# Patient Record
Sex: FEMALE | Race: WHITE | NOT HISPANIC OR LATINO | Employment: OTHER | URBAN - METROPOLITAN AREA
[De-identification: names, ages, dates, MRNs, and addresses within clinical notes are randomized per-mention and may not be internally consistent; named-entity substitution may affect disease eponyms.]

---

## 2017-01-11 RX ORDER — ATORVASTATIN CALCIUM 20 MG/1
TABLET, FILM COATED ORAL
Qty: 90 TABLET | Refills: 3 | Status: SHIPPED | OUTPATIENT
Start: 2017-01-11 | End: 2018-01-01 | Stop reason: SDUPTHER

## 2017-01-11 NOTE — TELEPHONE ENCOUNTER
----- Message from Chana Denise sent at 1/11/2017 10:06 AM CST -----  Contact: self/[phone]  Patient would like a refill on carvedilol (COREG) 3.125 MG tablet and atorvastatin (LIPITOR) 20 MG tablet.  Please advise

## 2017-02-03 DIAGNOSIS — M05.741 RHEUMATOID ARTHRITIS INVOLVING BOTH HANDS WITH POSITIVE RHEUMATOID FACTOR: ICD-10-CM

## 2017-02-03 DIAGNOSIS — M05.742 RHEUMATOID ARTHRITIS INVOLVING BOTH HANDS WITH POSITIVE RHEUMATOID FACTOR: ICD-10-CM

## 2017-02-03 DIAGNOSIS — M06.9 RHEUMATOID ARTHRITIS OF HAND, UNSPECIFIED LATERALITY, UNSPECIFIED RHEUMATOID FACTOR PRESENCE: ICD-10-CM

## 2017-02-03 DIAGNOSIS — Z79.631 LONG TERM METHOTREXATE USER: ICD-10-CM

## 2017-02-03 RX ORDER — METHOTREXATE 2.5 MG/1
25 TABLET ORAL
Qty: 40 TABLET | Refills: 0 | Status: SHIPPED | OUTPATIENT
Start: 2017-02-03 | End: 2017-03-03 | Stop reason: SDUPTHER

## 2017-02-06 RX ORDER — ESCITALOPRAM OXALATE 20 MG/1
TABLET ORAL
Qty: 30 TABLET | Refills: 11 | Status: SHIPPED | OUTPATIENT
Start: 2017-02-06 | End: 2017-06-16 | Stop reason: DRUGHIGH

## 2017-02-15 DIAGNOSIS — M81.0 OSTEOPOROSIS: Primary | ICD-10-CM

## 2017-02-17 ENCOUNTER — LAB VISIT (OUTPATIENT)
Dept: LAB | Facility: HOSPITAL | Age: 82
End: 2017-02-17
Attending: INTERNAL MEDICINE
Payer: MEDICARE

## 2017-02-17 DIAGNOSIS — Z79.631 METHOTREXATE, LONG TERM, CURRENT USE: ICD-10-CM

## 2017-02-17 DIAGNOSIS — M06.9 RHEUMATOID ARTHRITIS OF HAND, UNSPECIFIED LATERALITY, UNSPECIFIED RHEUMATOID FACTOR PRESENCE: ICD-10-CM

## 2017-02-17 LAB
ALBUMIN SERPL BCP-MCNC: 3.5 G/DL
ALP SERPL-CCNC: 67 U/L
ALT SERPL W/O P-5'-P-CCNC: 21 U/L
ANION GAP SERPL CALC-SCNC: 5 MMOL/L
AST SERPL-CCNC: 23 U/L
BASOPHILS # BLD AUTO: 0.03 K/UL
BASOPHILS NFR BLD: 0.7 %
BILIRUB SERPL-MCNC: 0.6 MG/DL
BUN SERPL-MCNC: 20 MG/DL
CALCIUM SERPL-MCNC: 9.2 MG/DL
CHLORIDE SERPL-SCNC: 101 MMOL/L
CO2 SERPL-SCNC: 29 MMOL/L
CREAT SERPL-MCNC: 0.9 MG/DL
CRP SERPL-MCNC: 8.8 MG/L
DIFFERENTIAL METHOD: ABNORMAL
EOSINOPHIL # BLD AUTO: 0.2 K/UL
EOSINOPHIL NFR BLD: 4.6 %
ERYTHROCYTE [DISTWIDTH] IN BLOOD BY AUTOMATED COUNT: 14 %
ERYTHROCYTE [SEDIMENTATION RATE] IN BLOOD BY WESTERGREN METHOD: 31 MM/HR
EST. GFR  (AFRICAN AMERICAN): >60 ML/MIN/1.73 M^2
EST. GFR  (NON AFRICAN AMERICAN): 58.1 ML/MIN/1.73 M^2
GLUCOSE SERPL-MCNC: 79 MG/DL
HCT VFR BLD AUTO: 34.5 %
HGB BLD-MCNC: 11.8 G/DL
LYMPHOCYTES # BLD AUTO: 1 K/UL
LYMPHOCYTES NFR BLD: 22.9 %
MCH RBC QN AUTO: 31.5 PG
MCHC RBC AUTO-ENTMCNC: 34.2 %
MCV RBC AUTO: 92 FL
MONOCYTES # BLD AUTO: 0.5 K/UL
MONOCYTES NFR BLD: 11.3 %
NEUTROPHILS # BLD AUTO: 2.6 K/UL
NEUTROPHILS NFR BLD: 60.3 %
PLATELET # BLD AUTO: 210 K/UL
PMV BLD AUTO: 11.3 FL
POTASSIUM SERPL-SCNC: 4.1 MMOL/L
PROT SERPL-MCNC: 7.1 G/DL
RBC # BLD AUTO: 3.75 M/UL
SODIUM SERPL-SCNC: 135 MMOL/L
WBC # BLD AUTO: 4.32 K/UL

## 2017-02-17 PROCEDURE — 36415 COLL VENOUS BLD VENIPUNCTURE: CPT | Mod: PO

## 2017-02-17 PROCEDURE — 85025 COMPLETE CBC W/AUTO DIFF WBC: CPT

## 2017-02-17 PROCEDURE — 80053 COMPREHEN METABOLIC PANEL: CPT

## 2017-02-17 PROCEDURE — 86140 C-REACTIVE PROTEIN: CPT

## 2017-02-17 PROCEDURE — 85651 RBC SED RATE NONAUTOMATED: CPT

## 2017-02-20 ENCOUNTER — TELEPHONE (OUTPATIENT)
Dept: RHEUMATOLOGY | Facility: CLINIC | Age: 82
End: 2017-02-20

## 2017-02-20 NOTE — TELEPHONE ENCOUNTER
----- Message from Isai Smith MD sent at 2/17/2017  4:33 PM CST -----  Please tell pt the crp is 8.8 which is minimal elevation similar to previous. The sodium is minimally low and slightly improved compared with previous. The liver tests are normal, the chronic kidney disease stage 3 is stable for last 7 months. ARISTIDES

## 2017-02-22 ENCOUNTER — OFFICE VISIT (OUTPATIENT)
Dept: RHEUMATOLOGY | Facility: CLINIC | Age: 82
End: 2017-02-22
Payer: MEDICARE

## 2017-02-22 ENCOUNTER — CLINICAL SUPPORT (OUTPATIENT)
Dept: INFECTIOUS DISEASES | Facility: CLINIC | Age: 82
End: 2017-02-22
Payer: MEDICARE

## 2017-02-22 VITALS
DIASTOLIC BLOOD PRESSURE: 74 MMHG | SYSTOLIC BLOOD PRESSURE: 143 MMHG | HEART RATE: 60 BPM | HEIGHT: 61 IN | WEIGHT: 122.13 LBS | BODY MASS INDEX: 23.06 KG/M2

## 2017-02-22 DIAGNOSIS — I10 ESSENTIAL HYPERTENSION: ICD-10-CM

## 2017-02-22 DIAGNOSIS — M17.0 PRIMARY OSTEOARTHRITIS OF BOTH KNEES: ICD-10-CM

## 2017-02-22 DIAGNOSIS — G89.29 CHRONIC PAIN OF RIGHT KNEE: ICD-10-CM

## 2017-02-22 DIAGNOSIS — M85.88 OSTEOPENIA OF SPINE: ICD-10-CM

## 2017-02-22 DIAGNOSIS — M81.0 OP (OSTEOPOROSIS): ICD-10-CM

## 2017-02-22 DIAGNOSIS — M85.80 OSTEOPENIA: ICD-10-CM

## 2017-02-22 DIAGNOSIS — M45.9 RHEUMATOID ARTHRITIS INVOLVING VERTEBRA WITH POSITIVE RHEUMATOID FACTOR: Primary | ICD-10-CM

## 2017-02-22 DIAGNOSIS — M25.561 CHRONIC PAIN OF RIGHT KNEE: ICD-10-CM

## 2017-02-22 DIAGNOSIS — Z79.631 METHOTREXATE, LONG TERM, CURRENT USE: Chronic | ICD-10-CM

## 2017-02-22 PROCEDURE — G0009 ADMIN PNEUMOCOCCAL VACCINE: HCPCS | Mod: PBBFAC

## 2017-02-22 PROCEDURE — 20610 DRAIN/INJ JOINT/BURSA W/O US: CPT | Mod: S$PBB,GC,, | Performed by: STUDENT IN AN ORGANIZED HEALTH CARE EDUCATION/TRAINING PROGRAM

## 2017-02-22 PROCEDURE — 99999 PR PBB SHADOW E&M-EST. PATIENT-LVL I: CPT | Mod: PBBFAC,,,

## 2017-02-22 PROCEDURE — 99211 OFF/OP EST MAY X REQ PHY/QHP: CPT | Mod: PBBFAC,25

## 2017-02-22 PROCEDURE — 90732 PPSV23 VACC 2 YRS+ SUBQ/IM: CPT | Mod: PBBFAC

## 2017-02-22 PROCEDURE — 99999 PR PBB SHADOW E&M-EST. PATIENT-LVL IV: CPT | Mod: PBBFAC,,, | Performed by: INTERNAL MEDICINE

## 2017-02-22 PROCEDURE — 99214 OFFICE O/P EST MOD 30 MIN: CPT | Mod: 25,S$PBB,, | Performed by: INTERNAL MEDICINE

## 2017-02-22 RX ORDER — TRIAMCINOLONE ACETONIDE 40 MG/ML
40 INJECTION, SUSPENSION INTRA-ARTICULAR; INTRAMUSCULAR
Status: DISCONTINUED | OUTPATIENT
Start: 2017-02-22 | End: 2017-02-22 | Stop reason: HOSPADM

## 2017-02-22 RX ADMIN — TRIAMCINOLONE ACETONIDE 40 MG: 40 INJECTION, SUSPENSION INTRA-ARTICULAR; INTRAMUSCULAR at 12:02

## 2017-02-22 ASSESSMENT — DISEASE ACTIVITY SCORE (DAS28)
TOTAL_SCORE_ESR: 3.5
ESR_MM_PER_HR: 31
TOTAL_SCORE_CRP: 2.88
GLOBAL_HEALTH_SCORE: 10
CRP_MG_PER_LITER: 8.8
TENDER_JOINTS_COUNT: 1
SWOLLEN_JOINTS_COUNT: 2

## 2017-02-22 ASSESSMENT — ROUTINE ASSESSMENT OF PATIENT INDEX DATA (RAPID3)
PSYCHOLOGICAL DISTRESS SCORE: 2.2
PAIN SCORE: 1
MDHAQ FUNCTION SCORE: .9
TOTAL RAPID3 SCORE: 1.67
FATIGUE SCORE: 1
PATIENT GLOBAL ASSESSMENT SCORE: 1
AM STIFFNESS SCORE: 0, NO

## 2017-02-22 NOTE — PROGRESS NOTES
Subjective:       Patient ID: Karly Sandoval is a 86 y.o. female.    Chief Complaint: No chief complaint on file.    HPI   Pt is an 87 y/o F with a h/o RA, OA of the knees, osteoporosis, and HTN who presents today for a f/u visit. She denies any recent flares of her RA. She is taking MTX 25 mg PO weekly and folic acid 1 mg daily. She denies any recent fevers/chills, antibiotic use, alopecia, vision changes, oral ulcers, chest pain, SOB, abdominal pain, skin rash, bowel/bladder changes, or neck pain. Her last Prolia 60 mg injection was 10/10/16, and she is due for another one on 4/10/17. Her worst pain is in her knees, with the right being worse than the left. The pain is worst when she gets out of a chair. She did PT which ended about 2 months ago, but she has not been keeping up with her HEP due to being busy. Her ankles swell sometimes, which she says is better in the morning and worse at night. On ROS, she reports SOB but says that was in the distant past and has not recurred. She also reports constipation, but she is having a BM almost daily. She takes Miralax prn which helps. She has eye drops for dry eyes.    Review of Systems   Constitutional: Negative for chills, fever and unexpected weight change.   HENT: Negative for mouth sores and trouble swallowing.    Eyes: Negative for pain and redness.        Dry eyes   Respiratory: Positive for shortness of breath. Negative for cough and choking.    Cardiovascular: Negative for chest pain.   Gastrointestinal: Positive for constipation. Negative for abdominal pain, blood in stool, diarrhea and nausea.   Endocrine: Negative for cold intolerance and heat intolerance.   Genitourinary: Negative for dysuria, genital sores and hematuria.   Musculoskeletal: Negative for myalgias.   Skin: Negative for color change, pallor and rash.   Neurological: Negative for weakness, numbness and headaches.   Hematological: Negative for adenopathy. Does not bruise/bleed easily.  "  Psychiatric/Behavioral: Negative for dysphoric mood and suicidal ideas.         Objective:     Visit Vitals    BP (!) 143/74    Pulse 60    Ht 5' 1.2" (1.554 m)    Wt 55.4 kg (122 lb 1.6 oz)    BMI 22.92 kg/m2        Physical Exam   Constitutional: She is oriented to person, place, and time and well-developed, well-nourished, and in no distress. No distress.   HENT:   Head: Normocephalic and atraumatic.   Eyes: Conjunctivae and EOM are normal. Pupils are equal, round, and reactive to light. Right eye exhibits no discharge. Left eye exhibits no discharge. No scleral icterus.   Neck: Normal range of motion.   Cardiovascular: Normal rate, regular rhythm, normal heart sounds and intact distal pulses.  Exam reveals no gallop and no friction rub.    No murmur heard.  Pulmonary/Chest: Effort normal and breath sounds normal. No respiratory distress. She has no wheezes. She has no rales.   Abdominal: Soft. Bowel sounds are normal. She exhibits no distension. There is no tenderness.       Right Side Rheumatological Exam     Examination finds the shoulder, elbow, wrist, 1st PIP, 1st MCP, 2nd PIP, 3rd PIP, 3rd MCP, 4th PIP, 4th MCP, 5th PIP and 5th MCP normal.    The patient has an enlarged knee and 2nd MCP    Joint Exam Comments   Knee: Baker's cyst    Knee Exam     Range of Motion   The patient has normal right knee ROM.  Patellofemoral Crepitus: positive  Effusion: negative  Warmth: negative    Muscle Strength (0-5 scale):  Neck Flexion:  5  Neck Extension: 5  Deltoid:  5  Biceps: 5/5   Triceps:  5  : 5/5   Iliopsoas: 5  Quadriceps:  5   Distal Lower Extremity: 5    Left Side Rheumatological Exam     Examination finds the shoulder, elbow, wrist, 1st PIP, 1st MCP, 2nd PIP, 2nd MCP, 3rd PIP, 3rd MCP, 4th PIP, 4th MCP, 5th PIP and 5th MCP normal.    The patient has an enlarged knee.    Joint Exam Comments   Knee: Baker's cyst    Knee Exam     Range of Motion   The patient has normal left knee ROM.    Patellofemoral " Crepitus: positive  Effusion: negative  Warmth: negative    Muscle Strength (0-5 scale):  Neck Flexion:  5  Neck Extension: 5  Deltoid:  5  Biceps: 5/5   Triceps:  5  :  5/5   Iliopsoas: 5  Quadriceps:  5   Distal Lower Extremity: 5      Neurological: She is alert and oriented to person, place, and time. She has normal reflexes. Gait normal.   Right Doran's Sign:  absent  Left Doran's Sign:  Absent  Reflex Scores:       Tricep reflexes are 2+ on the right side and 2+ on the left side.       Bicep reflexes are 2+ on the right side and 2+ on the left side.       Brachioradialis reflexes are 2+ on the right side and 2+ on the left side.       Patellar reflexes are 2+ on the right side and 2+ on the left side.       Achilles reflexes are 2+ on the right side and 2+ on the left side.  Skin: Skin is warm and dry. No rash noted. She is not diaphoretic. No erythema.     Psychiatric: Mood, memory, affect and judgment normal.   Musculoskeletal: Normal range of motion.           Results for ROULA MARIE (MRN [id number]) as of 2/22/2017 11:10   Ref. Range 2/17/2017 09:41   WBC Latest Ref Range: 3.90 - 12.70 K/uL 4.32   RBC Latest Ref Range: 4.00 - 5.40 M/uL 3.75 (L)   Hemoglobin Latest Ref Range: 12.0 - 16.0 g/dL 11.8 (L)   Hematocrit Latest Ref Range: 37.0 - 48.5 % 34.5 (L)   MCV Latest Ref Range: 82 - 98 fL 92   MCH Latest Ref Range: 27.0 - 31.0 pg 31.5 (H)   MCHC Latest Ref Range: 32.0 - 36.0 % 34.2   RDW Latest Ref Range: 11.5 - 14.5 % 14.0   Platelets Latest Ref Range: 150 - 350 K/uL 210   MPV Latest Ref Range: 9.2 - 12.9 fL 11.3   Gran% Latest Ref Range: 38.0 - 73.0 % 60.3   Gran # Latest Ref Range: 1.8 - 7.7 K/uL 2.6   Lymph% Latest Ref Range: 18.0 - 48.0 % 22.9   Lymph # Latest Ref Range: 1.0 - 4.8 K/uL 1.0   Mono% Latest Ref Range: 4.0 - 15.0 % 11.3   Mono # Latest Ref Range: 0.3 - 1.0 K/uL 0.5   Eosinophil% Latest Ref Range: 0.0 - 8.0 % 4.6   Eos # Latest Ref Range: 0.0 - 0.5 K/uL 0.2   Basophil% Latest  Ref Range: 0.0 - 1.9 % 0.7   Baso # Latest Ref Range: 0.00 - 0.20 K/uL 0.03   Sed Rate Latest Ref Range: 0 - 20 mm/Hr 31 (H)   Sodium Latest Ref Range: 136 - 145 mmol/L 135 (L)   Potassium Latest Ref Range: 3.5 - 5.1 mmol/L 4.1   Chloride Latest Ref Range: 95 - 110 mmol/L 101   CO2 Latest Ref Range: 23 - 29 mmol/L 29   Anion Gap Latest Ref Range: 8 - 16 mmol/L 5 (L)   BUN, Bld Latest Ref Range: 8 - 23 mg/dL 20   Creatinine Latest Ref Range: 0.5 - 1.4 mg/dL 0.9   eGFR if non African American Latest Ref Range: >60 mL/min/1.73 m^2 58.1 (A)   eGFR if African American Latest Ref Range: >60 mL/min/1.73 m^2 >60.0   Glucose Latest Ref Range: 70 - 110 mg/dL 79   Calcium Latest Ref Range: 8.7 - 10.5 mg/dL 9.2   Alkaline Phosphatase Latest Ref Range: 55 - 135 U/L 67   Total Protein Latest Ref Range: 6.0 - 8.4 g/dL 7.1   Albumin Latest Ref Range: 3.5 - 5.2 g/dL 3.5   Total Bilirubin Latest Ref Range: 0.1 - 1.0 mg/dL 0.6   AST Latest Ref Range: 10 - 40 U/L 23   ALT Latest Ref Range: 10 - 44 U/L 21   CRP Latest Ref Range: 0.0 - 8.2 mg/L 8.8 (H)   Differential Method Unknown Automated         Assessment:       1. 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 OP (osteoporosis)    3. Primary osteoarthritis of both knees    4. Methotrexate, long term, current use    5. Essential hypertension    6. Osteopenia    7. Osteopenia of spine          TJC=0  SJC=0  DAS28=2.54  GZV48-VGP=4.92    Plan:       *After obtaining verbal consent and discussing the risks and benefits with the patient, the patient's right knee was injected with 1mL 1% lidocaine and 1mL (40mg) Kenalog. Patient tolerated the procedure without complications. See attached procedure note.  - Continue MTX 25mg Qweek, with folic acid 1mg Daily  - Cont BP meds as prescribed by PCP  - Due for DEXA scan after 3/2/17.  - Arthritis survey today  - Pneumovax booster today  - Last Prolia injection was 10/10/16. Due for Prolia on 4/10/17.  - Discussed  the importance of HEP for knees to reduce knee pain and increase strength, as well as spine extension exercises to prevent risk of further kyphosis, vertebral fractures, and falls  - Cont daily vitamin D/calcium  - RTC 3 months with standing labs

## 2017-02-22 NOTE — PROCEDURES
Large Joint Aspiration/Injection  Date/Time: 2/22/2017 12:45 PM  Performed by: KRISTAL KEENAN  Authorized by: KRISTAL KEENAN     Consent Done?:  Yes (Verbal)  Indications:  Pain  Procedure site marked: Yes    Timeout: Prior to procedure the correct patient, procedure, and site was verified      Location:  Knee  Site:  R knee  Prep: Patient was prepped and draped in usual sterile fashion    Ultrasonic Guidance for needle placement: No  Needle size:  25 G  Approach:  Lateral  Medications:  40 mg triamcinolone acetonide 40 mg/mL  Aspirate amount (ml):  0  Patient tolerance:  Patient tolerated the procedure well with no immediate complications

## 2017-02-22 NOTE — PROGRESS NOTES
I have personally taken the history and examined the patient and agree with the resident,s note as stated above     Exam 143/74 tender synovitis both knees right> left no other tender or swollen joints  Chest is clear to ausc  2+ edema bilateral to knees.      RA: DAS28 3.5(moderate) [id number] 2.88( LDA0) but driven by OA knees right>>left  OA knees right> left improved with PT, completed  minimal hyponatremia off HCTZ  Minimal NCNC anemia, improved  Osteoporosis on denosumab 60mg sc q 6months last 10/10/16(#4) next( #5) due 4/19/17      * After verbal consent and cleansing with Chloraprep the right knee injected via the lateral suprapatellar bursa with Kenalog 40mg with 1 ml 1 % lidocaine . Patient tolerated procedure well.  Limited weight bearing x 72 h  *Pneumovax  Cont methotrexate 25 mg po once a week with folic acid 1mg daily  Cont HEP  Arthritis survey as previously ordered  DXA as previously ordered  RTC 3 months with standing labs

## 2017-02-22 NOTE — PATIENT INSTRUCTIONS
"- DEXA (bone density) scan after 3/2/17  - Due for Prolia on 4/10/17  - Pneumovax (pneumonia vaccine) today  - Arthritis survey (x-rays) today  - Continue taking all current medications  - Injected right knee with steroid today. Rest knee for 2-3 days.  - It is very important to continue your exercises. These can reduce knee pain and back exercises can reduce the chance of vertebral fracture and decrease being "hunched over."  - Return to see Dr. Smith in 3 months with labs  "

## 2017-02-22 NOTE — MR AVS SNAPSHOT
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    -  Primary     OP (osteoporosis)         Primary osteoarthritis of both knees         Methotrexate, long term, current use         Essential hypertension         Osteopenia         Osteopenia of spine                To Do List           Future Appointments        Provider Department Dept Phone    3/23/2017 9:15 AM EKG, APPT [facility] [phone]    3/23/2017 9:40 AM PACEMAKER, ICD [facility] Arrhythmia [phone]    3/23/2017 10:00 AM Davy Calero MD [facility] Arrhythmia [phone]    3/23/2017 11:20 AM [facility], DEXA1 [facility]-Bone Mineral Density [phone]    4/3/2017 1:00 PM Uday Allen MD [facility] [phone]      Goals (5 Years of Data)     None      Follow-Up and Disposition     Return in about 3 months (around 2017).      [facility] On Call     [facility] On Call Nurse Care Line - 24/7 Assistance  Registered nurses in the [facility] On Call Center provide clinical advisement, health education, appointment booking, and other advisory services.  Call for this free service at [phone].             Medications           Message regarding Medications     Verify the changes and/or additions to your medication regime listed below are the same as discussed with your clinician today.  If any of these changes or additions are incorrect, please notify your healthcare provider.             Verify that the below list of medications is an accurate representation of the medications you are currently taking.  If none reported, the list may be blank. If incorrect,  "please contact your healthcare provider. Carry this list with you in case of emergency.           Current Medications     alprazolam (XANAX) 0.25 MG tablet Take 1 tablet (0.25 mg total) by mouth daily as needed.    amlodipine (NORVASC) 10 MG tablet 1/2 pill  PO once a day    aspirin (ECOTRIN) 81 MG EC tablet Take 1 tablet (81 mg total) by mouth once daily. HOLD until cleared by your ENT doctor and your Neurologist.    atorvastatin (LIPITOR) 20 MG tablet TAKE 1 TABLET BY MOUTH EVERY DAY    carvedilol (COREG) 3.125 MG tablet Take 1 tablet (3.125 mg total) by mouth 2 (two) times daily with meals.    coenzyme Q10 (CO Q-10) 100 mg capsule Take 100 mg by mouth once daily.    denosumab (PROLIA) 60 mg/mL Syrg Inject 60 mg into the skin. Every 6 months    escitalopram oxalate (LEXAPRO) 20 MG tablet TAKE 1 TABLET (20 MG TOTAL) BY MOUTH ONCE DAILY.    fish oil-omega-3 fatty acids 300-1,000 mg capsule Take 1,000 mg by mouth once daily.     fluticasone (FLONASE) 50 mcg/actuation nasal spray 1 spray by Each Nare route as needed.     FLUZONE HIGH-DOSE 2016-17, PF, 180 mcg/0.5 mL Syrg ADM 0.5ML IM UTD    folic acid (FOLVITE) 1 MG tablet Take 1 tablet (1 mg total) by mouth once daily.    lisinopril (PRINIVIL,ZESTRIL) 40 MG tablet TAKE 1 TABLET BY MOUTH EVERY DAY    methotrexate 2.5 MG Tab Take 10 tablets (25 mg total) by mouth every 7 days.    ranitidine (ZANTAC) 150 MG tablet TAKE ONE TABLET BY MOUTH TWICE DAILY    VITAMIN B COMPLEX ORAL Take 400 mg by mouth once daily. Pt stated that she does not take vitamin b everyday.    vitamin E 400 UNIT capsule Take 400 Units by mouth once daily. Pt taking PRN           Clinical Reference Information           Your Vitals Were     BP Pulse Height Weight BMI    143/74 60 5' 1.2" (1.554 m) 55.4 kg (122 lb 1.6 oz) 22.92 kg/m2      Blood Pressure          Most Recent Value    BP  (!)  143/74      Allergies as of 2/22/2017     Amoxicillin    Bactrim [Sulfamethoxazole-trimethoprim]    Omeprazole " "   Rocephin [Ceftriaxone]    Penicillins      Immunizations Administered on Date of Encounter - 2/22/2017     Name Date Dose VIS Date Route    Pneumococcal Polysaccharide - 23 Valent  Incomplete 0.5 mL 4/24/2015 Intramuscular      Orders Placed During Today's Visit      Normal Orders This Visit    Pneumococcal Polysaccharide Vaccine (23 Valent) (SQ/IM)     Future Labs/Procedures Expected by Expires    DXA Bone Density Spine And Hip  2/22/2017 2/22/2018      [facility] Sign-Up     Activating your [facility] account is as easy as 1-2-3!     1) Visit [email or web address], select Sign Up Now, enter this activation code and your date of birth, then select Next.  [email or web address]  Expires: 4/8/2017 12:41 PM      2) Create a username and password to use when you visit [facility] in the future and select a security question in case you lose your password and select Next.    3) Enter your e-mail address and click Sign Up!    Additional Information  If you have questions, please e-mail [email or web address] or call [phone] to talk to our [facility] staff. Remember, [facility] is NOT to be used for urgent needs. For medical emergencies, dial 911.         Instructions    - DEXA (bone density) scan after 3/2/17  - Due for Prolia on 4/10/17  - Pneumovax (pneumonia vaccine) today  - Arthritis survey (x-rays) today  - Continue taking all current medications  - Injected right knee with steroid today. Rest knee for 2-3 days.  - It is very important to continue your exercises. These can reduce knee pain and back exercises can reduce the chance of vertebral fracture and decrease being "hunched over."  - Return to see Dr. Smith in 3 months with labs       Language Assistance Services     ATTENTION: Language assistance services are available, free of charge. Please call [phone].      ATENCIÓN: Si habla español, tiene a magaña disposición servicios gratuitos de asistencia lingüística. Llame al [phone].     CHÚ Ý: N?u b?n nói Ti?ng " Vi?t, có các d?ch v? h? tr? ngôn ng? mi?n phí dành cho b?n. G?i s? [phone].         [facility] complies with applicable Federal civil rights laws and does not discriminate on the basis of race, color, national origin, age, disability, or sex.

## 2017-03-03 DIAGNOSIS — M06.9 RHEUMATOID ARTHRITIS OF HAND, UNSPECIFIED LATERALITY, UNSPECIFIED RHEUMATOID FACTOR PRESENCE: ICD-10-CM

## 2017-03-03 DIAGNOSIS — Z79.631 LONG TERM METHOTREXATE USER: ICD-10-CM

## 2017-03-03 DIAGNOSIS — M05.741 RHEUMATOID ARTHRITIS INVOLVING BOTH HANDS WITH POSITIVE RHEUMATOID FACTOR: ICD-10-CM

## 2017-03-03 DIAGNOSIS — M05.742 RHEUMATOID ARTHRITIS INVOLVING BOTH HANDS WITH POSITIVE RHEUMATOID FACTOR: ICD-10-CM

## 2017-03-03 RX ORDER — METHOTREXATE 2.5 MG/1
25 TABLET ORAL
Qty: 120 TABLET | Refills: 0 | Status: SHIPPED | OUTPATIENT
Start: 2017-03-03 | End: 2017-06-01 | Stop reason: SDUPTHER

## 2017-03-21 DIAGNOSIS — I50.9 CONGESTIVE HEART FAILURE, UNSPECIFIED CONGESTIVE HEART FAILURE CHRONICITY, UNSPECIFIED CONGESTIVE HEART FAILURE TYPE: Primary | ICD-10-CM

## 2017-03-23 ENCOUNTER — HOSPITAL ENCOUNTER (OUTPATIENT)
Dept: CARDIOLOGY | Facility: CLINIC | Age: 82
Discharge: HOME | End: 2017-03-23
Payer: MEDICARE

## 2017-03-23 ENCOUNTER — HOSPITAL ENCOUNTER (OUTPATIENT)
Dept: RADIOLOGY | Facility: HOSPITAL | Age: 82
Discharge: HOME | End: 2017-03-23
Attending: STUDENT IN AN ORGANIZED HEALTH CARE EDUCATION/TRAINING PROGRAM
Payer: MEDICARE

## 2017-03-23 ENCOUNTER — CLINICAL SUPPORT (OUTPATIENT)
Dept: ELECTROPHYSIOLOGY | Facility: CLINIC | Age: 82
End: 2017-03-23
Payer: MEDICARE

## 2017-03-23 ENCOUNTER — HOSPITAL ENCOUNTER (OUTPATIENT)
Dept: RADIOLOGY | Facility: CLINIC | Age: 82
Discharge: HOME | End: 2017-03-23
Attending: STUDENT IN AN ORGANIZED HEALTH CARE EDUCATION/TRAINING PROGRAM
Payer: MEDICARE

## 2017-03-23 ENCOUNTER — OFFICE VISIT (OUTPATIENT)
Dept: ELECTROPHYSIOLOGY | Facility: CLINIC | Age: 82
End: 2017-03-23
Payer: MEDICARE

## 2017-03-23 VITALS
HEIGHT: 62 IN | BODY MASS INDEX: 23.21 KG/M2 | WEIGHT: 126.13 LBS | DIASTOLIC BLOOD PRESSURE: 61 MMHG | HEART RATE: 60 BPM | SYSTOLIC BLOOD PRESSURE: 142 MMHG

## 2017-03-23 DIAGNOSIS — M85.88 OSTEOPENIA OF SPINE: ICD-10-CM

## 2017-03-23 DIAGNOSIS — Z95.0 PACEMAKER: Primary | ICD-10-CM

## 2017-03-23 DIAGNOSIS — M85.80 OSTEOPENIA: ICD-10-CM

## 2017-03-23 DIAGNOSIS — M45.9 RHEUMATOID ARTHRITIS INVOLVING VERTEBRA WITH POSITIVE RHEUMATOID FACTOR: ICD-10-CM

## 2017-03-23 DIAGNOSIS — Z95.0 CARDIAC PACEMAKER IN SITU: ICD-10-CM

## 2017-03-23 DIAGNOSIS — I50.9 CONGESTIVE HEART FAILURE, UNSPECIFIED CONGESTIVE HEART FAILURE CHRONICITY, UNSPECIFIED CONGESTIVE HEART FAILURE TYPE: ICD-10-CM

## 2017-03-23 DIAGNOSIS — I49.3 PVC (PREMATURE VENTRICULAR CONTRACTION): ICD-10-CM

## 2017-03-23 DIAGNOSIS — M17.0 PRIMARY OSTEOARTHRITIS OF BOTH KNEES: ICD-10-CM

## 2017-03-23 DIAGNOSIS — E78.5 HYPERLIPIDEMIA, UNSPECIFIED HYPERLIPIDEMIA TYPE: ICD-10-CM

## 2017-03-23 DIAGNOSIS — I49.8 ATRIAL ARRHYTHMIA: ICD-10-CM

## 2017-03-23 DIAGNOSIS — I45.10 RBBB: ICD-10-CM

## 2017-03-23 DIAGNOSIS — I44.4 LAFB (LEFT ANTERIOR FASCICULAR BLOCK): ICD-10-CM

## 2017-03-23 DIAGNOSIS — I10 ESSENTIAL HYPERTENSION: ICD-10-CM

## 2017-03-23 DIAGNOSIS — Z79.631 METHOTREXATE, LONG TERM, CURRENT USE: Chronic | ICD-10-CM

## 2017-03-23 DIAGNOSIS — Q21.12 PFO (PATENT FORAMEN OVALE): ICD-10-CM

## 2017-03-23 PROCEDURE — 77080 DXA BONE DENSITY AXIAL: CPT | Mod: 26,,, | Performed by: INTERNAL MEDICINE

## 2017-03-23 PROCEDURE — 99214 OFFICE O/P EST MOD 30 MIN: CPT | Mod: S$PBB,,, | Performed by: INTERNAL MEDICINE

## 2017-03-23 PROCEDURE — 93010 ELECTROCARDIOGRAM REPORT: CPT | Mod: S$PBB,,, | Performed by: INTERNAL MEDICINE

## 2017-03-23 PROCEDURE — 93280 PM DEVICE PROGR EVAL DUAL: CPT | Mod: PBBFAC | Performed by: INTERNAL MEDICINE

## 2017-03-23 PROCEDURE — 99999 PR PBB SHADOW E&M-EST. PATIENT-LVL III: CPT | Mod: PBBFAC,,, | Performed by: INTERNAL MEDICINE

## 2017-03-23 PROCEDURE — 77080 DXA BONE DENSITY AXIAL: CPT | Mod: TC

## 2017-03-23 PROCEDURE — 77077 JOINT SURVEY SINGLE VIEW: CPT | Mod: 26,,, | Performed by: RADIOLOGY

## 2017-03-23 NOTE — PROGRESS NOTES
Subjective:   Patient ID:  Karly Sandoval is a 86 y.o. female     Chief complaint:Congestive Heart Failure      HPI  From my last note (2/29/16):  85 woman  RA  Syncope, RBBB/LAHB >. PPM 3 months ago   Has done well since then -- no issues   Feels well   PPM in good repair   ECG shows NSR RBBB/LAHB  She is here with her daughter and she is very jovial and talkative  She is doing well -- she can do what she wants to do-- she uses a cane 'cos she is worried about falling-- ever since she had her syncopal spells -- she has had no falls since the PPM  Update since then:  She has been stable and has not slowed down since last visit with me. No admissions since 2014.  I have reviewed the actual image of the ECG tracing obtained today and it shows NSR with LAHB/RBBB and o/w normal intervals    Current Outpatient Prescriptions   Medication Sig    alprazolam (XANAX) 0.25 MG tablet Take 1 tablet (0.25 mg total) by mouth daily as needed.    amlodipine (NORVASC) 10 MG tablet 1/2 pill  PO once a day    aspirin (ECOTRIN) 81 MG EC tablet Take 1 tablet (81 mg total) by mouth once daily. HOLD until cleared by your ENT doctor and your Neurologist.    atorvastatin (LIPITOR) 20 MG tablet TAKE 1 TABLET BY MOUTH EVERY DAY    carvedilol (COREG) 3.125 MG tablet Take 1 tablet (3.125 mg total) by mouth 2 (two) times daily with meals.    coenzyme Q10 (CO Q-10) 100 mg capsule Take 100 mg by mouth once daily.    denosumab (PROLIA) 60 mg/mL Syrg Inject 60 mg into the skin. Every 6 months    escitalopram oxalate (LEXAPRO) 20 MG tablet TAKE 1 TABLET (20 MG TOTAL) BY MOUTH ONCE DAILY.    fish oil-omega-3 fatty acids 300-1,000 mg capsule Take 1,000 mg by mouth once daily.     fluticasone (FLONASE) 50 mcg/actuation nasal spray 1 spray by Each Nare route as needed.     FLUZONE HIGH-DOSE 2016-17, PF, 180 mcg/0.5 mL Syrg ADM 0.5ML IM UTD    folic acid (FOLVITE) 1 MG tablet Take 1 tablet (1 mg total) by mouth once daily.    lisinopril  (PRINIVIL,ZESTRIL) 40 MG tablet TAKE 1 TABLET BY MOUTH EVERY DAY    methotrexate 2.5 MG Tab Take 10 tablets (25 mg total) by mouth every 7 days.    ranitidine (ZANTAC) 150 MG tablet TAKE ONE TABLET BY MOUTH TWICE DAILY    VITAMIN B COMPLEX ORAL Take 400 mg by mouth once daily. Pt stated that she does not take vitamin b everyday.    vitamin E 400 UNIT capsule Take 400 Units by mouth once daily. Pt taking PRN     No current facility-administered medications for this visit.      Review of Systems   Constitution: Positive for malaise/fatigue. Negative for decreased appetite, weakness, weight gain and weight loss.   HENT: Negative for headaches and nosebleeds.    Eyes: Negative for blurred vision and visual disturbance.   Cardiovascular: Negative for chest pain, claudication, cyanosis, dyspnea on exertion, irregular heartbeat, leg swelling, near-syncope, orthopnea, palpitations, paroxysmal nocturnal dyspnea and syncope.   Respiratory: Negative for cough, shortness of breath and wheezing.    Endocrine: Negative for heat intolerance.   Skin: Negative for rash.   Musculoskeletal: Negative for muscle weakness and myalgias.   Gastrointestinal: Positive for bloating. Negative for abdominal pain, anorexia, melena, nausea and vomiting.   Genitourinary: Negative for menorrhagia.   Neurological: Negative for excessive daytime sleepiness, dizziness, loss of balance, seizures and vertigo.   Psychiatric/Behavioral: Negative for altered mental status and depression. The patient is not nervous/anxious.        Objective:   Physical Exam   Constitutional: She is oriented to person, place, and time. She appears well-developed and well-nourished.   HENT:   Head: Normocephalic and atraumatic.   Right Ear: External ear normal.   Left Ear: External ear normal.   Eyes: Conjunctivae are normal. Pupils are equal, round, and reactive to light. Left eye exhibits no discharge. No scleral icterus.   Neck: Normal range of motion. Neck supple. No  "thyromegaly present.   Cardiovascular: Normal rate, regular rhythm, normal heart sounds and intact distal pulses.  Exam reveals no gallop, no S3, no S4, no friction rub, no midsystolic click and no opening snap.    No murmur heard.  Pulses:       Carotid pulses are 2+ on the right side, and 2+ on the left side.       Radial pulses are 2+ on the right side, and 2+ on the left side.        Dorsalis pedis pulses are 2+ on the right side, and 2+ on the left side.        Posterior tibial pulses are 2+ on the right side, and 2+ on the left side.   Pulmonary/Chest: Effort normal and breath sounds normal.   Device pocket is in excellent repair   Abdominal: Soft. She exhibits no distension. There is no hepatomegaly. There is no tenderness. There is no guarding.   Musculoskeletal:        Right ankle: She exhibits no swelling.        Left ankle: She exhibits no swelling.        Right lower leg: She exhibits no swelling.        Left lower leg: She exhibits no swelling.   Neurological: She is alert and oriented to person, place, and time. She has normal strength. No cranial nerve deficit. Gait normal.   Skin: Skin is warm, dry and intact. No rash noted. No cyanosis. Nails show no clubbing.   Psychiatric: She has a normal mood and affect. Her speech is normal and behavior is normal. Thought content normal. Cognition and memory are normal.   Nursing note and vitals reviewed.    BP (!) 142/61  Pulse 60  Ht 5' 2" (1.575 m)  Wt 57.2 kg (126 lb 1.7 oz)  BMI 23.06 kg/m2     Assessment:      1. Pacemaker    2. LAFB (left anterior fascicular block)    3. Hyperlipidemia, unspecified hyperlipidemia type    4. Essential hypertension    5. 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 Methotrexate, long term, current use    7. PFO (patent foramen ovale)    8. RBBB        Plan:    Discussed nutritional and exercise needs  No orders of the defined types were placed in this encounter.    Return in about 1 " year (around 3/23/2018), or if symptoms worsen or fail to improve, for sarahi bolton.  There are no discontinued medications.  New Prescriptions    No medications on file     Modified Medications    No medications on file

## 2017-03-28 ENCOUNTER — TELEPHONE (OUTPATIENT)
Dept: RHEUMATOLOGY | Facility: CLINIC | Age: 82
End: 2017-03-28

## 2017-04-03 ENCOUNTER — OFFICE VISIT (OUTPATIENT)
Dept: FAMILY MEDICINE | Facility: CLINIC | Age: 82
End: 2017-04-03
Payer: MEDICARE

## 2017-04-03 VITALS
TEMPERATURE: 98 F | HEART RATE: 60 BPM | HEIGHT: 62 IN | DIASTOLIC BLOOD PRESSURE: 60 MMHG | SYSTOLIC BLOOD PRESSURE: 174 MMHG | WEIGHT: 125 LBS | BODY MASS INDEX: 23 KG/M2 | OXYGEN SATURATION: 93 %

## 2017-04-03 DIAGNOSIS — F41.1 GENERALIZED ANXIETY DISORDER: ICD-10-CM

## 2017-04-03 DIAGNOSIS — Z79.631 METHOTREXATE, LONG TERM, CURRENT USE: ICD-10-CM

## 2017-04-03 DIAGNOSIS — I45.10 RBBB: ICD-10-CM

## 2017-04-03 DIAGNOSIS — M06.9 RHEUMATOID ARTHRITIS, INVOLVING UNSPECIFIED SITE, UNSPECIFIED RHEUMATOID FACTOR PRESENCE: ICD-10-CM

## 2017-04-03 DIAGNOSIS — E87.1 HYPONATREMIA: ICD-10-CM

## 2017-04-03 DIAGNOSIS — M17.0 OSTEOARTHRITIS OF BOTH KNEES, UNSPECIFIED OSTEOARTHRITIS TYPE: ICD-10-CM

## 2017-04-03 DIAGNOSIS — D63.8 ANEMIA OF CHRONIC DISEASE: ICD-10-CM

## 2017-04-03 DIAGNOSIS — M81.0 OSTEOPOROSIS, UNSPECIFIED OSTEOPOROSIS TYPE, UNSPECIFIED PATHOLOGICAL FRACTURE PRESENCE: ICD-10-CM

## 2017-04-03 DIAGNOSIS — I10 ESSENTIAL HYPERTENSION: Primary | ICD-10-CM

## 2017-04-03 DIAGNOSIS — I50.32 CHRONIC DIASTOLIC CHF (CONGESTIVE HEART FAILURE): ICD-10-CM

## 2017-04-03 DIAGNOSIS — I44.4 LAFB (LEFT ANTERIOR FASCICULAR BLOCK): ICD-10-CM

## 2017-04-03 PROCEDURE — 99213 OFFICE O/P EST LOW 20 MIN: CPT | Mod: PBBFAC,PO | Performed by: FAMILY MEDICINE

## 2017-04-03 PROCEDURE — 99999 PR PBB SHADOW E&M-EST. PATIENT-LVL III: CPT | Mod: PBBFAC,,, | Performed by: FAMILY MEDICINE

## 2017-04-03 PROCEDURE — 99215 OFFICE O/P EST HI 40 MIN: CPT | Mod: S$PBB,,, | Performed by: FAMILY MEDICINE

## 2017-04-03 NOTE — PROGRESS NOTES
(Portions of this note were dictated using voice recognition software and may contain dictation related errors in spelling/grammar/syntax not found on text review)    CC:   Chief Complaint   Patient presents with    Follow-up       HPI: 86 y.o. female here for medical follow-up    Rheumatoid arthritis followed by rheumatology , on methotrexate therapy    Prior history of stroke, was on dual platelet therapy with aspirin and Plavix.  However, had a syncopal episode, deemed to likely be cardiogenic in nature.  RBBB/LAHB treated with pacemaker.  She was reporting increased bruising with Plavix added, and recently had seen vascular neurology who had recommended stopping Plavix and continue indefinitely on aspirin monotherapy.  Also her Lexapro was decreased to 10 mg daily bc of bruising.    Past history of vertebral compression fracture status post vertebroplasty ×2. Recent xray end of last yr showed no new compression fx    Osteopenia with high fracture risk on DEXA but clinically osteoporotic given fx above: Was on alendronate in the past that had been off secondary to drug holiday.  Currently set up with Prolia injections, last DEXA 3/23/17, improvement of spine bmd    Anemia of chronic disease: Has been stable.  She does try to take iron but states that this makes her very constipated    Patient does have anxiety, Lexapro 10 mg daily, decreased from 20 mg as above.  Feels that anxiety stable,     Hypertension with coexisting diastolic dysfunction, last echo on 6/17/16 with EF of 55%, mild to moderate mitral regurgitation, diastolic dysfunction, mild aVR, mild TVR. On Lisinopril 40 mg daily , carvedilol 3.125 mg twice a day, and amlodipine 10 mg (was on HCTZ in past but was taken off d/t hyponatremia which improved off this med). bp has been better controlled on past VS review over the past month but is high today.  She actually states that she still only taking the 5 mg of amlodipine and never went up to 10 mg as  initially discussed.     Overall doing well today.  Does have some knee pains.  She had gotten the injections by rheumatology which helped for about a week or so.  She was advised him to physical therapy but was somewhat hesitant for this.    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)     has pacemaker       Past Surgical History:   Procedure Laterality Date    APPENDECTOMY      BREAST SURGERY      CARDIAC PACEMAKER PLACEMENT  11/2014    for syncope and RBBB/LAHB    CHOLECYSTECTOMY      EYE SURGERY      HEMORRHOID SURGERY      HYSTERECTOMY      1966    SKIN BIOPSY      VASCULAR SURGERY      VERTEBROPLASTY         Family History   Problem Relation Age of Onset    Leukemia Father     Heart disease Father     Hypertension Father     Diabetes Mellitus Mother     Hypertension Mother     Diabetes Mellitus Brother     Parkinsonism Brother 68    Heart attack Neg Hx     Heart failure Neg Hx     Hyperlipidemia Neg Hx     Stroke Neg Hx        Social History     Social History    Marital status:      Spouse name: N/A    Number of children: N/A    Years of education: N/A     Occupational History    Not on file.     Social History Main Topics    Smoking status: Never Smoker    Smokeless tobacco: Not on file    Alcohol use No      Comment: [facility]    Drug use: No    Sexual activity: No     Other Topics Concern    Not on file      Social History Narrative     SCREENINGS     Mammogram 2012, benign     Colonoscopy 2010, diverticulosis     DEXA scan 3-17. L spine T score is -0.9, up from -1.6  Total hip T score is -1.3. Femoral neck T score is -1.8. She has been scheduled for prolia injections     Immunizations  Pvx 2008  PCV 2015  Zoster 2016  Tetanus status unknown    Lab Results   Component Value Date    WBC 4.32 02/17/2017    HGB 11.8 (L) 02/17/2017    HCT 34.5 (L) 02/17/2017     02/17/2017    CHOL 136 10/17/2016    TRIG 61 10/17/2016    HDL 60 10/17/2016    ALT 21 02/17/2017    AST 23 02/17/2017     (L) 02/17/2017    K 4.1 02/17/2017     02/17/2017    CREATININE 0.9 02/17/2017    BUN 20 02/17/2017    CO2 29 02/17/2017    TSH 2.678 05/05/2016    INR 1.0 11/21/2014    HGBA1C 6.0 01/27/2014    LDLCALC 63.8 10/17/2016    GLU 79 02/17/2017     D level checked in October 2016 was normalThiamine level was normal.  Vitamin B12 level was high    ROS:  GENERAL: No fever, chills, fatigability or weight loss.  SKIN: No rashes, no itching.  Does state that she gets easily bruised.  HEAD: No headaches.  EYES: No visual changes  EARS: No ear pain or changes in hearing.  NOSE: No congestion or rhinorrhea.  MOUTH & THROAT: No hoarseness, change in voice, or sore throat.  NODES: Denies swollen glands.  CHEST: Denies RODAS, cyanosis, wheezing, cough and sputum production.  CARDIOVASCULAR: Denies chest pain, PND, orthopnea.  ABDOMEN: No nausea, vomiting, or changes in bowel function.  URINARY: No flank pain, dysuria or hematuria.  PERIPHERAL VASCULAR: No claudication or cyanosis.  MUSCULOSKELETAL: Above.  NEUROLOGIC: No weakness or numbness.    Vital signs reviewed the blood pressure was 174/60  PE:   APPEARANCE: Well nourished, well developed, in no acute distress.    HEAD: Normocephalic, atraumatic.  EYES: PERRL. EOMI.   Conjunctivae noninjected.  EARS: TM's intact. Light reflex normal. No retraction or perforation  NOSE: Mucosa pink.  Airway clear.  MOUTH & THROAT: No tonsillar enlargement. No pharyngeal erythema or exudate.   NECK: Supple with no cervical lymphadenopathy.  No carotid bruits.  No thyromegaly  CHEST: Good inspiratory effort. Lungs clear to auscultation with no wheezes or crackles.  CARDIOVASCULAR: Normal S1, S2. No rubs, murmurs, or gallops.  ABDOMEN: Bowel sounds normal. Not distended. Soft. No tenderness or masses. No organomegaly.  EXTREMITIES: 1+ ankle edema bilaterally.  Benign-appearing ecchymosis from minor trauma noted left arm      IMPRESSION  1. Essential hypertension    2. Rheumatoid arthritis, involving unspecified site, unspecified rheumatoid factor presence    3. Hyponatremia    4. Anemia of chronic disease    5. LAFB (left anterior fascicular block)    6. RBBB    7. Methotrexate, long term, current use    8. Osteoporosis, unspecified osteoporosis type, unspecified pathological fracture presence    9. Chronic diastolic CHF (congestive heart failure)    10. Osteoarthritis of both knees, unspecified osteoarthritis type    11. Generalized anxiety disorder            PLAN  Reviewed my prior medical documentation along with recent rheumatology and cardiology notes.  Reviewed her health screenings, labs as above.    Hypertension not controlled.  Advised to go up to the 10 mg of amlodipine as initially discussed.  Assess for any significant concern for increased peripheral edema although mild increase edema without any symptoms of pain should not be a very concerning pathology.  She could use compression stockings to help with this.  Will continue nadolol 3.125 mrem twice a day and lisinopril 40 mg daily.  She will stay off  hydrochlorothiazide secondary to prior hyponatremia issues next    Rheumatoid arthritis: Continue rheumatology follow-up.  Next    Hyponatremia: Stable since being off of thiazide next    Anemia chronic disease: Overall stable    Chronic diastolic dysfunction, stable with cardiology follow-up.  On ACE  inhibitor and beta blocker as above.  On aspirin next    Osteoporosis: Continue Prolia injections    Anxiety, stable on Lexapro 10 mg next    Knee pain: Home therapy exercises reviewed.  If worsening symptoms, can consider formal physical therapy.  She does have moderate DJD noted bilaterally based on arthritis survey done March 23, 2017    She states that she will be seeing her cardiologist the next 2 months or so.  She is advised on ambulatory blood pressure monitoring and calling the office if her blood pressure stays at or above 150/90  She can make an appointment here in the next 3-4 months for repeat check of her blood pressure.    She can get tetanus shot at local pharmacy

## 2017-04-03 NOTE — MR AVS SNAPSHOT
[address]  [place] [address]  Phone: [phone]  Fax: [phone]                  Karly Sandoval   4/3/2017 1:00 PM   Office Visit    Description:  Female : 1930   Provider:  Uday Allen MD   Department:  [facility]           Reason for Visit     Follow-up           Diagnoses this Visit        Comments    Essential hypertension    -  Primary     Rheumatoid arthritis, involving unspecified site, unspecified rheumatoid factor presence         Hyponatremia         Anemia of chronic disease         LAFB (left anterior fascicular block)         RBBB         Methotrexate, long term, current use         Osteoporosis, unspecified osteoporosis type, unspecified pathological fracture presence         Chronic diastolic CHF (congestive heart failure)                To Do List           Future Appointments        Provider Department Dept Phone    2017 10:15 AM EPO, INJECTION [facility] [phone]    2017 10:00 AM LAB, [facility] [phone]    2017 10:00 AM Isai Smith MD [facility] - Rheumatology [phone]    2017 8:30 AM LAB, [facility] [phone]    2017 10:30 AM Briseida Muñoz MD [facility] - Cardiology [phone]      Goals (5 Years of Data)     None      [facility]     [facility] Nurse Care Line - 24/ Assistance  Unless otherwise directed by your provider, please contact [facility], our nurse care line that is available for / assistance.     Registered nurses in the [facility] provide: appointment scheduling, clinical advisement, health education, and other advisory services.  Call: [phone] (toll free)               Medications           Message regarding Medications     Verify the changes and/or additions to your medication regime listed below are the same as discussed with your  clinician today.  If any of these changes or additions are incorrect, please notify your healthcare provider.             Verify that the below list of medications is an accurate representation of the medications you are currently taking.  If none reported, the list may be blank. If incorrect, please contact your healthcare provider. Carry this list with you in case of emergency.           Current Medications     alprazolam (XANAX) 0.25 MG tablet Take 1 tablet (0.25 mg total) by mouth daily as needed.    amlodipine (NORVASC) 10 MG tablet 1/2 pill  PO once a day    aspirin (ECOTRIN) 81 MG EC tablet Take 1 tablet (81 mg total) by mouth once daily. HOLD until cleared by your ENT doctor and your Neurologist.    atorvastatin (LIPITOR) 20 MG tablet TAKE 1 TABLET BY MOUTH EVERY DAY    carvedilol (COREG) 3.125 MG tablet Take 1 tablet (3.125 mg total) by mouth 2 (two) times daily with meals.    coenzyme Q10 (CO Q-10) 100 mg capsule Take 100 mg by mouth once daily.    denosumab (PROLIA) 60 mg/mL Syrg Inject 60 mg into the skin. Every 6 months    escitalopram oxalate (LEXAPRO) 20 MG tablet TAKE 1 TABLET (20 MG TOTAL) BY MOUTH ONCE DAILY.    fish oil-omega-3 fatty acids 300-1,000 mg capsule Take 1,000 mg by mouth once daily.     fluticasone (FLONASE) 50 mcg/actuation nasal spray 1 spray by Each Nare route as needed.     FLUZONE HIGH-DOSE 2016-17, PF, 180 mcg/0.5 mL Syrg ADM 0.5ML IM UTD    folic acid (FOLVITE) 1 MG tablet Take 1 tablet (1 mg total) by mouth once daily.    lisinopril (PRINIVIL,ZESTRIL) 40 MG tablet TAKE 1 TABLET BY MOUTH EVERY DAY    methotrexate 2.5 MG Tab Take 10 tablets (25 mg total) by mouth every 7 days.    ranitidine (ZANTAC) 150 MG tablet TAKE ONE TABLET BY MOUTH TWICE DAILY    VITAMIN B COMPLEX ORAL Take 400 mg by mouth once daily. Pt stated that she does not take vitamin b everyday.    vitamin E 400 UNIT capsule Take 400 Units by mouth once daily. Pt taking PRN           Clinical Reference Information  "          Your Vitals Were     BP Pulse Temp Height Weight SpO2    160/64 (BP Location: Right arm, Patient Position: Sitting, BP Method: Manual) 60 98.2 °F (36.8 °C) (Oral) 5' 2" (1.575 m) 56.7 kg (125 lb) 93%    BMI                22.86 kg/m2          Blood Pressure          Most Recent Value    BP  (!)  160/64      Allergies as of 4/3/2017     Amoxicillin    Bactrim [Sulfamethoxazole-trimethoprim]    Omeprazole    Rocephin [Ceftriaxone]    Penicillins      Immunizations Administered on Date of Encounter - 4/3/2017     None      [facility] Sign-Up     Activating your [facility] account is as easy as 1-2-3!     1) Visit [email or web address], select Sign Up Now, enter this activation code and your date of birth, then select Next.  [email or web address]  Expires: 4/8/2017  1:41 PM      2) Create a username and password to use when you visit [facility] in the future and select a security question in case you lose your password and select Next.    3) Enter your e-mail address and click Sign Up!    Additional Information  If you have questions, please e-mail [email or web address] or call [phone] to talk to our [facility] staff. Remember, [facility] is NOT to be used for urgent needs. For medical emergencies, dial 911.         Language Assistance Services     ATTENTION: Language assistance services are available, free of charge. Please call [phone].      ATENCIÓN: Si habla español, tiene a magaña disposición servicios gratuitos de asistencia lingüística. Llame al [phone].     [facility] Ý: N?u b?n nói Ti?ng Vi?t, có các d?ch v? h? tr? ngôn ng? mi?n phí dành cho b?n. G?i s? [phone].         [facility] complies with applicable Federal civil rights laws and does not discriminate on the basis of race, color, national origin, age, disability, or sex.        "

## 2017-04-12 ENCOUNTER — INFUSION (OUTPATIENT)
Dept: INFECTIOUS DISEASES | Facility: HOSPITAL | Age: 82
End: 2017-04-12
Attending: INTERNAL MEDICINE
Payer: MEDICARE

## 2017-04-12 VITALS — BODY MASS INDEX: 23 KG/M2 | WEIGHT: 125 LBS | HEIGHT: 62 IN

## 2017-04-12 DIAGNOSIS — M85.80 OSTEOPENIA, UNSPECIFIED LOCATION: ICD-10-CM

## 2017-04-12 DIAGNOSIS — M80.00XD AGE-RELATED OSTEOPOROSIS WITH CURRENT PATHOLOGICAL FRACTURE WITH ROUTINE HEALING, SUBSEQUENT ENCOUNTER: Primary | ICD-10-CM

## 2017-04-12 PROCEDURE — 96401 CHEMO ANTI-NEOPL SQ/IM: CPT

## 2017-04-12 PROCEDURE — 63600175 PHARM REV CODE 636 W HCPCS: Performed by: INTERNAL MEDICINE

## 2017-04-12 RX ADMIN — DENOSUMAB 60 MG: 60 INJECTION SUBCUTANEOUS at 10:04

## 2017-05-16 ENCOUNTER — TELEPHONE (OUTPATIENT)
Dept: ELECTROPHYSIOLOGY | Facility: CLINIC | Age: 82
End: 2017-05-16

## 2017-05-16 NOTE — TELEPHONE ENCOUNTER
Patient contacted the [facility] on this morning in relation to the [employer] remote monitor she received.  Patient stated she feels intimidated by the monitor and does NOT want to use it.  Patient stated she would prefer to continue to do her pacer follow up they way she has always done in the past.  Informed the patient a message would be sent to Dr. Calero as well as the Technician that monitors this web site.  Patient verbalized understanding.

## 2017-05-17 DIAGNOSIS — I10 HYPERTENSION, ESSENTIAL: ICD-10-CM

## 2017-05-17 RX ORDER — CARVEDILOL 3.12 MG/1
TABLET ORAL
Qty: 60 TABLET | Refills: 0 | Status: SHIPPED | OUTPATIENT
Start: 2017-05-17 | End: 2017-06-13 | Stop reason: SDUPTHER

## 2017-05-26 ENCOUNTER — LAB VISIT (OUTPATIENT)
Dept: LAB | Facility: HOSPITAL | Age: 82
End: 2017-05-26
Attending: INTERNAL MEDICINE
Payer: MEDICARE

## 2017-05-26 DIAGNOSIS — M06.9 RHEUMATOID ARTHRITIS OF HAND, UNSPECIFIED LATERALITY, UNSPECIFIED RHEUMATOID FACTOR PRESENCE: ICD-10-CM

## 2017-05-26 DIAGNOSIS — Z79.631 METHOTREXATE, LONG TERM, CURRENT USE: ICD-10-CM

## 2017-05-26 LAB
ALBUMIN SERPL BCP-MCNC: 3.4 G/DL
ALP SERPL-CCNC: 65 U/L
ALT SERPL W/O P-5'-P-CCNC: 18 U/L
ANION GAP SERPL CALC-SCNC: 7 MMOL/L
AST SERPL-CCNC: 22 U/L
BASOPHILS # BLD AUTO: 0.03 K/UL
BASOPHILS NFR BLD: 0.5 %
BILIRUB SERPL-MCNC: 0.6 MG/DL
BUN SERPL-MCNC: 21 MG/DL
CALCIUM SERPL-MCNC: 9.3 MG/DL
CHLORIDE SERPL-SCNC: 101 MMOL/L
CO2 SERPL-SCNC: 27 MMOL/L
CREAT SERPL-MCNC: 0.9 MG/DL
CRP SERPL-MCNC: 6.8 MG/L
DIFFERENTIAL METHOD: ABNORMAL
EOSINOPHIL # BLD AUTO: 0.2 K/UL
EOSINOPHIL NFR BLD: 3.2 %
ERYTHROCYTE [DISTWIDTH] IN BLOOD BY AUTOMATED COUNT: 13.7 %
ERYTHROCYTE [SEDIMENTATION RATE] IN BLOOD BY WESTERGREN METHOD: 34 MM/HR
EST. GFR  (AFRICAN AMERICAN): >60 ML/MIN/1.73 M^2
EST. GFR  (NON AFRICAN AMERICAN): 58.1 ML/MIN/1.73 M^2
GLUCOSE SERPL-MCNC: 102 MG/DL
HCT VFR BLD AUTO: 33.7 %
HGB BLD-MCNC: 11.1 G/DL
LYMPHOCYTES # BLD AUTO: 1.1 K/UL
LYMPHOCYTES NFR BLD: 18.9 %
MCH RBC QN AUTO: 31.4 PG
MCHC RBC AUTO-ENTMCNC: 32.9 %
MCV RBC AUTO: 95 FL
MONOCYTES # BLD AUTO: 0.6 K/UL
MONOCYTES NFR BLD: 9.9 %
NEUTROPHILS # BLD AUTO: 3.7 K/UL
NEUTROPHILS NFR BLD: 67 %
PLATELET # BLD AUTO: 215 K/UL
PMV BLD AUTO: 11.5 FL
POTASSIUM SERPL-SCNC: 4.5 MMOL/L
PROT SERPL-MCNC: 6.7 G/DL
RBC # BLD AUTO: 3.54 M/UL
SODIUM SERPL-SCNC: 135 MMOL/L
WBC # BLD AUTO: 5.56 K/UL

## 2017-05-26 PROCEDURE — 36415 COLL VENOUS BLD VENIPUNCTURE: CPT | Mod: PO

## 2017-05-26 PROCEDURE — 86140 C-REACTIVE PROTEIN: CPT

## 2017-05-26 PROCEDURE — 85025 COMPLETE CBC W/AUTO DIFF WBC: CPT

## 2017-05-26 PROCEDURE — 85651 RBC SED RATE NONAUTOMATED: CPT

## 2017-05-26 PROCEDURE — 80053 COMPREHEN METABOLIC PANEL: CPT

## 2017-05-29 DIAGNOSIS — Z79.631 LONG TERM METHOTREXATE USER: ICD-10-CM

## 2017-05-29 DIAGNOSIS — M06.9 RHEUMATOID ARTHRITIS OF HAND, UNSPECIFIED LATERALITY, UNSPECIFIED RHEUMATOID FACTOR PRESENCE: ICD-10-CM

## 2017-05-29 RX ORDER — FOLIC ACID 1 MG/1
1 TABLET ORAL DAILY
Qty: 90 TABLET | Refills: 3 | Status: SHIPPED | OUTPATIENT
Start: 2017-05-29 | End: 2017-06-16 | Stop reason: SDUPTHER

## 2017-05-29 NOTE — TELEPHONE ENCOUNTER
----- Message from Isai Smith MD sent at 5/28/2017  2:03 PM CDT -----  Please tell pt the sed rate is mildly elevated, similar to previous. The crp is normal. The sodium is minimally low similar to previous. The liver tests remain normal. The chronic kidney disease stage 3 is stable. The cbc shows mild and stable anemia. Thanks. ARISTIDES

## 2017-06-01 ENCOUNTER — OFFICE VISIT (OUTPATIENT)
Dept: RHEUMATOLOGY | Facility: CLINIC | Age: 82
End: 2017-06-01
Payer: MEDICARE

## 2017-06-01 VITALS
WEIGHT: 127.5 LBS | BODY MASS INDEX: 24.07 KG/M2 | HEIGHT: 61 IN | SYSTOLIC BLOOD PRESSURE: 140 MMHG | DIASTOLIC BLOOD PRESSURE: 72 MMHG | HEART RATE: 65 BPM

## 2017-06-01 DIAGNOSIS — R35.1 NOCTURIA: Primary | ICD-10-CM

## 2017-06-01 DIAGNOSIS — M25.562 ARTHRALGIA OF BOTH KNEES: ICD-10-CM

## 2017-06-01 DIAGNOSIS — M17.0 OSTEOARTHRITIS OF BOTH KNEES, UNSPECIFIED OSTEOARTHRITIS TYPE: ICD-10-CM

## 2017-06-01 DIAGNOSIS — M06.9 RHEUMATOID ARTHRITIS OF HAND, UNSPECIFIED LATERALITY, UNSPECIFIED RHEUMATOID FACTOR PRESENCE: ICD-10-CM

## 2017-06-01 DIAGNOSIS — Z86.73 HISTORY OF CVA (CEREBROVASCULAR ACCIDENT): ICD-10-CM

## 2017-06-01 DIAGNOSIS — M05.741 RHEUMATOID ARTHRITIS INVOLVING BOTH HANDS WITH POSITIVE RHEUMATOID FACTOR: ICD-10-CM

## 2017-06-01 DIAGNOSIS — G89.29 CHRONIC PAIN OF RIGHT KNEE: ICD-10-CM

## 2017-06-01 DIAGNOSIS — M25.561 CHRONIC PAIN OF RIGHT KNEE: ICD-10-CM

## 2017-06-01 DIAGNOSIS — M85.80 OSTEOPENIA, UNSPECIFIED LOCATION: ICD-10-CM

## 2017-06-01 DIAGNOSIS — R26.2 DIFFICULTY WALKING: ICD-10-CM

## 2017-06-01 DIAGNOSIS — Z79.631 METHOTREXATE, LONG TERM, CURRENT USE: Chronic | ICD-10-CM

## 2017-06-01 DIAGNOSIS — M25.561 ARTHRALGIA OF BOTH KNEES: ICD-10-CM

## 2017-06-01 DIAGNOSIS — M05.742 RHEUMATOID ARTHRITIS INVOLVING BOTH HANDS WITH POSITIVE RHEUMATOID FACTOR: ICD-10-CM

## 2017-06-01 DIAGNOSIS — R26.89 DECREASED FUNCTIONAL MOBILITY: ICD-10-CM

## 2017-06-01 DIAGNOSIS — Z79.631 LONG TERM METHOTREXATE USER: ICD-10-CM

## 2017-06-01 PROCEDURE — 99999 PR PBB SHADOW E&M-EST. PATIENT-LVL IV: CPT | Mod: PBBFAC,,, | Performed by: INTERNAL MEDICINE

## 2017-06-01 PROCEDURE — 99214 OFFICE O/P EST MOD 30 MIN: CPT | Mod: PBBFAC | Performed by: INTERNAL MEDICINE

## 2017-06-01 PROCEDURE — 99214 OFFICE O/P EST MOD 30 MIN: CPT | Mod: S$PBB,,, | Performed by: INTERNAL MEDICINE

## 2017-06-01 RX ORDER — FOLIC ACID 1 MG/1
1 TABLET ORAL DAILY
Qty: 90 TABLET | Refills: 3 | Status: SHIPPED | OUTPATIENT
Start: 2017-06-01 | End: 2018-06-01

## 2017-06-01 RX ORDER — METHOTREXATE 2.5 MG/1
25 TABLET ORAL
Qty: 120 TABLET | Refills: 0 | Status: SHIPPED | OUTPATIENT
Start: 2017-06-01 | End: 2017-09-12 | Stop reason: SDUPTHER

## 2017-06-01 ASSESSMENT — ROUTINE ASSESSMENT OF PATIENT INDEX DATA (RAPID3)
FATIGUE SCORE: 5
PATIENT GLOBAL ASSESSMENT SCORE: 4
PSYCHOLOGICAL DISTRESS SCORE: 3.3
PAIN SCORE: 2
AM STIFFNESS SCORE: 0, NO
TOTAL RAPID3 SCORE: 3.33
MDHAQ FUNCTION SCORE: 1.2

## 2017-06-01 ASSESSMENT — DISEASE ACTIVITY SCORE (DAS28)
TOTAL_SCORE_CRP: 2.26
GLOBAL_HEALTH_SCORE: 40
ESR_MM_PER_HR: 34
CRP_MG_PER_LITER: 6.8
TENDER_JOINTS_COUNT: 0
SWOLLEN_JOINTS_COUNT: 0
TOTAL_SCORE_ESR: 3.03

## 2017-06-01 NOTE — LETTER
[facility] Rheumatology  [address]  [facility] [address]  Phone: [phone]  Fax: [phone] June 1, 2017     Patient: Karly Sandoval    YOB: 1930   Date of Visit: 6/1/2017       To Whom It May Concern:    It is my medical opinion that Karly Sandoval  Is not able to push/pull garbage can to curb b/o incline which cannot negotiate due to severe osteoarthritis of both knees, requiring use of cane,  rheumatoid arthritis, and severe osteoporosis.  Please arrange for garbage can retrieval closer to her house as she can no longer bring to curb.   If you have any questions or concerns, please don't hesitate to call.    Sincerely,        Isai Smith MD

## 2017-06-01 NOTE — PROGRESS NOTES
Subjective:       Patient ID: Karly Sandoval is a 86 y.o. female.    Chief Complaint: RA    HPI No appetite, but has gained 5# since here. Dates this to starting carvedilol one year ago.  Knees are the worst thing. Ambulates with cane. The IAS right knee 3 months ago helped only one week. Other joints fine No infections, need for antibiotics. No oral ulcers occ upper denture irritates. Very occ SOB + cough.  No dyspepsia, n/v + protuberant abdomen. Also nocturia, wonders about Rx. Also bilateral ankle edema which she relates to amlodipine.  Review of Systems   Constitutional: Positive for fatigue. Negative for appetite change, fever and unexpected weight change.   HENT: Negative for mouth sores.    Eyes: Negative for visual disturbance.   Respiratory: Negative for cough, shortness of breath and wheezing.    Cardiovascular: Negative for chest pain and palpitations.   Gastrointestinal: Negative for abdominal pain, anal bleeding, blood in stool, constipation, diarrhea, nausea and vomiting.   Genitourinary: Negative for dysuria, frequency and urgency.   Musculoskeletal: Positive for arthralgias. Negative for back pain, gait problem, joint swelling, myalgias, neck pain and neck stiffness.   Skin: Negative for rash.   Neurological: Negative for weakness, numbness and headaches.   Hematological: Negative for adenopathy. Does not bruise/bleed easily.   Psychiatric/Behavioral: Negative for sleep disturbance. The patient is not nervous/anxious.          Objective:   There were no vitals taken for this visit.     Physical Exam   Constitutional: She is oriented to person, place, and time and well-developed, well-nourished, and in no distress.   HENT:   Head: Normocephalic and atraumatic.   Mouth/Throat: Oropharynx is clear and moist.   Eyes: Conjunctivae and EOM are normal.   Neck: Normal range of motion. Neck supple. No thyromegaly present.   Cardiovascular: Normal rate, regular rhythm, normal heart sounds and intact  distal pulses.  Exam reveals no gallop and no friction rub.    No murmur heard.  Pulmonary/Chest: Breath sounds normal. She has no wheezes. She has no rales. She exhibits no tenderness.   Abdominal: Soft. She exhibits no distension and no mass. There is no tenderness.       Right Side Rheumatological Exam     Examination finds the shoulder, elbow, wrist, 1st PIP, 1st MCP, 2nd PIP, 2nd MCP, 3rd PIP, 3rd MCP, 4th PIP, 4th MCP, 5th PIP and 5th MCP normal.    The patient has an enlarged knee    Left Side Rheumatological Exam     Examination finds the shoulder, elbow, wrist, 1st PIP, 1st MCP, 2nd PIP, 2nd MCP, 3rd PIP, 3rd MCP, 4th PIP, 4th MCP, 5th PIP and 5th MCP normal.    The patient has an enlarged knee.      Lymphadenopathy:     She has no cervical adenopathy.   Neurological: She is alert and oriented to person, place, and time. She displays normal reflexes. Gait normal.   Nl motor strength UE and LE bilateral   Musculoskeletal: She exhibits edema.         Results for ROULA MARIE (MRN [id number]) as of 6/1/2017 09:51   Ref. Range 5/26/2017 09:46   WBC Latest Ref Range: 3.90 - 12.70 K/uL 5.56   RBC Latest Ref Range: 4.00 - 5.40 M/uL 3.54 (L)   Hemoglobin Latest Ref Range: 12.0 - 16.0 g/dL 11.1 (L)   Hematocrit Latest Ref Range: 37.0 - 48.5 % 33.7 (L)   MCV Latest Ref Range: 82 - 98 fL 95   MCH Latest Ref Range: 27.0 - 31.0 pg 31.4 (H)   MCHC Latest Ref Range: 32.0 - 36.0 % 32.9   RDW Latest Ref Range: 11.5 - 14.5 % 13.7   Platelets Latest Ref Range: 150 - 350 K/uL 215   MPV Latest Ref Range: 9.2 - 12.9 fL 11.5   Gran% Latest Ref Range: 38.0 - 73.0 % 67.0   Gran # Latest Ref Range: 1.8 - 7.7 K/uL 3.7   Lymph% Latest Ref Range: 18.0 - 48.0 % 18.9   Lymph # Latest Ref Range: 1.0 - 4.8 K/uL 1.1   Mono% Latest Ref Range: 4.0 - 15.0 % 9.9   Mono # Latest Ref Range: 0.3 - 1.0 K/uL 0.6   Eosinophil% Latest Ref Range: 0.0 - 8.0 % 3.2   Eos # Latest Ref Range: 0.0 - 0.5 K/uL 0.2   Basophil% Latest Ref Range: 0.0 - 1.9 %  "0.5   Baso # Latest Ref Range: 0.00 - 0.20 K/uL 0.03   Sed Rate Latest Ref Range: 0 - 20 mm/Hr 34 (H)   Sodium Latest Ref Range: 136 - 145 mmol/L 135 (L)   Potassium Latest Ref Range: 3.5 - 5.1 mmol/L 4.5   Chloride Latest Ref Range: 95 - 110 mmol/L 101   CO2 Latest Ref Range: 23 - 29 mmol/L 27   Anion Gap Latest Ref Range: 8 - 16 mmol/L 7 (L)   BUN, Bld Latest Ref Range: 8 - 23 mg/dL 21   Creatinine Latest Ref Range: 0.5 - 1.4 mg/dL 0.9   eGFR if non African American Latest Ref Range: >60 mL/min/1.73 m^2 58.1 (A)   eGFR if African American Latest Ref Range: >60 mL/min/1.73 m^2 >60.0   Glucose Latest Ref Range: 70 - 110 mg/dL 102   Calcium Latest Ref Range: 8.7 - 10.5 mg/dL 9.3   Alkaline Phosphatase Latest Ref Range: 55 - 135 U/L 65   Total Protein Latest Ref Range: 6.0 - 8.4 g/dL 6.7   Albumin Latest Ref Range: 3.5 - 5.2 g/dL 3.4 (L)   Total Bilirubin Latest Ref Range: 0.1 - 1.0 mg/dL 0.6   AST Latest Ref Range: 10 - 40 U/L 22   ALT Latest Ref Range: 10 - 44 U/L 18   CRP Latest Ref Range: 0.0 - 8.2 mg/L 6.8   Differential Method Unknown Automated       Please tell pt the bone density shows osteopenia with elevated fracture risk but significant improvement in the lumbar spine BMD of 8.9% compared with the previous study. RJQ          Signed by     Signed Credentials Date/Time  Phone Pager   LANI ROLON MD 3/28/2017 08:23 [phone]    PACS Images     Show images for DXA Bone Density Spine And Hip   Reviewed By Pb Figueroa MD on 2017 12:07   Isai Smith MD on 3/28/2017 11:10   External Result Report     External Result Report   Narrative     : 1930 ORDERING PHYSICIAN: ROBERTO Figueroa LOCATION: [facility] [place]    HISTORY: 87 y/o female with a hx of fractured back at 85 y/o. She had menopausal symptoms at 49 y/o. She has lost at least 2" in height since age 25. She ist aking 1,000 units of Vit D supplements. She does not exercise or smoke.    TECHNIQUE: Bone Mineral Density " performed using Hologic Horizon A (S/N [id number]) reveals good positioning of lumbar spine and hip.    BONE MINERAL DENSITY RESULTS:  Lumbar Spine: Lumbar bone mineral density L1-L4 is 0.950g/cm2, which is a t-score of -0.9. The z-score is 2.0.    Total Hip: The total hip bone mineral density is 0.784g/cm2.  The t-score is -1.3, and the z-score is 1.0.  Femoral neck BMD is 0.650g/cm2 and the t-score is -1.8.    COMPARISONS:  Date Location BMD T-score  03/02/15 L-spine 0.872 -1.6  Total Hip 0.772 -1.4   Impression      Low bone mass/osteopenia of the total hip and femoral neck. Treat as osteoporosis with history of prior vertebral fracture. Increase in lumbar spine BMD (8.9%) since prior study. FRAX calculations do suggest treatment.      Recommendations:  1) Adequate calcium and Vitamin D therapy  2) Appropriate exercise  3) Consider medical therapy including bisphosphonates or denosumab.  4) Consider repeat BMD in 2 years      EXPLANATION OF RESULTS:  The t-score compares this results to the bone density of a 25 year old of the same gender. The z-score compares this result to the average bone density to people of the same age and gender. The amounts indicate the number of standard deviations above or below the mean.  * Osteoporosis is generally defined as having a t-score between less than -2.5.  * Osteopenia is generally defined as having a t-score between -1 and -2.5.  * The normal range is generally defined as having a t-score between -1 to 1.      Electronically signed by: LANI ROLON MD  Date: 03/28/17  Time: 08:23     Encounter     View Encounter          Reviewed By     Sarbjit Figueroa MD on 5/8/2017 12:07   Isai Smith MD on 3/28/2017 11:10     Please tell pt the x-rays show degenerative changes, but no damage or findings related to rheumatoid arthritis. RJQ          Signed by     Signed Credentials Date/Time  Phone Pager   OLIVIA CUTLER III, MD 3/23/2017 11:26 [phone]    PACS Images     Show images for XR Arthritis Survey   Reviewed By List     Isai Smith MD on 3/23/2017 13:17   External Result Report     External Result Report   Narrative     4 views: There is DJD of the C-spine but no instability.  Right and left knees demonstrate moderate DJD and varus deformities.  Right left hands demonstrate mild DJD but no erosions.  Right and left feet demonstrate MTP joint subluxations, DJD, and hallux valgus deformities.  There is a short left fourth metatarsal.   Impression      No definite erosive arthritis seen.      Electronically signed by: OLIVIA CUTLER  Date: 03/23/17  Time: 11:26     Encounter     View Encounter          Reviewed By     Isai Smith MD on 3/23/2017 13:17     Results for ROULA MARIE (MRN [id number]) as of 6/1/2017 09:51   Ref. Range 10/17/2016 08:53   Cholesterol Latest Ref Range: 120 - 199 mg/dL 136   HDL Latest Ref Range: 40 - 75 mg/dL 60   LDL Cholesterol Latest Ref Range: 63.0 - 159.0 mg/dL 63.8   Total Cholesterol/HDL Ratio Latest Ref Range: 2.0 - 5.0  2.3   Triglycerides Latest Ref Range: 30 - 150 mg/dL 61         Assessment:         RA: DAS28 3.5(moderate) OVC93-FDM 2.88( LDA0) but driven by OA knees right>>left  OA knees right> left improved with PT, completed s/p IAS 2/22/17  minimal hyponatremia off HCTZ  Minimal NCNC anemia, improved  Osteoporosis on denosumab 60mg sc q 6months last 10/10/16(#4) next( #5) due 4/12/17          Plan:     letter for garbage collection agency provided   ref to Urology for nocturia  Cont methotrexate 25 mg po once a week with folic acid 1mg daily  Cont HEP  RTC 3 months with standing labs

## 2017-06-09 ENCOUNTER — LAB VISIT (OUTPATIENT)
Dept: LAB | Facility: HOSPITAL | Age: 82
End: 2017-06-09
Attending: INTERNAL MEDICINE
Payer: MEDICARE

## 2017-06-09 DIAGNOSIS — E78.5 HYPERLIPIDEMIA, UNSPECIFIED: ICD-10-CM

## 2017-06-09 LAB
ALBUMIN SERPL BCP-MCNC: 3.3 G/DL
ALP SERPL-CCNC: 65 U/L
ALT SERPL W/O P-5'-P-CCNC: 18 U/L
ANION GAP SERPL CALC-SCNC: 9 MMOL/L
AST SERPL-CCNC: 21 U/L
BILIRUB SERPL-MCNC: 0.7 MG/DL
BUN SERPL-MCNC: 20 MG/DL
CALCIUM SERPL-MCNC: 9 MG/DL
CHLORIDE SERPL-SCNC: 102 MMOL/L
CHOLEST/HDLC SERPL: 2.1 {RATIO}
CO2 SERPL-SCNC: 26 MMOL/L
CREAT SERPL-MCNC: 0.9 MG/DL
EST. GFR  (AFRICAN AMERICAN): >60 ML/MIN/1.73 M^2
EST. GFR  (NON AFRICAN AMERICAN): 58.1 ML/MIN/1.73 M^2
GLUCOSE SERPL-MCNC: 104 MG/DL
HDL/CHOLESTEROL RATIO: 48.5 %
HDLC SERPL-MCNC: 130 MG/DL
HDLC SERPL-MCNC: 63 MG/DL
HGB BLD-MCNC: 11.2 G/DL
LDLC SERPL CALC-MCNC: 56.4 MG/DL
NONHDLC SERPL-MCNC: 67 MG/DL
POTASSIUM SERPL-SCNC: 4.4 MMOL/L
PROT SERPL-MCNC: 6.7 G/DL
SODIUM SERPL-SCNC: 137 MMOL/L
TRIGL SERPL-MCNC: 53 MG/DL
TSH SERPL DL<=0.005 MIU/L-ACNC: 2.55 UIU/ML

## 2017-06-09 PROCEDURE — 85018 HEMOGLOBIN: CPT

## 2017-06-09 PROCEDURE — 36415 COLL VENOUS BLD VENIPUNCTURE: CPT | Mod: PO

## 2017-06-09 PROCEDURE — 84443 ASSAY THYROID STIM HORMONE: CPT

## 2017-06-09 PROCEDURE — 80053 COMPREHEN METABOLIC PANEL: CPT

## 2017-06-09 PROCEDURE — 80061 LIPID PANEL: CPT

## 2017-06-12 ENCOUNTER — TELEPHONE (OUTPATIENT)
Dept: CARDIOLOGY | Facility: CLINIC | Age: 82
End: 2017-06-12

## 2017-06-12 NOTE — TELEPHONE ENCOUNTER
----- Message from Briseida Muñoz MD sent at 6/12/2017 11:24 AM CDT -----  Please mail patient the blood work results and inform the patient that we will discuss it in detail during the upcoming visit. It looks good.

## 2017-06-13 DIAGNOSIS — I10 HYPERTENSION, ESSENTIAL: ICD-10-CM

## 2017-06-13 RX ORDER — CARVEDILOL 3.12 MG/1
TABLET ORAL
Qty: 60 TABLET | Refills: 0 | Status: SHIPPED | OUTPATIENT
Start: 2017-06-13 | End: 2017-07-13 | Stop reason: SDUPTHER

## 2017-06-16 ENCOUNTER — OFFICE VISIT (OUTPATIENT)
Dept: CARDIOLOGY | Facility: CLINIC | Age: 82
End: 2017-06-16
Payer: MEDICARE

## 2017-06-16 VITALS
BODY MASS INDEX: 23.45 KG/M2 | HEIGHT: 62 IN | WEIGHT: 127.44 LBS | SYSTOLIC BLOOD PRESSURE: 149 MMHG | DIASTOLIC BLOOD PRESSURE: 65 MMHG | HEART RATE: 60 BPM

## 2017-06-16 DIAGNOSIS — M45.9 RHEUMATOID ARTHRITIS INVOLVING VERTEBRA WITH POSITIVE RHEUMATOID FACTOR: ICD-10-CM

## 2017-06-16 DIAGNOSIS — I49.3 PVC (PREMATURE VENTRICULAR CONTRACTION): ICD-10-CM

## 2017-06-16 DIAGNOSIS — Z86.73 HISTORY OF CVA (CEREBROVASCULAR ACCIDENT): ICD-10-CM

## 2017-06-16 DIAGNOSIS — I44.4 LAFB (LEFT ANTERIOR FASCICULAR BLOCK): ICD-10-CM

## 2017-06-16 DIAGNOSIS — Q21.12 PFO (PATENT FORAMEN OVALE): ICD-10-CM

## 2017-06-16 DIAGNOSIS — I10 ESSENTIAL HYPERTENSION: Primary | ICD-10-CM

## 2017-06-16 DIAGNOSIS — I49.8 ATRIAL ARRHYTHMIA: ICD-10-CM

## 2017-06-16 DIAGNOSIS — E78.5 HYPERLIPIDEMIA, UNSPECIFIED HYPERLIPIDEMIA TYPE: ICD-10-CM

## 2017-06-16 DIAGNOSIS — I45.10 RBBB: ICD-10-CM

## 2017-06-16 DIAGNOSIS — D63.8 ANEMIA OF CHRONIC DISEASE: ICD-10-CM

## 2017-06-16 DIAGNOSIS — I50.32 CHRONIC DIASTOLIC CHF (CONGESTIVE HEART FAILURE): ICD-10-CM

## 2017-06-16 DIAGNOSIS — Z95.0 PACEMAKER: ICD-10-CM

## 2017-06-16 PROCEDURE — 1159F MED LIST DOCD IN RCRD: CPT | Mod: ,,, | Performed by: INTERNAL MEDICINE

## 2017-06-16 PROCEDURE — 99999 PR PBB SHADOW E&M-EST. PATIENT-LVL III: CPT | Mod: PBBFAC,,, | Performed by: INTERNAL MEDICINE

## 2017-06-16 PROCEDURE — 99213 OFFICE O/P EST LOW 20 MIN: CPT | Mod: S$PBB,,, | Performed by: INTERNAL MEDICINE

## 2017-06-16 PROCEDURE — 1126F AMNT PAIN NOTED NONE PRSNT: CPT | Mod: ,,, | Performed by: INTERNAL MEDICINE

## 2017-06-16 PROCEDURE — 99213 OFFICE O/P EST LOW 20 MIN: CPT | Mod: PBBFAC,PO | Performed by: INTERNAL MEDICINE

## 2017-06-16 RX ORDER — ESCITALOPRAM OXALATE 10 MG/1
10 TABLET ORAL DAILY
COMMUNITY
End: 2017-08-07

## 2017-06-16 NOTE — PROGRESS NOTES
Subjective:   Patient ID:  Karly Sandoval is a 86 y.o. female who presents for follow-up of Hypertension and Leg Swelling      HPI: Doing well.  No symptoms except for chronic peripheral edema. In the past did not tolerate diuretics.    Lab Results   Component Value Date     06/09/2017    K 4.4 06/09/2017     06/09/2017    CO2 26 06/09/2017    BUN 20 06/09/2017    CREATININE 0.9 06/09/2017     06/09/2017    HGBA1C 6.0 01/27/2014    MG 1.8 10/04/2014    AST 21 06/09/2017    ALT 18 06/09/2017    ALBUMIN 3.3 (L) 06/09/2017    PROT 6.7 06/09/2017    BILITOT 0.7 06/09/2017    WBC 5.56 05/26/2017    HGB 11.2 (L) 06/09/2017    HCT 33.7 (L) 05/26/2017    MCV 95 05/26/2017     05/26/2017    INR 1.0 11/21/2014    TSH 2.552 06/09/2017    CHOL 130 06/09/2017    HDL 63 06/09/2017    LDLCALC 56.4 (L) 06/09/2017    TRIG 53 06/09/2017       Review of Systems   Constitution: Positive for malaise/fatigue.   HENT: Negative.    Eyes: Negative.    Cardiovascular: Positive for leg swelling. Negative for chest pain, claudication, dyspnea on exertion, irregular heartbeat, near-syncope, palpitations and syncope.   Respiratory: Positive for cough. Negative for shortness of breath, snoring and wheezing.    Endocrine: Negative.  Negative for cold intolerance, heat intolerance, polydipsia, polyphagia and polyuria.   Skin: Negative.    Musculoskeletal: Positive for arthritis.   Gastrointestinal: Negative.    Genitourinary: Negative.    Neurological: Negative.    Psychiatric/Behavioral: Negative.        Current Outpatient Prescriptions:     alprazolam (XANAX) 0.25 MG tablet, Take 1 tablet (0.25 mg total) by mouth daily as needed., Disp: 30 tablet, Rfl: 1    amlodipine (NORVASC) 10 MG tablet, 1/2 pill  PO once a day, Disp: 90 tablet, Rfl: 3    aspirin (ECOTRIN) 81 MG EC tablet, Take 1 tablet (81 mg total) by mouth once daily. HOLD until cleared by your ENT doctor and your Neurologist., Disp: , Rfl: 0     "atorvastatin (LIPITOR) 20 MG tablet, TAKE 1 TABLET BY MOUTH EVERY DAY, Disp: 90 tablet, Rfl: 3    carvedilol (COREG) 3.125 MG tablet, TAKE 1 TABLET(3.125 MG) BY MOUTH TWICE DAILY WITH MEALS, Disp: 60 tablet, Rfl: 0    coenzyme Q10 (CO Q-10) 100 mg capsule, Take 100 mg by mouth once daily., Disp: , Rfl:     denosumab (PROLIA) 60 mg/mL Syrg, Inject 60 mg into the skin. Every 6 months, Disp: , Rfl:     escitalopram oxalate (LEXAPRO) 10 MG tablet, Take 10 mg by mouth once daily., Disp: , Rfl:     fish oil-omega-3 fatty acids 300-1,000 mg capsule, Take 1,000 mg by mouth once daily. , Disp: , Rfl:     fluticasone (FLONASE) 50 mcg/actuation nasal spray, 1 spray by Each Nare route as needed. , Disp: , Rfl:     FLUZONE HIGH-DOSE 2016-17, PF, 180 mcg/0.5 mL Syrg, ADM 0.5ML IM UTD, Disp: , Rfl: 0    folic acid (FOLVITE) 1 MG tablet, Take 1 tablet (1 mg total) by mouth once daily., Disp: 90 tablet, Rfl: 3    lisinopril (PRINIVIL,ZESTRIL) 40 MG tablet, TAKE 1 TABLET BY MOUTH EVERY DAY, Disp: 90 tablet, Rfl: 3    methotrexate 2.5 MG Tab, Take 10 tablets (25 mg total) by mouth every 7 days., Disp: 120 tablet, Rfl: 0    ranitidine (ZANTAC) 150 MG tablet, TAKE ONE TABLET BY MOUTH TWICE DAILY, Disp: 60 tablet, Rfl: 6    VITAMIN B COMPLEX ORAL, Take 400 mg by mouth once daily. Pt stated that she does not take vitamin b everyday., Disp: , Rfl:     vitamin E 400 UNIT capsule, Take 400 Units by mouth once daily. Pt taking PRN, Disp: , Rfl:     Objective:   Physical Exam   Constitutional: She is oriented to person, place, and time. She appears well-developed and well-nourished.   BP (!) 149/65   Pulse 60   Ht 5' 2" (1.575 m)   Wt 57.8 kg (127 lb 6.8 oz)   BMI 23.31 kg/m²      HENT:   Head: Normocephalic.   Eyes: Pupils are equal, round, and reactive to light.   Neck: Normal range of motion. Neck supple. Carotid bruit is not present. No thyromegaly present.   Cardiovascular: Normal rate, regular rhythm and intact distal " pulses.  Exam reveals no gallop and no friction rub.    Murmur heard.   Early systolic murmur is present with a grade of 1/6  at the upper right sternal border  Pulses:       Carotid pulses are 2+ on the right side, and 2+ on the left side.       Radial pulses are 2+ on the right side, and 2+ on the left side.        Femoral pulses are 2+ on the right side, and 2+ on the left side.       Popliteal pulses are 2+ on the right side, and 2+ on the left side.        Dorsalis pedis pulses are 2+ on the right side, and 2+ on the left side.        Posterior tibial pulses are 2+ on the right side, and 2+ on the left side.   Pulmonary/Chest: Effort normal and breath sounds normal. No respiratory distress. She has no wheezes. She has no rales. She exhibits no tenderness.   Abdominal: Soft. Bowel sounds are normal.   Musculoskeletal: Normal range of motion. She exhibits no edema.   Neurological: She is alert and oriented to person, place, and time.   Skin: Skin is warm and dry.   Psychiatric: She has a normal mood and affect.   Nursing note and vitals reviewed.      Assessment and Plan:     Problem List Items Addressed This Visit        Cardiology Problems    Hypertension - Primary    Relevant Orders    Comprehensive metabolic panel    Lipid panel    TSH    Hyperlipidemia    Relevant Orders    Comprehensive metabolic panel    Lipid panel    TSH    Atrial arrhythmia    Chronic diastolic CHF (congestive heart failure)    PFO (patent foramen ovale)    PVC (premature ventricular contraction)    RBBB    LAFB (left anterior fascicular block)       Other    RA (rheumatoid arthritis)    Anemia of chronic disease    History of CVA (cerebrovascular accident)    Pacemaker      Other Visit Diagnoses    None.       Avoid salt and elevate legs.  If no better we might have to d/c Amlodipine.  Return in about 6 months (around 12/16/2017).

## 2017-06-26 ENCOUNTER — CLINICAL SUPPORT (OUTPATIENT)
Dept: ELECTROPHYSIOLOGY | Facility: CLINIC | Age: 82
End: 2017-06-26
Payer: MEDICARE

## 2017-06-26 DIAGNOSIS — I49.3 PVC (PREMATURE VENTRICULAR CONTRACTION): ICD-10-CM

## 2017-06-26 DIAGNOSIS — I49.8 ATRIAL ARRHYTHMIA: ICD-10-CM

## 2017-06-26 DIAGNOSIS — Z95.0 CARDIAC PACEMAKER IN SITU: ICD-10-CM

## 2017-06-26 PROCEDURE — 93293 PM PHONE R-STRIP DEVICE EVAL: CPT | Mod: PBBFAC | Performed by: INTERNAL MEDICINE

## 2017-06-28 DIAGNOSIS — I44.4 LAFB (LEFT ANTERIOR FASCICULAR BLOCK): ICD-10-CM

## 2017-06-28 DIAGNOSIS — I49.3 PVC (PREMATURE VENTRICULAR CONTRACTION): ICD-10-CM

## 2017-06-28 DIAGNOSIS — Q21.12 PFO (PATENT FORAMEN OVALE): ICD-10-CM

## 2017-06-28 DIAGNOSIS — I45.10 RBBB: ICD-10-CM

## 2017-06-28 DIAGNOSIS — I50.32 CHRONIC DIASTOLIC CHF (CONGESTIVE HEART FAILURE): ICD-10-CM

## 2017-06-28 DIAGNOSIS — Z95.0 PACEMAKER: Primary | ICD-10-CM

## 2017-07-13 DIAGNOSIS — I10 HYPERTENSION, ESSENTIAL: ICD-10-CM

## 2017-07-13 RX ORDER — CARVEDILOL 3.12 MG/1
TABLET ORAL
Qty: 60 TABLET | Refills: 0 | Status: SHIPPED | OUTPATIENT
Start: 2017-07-13 | End: 2017-08-10 | Stop reason: SDUPTHER

## 2017-07-27 RX ORDER — LISINOPRIL 40 MG/1
TABLET ORAL
Qty: 90 TABLET | Refills: 3 | Status: SHIPPED | OUTPATIENT
Start: 2017-07-27 | End: 2018-07-19 | Stop reason: SDUPTHER

## 2017-08-07 ENCOUNTER — OFFICE VISIT (OUTPATIENT)
Dept: FAMILY MEDICINE | Facility: CLINIC | Age: 82
End: 2017-08-07
Payer: MEDICARE

## 2017-08-07 VITALS
BODY MASS INDEX: 23.45 KG/M2 | HEART RATE: 58 BPM | DIASTOLIC BLOOD PRESSURE: 66 MMHG | OXYGEN SATURATION: 96 % | WEIGHT: 127.44 LBS | SYSTOLIC BLOOD PRESSURE: 138 MMHG | HEIGHT: 62 IN

## 2017-08-07 DIAGNOSIS — M81.0 OSTEOPOROSIS, UNSPECIFIED OSTEOPOROSIS TYPE, UNSPECIFIED PATHOLOGICAL FRACTURE PRESENCE: ICD-10-CM

## 2017-08-07 DIAGNOSIS — I10 ESSENTIAL HYPERTENSION: ICD-10-CM

## 2017-08-07 DIAGNOSIS — R00.2 PALPITATION: Primary | ICD-10-CM

## 2017-08-07 DIAGNOSIS — Z79.631 METHOTREXATE, LONG TERM, CURRENT USE: ICD-10-CM

## 2017-08-07 DIAGNOSIS — M06.9 RHEUMATOID ARTHRITIS, INVOLVING UNSPECIFIED SITE, UNSPECIFIED RHEUMATOID FACTOR PRESENCE: ICD-10-CM

## 2017-08-07 DIAGNOSIS — R60.0 PERIPHERAL EDEMA: ICD-10-CM

## 2017-08-07 DIAGNOSIS — D63.8 ANEMIA OF CHRONIC DISEASE: ICD-10-CM

## 2017-08-07 DIAGNOSIS — R35.1 NOCTURIA: ICD-10-CM

## 2017-08-07 DIAGNOSIS — F41.1 GENERALIZED ANXIETY DISORDER: ICD-10-CM

## 2017-08-07 PROCEDURE — 99213 OFFICE O/P EST LOW 20 MIN: CPT | Mod: PBBFAC,PO | Performed by: FAMILY MEDICINE

## 2017-08-07 PROCEDURE — 99215 OFFICE O/P EST HI 40 MIN: CPT | Mod: S$PBB,,, | Performed by: FAMILY MEDICINE

## 2017-08-07 PROCEDURE — 3008F BODY MASS INDEX DOCD: CPT | Mod: ,,, | Performed by: FAMILY MEDICINE

## 2017-08-07 PROCEDURE — 99999 PR PBB SHADOW E&M-EST. PATIENT-LVL III: CPT | Mod: PBBFAC,,, | Performed by: FAMILY MEDICINE

## 2017-08-07 PROCEDURE — 93005 ELECTROCARDIOGRAM TRACING: CPT | Mod: PBBFAC,PO | Performed by: FAMILY MEDICINE

## 2017-08-07 PROCEDURE — 93010 ELECTROCARDIOGRAM REPORT: CPT | Mod: ,,, | Performed by: INTERNAL MEDICINE

## 2017-08-07 PROCEDURE — 1159F MED LIST DOCD IN RCRD: CPT | Mod: ,,, | Performed by: FAMILY MEDICINE

## 2017-08-07 RX ORDER — ESCITALOPRAM OXALATE 20 MG/1
20 TABLET ORAL DAILY
COMMUNITY
End: 2018-02-09

## 2017-08-07 RX ORDER — ALPRAZOLAM 0.25 MG/1
0.25 TABLET ORAL DAILY PRN
Qty: 30 TABLET | Refills: 1 | Status: SHIPPED | OUTPATIENT
Start: 2017-08-07 | End: 2018-03-09 | Stop reason: SDUPTHER

## 2017-08-07 RX ORDER — ASPIRIN 81 MG/1
81 TABLET ORAL DAILY
COMMUNITY
End: 2018-01-08 | Stop reason: SDUPTHER

## 2017-08-07 NOTE — PROGRESS NOTES
(Portions of this note were dictated using voice recognition software and may contain dictation related errors in spelling/grammar/syntax not found on text review)    CC:   Chief Complaint   Patient presents with    Follow-up       HPI: 87 y.o. female     Rheumatoid arthritis followed by rheumatology , on methotrexate therapy     Prior history of stroke, was on dual platelet therapy with aspirin and Plavix.  However, had a syncopal episode, deemed to likely be cardiogenic in nature.  RBBB/LAHB treated with pacemaker.  She was reporting increased bruising with Plavix added, and recently had seen vascular neurology who had recommended stopping Plavix and continue indefinitely on aspirin monotherapy.  Also her Lexapro was decreased to 10 mg daily bc of bruising.  However, patient does feel anxious and wonders whether she should go back up on the Lexapro.  Also does take Xanax on rare occasions for anxiety, would like a refill of this just to have on hand.     Past history of vertebral compression fracture status post vertebroplasty ×2.      Osteopenia with high fracture risk on DEXA but clinically osteoporotic given fx above: Was on alendronate in the past that had been off secondary to drug holiday.  Currently set up with Prolia injections, last DEXA 3/23/17, improvement of spine bmd     Anemia of chronic disease: Has been stable.  She does try to take iron but states that this makes her very constipated     Hypertension with coexisting diastolic dysfunction, last echo on 6/17/16 with EF of 55%, mild to moderate mitral regurgitation, diastolic dysfunction, mild aVR, mild TVR. On Lisinopril 40 mg daily , carvedilol 3.125 mg twice a day, and amlodipine 10 mg (was on HCTZ in past but was taken off d/t hyponatremia which improved off this med).  Blood pressure is well controlled today.  She does complain of bilateral leg edema since starting on the amlodipine max dose denies any pain in the leg relating to this    She  "states the last couple of days she has been feeling "not great".  Feels a little "fluttering" type sensation in her chest.  Not sure if it is her heart rate or anything related to her pacemaker.  Interrogation last done June 26, reported as normal.  Denies any chest pain or shortness of breath.            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)     has pacemaker       Past Surgical History:   Procedure Laterality Date    APPENDECTOMY      BREAST SURGERY      CARDIAC PACEMAKER PLACEMENT  11/2014    for syncope and RBBB/LAHB    CHOLECYSTECTOMY      EYE SURGERY      HEMORRHOID SURGERY      HYSTERECTOMY      1966    SKIN BIOPSY      VASCULAR SURGERY      VERTEBROPLASTY         Family History   Problem Relation Age of Onset    Leukemia Father     Heart disease Father     Hypertension Father     Diabetes Mellitus Mother     Hypertension Mother     Diabetes Mellitus Brother     Parkinsonism Brother 68    Heart attack Neg Hx     Heart failure Neg Hx     Hyperlipidemia Neg Hx     Stroke Neg Hx        Social History     Social History    Marital status:      Spouse name: N/A    Number of children: N/A    Years of education: N/A     Occupational History    Not on file.     Social History Main Topics    Smoking status: Never Smoker    Smokeless tobacco: Not on file    Alcohol use No      Comment: vermooth    Drug " use: No    Sexual activity: No     Other Topics Concern    Not on file     Social History Narrative    No narrative on file     SCREENINGS     Mammogram 2012, benign     Colonoscopy 2010, diverticulosis     DEXA scan 3-17. L spine T score is -0.9, up from -1.6  Total hip T score is -1.3. Femoral neck T score is -1.8. She has been on schedule for prolia injections     Immunizations  Pvx 2008  PCV 2015  Zoster 2016  Tetanus: advised at local pharmacy           Lab Results         Lab Results   Component Value Date    WBC 5.56 05/26/2017    HGB 11.2 (L) 06/09/2017    HCT 33.7 (L) 05/26/2017     05/26/2017    CHOL 130 06/09/2017    TRIG 53 06/09/2017    HDL 63 06/09/2017    ALT 18 06/09/2017    AST 21 06/09/2017     06/09/2017    K 4.4 06/09/2017     06/09/2017    CREATININE 0.9 06/09/2017    BUN 20 06/09/2017    CO2 26 06/09/2017    TSH 2.552 06/09/2017    INR 1.0 11/21/2014    HGBA1C 6.0 01/27/2014    LDLCALC 56.4 (L) 06/09/2017     06/09/2017       ROS:  GENERAL: No fever, chills, fatigability or weight loss.  SKIN: No rashes, no itching.  HEAD: No headaches.  EYES: No visual changes  EARS: No ear pain or changes in hearing.  NOSE: No congestion or rhinorrhea.  MOUTH & THROAT: No hoarseness, change in voice, or sore throat.  NODES: Denies swollen glands.  CHEST: Denies RODAS, cyanosis, wheezing, cough and sputum production.  CARDIOVASCULAR: Above.  ABDOMEN: No nausea, vomiting, or changes in bowel function.  URINARY: Complains of nocturia but no significant polyuria during the day.  No burning on urination.  PERIPHERAL VASCULAR: No claudication or cyanosis.  MUSCULOSKELETAL: No joint stiffness or swelling. Denies back pain.  NEUROLOGIC: No weakness or numbness.    Vital signs reviewed  PE:   APPEARANCE: Well nourished, well developed, in no acute distress.    HEAD: Normocephalic, atraumatic.  EYES: PERRL. EOMI.   Conjunctivae noninjected.  EARS: TM's intact. Light reflex normal. No  retraction or perforation  NOSE: Mucosa pink. Airway clear.  MOUTH & THROAT: No tonsillar enlargement. No pharyngeal erythema or exudate.   NECK: Supple with no cervical lymphadenopathy.    CHEST: Good inspiratory effort. Lungs clear to auscultation with no wheezes or crackles.  CARDIOVASCULAR: Normal S1, S2. No rubs, murmurs, or gallops.  She does have several ectopic beats noted on auscultation  ABDOMEN: Bowel sounds normal. Not distended. Soft. No tenderness or masses. No organomegaly.  EXTREMITIES: 2+ pitting edema bilateral lower extremities    IMPRESSION  Encounter Diagnoses   Name Primary?    Palpitation Yes    Essential hypertension     Rheumatoid arthritis, involving unspecified site, unspecified rheumatoid factor presence     Anemia of chronic disease     Osteoporosis, unspecified osteoporosis type, unspecified pathological fracture presence     Methotrexate, long term, current use     Generalized anxiety disorder     Peripheral edema     Nocturia            PLAN  Given the ectopy noted on auscultation, EKG was done.  Independently reviewed, demonstrates left axis deviation consistent with LAHB, normal sinus rhythm.  No ectopy noted.  No acute ST segment or T-wave changes.    Advised to continue to monitor for the above symptoms.  If she notices more fluttering type sensation, she may need to talk to her established cardiologist about further outpatient cardiac monitoring    It is also possible that anxiety could feel some of her symptoms.  She does endorse a nervous and anxious.  She would like to try to go back up to her Lexapro 20 mg which I feel is okay.  She apparently was breaking her 20 g pills in half but she can go back to whole pill    Hypertension controlled.  Continue current therapy.  Does have peripheral edema with the amlodipine but overall is not very symptomatic with this.  She can use compression stockings to control.  Would avoid diuretics if possible given hyponatremia when she  was on thiazides.    Continue rheumatology follow-up for rheumatoid arthritis    Nocturia: Offered testing for UTI or other abnormalities including proteinuria or glycosuria.  Patient denies any pain or burning.  She declines urinary testing.  She wonders if there something she can take for nocturia.  I would rather not be on anticholinergics because of age and comorbidities.  She is advised to try to restrict fluid intake at least a couple of hours prior to going to bed.  Notify if there are worsening concerns that warrant further testing

## 2017-08-10 DIAGNOSIS — I10 HYPERTENSION, ESSENTIAL: ICD-10-CM

## 2017-08-10 RX ORDER — CARVEDILOL 3.12 MG/1
TABLET ORAL
Qty: 60 TABLET | Refills: 0 | Status: SHIPPED | OUTPATIENT
Start: 2017-08-10 | End: 2017-09-07 | Stop reason: SDUPTHER

## 2017-09-07 DIAGNOSIS — I10 HYPERTENSION, ESSENTIAL: ICD-10-CM

## 2017-09-07 RX ORDER — CARVEDILOL 3.12 MG/1
TABLET ORAL
Qty: 60 TABLET | Refills: 0 | Status: SHIPPED | OUTPATIENT
Start: 2017-09-07 | End: 2017-10-06 | Stop reason: SDUPTHER

## 2017-09-12 ENCOUNTER — TELEPHONE (OUTPATIENT)
Dept: RHEUMATOLOGY | Facility: CLINIC | Age: 82
End: 2017-09-12

## 2017-09-12 DIAGNOSIS — M05.742 RHEUMATOID ARTHRITIS INVOLVING BOTH HANDS WITH POSITIVE RHEUMATOID FACTOR: ICD-10-CM

## 2017-09-12 DIAGNOSIS — M05.741 RHEUMATOID ARTHRITIS INVOLVING BOTH HANDS WITH POSITIVE RHEUMATOID FACTOR: ICD-10-CM

## 2017-09-12 DIAGNOSIS — Z79.631 LONG TERM METHOTREXATE USER: ICD-10-CM

## 2017-09-12 DIAGNOSIS — M06.9 RHEUMATOID ARTHRITIS OF HAND, UNSPECIFIED LATERALITY, UNSPECIFIED RHEUMATOID FACTOR PRESENCE: ICD-10-CM

## 2017-09-12 RX ORDER — METHOTREXATE 2.5 MG/1
25 TABLET ORAL
Qty: 40 TABLET | Refills: 0 | Status: SHIPPED | OUTPATIENT
Start: 2017-09-12 | End: 2017-10-09 | Stop reason: SDUPTHER

## 2017-09-19 ENCOUNTER — OFFICE VISIT (OUTPATIENT)
Dept: URGENT CARE | Facility: CLINIC | Age: 82
End: 2017-09-19
Payer: MEDICARE

## 2017-09-19 VITALS
HEIGHT: 62 IN | WEIGHT: 125 LBS | TEMPERATURE: 98 F | BODY MASS INDEX: 23 KG/M2 | DIASTOLIC BLOOD PRESSURE: 63 MMHG | HEART RATE: 61 BPM | OXYGEN SATURATION: 97 % | SYSTOLIC BLOOD PRESSURE: 154 MMHG

## 2017-09-19 DIAGNOSIS — M77.12 LATERAL EPICONDYLITIS OF LEFT ELBOW: Primary | ICD-10-CM

## 2017-09-19 PROCEDURE — 99213 OFFICE O/P EST LOW 20 MIN: CPT | Mod: 25,S$GLB,, | Performed by: FAMILY MEDICINE

## 2017-09-19 PROCEDURE — 20605 DRAIN/INJ JOINT/BURSA W/O US: CPT | Mod: LT,S$GLB,, | Performed by: FAMILY MEDICINE

## 2017-09-19 PROCEDURE — 1125F AMNT PAIN NOTED PAIN PRSNT: CPT | Mod: S$GLB,,, | Performed by: FAMILY MEDICINE

## 2017-09-19 PROCEDURE — 1159F MED LIST DOCD IN RCRD: CPT | Mod: S$GLB,,, | Performed by: FAMILY MEDICINE

## 2017-09-19 RX ORDER — DICLOFENAC SODIUM 10 MG/G
2 GEL TOPICAL 2 TIMES DAILY
Qty: 100 G | Refills: 1 | Status: ON HOLD | OUTPATIENT
Start: 2017-09-19 | End: 2018-09-25 | Stop reason: HOSPADM

## 2017-09-19 RX ORDER — BETAMETHASONE SODIUM PHOSPHATE AND BETAMETHASONE ACETATE 3; 3 MG/ML; MG/ML
3 INJECTION, SUSPENSION INTRA-ARTICULAR; INTRALESIONAL; INTRAMUSCULAR; SOFT TISSUE
Status: DISCONTINUED | OUTPATIENT
Start: 2017-09-19 | End: 2017-09-19 | Stop reason: HOSPADM

## 2017-09-19 RX ADMIN — BETAMETHASONE SODIUM PHOSPHATE AND BETAMETHASONE ACETATE 3 MG: 3; 3 INJECTION, SUSPENSION INTRA-ARTICULAR; INTRALESIONAL; INTRAMUSCULAR; SOFT TISSUE at 03:09

## 2017-09-19 NOTE — PROGRESS NOTES
"Subjective:       Patient ID: Karly Sandoval is a 87 y.o. female.    Vitals:  height is 5' 2" (1.575 m) and weight is 56.7 kg (125 lb). Her temperature is 97.6 °F (36.4 °C). Her blood pressure is 154/63 (abnormal) and her pulse is 61. Her oxygen saturation is 97%.     Chief Complaint: Arm Pain    Patient reports that she has not fallen or injured her left elbow and she has full ROM but she has discomfort and hurting from the elbow radiating up to her left shoulder.  She reports using her cane regularly and using her arms regularly at [facility]       Arm Pain    Incident onset: 7 days. There was no injury mechanism. The pain is present in the upper left arm and left elbow. The quality of the pain is described as aching. The pain is at a severity of 4/10. The pain is moderate. The pain has been fluctuating since the incident. The symptoms are aggravated by movement. She has tried acetaminophen for the symptoms. The treatment provided moderate relief.     Review of Systems   Musculoskeletal: Positive for joint pain and stiffness.       Objective:      Physical Exam   Constitutional: She is oriented to person, place, and time. She appears well-developed and well-nourished. She is cooperative.  Non-toxic appearance. She does not appear ill. No distress.   HENT:   Head: Normocephalic and atraumatic.   Right Ear: External ear normal.   Left Ear: External ear normal.   Nose: Nose normal. No nasal deformity.   Mouth/Throat: Uvula is midline, oropharynx is clear and moist and mucous membranes are normal. No trismus in the jaw. Normal dentition. No uvula swelling. No posterior oropharyngeal erythema.   Eyes: Conjunctivae and lids are normal. Right eye exhibits no discharge. Left eye exhibits no discharge. No scleral icterus.   Sclera clear bilat   Neck: Trachea normal, normal range of motion, full passive range of motion without pain and phonation normal.   Cardiovascular: Intact distal pulses and normal pulses.  "   Pulmonary/Chest: Effort normal. No respiratory distress.   Left side pacer noted.    Abdominal: Soft. Normal appearance and bowel sounds are normal. She exhibits no distension, no pulsatile midline mass and no mass. There is no tenderness.   Musculoskeletal: Normal range of motion. She exhibits no edema or deformity.        Left elbow: She exhibits normal range of motion, no swelling, no effusion, no deformity and no laceration. Tenderness found. Lateral epicondyle and olecranon process tenderness noted.        Arms:  Neurological: She is alert and oriented to person, place, and time. She exhibits normal muscle tone. Coordination normal.   Skin: Skin is warm, dry and intact. She is not diaphoretic (delightful elderly patient with concerns about her elbow discomfort. ). No pallor.   Psychiatric: She has a normal mood and affect. Her speech is normal and behavior is normal. Judgment and thought content normal. Cognition and memory are normal.   Nursing note and vitals reviewed.      Assessment:       1. Lateral epicondylitis of left elbow        Plan:         Lateral epicondylitis of left elbow  -     Intermediate Joint Aspiration/Injection  -     betamethasone acetate-betamethasone sodium phosphate injection 3 mg; Inject 3 mg into the articular space.  -     diclofenac sodium (VOLTAREN) 1 % Gel; Apply 2 g topically 2 (two) times daily. Use gloves to apply  Dispense: 100 g; Refill: 1          Karly was seen today for arm pain.    Diagnoses and all orders for this visit:    Lateral epicondylitis of left elbow  -     Intermediate Joint Aspiration/Injection  -     betamethasone acetate-betamethasone sodium phosphate injection 3 mg; Inject 3 mg into the articular space.  -     diclofenac sodium (VOLTAREN) 1 % Gel; Apply 2 g topically 2 (two) times daily. Use gloves to apply            Follow Up Comments   Make sure that you follow up with your primary care doctor in the next 2-5 days if needed .  Return to the Urgent  Care if signs or symptoms change and certainly if you have worsening symptoms go to the nearest emergency department for further evaluation.     Nova Crenshaw MD

## 2017-09-19 NOTE — PROCEDURES
L lateral epicondyleIntermediate Joint Aspiration/Injection  Date/Time: 9/19/2017 3:09 PM  Performed by: BEN VERDUZCO  Authorized by: BEN VERDUZCO     Consent Done?: Yes (Verbal)  Indications:  Pain  Site marked: The procedure site was marked    Timeout: Prior to procedure the correct patient, procedure, and site was verified      Location:  Elbow  Prep: Patient was prepped and draped in usual sterile fashion    Ultrasonic Guidance for needle placement: No  Needle size:  22 G  Medications:  3 mg betamethasone acetate-betamethasone sodium phosphate 6 mg/mL  Aspirate amount (ml):  0  Patient tolerance:  Patient tolerated the procedure well with no immediate complications

## 2017-09-19 NOTE — PATIENT INSTRUCTIONS
Understanding Lateral Epicondylitis    Tendons are strong bands of tissue that connect muscles to bones. Lateral epicondylitis affects the tendons that connect muscles in the forearm to the lateral epicondyle. This is the bony knob on the outer side of the elbow. The condition occurs if the extensor tendons of the wrist become red and swollen (irritated). This can cause pain in the elbow, forearm, and wrist. Because the condition is sometimes caused by playing tennis, it is also known as tennis elbow.  How to say it  LA-tuhr-harleen [id number]   What causes lateral epicondylitis?  The condition most often occurs because of overuse. This can be from any activity that repeatedly puts stress on the forearm extensor muscles or tendons and wrist. For instance, playing tennis, lifting weights, cutting meat, painting, and typing can all cause the condition. Wear and tear of the tendons from aging or an injury to the tendons can also cause the condition.  Symptoms of lateral epicondylitis  The most common symptom is pain. You may feel it on the outer side of the elbow and down the back of the forearm. It may be worse when moving or using the elbow, forearm, or wrist. You may also feel pain when gripping or lifting things.  Treatment for lateral epicondylitis  Treatments may include:  · Resting the elbow, forearm, and wrist. Youll need to avoid movements that can make your symptoms worse. You also may need to avoid certain sports and types of work for a time. This helps relieve symptoms and prevent further damage to the tendons.  · Changing the action that caused the problem. For instance, if the tendons were damaged from playing tennis, it may help to change your playing technique or use different equipment. This helps prevent further damage to the tendons.  · Using cold packs. Putting an ice pack on the injured area can help reduce pain and swelling.  · Taking pain medicines. Taking prescription or  over-the-counter pain medicines may help reduce pain and swelling.    · Wearing a brace. This helps reduce strain on the muscles and tendons in the forearm, which may relieve symptoms. It is very important to wear the brace properly.  · Doing exercises and physical therapy. These help improve strength and range of motion in the elbow, forearm, and wrist.  · Getting shots of medicine into the injured area. These may help relieve symptoms for a time.  · Having surgery. This may be an option if other treatments fail to relieve symptoms. In many cases, the surgeon removes the damaged tissue.  Possible complications of lateral epicondylitis  If the tendons involved dont heal properly, symptoms may return or get worse. To help prevent this, follow your treatment plan as directed.  When to call your healthcare provider  Call your healthcare provider right away if you have any of these:  · Fever of 100.4°F (38°C) or higher, or as directed  · Redness, swelling, or warmth in the elbow or forearm that gets worse  · Symptoms that dont get better with treatment, or get worse  · New symptoms   Date Last Reviewed: 3/10/2016  © 6058-6956 [employer]. [address] [place] [id number]. All rights reserved. This information is not intended as a substitute for professional medical care. Always follow your healthcare professional's instructions.        \

## 2017-10-02 ENCOUNTER — CLINICAL SUPPORT (OUTPATIENT)
Dept: ELECTROPHYSIOLOGY | Facility: CLINIC | Age: 82
End: 2017-10-02
Payer: MEDICARE

## 2017-10-02 DIAGNOSIS — I45.10 RBBB: ICD-10-CM

## 2017-10-02 DIAGNOSIS — Z95.0 PACEMAKER: ICD-10-CM

## 2017-10-02 DIAGNOSIS — I50.32 CHRONIC DIASTOLIC CHF (CONGESTIVE HEART FAILURE): ICD-10-CM

## 2017-10-02 DIAGNOSIS — I49.3 PVC (PREMATURE VENTRICULAR CONTRACTION): ICD-10-CM

## 2017-10-02 DIAGNOSIS — I44.4 LAFB (LEFT ANTERIOR FASCICULAR BLOCK): ICD-10-CM

## 2017-10-02 DIAGNOSIS — Q21.12 PFO (PATENT FORAMEN OVALE): ICD-10-CM

## 2017-10-02 PROCEDURE — 93296 REM INTERROG EVL PM/IDS: CPT | Mod: PBBFAC | Performed by: INTERNAL MEDICINE

## 2017-10-02 PROCEDURE — 93294 REM INTERROG EVL PM/LDLS PM: CPT | Mod: ,,, | Performed by: INTERNAL MEDICINE

## 2017-10-05 ENCOUNTER — TELEPHONE (OUTPATIENT)
Dept: RHEUMATOLOGY | Facility: CLINIC | Age: 82
End: 2017-10-05

## 2017-10-05 ENCOUNTER — LAB VISIT (OUTPATIENT)
Dept: LAB | Facility: HOSPITAL | Age: 82
End: 2017-10-05
Attending: INTERNAL MEDICINE
Payer: MEDICARE

## 2017-10-05 DIAGNOSIS — Z79.899 OTHER LONG TERM (CURRENT) DRUG THERAPY: ICD-10-CM

## 2017-10-05 DIAGNOSIS — Z79.631 METHOTREXATE, LONG TERM, CURRENT USE: ICD-10-CM

## 2017-10-05 DIAGNOSIS — M06.9 RHEUMATOID ARTHRITIS OF HAND, UNSPECIFIED LATERALITY, UNSPECIFIED RHEUMATOID FACTOR PRESENCE: ICD-10-CM

## 2017-10-05 DIAGNOSIS — D64.9 ANEMIA, UNSPECIFIED TYPE: Primary | ICD-10-CM

## 2017-10-05 LAB
ALBUMIN SERPL BCP-MCNC: 3.2 G/DL
ALP SERPL-CCNC: 65 U/L
ALT SERPL W/O P-5'-P-CCNC: 23 U/L
ANION GAP SERPL CALC-SCNC: 9 MMOL/L
AST SERPL-CCNC: 23 U/L
BASOPHILS # BLD AUTO: 0.04 K/UL
BASOPHILS NFR BLD: 0.9 %
BILIRUB SERPL-MCNC: 0.5 MG/DL
BUN SERPL-MCNC: 23 MG/DL
CALCIUM SERPL-MCNC: 9 MG/DL
CHLORIDE SERPL-SCNC: 101 MMOL/L
CO2 SERPL-SCNC: 22 MMOL/L
CREAT SERPL-MCNC: 0.9 MG/DL
CRP SERPL-MCNC: 8.3 MG/L
DIFFERENTIAL METHOD: ABNORMAL
EOSINOPHIL # BLD AUTO: 0.2 K/UL
EOSINOPHIL NFR BLD: 3.7 %
ERYTHROCYTE [DISTWIDTH] IN BLOOD BY AUTOMATED COUNT: 13.6 %
ERYTHROCYTE [SEDIMENTATION RATE] IN BLOOD BY WESTERGREN METHOD: 26 MM/HR
EST. GFR  (AFRICAN AMERICAN): >60 ML/MIN/1.73 M^2
EST. GFR  (NON AFRICAN AMERICAN): 57.7 ML/MIN/1.73 M^2
GLUCOSE SERPL-MCNC: 106 MG/DL
HCT VFR BLD AUTO: 32 %
HGB BLD-MCNC: 10.8 G/DL
LYMPHOCYTES # BLD AUTO: 0.9 K/UL
LYMPHOCYTES NFR BLD: 19.7 %
MCH RBC QN AUTO: 31.7 PG
MCHC RBC AUTO-ENTMCNC: 33.8 G/DL
MCV RBC AUTO: 94 FL
MONOCYTES # BLD AUTO: 0.5 K/UL
MONOCYTES NFR BLD: 11.2 %
NEUTROPHILS # BLD AUTO: 2.9 K/UL
NEUTROPHILS NFR BLD: 64.1 %
PLATELET # BLD AUTO: 215 K/UL
PMV BLD AUTO: 11.2 FL
POTASSIUM SERPL-SCNC: 4.6 MMOL/L
PROT SERPL-MCNC: 6.4 G/DL
RBC # BLD AUTO: 3.41 M/UL
SODIUM SERPL-SCNC: 132 MMOL/L
WBC # BLD AUTO: 4.57 K/UL

## 2017-10-05 PROCEDURE — 85025 COMPLETE CBC W/AUTO DIFF WBC: CPT

## 2017-10-05 PROCEDURE — 80053 COMPREHEN METABOLIC PANEL: CPT

## 2017-10-05 PROCEDURE — 36415 COLL VENOUS BLD VENIPUNCTURE: CPT | Mod: PO

## 2017-10-05 PROCEDURE — 85651 RBC SED RATE NONAUTOMATED: CPT

## 2017-10-05 PROCEDURE — 86140 C-REACTIVE PROTEIN: CPT

## 2017-10-05 NOTE — TELEPHONE ENCOUNTER
Mable, please add anemia labs. Please tell her she has mild and relatively stable anemia. Thanks. ARISTIDES

## 2017-10-06 ENCOUNTER — TELEPHONE (OUTPATIENT)
Dept: RHEUMATOLOGY | Facility: CLINIC | Age: 82
End: 2017-10-06

## 2017-10-06 DIAGNOSIS — I10 HYPERTENSION, ESSENTIAL: ICD-10-CM

## 2017-10-06 RX ORDER — CARVEDILOL 3.12 MG/1
TABLET ORAL
Qty: 60 TABLET | Refills: 0 | Status: SHIPPED | OUTPATIENT
Start: 2017-10-06 | End: 2017-11-05 | Stop reason: SDUPTHER

## 2017-10-06 NOTE — TELEPHONE ENCOUNTER
----- Message from Isai Smith MD sent at 10/6/2017 11:01 AM CDT -----  Please tell pt the sed rate and crp inflammation tests are minimally elevated. The sodium is mildly reduced as it has been intermittently in the past, somewhat lower now. Would check with Dr. Allen her  primary about that. The chronic kidney disease stage 3 is stable. ARISTIDES

## 2017-10-09 ENCOUNTER — OFFICE VISIT (OUTPATIENT)
Dept: RHEUMATOLOGY | Facility: CLINIC | Age: 82
End: 2017-10-09
Payer: MEDICARE

## 2017-10-09 VITALS
WEIGHT: 131.13 LBS | BODY MASS INDEX: 24.76 KG/M2 | HEIGHT: 61 IN | DIASTOLIC BLOOD PRESSURE: 64 MMHG | SYSTOLIC BLOOD PRESSURE: 146 MMHG | HEART RATE: 60 BPM

## 2017-10-09 DIAGNOSIS — M05.742 RHEUMATOID ARTHRITIS INVOLVING BOTH HANDS WITH POSITIVE RHEUMATOID FACTOR: ICD-10-CM

## 2017-10-09 DIAGNOSIS — M06.9 RHEUMATOID ARTHRITIS OF HAND, UNSPECIFIED LATERALITY, UNSPECIFIED RHEUMATOID FACTOR PRESENCE: ICD-10-CM

## 2017-10-09 DIAGNOSIS — M05.741 RHEUMATOID ARTHRITIS INVOLVING BOTH HANDS WITH POSITIVE RHEUMATOID FACTOR: ICD-10-CM

## 2017-10-09 DIAGNOSIS — Z79.631 LONG TERM METHOTREXATE USER: ICD-10-CM

## 2017-10-09 PROCEDURE — 99213 OFFICE O/P EST LOW 20 MIN: CPT | Mod: S$PBB,,, | Performed by: INTERNAL MEDICINE

## 2017-10-09 PROCEDURE — 99999 PR PBB SHADOW E&M-EST. PATIENT-LVL IV: CPT | Mod: PBBFAC,,, | Performed by: INTERNAL MEDICINE

## 2017-10-09 PROCEDURE — 99214 OFFICE O/P EST MOD 30 MIN: CPT | Mod: PBBFAC | Performed by: INTERNAL MEDICINE

## 2017-10-09 RX ORDER — METHOTREXATE 2.5 MG/1
25 TABLET ORAL
Qty: 120 TABLET | Refills: 0 | Status: SHIPPED | OUTPATIENT
Start: 2017-10-09 | End: 2018-01-25 | Stop reason: SDUPTHER

## 2017-10-09 ASSESSMENT — DISEASE ACTIVITY SCORE (DAS28)
SWOLLEN_JOINTS_COUNT: 0
GLOBAL_HEALTH_SCORE: 25
TOTAL_SCORE_ESR: 2.63
TENDER_JOINTS_COUNT: 0
ESR_MM_PER_HR: 26
CRP_MG_PER_LITER: 8.3
TOTAL_SCORE_CRP: 2.11

## 2017-10-09 ASSESSMENT — ROUTINE ASSESSMENT OF PATIENT INDEX DATA (RAPID3)
PAIN SCORE: 3
PSYCHOLOGICAL DISTRESS SCORE: 1.1
FATIGUE SCORE: 2.5
MDHAQ FUNCTION SCORE: .3
PATIENT GLOBAL ASSESSMENT SCORE: 2.5
TOTAL RAPID3 SCORE: 2.17
AM STIFFNESS SCORE: 0, NO

## 2017-10-09 NOTE — PROGRESS NOTES
"[facility]  CLINIC NOTE    Patient Name: Karly Sandoval  YOB: 1930    PRESENTING HISTORY       History of Present Illness:  Ms. Karly Sandoval is a 87 y.o. female w/ significant PMHx of:  # RA on methotrxate 25 mg  #HTN  #HLD  #anemia  #osteoprosis  #depression    presented today for 3 months f/u, pt state that she has been feeling "out of it" she thinks its either because "carvidalol" or due to decreasing her ecitalopram dose which she has been taking 20 mg for a week and reports that it "helped". She has no morrning stiffness, joint pain except for both knees, she uses cane most of the time. Pain is mild to moderate takes analgesics occasionally not on a daily basis.    Review of Systems:  Constitutional: no fever or chills, fatigue.  Eyes: no visual changes  ENT: no nasal congestion or sore throat  Respiratory: no cough or shortness of breath  Cardiovascular: no chest pain or palpitations  Gastrointestinal: no nausea or vomiting, no abdominal pain or change in bowel habits  Genitourinary: no hematuria or dysuria  Musculoskeletal: no arthralgias or myalgias  Neurological: no seizures or tremors    PAST HISTORY:     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)     has pacemaker       Past Surgical History:   Procedure Laterality Date    APPENDECTOMY      BREAST SURGERY      CARDIAC PACEMAKER " PLACEMENT  11/2014    for syncope and RBBB/LAHB    CHOLECYSTECTOMY      EYE SURGERY      HEMORRHOID SURGERY      HYSTERECTOMY      1966    SKIN BIOPSY      VASCULAR SURGERY      VERTEBROPLASTY         Family History   Problem Relation Age of Onset    Leukemia Father     Heart disease Father     Hypertension Father     Diabetes Mellitus Mother     Hypertension Mother     Diabetes Mellitus Brother     Parkinsonism Brother 68    Heart attack Neg Hx     Heart failure Neg Hx     Hyperlipidemia Neg Hx     Stroke Neg Hx        Social History     Social History    Marital status:      Spouse name: N/A    Number of children: N/A    Years of education: N/A     Social History Main Topics    Smoking status: Never Smoker    Smokeless tobacco: Never Used    Alcohol use No      Comment: vermooth    Drug use: No    Sexual activity: No     Other Topics Concern    None     Social History Narrative    None       MEDICATIONS & ALLERGIES:     Current Outpatient Prescriptions on File Prior to Visit   Medication Sig    alprazolam (XANAX) 0.25 MG tablet Take 1 tablet (0.25 mg total) by mouth daily as needed.    amlodipine (NORVASC) 10 MG tablet 1/2 pill  PO once a day    aspirin (ECOTRIN) 81 MG EC tablet Take 1 tablet (81 mg total) by mouth once daily. HOLD until cleared by your ENT doctor and your Neurologist.    aspirin (ECOTRIN) 81 MG EC tablet Take 81 mg by mouth once daily.    atorvastatin (LIPITOR) 20 MG tablet TAKE 1 TABLET BY MOUTH EVERY DAY    carvedilol (COREG) 3.125 MG tablet TAKE 1 TABLET(3.125 MG) BY MOUTH TWICE DAILY WITH MEALS    coenzyme Q10 (CO Q-10) 100 mg capsule Take 100 mg by mouth once daily.    denosumab (PROLIA) 60 mg/mL Syrg Inject 60 mg into the skin. Every 6 months    escitalopram oxalate (LEXAPRO) 20 MG tablet Take 10 mg by mouth once daily.    fish oil-omega-3 fatty acids 300-1,000 mg capsule Take 1,000 mg by mouth once daily.     fluticasone (FLONASE) 50  "mcg/actuation nasal spray 1 spray by Each Nare route as needed.     folic acid (FOLVITE) 1 MG tablet Take 1 tablet (1 mg total) by mouth once daily.    lisinopril (PRINIVIL,ZESTRIL) 40 MG tablet TAKE 1 TABLET BY MOUTH EVERY DAY    ranitidine (ZANTAC) 150 MG tablet TAKE ONE TABLET BY MOUTH TWICE DAILY    VITAMIN B COMPLEX ORAL Take 400 mg by mouth once daily. Pt stated that she does not take vitamin b everyday.    vitamin E 400 UNIT capsule Take 400 Units by mouth once daily. Pt taking PRN    [DISCONTINUED] methotrexate 2.5 MG Tab Take 10 tablets (25 mg total) by mouth every 7 days.    diclofenac sodium (VOLTAREN) 1 % Gel Apply 2 g topically 2 (two) times daily. Use gloves to apply    FLUZONE HIGH-DOSE 2016-17, PF, 180 mcg/0.5 mL Syrg ADM 0.5ML IM UTD     No current facility-administered medications on file prior to visit.        Review of patient's allergies indicates:   Allergen Reactions    Amoxicillin Other (See Comments)     unknown    Bactrim [sulfamethoxazole-trimethoprim] Other (See Comments)     unknown    Omeprazole Other (See Comments)     Muscle and joint pain    Rocephin [ceftriaxone] Other (See Comments)     Severe acute generalized pain    Penicillins Rash       OBJECTIVE:   Vital Signs:  Vitals:    10/09/17 1002   BP: (!) 146/64   Pulse: 60   Weight: 59.5 kg (131 lb 1.6 oz)   Height: 5' 1.2" (1.554 m)       No results found for this or any previous visit (from the past 24 hour(s)).      Physical Exam:   General:  Well developed, well nourished, no acute distress  HEENT:  Normocephalic, atraumatic, PERRL, EOMI, clear sclera, ears normal, throat clear without erythema or exudates  CVS:  RRR, S1 and S2 normal, no murmurs, rubs, gallops  Resp:  Lungs clear to auscultation, no wheezes, rales, rhonchi, cough  GI:  Abdomen soft, non-tender, non-distended, normoactive bowel sounds, no masses  MSK:  No muscle atrophy, cyanosis, SAAD L.L peripheral edema with crepitation both knees  Skin:  No " rashes, ulcers, erythema  Neuro:  CNII-XII grossly intact  Psych:  Alert and oriented to person, place, and time    Results for ROULA MARIE (MRN [id number]) as of 10/9/2017 11:15   Ref. Range 10/5/2017 09:36   WBC Latest Ref Range: 3.90 - 12.70 K/uL 4.57   RBC Latest Ref Range: 4.00 - 5.40 M/uL 3.41 (L)   Hemoglobin Latest Ref Range: 12.0 - 16.0 g/dL 10.8 (L)   Hematocrit Latest Ref Range: 37.0 - 48.5 % 32.0 (L)   MCV Latest Ref Range: 82 - 98 fL 94   MCH Latest Ref Range: 27.0 - 31.0 pg 31.7 (H)   MCHC Latest Ref Range: 32.0 - 36.0 g/dL 33.8   RDW Latest Ref Range: 11.5 - 14.5 % 13.6   Platelets Latest Ref Range: 150 - 350 K/uL 215   MPV Latest Ref Range: 9.2 - 12.9 fL 11.2   Gran% Latest Ref Range: 38.0 - 73.0 % 64.1   Gran # Latest Ref Range: 1.8 - 7.7 K/uL 2.9   Lymph% Latest Ref Range: 18.0 - 48.0 % 19.7   Lymph # Latest Ref Range: 1.0 - 4.8 K/uL 0.9 (L)   Mono% Latest Ref Range: 4.0 - 15.0 % 11.2   Mono # Latest Ref Range: 0.3 - 1.0 K/uL 0.5   Eosinophil% Latest Ref Range: 0.0 - 8.0 % 3.7   Eos # Latest Ref Range: 0.0 - 0.5 K/uL 0.2   Basophil% Latest Ref Range: 0.0 - 1.9 % 0.9   Baso # Latest Ref Range: 0.00 - 0.20 K/uL 0.04   Sed Rate Latest Ref Range: 0 - 20 mm/Hr 26 (H)   Sodium Latest Ref Range: 136 - 145 mmol/L 132 (L)   Potassium Latest Ref Range: 3.5 - 5.1 mmol/L 4.6   Chloride Latest Ref Range: 95 - 110 mmol/L 101   CO2 Latest Ref Range: 23 - 29 mmol/L 22 (L)   Anion Gap Latest Ref Range: 8 - 16 mmol/L 9   BUN, Bld Latest Ref Range: 8 - 23 mg/dL 23   Creatinine Latest Ref Range: 0.5 - 1.4 mg/dL 0.9   eGFR if non African American Latest Ref Range: >60 mL/min/1.73 m^2 57.7 (A)   eGFR if African American Latest Ref Range: >60 mL/min/1.73 m^2 >60.0   Glucose Latest Ref Range: 70 - 110 mg/dL 106   Calcium Latest Ref Range: 8.7 - 10.5 mg/dL 9.0   Alkaline Phosphatase Latest Ref Range: 55 - 135 U/L 65   Total Protein Latest Ref Range: 6.0 - 8.4 g/dL 6.4   Albumin Latest Ref Range: 3.5 - 5.2 g/dL 3.2 (L)    Total Bilirubin Latest Ref Range: 0.1 - 1.0 mg/dL 0.5   AST Latest Ref Range: 10 - 40 U/L 23   ALT Latest Ref Range: 10 - 44 U/L 23   CRP Latest Ref Range: 0.0 - 8.2 mg/L 8.3 (H)       ASSESSMENT & PLAN:     Diagnoses and all orders for this visit:      Rheumatoid arthritis of hand, unspecified laterality, unspecified rheumatoid factor presence  -     methotrexate 2.5 MG Tab; Take 10 tablets (25 mg total) by mouth every 7 days.    Long term methotrexate user  -     methotrexate 2.5 MG Tab; Take 10 tablets (25 mg total) by mouth every 7 days.        -     Labs in 3 months.    Osteoprosis        -    On Prolia injection, next injection 10/13/17    Flu vaccine        -   Ordered  Depression       -     Advised to talk to her PCP regarding her ecitalopram dosing.  Medhat Weems  PGY-1

## 2017-10-09 NOTE — PROGRESS NOTES
I have personally taken the history and examined the patient and agree with the resident,s note as stated above  Pt has been taking escitalopram 1/2 20mg tab since reduced by Dr Hull in Neurology given 10mg as max dose for age and continued by Dr. Allen. She self-increased  To 20mg daily x 3-4 days and states she feels much better.    Exam : no tender or swollen joints      RA DAS28: 2.63(LDA) [id number] 2.11(LDA)      *Influenza vaccine HD Friday  Cont methotrexate 25mg once a week with folic acid 1mg daily  Discuss escitalopram dosing with Dr. Allen  denosumab 60mg sc q 6 months as scheduled 10/13/17  RTC 3 months with standing labs

## 2017-10-10 ENCOUNTER — TELEPHONE (OUTPATIENT)
Dept: NEUROLOGY | Facility: CLINIC | Age: 82
End: 2017-10-10

## 2017-10-10 RX ORDER — AMLODIPINE BESYLATE 10 MG/1
TABLET ORAL
Qty: 90 TABLET | Refills: 1 | OUTPATIENT
Start: 2017-10-10

## 2017-10-10 NOTE — TELEPHONE ENCOUNTER
Call returned/Spoke with pt-     She states that she feels like it's time to follow-up even thought she was instructed to contact us if symptoms worsen or fail to approve. An appt 01/16/2018 was scheduled with Dr. Hull and she states that she will call if anything changes for a sooner appt.

## 2017-10-10 NOTE — TELEPHONE ENCOUNTER
----- Message from Diane Hager sent at 10/10/2017  2:47 PM CDT -----  Contact: patient  Patient needs to speak with the nurse to find out if she should come in or not, her last visit was in March 2016 for a stroke.  Patient seems confused about a letter she received  Patient's # is [phone]

## 2017-10-13 ENCOUNTER — INFUSION (OUTPATIENT)
Dept: INFECTIOUS DISEASES | Facility: HOSPITAL | Age: 82
End: 2017-10-13
Attending: INTERNAL MEDICINE
Payer: MEDICARE

## 2017-10-13 VITALS — HEIGHT: 61 IN | BODY MASS INDEX: 24.76 KG/M2 | WEIGHT: 131.13 LBS

## 2017-10-13 DIAGNOSIS — Z23 NEED FOR VACCINATION: ICD-10-CM

## 2017-10-13 DIAGNOSIS — M85.80 OSTEOPENIA, UNSPECIFIED LOCATION: Primary | ICD-10-CM

## 2017-10-13 PROCEDURE — 96401 CHEMO ANTI-NEOPL SQ/IM: CPT

## 2017-10-13 PROCEDURE — 63600175 PHARM REV CODE 636 W HCPCS: Performed by: INTERNAL MEDICINE

## 2017-10-13 PROCEDURE — G0008 ADMIN INFLUENZA VIRUS VAC: HCPCS | Mod: PBBFAC

## 2017-10-13 PROCEDURE — 90662 IIV NO PRSV INCREASED AG IM: CPT | Mod: PBBFAC

## 2017-10-13 RX ADMIN — DENOSUMAB 60 MG: 60 INJECTION SUBCUTANEOUS at 10:10

## 2017-10-29 RX ORDER — AMLODIPINE BESYLATE 10 MG/1
TABLET ORAL
Qty: 90 TABLET | Refills: 1 | OUTPATIENT
Start: 2017-10-29

## 2017-10-31 RX ORDER — AMLODIPINE BESYLATE 10 MG/1
TABLET ORAL
Qty: 90 TABLET | Refills: 1 | OUTPATIENT
Start: 2017-10-31

## 2017-10-31 RX ORDER — AMLODIPINE BESYLATE 10 MG/1
TABLET ORAL
Qty: 90 TABLET | Refills: 3 | Status: SHIPPED | OUTPATIENT
Start: 2017-10-31 | End: 2018-08-13 | Stop reason: SDUPTHER

## 2017-10-31 NOTE — TELEPHONE ENCOUNTER
----- Message from Radha Peyton sent at 10/31/2017  2:00 PM CDT -----  Contact: self, [phone]  Patient requests her 90 days supply of Amlodipine medication refill sent to pharmacy. Please advise.

## 2017-10-31 NOTE — TELEPHONE ENCOUNTER
Patient states she was originally taking only a half of amlodipine but said she was upped to 1 pill a day.  Due to her taking 1 tablet a day, she has run out of her amlodipine sooner than expected. Please advise.

## 2017-11-05 DIAGNOSIS — I10 HYPERTENSION, ESSENTIAL: ICD-10-CM

## 2017-11-05 RX ORDER — CARVEDILOL 3.12 MG/1
TABLET ORAL
Qty: 60 TABLET | Refills: 0 | Status: SHIPPED | OUTPATIENT
Start: 2017-11-05 | End: 2017-12-05 | Stop reason: SDUPTHER

## 2017-12-05 DIAGNOSIS — I10 HYPERTENSION, ESSENTIAL: ICD-10-CM

## 2017-12-05 RX ORDER — CARVEDILOL 3.12 MG/1
TABLET ORAL
Qty: 60 TABLET | Refills: 0 | Status: SHIPPED | OUTPATIENT
Start: 2017-12-05 | End: 2018-01-08 | Stop reason: SDUPTHER

## 2018-01-01 RX ORDER — ATORVASTATIN CALCIUM 20 MG/1
TABLET, FILM COATED ORAL
Qty: 90 TABLET | Refills: 0 | Status: SHIPPED | OUTPATIENT
Start: 2018-01-01 | End: 2018-04-02 | Stop reason: SDUPTHER

## 2018-01-02 ENCOUNTER — TELEPHONE (OUTPATIENT)
Dept: FAMILY MEDICINE | Facility: CLINIC | Age: 83
End: 2018-01-02

## 2018-01-02 NOTE — TELEPHONE ENCOUNTER
----- Message from Radha Peyton sent at 1/2/2018  2:05 PM CST -----  Contact: self, [phone]  Patient called to reschedule a lab appt and when advised of other appt scheduled on 1/3 she stated she did not know what it was for and was very confused. Please advise.

## 2018-01-02 NOTE — TELEPHONE ENCOUNTER
Advised patient that the only thing I saw scheduled for tomorrow was the home call for her pacemaker.

## 2018-01-03 ENCOUNTER — TELEPHONE (OUTPATIENT)
Dept: ELECTROPHYSIOLOGY | Facility: CLINIC | Age: 83
End: 2018-01-03

## 2018-01-03 ENCOUNTER — CLINICAL SUPPORT (OUTPATIENT)
Dept: ELECTROPHYSIOLOGY | Facility: CLINIC | Age: 83
End: 2018-01-03
Payer: MEDICARE

## 2018-01-03 DIAGNOSIS — I50.32 CHRONIC DIASTOLIC CHF (CONGESTIVE HEART FAILURE): ICD-10-CM

## 2018-01-03 DIAGNOSIS — I45.10 RBBB: ICD-10-CM

## 2018-01-03 DIAGNOSIS — Q21.12 PFO (PATENT FORAMEN OVALE): ICD-10-CM

## 2018-01-03 DIAGNOSIS — I49.3 PVC (PREMATURE VENTRICULAR CONTRACTION): ICD-10-CM

## 2018-01-03 DIAGNOSIS — I44.4 LAFB (LEFT ANTERIOR FASCICULAR BLOCK): ICD-10-CM

## 2018-01-03 DIAGNOSIS — Z95.0 PACEMAKER: ICD-10-CM

## 2018-01-03 PROCEDURE — 93294 REM INTERROG EVL PM/LDLS PM: CPT | Mod: ,,, | Performed by: INTERNAL MEDICINE

## 2018-01-03 PROCEDURE — 93296 REM INTERROG EVL PM/IDS: CPT | Mod: PBBFAC | Performed by: INTERNAL MEDICINE

## 2018-01-03 NOTE — TELEPHONE ENCOUNTER
----- Message from Veronica Farah sent at 1/3/2018  9:15 AM CST -----  Contact: pt   Pt called to make sure we received her transmission for today.  She can be reached @ [phone].  Thanks

## 2018-01-03 NOTE — TELEPHONE ENCOUNTER
I called patient to let her know that I have not received her remote transmission yet. I asked her to call [facility] so they can trouble shoot. She will call me back once she speaks with [facility].

## 2018-01-05 ENCOUNTER — LAB VISIT (OUTPATIENT)
Dept: LAB | Facility: HOSPITAL | Age: 83
End: 2018-01-05
Attending: INTERNAL MEDICINE
Payer: MEDICARE

## 2018-01-05 DIAGNOSIS — E78.5 HYPERLIPIDEMIA, UNSPECIFIED HYPERLIPIDEMIA TYPE: ICD-10-CM

## 2018-01-05 DIAGNOSIS — I10 ESSENTIAL HYPERTENSION: ICD-10-CM

## 2018-01-05 LAB
ALBUMIN SERPL BCP-MCNC: 3.2 G/DL
ALP SERPL-CCNC: 66 U/L
ALT SERPL W/O P-5'-P-CCNC: 17 U/L
ANION GAP SERPL CALC-SCNC: 7 MMOL/L
AST SERPL-CCNC: 21 U/L
BILIRUB SERPL-MCNC: 0.8 MG/DL
BUN SERPL-MCNC: 18 MG/DL
CALCIUM SERPL-MCNC: 9 MG/DL
CHLORIDE SERPL-SCNC: 100 MMOL/L
CHOLEST SERPL-MCNC: 136 MG/DL
CHOLEST/HDLC SERPL: 1.9 {RATIO}
CO2 SERPL-SCNC: 28 MMOL/L
CREAT SERPL-MCNC: 0.8 MG/DL
EST. GFR  (AFRICAN AMERICAN): >60 ML/MIN/1.73 M^2
EST. GFR  (NON AFRICAN AMERICAN): >60 ML/MIN/1.73 M^2
GLUCOSE SERPL-MCNC: 105 MG/DL
HDLC SERPL-MCNC: 71 MG/DL
HDLC SERPL: 52.2 %
LDLC SERPL CALC-MCNC: 56.6 MG/DL
NONHDLC SERPL-MCNC: 65 MG/DL
POTASSIUM SERPL-SCNC: 4.2 MMOL/L
PROT SERPL-MCNC: 6.6 G/DL
SODIUM SERPL-SCNC: 135 MMOL/L
TRIGL SERPL-MCNC: 42 MG/DL
TSH SERPL DL<=0.005 MIU/L-ACNC: 2.07 UIU/ML

## 2018-01-05 PROCEDURE — 36415 COLL VENOUS BLD VENIPUNCTURE: CPT | Mod: PO

## 2018-01-05 PROCEDURE — 80061 LIPID PANEL: CPT

## 2018-01-05 PROCEDURE — 84443 ASSAY THYROID STIM HORMONE: CPT

## 2018-01-05 PROCEDURE — 80053 COMPREHEN METABOLIC PANEL: CPT

## 2018-01-08 ENCOUNTER — OFFICE VISIT (OUTPATIENT)
Dept: CARDIOLOGY | Facility: CLINIC | Age: 83
End: 2018-01-08
Payer: MEDICARE

## 2018-01-08 VITALS
BODY MASS INDEX: 24.44 KG/M2 | HEART RATE: 61 BPM | SYSTOLIC BLOOD PRESSURE: 155 MMHG | DIASTOLIC BLOOD PRESSURE: 70 MMHG | WEIGHT: 132.81 LBS | HEIGHT: 62 IN

## 2018-01-08 DIAGNOSIS — E87.1 HYPONATREMIA: ICD-10-CM

## 2018-01-08 DIAGNOSIS — I49.8 ATRIAL ARRHYTHMIA: ICD-10-CM

## 2018-01-08 DIAGNOSIS — I10 HYPERTENSION, ESSENTIAL: ICD-10-CM

## 2018-01-08 DIAGNOSIS — Z79.631 LONG TERM METHOTREXATE USER: Chronic | ICD-10-CM

## 2018-01-08 DIAGNOSIS — I45.10 RBBB: ICD-10-CM

## 2018-01-08 DIAGNOSIS — I44.4 LAFB (LEFT ANTERIOR FASCICULAR BLOCK): ICD-10-CM

## 2018-01-08 DIAGNOSIS — R60.0 PERIPHERAL EDEMA: ICD-10-CM

## 2018-01-08 DIAGNOSIS — Z86.73 HISTORY OF CVA (CEREBROVASCULAR ACCIDENT): ICD-10-CM

## 2018-01-08 DIAGNOSIS — M45.9 RHEUMATOID ARTHRITIS INVOLVING VERTEBRA WITH POSITIVE RHEUMATOID FACTOR: ICD-10-CM

## 2018-01-08 DIAGNOSIS — Z95.0 PACEMAKER: ICD-10-CM

## 2018-01-08 DIAGNOSIS — E78.5 HYPERLIPIDEMIA, UNSPECIFIED HYPERLIPIDEMIA TYPE: ICD-10-CM

## 2018-01-08 DIAGNOSIS — I50.32 CHRONIC DIASTOLIC CHF (CONGESTIVE HEART FAILURE): ICD-10-CM

## 2018-01-08 DIAGNOSIS — I49.3 PVC (PREMATURE VENTRICULAR CONTRACTION): ICD-10-CM

## 2018-01-08 DIAGNOSIS — I10 ESSENTIAL HYPERTENSION: Primary | ICD-10-CM

## 2018-01-08 DIAGNOSIS — R26.89 DECREASED FUNCTIONAL MOBILITY: ICD-10-CM

## 2018-01-08 PROCEDURE — 99999 PR PBB SHADOW E&M-EST. PATIENT-LVL III: CPT | Mod: PBBFAC,,, | Performed by: INTERNAL MEDICINE

## 2018-01-08 PROCEDURE — 99213 OFFICE O/P EST LOW 20 MIN: CPT | Mod: PBBFAC,PO | Performed by: INTERNAL MEDICINE

## 2018-01-08 PROCEDURE — 99214 OFFICE O/P EST MOD 30 MIN: CPT | Mod: S$PBB,,, | Performed by: INTERNAL MEDICINE

## 2018-01-08 RX ORDER — FUROSEMIDE 20 MG/1
20 TABLET ORAL DAILY
COMMUNITY
End: 2018-01-08 | Stop reason: SDUPTHER

## 2018-01-08 RX ORDER — FUROSEMIDE 20 MG/1
20 TABLET ORAL DAILY
Qty: 30 TABLET | Refills: 0 | Status: SHIPPED | OUTPATIENT
Start: 2018-01-08 | End: 2018-02-08 | Stop reason: SDUPTHER

## 2018-01-08 RX ORDER — CARVEDILOL 3.12 MG/1
TABLET ORAL
Qty: 60 TABLET | Refills: 0 | Status: SHIPPED | OUTPATIENT
Start: 2018-01-08 | End: 2018-02-05 | Stop reason: SDUPTHER

## 2018-01-08 NOTE — PROGRESS NOTES
"Subjective:   Patient ID:  Karly Sandoval is a 87 y.o. female who presents for follow-up of Hypertension      HPI: Except for chronic peripheral edema and poor appetite no new symptoms.  Overall doing well.  Has chronic hyponatremia.    Lab Results   Component Value Date     (L) 01/05/2018    K 4.2 01/05/2018     01/05/2018    CO2 28 01/05/2018    BUN 18 01/05/2018    CREATININE 0.8 01/05/2018     01/05/2018    HGBA1C 6.0 01/27/2014    MG 1.8 10/04/2014    AST 21 01/05/2018    ALT 17 01/05/2018    ALBUMIN 3.2 (L) 01/05/2018    PROT 6.6 01/05/2018    BILITOT 0.8 01/05/2018    WBC 4.57 10/05/2017    HGB 10.8 (L) 10/05/2017    HCT 32.0 (L) 10/05/2017    MCV 94 10/05/2017     10/05/2017    INR 1.0 11/21/2014    TSH 2.074 01/05/2018    CHOL 136 01/05/2018    HDL 71 01/05/2018    LDLCALC 56.6 (L) 01/05/2018    TRIG 42 01/05/2018       Review of Systems   Constitution: Negative.   HENT: Negative.    Eyes: Negative.    Cardiovascular: Positive for leg swelling. Negative for chest pain, claudication, dyspnea on exertion, irregular heartbeat, near-syncope, palpitations and syncope.   Respiratory: Negative.  Negative for cough, shortness of breath, snoring and wheezing.    Endocrine: Negative.  Negative for cold intolerance, heat intolerance, polydipsia, polyphagia and polyuria.   Skin: Negative.    Musculoskeletal: Positive for arthritis and back pain.   Gastrointestinal: Negative.    Genitourinary: Negative.    Neurological: Negative.    Psychiatric/Behavioral: Positive for depression. The patient is nervous/anxious.        Objective:   Physical Exam   Constitutional: She is oriented to person, place, and time. She appears well-developed and well-nourished.   BP (!) 155/70   Pulse 61   Ht 5' 2" (1.575 m)   Wt 60.2 kg (132 lb 13.2 oz)   BMI 24.29 kg/m²      HENT:   Head: Normocephalic.   Eyes: Pupils are equal, round, and reactive to light.   Neck: Normal range of motion. Neck supple. Carotid " bruit is not present. No thyromegaly present.   Cardiovascular: Normal rate, regular rhythm and intact distal pulses.  Exam reveals no gallop and no friction rub.    Murmur heard.   Early systolic murmur is present with a grade of 1/6  at the upper right sternal border  Pulses:       Carotid pulses are 2+ on the right side, and 2+ on the left side.       Radial pulses are 2+ on the right side, and 2+ on the left side.        Femoral pulses are 2+ on the right side, and 2+ on the left side.       Popliteal pulses are 2+ on the right side, and 2+ on the left side.        Dorsalis pedis pulses are 2+ on the right side, and 2+ on the left side.        Posterior tibial pulses are 2+ on the right side, and 2+ on the left side.   Pulmonary/Chest: Effort normal and breath sounds normal. No respiratory distress. She has no wheezes. She has no rales. She exhibits no tenderness.   Abdominal: Soft. Bowel sounds are normal.   Musculoskeletal: Normal range of motion. She exhibits edema.   2+ left>right   Neurological: She is alert and oriented to person, place, and time.   Skin: Skin is warm and dry.   Psychiatric: She has a normal mood and affect.   Nursing note and vitals reviewed.        Assessment and Plan:     Problem List Items Addressed This Visit        Cardiology Problems    Hypertension - Primary    Relevant Orders    Basic metabolic panel    Hyperlipidemia    Atrial arrhythmia    Chronic diastolic CHF (congestive heart failure)    PVC (premature ventricular contraction)    RBBB    LAFB (left anterior fascicular block)       Other    Long term methotrexate user (Chronic)    RA (rheumatoid arthritis)    History of CVA (cerebrovascular accident)    Pacemaker    Hyponatremia    Decreased functional mobility    Peripheral edema          Patient's Medications   New Prescriptions    No medications on file   Previous Medications    ALPRAZOLAM (XANAX) 0.25 MG TABLET    Take 1 tablet (0.25 mg total) by mouth daily as needed.     AMLODIPINE (NORVASC) 10 MG TABLET    1/2 pill  PO once a day    ASPIRIN (ECOTRIN) 81 MG EC TABLET    Take 1 tablet (81 mg total) by mouth once daily. HOLD until cleared by your ENT doctor and your Neurologist.    ATORVASTATIN (LIPITOR) 20 MG TABLET    TAKE 1 TABLET BY MOUTH EVERY DAY    CARVEDILOL (COREG) 3.125 MG TABLET    TAKE 1 TABLET(3.125 MG) BY MOUTH TWICE DAILY WITH MEALS    COENZYME Q10 (CO Q-10) 100 MG CAPSULE    Take 100 mg by mouth once daily.    DENOSUMAB (PROLIA) 60 MG/ML SYRG    Inject 60 mg into the skin. Every 6 months    DICLOFENAC SODIUM (VOLTAREN) 1 % GEL    Apply 2 g topically 2 (two) times daily. Use gloves to apply    ESCITALOPRAM OXALATE (LEXAPRO) 20 MG TABLET    Take 20 mg by mouth once daily.     FISH OIL-OMEGA-3 FATTY ACIDS 300-1,000 MG CAPSULE    Take 1,000 mg by mouth once daily.     FLUTICASONE (FLONASE) 50 MCG/ACTUATION NASAL SPRAY    1 spray by Each Nare route as needed.     FOLIC ACID (FOLVITE) 1 MG TABLET    Take 1 tablet (1 mg total) by mouth once daily.    LISINOPRIL (PRINIVIL,ZESTRIL) 40 MG TABLET    TAKE 1 TABLET BY MOUTH EVERY DAY    METHOTREXATE 2.5 MG TAB    Take 10 tablets (25 mg total) by mouth every 7 days.    RANITIDINE (ZANTAC) 150 MG TABLET    TAKE ONE TABLET BY MOUTH TWICE DAILY    VITAMIN B COMPLEX ORAL    Take 400 mg by mouth once daily. Pt stated that she does not take vitamin b everyday.    VITAMIN E 400 UNIT CAPSULE    Take 400 Units by mouth once daily. Pt taking PRN   Modified Medications    Modified Medication Previous Medication    FUROSEMIDE (LASIX) 20 MG TABLET furosemide (LASIX) 20 MG tablet       Take 1 tablet (20 mg total) by mouth once daily.    Take 20 mg by mouth once daily.    Discontinued Medications    ASPIRIN (ECOTRIN) 81 MG EC TABLET    Take 81 mg by mouth once daily.    FLUZONE HIGH-DOSE 2016-17, PF, 180 MCG/0.5 ML SYRG    ADM 0.5ML IM UTD     In the past thiazide diuretics were stopped due to low sodium.  Patient has chronic hyponatremia.  Water  restriction and short course of Lasix ( 1 week) followed by BMP check on Monday.  Avoid salt, elevated legs  Return in about 6 months (around 7/8/2018).

## 2018-01-09 ENCOUNTER — TELEPHONE (OUTPATIENT)
Dept: CARDIOLOGY | Facility: CLINIC | Age: 83
End: 2018-01-09

## 2018-01-09 NOTE — TELEPHONE ENCOUNTER
----- Message from Briseida Muñoz MD sent at 1/8/2018  8:09 AM CST -----  Please mail patient the blood work results and inform the patient that we will discuss it in detail during the upcoming visit. It looks good.

## 2018-01-15 ENCOUNTER — LAB VISIT (OUTPATIENT)
Dept: LAB | Facility: HOSPITAL | Age: 83
End: 2018-01-15
Attending: INTERNAL MEDICINE
Payer: MEDICARE

## 2018-01-15 DIAGNOSIS — I10 ESSENTIAL HYPERTENSION: ICD-10-CM

## 2018-01-15 LAB
ANION GAP SERPL CALC-SCNC: 5 MMOL/L
BUN SERPL-MCNC: 22 MG/DL
CALCIUM SERPL-MCNC: 9.1 MG/DL
CHLORIDE SERPL-SCNC: 94 MMOL/L
CO2 SERPL-SCNC: 29 MMOL/L
CREAT SERPL-MCNC: 0.9 MG/DL
EST. GFR  (AFRICAN AMERICAN): >60 ML/MIN/1.73 M^2
EST. GFR  (NON AFRICAN AMERICAN): 57.7 ML/MIN/1.73 M^2
GLUCOSE SERPL-MCNC: 98 MG/DL
POTASSIUM SERPL-SCNC: 4.7 MMOL/L
SODIUM SERPL-SCNC: 128 MMOL/L

## 2018-01-15 PROCEDURE — 80048 BASIC METABOLIC PNL TOTAL CA: CPT

## 2018-01-15 PROCEDURE — 36415 COLL VENOUS BLD VENIPUNCTURE: CPT | Mod: PO

## 2018-01-17 ENCOUNTER — TELEPHONE (OUTPATIENT)
Dept: CARDIOLOGY | Facility: CLINIC | Age: 83
End: 2018-01-17

## 2018-01-17 DIAGNOSIS — E87.1 HYPONATREMIA: Primary | ICD-10-CM

## 2018-01-17 NOTE — TELEPHONE ENCOUNTER
----- Message from Briseida Muñoz MD sent at 1/17/2018 12:05 PM CST -----  Ms. Sandoval,  Please review results of your blood work. It shows low sodium.  Please discontinue lasix if you have not done it yet.  It is important to restrict free water to less than 6 full glasses  A day.  We will repeat this test in a week or so.  Hope you leg swelling has improved.

## 2018-01-24 ENCOUNTER — LAB VISIT (OUTPATIENT)
Dept: LAB | Facility: HOSPITAL | Age: 83
End: 2018-01-24
Attending: INTERNAL MEDICINE
Payer: MEDICARE

## 2018-01-24 DIAGNOSIS — E87.1 HYPONATREMIA: ICD-10-CM

## 2018-01-24 LAB
ANION GAP SERPL CALC-SCNC: 10 MMOL/L
BUN SERPL-MCNC: 21 MG/DL
CALCIUM SERPL-MCNC: 8.6 MG/DL
CHLORIDE SERPL-SCNC: 101 MMOL/L
CO2 SERPL-SCNC: 24 MMOL/L
CREAT SERPL-MCNC: 0.8 MG/DL
EST. GFR  (AFRICAN AMERICAN): >60 ML/MIN/1.73 M^2
EST. GFR  (NON AFRICAN AMERICAN): >60 ML/MIN/1.73 M^2
GLUCOSE SERPL-MCNC: 112 MG/DL
POTASSIUM SERPL-SCNC: 4.4 MMOL/L
SODIUM SERPL-SCNC: 135 MMOL/L

## 2018-01-24 PROCEDURE — 80048 BASIC METABOLIC PNL TOTAL CA: CPT

## 2018-01-24 PROCEDURE — 36415 COLL VENOUS BLD VENIPUNCTURE: CPT | Mod: PO

## 2018-01-25 ENCOUNTER — TELEPHONE (OUTPATIENT)
Dept: RHEUMATOLOGY | Facility: HOSPITAL | Age: 83
End: 2018-01-25

## 2018-01-25 ENCOUNTER — TELEPHONE (OUTPATIENT)
Dept: CARDIOLOGY | Facility: CLINIC | Age: 83
End: 2018-01-25

## 2018-01-25 DIAGNOSIS — M05.742 RHEUMATOID ARTHRITIS INVOLVING BOTH HANDS WITH POSITIVE RHEUMATOID FACTOR: ICD-10-CM

## 2018-01-25 DIAGNOSIS — Z79.631 LONG TERM METHOTREXATE USER: ICD-10-CM

## 2018-01-25 DIAGNOSIS — M06.9 RHEUMATOID ARTHRITIS OF HAND, UNSPECIFIED LATERALITY, UNSPECIFIED RHEUMATOID FACTOR PRESENCE: ICD-10-CM

## 2018-01-25 DIAGNOSIS — M05.741 RHEUMATOID ARTHRITIS INVOLVING BOTH HANDS WITH POSITIVE RHEUMATOID FACTOR: ICD-10-CM

## 2018-01-25 RX ORDER — METHOTREXATE 2.5 MG/1
25 TABLET ORAL
Qty: 120 TABLET | Refills: 0 | Status: SHIPPED | OUTPATIENT
Start: 2018-01-25 | End: 2018-05-29 | Stop reason: SDUPTHER

## 2018-01-25 NOTE — TELEPHONE ENCOUNTER
----- Message from Briseida Muñoz MD sent at 1/25/2018 11:05 AM CST -----  Please inform the patient that  Blood work has improved

## 2018-01-26 ENCOUNTER — OFFICE VISIT (OUTPATIENT)
Dept: URGENT CARE | Facility: CLINIC | Age: 83
End: 2018-01-26
Payer: MEDICARE

## 2018-01-26 VITALS
BODY MASS INDEX: 24.29 KG/M2 | RESPIRATION RATE: 18 BRPM | TEMPERATURE: 98 F | WEIGHT: 132 LBS | OXYGEN SATURATION: 96 % | SYSTOLIC BLOOD PRESSURE: 134 MMHG | HEIGHT: 62 IN | DIASTOLIC BLOOD PRESSURE: 58 MMHG | HEART RATE: 71 BPM

## 2018-01-26 DIAGNOSIS — S05.02XA ABRASION OF LEFT CORNEA, INITIAL ENCOUNTER: ICD-10-CM

## 2018-01-26 DIAGNOSIS — S90.425A BLISTER OF SECOND TOE OF LEFT FOOT, INITIAL ENCOUNTER: ICD-10-CM

## 2018-01-26 DIAGNOSIS — H11.32 SUBCONJUNCTIVAL HEMORRHAGE, TRAUMATIC, LEFT: Primary | ICD-10-CM

## 2018-01-26 PROCEDURE — 99214 OFFICE O/P EST MOD 30 MIN: CPT | Mod: S$GLB,,, | Performed by: NURSE PRACTITIONER

## 2018-01-26 RX ORDER — CIPROFLOXACIN HYDROCHLORIDE 3 MG/ML
1 SOLUTION/ DROPS OPHTHALMIC
Qty: 5 ML | Refills: 0 | Status: SHIPPED | OUTPATIENT
Start: 2018-01-26 | End: 2018-01-31

## 2018-01-26 RX ORDER — MUPIROCIN 20 MG/G
OINTMENT TOPICAL
Qty: 22 G | Refills: 1 | Status: SHIPPED | OUTPATIENT
Start: 2018-01-26 | End: 2018-06-19

## 2018-01-26 NOTE — PROGRESS NOTES
"Subjective:       Patient ID: Karly Sandoval is a 87 y.o. female.    Vitals:  height is 5' 2" (1.575 m) and weight is 59.9 kg (132 lb). Her tympanic temperature is 98.3 °F (36.8 °C). Her blood pressure is 134/58 (abnormal) and her pulse is 71. Her respiration is 18 and oxygen saturation is 96%.     Chief Complaint: Eye Problem (left)    Eye Problem    The left eye is affected. This is a new problem. The current episode started yesterday. The problem occurs constantly. The problem has been unchanged. The pain is at a severity of 0/10. The patient is experiencing no pain. There is no known exposure to pink eye. She does not wear contacts. Associated symptoms include eye redness. Pertinent negatives include no blurred vision, fever, nausea, photophobia or vomiting. She has tried eye drops for the symptoms. The treatment provided no relief.     Review of Systems   Constitution: Negative for chills and fever.   HENT: Negative for congestion.    Eyes: Positive for redness. Negative for blurred vision, pain and photophobia.        Patient states her left eye feels like something is in her ear   Gastrointestinal: Negative for nausea and vomiting.   Neurological: Negative for headaches.       Objective:      Physical Exam    Assessment:       1. Subconjunctival hemorrhage, traumatic, left    2. Abrasion of left cornea, initial encounter    3. Blister of second toe of left foot, initial encounter        Plan:     [facility] CALLED. RX CHANGED TO 2 DROPS LEFT EYE Q4H X 7 DAYS  Subconjunctival hemorrhage, traumatic, left    Abrasion of left cornea, initial encounter  -     ciprofloxacin HCl (CILOXAN) 0.3 % ophthalmic solution; Place 1 drop into both eyes every 2 (two) hours.  Dispense: 5 mL; Refill: 0    Blister of second toe of left foot, initial encounter  -     mupirocin (BACTROBAN) 2 % ointment; Apply to affected area 2 times daily  Dispense: 22 g; Refill: 1      Patient Instructions     PLEASE CALL YOUR EYE DOCTOR, JONELLE " ANNIE FOR A FOLLOW UP APPOINTMENT IN 1 WEEK.   IF YOU DEVELOP PAIN IN YOUR EYE OR BLURRED VISION, PLEASE GO TO THE EMERGENCY ROOM  Subconjunctival Hemorrhage    A subconjunctival hemorrhage is a result of a broken blood vessel in the white portion of the eye. It is usually painless and may be caused by coughing, sneezing, or vomiting. An injury to the eye can cause this. It can also be a sign of hypertension (high blood pressure) or a bleeding disorder.  Although it can look frightening, the presence of the blood is not serious. The blood will be reabsorbed without treatment within 2 to 3 weeks.  Home care  You may continue your usual activities.  Follow-up care  Follow up with your healthcare provider, or as advised.  When to seek medical advice  Contact your healthcare provider right away if any of these occur:  · Pain in the eye  · Change in vision  · The blood does not disappear within three weeks  · Increasing redness or swelling of the eye  · Severe headache or dizziness  · Signs of bruising or bleeding from other parts of your body  Date Last Reviewed: 6/14/2015 © 2000-2017 [employer]. [address] [place] [address]. All rights reserved. This information is not intended as a substitute for professional medical care. Always follow your healthcare professional's instructions.        Subconjunctival Hemorrhage    A subconjunctival hemorrhage is when a blood vessel breaks open in the white of the eye. It causes a bright red patch in the white of the eye. It is similar to a bruise on the skin. This type of hemorrhage is common. It can look quite alarming, but it is usually harmless.  Understanding the conjunctiva  The conjunctiva is the thin layer that covers the inside of the eyelids and the surface of the eye. It has many tiny blood vessels that bring oxygen and nutrients to the eye. The sclera is the white part of the eye that lies beneath the conjunctiva. Sometimes a blood vessel in  the conjunctiva breaks open and bleeds. The blood then collects under the conjunctiva and turns part of the eye red. Over several weeks, your body then absorbs the blood.  What causes subconjunctival hemorrhage?  In many cases the cause isnt known. But some health conditions may make it more likely. These include:  · Eye injury  · Eye surgery  · High blood pressure  · Inflammation of the conjunctiva  · Contact lens use  · Diabetes  · Arteriosclerosis  · Tumor of the conjunctiva  · Diseases that affect blood clotting  · Violent sneezing, coughing, or vomiting  · Certain medicines that can increase bleeding, such as aspirin  · Pushing hard during childbirth  · Straining during constipation  Symptoms of subconjunctival hemorrhage  The main symptom is a red patch on the eye. You may notice it after waking up in the morning. In most cases just one eye will have a hemorrhage. It usually happens once and then goes away. But some health conditions may cause repeat hemorrhages. You may feel like you have something in your eye, but this is not common. The hemorrhage shouldnt affect your eyesight or cause any pain. If you do have pain, you may have another type of problem with your eye.  Diagnosing subconjunctival hemorrhage  Your healthcare provider will ask about your health history. You may have a physical exam. This includes a basic eye exam. Your provider will make sure you dont have other causes of red eye that may need other treatment.  You will need to see an eye doctor (ophthalmologist) if you have had an eye injury. This doctor might use a special lighted microscope to look closely at your eye. This helps show the doctor if the injury hurt the eye itself and not just its outer layer.  If this is not your first subconjunctival hemorrhage, your doctor may need to find the cause. For example, you may need blood tests to check for a blood clotting disorder.  Treatment for subconjunctival hemorrhage  In most cases you  will not need treatment. The red patch will usually go away on its own in a few weeks. It will turn from red to brown and then yellow. There are no treatments to speed up this process. Your doctor may suggest you use a warm compress and artificial tears eye drops to help relieve some of the redness.  If your subconjunctival hemorrhage was caused by a health condition, that condition will be treated. For example, you may need a blood pressure medicine to treat high blood pressure.  When to call your healthcare provider  Call your healthcare provider right away if you have any of these:  · Hemorrhage that doesnt go away in 2 to 3 weeks  · Eye pain  · Loss of eyesight  · Another subconjunctival hemorrhage    Date Last Reviewed: 2/1/2017  © 0115-5739 [employer]. [address] [place] [address], [place] [address]. All rights reserved. This information is not intended as a substitute for professional medical care. Always follow your healthcare professional's instructions.

## 2018-01-26 NOTE — PATIENT INSTRUCTIONS
PLEASE CALL YOUR EYE DOCTOR, JONELLE VALENCIA FOR A FOLLOW UP APPOINTMENT IN 1 WEEK.   IF YOU DEVELOP PAIN IN YOUR EYE OR BLURRED VISION, PLEASE GO TO THE EMERGENCY ROOM  Subconjunctival Hemorrhage    A subconjunctival hemorrhage is a result of a broken blood vessel in the white portion of the eye. It is usually painless and may be caused by coughing, sneezing, or vomiting. An injury to the eye can cause this. It can also be a sign of hypertension (high blood pressure) or a bleeding disorder.  Although it can look frightening, the presence of the blood is not serious. The blood will be reabsorbed without treatment within 2 to 3 weeks.  Home care  You may continue your usual activities.  Follow-up care  Follow up with your healthcare provider, or as advised.  When to seek medical advice  Contact your healthcare provider right away if any of these occur:  · Pain in the eye  · Change in vision  · The blood does not disappear within three weeks  · Increasing redness or swelling of the eye  · Severe headache or dizziness  · Signs of bruising or bleeding from other parts of your body  Date Last Reviewed: 6/14/2015  © 4964-9391 [employer]. [address] [place] [address]. All rights reserved. This information is not intended as a substitute for professional medical care. Always follow your healthcare professional's instructions.        Subconjunctival Hemorrhage    A subconjunctival hemorrhage is when a blood vessel breaks open in the white of the eye. It causes a bright red patch in the white of the eye. It is similar to a bruise on the skin. This type of hemorrhage is common. It can look quite alarming, but it is usually harmless.  Understanding the conjunctiva  The conjunctiva is the thin layer that covers the inside of the eyelids and the surface of the eye. It has many tiny blood vessels that bring oxygen and nutrients to the eye. The sclera is the white part of the eye that lies beneath the  conjunctiva. Sometimes a blood vessel in the conjunctiva breaks open and bleeds. The blood then collects under the conjunctiva and turns part of the eye red. Over several weeks, your body then absorbs the blood.  What causes subconjunctival hemorrhage?  In many cases the cause isnt known. But some health conditions may make it more likely. These include:  · Eye injury  · Eye surgery  · High blood pressure  · Inflammation of the conjunctiva  · Contact lens use  · Diabetes  · Arteriosclerosis  · Tumor of the conjunctiva  · Diseases that affect blood clotting  · Violent sneezing, coughing, or vomiting  · Certain medicines that can increase bleeding, such as aspirin  · Pushing hard during childbirth  · Straining during constipation  Symptoms of subconjunctival hemorrhage  The main symptom is a red patch on the eye. You may notice it after waking up in the morning. In most cases just one eye will have a hemorrhage. It usually happens once and then goes away. But some health conditions may cause repeat hemorrhages. You may feel like you have something in your eye, but this is not common. The hemorrhage shouldnt affect your eyesight or cause any pain. If you do have pain, you may have another type of problem with your eye.  Diagnosing subconjunctival hemorrhage  Your healthcare provider will ask about your health history. You may have a physical exam. This includes a basic eye exam. Your provider will make sure you dont have other causes of red eye that may need other treatment.  You will need to see an eye doctor (ophthalmologist) if you have had an eye injury. This doctor might use a special lighted microscope to look closely at your eye. This helps show the doctor if the injury hurt the eye itself and not just its outer layer.  If this is not your first subconjunctival hemorrhage, your doctor may need to find the cause. For example, you may need blood tests to check for a blood clotting disorder.  Treatment for  subconjunctival hemorrhage  In most cases you will not need treatment. The red patch will usually go away on its own in a few weeks. It will turn from red to brown and then yellow. There are no treatments to speed up this process. Your doctor may suggest you use a warm compress and artificial tears eye drops to help relieve some of the redness.  If your subconjunctival hemorrhage was caused by a health condition, that condition will be treated. For example, you may need a blood pressure medicine to treat high blood pressure.  When to call your healthcare provider  Call your healthcare provider right away if you have any of these:  · Hemorrhage that doesnt go away in 2 to 3 weeks  · Eye pain  · Loss of eyesight  · Another subconjunctival hemorrhage    Date Last Reviewed: 2/1/2017  © 9675-5700 [employer]. [address] [place] [address], [place] [address]. All rights reserved. This information is not intended as a substitute for professional medical care. Always follow your healthcare professional's instructions.

## 2018-02-01 ENCOUNTER — LAB VISIT (OUTPATIENT)
Dept: LAB | Facility: HOSPITAL | Age: 83
End: 2018-02-01
Attending: INTERNAL MEDICINE
Payer: MEDICARE

## 2018-02-01 ENCOUNTER — TELEPHONE (OUTPATIENT)
Dept: RHEUMATOLOGY | Facility: CLINIC | Age: 83
End: 2018-02-01

## 2018-02-01 ENCOUNTER — OFFICE VISIT (OUTPATIENT)
Dept: NEUROLOGY | Facility: CLINIC | Age: 83
End: 2018-02-01
Payer: MEDICARE

## 2018-02-01 VITALS
BODY MASS INDEX: 23.53 KG/M2 | HEART RATE: 60 BPM | SYSTOLIC BLOOD PRESSURE: 160 MMHG | DIASTOLIC BLOOD PRESSURE: 66 MMHG | WEIGHT: 127.88 LBS | HEIGHT: 62 IN

## 2018-02-01 DIAGNOSIS — F41.9 ANXIETY: ICD-10-CM

## 2018-02-01 DIAGNOSIS — Z79.631 METHOTREXATE, LONG TERM, CURRENT USE: ICD-10-CM

## 2018-02-01 DIAGNOSIS — M06.9 RHEUMATOID ARTHRITIS OF HAND, UNSPECIFIED LATERALITY, UNSPECIFIED RHEUMATOID FACTOR PRESENCE: ICD-10-CM

## 2018-02-01 DIAGNOSIS — D64.9 ANEMIA, UNSPECIFIED TYPE: Primary | ICD-10-CM

## 2018-02-01 DIAGNOSIS — I10 ESSENTIAL HYPERTENSION: ICD-10-CM

## 2018-02-01 DIAGNOSIS — Z79.899 OTHER LONG TERM (CURRENT) DRUG THERAPY: ICD-10-CM

## 2018-02-01 DIAGNOSIS — Z86.73 HISTORY OF CVA (CEREBROVASCULAR ACCIDENT): Primary | ICD-10-CM

## 2018-02-01 LAB
BASOPHILS # BLD AUTO: 0.05 K/UL
BASOPHILS NFR BLD: 1 %
CRP SERPL-MCNC: 4.4 MG/L
DIFFERENTIAL METHOD: ABNORMAL
EOSINOPHIL # BLD AUTO: 0.2 K/UL
EOSINOPHIL NFR BLD: 3.4 %
ERYTHROCYTE [DISTWIDTH] IN BLOOD BY AUTOMATED COUNT: 13.4 %
ERYTHROCYTE [SEDIMENTATION RATE] IN BLOOD BY WESTERGREN METHOD: 30 MM/HR
HCT VFR BLD AUTO: 34.8 %
HGB BLD-MCNC: 11.6 G/DL
IMM GRANULOCYTES # BLD AUTO: 0.02 K/UL
IMM GRANULOCYTES NFR BLD AUTO: 0.4 %
LYMPHOCYTES # BLD AUTO: 1.3 K/UL
LYMPHOCYTES NFR BLD: 25.3 %
MCH RBC QN AUTO: 30.9 PG
MCHC RBC AUTO-ENTMCNC: 33.3 G/DL
MCV RBC AUTO: 93 FL
MONOCYTES # BLD AUTO: 0.6 K/UL
MONOCYTES NFR BLD: 11.8 %
NEUTROPHILS # BLD AUTO: 3.1 K/UL
NEUTROPHILS NFR BLD: 58.1 %
NRBC BLD-RTO: 0 /100 WBC
PLATELET # BLD AUTO: 214 K/UL
PMV BLD AUTO: 11.2 FL
RBC # BLD AUTO: 3.76 M/UL
WBC # BLD AUTO: 5.25 K/UL

## 2018-02-01 PROCEDURE — 99214 OFFICE O/P EST MOD 30 MIN: CPT | Mod: S$PBB,,, | Performed by: PSYCHIATRY & NEUROLOGY

## 2018-02-01 PROCEDURE — 99999 PR PBB SHADOW E&M-EST. PATIENT-LVL IV: CPT | Mod: PBBFAC,,, | Performed by: PSYCHIATRY & NEUROLOGY

## 2018-02-01 PROCEDURE — 86140 C-REACTIVE PROTEIN: CPT

## 2018-02-01 PROCEDURE — 1126F AMNT PAIN NOTED NONE PRSNT: CPT | Mod: ,,, | Performed by: PSYCHIATRY & NEUROLOGY

## 2018-02-01 PROCEDURE — 1159F MED LIST DOCD IN RCRD: CPT | Mod: ,,, | Performed by: PSYCHIATRY & NEUROLOGY

## 2018-02-01 PROCEDURE — 85025 COMPLETE CBC W/AUTO DIFF WBC: CPT

## 2018-02-01 PROCEDURE — 36415 COLL VENOUS BLD VENIPUNCTURE: CPT

## 2018-02-01 PROCEDURE — 85651 RBC SED RATE NONAUTOMATED: CPT

## 2018-02-01 PROCEDURE — 99214 OFFICE O/P EST MOD 30 MIN: CPT | Mod: PBBFAC | Performed by: PSYCHIATRY & NEUROLOGY

## 2018-02-01 NOTE — PROGRESS NOTES
Neurology Clinic Follow-Up Note      Impression:  1. s/p L MCA ischemic stroke 2014, [facility]  2. Anxiety  3. HTN: above-target  4. Stroke risk factors: prior stroke, HTN, dyslipidemia, PFO    Plan:  1. Continue Lexapro 20mg/d  2. Update me by phone in 3 weeks if anxiety isn't better  3. Call Dr. Allen's office re uncontrolled BP  4. Continue ASA 81mg daily indefinitely  5. RTC prn or 6 months if Dr. Allen prefers I handle the anxiety        Problem List Items Addressed This Visit        1 - High    History of CVA (cerebrovascular accident) - Primary    Anxiety       2     Hypertension        Patient returns for follow-up of above.  She presents with her son.  Having months of anxiety.  Lexapro increased from 10 to 20mg daily 4 weeks ago; no change yet.  Some word-finding trouble persists.  Tolerating ASA and atorvastatin.     No recurrent stroke symptoms       Outpatient Prescriptions Marked as Taking for the 2/1/18 encounter (Office Visit) with Alo Hull MD   Medication Sig Dispense Refill    alprazolam (XANAX) 0.25 MG tablet Take 1 tablet (0.25 mg total) by mouth daily as needed. 30 tablet 1    amLODIPine (NORVASC) 10 MG tablet 1/2 pill  PO once a day 90 tablet 3    aspirin (ECOTRIN) 81 MG EC tablet Take 1 tablet (81 mg total) by mouth once daily. HOLD until cleared by your ENT doctor and your Neurologist.  0    atorvastatin (LIPITOR) 20 MG tablet TAKE 1 TABLET BY MOUTH EVERY DAY 90 tablet 0    carvedilol (COREG) 3.125 MG tablet TAKE 1 TABLET(3.125 MG) BY MOUTH TWICE DAILY WITH MEALS 60 tablet 0    coenzyme Q10 (CO Q-10) 100 mg capsule Take 100 mg by mouth once daily.      denosumab (PROLIA) 60 mg/mL Syrg Inject 60 mg into the skin. Every 6 months      escitalopram oxalate (LEXAPRO) 20 MG tablet Take 20 mg by mouth once daily.       fish oil-omega-3 fatty acids 300-1,000 mg capsule Take 1,000 mg by mouth once daily.       fluticasone (FLONASE) 50 mcg/actuation nasal spray 1 spray by Each Nare  "route as needed.       folic acid (FOLVITE) 1 MG tablet Take 1 tablet (1 mg total) by mouth once daily. 90 tablet 3    lisinopril (PRINIVIL,ZESTRIL) 40 MG tablet TAKE 1 TABLET BY MOUTH EVERY DAY 90 tablet 3    methotrexate 2.5 MG Tab Take 10 tablets (25 mg total) by mouth every 7 days. 120 tablet 0    mupirocin (BACTROBAN) 2 % ointment Apply to affected area 2 times daily 22 g 1    ranitidine (ZANTAC) 150 MG tablet TAKE ONE TABLET BY MOUTH TWICE DAILY 60 tablet 6    VITAMIN B COMPLEX ORAL Take 400 mg by mouth once daily. Pt stated that she does not take vitamin b everyday.      vitamin E 400 UNIT capsule Take 400 Units by mouth once daily. Pt taking PRN         BP (!) 160/66   Pulse 60   Ht 5' 2" (1.575 m)   Wt 58 kg (127 lb 13.9 oz)   BMI 23.39 kg/m²   Well-developed, well nourished.  Awake, alert and oriented.  Language is normal.  PERRL, optic disc margins are sharp, EOMF without nystagmus, VFF, facial movements intact.  No UE drift.  No extinction to double simultaneous tactile stimulation.  Muscle power is normal.  Gait is steady. Min L wrist cogwheeling    Lab Results   Component Value Date    TSH 2.074 01/05/2018       Alo Hull MD  "

## 2018-02-01 NOTE — LETTER
February 1, 2018      Uday Allen MD  [address]  [address]  [facility] [address]           [facility]  [facility]  [place] [address]  Phone: [phone]          Patient: Karly Sandoval   MR Number: [id number]   YOB: 1930   Date of Visit: 2/1/2018       Dear Dr. Uday Allen:    Thank you for referring Karly Sandoval to me for evaluation. Attached you will find relevant portions of my assessment and plan of care.    If you have questions, please do not hesitate to call me. I look forward to following Karly Sandoval along with you.    Sincerely,    Alo Hull MD    Enclosure  CC:  No Recipients    If you would like to receive this communication electronically, please contact [email or web address] or [phone] to request more information on [employer] access.    For providers and/or their staff who would like to refer a patient to [facility], please contact us through our one-stop-shop provider referral line, [facility], at [phone].    If you feel you have received this communication in error or would no longer like to receive these types of communications, please e-mail [email or web address]

## 2018-02-02 ENCOUNTER — TELEPHONE (OUTPATIENT)
Dept: RHEUMATOLOGY | Facility: CLINIC | Age: 83
End: 2018-02-02

## 2018-02-02 ENCOUNTER — TELEPHONE (OUTPATIENT)
Dept: FAMILY MEDICINE | Facility: CLINIC | Age: 83
End: 2018-02-02

## 2018-02-02 NOTE — TELEPHONE ENCOUNTER
----- Message from Isai Smith MD sent at 2/1/2018  1:35 PM CST -----  Please tell pt the crp inflammation test is normal and improved compared with 3 months ago. ARISTIDES

## 2018-02-02 NOTE — TELEPHONE ENCOUNTER
----- Message from Florencia Bustos sent at 2/2/2018  3:03 PM CST -----  Contact: Self/ [phone]  Patient would like to be seen sooner than the next available appointment.     Please call and advise.

## 2018-02-05 DIAGNOSIS — I10 HYPERTENSION, ESSENTIAL: ICD-10-CM

## 2018-02-05 RX ORDER — CARVEDILOL 3.12 MG/1
TABLET ORAL
Qty: 60 TABLET | Refills: 0 | Status: SHIPPED | OUTPATIENT
Start: 2018-02-05 | End: 2018-06-19

## 2018-02-08 ENCOUNTER — OFFICE VISIT (OUTPATIENT)
Dept: URGENT CARE | Facility: CLINIC | Age: 83
End: 2018-02-08
Payer: MEDICARE

## 2018-02-08 VITALS
DIASTOLIC BLOOD PRESSURE: 74 MMHG | RESPIRATION RATE: 16 BRPM | HEART RATE: 63 BPM | HEIGHT: 62 IN | OXYGEN SATURATION: 97 % | WEIGHT: 127 LBS | TEMPERATURE: 97 F | BODY MASS INDEX: 23.37 KG/M2 | SYSTOLIC BLOOD PRESSURE: 164 MMHG

## 2018-02-08 DIAGNOSIS — R03.0 ELEVATED BLOOD PRESSURE, SITUATIONAL: Primary | ICD-10-CM

## 2018-02-08 PROCEDURE — 99213 OFFICE O/P EST LOW 20 MIN: CPT | Mod: S$GLB,,, | Performed by: FAMILY MEDICINE

## 2018-02-08 RX ORDER — FUROSEMIDE 20 MG/1
TABLET ORAL
Qty: 20 TABLET | Refills: 1 | Status: SHIPPED | OUTPATIENT
Start: 2018-02-08 | End: 2018-03-08 | Stop reason: SDUPTHER

## 2018-02-08 RX ORDER — FUROSEMIDE 20 MG/1
20 TABLET ORAL DAILY
Qty: 30 TABLET | Refills: 11 | Status: ON HOLD | OUTPATIENT
Start: 2018-02-08 | End: 2018-12-03 | Stop reason: SDUPTHER

## 2018-02-08 NOTE — PROGRESS NOTES
"Subjective:       Patient ID: Karly Sandoval is a 87 y.o. female.    Vitals:  height is 5' 2" (1.575 m) and weight is 57.6 kg (127 lb). Her temperature is 97 °F (36.1 °C). Her blood pressure is 164/74 (abnormal) and her pulse is 63. Her respiration is 16 and oxygen saturation is 97%.     Chief Complaint: Hypertension (185/71 - TODAY)    PT HAS HX OF HYPERTENSION. PT SAYS HER BLOOD PRESSURE HAS BEEN HIGH SINCE THURSDAY 2/1/18. PT TRIED TO GET APPOINTMENT W PCP BUT WAS UNABLE TO.      Hypertension   This is a chronic problem. The current episode started in the past 7 days. The problem has been waxing and waning since onset. Pertinent negatives include no blurred vision, chest pain, headaches or shortness of breath. There are no associated agents to hypertension. There are no known risk factors for coronary artery disease. Treatments tried: AMLODIPINE.     Review of Systems   Constitution: Negative for chills and fever.   HENT: Negative for sore throat.    Eyes: Negative for blurred vision.   Cardiovascular: Negative for chest pain.   Respiratory: Negative for shortness of breath.    Skin: Negative for rash.   Musculoskeletal: Negative for back pain and joint pain.   Gastrointestinal: Negative for abdominal pain, diarrhea, nausea and vomiting.   Neurological: Negative for headaches.   Psychiatric/Behavioral: The patient is not nervous/anxious.        Objective:      Physical Exam   Constitutional: She is oriented to person, place, and time. She appears well-developed and well-nourished. She is cooperative.  Non-toxic appearance. She does not appear ill. No distress.   HENT:   Head: Normocephalic and atraumatic.   Right Ear: Hearing, tympanic membrane, external ear and ear canal normal.   Left Ear: Hearing, tympanic membrane, external ear and ear canal normal.   Nose: Nose normal. No mucosal edema, rhinorrhea or nasal deformity. No epistaxis. Right sinus exhibits no maxillary sinus tenderness and no frontal sinus " tenderness. Left sinus exhibits no maxillary sinus tenderness and no frontal sinus tenderness.   Mouth/Throat: Uvula is midline, oropharynx is clear and moist and mucous membranes are normal. No trismus in the jaw. Normal dentition. No uvula swelling. No posterior oropharyngeal erythema.   Eyes: Conjunctivae and lids are normal. Right eye exhibits no discharge. Left eye exhibits no discharge. No scleral icterus.   Sclera clear bilat   Neck: Trachea normal, normal range of motion, full passive range of motion without pain and phonation normal. Neck supple.   Cardiovascular: Normal rate, regular rhythm, normal heart sounds, intact distal pulses and normal pulses.    Pulmonary/Chest: Effort normal and breath sounds normal. She has no rales. She exhibits no tenderness.   Abdominal: Soft. Normal appearance and bowel sounds are normal. She exhibits no distension, no pulsatile midline mass and no mass. There is no tenderness.   Musculoskeletal: Normal range of motion. She exhibits no edema or deformity.   Neurological: She is alert and oriented to person, place, and time. She exhibits normal muscle tone. Coordination normal.   Skin: Skin is warm, dry and intact. She is not diaphoretic. No pallor.   Psychiatric: She has a normal mood and affect. Her speech is normal and behavior is normal. Judgment and thought content normal. Cognition and memory are normal.   Nursing note and vitals reviewed.      Assessment:       1. Elevated blood pressure, situational        Plan:         Elevated blood pressure, situational    Other orders  -     furosemide (LASIX) 20 MG tablet; Take 1 tablet (20 mg total) by mouth once daily.  Dispense: 30 tablet; Refill: 11  -     furosemide (LASIX) 20 MG tablet; Take one by mouth every other day  Dispense: 20 tablet; Refill: 1        Cont all meds  Follow up with PMD..Reassurance

## 2018-02-08 NOTE — TELEPHONE ENCOUNTER
----- Message from Sade Rapp sent at 2/8/2018 12:31 PM CST -----  Contact: [phone]/self  Patient requesting to speak with you regarding having elevated blood pressure. Please advise.

## 2018-02-08 NOTE — PATIENT INSTRUCTIONS
"  Discharge Instructions: Taking Blood Pressure Medications  You have been diagnosed with high blood pressure (hypertension). Diet and exercise help control high blood pressure. Many people also need the help of one or more medicines. Here are the main types of blood pressure medicines and how they work.  Diuretics  Diuretics are sometimes called "water pills" because they work in the kidney and flush excess water and sodium (salt) from the body. These are one of the most common and  important medicines for the management of blood pressure.  Beta-blockers  Beta-blockers reduce nerve impulses to the heart and blood vessels. This makes the heart beat slower and with less force. Your blood pressure drops, and your heart does not have to work as hard, which may improve pumping function.  ACE inhibitors  ACE inhibitors cause the vessels to relax. This helps the blood flow better and lowers blood pressure.  Angiotensin antagonists  Angiotensin antagonists shield blood vessels from a hormone that causes the blood vessels to get stiff and narrow. Your vessels become wider, and your blood pressure goes down.  Calcium channel blockers (CCBs)  CCBs keep calcium from entering the muscle cells of the heart and blood vessels. This causes blood vessels to relax, and your blood pressure goes down.  Alpha-blockers  Alpha-blockers reduce nerve impulses to blood vessels. This allows blood to pass easily, causing blood pressure to go down.  Alpha-beta blockers  Alpha-beta blockers work the same way as alpha-blockers but also slow your heartbeat. As a result, less blood is pumped through your blood vessels and your blood pressure goes down.  Vasodilators  Vasodilators directly open blood vessels by relaxing the muscle in the vessel walls, causing blood pressure to go down.  Date Last Reviewed: 4/27/2016  © 8392-9142 [employer]. [address] [place] [address], [place] [address]. All rights reserved. This information is not " intended as a substitute for professional medical care. Always follow your healthcare professional's instructions.

## 2018-02-09 RX ORDER — ESCITALOPRAM OXALATE 20 MG/1
TABLET ORAL
Qty: 30 TABLET | Refills: 6 | Status: SHIPPED | OUTPATIENT
Start: 2018-02-09 | End: 2018-03-09

## 2018-02-15 DIAGNOSIS — I10 HYPERTENSION, ESSENTIAL: ICD-10-CM

## 2018-02-15 RX ORDER — CARVEDILOL 3.12 MG/1
TABLET ORAL
Qty: 60 TABLET | Refills: 0 | Status: SHIPPED | OUTPATIENT
Start: 2018-02-15 | End: 2018-03-08 | Stop reason: SDUPTHER

## 2018-03-08 ENCOUNTER — OFFICE VISIT (OUTPATIENT)
Dept: RHEUMATOLOGY | Facility: CLINIC | Age: 83
End: 2018-03-08
Payer: MEDICARE

## 2018-03-08 VITALS
BODY MASS INDEX: 24.52 KG/M2 | WEIGHT: 129.88 LBS | HEART RATE: 60 BPM | HEIGHT: 61 IN | DIASTOLIC BLOOD PRESSURE: 69 MMHG | SYSTOLIC BLOOD PRESSURE: 143 MMHG

## 2018-03-08 DIAGNOSIS — M85.80 OSTEOPENIA, UNSPECIFIED LOCATION: ICD-10-CM

## 2018-03-08 DIAGNOSIS — M05.79 RHEUMATOID ARTHRITIS INVOLVING MULTIPLE SITES WITH POSITIVE RHEUMATOID FACTOR: ICD-10-CM

## 2018-03-08 DIAGNOSIS — E55.9 VITAMIN D DEFICIENCY: ICD-10-CM

## 2018-03-08 DIAGNOSIS — E78.00 PURE HYPERCHOLESTEROLEMIA: ICD-10-CM

## 2018-03-08 PROCEDURE — 96372 THER/PROPH/DIAG INJ SC/IM: CPT | Mod: PBBFAC

## 2018-03-08 PROCEDURE — 99213 OFFICE O/P EST LOW 20 MIN: CPT | Mod: S$PBB,,, | Performed by: INTERNAL MEDICINE

## 2018-03-08 PROCEDURE — 99999 PR PBB SHADOW E&M-EST. PATIENT-LVL IV: CPT | Mod: PBBFAC,,, | Performed by: INTERNAL MEDICINE

## 2018-03-08 PROCEDURE — 99214 OFFICE O/P EST MOD 30 MIN: CPT | Mod: PBBFAC,25 | Performed by: INTERNAL MEDICINE

## 2018-03-08 RX ORDER — TRIAMCINOLONE ACETONIDE 40 MG/ML
40 INJECTION, SUSPENSION INTRA-ARTICULAR; INTRAMUSCULAR
Status: COMPLETED | OUTPATIENT
Start: 2018-03-08 | End: 2018-03-08

## 2018-03-08 RX ADMIN — TRIAMCINOLONE ACETONIDE 40 MG: 40 INJECTION, SUSPENSION INTRA-ARTICULAR; INTRAMUSCULAR at 10:03

## 2018-03-08 ASSESSMENT — DISEASE ACTIVITY SCORE (DAS28)
CRP_MG_PER_LITER: 4.4
GLOBAL_HEALTH_SCORE: 70
TENDER_JOINTS_COUNT: 6
TOTAL_SCORE_CRP: 4.48
ESR_MM_PER_HR: 30
TOTAL_SCORE_ESR: 5.29
SWOLLEN_JOINTS_COUNT: 4

## 2018-03-08 ASSESSMENT — ROUTINE ASSESSMENT OF PATIENT INDEX DATA (RAPID3)
MDHAQ FUNCTION SCORE: 1.2
TOTAL RAPID3 SCORE: 5.67
PSYCHOLOGICAL DISTRESS SCORE: 2.2
PATIENT GLOBAL ASSESSMENT SCORE: 7
PAIN SCORE: 6
FATIGUE SCORE: 5
WHEN YOU AWAKENED IN THE MORNING OVER THE LAST WEEK, PLEASE INDICATE THE AMOUNT OF TIME IT TAKES UNTIL YOU ARE AS LIMBER AS YOU WILL BE FOR THE DAY: 15 MINS
AM STIFFNESS SCORE: 1, YES

## 2018-03-08 NOTE — PROGRESS NOTES
"Subjective:       Patient ID: Karly Sandoval is a 87 y.o. female.    Chief Complaint: RA RF+ ACPA+, KELLY-, non-erosive, osteoporosis  HPInotes some increased joint pain 2,3 mcps right>left with some slight swelling righ 2nd mcp. Some increased knee pain as well.  Review of Systems   Constitutional: Negative for appetite change, fatigue, fever and unexpected weight change.   HENT: Negative for mouth sores.    Eyes: Negative for visual disturbance.   Respiratory: Negative for cough, shortness of breath and wheezing.    Cardiovascular: Negative for chest pain and palpitations.   Gastrointestinal: Positive for constipation. Negative for abdominal pain, anal bleeding, blood in stool, diarrhea, nausea and vomiting.   Genitourinary: Negative for dysuria, frequency and urgency.   Musculoskeletal: Negative for arthralgias, back pain, gait problem, joint swelling, myalgias, neck pain and neck stiffness.   Skin: Negative for rash.   Neurological: Negative for weakness, numbness and headaches.   Hematological: Negative for adenopathy. Does not bruise/bleed easily.   Psychiatric/Behavioral: Negative for sleep disturbance. The patient is not nervous/anxious.          Objective:   BP (!) 143/69   Pulse 60   Ht 5' 1.2" (1.554 m)   Wt 58.9 kg (129 lb 14.4 oz)   BMI 24.38 kg/m²      Physical Exam   Constitutional: She is oriented to person, place, and time and well-developed, well-nourished, and in no distress.   HENT:   Head: Normocephalic and atraumatic.   Mouth/Throat: Oropharynx is clear and moist.   Eyes: Conjunctivae and EOM are normal.   Neck: Normal range of motion. Neck supple. No thyromegaly present.   Cardiovascular: Normal rate, regular rhythm, normal heart sounds and intact distal pulses.  Exam reveals no gallop and no friction rub.    No murmur heard.  Pulmonary/Chest: Breath sounds normal. She has no wheezes. She has no rales. She exhibits no tenderness.       Right Side Rheumatological Exam     The patient is " tender to palpation of the knee, 2nd MCP and 3rd MCP    She has swelling of the 2nd MCP and 3rd MCP    The patient has an enlarged knee    Left Side Rheumatological Exam     The patient is tender to palpation of the knee, 2nd MCP and 3rd MCP.    She has swelling of the 2nd MCP and 3rd MCP    The patient has an enlarged knee.      Lymphadenopathy:     She has no cervical adenopathy.   Neurological: She is alert and oriented to person, place, and time. She displays normal reflexes. Gait normal.   Nl motor strength UE and LE bilateral   Skin: Skin is warm and dry. No rash noted. No erythema. No pallor.     Musculoskeletal: She exhibits no edema.           AssessResults for ROULA MARIE (MRN [id number]) as of 3/8/2018 10:14   Ref. Range 1/24/2018 09:52 2/1/2018 12:37   WBC Latest Ref Range: 3.90 - 12.70 K/uL  5.25   RBC Latest Ref Range: 4.00 - 5.40 M/uL  3.76 (L)   Hemoglobin Latest Ref Range: 12.0 - 16.0 g/dL  11.6 (L)   Hematocrit Latest Ref Range: 37.0 - 48.5 %  34.8 (L)   MCV Latest Ref Range: 82 - 98 fL  93   MCH Latest Ref Range: 27.0 - 31.0 pg  30.9   MCHC Latest Ref Range: 32.0 - 36.0 g/dL  33.3   RDW Latest Ref Range: 11.5 - 14.5 %  13.4   Platelets Latest Ref Range: 150 - 350 K/uL  214   MPV Latest Ref Range: 9.2 - 12.9 fL  11.2   Gran% Latest Ref Range: 38.0 - 73.0 %  58.1   Gran # (ANC) Latest Ref Range: 1.8 - 7.7 K/uL  3.1   Immature Granulocytes Latest Ref Range: 0.0 - 0.5 %  0.4   Immature Grans (Abs) Latest Ref Range: 0.00 - 0.04 K/uL  0.02   Lymph% Latest Ref Range: 18.0 - 48.0 %  25.3   Lymph # Latest Ref Range: 1.0 - 4.8 K/uL  1.3   Mono% Latest Ref Range: 4.0 - 15.0 %  11.8   Mono # Latest Ref Range: 0.3 - 1.0 K/uL  0.6   Eosinophil% Latest Ref Range: 0.0 - 8.0 %  3.4   Eos # Latest Ref Range: 0.0 - 0.5 K/uL  0.2   Basophil% Latest Ref Range: 0.0 - 1.9 %  1.0   Baso # Latest Ref Range: 0.00 - 0.20 K/uL  0.05   nRBC Latest Ref Range: 0 /100 WBC  0   Sed Rate Latest Ref Range: 0 - 20 mm/Hr  30 (H)    Sodium Latest Ref Range: 136 - 145 mmol/L 135 (L)    Potassium Latest Ref Range: 3.5 - 5.1 mmol/L 4.4    Chloride Latest Ref Range: 95 - 110 mmol/L 101    CO2 Latest Ref Range: 23 - 29 mmol/L 24    Anion Gap Latest Ref Range: 8 - 16 mmol/L 10    BUN, Bld Latest Ref Range: 8 - 23 mg/dL 21    Creatinine Latest Ref Range: 0.5 - 1.4 mg/dL 0.8    eGFR if non African American Latest Ref Range: >60 mL/min/1.73 m^2 >60.0    eGFR if African American Latest Ref Range: >60 mL/min/1.73 m^2 >60.0    Glucose Latest Ref Range: 70 - 110 mg/dL 112 (H)    Calcium Latest Ref Range: 8.7 - 10.5 mg/dL 8.6 (L)    CRP Latest Ref Range: 0.0 - 8.2 mg/L  4.4   ment/Plan         Problem List Items Addressed This Visit     RA (rheumatoid arthritis)    Overview     Results for ROULA MARIE (MRN [id number]) as of 3/8/2018 10:14   Ref. Range 8/21/2007 10:34   KELLY Latest Ref Range: Neg <1:160  Negative   CCP Antibodies Latest Units: U/mL 7.4   Rheumatoid Factor Latest Ref Range: 0 - 15 IU/ml 930 (HH)     Please tell pt the x-rays show degenerative changes, but no damage or findings related to rheumatoid arthritis. 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  Right and left knees demonstrate moderate DJD and varus deformities.  Right left hands demonstrate mild DJD but no erosions.  Right and left feet demonstrate MTP joint subluxations, DJD, and hallux valgus deformities.  There is a short left fourth metatarsal.   Impression      No definite erosive arthritis seen.      Electronically signed by: OLIVIA CUTLER  Date: 03/23/17  Time: 11:26                  Current Assessment & Plan     ESR 30 CRP 4.4 global 70  TJ 6    SJ  4  DAS28  5.029(high disease activity [id number] 4.48 (moderate activity) c/w flare    *Kenalog 40mg IM today  Cont  "methotrexate 25mg po once a week with folic acid 1mg daily  RTC 3 months, If no improvement, add abatacept IV             Relevant Medications    triamcinolone acetonide injection 40 mg    Osteopenia    Overview     Please tell pt the bone density shows osteopenia with elevated fracture risk but significant improvement in the lumbar spine BMD of 8.9% compared with the previous study. RJQ          PACS Images     Show images for DXA Bone Density Spine And Hip   Reviewed By Pb Figueroa MD on 2017 12:07   Isai Smith MD on 3/28/2017 11:10   External Result Report     External Result Report   Narrative     : 1930 ORDERING PHYSICIAN: ROBERTO Figueroa LOCATION: [facility]    HISTORY: 85 y/o female with a hx of fractured back at 83 y/o. She had menopausal symptoms at 51 y/o. She has lost at least 2" in height since age 25. She ist aking 1,000 units of Vit D supplements. She does not exercise or smoke.    TECHNIQUE: Bone Mineral Density performed using Hologic Horizon A (S/N [id number]) reveals good positioning of lumbar spine and hip.    BONE MINERAL DENSITY RESULTS:  Lumbar Spine: Lumbar bone mineral density L1-L4 is 0.950g/cm2, which is a t-score of -0.9. The z-score is 2.0.    Total Hip: The total hip bone mineral density is 0.784g/cm2.  The t-score is -1.3, and the z-score is 1.0.  Femoral neck BMD is 0.650g/cm2 and the t-score is -1.8.    COMPARISONS:  Date Location BMD T-score  03/02/15 L-spine 0.872 -1.6  Total Hip 0.772 -1.4   Impression      Low bone mass/osteopenia of the total hip and femoral neck. Treat as osteoporosis with history of prior vertebral fracture. Increase in lumbar spine BMD (8.9%) since prior study. FRAX calculations do suggest treatment.      Recommendations:  1) Adequate calcium and Vitamin D therapy  2) Appropriate exercise  3) Consider medical therapy including bisphosphonates or denosumab.  4) Consider repeat BMD in 2 years        Thoracic spine    AP and " lateral    The patient is status post vertebroplasty 2 mid thoracic vertebral bodies since 2014. There is scoliosis and osteopenia. The remainder of the visualized vertebral bodies show preserved height. There is kyphosis at the level of the previously vertebroplasty vertebral bodies.    The patient is status post cholecystectomy.   Impression      The patient is status post vertebroplasty at 2 mid thoracic vertebral bodies with kyphosis of the spine at this point.      ______________________________________     Electronically signed by: DEB ZAPATA MD  Date: 09/13/16              Current Assessment & Plan     Cont denosumab 60mg sc q 6 months, next dose 4/13/18\  cmp 1 wk  prior to dose         Hyperlipidemia    Overview     Results for ROULA MARIE (MRN [id number]) as of 3/8/2018 10:14   Ref. Range 1/5/2018 09:20   Cholesterol Latest Ref Range: 120 - 199 mg/dL 136   HDL Latest Ref Range: 40 - 75 mg/dL 71   LDL Cholesterol Latest Ref Range: 63.0 - 159.0 mg/dL 56.6 (L)   Total Cholesterol/HDL Ratio Latest Ref Range: 2.0 - 5.0  1.9 (L)   Triglycerides Latest Ref Range: 30 - 150 mg/dL 42            Current Assessment & Plan     Cont atorvastatin 20mg daily         Vitamin D deficiency    Overview     Results for ROULA MARIE (MRN [id number]) as of 3/8/2018 10:14   Ref. Range 10/27/2016 09:28   Vit D, 25-Hydroxy Latest Ref Range: 30 - 96 ng/mL 45

## 2018-03-08 NOTE — ASSESSMENT & PLAN NOTE
ESR 30 CRP 4.4 global 70  TJ 6    SJ  4  DAS28  5.029(high disease activity [id number] 4.48 (moderate activity) c/w flare    *Kenalog 40mg IM today  Cont methotrexate 25mg po once a week with folic acid 1mg daily  RTC 3 months, If no improvement, add abatacept IV

## 2018-03-09 ENCOUNTER — OFFICE VISIT (OUTPATIENT)
Dept: FAMILY MEDICINE | Facility: CLINIC | Age: 83
End: 2018-03-09
Payer: MEDICARE

## 2018-03-09 ENCOUNTER — LAB VISIT (OUTPATIENT)
Dept: LAB | Facility: HOSPITAL | Age: 83
End: 2018-03-09
Attending: FAMILY MEDICINE
Payer: MEDICARE

## 2018-03-09 VITALS
DIASTOLIC BLOOD PRESSURE: 62 MMHG | BODY MASS INDEX: 24.47 KG/M2 | TEMPERATURE: 98 F | OXYGEN SATURATION: 99 % | WEIGHT: 129.63 LBS | HEART RATE: 62 BPM | HEIGHT: 61 IN | SYSTOLIC BLOOD PRESSURE: 138 MMHG

## 2018-03-09 DIAGNOSIS — Z95.0 PACEMAKER: ICD-10-CM

## 2018-03-09 DIAGNOSIS — D63.8 ANEMIA OF CHRONIC DISEASE: Primary | ICD-10-CM

## 2018-03-09 DIAGNOSIS — E87.1 HYPONATREMIA: ICD-10-CM

## 2018-03-09 DIAGNOSIS — I10 ESSENTIAL HYPERTENSION: ICD-10-CM

## 2018-03-09 DIAGNOSIS — F41.1 GENERALIZED ANXIETY DISORDER: ICD-10-CM

## 2018-03-09 DIAGNOSIS — M81.0 OSTEOPOROSIS, UNSPECIFIED OSTEOPOROSIS TYPE, UNSPECIFIED PATHOLOGICAL FRACTURE PRESENCE: ICD-10-CM

## 2018-03-09 DIAGNOSIS — Z86.73 HISTORY OF CVA (CEREBROVASCULAR ACCIDENT): ICD-10-CM

## 2018-03-09 DIAGNOSIS — E78.00 PURE HYPERCHOLESTEROLEMIA: ICD-10-CM

## 2018-03-09 DIAGNOSIS — M05.79 RHEUMATOID ARTHRITIS INVOLVING MULTIPLE SITES WITH POSITIVE RHEUMATOID FACTOR: ICD-10-CM

## 2018-03-09 LAB
ANION GAP SERPL CALC-SCNC: 8 MMOL/L
BUN SERPL-MCNC: 23 MG/DL
CALCIUM SERPL-MCNC: 9.7 MG/DL
CHLORIDE SERPL-SCNC: 98 MMOL/L
CO2 SERPL-SCNC: 28 MMOL/L
CREAT SERPL-MCNC: 0.8 MG/DL
EST. GFR  (AFRICAN AMERICAN): >60 ML/MIN/1.73 M^2
EST. GFR  (NON AFRICAN AMERICAN): >60 ML/MIN/1.73 M^2
GLUCOSE SERPL-MCNC: 89 MG/DL
POTASSIUM SERPL-SCNC: 4.5 MMOL/L
SODIUM SERPL-SCNC: 134 MMOL/L

## 2018-03-09 PROCEDURE — 36415 COLL VENOUS BLD VENIPUNCTURE: CPT

## 2018-03-09 PROCEDURE — 99214 OFFICE O/P EST MOD 30 MIN: CPT | Mod: PBBFAC,PO | Performed by: FAMILY MEDICINE

## 2018-03-09 PROCEDURE — 99215 OFFICE O/P EST HI 40 MIN: CPT | Mod: S$PBB,,, | Performed by: FAMILY MEDICINE

## 2018-03-09 PROCEDURE — 80048 BASIC METABOLIC PNL TOTAL CA: CPT

## 2018-03-09 PROCEDURE — 99999 PR PBB SHADOW E&M-EST. PATIENT-LVL IV: CPT | Mod: PBBFAC,,, | Performed by: FAMILY MEDICINE

## 2018-03-09 RX ORDER — ALPRAZOLAM 0.25 MG/1
0.25 TABLET ORAL DAILY PRN
Qty: 30 TABLET | Refills: 1 | Status: SHIPPED | OUTPATIENT
Start: 2018-03-09 | End: 2018-04-05 | Stop reason: SDUPTHER

## 2018-03-09 RX ORDER — SERTRALINE HYDROCHLORIDE 50 MG/1
50 TABLET, FILM COATED ORAL DAILY
Qty: 30 TABLET | Refills: 11 | Status: SHIPPED | OUTPATIENT
Start: 2018-03-09 | End: 2018-06-19 | Stop reason: SDUPTHER

## 2018-03-09 NOTE — PROGRESS NOTES
"(Portions of this note were dictated using voice recognition software and may contain dictation related errors in spelling/grammar/syntax not found on text review)    CC:   Chief Complaint   Patient presents with    Follow-up     6 month       HPI: 87 y.o. female     Rheumatoid arthritis followed by rheumatology , on methotrexate therapy     Prior history of stroke, was on dual platelet therapy with aspirin and Plavix.  However, had a syncopal episode, deemed to likely be cardiogenic in nature.  RBBB/LAHB treated with pacemaker.  She was reporting increased bruising with Plavix added, and recently had seen vascular neurology who had recommended stopping Plavix and continue indefinitely on aspirin monotherapy.     Anxiety: On Lexapro.  Was decreased to 10 mg secondary to bruising issues as above but patient had reported worsening anxiety at last visit and this was increased back to 20 mg.  she feels "not myself".  She does report significant anxiety, excessive worry and perseveration about her health.  While she takes Xanax just half a pill and in the past occasionally only, she finds herself taking half pill most days a week now because of the anxiety.    Past history of vertebral compression fracture status post vertebroplasty ×2.      Osteopenia with high fracture risk on DEXA but clinically osteoporotic given fx above: Was on alendronate in the past that had been off secondary to drug holiday.  Currently set up with Prolia injections, last DEXA 3/23/17, improvement of spine bmd     Anemia of chronic disease: Has been stable.  She does try to take iron but states that this makes her very constipated     Hypertension with coexisting diastolic dysfunction, last echo on 6/17/16 with EF of 55%, mild to moderate mitral regurgitation, diastolic dysfunction, mild aVR, mild TVR. On Lisinopril 40 mg daily , carvedilol 3.125 mg twice a day, and amlodipine 10 mg (patient states she only taking half a day at 5 mg).  She was " on HCTZ in past but was taken off d/t hyponatremia which improved off this med.  BP was well-controlled at my last assessment but has been higher on more recent office visits with other providers.  .  She was started on Lasix 20 mg a day which he takes every other day or so.  We did discuss rechecking BMP given her hyponatremia and also checking potassium status since starting the Lasix.        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)     has pacemaker       Past Surgical History:   Procedure Laterality Date    APPENDECTOMY      BREAST SURGERY      CARDIAC PACEMAKER PLACEMENT  11/2014    for syncope and RBBB/LAHB    CHOLECYSTECTOMY      EYE SURGERY      HEMORRHOID SURGERY      HYSTERECTOMY      1966    SKIN BIOPSY      VASCULAR SURGERY      VERTEBROPLASTY         Family History   Problem Relation Age of Onset    Leukemia Father     Heart disease Father     Hypertension Father     Diabetes Mellitus Mother     Hypertension Mother     Diabetes Mellitus Brother     Parkinsonism Brother 68    Heart attack Neg Hx     Heart failure Neg Hx     Hyperlipidemia Neg Hx     Stroke Neg Hx        Social History     Social History    Marital status:      Spouse name: N/A    Number of children: N/A    Years of education: N/A     Occupational History    Not on file.     Social History Main Topics    Smoking status: Never  "Smoker    Smokeless tobacco: Never Used    Alcohol use No      Comment: [facility]    Drug use: No    Sexual activity: No     Other Topics Concern    Not on file     Social History Narrative    No narrative on file     SCREENINGS     Mammogram 2012, benign     Colonoscopy 2010, diverticulosis     DEXA scan 3-17. L spine T score is -0.9, up from -1.6  Total hip T score is -1.3. Femoral neck T score is -1.8. She has been on schedule for prolia injections     Immunizations  Pvx 2008  PCV 2015  Zoster 2016  Tetanus: advised at local pharmacy       Lab Results   Component Value Date    WBC 5.25 02/01/2018    HGB 11.6 (L) 02/01/2018    HCT 34.8 (L) 02/01/2018     02/01/2018    CHOL 136 01/05/2018    TRIG 42 01/05/2018    HDL 71 01/05/2018    ALT 17 01/05/2018    AST 21 01/05/2018     (L) 01/24/2018    K 4.4 01/24/2018     01/24/2018    CREATININE 0.8 01/24/2018    CALCIUM 8.6 (L) 01/24/2018    BUN 21 01/24/2018    CO2 24 01/24/2018    TSH 2.074 01/05/2018    INR 1.0 11/21/2014    HGBA1C 6.0 01/27/2014    LDLCALC 56.6 (L) 01/05/2018     (H) 01/24/2018           ROS:  GENERAL: No fever, chills, fatigability or weight loss.  Admits to decreased appetite  SKIN: No rashes, no itching.  HEAD: No headaches.  EYES: No visual changes  EARS: No ear pain or changes in hearing.  NOSE: No congestion or rhinorrhea.  MOUTH & THROAT: No hoarseness, change in voice, or sore throat.  NODES: Denies swollen glands.  CHEST: Denies RODAS, cyanosis, wheezing, cough and sputum production.  CARDIOVASCULAR: Sometimes she will have a "feeling in my chest" that she attributes to feeling nervous and anxious  ABDOMEN: No nausea, vomiting, or changes in bowel function.  URINARY: No flank pain, dysuria or hematuria.  PERIPHERAL VASCULAR: No claudication or cyanosis.  MUSCULOSKELETAL: Chronic joint pains  NEUROLOGIC: No weakness or numbness.  Psychiatric: Anxiety, gets frustrated by forgetfulness    Vital signs reviewed  PE: "   APPEARANCE: Well nourished, well developed, in no acute distress   HEAD: Normocephalic, atraumatic.  EYES:     Conjunctivae noninjected.  NECK: Supple with no cervical lymphadenopathy.    CHEST: Good inspiratory effort. Lungs clear to auscultation with no wheezes or crackles.  CARDIOVASCULAR: Normal S1, S2. No rubs, murmurs, or gallops.  ABDOMEN: Bowel sounds normal. Not distended. Soft. No tenderness or masses. No organomegaly.  EXTREMITIES: 1+ pitting edema bilateral lower extremities   PSYCHIATRIC: Anxious affect, often does present with word finding difficulties that is known to be in the setting of her anxiety    IMPRESSION  1. Anemia of chronic disease    2. Osteoporosis, unspecified osteoporosis type, unspecified pathological fracture presence    3. Generalized anxiety disorder    4. Essential hypertension    5. History of CVA (cerebrovascular accident)    6. Pacemaker    7. Pure hypercholesterolemia    8. Hyponatremia    9. Rheumatoid arthritis involving multiple sites with positive rheumatoid factor            PLAN  Reviewed recent rheumatology, cardiology, and urgent care documentation.  Reviewed her most recent labs and health screenings as documented above     Blood pressure is better control at this time.  She had a very difficult time getting up on the table secondary to arthritis pains and such I could not get a secondary blood pressure management later in the visit beyond the automatic blood pressure measurement done by the nurse on intake.  She is on furosemide at this time 20 mg every other day in addition to her Norvasc 5 mg daily, lisinopril 40 mg daily, carvedilol 3.125 mg twice daily.  Recheck BMP    Generalized anxiety: She is on 20 mg of Lexapro but does find that her anxiety is not controlled.  While her Xanax use used to be fairly rare, now she is taking a half of the 0.25 mg pill almost every day.  She does feel very nervous about her health conditions and seems to focus a lot and  "perseverate on her health even though she admits "I am probably doing a lot better healthwise than other 87-year-old people that you see".  She feels that perhaps resetting her focus and her thinking may be important.  I did recommend she could consider counseling as an option to help with her anxiety.  We discussed also switching her SSRI to something different in case she has a better effect from a change.  After much discussion, patient agrees to try something else.  Transition over to Zoloft 50 mg daily.  We can perhaps uptitrated this if necessary later on over the course of the next month or 2.  She is advised to stop her Lexapro.  Xanax refilled but advise trying to use as little as possible    Rheumatoid arthritis: Continue rheumatology follow-up      "

## 2018-03-09 NOTE — PATIENT INSTRUCTIONS
Over the counter compression stockings to help with leg swelling.    Anxiety: Lexapro does not seem to be helping with your anxiety, so I don't know the benefit of continuing to take it. We may want to change to a different anxiety medication called zoloft (sertraline). We'll start at 50 mg of zoloft a day. You can stop lexapro and immediately transition over to the zoloft. Minor symptoms like stomach or tiredness may happen in the first week or so but should improve with continued use. We could increase that med over the next month if we need to.

## 2018-03-18 DIAGNOSIS — I10 HYPERTENSION, ESSENTIAL: ICD-10-CM

## 2018-03-19 RX ORDER — CARVEDILOL 3.12 MG/1
TABLET ORAL
Qty: 60 TABLET | Refills: 0 | Status: SHIPPED | OUTPATIENT
Start: 2018-03-19 | End: 2018-04-12 | Stop reason: SDUPTHER

## 2018-03-22 RX ORDER — ALPRAZOLAM 0.25 MG/1
TABLET ORAL
Qty: 30 TABLET | OUTPATIENT
Start: 2018-03-22

## 2018-04-02 RX ORDER — ATORVASTATIN CALCIUM 20 MG/1
TABLET, FILM COATED ORAL
Qty: 90 TABLET | Refills: 0 | Status: SHIPPED | OUTPATIENT
Start: 2018-04-02 | End: 2018-07-12 | Stop reason: SDUPTHER

## 2018-04-06 RX ORDER — ALPRAZOLAM 0.25 MG/1
TABLET ORAL
Qty: 30 TABLET | Refills: 1 | Status: SHIPPED | OUTPATIENT
Start: 2018-04-06 | End: 2018-08-24 | Stop reason: SDUPTHER

## 2018-04-12 ENCOUNTER — TELEPHONE (OUTPATIENT)
Dept: ELECTROPHYSIOLOGY | Facility: CLINIC | Age: 83
End: 2018-04-12

## 2018-04-12 ENCOUNTER — OFFICE VISIT (OUTPATIENT)
Dept: ELECTROPHYSIOLOGY | Facility: CLINIC | Age: 83
End: 2018-04-12
Payer: MEDICARE

## 2018-04-12 ENCOUNTER — HOSPITAL ENCOUNTER (OUTPATIENT)
Dept: CARDIOLOGY | Facility: CLINIC | Age: 83
Discharge: HOME | End: 2018-04-12
Payer: MEDICARE

## 2018-04-12 ENCOUNTER — CLINICAL SUPPORT (OUTPATIENT)
Dept: ELECTROPHYSIOLOGY | Facility: CLINIC | Age: 83
End: 2018-04-12
Attending: INTERNAL MEDICINE
Payer: MEDICARE

## 2018-04-12 VITALS
SYSTOLIC BLOOD PRESSURE: 142 MMHG | WEIGHT: 125.69 LBS | DIASTOLIC BLOOD PRESSURE: 82 MMHG | BODY MASS INDEX: 23.59 KG/M2 | HEART RATE: 67 BPM

## 2018-04-12 DIAGNOSIS — I44.4 LAFB (LEFT ANTERIOR FASCICULAR BLOCK): ICD-10-CM

## 2018-04-12 DIAGNOSIS — I49.3 PVC (PREMATURE VENTRICULAR CONTRACTION): ICD-10-CM

## 2018-04-12 DIAGNOSIS — Z95.0 PACEMAKER: ICD-10-CM

## 2018-04-12 DIAGNOSIS — Z95.0 CARDIAC PACEMAKER IN SITU: ICD-10-CM

## 2018-04-12 DIAGNOSIS — I50.32 CHRONIC DIASTOLIC CHF (CONGESTIVE HEART FAILURE): ICD-10-CM

## 2018-04-12 DIAGNOSIS — I49.8 ATRIAL ARRHYTHMIA: ICD-10-CM

## 2018-04-12 DIAGNOSIS — I50.9 CONGESTIVE HEART FAILURE, UNSPECIFIED CONGESTIVE HEART FAILURE CHRONICITY, UNSPECIFIED CONGESTIVE HEART FAILURE TYPE: ICD-10-CM

## 2018-04-12 DIAGNOSIS — Z86.73 HISTORY OF CVA (CEREBROVASCULAR ACCIDENT): ICD-10-CM

## 2018-04-12 DIAGNOSIS — I45.10 RBBB: ICD-10-CM

## 2018-04-12 DIAGNOSIS — Q21.12 PFO (PATENT FORAMEN OVALE): Primary | ICD-10-CM

## 2018-04-12 DIAGNOSIS — I50.9 CONGESTIVE HEART FAILURE, UNSPECIFIED CONGESTIVE HEART FAILURE CHRONICITY, UNSPECIFIED CONGESTIVE HEART FAILURE TYPE: Primary | ICD-10-CM

## 2018-04-12 DIAGNOSIS — I10 ESSENTIAL HYPERTENSION: ICD-10-CM

## 2018-04-12 PROCEDURE — 93010 ELECTROCARDIOGRAM REPORT: CPT | Mod: S$PBB,,, | Performed by: INTERNAL MEDICINE

## 2018-04-12 PROCEDURE — 99999 PR PBB SHADOW E&M-EST. PATIENT-LVL III: CPT | Mod: PBBFAC,,, | Performed by: NURSE PRACTITIONER

## 2018-04-12 PROCEDURE — 99213 OFFICE O/P EST LOW 20 MIN: CPT | Mod: PBBFAC | Performed by: NURSE PRACTITIONER

## 2018-04-12 PROCEDURE — 93005 ELECTROCARDIOGRAM TRACING: CPT | Mod: PBBFAC | Performed by: INTERNAL MEDICINE

## 2018-04-12 PROCEDURE — 99214 OFFICE O/P EST MOD 30 MIN: CPT | Mod: S$PBB,,, | Performed by: NURSE PRACTITIONER

## 2018-04-12 PROCEDURE — 93280 PM DEVICE PROGR EVAL DUAL: CPT | Mod: PBBFAC | Performed by: INTERNAL MEDICINE

## 2018-04-12 RX ORDER — ESCITALOPRAM OXALATE 20 MG/1
20 TABLET ORAL DAILY
Refills: 5 | COMMUNITY
Start: 2018-03-21 | End: 2018-04-12

## 2018-04-12 NOTE — PROGRESS NOTES
Subjective:    Patient ID:  Karly Sandoval is a 87 y.o. female who presents for a Pacemaker Check.     Karly Sandoval is a patient of Dr. Ivet Carrasco.    HPI     87 woman  RA  Syncope, RBBB/LAHB >. PPM   Has done well since then -- no issues   No admissions since 2014.    Since her last office visit, Ms. Sandoval reports feeling well from a cardiac standpoint, she denies chest pain, SOB/RODAS, dizziness, palpitations, or syncope. She does note feeling occasionally depressed>>Per pt, she was recently switched to Zoloft>>to follow with PCP.     Recent cardiac studies:  Device Interrogation (04/12/18) reveals an intrinsic SB with stable lead and device function. AMS x 2 noted (total duration 1 minute, 57 seconds); NSVT (max episode duration 6 seconds). She paces 45% in the RA and 0% in the RV. Estimated battery longevity 6 years.     I reviewed today's ECG which demonstrated SR w/first degree AVB and PACs at 67 bpm; , , and QTc 471.      Review of Systems   Constitution: Positive for malaise/fatigue. Negative for diaphoresis.   HENT: Negative for nosebleeds.    Eyes: Negative for double vision.   Cardiovascular: Negative for chest pain, dyspnea on exertion, irregular heartbeat, near-syncope, palpitations and syncope.   Respiratory: Negative for shortness of breath.    Skin: Negative.    Musculoskeletal: Negative.    Gastrointestinal: Negative for hematemesis and hematochezia.   Genitourinary: Negative for hematuria.   Neurological: Negative for dizziness and light-headedness.   Psychiatric/Behavioral: Positive for depression (occasional). Negative for altered mental status, suicidal ideas and thoughts of violence.        Objective:    Physical Exam   Constitutional: She is oriented to person, place, and time. She appears well-developed and well-nourished.   HENT:   Head: Normocephalic and atraumatic.   Eyes: Pupils are equal, round, and reactive to light.   Cardiovascular: Normal rate and regular  rhythm.    Pulmonary/Chest: Effort normal.   Neurological: She is alert and oriented to person, place, and time.   Skin: She is not diaphoretic.   Vitals reviewed.        Assessment:       1. PFO (patent foramen ovale)    2. PVC (premature ventricular contraction)    3. RBBB    4. LAFB (left anterior fascicular block)    5. Essential hypertension    6. Chronic diastolic CHF (congestive heart failure)    7. History of CVA (cerebrovascular accident)    8. Pacemaker         Plan:       Ms. Sandoval is doing well from a device perspective with stable lead and device function without sustained ventricular arrhythmia noted; AMS x 2 noted (total duration 1 minute 57 seconds).    Continue current medication regimen and device settings.   Follow up with PCP for occasional depression and anti-depression medication management.   Follow up in device clinic as scheduled.   Follow up in EP clinic in 1 year, sooner as needed.     [place]ena Berry, [place], APRN, FNP-C        (A copy of today's note was sent to Rafael Carrasco and Alejandro (PCP).)

## 2018-04-16 ENCOUNTER — INFUSION (OUTPATIENT)
Dept: INFECTIOUS DISEASES | Facility: HOSPITAL | Age: 83
End: 2018-04-16
Attending: INTERNAL MEDICINE
Payer: MEDICARE

## 2018-04-16 VITALS — HEIGHT: 61 IN | BODY MASS INDEX: 23.73 KG/M2 | WEIGHT: 125.69 LBS

## 2018-04-16 DIAGNOSIS — M85.80 OSTEOPENIA, UNSPECIFIED LOCATION: ICD-10-CM

## 2018-04-16 DIAGNOSIS — M81.0 AGE-RELATED OSTEOPOROSIS WITHOUT CURRENT PATHOLOGICAL FRACTURE: Primary | ICD-10-CM

## 2018-04-16 PROCEDURE — 96372 THER/PROPH/DIAG INJ SC/IM: CPT

## 2018-04-16 PROCEDURE — 63600175 PHARM REV CODE 636 W HCPCS: Mod: JG | Performed by: INTERNAL MEDICINE

## 2018-04-16 RX ADMIN — DENOSUMAB 60 MG: 60 INJECTION SUBCUTANEOUS at 10:04

## 2018-04-16 NOTE — PLAN OF CARE
Problem: Health Knowledge, Opportunity to Enhance (Adult,NICU,[place],Obstetrics,Pediatric)  Goal: Knowledgeable about Health Subject/Topic  Patient will demonstrate the desired outcomes by discharge/transition of care.  Outcome: Ongoing (interventions implemented as appropriate)  PATIENT EDUCATED ON HAND WASHING AND INFECTION CONTROL. PATIENT VERBALIZED UNDERSTANDING

## 2018-04-17 DIAGNOSIS — I10 HYPERTENSION, ESSENTIAL: ICD-10-CM

## 2018-04-17 RX ORDER — ATORVASTATIN CALCIUM 20 MG/1
TABLET, FILM COATED ORAL
Qty: 90 TABLET | Refills: 0 | OUTPATIENT
Start: 2018-04-17

## 2018-04-17 RX ORDER — CARVEDILOL 3.12 MG/1
TABLET ORAL
Qty: 60 TABLET | Refills: 0 | Status: SHIPPED | OUTPATIENT
Start: 2018-04-17 | End: 2018-05-15 | Stop reason: SDUPTHER

## 2018-05-08 ENCOUNTER — TELEPHONE (OUTPATIENT)
Dept: CARDIOLOGY | Facility: CLINIC | Age: 83
End: 2018-05-08

## 2018-05-08 DIAGNOSIS — I10 HYPERTENSION, ESSENTIAL: ICD-10-CM

## 2018-05-08 DIAGNOSIS — E78.5 HYPERLIPIDEMIA, UNSPECIFIED HYPERLIPIDEMIA TYPE: Primary | ICD-10-CM

## 2018-05-15 DIAGNOSIS — I10 HYPERTENSION, ESSENTIAL: ICD-10-CM

## 2018-05-15 RX ORDER — CARVEDILOL 3.12 MG/1
TABLET ORAL
Qty: 60 TABLET | Refills: 0 | Status: SHIPPED | OUTPATIENT
Start: 2018-05-15 | End: 2018-06-17 | Stop reason: SDUPTHER

## 2018-05-29 ENCOUNTER — TELEPHONE (OUTPATIENT)
Dept: RHEUMATOLOGY | Facility: CLINIC | Age: 83
End: 2018-05-29

## 2018-05-29 DIAGNOSIS — M06.9 RHEUMATOID ARTHRITIS OF HAND, UNSPECIFIED LATERALITY, UNSPECIFIED RHEUMATOID FACTOR PRESENCE: ICD-10-CM

## 2018-05-29 DIAGNOSIS — M05.742 RHEUMATOID ARTHRITIS INVOLVING BOTH HANDS WITH POSITIVE RHEUMATOID FACTOR: ICD-10-CM

## 2018-05-29 DIAGNOSIS — M05.741 RHEUMATOID ARTHRITIS INVOLVING BOTH HANDS WITH POSITIVE RHEUMATOID FACTOR: ICD-10-CM

## 2018-05-29 DIAGNOSIS — Z79.631 LONG TERM METHOTREXATE USER: ICD-10-CM

## 2018-05-29 RX ORDER — METHOTREXATE 2.5 MG/1
25 TABLET ORAL
Qty: 40 TABLET | Refills: 0 | Status: SHIPPED | OUTPATIENT
Start: 2018-05-29 | End: 2018-05-31 | Stop reason: SDUPTHER

## 2018-05-29 NOTE — TELEPHONE ENCOUNTER
Mable, please schedule overdue standing labs this week so I can refill methotrexate. Also overdue for f/u appt. Thanks ARISTIDES

## 2018-05-31 ENCOUNTER — TELEPHONE (OUTPATIENT)
Dept: RHEUMATOLOGY | Facility: CLINIC | Age: 83
End: 2018-05-31

## 2018-05-31 DIAGNOSIS — M06.9 RHEUMATOID ARTHRITIS OF HAND, UNSPECIFIED LATERALITY, UNSPECIFIED RHEUMATOID FACTOR PRESENCE: ICD-10-CM

## 2018-05-31 DIAGNOSIS — M05.741 RHEUMATOID ARTHRITIS INVOLVING BOTH HANDS WITH POSITIVE RHEUMATOID FACTOR: ICD-10-CM

## 2018-05-31 DIAGNOSIS — Z79.631 LONG TERM METHOTREXATE USER: ICD-10-CM

## 2018-05-31 DIAGNOSIS — M05.742 RHEUMATOID ARTHRITIS INVOLVING BOTH HANDS WITH POSITIVE RHEUMATOID FACTOR: ICD-10-CM

## 2018-05-31 RX ORDER — METHOTREXATE 2.5 MG/1
25 TABLET ORAL
Qty: 40 TABLET | Refills: 0 | Status: SHIPPED | OUTPATIENT
Start: 2018-05-31 | End: 2018-06-19 | Stop reason: SDUPTHER

## 2018-05-31 NOTE — TELEPHONE ENCOUNTER
Please schedule overdue standing labs tomorrow or early next week so I can refill methotrexate. Thanks. ARISTIDES

## 2018-06-01 DIAGNOSIS — Z79.631 LONG TERM METHOTREXATE USER: ICD-10-CM

## 2018-06-01 DIAGNOSIS — M06.9 RHEUMATOID ARTHRITIS OF HAND, UNSPECIFIED LATERALITY, UNSPECIFIED RHEUMATOID FACTOR PRESENCE: ICD-10-CM

## 2018-06-01 RX ORDER — FOLIC ACID 1 MG/1
1 TABLET ORAL DAILY
Qty: 90 TABLET | Refills: 3 | Status: SHIPPED | OUTPATIENT
Start: 2018-06-01 | End: 2019-06-01

## 2018-06-12 ENCOUNTER — TELEPHONE (OUTPATIENT)
Dept: NEUROLOGY | Facility: CLINIC | Age: 83
End: 2018-06-12

## 2018-06-12 NOTE — TELEPHONE ENCOUNTER
Spoke with Mrs. Sandoval-    She states that she doing well and does not need to f/u at this time.

## 2018-06-12 NOTE — TELEPHONE ENCOUNTER
----- Message from Sharon Gomez sent at 6/12/2018  2:11 PM CDT -----  Contact: pt @ [phone]  Calling to speak with someone regarding her needing to schedule an appt with Dr. Hull, says during her visit in January she understood the doctor to say that she did not need to see her again. Please call.

## 2018-06-17 DIAGNOSIS — I10 HYPERTENSION, ESSENTIAL: ICD-10-CM

## 2018-06-18 RX ORDER — CARVEDILOL 3.12 MG/1
TABLET ORAL
Qty: 60 TABLET | Refills: 0 | Status: SHIPPED | OUTPATIENT
Start: 2018-06-18 | End: 2018-07-16 | Stop reason: SDUPTHER

## 2018-06-19 ENCOUNTER — OFFICE VISIT (OUTPATIENT)
Dept: FAMILY MEDICINE | Facility: CLINIC | Age: 83
End: 2018-06-19
Payer: MEDICARE

## 2018-06-19 VITALS
WEIGHT: 128.06 LBS | HEART RATE: 60 BPM | OXYGEN SATURATION: 98 % | HEIGHT: 61 IN | DIASTOLIC BLOOD PRESSURE: 65 MMHG | BODY MASS INDEX: 24.18 KG/M2 | SYSTOLIC BLOOD PRESSURE: 141 MMHG | TEMPERATURE: 98 F

## 2018-06-19 DIAGNOSIS — E87.1 HYPONATREMIA: ICD-10-CM

## 2018-06-19 DIAGNOSIS — F41.1 GENERALIZED ANXIETY DISORDER: Primary | ICD-10-CM

## 2018-06-19 DIAGNOSIS — L82.1 SEBORRHEIC KERATOSIS: ICD-10-CM

## 2018-06-19 DIAGNOSIS — M05.79 RHEUMATOID ARTHRITIS INVOLVING MULTIPLE SITES WITH POSITIVE RHEUMATOID FACTOR: ICD-10-CM

## 2018-06-19 DIAGNOSIS — I10 ESSENTIAL HYPERTENSION: ICD-10-CM

## 2018-06-19 DIAGNOSIS — Z79.631 LONG TERM METHOTREXATE USER: ICD-10-CM

## 2018-06-19 DIAGNOSIS — D63.8 ANEMIA OF CHRONIC DISEASE: ICD-10-CM

## 2018-06-19 DIAGNOSIS — M05.741 RHEUMATOID ARTHRITIS INVOLVING BOTH HANDS WITH POSITIVE RHEUMATOID FACTOR: ICD-10-CM

## 2018-06-19 DIAGNOSIS — M06.9 RHEUMATOID ARTHRITIS OF HAND, UNSPECIFIED LATERALITY, UNSPECIFIED RHEUMATOID FACTOR PRESENCE: ICD-10-CM

## 2018-06-19 DIAGNOSIS — R49.0 HOARSENESS: ICD-10-CM

## 2018-06-19 DIAGNOSIS — M05.742 RHEUMATOID ARTHRITIS INVOLVING BOTH HANDS WITH POSITIVE RHEUMATOID FACTOR: ICD-10-CM

## 2018-06-19 DIAGNOSIS — R60.0 PERIPHERAL EDEMA: ICD-10-CM

## 2018-06-19 PROCEDURE — 99214 OFFICE O/P EST MOD 30 MIN: CPT | Mod: PBBFAC,PO | Performed by: FAMILY MEDICINE

## 2018-06-19 PROCEDURE — 99214 OFFICE O/P EST MOD 30 MIN: CPT | Mod: S$PBB,,, | Performed by: FAMILY MEDICINE

## 2018-06-19 PROCEDURE — 99999 PR PBB SHADOW E&M-EST. PATIENT-LVL IV: CPT | Mod: PBBFAC,,, | Performed by: FAMILY MEDICINE

## 2018-06-19 RX ORDER — ESCITALOPRAM OXALATE 20 MG/1
20 TABLET ORAL DAILY
Refills: 5 | COMMUNITY
Start: 2018-04-16 | End: 2018-06-19

## 2018-06-19 RX ORDER — SERTRALINE HYDROCHLORIDE 25 MG/1
25 TABLET, FILM COATED ORAL DAILY
Qty: 30 TABLET | Refills: 11 | Status: SHIPPED | OUTPATIENT
Start: 2018-06-19 | End: 2018-06-19 | Stop reason: SDUPTHER

## 2018-06-19 RX ORDER — METHOTREXATE 2.5 MG/1
25 TABLET ORAL
Qty: 40 TABLET | Refills: 0 | Status: SHIPPED | OUTPATIENT
Start: 2018-06-19 | End: 2018-07-11

## 2018-06-19 RX ORDER — FLUTICASONE PROPIONATE 50 MCG
2 SPRAY, SUSPENSION (ML) NASAL DAILY
Qty: 16 G | Refills: 0 | Status: SHIPPED | OUTPATIENT
Start: 2018-06-19 | End: 2018-06-19 | Stop reason: SDUPTHER

## 2018-06-19 RX ORDER — TRIAMCINOLONE ACETONIDE 1 MG/G
CREAM TOPICAL
Refills: 1 | COMMUNITY
Start: 2018-06-01 | End: 2018-06-19

## 2018-06-19 RX ORDER — SERTRALINE HYDROCHLORIDE 25 MG/1
25 TABLET, FILM COATED ORAL DAILY
Qty: 30 TABLET | Refills: 11 | Status: ON HOLD | OUTPATIENT
Start: 2018-06-19 | End: 2018-12-03 | Stop reason: HOSPADM

## 2018-06-19 RX ORDER — FLUTICASONE PROPIONATE 50 MCG
SPRAY, SUSPENSION (ML) NASAL
Qty: 48 ML | Refills: 0 | OUTPATIENT
Start: 2018-06-19

## 2018-06-19 RX ORDER — FLUTICASONE PROPIONATE 50 MCG
2 SPRAY, SUSPENSION (ML) NASAL DAILY
Qty: 16 G | Refills: 0 | Status: SHIPPED | OUTPATIENT
Start: 2018-06-19 | End: 2018-07-19

## 2018-06-19 NOTE — PROGRESS NOTES
(Portions of this note were dictated using voice recognition software and may contain dictation related errors in spelling/grammar/syntax not found on text review)    CC:   Chief Complaint   Patient presents with    Medication Management       HPI: 87 y.o. female     Last visit with me in March 2018.  Medical history below.  At last visit we modified her anxiety medication.  Was on Lexapro.  This was maximized to 20 mg a day but she still felt significant anxiety excessive worry and perseveration about her health.  Typically takes Xanax only on occasion just half a pill but was finding herself taking this more regularly.  After much discussion, she agreed to change her pharmacotherapy for anxiety to  Zoloft 50 mg daily she states that ,I was doing good for a while but then about 5 weeks ago she noticed that she is getting issues with feeling not motivated and not wanting to socialize with people.  She was concerned the medication could be having this effect on her.  She was concerned if any of her other issues could be causing the symptoms.  Denies any shortness of breath or chest pain at this time.  Denies any fevers or chills.  As below, has had issues with hyponatremia in the past typically improved after stopping hydrochlorothiazide but since she has been on Lasix since then for blood pressure control and diastolic dysfunction, we discussed perhaps she should get some labs to recheck this in case this is having some affect on mental status.    Also followed by Cardiology, Rheumatology, Neurology, overall health has been fairly stable    Hypertension: with coexisting diastolic dysfunction, last echo on 6/17/16 with EF of 55%, mild to moderate mitral regurgitation, diastolic dysfunction, mild aVR, mild TVR. On Lisinopril 40 mg daily , carvedilol 3.125 mg twice a day, and amlodipine 10 mg (patient states she only taking half a day at 5 mg).  She was on HCTZ in past but was taken off d/t hyponatremia which  improved off this med.   .  She was started on Lasix 20 mg a day which he takes every other day or so.     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)     has pacemaker       Past Surgical History:   Procedure Laterality Date    APPENDECTOMY      BREAST SURGERY      CARDIAC PACEMAKER PLACEMENT  11/2014    for syncope and RBBB/LAHB    CHOLECYSTECTOMY      EYE SURGERY      HEMORRHOID SURGERY      HYSTERECTOMY      1966    SKIN BIOPSY      VASCULAR SURGERY      VERTEBROPLASTY         Family History   Problem Relation Age of Onset    Leukemia Father     Heart disease Father     Hypertension Father     Diabetes Mellitus Mother     Hypertension Mother     Diabetes Mellitus Brother     Parkinsonism Brother 68    Heart attack Neg Hx     Heart failure Neg Hx     Hyperlipidemia Neg Hx     Stroke Neg Hx        Social History     Social History    Marital status:      Spouse name: N/A    Number of children: N/A    Years of education: N/A     Occupational History    Not on file.     Social History Main Topics    Smoking status: Never Smoker    Smokeless tobacco: Never Used    Alcohol use No      Comment: [facility]    Drug use: No    Sexual activity: No     Other Topics Concern    Not on file     Social History Narrative    No narrative on file     Lab Results   Component Value Date    WBC 5.25 02/01/2018    HGB 11.6 (L)  02/01/2018    HCT 34.8 (L) 02/01/2018     02/01/2018    CHOL 136 01/05/2018    TRIG 42 01/05/2018    HDL 71 01/05/2018    ALT 17 01/05/2018    AST 21 01/05/2018     (L) 03/09/2018    K 4.5 03/09/2018    CL 98 03/09/2018    CREATININE 0.8 03/09/2018    CALCIUM 9.7 03/09/2018    BUN 23 03/09/2018    CO2 28 03/09/2018    TSH 2.074 01/05/2018    INR 1.0 11/21/2014    HGBA1C 6.0 01/27/2014    LDLCALC 56.6 (L) 01/05/2018    GLU 89 03/09/2018       Sodium   Date Value Ref Range Status   03/09/2018 134 (L) 136 - 145 mmol/L Final   01/24/2018 135 (L) 136 - 145 mmol/L Final   01/15/2018 128 (L) 136 - 145 mmol/L Final   01/05/2018 135 (L) 136 - 145 mmol/L Final   10/05/2017 132 (L) 136 - 145 mmol/L Final   06/09/2017 137 136 - 145 mmol/L Final           ROS:  GENERAL: No fever, chills, fatigability or weight loss.  SKIN:  Rash on back.  HEAD: No headaches.  EYES: No visual changes  EARS: No ear pain or changes in hearing.  NOSE: No congestion or rhinorrhea.  MOUTH & THROAT:  Hoarseness, throat discomfort.  NODES: Denies swollen glands.  CHEST:  Cough  CARDIOVASCULAR: Denies chest pain, PND, orthopnea.  ABDOMEN: No nausea, vomiting, or changes in bowel function.  URINARY: No flank pain, dysuria or hematuria.  PERIPHERAL VASCULAR: No claudication or cyanosis.  MUSCULOSKELETAL:  No acute issues  NEUROLOGIC: No weakness or numbness.  PSYCHIATRIC:  Above    Vital signs reviewed  PE:   APPEARANCE: Well nourished, well developed, in no acute distress.    HEAD: Normocephalic, atraumatic.  EYES:    Conjunctivae noninjected.  EARS: TM's intact. Light reflex normal. No retraction or perforation  NOSE:  Mild mucosal edema bilaterally.  MOUTH & THROAT: No tonsillar enlargement. No pharyngeal erythema or exudate.  hoarse  NECK: Supple with no cervical lymphadenopathy.  No carotid bruits.  No thyromegaly  CHEST: Good inspiratory effort. Lungs clear to auscultation with no wheezes or crackles.  CARDIOVASCULAR: Normal S1, S2. No  rubs, murmurs, or gallops.  ABDOMEN: Bowel sounds normal. Not distended. Soft. No tenderness or masses. No organomegaly.  EXTREMITIES:  1+ bilateral lower extremity edema  SKIN:  Multiple seborrheic keratoses noted on back, none of them looking significantly inflamed.  No signs of infection.    IMPRESSION  1. Generalized anxiety disorder    2. Seborrheic keratosis    3. Hyponatremia    4. Anemia of chronic disease    5. Peripheral edema    6. Rheumatoid arthritis involving multiple sites with positive rheumatoid factor    7. Essential hypertension            PLAN  2 possibilities:  1.  Her recent issues with motivation problems, difficulty with socializing and interacting could be from medication side effects in which case we could drop to medication Zoloft down to 25 mg to see how she does.  2.  Recent issues could be from uncontrolled anxiety and depression in which case perhaps even increase in the Zoloft could be helpful.  She is concerned about increasing the dose at this time would rather try to drop down and see if she does any better.  I have changed her Zoloft out down to 25 mg daily and sent this to her pharmacy.  Notify if the above symptoms get worse which might make option 2 a more likely scenario.    Also review labs to see if any other issues like worsening hyponatremia could be involved with some of the changes as noted above.  I see that she has Rheumatology labs coming up this Friday with a CBC, CMP, CRP.  Furthermore I see that next month she has Cardiology labs including a CMP, lipids, TSH.  I will have her do lipids and TSH with the most recent lab to get all done at the same time and she can cancel the July lab appointment.  Was advised to fast for this upcoming labs.  She can follow up with her rheumatologist and cardiologist as directed further scheduled appointments    Hoarseness and cough.  Can try a course of Flonase 2 sprays per nostril once daily for the next 2-3 weeks in case of  postnasal drip related symptoms.     SCREENINGS     Mammogram 2012, benign     Colonoscopy 2010, diverticulosis     DEXA scan 3-17. L spine T score is -0.9, up from -1.6  Total hip T score is -1.3. Femoral neck T score is -1.8. She has been on schedule for prolia injections     Immunizations  Pvx 2008  PCV 2015  Zoster 2016  Tetanus: advised at local pharmacy

## 2018-06-19 NOTE — PATIENT INSTRUCTIONS
We'll drop down Zoloft to 25 mg daily from 50 mg in case your symptoms of not wanting to do anything are related to the medication    Start flonase 2 sprays/nostril once daily for 2-3 weeks for possible allergies.

## 2018-06-22 ENCOUNTER — LAB VISIT (OUTPATIENT)
Dept: LAB | Facility: HOSPITAL | Age: 83
End: 2018-06-22
Attending: INTERNAL MEDICINE
Payer: MEDICARE

## 2018-06-22 ENCOUNTER — TELEPHONE (OUTPATIENT)
Dept: RHEUMATOLOGY | Facility: CLINIC | Age: 83
End: 2018-06-22

## 2018-06-22 DIAGNOSIS — I10 HYPERTENSION, ESSENTIAL: ICD-10-CM

## 2018-06-22 DIAGNOSIS — D64.9 ANEMIA, UNSPECIFIED TYPE: Primary | ICD-10-CM

## 2018-06-22 DIAGNOSIS — Z79.899 OTHER LONG TERM (CURRENT) DRUG THERAPY: ICD-10-CM

## 2018-06-22 DIAGNOSIS — M06.9 RHEUMATOID ARTHRITIS OF HAND, UNSPECIFIED LATERALITY, UNSPECIFIED RHEUMATOID FACTOR PRESENCE: ICD-10-CM

## 2018-06-22 DIAGNOSIS — Z79.631 METHOTREXATE, LONG TERM, CURRENT USE: ICD-10-CM

## 2018-06-22 DIAGNOSIS — E78.5 HYPERLIPIDEMIA, UNSPECIFIED HYPERLIPIDEMIA TYPE: ICD-10-CM

## 2018-06-22 LAB
ALBUMIN SERPL BCP-MCNC: 3.4 G/DL
ALP SERPL-CCNC: 65 U/L
ALT SERPL W/O P-5'-P-CCNC: 21 U/L
ANION GAP SERPL CALC-SCNC: 8 MMOL/L
AST SERPL-CCNC: 21 U/L
BASOPHILS # BLD AUTO: 0.03 K/UL
BASOPHILS NFR BLD: 0.6 %
BILIRUB SERPL-MCNC: 0.7 MG/DL
BUN SERPL-MCNC: 18 MG/DL
CALCIUM SERPL-MCNC: 8.9 MG/DL
CHLORIDE SERPL-SCNC: 101 MMOL/L
CHOLEST SERPL-MCNC: 132 MG/DL
CHOLEST/HDLC SERPL: 2 {RATIO}
CO2 SERPL-SCNC: 26 MMOL/L
CREAT SERPL-MCNC: 0.8 MG/DL
CRP SERPL-MCNC: 12.7 MG/L
DIFFERENTIAL METHOD: ABNORMAL
EOSINOPHIL # BLD AUTO: 0.1 K/UL
EOSINOPHIL NFR BLD: 2.9 %
ERYTHROCYTE [DISTWIDTH] IN BLOOD BY AUTOMATED COUNT: 14.3 %
ERYTHROCYTE [SEDIMENTATION RATE] IN BLOOD BY WESTERGREN METHOD: 40 MM/HR
EST. GFR  (AFRICAN AMERICAN): >60 ML/MIN/1.73 M^2
EST. GFR  (NON AFRICAN AMERICAN): >60 ML/MIN/1.73 M^2
GLUCOSE SERPL-MCNC: 98 MG/DL
HCT VFR BLD AUTO: 32.7 %
HDLC SERPL-MCNC: 66 MG/DL
HDLC SERPL: 50 %
HGB BLD-MCNC: 10.8 G/DL
IMM GRANULOCYTES # BLD AUTO: 0.04 K/UL
IMM GRANULOCYTES NFR BLD AUTO: 0.8 %
LDLC SERPL CALC-MCNC: 56.4 MG/DL
LYMPHOCYTES # BLD AUTO: 0.9 K/UL
LYMPHOCYTES NFR BLD: 17.9 %
MCH RBC QN AUTO: 31.5 PG
MCHC RBC AUTO-ENTMCNC: 33 G/DL
MCV RBC AUTO: 95 FL
MONOCYTES # BLD AUTO: 0.7 K/UL
MONOCYTES NFR BLD: 14.1 %
NEUTROPHILS # BLD AUTO: 3 K/UL
NEUTROPHILS NFR BLD: 63.7 %
NONHDLC SERPL-MCNC: 66 MG/DL
NRBC BLD-RTO: 0 /100 WBC
PLATELET # BLD AUTO: 221 K/UL
PMV BLD AUTO: 10.9 FL
POTASSIUM SERPL-SCNC: 4.3 MMOL/L
PROT SERPL-MCNC: 6.6 G/DL
RBC # BLD AUTO: 3.43 M/UL
SODIUM SERPL-SCNC: 135 MMOL/L
TRIGL SERPL-MCNC: 48 MG/DL
TSH SERPL DL<=0.005 MIU/L-ACNC: 2.04 UIU/ML
WBC # BLD AUTO: 4.76 K/UL

## 2018-06-22 PROCEDURE — 80061 LIPID PANEL: CPT

## 2018-06-22 PROCEDURE — 86140 C-REACTIVE PROTEIN: CPT

## 2018-06-22 PROCEDURE — 36415 COLL VENOUS BLD VENIPUNCTURE: CPT | Mod: PO

## 2018-06-22 PROCEDURE — 80053 COMPREHEN METABOLIC PANEL: CPT

## 2018-06-22 PROCEDURE — 85651 RBC SED RATE NONAUTOMATED: CPT

## 2018-06-22 PROCEDURE — 85025 COMPLETE CBC W/AUTO DIFF WBC: CPT

## 2018-06-22 PROCEDURE — 84443 ASSAY THYROID STIM HORMONE: CPT

## 2018-06-26 ENCOUNTER — TELEPHONE (OUTPATIENT)
Dept: CARDIOLOGY | Facility: CLINIC | Age: 83
End: 2018-06-26

## 2018-06-26 DIAGNOSIS — Z79.631 LONG TERM METHOTREXATE USER: ICD-10-CM

## 2018-06-26 DIAGNOSIS — M05.742 RHEUMATOID ARTHRITIS INVOLVING BOTH HANDS WITH POSITIVE RHEUMATOID FACTOR: ICD-10-CM

## 2018-06-26 DIAGNOSIS — M05.741 RHEUMATOID ARTHRITIS INVOLVING BOTH HANDS WITH POSITIVE RHEUMATOID FACTOR: ICD-10-CM

## 2018-06-26 DIAGNOSIS — M06.9 RHEUMATOID ARTHRITIS OF HAND, UNSPECIFIED LATERALITY, UNSPECIFIED RHEUMATOID FACTOR PRESENCE: ICD-10-CM

## 2018-06-26 RX ORDER — METHOTREXATE 2.5 MG/1
25 TABLET ORAL
Qty: 120 TABLET | Refills: 0 | Status: SHIPPED | OUTPATIENT
Start: 2018-06-26 | End: 2018-10-18

## 2018-06-26 NOTE — TELEPHONE ENCOUNTER
----- Message from Briseida Muñoz MD sent at 6/25/2018  5:35 PM CDT -----  Please mail patient the blood work results and inform the patient that we will discuss it in detail during the upcoming visit. It looks good.

## 2018-06-30 ENCOUNTER — EXTERNAL CHRONIC CARE MANAGEMENT (OUTPATIENT)
Dept: PRIMARY CARE CLINIC | Facility: CLINIC | Age: 83
End: 2018-06-30
Payer: MEDICARE

## 2018-06-30 PROCEDURE — 99490 CHRNC CARE MGMT STAFF 1ST 20: CPT | Mod: PBBFAC,PO | Performed by: FAMILY MEDICINE

## 2018-06-30 PROCEDURE — 99490 CHRNC CARE MGMT STAFF 1ST 20: CPT | Mod: S$PBB,,, | Performed by: FAMILY MEDICINE

## 2018-07-07 ENCOUNTER — HOSPITAL ENCOUNTER (EMERGENCY)
Facility: HOSPITAL | Age: 83
Discharge: HOME | End: 2018-07-07
Attending: EMERGENCY MEDICINE
Payer: MEDICARE

## 2018-07-07 VITALS
DIASTOLIC BLOOD PRESSURE: 76 MMHG | RESPIRATION RATE: 18 BRPM | BODY MASS INDEX: 23.79 KG/M2 | OXYGEN SATURATION: 99 % | HEIGHT: 61 IN | SYSTOLIC BLOOD PRESSURE: 193 MMHG | TEMPERATURE: 98 F | HEART RATE: 76 BPM | WEIGHT: 126 LBS

## 2018-07-07 DIAGNOSIS — R51.9 ACUTE NONINTRACTABLE HEADACHE, UNSPECIFIED HEADACHE TYPE: Primary | ICD-10-CM

## 2018-07-07 PROCEDURE — 99284 EMERGENCY DEPT VISIT MOD MDM: CPT | Mod: 25

## 2018-07-07 NOTE — ED PROVIDER NOTES
"Encounter Date: 7/7/2018    SCRIBE #1 NOTE: I, Radha Sanches, am scribing for, and in the presence of, Dr. Mckeon.       History     Chief Complaint   Patient presents with    Headache     pt reports having a headache since midnight, states headache is a little better but feels "shook up" because of having a pace maker. Reports taking aspirin around 0030. Reports BP at home was 180/74. Denies chest pain.      Karly Sandoval is a 88 y.o. 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    The patient presents to the ED due to right sided headache that began at midnight..  Denies any fever, neck stiffness, or vomiting.  Pt's family is concerning for stroke.  They states that 4 years ago, pt experienced headache and was diagnosed with left cerebral infarct.  She was given 2 baby aspirin PTA.  She is on blood thinner.  No other complaints at this time.      The history is provided by the patient.     Review of patient's allergies indicates:   Allergen Reactions    Amoxicillin Other (See Comments)     unknown    Bactrim [sulfamethoxazole-trimethoprim] Other (See Comments)     unknown    Omeprazole Other (See Comments)     Muscle and joint pain    Rocephin [ceftriaxone] Other (See Comments)     Severe acute generalized pain    Penicillins Rash     Past Medical History:   Diagnosis Date    Acid reflux     Anemia     Anxiety     Carotid artery occlusion     Compression fracture of thoracic vertebra 6/25/2014    CVA (cerebral infarction) 2014    Diastolic " heart failure     echo 2016 ef 55% +Diastolic dysf    Diverticulosis     Encounter for blood transfusion     History of cholelithiasis     Hyperlipidemia     Hypertension     Osteoarthritis     Osteopenia     PVC (premature ventricular contraction) 4/28/2014    RVOT origin -- benign     Rheumatoid arthritis(714.0)     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)     has pacemaker     Past Surgical History:   Procedure Laterality Date    APPENDECTOMY      BREAST SURGERY      CARDIAC PACEMAKER PLACEMENT  11/2014    for syncope and RBBB/LAHB    CHOLECYSTECTOMY      EYE SURGERY      HEMORRHOID SURGERY      HYSTERECTOMY      1966    SKIN BIOPSY      VASCULAR SURGERY      VERTEBROPLASTY       Family History   Problem Relation Age of Onset    Leukemia Father     Heart disease Father     Hypertension Father     Diabetes Mellitus Mother     Hypertension Mother     Diabetes Mellitus Brother     Parkinsonism Brother 68    Heart attack Neg Hx     Heart failure Neg Hx     Hyperlipidemia Neg Hx     Stroke Neg Hx      Social History   Substance Use Topics    Smoking status: Never Smoker    Smokeless tobacco: Never Used    Alcohol use No      Comment: mitchell     Review of Systems   Constitutional: Negative for chills, fatigue and fever.   HENT: Negative for congestion, sore throat and voice change.    Eyes: Negative for photophobia, pain and redness.   Respiratory: Negative for cough, choking and shortness of breath.    Cardiovascular: Negative for chest pain, palpitations and leg swelling.   Gastrointestinal: Negative for abdominal pain, diarrhea, nausea and vomiting.   Genitourinary: Negative for dysuria, frequency and urgency.   Musculoskeletal: Negative for back pain, neck pain and neck stiffness.   Skin: Negative for rash.   Neurological: Positive for headaches. Negative for seizures, light-headedness and numbness.   All other systems reviewed and are  negative.      Physical Exam     Initial Vitals [07/07/18 0208]   BP Pulse Resp Temp SpO2   (!) 202/81 66 18 98 °F (36.7 °C) 99 %      MAP       --         Physical Exam    Nursing note and vitals reviewed.  Constitutional: She appears well-developed and well-nourished. She is not diaphoretic. No distress.   HENT:   Head: Normocephalic and atraumatic.   Mouth/Throat: Oropharynx is clear and moist.   Eyes: Conjunctivae and EOM are normal. Pupils are equal, round, and reactive to light.   Neck: Normal range of motion. Neck supple.   No nuchal rigidity   Cardiovascular: Normal rate, regular rhythm, normal heart sounds and intact distal pulses.   Pulmonary/Chest: Breath sounds normal. No respiratory distress.   Abdominal: Soft. She exhibits no distension. There is no tenderness. There is no rebound and no guarding.   Musculoskeletal: Normal range of motion. She exhibits no edema or tenderness.   Neurological: She is alert and oriented to person, place, and time. She has normal strength.   Skin: Skin is warm and dry. Capillary refill takes less than 2 seconds.   Psychiatric: She has a normal mood and affect. Thought content normal.         ED Course   Procedures  Labs Reviewed - No data to display       Imaging Results          CT Head Without Contrast (Final result)  Result time 07/07/18 02:43:12    Final result by Mayuri Ortiz MD (07/07/18 02:43:12)                 Impression:      No acute intracranial abnormalities identified.  Remote left temporal parietal lobe infarction with encephalomalacia.      Electronically signed by: Mayuri Ortiz MD  Date:    07/07/2018  Time:    02:43             Narrative:    EXAMINATION:  CT HEAD WITHOUT CONTRAST    CLINICAL HISTORY:  Headache, acute, norm neuro exam;    TECHNIQUE:  Low dose axial images were obtained through the head.  Coronal and sagittal reformations were also performed. Contrast was not administered.    COMPARISON:  CT head from September  2014.    FINDINGS:  There is generalized cerebral volume loss with mild chronic microvascular ischemic disease.  Stable region of remote infarction with encephalomalacia seen involving the left temporal parietal lobe.  Acute/recent major vascular distribution cerebral infarction, intraparenchymal hemorrhage, or intra-axial space occupying lesion. The ventricular system is normal in size and configuration with no evidence of hydrocephalus. No effacement of the skull-base cisterns. No abnormal extra-axial fluid collections or blood products. The visualized paranasal sinuses and mastoid air cells are clear. The calvarium shows no significant abnormality.                                 Medical Decision Making:   History:   Old Medical Records: I decided to obtain old medical records.  Clinical Tests:   Radiological Study: Ordered and Reviewed  ED Management:  Pt appears well. States ha improved with asa. Head ct wnl.               Attending Attestation:           Physician Attestation for Scribe:  Physician Attestation Statement for Scribe #1: I, Acosta Mckeon, reviewed documentation, as scribed by Radha Sanches in my presence, and it is both accurate and complete.                    Clinical Impression:     1. Acute nonintractable headache, unspecified headache type         Disposition:   Disposition: Discharged  Condition: Stable                        Acosta Mckeon MD  07/07/18 8256

## 2018-07-11 ENCOUNTER — OFFICE VISIT (OUTPATIENT)
Dept: RHEUMATOLOGY | Facility: CLINIC | Age: 83
End: 2018-07-11
Payer: MEDICARE

## 2018-07-11 VITALS
HEART RATE: 59 BPM | WEIGHT: 128.19 LBS | SYSTOLIC BLOOD PRESSURE: 140 MMHG | DIASTOLIC BLOOD PRESSURE: 66 MMHG | HEIGHT: 61 IN | BODY MASS INDEX: 24.2 KG/M2

## 2018-07-11 DIAGNOSIS — M20.12 HALLUX ABDUCTO VALGUS, BILATERAL: ICD-10-CM

## 2018-07-11 DIAGNOSIS — M85.80 OSTEOPENIA, UNSPECIFIED LOCATION: ICD-10-CM

## 2018-07-11 DIAGNOSIS — M06.9 RHEUMATOID ARTHRITIS, INVOLVING UNSPECIFIED SITE, UNSPECIFIED RHEUMATOID FACTOR PRESENCE: Primary | ICD-10-CM

## 2018-07-11 DIAGNOSIS — G89.29 CHRONIC PAIN OF RIGHT KNEE: ICD-10-CM

## 2018-07-11 DIAGNOSIS — M20.11 HALLUX ABDUCTO VALGUS, BILATERAL: ICD-10-CM

## 2018-07-11 DIAGNOSIS — M25.561 CHRONIC PAIN OF RIGHT KNEE: ICD-10-CM

## 2018-07-11 DIAGNOSIS — M17.0 BILATERAL PRIMARY OSTEOARTHRITIS OF KNEE: ICD-10-CM

## 2018-07-11 PROCEDURE — 99214 OFFICE O/P EST MOD 30 MIN: CPT | Mod: S$PBB,25,, | Performed by: INTERNAL MEDICINE

## 2018-07-11 PROCEDURE — 99999 PR PBB SHADOW E&M-EST. PATIENT-LVL V: CPT | Mod: PBBFAC,,, | Performed by: INTERNAL MEDICINE

## 2018-07-11 PROCEDURE — 20610 DRAIN/INJ JOINT/BURSA W/O US: CPT | Mod: PBBFAC | Performed by: INTERNAL MEDICINE

## 2018-07-11 PROCEDURE — 99215 OFFICE O/P EST HI 40 MIN: CPT | Mod: PBBFAC | Performed by: INTERNAL MEDICINE

## 2018-07-11 RX ORDER — TRIAMCINOLONE ACETONIDE 40 MG/ML
40 INJECTION, SUSPENSION INTRA-ARTICULAR; INTRAMUSCULAR
Status: DISCONTINUED | OUTPATIENT
Start: 2018-07-11 | End: 2018-07-11 | Stop reason: HOSPADM

## 2018-07-11 RX ORDER — HYDROXYCHLOROQUINE SULFATE 200 MG/1
200 TABLET, FILM COATED ORAL DAILY
Qty: 30 TABLET | Refills: 1 | Status: SHIPPED | OUTPATIENT
Start: 2018-07-11 | End: 2018-12-11 | Stop reason: SDUPTHER

## 2018-07-11 RX ADMIN — TRIAMCINOLONE ACETONIDE 40 MG: 40 INJECTION, SUSPENSION INTRA-ARTICULAR; INTRAMUSCULAR at 01:07

## 2018-07-11 ASSESSMENT — ROUTINE ASSESSMENT OF PATIENT INDEX DATA (RAPID3)
MDHAQ FUNCTION SCORE: 1
WHEN YOU AWAKENED IN THE MORNING OVER THE LAST WEEK, PLEASE INDICATE THE AMOUNT OF TIME IT TAKES UNTIL YOU ARE AS LIMBER AS YOU WILL BE FOR THE DAY: 5 MINS
PSYCHOLOGICAL DISTRESS SCORE: 1.1
PATIENT GLOBAL ASSESSMENT SCORE: 5
PAIN SCORE: 0
TOTAL RAPID3 SCORE: 2.78
AM STIFFNESS SCORE: 1, YES
FATIGUE SCORE: 0

## 2018-07-11 ASSESSMENT — DISEASE ACTIVITY SCORE (DAS28)
SWOLLEN_JOINTS_COUNT: 1
TOTAL_SCORE_ESR: 4.35
CRP_MG_PER_LITER: 12.7
TENDER_JOINTS_COUNT: 2
ESR_MM_PER_HR: 40
GLOBAL_HEALTH_SCORE: 50
TOTAL_SCORE_CRP: 3.67

## 2018-07-11 NOTE — PROCEDURES
Large Joint Aspiration/Injection  Date/Time: 7/11/2018 1:51 PM  Performed by: ISELA ISAAC  Authorized by: ISELA ISAAC     Consent Done?:  Yes (Verbal)  Indications:  Pain  Procedure site marked: Yes    Timeout: Prior to procedure the correct patient, procedure, and site was verified      Location:  Knee  Site:  R knee  Prep: Patient was prepped and draped in usual sterile fashion    Needle size:  25 G  Medications:  40 mg triamcinolone acetonide 40 mg/mL  Aspirate amount (ml):  0  Patient tolerance:  Patient tolerated the procedure well with no immediate complications

## 2018-07-11 NOTE — PROGRESS NOTES
I have personally taken the history and examined the patient and agree with the resident,s note as stated above         Results for ROULA MARIE (MRN [id number]) as of 7/11/2018 13:14   Ref. Range 6/22/2018 08:46   WBC Latest Ref Range: 3.90 - 12.70 K/uL 4.76   RBC Latest Ref Range: 4.00 - 5.40 M/uL 3.43 (L)   Hemoglobin Latest Ref Range: 12.0 - 16.0 g/dL 10.8 (L)   Hematocrit Latest Ref Range: 37.0 - 48.5 % 32.7 (L)   MCV Latest Ref Range: 82 - 98 fL 95   MCH Latest Ref Range: 27.0 - 31.0 pg 31.5 (H)   MCHC Latest Ref Range: 32.0 - 36.0 g/dL 33.0   RDW Latest Ref Range: 11.5 - 14.5 % 14.3   Platelets Latest Ref Range: 150 - 350 K/uL 221   MPV Latest Ref Range: 9.2 - 12.9 fL 10.9   Gran% Latest Ref Range: 38.0 - 73.0 % 63.7   Gran # (ANC) Latest Ref Range: 1.8 - 7.7 K/uL 3.0   Immature Granulocytes Latest Ref Range: 0.0 - 0.5 % 0.8 (H)   Immature Grans (Abs) Latest Ref Range: 0.00 - 0.04 K/uL 0.04   Lymph% Latest Ref Range: 18.0 - 48.0 % 17.9 (L)   Lymph # Latest Ref Range: 1.0 - 4.8 K/uL 0.9 (L)   Mono% Latest Ref Range: 4.0 - 15.0 % 14.1   Mono # Latest Ref Range: 0.3 - 1.0 K/uL 0.7   Eosinophil% Latest Ref Range: 0.0 - 8.0 % 2.9   Eos # Latest Ref Range: 0.0 - 0.5 K/uL 0.1   Basophil% Latest Ref Range: 0.0 - 1.9 % 0.6   Baso # Latest Ref Range: 0.00 - 0.20 K/uL 0.03   nRBC Latest Ref Range: 0 /100 WBC 0   Sed Rate Latest Ref Range: 0 - 20 mm/Hr 40 (H)   Sodium Latest Ref Range: 136 - 145 mmol/L 135 (L)   Potassium Latest Ref Range: 3.5 - 5.1 mmol/L 4.3   Chloride Latest Ref Range: 95 - 110 mmol/L 101   CO2 Latest Ref Range: 23 - 29 mmol/L 26   Anion Gap Latest Ref Range: 8 - 16 mmol/L 8   BUN, Bld Latest Ref Range: 8 - 23 mg/dL 18   Creatinine Latest Ref Range: 0.5 - 1.4 mg/dL 0.8   eGFR if non African American Latest Ref Range: >60 mL/min/1.73 m^2 >60.0   eGFR if African American Latest Ref Range: >60 mL/min/1.73 m^2 >60.0   Glucose Latest Ref Range: 70 - 110 mg/dL 98   Calcium Latest Ref Range: 8.7 - 10.5  mg/dL 8.9   Alkaline Phosphatase Latest Ref Range: 55 - 135 U/L 65   Total Protein Latest Ref Range: 6.0 - 8.4 g/dL 6.6   Albumin Latest Ref Range: 3.5 - 5.2 g/dL 3.4 (L)   Total Bilirubin Latest Ref Range: 0.1 - 1.0 mg/dL 0.7   AST Latest Ref Range: 10 - 40 U/L 21   ALT Latest Ref Range: 10 - 44 U/L 21   CRP Latest Ref Range: 0.0 - 8.2 mg/L 12.7 (H)   Triglycerides Latest Ref Range: 30 - 150 mg/dL 48   Cholesterol Latest Ref Range: 120 - 199 mg/dL 132   HDL Latest Ref Range: 40 - 75 mg/dL 66   LDL Cholesterol Latest Ref Range: 63.0 - 159.0 mg/dL 56.4 (L)   Total Cholesterol/HDL Ratio Latest Ref Range: 2.0 - 5.0  2.0   TSH Latest Ref Range: 0.400 - 4.000 uIU/mL 2.038             RA RF+ ACPA+:   TJ 2  SJ 1   Global 50 ESR 40 CRP 12.7 DAS28 4.35  ULQ95-SCG 3.67(MDA)    * After verbal consent and cleansing with Chloraprep the right knee  injected with Kenalog 40mg with 1 ml 1 % lidocaine via the lateral suprapatellar bursa. Patient tolerated procedure well.  *Tdap, Shingrix  Cont methotrexate 25mg po once a week with folic acid 1mg daily  Add hydroxychloroquine 200mg daily, baseline Ophthalmology(Dr. Duval_  No transportation for PT  Ref to Podiatry  RTC 3 months with standing labs

## 2018-07-12 RX ORDER — ATORVASTATIN CALCIUM 20 MG/1
TABLET, FILM COATED ORAL
Qty: 90 TABLET | Refills: 3 | Status: SHIPPED | OUTPATIENT
Start: 2018-07-12

## 2018-07-16 ENCOUNTER — TELEPHONE (OUTPATIENT)
Dept: ELECTROPHYSIOLOGY | Facility: CLINIC | Age: 83
End: 2018-07-16

## 2018-07-16 DIAGNOSIS — I10 HYPERTENSION, ESSENTIAL: ICD-10-CM

## 2018-07-16 RX ORDER — CARVEDILOL 3.12 MG/1
TABLET ORAL
Qty: 60 TABLET | Refills: 0 | Status: SHIPPED | OUTPATIENT
Start: 2018-07-16 | End: 2018-08-13 | Stop reason: SDUPTHER

## 2018-07-19 ENCOUNTER — TELEPHONE (OUTPATIENT)
Dept: ELECTROPHYSIOLOGY | Facility: CLINIC | Age: 83
End: 2018-07-19

## 2018-07-19 RX ORDER — LISINOPRIL 40 MG/1
TABLET ORAL
Qty: 90 TABLET | Refills: 3 | Status: ON HOLD | OUTPATIENT
Start: 2018-07-19 | End: 2018-09-25 | Stop reason: HOSPADM

## 2018-07-19 NOTE — TELEPHONE ENCOUNTER
I spoke with patient this morning and reminded her to send her [employer] remote pacemaker transmission. She informed me that she spoke with John at [employer] and was informed that she needs a new monitor and it will be sent to her.

## 2018-07-27 ENCOUNTER — TELEPHONE (OUTPATIENT)
Dept: RHEUMATOLOGY | Facility: CLINIC | Age: 83
End: 2018-07-27

## 2018-07-27 NOTE — TELEPHONE ENCOUNTER
[facility]ry Duval 7/18/18:    No Plaquenil toxicity f/u 6 months

## 2018-07-30 ENCOUNTER — TELEPHONE (OUTPATIENT)
Dept: RHEUMATOLOGY | Facility: CLINIC | Age: 83
End: 2018-07-30

## 2018-07-30 ENCOUNTER — OFFICE VISIT (OUTPATIENT)
Dept: CARDIOLOGY | Facility: CLINIC | Age: 83
End: 2018-07-30
Payer: MEDICARE

## 2018-07-30 ENCOUNTER — HOSPITAL ENCOUNTER (OUTPATIENT)
Dept: RADIOLOGY | Facility: HOSPITAL | Age: 83
Discharge: HOME | End: 2018-07-30
Attending: STUDENT IN AN ORGANIZED HEALTH CARE EDUCATION/TRAINING PROGRAM
Payer: MEDICARE

## 2018-07-30 VITALS
WEIGHT: 126.44 LBS | BODY MASS INDEX: 23.27 KG/M2 | HEIGHT: 62 IN | SYSTOLIC BLOOD PRESSURE: 166 MMHG | DIASTOLIC BLOOD PRESSURE: 73 MMHG | HEART RATE: 67 BPM

## 2018-07-30 DIAGNOSIS — E78.00 PURE HYPERCHOLESTEROLEMIA: ICD-10-CM

## 2018-07-30 DIAGNOSIS — D63.8 ANEMIA OF CHRONIC DISEASE: ICD-10-CM

## 2018-07-30 DIAGNOSIS — I10 ESSENTIAL HYPERTENSION: Primary | ICD-10-CM

## 2018-07-30 DIAGNOSIS — I45.10 RBBB: ICD-10-CM

## 2018-07-30 DIAGNOSIS — M20.12 HALLUX ABDUCTO VALGUS, BILATERAL: ICD-10-CM

## 2018-07-30 DIAGNOSIS — I49.3 PVC (PREMATURE VENTRICULAR CONTRACTION): ICD-10-CM

## 2018-07-30 DIAGNOSIS — E87.1 HYPONATREMIA: ICD-10-CM

## 2018-07-30 DIAGNOSIS — M20.11 HALLUX ABDUCTO VALGUS, BILATERAL: ICD-10-CM

## 2018-07-30 DIAGNOSIS — I50.32 CHRONIC DIASTOLIC CHF (CONGESTIVE HEART FAILURE): ICD-10-CM

## 2018-07-30 DIAGNOSIS — I49.8 ATRIAL ARRHYTHMIA: ICD-10-CM

## 2018-07-30 DIAGNOSIS — Q21.12 PFO (PATENT FORAMEN OVALE): ICD-10-CM

## 2018-07-30 DIAGNOSIS — I44.4 LAFB (LEFT ANTERIOR FASCICULAR BLOCK): ICD-10-CM

## 2018-07-30 DIAGNOSIS — M05.79 RHEUMATOID ARTHRITIS INVOLVING MULTIPLE SITES WITH POSITIVE RHEUMATOID FACTOR: ICD-10-CM

## 2018-07-30 PROCEDURE — 73620 X-RAY EXAM OF FOOT: CPT | Mod: TC,50,PO,LT

## 2018-07-30 PROCEDURE — 99214 OFFICE O/P EST MOD 30 MIN: CPT | Mod: S$PBB,,, | Performed by: INTERNAL MEDICINE

## 2018-07-30 PROCEDURE — 99999 PR PBB SHADOW E&M-EST. PATIENT-LVL III: CPT | Mod: PBBFAC,,, | Performed by: INTERNAL MEDICINE

## 2018-07-30 PROCEDURE — 73620 X-RAY EXAM OF FOOT: CPT | Mod: 26,50,, | Performed by: RADIOLOGY

## 2018-07-30 PROCEDURE — 99213 OFFICE O/P EST LOW 20 MIN: CPT | Mod: PBBFAC,25,PO | Performed by: INTERNAL MEDICINE

## 2018-07-30 NOTE — PROGRESS NOTES
"Subjective:   Patient ID:  Karly Sandoval is a 88 y.o. female who presents for follow-up of Hypertension      HPI: Doing well. No new complaints except or chronic fatigue and RA symptoms.  BP is elevated today    Lab Results   Component Value Date     (L) 06/22/2018    K 4.3 06/22/2018     06/22/2018    CO2 26 06/22/2018    BUN 18 06/22/2018    CREATININE 0.8 06/22/2018    GLU 98 06/22/2018    HGBA1C 6.0 01/27/2014    MG 1.8 10/04/2014    AST 21 06/22/2018    ALT 21 06/22/2018    ALBUMIN 3.4 (L) 06/22/2018    PROT 6.6 06/22/2018    BILITOT 0.7 06/22/2018    WBC 4.76 06/22/2018    HGB 10.8 (L) 06/22/2018    HCT 32.7 (L) 06/22/2018    MCV 95 06/22/2018     06/22/2018    INR 1.0 11/21/2014    TSH 2.038 06/22/2018    CHOL 132 06/22/2018    HDL 66 06/22/2018    LDLCALC 56.4 (L) 06/22/2018    TRIG 48 06/22/2018       Review of Systems   Constitution: Positive for malaise/fatigue.   HENT: Negative.    Eyes: Negative.    Cardiovascular: Positive for leg swelling. Negative for chest pain, claudication, dyspnea on exertion, irregular heartbeat, near-syncope, palpitations and syncope.   Respiratory: Positive for cough and sputum production. Negative for shortness of breath, snoring and wheezing.    Endocrine: Negative.  Negative for cold intolerance, heat intolerance, polydipsia, polyphagia and polyuria.   Skin: Negative.    Musculoskeletal: Positive for arthritis.   Gastrointestinal: Positive for change in bowel habit.   Genitourinary: Negative.    Neurological: Negative.    Psychiatric/Behavioral: Positive for depression.       Objective:   Physical Exam   Constitutional: She is oriented to person, place, and time. She appears well-developed and well-nourished.   BP (!) 166/73   Pulse 67   Ht 5' 2" (1.575 m)   Wt 57.3 kg (126 lb 6.9 oz)   BMI 23.13 kg/m²      HENT:   Head: Normocephalic.   Eyes: Pupils are equal, round, and reactive to light.   Neck: Normal range of motion. Neck supple. Carotid " bruit is not present. No thyromegaly present.   Cardiovascular: Normal rate, regular rhythm and intact distal pulses.  Exam reveals no gallop and no friction rub.    Murmur heard.   Early systolic murmur is present with a grade of 1/6  at the upper right sternal border  Pulses:       Carotid pulses are 2+ on the right side, and 2+ on the left side.       Radial pulses are 2+ on the right side, and 2+ on the left side.        Femoral pulses are 2+ on the right side, and 2+ on the left side.       Popliteal pulses are 2+ on the right side, and 2+ on the left side.        Dorsalis pedis pulses are 2+ on the right side, and 2+ on the left side.        Posterior tibial pulses are 2+ on the right side, and 2+ on the left side.   Pulmonary/Chest: Effort normal and breath sounds normal. No respiratory distress. She has no wheezes. She has no rales. She exhibits no tenderness.   Abdominal: Soft. Bowel sounds are normal.   Musculoskeletal: Normal range of motion. She exhibits no edema.   Neurological: She is alert and oriented to person, place, and time.   Skin: Skin is warm and dry.   Psychiatric: She has a normal mood and affect.   Nursing note and vitals reviewed.        Assessment and Plan:     Problem List Items Addressed This Visit        Cardiology Problems    Hypertension - Primary    Hyperlipidemia    Atrial arrhythmia    Chronic diastolic CHF (congestive heart failure)    PFO (patent foramen ovale)    PVC (premature ventricular contraction)    RBBB    LAFB (left anterior fascicular block)       Other    RA (rheumatoid arthritis)    Anemia of chronic disease    Hyponatremia          Patient's Medications   New Prescriptions    No medications on file   Previous Medications    ALPRAZOLAM (XANAX) 0.25 MG TABLET    TAKE 1 TABLET BY MOUTH EVERY DAY AS NEEDED    AMLODIPINE (NORVASC) 10 MG TABLET    1/2 pill  PO once a day    ASPIRIN (ECOTRIN) 81 MG EC TABLET    Take 1 tablet (81 mg total) by mouth once daily. HOLD until  cleared by your ENT doctor and your Neurologist.    ATORVASTATIN (LIPITOR) 20 MG TABLET    TAKE 1 TABLET BY MOUTH EVERY DAY    CARVEDILOL (COREG) 3.125 MG TABLET    TAKE 1 TABLET(3.125 MG) BY MOUTH TWICE DAILY WITH MEALS    COENZYME Q10 (CO Q-10) 100 MG CAPSULE    Take 100 mg by mouth once daily.    DENOSUMAB (PROLIA) 60 MG/ML SYRG    Inject 60 mg into the skin. Every 6 months    DICLOFENAC SODIUM (VOLTAREN) 1 % GEL    Apply 2 g topically 2 (two) times daily. Use gloves to apply    FISH OIL-OMEGA-3 FATTY ACIDS 300-1,000 MG CAPSULE    Take 1,000 mg by mouth once daily.     FOLIC ACID (FOLVITE) 1 MG TABLET    TAKE 1 TABLET (1 MG TOTAL) BY MOUTH ONCE DAILY.    FUROSEMIDE (LASIX) 20 MG TABLET    Take 1 tablet (20 mg total) by mouth once daily.    HYDROXYCHLOROQUINE (PLAQUENIL) 200 MG TABLET    Take 1 tablet (200 mg total) by mouth once daily.    LISINOPRIL (PRINIVIL,ZESTRIL) 40 MG TABLET    TAKE 1 TABLET BY MOUTH EVERY DAY    METHOTREXATE 2.5 MG TAB    TAKE 10 TABLETS (25 MG TOTAL) BY MOUTH EVERY 7 DAYS.    RANITIDINE (ZANTAC) 150 MG TABLET    TAKE ONE TABLET BY MOUTH TWICE DAILY    SERTRALINE (ZOLOFT) 25 MG TABLET    Take 1 tablet (25 mg total) by mouth once daily.    VITAMIN B COMPLEX ORAL    Take 400 mg by mouth once daily. Pt stated that she does not take vitamin b everyday.    VITAMIN E 400 UNIT CAPSULE    Take 400 Units by mouth once daily. Pt taking PRN   Modified Medications    No medications on file   Discontinued Medications    No medications on file   May increase Amlodipine to 10 mg daily.  Can decrease Lasix to biweeekly    Follow-up in about 6 months (around 1/30/2019).

## 2018-07-30 NOTE — TELEPHONE ENCOUNTER
"Spoke to the patient and confirmed that the "foot x-rays show bunion type deformities, degenerative changes, thin bones and hammertoes. No definite evidence of stress fracture. Would consult with Podiatry as scheduled. RJQ" Patient verbalized understanding    Message from Isai Smith MD sent at 7/30/2018 12:40 PM CDT -----  Please tell pt the foot x-rays show bunion type deformities, degenerative changes, thin bones and hammertoes. No definite evidence of stress fracture. Would consult with Podiatry as scheduled. RJQ    "

## 2018-07-31 ENCOUNTER — EXTERNAL CHRONIC CARE MANAGEMENT (OUTPATIENT)
Dept: PRIMARY CARE CLINIC | Facility: CLINIC | Age: 83
End: 2018-07-31
Payer: MEDICARE

## 2018-07-31 PROCEDURE — 99490 CHRNC CARE MGMT STAFF 1ST 20: CPT | Mod: PBBFAC,PO | Performed by: FAMILY MEDICINE

## 2018-07-31 PROCEDURE — 99490 CHRNC CARE MGMT STAFF 1ST 20: CPT | Mod: S$PBB,,, | Performed by: FAMILY MEDICINE

## 2018-08-01 ENCOUNTER — OFFICE VISIT (OUTPATIENT)
Dept: PODIATRY | Facility: CLINIC | Age: 83
End: 2018-08-01
Payer: MEDICARE

## 2018-08-01 VITALS
BODY MASS INDEX: 23.19 KG/M2 | SYSTOLIC BLOOD PRESSURE: 153 MMHG | DIASTOLIC BLOOD PRESSURE: 64 MMHG | WEIGHT: 126 LBS | HEART RATE: 60 BPM | HEIGHT: 62 IN

## 2018-08-01 DIAGNOSIS — M79.672 LEFT FOOT PAIN: ICD-10-CM

## 2018-08-01 DIAGNOSIS — B35.1 ONYCHOMYCOSIS DUE TO DERMATOPHYTE: Primary | ICD-10-CM

## 2018-08-01 PROCEDURE — 99203 OFFICE O/P NEW LOW 30 MIN: CPT | Mod: S$PBB,,, | Performed by: PODIATRIST

## 2018-08-01 PROCEDURE — 99213 OFFICE O/P EST LOW 20 MIN: CPT | Mod: PBBFAC,PO | Performed by: PODIATRIST

## 2018-08-01 PROCEDURE — 99999 PR PBB SHADOW E&M-EST. PATIENT-LVL III: CPT | Mod: PBBFAC,,, | Performed by: PODIATRIST

## 2018-08-01 NOTE — LETTER
August 13, 2018      Bernabe Huber, DO  [address]  [facility] [address]           [facility]  [address] [facility] [address]  Phone: [phone]          Patient: Karly Sandoval   MR Number: [id number]   YOB: 1930   Date of Visit: 8/1/2018       Dear Dr. Bernabe Huber:    Thank you for referring Karly Sandoval to me for evaluation. Attached you will find relevant portions of my assessment and plan of care.    If you have questions, please do not hesitate to call me. I look forward to following Karly Sandoval along with you.    Sincerely,    Elder Rivera, AMRIT    Enclosure  CC:  No Recipients    If you would like to receive this communication electronically, please contact [email or web address] or [phone] to request more information on [employer] access.    For providers and/or their staff who would like to refer a patient to [facility], please contact us through our one-stop-shop provider referral line, [facility], at [phone].    If you feel you have received this communication in error or would no longer like to receive these types of communications, please e-mail [email or web address]

## 2018-08-03 ENCOUNTER — TELEPHONE (OUTPATIENT)
Dept: ELECTROPHYSIOLOGY | Facility: CLINIC | Age: 83
End: 2018-08-03

## 2018-08-03 NOTE — TELEPHONE ENCOUNTER
Ms Roach called w/ questions of how to connect her new JUDITH/ [employer] monitor. I advised I would have the Diagnostic Tech return her call. Patient verbalized understanding.

## 2018-08-03 NOTE — TELEPHONE ENCOUNTER
----- Message from Alaina Ritteramosjordana sent at 8/3/2018  1:35 PM CDT -----  Contact: Patient  The Pt is calling to speak with you about her device. Please call her back @ [phone]. Thanks, Alaina       I spoke with patient. She is confused on how to connect her remote home monitor. I gave her the number to [employer] to call and connect. She will do this then call me back.

## 2018-08-13 ENCOUNTER — TELEPHONE (OUTPATIENT)
Dept: CARDIOLOGY | Facility: CLINIC | Age: 83
End: 2018-08-13

## 2018-08-13 ENCOUNTER — TELEPHONE (OUTPATIENT)
Dept: FAMILY MEDICINE | Facility: CLINIC | Age: 83
End: 2018-08-13

## 2018-08-13 DIAGNOSIS — I10 HYPERTENSION, ESSENTIAL: Primary | ICD-10-CM

## 2018-08-13 RX ORDER — CARVEDILOL 6.25 MG/1
6.25 TABLET ORAL 2 TIMES DAILY WITH MEALS
Qty: 60 TABLET | Refills: 6 | Status: SHIPPED | OUTPATIENT
Start: 2018-08-13

## 2018-08-13 RX ORDER — AMLODIPINE BESYLATE 10 MG/1
TABLET ORAL
Qty: 90 TABLET | Refills: 3 | Status: ON HOLD | OUTPATIENT
Start: 2018-08-13 | End: 2018-09-25 | Stop reason: HOSPADM

## 2018-08-13 RX ORDER — CARVEDILOL 6.25 MG/1
6.25 TABLET ORAL 2 TIMES DAILY WITH MEALS
COMMUNITY
End: 2018-08-13 | Stop reason: SDUPTHER

## 2018-08-13 NOTE — TELEPHONE ENCOUNTER
Patient states that when she went to her Cardiologist, they advised her to take 1 whole tablet of amlodipine (Norvasc).  She doesn't like the medication and is wanting to change her medication.  I advised patient that if she is having any adverse symptoms, to contact Cardiology due to increase in medication.  Patient verbalized understanding.

## 2018-08-13 NOTE — PROGRESS NOTES
Subjective:      Patient ID: Karly Sandoval is a 88 y.o. female.    Chief Complaint: Plantar Warts (2nd left metatarsal)    Karly is a 88 y.o. female who presents to the clinic complaining of thick and discolored toenails on both feet. Karly is inquiring about treatment options.      Review of Systems   Constitution: Negative for chills, fever and malaise/fatigue.   HENT: Negative for hearing loss.    Cardiovascular: Positive for leg swelling. Negative for claudication.   Respiratory: Negative for shortness of breath.    Skin: Positive for color change, dry skin and nail changes. Negative for flushing and rash.   Musculoskeletal: Positive for arthritis, joint pain and joint swelling. Negative for myalgias.   Neurological: Negative for loss of balance, numbness, paresthesias and sensory change.   Psychiatric/Behavioral: Negative for altered mental status.           Objective:      Physical Exam   Constitutional: She appears well-developed and well-nourished. She is cooperative.   Cardiovascular:   Pulses:       Dorsalis pedis pulses are 2+ on the right side, and 2+ on the left side.        Posterior tibial pulses are 2+ on the right side, and 2+ on the left side.   +1 LE edema noted b/L   Musculoskeletal:        Right ankle: She exhibits decreased range of motion and abnormal pulse. Achilles tendon exhibits normal Foster's test results.        Left ankle: She exhibits decreased range of motion and abnormal pulse. Achilles tendon exhibits normal Foster's test results.        Right foot: There is decreased range of motion.        Left foot: There is decreased range of motion.   Decreased ROM with out joint pain nor crepitation to bilateral feet and ankle joints.  Muscle strength 4/5 in all groups bilaterally  Hammertoes noted, digits 2-5 b/L, rigid with adductovarus rotation of b/L  fifth digit  Left 2nd digit cock up deformity noted.   Hallux valgus deformity noted b/L       Feet:   Right Foot:   Protective  Sensation: 5 sites tested. 2 sites sensed.   Left Foot:   Protective Sensation: 5 sites tested. 2 sites sensed.   Neurological: She is alert.   intact sensation noted to b/L lower extremities   Skin: Skin is dry. Capillary refill takes more than 3 seconds.   Nails x10 are elongated by  4-5mm's, thickened by 3-4 mm's, dystrophic, and are yellowish in  coloration . Xerosis Bilaterally. No open lesions noted.   HKL's noted to distal 2nd digit and sub 2nd met b/L   Psychiatric: Her behavior is normal.   Vitals reviewed.            Assessment:       Encounter Diagnoses   Name Primary?    Onychomycosis due to dermatophyte Yes    Left foot pain          Plan:       Karly was seen today for plantar warts.    Diagnoses and all orders for this visit:    Onychomycosis due to dermatophyte    Left foot pain      I counseled the patient on her conditions, their implications and medical management.        - Shoe inspection. Patient instructed on proper foot hygeine. We discussed wearing proper shoe gear, daily foot inspections, never walking without protective shoe gear, never putting sharp instruments to feet, routine podiatric nail visits every 2-3 months.      - With patient's permission, nails were aggressively reduced and debrided x 10 to their soft tissue attachment mechanically and with electric , removing all offending nail and debris. Patient relates relief following the procedure. She will continue to monitor the areas daily, inspect her feet, wear protective shoe gear when ambulatory, moisturizer to maintain skin integrity and follow in this office in approximately 2-3 months, sooner p.r.n.  After cleansing with an alcohol prep pad, the about mentioned hyperkeratotic lesions were sharply debrided X 3 utilizing a #15 blade to a smooth base without incident. Pt tolerated the procedure well and reported comfort to the debarment sites. Pt will continue to use padding and moisture the callused areas.   Nail care not  covered, pt can RTC as Proc-b prn.

## 2018-08-13 NOTE — TELEPHONE ENCOUNTER
Pt called stating that she increased Amlodoine to 10mg once a day for the past 8 days. Pt stated that her b/p has been in 160's-170's/70's w/her increasing the dosage. Pt would like to know if she should continue to take Amlodipine 10mg or if she should be on another medication since her b/p is high. Pt stated that she has been eating a little salt. Please advise.

## 2018-08-16 DIAGNOSIS — I10 HYPERTENSION, ESSENTIAL: ICD-10-CM

## 2018-08-16 RX ORDER — CARVEDILOL 3.12 MG/1
TABLET ORAL
Qty: 60 TABLET | Refills: 0 | Status: SHIPPED | OUTPATIENT
Start: 2018-08-16 | End: 2018-09-17 | Stop reason: DRUGHIGH

## 2018-08-17 ENCOUNTER — TELEPHONE (OUTPATIENT)
Dept: ELECTROPHYSIOLOGY | Facility: CLINIC | Age: 83
End: 2018-08-17

## 2018-08-17 NOTE — TELEPHONE ENCOUNTER
----- Message from Cherie Berrios sent at 8/17/2018 11:41 AM CDT -----  Contact: Pt called   Pt has been having problems with her device pacemaker. Please call pt @ [phone]. Thank you.

## 2018-08-21 ENCOUNTER — CLINICAL SUPPORT (OUTPATIENT)
Dept: ELECTROPHYSIOLOGY | Facility: CLINIC | Age: 83
End: 2018-08-21
Attending: INTERNAL MEDICINE
Payer: MEDICARE

## 2018-08-21 DIAGNOSIS — I44.4 LAFB (LEFT ANTERIOR FASCICULAR BLOCK): ICD-10-CM

## 2018-08-21 DIAGNOSIS — I45.10 RBBB: ICD-10-CM

## 2018-08-21 DIAGNOSIS — Z95.0 PACEMAKER: ICD-10-CM

## 2018-08-21 DIAGNOSIS — Q21.12 PFO (PATENT FORAMEN OVALE): ICD-10-CM

## 2018-08-21 DIAGNOSIS — I49.3 PVC (PREMATURE VENTRICULAR CONTRACTION): ICD-10-CM

## 2018-08-21 DIAGNOSIS — I50.32 CHRONIC DIASTOLIC CHF (CONGESTIVE HEART FAILURE): ICD-10-CM

## 2018-08-21 PROCEDURE — 93296 REM INTERROG EVL PM/IDS: CPT | Mod: PBBFAC | Performed by: INTERNAL MEDICINE

## 2018-08-21 PROCEDURE — 93294 REM INTERROG EVL PM/LDLS PM: CPT | Mod: ,,, | Performed by: INTERNAL MEDICINE

## 2018-08-24 RX ORDER — ALPRAZOLAM 0.25 MG/1
TABLET ORAL
Qty: 30 TABLET | Refills: 1 | Status: SHIPPED | OUTPATIENT
Start: 2018-08-24

## 2018-08-27 ENCOUNTER — TELEPHONE (OUTPATIENT)
Dept: CARDIOLOGY | Facility: CLINIC | Age: 83
End: 2018-08-27

## 2018-08-27 NOTE — TELEPHONE ENCOUNTER
Pt's son, Rene notified. Rene verbalized understanding. Rene checked pt's b/p while I was on the phone w/him and stated that it was 154/64 P61.

## 2018-08-27 NOTE — TELEPHONE ENCOUNTER
Pt called stating that she is confused, dizzy, and a fullness/heaviness in her head. Pt stated that her b/p has been high and was unable to tell me what her readings are. I called pt's son, Rene because pt stated that she is confused and she sounds confused over the phone. I advised Rene of what was going on w/pt and he stated that he will be at her house to check on her in 15minutes. I advised pt that her son was on his way to check on her, pt verbalized understanding. Pt was able to check her b/p while I was on the phone and it was 170/81 p72. Pt stated that she took Amlodipine at 7am, Carvedilol and Lisinopril at 7am. Pt stated that she did have salt w/her breakfast this morning. Pt stated that she is worried about her b/p being high and wonders if it is making her feel this way. I advised pt to go to the ER if she feels like she needs immediate attention, verbalized understanding. Please advise.     I agree, it would be best to be checked out in ER.

## 2018-08-31 ENCOUNTER — EXTERNAL CHRONIC CARE MANAGEMENT (OUTPATIENT)
Dept: PRIMARY CARE CLINIC | Facility: CLINIC | Age: 83
End: 2018-08-31
Payer: MEDICARE

## 2018-08-31 ENCOUNTER — TELEPHONE (OUTPATIENT)
Dept: CARDIOLOGY | Facility: CLINIC | Age: 83
End: 2018-08-31

## 2018-08-31 PROCEDURE — 99490 CHRNC CARE MGMT STAFF 1ST 20: CPT | Mod: S$PBB,,, | Performed by: FAMILY MEDICINE

## 2018-08-31 PROCEDURE — 99490 CHRNC CARE MGMT STAFF 1ST 20: CPT | Mod: PBBFAC,PO | Performed by: FAMILY MEDICINE

## 2018-08-31 NOTE — TELEPHONE ENCOUNTER
----- Message from Gloria Thakur MA sent at 8/31/2018  9:00 AM CDT -----  Contact: self  Pt. Is calling about meds-Norvasc and Coreg. Thinks that meds are not working b/p. Please call. Last visit 7/30/18.

## 2018-08-31 NOTE — TELEPHONE ENCOUNTER
Pt called stating that her b/p has still been elevated. I spoke w/Dr. Griffin regarding pt's readings and he stated that pt could take1/2 of Carvedilol 6.25mg twice a day with her Carvedilol 6.25mg until she gets instructions from you.  Pt stated that she has Carvedilol 3.125mg at home and will take them in addition to the 6.25mg. Please advise.   Pt reports b/p as:  8/30/18 161/76P? AFTER MEDS  8/30/18 159/64 P?  8/30/18 150/68 P62    8/31/18 151/62 P71 8:30AM 30 MINUTES AFTER MEDS

## 2018-09-05 ENCOUNTER — TELEPHONE (OUTPATIENT)
Dept: CARDIOLOGY | Facility: CLINIC | Age: 83
End: 2018-09-05

## 2018-09-05 DIAGNOSIS — I10 ESSENTIAL HYPERTENSION: ICD-10-CM

## 2018-09-05 DIAGNOSIS — I50.32 CHRONIC DIASTOLIC CHF (CONGESTIVE HEART FAILURE): Primary | ICD-10-CM

## 2018-09-05 NOTE — TELEPHONE ENCOUNTER
Pt called stating that her b/p this morning an hour after taking her medications was 150/63 P66. Pt also stated that she has edema in her feet and ankles that has gotten better since last week. Pt denies eating salt.  Pt stated that she is nervous and worried about her b/p and edema and wants to know if she should be seen. Please advise.

## 2018-09-05 NOTE — TELEPHONE ENCOUNTER
Pt notified, verbalized understanding. Per Dr. Muñoz pt to take Furosemide 20mg daily. Pt notified, verbalized understanding.

## 2018-09-13 ENCOUNTER — LAB VISIT (OUTPATIENT)
Dept: LAB | Facility: HOSPITAL | Age: 83
End: 2018-09-13
Attending: INTERNAL MEDICINE
Payer: MEDICARE

## 2018-09-13 DIAGNOSIS — I10 ESSENTIAL HYPERTENSION: ICD-10-CM

## 2018-09-13 DIAGNOSIS — I50.32 CHRONIC DIASTOLIC CHF (CONGESTIVE HEART FAILURE): ICD-10-CM

## 2018-09-13 LAB
ANION GAP SERPL CALC-SCNC: 7 MMOL/L
BNP SERPL-MCNC: 127 PG/ML
BUN SERPL-MCNC: 21 MG/DL
CALCIUM SERPL-MCNC: 8.9 MG/DL
CHLORIDE SERPL-SCNC: 96 MMOL/L
CO2 SERPL-SCNC: 25 MMOL/L
CREAT SERPL-MCNC: 0.9 MG/DL
EST. GFR  (AFRICAN AMERICAN): >60 ML/MIN/1.73 M^2
EST. GFR  (NON AFRICAN AMERICAN): 57.3 ML/MIN/1.73 M^2
GLUCOSE SERPL-MCNC: 105 MG/DL
POTASSIUM SERPL-SCNC: 4.6 MMOL/L
SODIUM SERPL-SCNC: 128 MMOL/L

## 2018-09-13 PROCEDURE — 36415 COLL VENOUS BLD VENIPUNCTURE: CPT | Mod: PO

## 2018-09-13 PROCEDURE — 80048 BASIC METABOLIC PNL TOTAL CA: CPT

## 2018-09-13 PROCEDURE — 83880 ASSAY OF NATRIURETIC PEPTIDE: CPT

## 2018-09-14 ENCOUNTER — TELEPHONE (OUTPATIENT)
Dept: CARDIOLOGY | Facility: CLINIC | Age: 83
End: 2018-09-14

## 2018-09-14 NOTE — TELEPHONE ENCOUNTER
----- Message from Briseida Muñoz MD sent at 9/14/2018  8:22 AM CDT -----  Please inform the patient that blood work shows low sodium.  This is due to increased dose of Lasix.  She should go back to Lasix 2 x week (M-F) and restrict water intake to 6 glasses a day.  We should repeat BMP in 1 week after that

## 2018-09-16 NOTE — PROGRESS NOTES
Cardiology Clinic Note  Reason for Visit: 2 month f/u     HPI:     Karly Sandoval is a 88 y.o. F with HTN, HLD, bifascicular block (RBBB, LAFB) and unexplained syncope with wide QRS s/p Hieu dcPPM, HFpEF, PFO, PVCs, and rheumatoid arthritis, who presents for follow up. She was last seen by Dr Muñoz on 7/30/18.    She reports swelling in her bilateral lower extremities for the past few weeks. She was increased to lasix daily with some improvement in edema but not resolution. Labs showed hyponatremia, so lasix was decreased back to twice per week. She denies dyspnea, orthopnea, and PND. She continues to report a large amount of swelling in her legs. She also reports that her BP is elevated today.    Prior cardiovascular issues:  None    ROS:    Constitution: Negative for fever or chills.  HENT: Negative for  headaches.  Eyes: Negative for blurred vision.   Cardiovascular: See above  Pulmonary: Negative for SOB. Negative for cough.   Gastrointestinal: Negative for nausea/vomiting.   : Negative for dysuria.   Skin: Negative for rashes.  Neurological: Negative for focal weakness.  Psychological: Negative for depression.    Medications:     Current Outpatient Medications on File Prior to Visit   Medication Sig Dispense Refill    ALPRAZolam (XANAX) 0.25 MG tablet TAKE 1 TABLET BY MOUTH EVERY DAY AS NEEDED 30 tablet 1    amLODIPine (NORVASC) 10 MG tablet Pt to take one pill once a day 90 tablet 3    aspirin (ECOTRIN) 81 MG EC tablet Take 1 tablet (81 mg total) by mouth once daily. HOLD until cleared by your ENT doctor and your Neurologist.  0    atorvastatin (LIPITOR) 20 MG tablet TAKE 1 TABLET BY MOUTH EVERY DAY 90 tablet 3    carvedilol (COREG) 3.125 MG tablet TAKE 1 TABLET(3.125 MG) BY MOUTH TWICE DAILY WITH MEALS 60 tablet 0    carvedilol (COREG) 6.25 MG tablet Take 1 tablet (6.25 mg total) by mouth 2 (two) times daily with meals. 60 tablet 6    coenzyme Q10 (CO Q-10) 100 mg capsule Take 100 mg by  "mouth once daily.      denosumab (PROLIA) 60 mg/mL Syrg Inject 60 mg into the skin. Every 6 months      diclofenac sodium (VOLTAREN) 1 % Gel Apply 2 g topically 2 (two) times daily. Use gloves to apply (Patient taking differently: Apply 2 g topically 2 (two) times daily. Use gloves to apply  Pt using prn.) 100 g 1    fish oil-omega-3 fatty acids 300-1,000 mg capsule Take 1,000 mg by mouth once daily.       folic acid (FOLVITE) 1 MG tablet TAKE 1 TABLET (1 MG TOTAL) BY MOUTH ONCE DAILY. 90 tablet 3    furosemide (LASIX) 20 MG tablet Take 1 tablet (20 mg total) by mouth once daily. (Patient taking differently: Take 20 mg by mouth. 1 tablet 2 days per week per  9/14/18) 30 tablet 11    hydroxychloroquine (PLAQUENIL) 200 mg tablet Take 1 tablet (200 mg total) by mouth once daily. 30 tablet 1    lisinopril (PRINIVIL,ZESTRIL) 40 MG tablet TAKE 1 TABLET BY MOUTH EVERY DAY 90 tablet 3    methotrexate 2.5 MG Tab TAKE 10 TABLETS (25 MG TOTAL) BY MOUTH EVERY 7 DAYS. 120 tablet 0    ranitidine (ZANTAC) 150 MG tablet TAKE ONE TABLET BY MOUTH TWICE DAILY 60 tablet 6    sertraline (ZOLOFT) 25 MG tablet Take 1 tablet (25 mg total) by mouth once daily. 30 tablet 11    VITAMIN B COMPLEX ORAL Take 400 mg by mouth once daily. Pt stated that she does not take vitamin b everyday.      vitamin E 400 UNIT capsule Take 400 Units by mouth once daily. Pt taking PRN       No current facility-administered medications on file prior to visit.        Physical Exam:   BP (!) 178/77   Pulse 60   Ht 5' 2" (1.575 m)   Wt 58.7 kg (129 lb 6.6 oz)   BMI 23.67 kg/m²      Constitutional: No apparent distress, conversant  HEENT: Sclera anicteric, extraocular movements intact  Neck: Jugular venous distension to mid neck while sitting upright, no carotid bruits  CV: Regular rate and rhythm, no murmurs  Pulm: Bibasilar crackles  GI: Abdomen soft, no palpable masses  Extremities: 2+ pitting bilateral lower extremity edema, warm "   Skin: No ecchymosis, erythema, or ulcers  Psych: Alert and oriented to person place location, appropriate affect  Neuro: No focal deficits    Labs:     Blood Tests:  Lab Results   Component Value Date     (H) 2018     (L) 2018    K 4.6 2018    CL 96 2018    CO2 25 2018    BUN 21 2018    CREATININE 0.9 2018     2018    HGBA1C 6.0 2014    MG 1.8 10/04/2014    AST 21 2018    ALT 21 2018    ALBUMIN 3.4 (L) 2018    PROT 6.6 2018    BILITOT 0.7 2018    WBC 4.76 2018    HGB 10.8 (L) 2018    HCT 32.7 (L) 2018    MCV 95 2018     2018    INR 1.0 2014    TSH 2.038 2018       Lab Results   Component Value Date    CHOL 132 2018    HDL 66 2018    TRIG 48 2018       Lab Results   Component Value Date    LDLCALC 56.4 (L) 2018       Urine Tests:  Lab Results   Component Value Date    COLORU Yellow 2016    APPEARANCEUA Clear 2016    PHUR 7.0 2016    SPECGRAV 1.010 2016    PROTEINUA Negative 2016    GLUCUA Negative 2016    KETONESU Negative 2016    BILIRUBINUA Negative 2016    OCCULTUA Negative 2016    NITRITE Negative 2016    UROBILINOGEN Negative 2016    LEUKOCYTESUR 2+ (A) 2016    CREATRANDUR 57.0 2016       Imaging:     Echocardiogram  TTE 16  CONCLUSIONS     1 - Moderate left atrial enlargement.     2 - Eccentric hypertrophy.     3 - Normal left ventricular systolic function (EF 55-60%).     4 - Normal right ventricular systolic function .     5 - The estimated PA systolic pressure is 26 mmHg.     6 - Mild aortic regurgitation.     7 - Mild to moderate mitral regurgitation.     8 - Mild tricuspid regurgitation.     Stress testing  None    Cath Lab  None    Other  None    EK18 - atrial paced with 1st degree AVB, LAFB    Assessment:     1. Chronic diastolic CHF  (congestive heart failure)    2. Essential hypertension    3. Pure hypercholesterolemia    4. Atrial arrhythmia    5. Pacemaker    6. PFO (patent foramen ovale)    7. History of CVA (cerebrovascular accident)        Plan:     Chronic diastolic CHF (congestive heart failure)  Still volume overloaded and suspect hyponatremia is due to remaining volume overload. Most likely precipitants of heart failure are dietary indiscretion and uncontrolled hypertension. Continue current BP regimen with amlodipine 10mg qd, coreg 6.25mg BID, lisinopril 40mg daily. Start spironolactone for BP control. Check Cr, K in a few days. Increase lasix to daily.    Essential hypertension  BP is uncontrolled. Adding spironolactone, as above.    Pure hypercholesterolemia  On moderate intensity atorvastatin.    Bifascicular block  Wide QRS, syncope s/p pacemaker  Followed by Dr Ivet Carrasco. If volume overload persists despite control of HTN, will interrogate device.    PFO (patent foramen ovale)  On ASA.    History of CVA (cerebrovascular accident)  On ASA and statin.    Signed:  Shade Arreguin MD  Cardiology     9/17/2018 12:01 PM    Follow-up:     Future Appointments   Date Time Provider Department Center   9/17/2018 11:30 AM Jackson Arreguin III, MD [facility] CARDIO [place]   10/8/2018 11:00 AM LAB, [facility] LAB [place]   10/17/2018 10:00 AM EPO, INJECTION [facility] ID INF [facility]   10/17/2018 11:00 AM Isai Smith MD [facility] RHEUM [facility]   11/19/2018  8:00 AM HOME MONITOR DEVICE CHECK, [facility] ARRHYTH [facility]   2/19/2019  8:00 AM HOME MONITOR DEVICE CHECK, [facility] ARRHYTH [facility]

## 2018-09-17 ENCOUNTER — TELEPHONE (OUTPATIENT)
Dept: CARDIOLOGY | Facility: CLINIC | Age: 83
End: 2018-09-17

## 2018-09-17 ENCOUNTER — OFFICE VISIT (OUTPATIENT)
Dept: CARDIOLOGY | Facility: CLINIC | Age: 83
End: 2018-09-17
Payer: MEDICARE

## 2018-09-17 VITALS
SYSTOLIC BLOOD PRESSURE: 178 MMHG | HEIGHT: 62 IN | DIASTOLIC BLOOD PRESSURE: 77 MMHG | HEART RATE: 60 BPM | BODY MASS INDEX: 23.82 KG/M2 | WEIGHT: 129.44 LBS

## 2018-09-17 DIAGNOSIS — I10 ESSENTIAL HYPERTENSION: Primary | ICD-10-CM

## 2018-09-17 DIAGNOSIS — Z86.73 HISTORY OF CVA (CEREBROVASCULAR ACCIDENT): ICD-10-CM

## 2018-09-17 DIAGNOSIS — I49.8 ATRIAL ARRHYTHMIA: ICD-10-CM

## 2018-09-17 DIAGNOSIS — Q21.12 PFO (PATENT FORAMEN OVALE): ICD-10-CM

## 2018-09-17 DIAGNOSIS — I10 ESSENTIAL HYPERTENSION: ICD-10-CM

## 2018-09-17 DIAGNOSIS — Z95.0 PACEMAKER: ICD-10-CM

## 2018-09-17 DIAGNOSIS — E78.00 PURE HYPERCHOLESTEROLEMIA: ICD-10-CM

## 2018-09-17 DIAGNOSIS — I50.32 CHRONIC DIASTOLIC CHF (CONGESTIVE HEART FAILURE): Primary | ICD-10-CM

## 2018-09-17 PROCEDURE — 99214 OFFICE O/P EST MOD 30 MIN: CPT | Mod: S$PBB,,, | Performed by: INTERNAL MEDICINE

## 2018-09-17 PROCEDURE — 99999 PR PBB SHADOW E&M-EST. PATIENT-LVL III: CPT | Mod: PBBFAC,,, | Performed by: INTERNAL MEDICINE

## 2018-09-17 PROCEDURE — 99213 OFFICE O/P EST LOW 20 MIN: CPT | Mod: PBBFAC,PO | Performed by: INTERNAL MEDICINE

## 2018-09-17 RX ORDER — SPIRONOLACTONE 25 MG/1
25 TABLET ORAL DAILY
Qty: 30 TABLET | Refills: 11 | Status: ON HOLD | OUTPATIENT
Start: 2018-09-17 | End: 2018-10-10

## 2018-09-17 NOTE — TELEPHONE ENCOUNTER
----- Message from Nika Bailey MA sent at 9/17/2018  2:47 PM CDT -----  Contact: patient called  The patient would like to ask you a question about her medication Spironolactone 25 mg. Last Visit was on 9- .  Please call [phone]. Thank you.

## 2018-09-17 NOTE — TELEPHONE ENCOUNTER
Pt instructed as on how to take her new medication Spironolactone.Pt reports understanding of these instructions.

## 2018-09-21 ENCOUNTER — TELEPHONE (OUTPATIENT)
Dept: CARDIOLOGY | Facility: CLINIC | Age: 83
End: 2018-09-21

## 2018-09-21 NOTE — TELEPHONE ENCOUNTER
----- Message from Briseida Muñoz MD sent at 9/21/2018  4:57 PM CDT -----  Please let patientknow that sodium isgetting really low. SHe hsould definitely restrict fluid to 65-6 glasses day. Is she feels weak, nauseated over the weekend she should go to ER.

## 2018-09-24 ENCOUNTER — HOSPITAL ENCOUNTER (OUTPATIENT)
Facility: HOSPITAL | Age: 83
Discharge: HOME | End: 2018-09-25
Attending: EMERGENCY MEDICINE | Admitting: EMERGENCY MEDICINE
Payer: MEDICARE

## 2018-09-24 ENCOUNTER — OFFICE VISIT (OUTPATIENT)
Dept: CARDIOLOGY | Facility: CLINIC | Age: 83
End: 2018-09-24
Payer: MEDICARE

## 2018-09-24 VITALS
HEIGHT: 62 IN | DIASTOLIC BLOOD PRESSURE: 59 MMHG | HEART RATE: 66 BPM | BODY MASS INDEX: 23.22 KG/M2 | SYSTOLIC BLOOD PRESSURE: 132 MMHG | WEIGHT: 126.19 LBS

## 2018-09-24 DIAGNOSIS — I50.32 CHRONIC DIASTOLIC CHF (CONGESTIVE HEART FAILURE): Primary | ICD-10-CM

## 2018-09-24 DIAGNOSIS — I49.3 PVC (PREMATURE VENTRICULAR CONTRACTION): ICD-10-CM

## 2018-09-24 DIAGNOSIS — E87.1 HYPONATREMIA: Primary | ICD-10-CM

## 2018-09-24 DIAGNOSIS — E78.00 PURE HYPERCHOLESTEROLEMIA: ICD-10-CM

## 2018-09-24 DIAGNOSIS — I10 ESSENTIAL HYPERTENSION: ICD-10-CM

## 2018-09-24 DIAGNOSIS — I45.10 RBBB: ICD-10-CM

## 2018-09-24 DIAGNOSIS — I44.4 LAFB (LEFT ANTERIOR FASCICULAR BLOCK): ICD-10-CM

## 2018-09-24 DIAGNOSIS — Z95.0 PACEMAKER: ICD-10-CM

## 2018-09-24 PROBLEM — N17.9 AKI (ACUTE KIDNEY INJURY): Status: ACTIVE | Noted: 2018-09-24

## 2018-09-24 LAB
ALBUMIN SERPL BCP-MCNC: 3.6 G/DL
ALP SERPL-CCNC: 63 U/L
ALT SERPL W/O P-5'-P-CCNC: 27 U/L
ANION GAP SERPL CALC-SCNC: 9 MMOL/L
AST SERPL-CCNC: 23 U/L
BACTERIA #/AREA URNS AUTO: ABNORMAL /HPF
BASOPHILS # BLD AUTO: 0.04 K/UL
BASOPHILS NFR BLD: 0.8 %
BILIRUB SERPL-MCNC: 0.6 MG/DL
BILIRUB UR QL STRIP: NEGATIVE
BNP SERPL-MCNC: 97 PG/ML
BUN SERPL-MCNC: 44 MG/DL
CALCIUM SERPL-MCNC: 9.7 MG/DL
CHLORIDE SERPL-SCNC: 99 MMOL/L
CLARITY UR REFRACT.AUTO: CLEAR
CO2 SERPL-SCNC: 21 MMOL/L
COLOR UR AUTO: YELLOW
CREAT SERPL-MCNC: 1.5 MG/DL
DIFFERENTIAL METHOD: ABNORMAL
EOSINOPHIL # BLD AUTO: 0.1 K/UL
EOSINOPHIL NFR BLD: 2.9 %
ERYTHROCYTE [DISTWIDTH] IN BLOOD BY AUTOMATED COUNT: 13.6 %
EST. GFR  (AFRICAN AMERICAN): 35.6 ML/MIN/1.73 M^2
EST. GFR  (NON AFRICAN AMERICAN): 30.9 ML/MIN/1.73 M^2
GLUCOSE SERPL-MCNC: 109 MG/DL
GLUCOSE UR QL STRIP: NEGATIVE
HCT VFR BLD AUTO: 33.2 %
HGB BLD-MCNC: 11.5 G/DL
HGB UR QL STRIP: ABNORMAL
HYALINE CASTS UR QL AUTO: 2 /LPF
IMM GRANULOCYTES # BLD AUTO: 0.02 K/UL
IMM GRANULOCYTES NFR BLD AUTO: 0.4 %
KETONES UR QL STRIP: NEGATIVE
LEUKOCYTE ESTERASE UR QL STRIP: ABNORMAL
LYMPHOCYTES # BLD AUTO: 0.9 K/UL
LYMPHOCYTES NFR BLD: 17.4 %
MCH RBC QN AUTO: 31.6 PG
MCHC RBC AUTO-ENTMCNC: 34.6 G/DL
MCV RBC AUTO: 91 FL
MICROSCOPIC COMMENT: ABNORMAL
MONOCYTES # BLD AUTO: 0.6 K/UL
MONOCYTES NFR BLD: 13.1 %
NEUTROPHILS # BLD AUTO: 3.2 K/UL
NEUTROPHILS NFR BLD: 65.4 %
NITRITE UR QL STRIP: NEGATIVE
NRBC BLD-RTO: 0 /100 WBC
OSMOLALITY UR: 304 MOSM/KG
PH UR STRIP: 6 [PH] (ref 5–8)
PLATELET # BLD AUTO: 192 K/UL
PMV BLD AUTO: 10.5 FL
POTASSIUM SERPL-SCNC: 4.8 MMOL/L
PROT SERPL-MCNC: 6.6 G/DL
PROT UR QL STRIP: NEGATIVE
RBC # BLD AUTO: 3.64 M/UL
RBC #/AREA URNS AUTO: 0 /HPF (ref 0–4)
SODIUM SERPL-SCNC: 129 MMOL/L
SODIUM UR-SCNC: 61 MMOL/L
SP GR UR STRIP: 1.01 (ref 1–1.03)
SQUAMOUS #/AREA URNS AUTO: 0 /HPF
URN SPEC COLLECT METH UR: ABNORMAL
UROBILINOGEN UR STRIP-ACNC: NEGATIVE EU/DL
WBC # BLD AUTO: 4.88 K/UL
WBC #/AREA URNS AUTO: 2 /HPF (ref 0–5)

## 2018-09-24 PROCEDURE — 85025 COMPLETE CBC W/AUTO DIFF WBC: CPT

## 2018-09-24 PROCEDURE — 99999 PR PBB SHADOW E&M-EST. PATIENT-LVL III: CPT | Mod: PBBFAC,,, | Performed by: INTERNAL MEDICINE

## 2018-09-24 PROCEDURE — 83935 ASSAY OF URINE OSMOLALITY: CPT

## 2018-09-24 PROCEDURE — 99220 PR INITIAL OBSERVATION CARE,LEVL III: CPT | Mod: ,,, | Performed by: PHYSICIAN ASSISTANT

## 2018-09-24 PROCEDURE — 99213 OFFICE O/P EST LOW 20 MIN: CPT | Mod: PBBFAC,PO | Performed by: INTERNAL MEDICINE

## 2018-09-24 PROCEDURE — 99213 OFFICE O/P EST LOW 20 MIN: CPT | Mod: S$PBB,,, | Performed by: INTERNAL MEDICINE

## 2018-09-24 PROCEDURE — 81001 URINALYSIS AUTO W/SCOPE: CPT

## 2018-09-24 PROCEDURE — 25000003 PHARM REV CODE 250: Performed by: PHYSICIAN ASSISTANT

## 2018-09-24 PROCEDURE — 80053 COMPREHEN METABOLIC PANEL: CPT

## 2018-09-24 PROCEDURE — 63600175 PHARM REV CODE 636 W HCPCS: Performed by: PHYSICIAN ASSISTANT

## 2018-09-24 PROCEDURE — 99284 EMERGENCY DEPT VISIT MOD MDM: CPT | Mod: 27

## 2018-09-24 PROCEDURE — 99285 EMERGENCY DEPT VISIT HI MDM: CPT | Mod: ,,, | Performed by: PHYSICIAN ASSISTANT

## 2018-09-24 PROCEDURE — G0378 HOSPITAL OBSERVATION PER HR: HCPCS

## 2018-09-24 PROCEDURE — 83880 ASSAY OF NATRIURETIC PEPTIDE: CPT

## 2018-09-24 PROCEDURE — 84300 ASSAY OF URINE SODIUM: CPT

## 2018-09-24 RX ORDER — SODIUM CHLORIDE 0.9 % (FLUSH) 0.9 %
3 SYRINGE (ML) INJECTION EVERY 8 HOURS
Status: DISCONTINUED | OUTPATIENT
Start: 2018-09-24 | End: 2018-09-25 | Stop reason: HOSPADM

## 2018-09-24 RX ORDER — CARVEDILOL 3.12 MG/1
6.25 TABLET ORAL 2 TIMES DAILY WITH MEALS
Status: DISCONTINUED | OUTPATIENT
Start: 2018-09-24 | End: 2018-09-25 | Stop reason: HOSPADM

## 2018-09-24 RX ORDER — ACETAMINOPHEN 325 MG/1
650 TABLET ORAL EVERY 6 HOURS PRN
Status: DISCONTINUED | OUTPATIENT
Start: 2018-09-24 | End: 2018-09-25 | Stop reason: HOSPADM

## 2018-09-24 RX ORDER — SODIUM CHLORIDE 9 MG/ML
1000 INJECTION, SOLUTION INTRAVENOUS
Status: COMPLETED | OUTPATIENT
Start: 2018-09-24 | End: 2018-09-24

## 2018-09-24 RX ORDER — ALPRAZOLAM 0.25 MG/1
0.25 TABLET ORAL DAILY PRN
Status: DISCONTINUED | OUTPATIENT
Start: 2018-09-24 | End: 2018-09-25 | Stop reason: HOSPADM

## 2018-09-24 RX ORDER — ONDANSETRON 2 MG/ML
4 INJECTION INTRAMUSCULAR; INTRAVENOUS EVERY 8 HOURS PRN
Status: DISCONTINUED | OUTPATIENT
Start: 2018-09-24 | End: 2018-09-25 | Stop reason: HOSPADM

## 2018-09-24 RX ORDER — FOLIC ACID 1 MG/1
1 TABLET ORAL DAILY
Status: DISCONTINUED | OUTPATIENT
Start: 2018-09-25 | End: 2018-09-25 | Stop reason: HOSPADM

## 2018-09-24 RX ORDER — EPINEPHRINE 0.22MG
100 AEROSOL WITH ADAPTER (ML) INHALATION DAILY
Status: DISCONTINUED | OUTPATIENT
Start: 2018-09-25 | End: 2018-09-25 | Stop reason: HOSPADM

## 2018-09-24 RX ORDER — ATORVASTATIN CALCIUM 10 MG/1
20 TABLET, FILM COATED ORAL DAILY
Status: DISCONTINUED | OUTPATIENT
Start: 2018-09-25 | End: 2018-09-25 | Stop reason: HOSPADM

## 2018-09-24 RX ORDER — ASPIRIN 81 MG/1
81 TABLET ORAL NIGHTLY
Status: DISCONTINUED | OUTPATIENT
Start: 2018-09-24 | End: 2018-09-25 | Stop reason: HOSPADM

## 2018-09-24 RX ORDER — SPIRONOLACTONE 25 MG/1
25 TABLET ORAL DAILY
Status: DISCONTINUED | OUTPATIENT
Start: 2018-09-25 | End: 2018-09-24

## 2018-09-24 RX ORDER — HEPARIN SODIUM 5000 [USP'U]/ML
5000 INJECTION, SOLUTION INTRAVENOUS; SUBCUTANEOUS EVERY 8 HOURS
Status: DISCONTINUED | OUTPATIENT
Start: 2018-09-24 | End: 2018-09-25 | Stop reason: HOSPADM

## 2018-09-24 RX ORDER — HYDRALAZINE HYDROCHLORIDE 25 MG/1
25 TABLET, FILM COATED ORAL EVERY 8 HOURS PRN
Status: DISCONTINUED | OUTPATIENT
Start: 2018-09-24 | End: 2018-09-25 | Stop reason: HOSPADM

## 2018-09-24 RX ORDER — SERTRALINE HYDROCHLORIDE 25 MG/1
25 TABLET, FILM COATED ORAL DAILY
Status: DISCONTINUED | OUTPATIENT
Start: 2018-09-25 | End: 2018-09-25 | Stop reason: HOSPADM

## 2018-09-24 RX ORDER — SODIUM CHLORIDE 9 MG/ML
INJECTION, SOLUTION INTRAVENOUS CONTINUOUS
Status: ACTIVE | OUTPATIENT
Start: 2018-09-24 | End: 2018-09-25

## 2018-09-24 RX ORDER — ONDANSETRON 8 MG/1
8 TABLET, ORALLY DISINTEGRATING ORAL EVERY 8 HOURS PRN
Status: DISCONTINUED | OUTPATIENT
Start: 2018-09-24 | End: 2018-09-25 | Stop reason: HOSPADM

## 2018-09-24 RX ADMIN — HEPARIN SODIUM 5000 UNITS: 5000 INJECTION, SOLUTION INTRAVENOUS; SUBCUTANEOUS at 08:09

## 2018-09-24 RX ADMIN — CARVEDILOL 6.25 MG: 3.12 TABLET, FILM COATED ORAL at 04:09

## 2018-09-24 RX ADMIN — SODIUM CHLORIDE: 0.9 INJECTION, SOLUTION INTRAVENOUS at 11:09

## 2018-09-24 RX ADMIN — ASPIRIN 81 MG: 81 TABLET, COATED ORAL at 08:09

## 2018-09-24 RX ADMIN — SODIUM CHLORIDE 1000 ML: 0.9 INJECTION, SOLUTION INTRAVENOUS at 01:09

## 2018-09-24 RX ADMIN — SODIUM CHLORIDE: 0.9 INJECTION, SOLUTION INTRAVENOUS at 04:09

## 2018-09-24 NOTE — ED PROVIDER NOTES
Encounter Date: 9/24/2018       History     Chief Complaint   Patient presents with    Abnormal Lab     sodium 127     88-year-old female with multiple comorbidities including CAD, diastolic CHF, history of CVA presents from Cardiology Clinic for hyponatremia noted on outpatient labs.  Patient reports bilateral lower extremity swelling and urinary frequency, denies any other symptoms at this time.  Denies shortness of breath, chest pain, orthopnea, confusion, seizures.  Patient states she has been compliant with Lasix has been restricting her fluid intake.          Review of patient's allergies indicates:   Allergen Reactions    Amoxicillin Other (See Comments)     unknown    Bactrim [sulfamethoxazole-trimethoprim] Other (See Comments)     unknown    Omeprazole Other (See Comments)     Muscle and joint pain    Rocephin [ceftriaxone] Other (See Comments)     Severe acute generalized pain    Penicillins Rash     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 Negative for fever.   Respiratory: Positive for cough. Negative for chest tightness and shortness of breath.    Cardiovascular: Positive for leg swelling. Negative for chest pain and palpitations.   Gastrointestinal: Positive for constipation. Negative for abdominal pain, anal bleeding, blood in stool, diarrhea, nausea, rectal pain and vomiting.   Endocrine: Positive for polyuria.   Genitourinary: Positive for frequency. Negative for decreased urine volume, difficulty urinating, dysuria, flank pain, hematuria and urgency.   Musculoskeletal: Negative for back pain and gait problem.   Skin: Negative for color change.   Neurological: Negative for tremors, seizures, weakness, light-headedness and headaches.   Psychiatric/Behavioral: Negative for confusion and decreased concentration. The patient is nervous/anxious.        Physical Exam     Initial Vitals [09/24/18 1207]   BP Pulse Resp Temp SpO2   (!) 120/59 65 18 97.8 °F (36.6 °C) 96 %      MAP       --         Physical Exam    Nursing note and vitals reviewed.  Constitutional: She appears well-developed and well-nourished. She is not diaphoretic. No distress.   HENT:   Head: Normocephalic and atraumatic.   Eyes: EOM are normal. Pupils are equal, round, and reactive to light.   Neck: Normal range of motion. Neck supple.   Cardiovascular: Normal rate, regular rhythm, normal heart sounds and intact distal pulses. Exam reveals no gallop and no friction rub.    No murmur heard.  Pulmonary/Chest: Breath sounds normal. No respiratory distress. She has no wheezes. She has no rhonchi. She has no rales. She exhibits no tenderness.   Abdominal: Soft. Bowel sounds are normal. She exhibits no distension and no mass. There is no tenderness. There is no rebound and no guarding.    Musculoskeletal: Normal range of motion. She exhibits no edema or tenderness.   Neurological: She is alert and oriented to person, place, and time. She has normal strength. No cranial nerve deficit.   Skin: Skin is warm and dry.   Psychiatric: She has a normal mood and affect.         ED Course   Procedures  Labs Reviewed   CBC W/ AUTO DIFFERENTIAL   COMPREHENSIVE METABOLIC PANEL   URINALYSIS, REFLEX TO URINE CULTURE   OSMOLALITY, URINE RANDOM   SODIUM, URINE, RANDOM   B-TYPE NATRIURETIC PEPTIDE          Imaging Results    None          Medical Decision Making:   History:   Old Medical Records: I decided to obtain old medical records.  Clinical Tests:   Lab Tests: Ordered and Reviewed  Other:   I have discussed this case with another health care provider.       APC / Resident Notes:   88-year-old female presenting from Cardiology Clinic for hyponatremia.  Patient's only complaint is persistent bilateral lower leg swelling. Denies confusion, seizures weakness. Suspect hyponatremia secondary to over diuresis, DDx includes increased excretion of sodium dehydration.  Will recheck labs and consult Cardiology.    Labs show improvement of hyponatremia with sodium of 129.  BUN/creatinine 44/1.5.  Suspect secondary to dehydration.  Cardiology consulted, they recommend observation admission for hydration.  Will start fluid infusion at a rate of 250 per hour.  I discussed this patient with my supervising physician and admitting to Medicine for observation.    Marisela Madison PA-C                   Clinical Impression:   The encounter diagnosis was Hyponatremia.      Disposition:   Disposition: Admitted  Condition: Fair                        Marisela Madison PA-C  09/24/18 0106

## 2018-09-24 NOTE — ASSESSMENT & PLAN NOTE
Stable on admission  - continue home coreg  - hold home lasix, lisinopril, spironolactone in setting of CHARLENE

## 2018-09-24 NOTE — ASSESSMENT & PLAN NOTE
-2DCFD EF 55% (2016)  - continue home coreg  - hold home lasix, lisinopril in setting of CHARLENE/ hyponatremia

## 2018-09-24 NOTE — CONSULTS
[facility]  Cardiology  Consult Note    Patient Name: Karly Sandoval  MRN: [id number]  Admission Date: 9/24/2018  Hospital Length of Stay: 0 days  Code Status: Full Code   Attending Provider: Kristina Wolf MD   Consulting Provider: Blaise Pepper MD  Primary Care Physician: Uday Allen MD  Principal Problem:<principal problem not specified>    Patient information was obtained from patient and ER records.     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 A few weeks ago her legs started swelling and she had increased her PRN Lasix with some improvement(still swollen) noted however on labs developed hyponatremia. Per the son, the patient did not have much edema on lower extremities with amlodipine 5mg but did with 10mg.     On labs today, she remains with a mild hyponatremia as well as elevated cr 1.5.     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)     has pacemaker       Past Surgical History:   Procedure Laterality Date    APPENDECTOMY      BREAST SURGERY      CARDIAC PACEMAKER PLACEMENT  11/2014    for syncope and RBBB/LAHB    CHOLECYSTECTOMY       EYE SURGERY      HEMORRHOID SURGERY      HYSTERECTOMY      1966    SKIN BIOPSY      VASCULAR SURGERY      VERTEBROPLASTY         Review of patient's allergies indicates:   Allergen Reactions    Amoxicillin Other (See Comments)     unknown    Bactrim [sulfamethoxazole-trimethoprim] Other (See Comments)     unknown    Omeprazole Other (See Comments)     Muscle and joint pain    Rocephin [ceftriaxone] Other (See Comments)     Severe acute generalized pain    Penicillins Rash       No current facility-administered medications on file prior to encounter.      Current Outpatient Medications on File Prior to Encounter   Medication Sig    ALPRAZolam (XANAX) 0.25 MG tablet TAKE 1 TABLET BY MOUTH EVERY DAY AS NEEDED    amLODIPine (NORVASC) 10 MG tablet Pt to take one pill once a day    aspirin (ECOTRIN) 81 MG EC tablet Take 1 tablet (81 mg total) by mouth once daily. HOLD until cleared by your ENT doctor and your Neurologist.    atorvastatin (LIPITOR) 20 MG tablet TAKE 1 TABLET BY MOUTH EVERY DAY    carvedilol (COREG) 6.25 MG tablet Take 1 tablet (6.25 mg total) by mouth 2 (two) times daily with meals. (Patient taking differently: Take 9.375 mg by mouth 2 (two) times daily with meals. 1.5 tablets twice daily)    coenzyme Q10 (CO Q-10) 100 mg capsule Take 100 mg by mouth once daily.    denosumab (PROLIA) 60 mg/mL Syrg Inject 60 mg into the skin. Every 6 months    diclofenac sodium (VOLTAREN) 1 % Gel Apply 2 g topically 2 (two) times daily. Use gloves to apply (Patient taking differently: Apply 2 g topically 2 (two) times daily. Use gloves to apply  Pt using prn.)    fish oil-omega-3 fatty acids 300-1,000 mg capsule Take 1,000 mg by mouth once daily.     folic acid (FOLVITE) 1 MG tablet TAKE 1 TABLET (1 MG TOTAL) BY MOUTH ONCE DAILY.    furosemide (LASIX) 20 MG tablet Take 1 tablet (20 mg total) by mouth once daily. (Patient taking differently: Take 20 mg by mouth. 1 tablet 2 days per week per   9/14/18)    hydroxychloroquine (PLAQUENIL) 200 mg tablet Take 1 tablet (200 mg total) by mouth once daily.    lisinopril (PRINIVIL,ZESTRIL) 40 MG tablet TAKE 1 TABLET BY MOUTH EVERY DAY    methotrexate 2.5 MG Tab TAKE 10 TABLETS (25 MG TOTAL) BY MOUTH EVERY 7 DAYS.    sertraline (ZOLOFT) 25 MG tablet Take 1 tablet (25 mg total) by mouth once daily.    spironolactone (ALDACTONE) 25 MG tablet Take 1 tablet (25 mg total) by mouth once daily.    VITAMIN B COMPLEX ORAL Take 400 mg by mouth once daily. Pt stated that she does not take vitamin b everyday.    vitamin E 400 UNIT capsule Take 400 Units by mouth once daily. Pt taking PRN     Family History     Problem Relation (Age of Onset)    Diabetes Mellitus Mother, Brother    Heart disease Father    Hypertension Father, Mother    Leukemia Father    Parkinsonism Brother (68)        Tobacco Use    Smoking status: Never Smoker    Smokeless tobacco: Never Used   Substance and Sexual Activity    Alcohol use: No     Comment: [facility]    Drug use: No    Sexual activity: No     Partners: Male     Birth control/protection: None     Review of Systems   Constitution: Positive for weakness. Negative for chills, diaphoresis, fever and weight loss.   HENT: Negative for congestion, hoarse voice and sore throat.    Eyes: Negative for blurred vision and double vision.   Cardiovascular: Positive for leg swelling. Negative for chest pain, dyspnea on exertion, orthopnea, palpitations, paroxysmal nocturnal dyspnea and syncope.   Respiratory: Negative for shortness of breath.    Endocrine: Negative for cold intolerance and heat intolerance.   Hematologic/Lymphatic: Does not bruise/bleed easily.   Gastrointestinal: Positive for anorexia. Negative for abdominal pain, constipation, diarrhea, hematochezia, hemorrhoids, melena, nausea and vomiting.   Genitourinary: Negative for dysuria.   Neurological: Negative for focal weakness and sensory change.     Objective:     Vital  Signs (Most Recent):  Temp: 97.8 °F (36.6 °C) (09/24/18 1207)  Pulse: 65 (09/24/18 1207)  Resp: 18 (09/24/18 1207)  BP: (!) 120/59 (09/24/18 1207)  SpO2: 96 % (09/24/18 1207) Vital Signs (24h Range):  Temp:  [97.8 °F (36.6 °C)] 97.8 °F (36.6 °C)  Pulse:  [65-66] 65  Resp:  [18] 18  SpO2:  [96 %] 96 %  BP: (120-132)/(59) 120/59     Weight: 57.2 kg (126 lb)  Body mass index is 23.05 kg/m².    SpO2: 96 %       No intake or output data in the 24 hours ending 09/24/18 1416    Lines/Drains/Airways     Peripheral Intravenous Line                 Peripheral IV - Single Lumen 09/24/18 1249 Right Hand less than 1 day                Physical Exam   Constitutional: She is oriented to person, place, and time. She appears well-developed and well-nourished.   HENT:   Head: Normocephalic and atraumatic.   Eyes: EOM are normal.   Neck: No JVD present.   Cardiovascular: Normal rate, regular rhythm and intact distal pulses. Exam reveals no gallop and no friction rub.   Murmur heard.   Midsystolic murmur is present with a grade of 2/6 at the upper right sternal border.  Pulmonary/Chest: Effort normal and breath sounds normal. No respiratory distress.   Abdominal: Soft. Bowel sounds are normal. There is no tenderness.   Musculoskeletal: Normal range of motion. She exhibits edema (+2).   Neurological: She is oriented to person, place, and time.   Skin: Skin is warm and dry.       Significant Labs:   Recent Lab Results       09/24/18  1249      Immature Granulocytes 0.4     Immature Grans (Abs) 0.02  Comment:  Mild elevation in immature granulocytes is non specific and   can be seen in a variety of conditions including stress response,   acute inflammation, trauma and pregnancy. Correlation with other   laboratory and clinical findings is essential.       Albumin 3.6     Alkaline Phosphatase 63     ALT 27     Anion Gap 9     AST 23     Baso # 0.04     Basophil% 0.8     Total Bilirubin 0.6  Comment:  For infants and newborns,  interpretation of results should be based  on gestational age, weight and in agreement with clinical  observations.  Premature Infant recommended reference ranges:  Up to 24 hours.............<8.0 mg/dL  Up to 48 hours............<12.0 mg/dL  3-5 days..................<15.0 mg/dL  6-29 days.................<15.0 mg/dL       BNP 97  Comment:  Values of less than 100 pg/ml are consistent with non-CHF populations.     BUN, Bld 44     Calcium 9.7     Chloride 99     CO2 21     Creatinine 1.5     Differential Method Automated     eGFR if African American 35.6     eGFR if non  30.9  Comment:  Calculation used to obtain the estimated glomerular filtration  rate (eGFR) is the CKD-EPI equation.        Eos # 0.1     Eosinophil% 2.9     Glucose 109     Gran # (ANC) 3.2     Gran% 65.4     Hematocrit 33.2     Hemoglobin 11.5     Lymph # 0.9     Lymph% 17.4     MCH 31.6     MCHC 34.6     MCV 91     Mono # 0.6     Mono% 13.1     MPV 10.5     nRBC 0     Platelets 192     Potassium 4.8     Total Protein 6.6     RBC 3.64     RDW 13.6     Sodium 129     WBC 4.88           Significant Imaging: Echocardiogram:   2D echo with color flow doppler:   Results for orders placed or performed in visit on 06/17/16   Echo doppler color flow   Result Value Ref Range    EF 55 55 - 65    Mitral Valve Regurgitation MILD TO MODERATE     Diastolic Dysfunction Yes (A)     Aortic Valve Regurgitation MILD     Est. PA Systolic Pressure 26.04     Mitral Valve Mobility NORMAL     Tricuspid Valve Regurgitation MILD      Assessment and Plan:     Hyponatremia    Hyponatremia and CHARLENE noted on labs  Recommend admission for volume replacement with normal saline  Recommend stopping amlodipine  Per history patient does not have a good appetite so solute may be an ongoing issue                VTE Risk Mitigation (From admission, onward)        Ordered     IP VTE HIGH RISK PATIENT  Once      09/24/18 1348     Place TOM hose  Until discontinued       09/24/18 4536          Thank you for your consult.    Blaise Pepper DO  Cardiology Fellow, PGY-6    Cardiology   [facility]

## 2018-09-24 NOTE — SUBJECTIVE & OBJECTIVE
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)     has pacemaker       Past Surgical History:   Procedure Laterality Date    APPENDECTOMY      BREAST SURGERY      CARDIAC PACEMAKER PLACEMENT  11/2014    for syncope and RBBB/LAHB    CHOLECYSTECTOMY      EYE SURGERY      HEMORRHOID SURGERY      HYSTERECTOMY      1966    SKIN BIOPSY      VASCULAR SURGERY      VERTEBROPLASTY         Review of patient's allergies indicates:   Allergen Reactions    Amoxicillin Other (See Comments)     unknown    Bactrim [sulfamethoxazole-trimethoprim] Other (See Comments)     unknown    Omeprazole Other (See Comments)     Muscle and joint pain    Rocephin [ceftriaxone] Other (See Comments)     Severe acute generalized pain    Penicillins Rash       No current facility-administered medications on file prior to encounter.      Current Outpatient Medications on File Prior to Encounter   Medication Sig    ALPRAZolam (XANAX) 0.25 MG tablet TAKE 1 TABLET BY MOUTH EVERY DAY AS NEEDED    amLODIPine (NORVASC) 10 MG tablet Pt to take one pill once a day    aspirin (ECOTRIN) 81 MG EC tablet Take 1 tablet (81 mg total) by mouth once daily. HOLD until cleared by your ENT doctor and your Neurologist.    atorvastatin (LIPITOR) 20 MG tablet TAKE 1 TABLET BY MOUTH EVERY DAY    carvedilol (COREG) 6.25 MG tablet Take 1 tablet (6.25 mg total) by mouth 2 (two) times  daily with meals. (Patient taking differently: Take 9.375 mg by mouth 2 (two) times daily with meals. 1.5 tablets twice daily)    coenzyme Q10 (CO Q-10) 100 mg capsule Take 100 mg by mouth once daily.    denosumab (PROLIA) 60 mg/mL Syrg Inject 60 mg into the skin. Every 6 months    diclofenac sodium (VOLTAREN) 1 % Gel Apply 2 g topically 2 (two) times daily. Use gloves to apply (Patient taking differently: Apply 2 g topically 2 (two) times daily. Use gloves to apply  Pt using prn.)    fish oil-omega-3 fatty acids 300-1,000 mg capsule Take 1,000 mg by mouth once daily.     folic acid (FOLVITE) 1 MG tablet TAKE 1 TABLET (1 MG TOTAL) BY MOUTH ONCE DAILY.    furosemide (LASIX) 20 MG tablet Take 1 tablet (20 mg total) by mouth once daily. (Patient taking differently: Take 20 mg by mouth. 1 tablet 2 days per week per  9/14/18)    hydroxychloroquine (PLAQUENIL) 200 mg tablet Take 1 tablet (200 mg total) by mouth once daily.    lisinopril (PRINIVIL,ZESTRIL) 40 MG tablet TAKE 1 TABLET BY MOUTH EVERY DAY    methotrexate 2.5 MG Tab TAKE 10 TABLETS (25 MG TOTAL) BY MOUTH EVERY 7 DAYS.    sertraline (ZOLOFT) 25 MG tablet Take 1 tablet (25 mg total) by mouth once daily.    spironolactone (ALDACTONE) 25 MG tablet Take 1 tablet (25 mg total) by mouth once daily.    VITAMIN B COMPLEX ORAL Take 400 mg by mouth once daily. Pt stated that she does not take vitamin b everyday.    vitamin E 400 UNIT capsule Take 400 Units by mouth once daily. Pt taking PRN     Family History     Problem Relation (Age of Onset)    Diabetes Mellitus Mother, Brother    Heart disease Father    Hypertension Father, Mother    Leukemia Father    Parkinsonism Brother (68)        Tobacco Use    Smoking status: Never Smoker    Smokeless tobacco: Never Used   Substance and Sexual Activity    Alcohol use: No     [facility]: [facility]    Drug use: No    Sexual activity: No     Partners: Male     Birth control/protection: None     Review of  Systems   Constitution: Positive for weakness. Negative for chills, diaphoresis, fever and weight loss.   HENT: Negative for congestion, hoarse voice and sore throat.    Eyes: Negative for blurred vision and double vision.   Cardiovascular: Positive for leg swelling. Negative for chest pain, dyspnea on exertion, orthopnea, palpitations, paroxysmal nocturnal dyspnea and syncope.   Respiratory: Negative for shortness of breath.    Endocrine: Negative for cold intolerance and heat intolerance.   Hematologic/Lymphatic: Does not bruise/bleed easily.   Gastrointestinal: Positive for anorexia. Negative for abdominal pain, constipation, diarrhea, hematochezia, hemorrhoids, melena, nausea and vomiting.   Genitourinary: Negative for dysuria.   Neurological: Negative for focal weakness and sensory change.     Objective:     Vital Signs (Most Recent):  Temp: 97.8 °F (36.6 °C) (09/24/18 1207)  Pulse: 65 (09/24/18 1207)  Resp: 18 (09/24/18 1207)  BP: (!) 120/59 (09/24/18 1207)  SpO2: 96 % (09/24/18 1207) Vital Signs (24h Range):  Temp:  [97.8 °F (36.6 °C)] 97.8 °F (36.6 °C)  Pulse:  [65-66] 65  Resp:  [18] 18  SpO2:  [96 %] 96 %  BP: (120-132)/(59) 120/59     Weight: 57.2 kg (126 lb)  Body mass index is 23.05 kg/m².    SpO2: 96 %       No intake or output data in the 24 hours ending 09/24/18 1416    Lines/Drains/Airways     Peripheral Intravenous Line                 Peripheral IV - Single Lumen 09/24/18 1249 Right Hand less than 1 day                Physical Exam   Constitutional: She is oriented to person, place, and time. She appears well-developed and well-nourished.   HENT:   Head: Normocephalic and atraumatic.   Eyes: EOM are normal.   Neck: No JVD present.   Cardiovascular: Normal rate, regular rhythm and intact distal pulses. Exam reveals no gallop and no friction rub.   Murmur heard.   Midsystolic murmur is present with a grade of 2/6 at the upper right sternal border.  Pulmonary/Chest: Effort normal and breath sounds  normal. No respiratory distress.   Abdominal: Soft. Bowel sounds are normal. There is no tenderness.   Musculoskeletal: Normal range of motion. She exhibits edema (+2).   Neurological: She is oriented to person, place, and time.   Skin: Skin is warm and dry.       Significant Labs:   Recent Lab Results       09/24/18  1249      Immature Granulocytes 0.4     Immature Grans (Abs) 0.02  Comment:  Mild elevation in immature granulocytes is non specific and   can be seen in a variety of conditions including stress response,   acute inflammation, trauma and pregnancy. Correlation with other   laboratory and clinical findings is essential.       Albumin 3.6     Alkaline Phosphatase 63     ALT 27     Anion Gap 9     AST 23     Baso # 0.04     Basophil% 0.8     Total Bilirubin 0.6  Comment:  For infants and newborns, interpretation of results should be based  on gestational age, weight and in agreement with clinical  observations.  Premature Infant recommended reference ranges:  Up to 24 hours.............<8.0 mg/dL  Up to 48 hours............<12.0 mg/dL  3-5 days..................<15.0 mg/dL  6-29 days.................<15.0 mg/dL       BNP 97  Comment:  Values of less than 100 pg/ml are consistent with non-CHF populations.     BUN, Bld 44     Calcium 9.7     Chloride 99     CO2 21     Creatinine 1.5     Differential Method Automated     eGFR if African American 35.6     eGFR if non  30.9  Comment:  Calculation used to obtain the estimated glomerular filtration  rate (eGFR) is the CKD-EPI equation.        Eos # 0.1     Eosinophil% 2.9     Glucose 109     Gran # (ANC) 3.2     Gran% 65.4     Hematocrit 33.2     Hemoglobin 11.5     Lymph # 0.9     Lymph% 17.4     MCH 31.6     MCHC 34.6     MCV 91     Mono # 0.6     Mono% 13.1     MPV 10.5     nRBC 0     Platelets 192     Potassium 4.8     Total Protein 6.6     RBC 3.64     RDW 13.6     Sodium 129     WBC 4.88           Significant Imaging: Echocardiogram:   2D  echo with color flow doppler:   Results for orders placed or performed in visit on 06/17/16   Echo doppler color flow   Result Value Ref Range    EF 55 55 - 65    Mitral Valve Regurgitation MILD TO MODERATE     Diastolic Dysfunction Yes (A)     Aortic Valve Regurgitation MILD     Est. PA Systolic Pressure 26.04     Mitral Valve Mobility NORMAL     Tricuspid Valve Regurgitation MILD

## 2018-09-24 NOTE — HPI
89 yo female with a history of HTN, Bi-fasicular block, syncope with wide complex QRS s/p DcPPM(Hieu,11/15/14), HFpEF(EF55% 6/16), PFO, RA who presents to the ER after being referred from cardiology clinic for hyponatremia, leg swelling and fatigue. A few weeks ago her legs started swelling and she had increased her PRN Lasix with some improvement(still swollen) noted however on labs developed hyponatremia. Per the son, the patient did not have much edema on lower extremities with amlodipine 5mg but did with 10mg.     On labs today, she remains with a mild hyponatremia as well as elevated cr 1.5.

## 2018-09-24 NOTE — ASSESSMENT & PLAN NOTE
Peripheral Edema  Likely due to over diuresis  - continue gentle IVF overnight  - hold home amlodipine, lasix  - reassess in AM

## 2018-09-24 NOTE — PLAN OF CARE
Problem: Fall Risk (Adult)  Goal: Identify Related Risk Factors and Signs and Symptoms  Related risk factors and signs and symptoms are identified upon initiation of Human Response Clinical Practice Guideline (CPG)  No falls this shift.

## 2018-09-24 NOTE — SUBJECTIVE & OBJECTIVE
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)     has pacemaker       Past Surgical History:   Procedure Laterality Date    APPENDECTOMY      BREAST SURGERY      CARDIAC PACEMAKER PLACEMENT  11/2014    for syncope and RBBB/LAHB    CHOLECYSTECTOMY      EYE SURGERY      HEMORRHOID SURGERY      HYSTERECTOMY      1966    SKIN BIOPSY      VASCULAR SURGERY      VERTEBROPLASTY         Review of patient's allergies indicates:   Allergen Reactions    Amoxicillin Other (See Comments)     unknown    Bactrim [sulfamethoxazole-trimethoprim] Other (See Comments)     unknown    Omeprazole Other (See Comments)     Muscle and joint pain    Rocephin [ceftriaxone] Other (See Comments)     Severe acute generalized pain    Penicillins Rash       No current facility-administered medications on file prior to encounter.      Current Outpatient Medications on File Prior to Encounter   Medication Sig    ALPRAZolam (XANAX) 0.25 MG tablet TAKE 1 TABLET BY MOUTH EVERY DAY AS NEEDED    amLODIPine (NORVASC) 10 MG tablet Pt to take one pill once a day    aspirin (ECOTRIN) 81 MG EC tablet Take 1 tablet (81 mg total) by mouth once daily. HOLD until cleared by your ENT doctor and your Neurologist.    atorvastatin (LIPITOR) 20 MG tablet TAKE 1 TABLET BY MOUTH EVERY DAY    carvedilol (COREG) 6.25 MG tablet Take 1 tablet (6.25 mg total) by mouth 2 (two) times  daily with meals. (Patient taking differently: Take 9.375 mg by mouth 2 (two) times daily with meals. 1.5 tablets twice daily)    coenzyme Q10 (CO Q-10) 100 mg capsule Take 100 mg by mouth once daily.    denosumab (PROLIA) 60 mg/mL Syrg Inject 60 mg into the skin. Every 6 months    diclofenac sodium (VOLTAREN) 1 % Gel Apply 2 g topically 2 (two) times daily. Use gloves to apply (Patient taking differently: Apply 2 g topically 2 (two) times daily. Use gloves to apply  Pt using prn.)    fish oil-omega-3 fatty acids 300-1,000 mg capsule Take 1,000 mg by mouth once daily.     folic acid (FOLVITE) 1 MG tablet TAKE 1 TABLET (1 MG TOTAL) BY MOUTH ONCE DAILY.    furosemide (LASIX) 20 MG tablet Take 1 tablet (20 mg total) by mouth once daily. (Patient taking differently: Take 20 mg by mouth. 1 tablet 2 days per week per  9/14/18)    hydroxychloroquine (PLAQUENIL) 200 mg tablet Take 1 tablet (200 mg total) by mouth once daily.    lisinopril (PRINIVIL,ZESTRIL) 40 MG tablet TAKE 1 TABLET BY MOUTH EVERY DAY    methotrexate 2.5 MG Tab TAKE 10 TABLETS (25 MG TOTAL) BY MOUTH EVERY 7 DAYS.    sertraline (ZOLOFT) 25 MG tablet Take 1 tablet (25 mg total) by mouth once daily.    spironolactone (ALDACTONE) 25 MG tablet Take 1 tablet (25 mg total) by mouth once daily.    VITAMIN B COMPLEX ORAL Take 400 mg by mouth once daily. Pt stated that she does not take vitamin b everyday.    vitamin E 400 UNIT capsule Take 400 Units by mouth once daily. Pt taking PRN     Family History     Problem Relation (Age of Onset)    Diabetes Mellitus Mother, Brother    Heart disease Father    Hypertension Father, Mother    Leukemia Father    Parkinsonism Brother (68)        Tobacco Use    Smoking status: Never Smoker    Smokeless tobacco: Never Used   Substance and Sexual Activity    Alcohol use: No     Comment: [facility]    Drug use: No    Sexual activity: No     Partners: Male     Birth control/protection: None     Review of  Systems   Constitutional: Positive for appetite change (decreased ) and fatigue. Negative for chills, diaphoresis, fever and unexpected weight change.   HENT: Negative for congestion, sinus pressure and sore throat.    Eyes: Negative for discharge and itching.   Respiratory: Positive for cough (chronic). Negative for chest tightness, shortness of breath and wheezing.    Cardiovascular: Positive for leg swelling (BLE). Negative for chest pain and palpitations.   Gastrointestinal: Negative for abdominal pain, diarrhea, nausea and vomiting.   Genitourinary: Positive for frequency. Negative for difficulty urinating, dysuria and hematuria.   Musculoskeletal: Negative for arthralgias and myalgias.   Skin: Negative for color change and pallor.   Neurological: Negative for dizziness, syncope, light-headedness, numbness and headaches.   Psychiatric/Behavioral: Negative for confusion. The patient is nervous/anxious.      Objective:     Vital Signs (Most Recent):  Temp: 97.6 °F (36.4 °C) (09/24/18 1537)  Pulse: 63 (09/24/18 1537)  Resp: 12 (09/24/18 1537)  BP: (!) 159/70 (09/24/18 1537)  SpO2: 99 % (09/24/18 1537) Vital Signs (24h Range):  Temp:  [97.6 °F (36.4 °C)-97.8 °F (36.6 °C)] 97.6 °F (36.4 °C)  Pulse:  [63-67] 63  Resp:  [12-18] 12  SpO2:  [96 %-99 %] 99 %  BP: (120-159)/(59-70) 159/70     Weight: 57.2 kg (126 lb)  Body mass index is 23.05 kg/m².    Physical Exam   Constitutional: She is oriented to person, place, and time. No distress.   HENT:   Head: Normocephalic and atraumatic.   Eyes: Right eye exhibits no discharge. Left eye exhibits no discharge.   Neck: Normal range of motion. Neck supple.   Cardiovascular: Normal rate, regular rhythm and normal heart sounds.   Pulmonary/Chest: Effort normal and breath sounds normal. No respiratory distress. She has no wheezes. She has no rales.   Abdominal: Soft. Bowel sounds are normal. She exhibits no distension and no mass. There is tenderness (pt endorses suprapubic  tenderness). There is no guarding.   Musculoskeletal: She exhibits edema and tenderness (BLE). She exhibits no deformity.   Neurological: She is alert and oriented to person, place, and time.   Skin: Skin is warm and dry. She is not diaphoretic.   Psychiatric: She has a normal mood and affect. Her behavior is normal.           Significant Labs: All pertinent labs within the past 24 hours have been reviewed.    Significant Imaging: I have reviewed all pertinent imaging results/findings within the past 24 hours.

## 2018-09-24 NOTE — ASSESSMENT & PLAN NOTE
Hyponatremia and CHARLENE noted on labs  Recommend admission for volume replacement with normal saline  Recommend stopping amlodipine  Per history patient does not have a good appetite so solute may be an ongoing issue

## 2018-09-24 NOTE — ASSESSMENT & PLAN NOTE
Cr 1.5 on admission (BL 0.8-1.2)  - hold home lasix, lisinopril, spironolactone  - avoid nephrotoxic agents  - continue to monitor

## 2018-09-24 NOTE — ED TRIAGE NOTES
Pt sent from cardiology clinic for sodium of 127 and bilateral lower extremity swelling.  Pt states she has noticed increased swelling in bilateral lower extremities for over one month. Denies chest pain. Denies shortness of breath

## 2018-09-24 NOTE — HPI
"Karly Sandoval is an 88F with HTN, HLD, CAD, diastolic CHF pEF (2016 echo 55%), CVA (2014, no residual deficits), RA, who is being admitted to observation for hyponatremia. The patient reports she was referred to the ED by her cardiologist for having low sodium and leg swelling. She states that her lower extremities have been swelling for "months". She reports that 1-2 weeks ago her cardiologist recommended taking her Lasix daily rather than 3 times per week. She endorses nocturia. She denies fevers, muscle cramps, HA, confusion, LOC, CP, SOB, N/V/D.     In ED, VSS, afebrile without leukocytosis. Hyponatremic (Na+ 129), Cr 1.5 (BL 0.8-1.2), BUN 44 (BL ~20s), BNP 97 (decreased from 127 on 9/13).  "

## 2018-09-24 NOTE — PROGRESS NOTES
Cardiology Clinic Note  Reason for Visit: HF    HPI:     Karly Sandoval is a 88 y.o. HTN, HLD, bifascicular block (RBBB, LAFB) and unexplained syncope with wide QRS s/p Hieu dcPPM, HFpEF, PFO, PVCs, and rheumatoid arthritis, who presents for follow up.     She recently reported an increase in her bilateral lower extremities for the past few weeks. She increased her lasix to daily and restricted fluid intake with improvement in edema and decrease in weight. Recent labs revealed persistent hyponatremia. She currently denies orthopnea, dyspnea, PND. Her BP is controlled today. Edema in bilateral legs persists, but she reports improvement with leg elevation.     Prior cardiovascular issues:  None    ROS:    Constitution: Negative for fever or chills.  HENT: Negative for  headaches.  Eyes: Negative for blurred vision.   Cardiovascular: See above  Pulmonary: Negative for SOB. Negative for cough.   Gastrointestinal: Negative for nausea/vomiting.   : Negative for dysuria.   Skin: Negative for rashes.  Neurological: Negative for focal weakness.  Psychological: Negative for depression.    Medications:     Current Outpatient Medications on File Prior to Visit   Medication Sig Dispense Refill    ALPRAZolam (XANAX) 0.25 MG tablet TAKE 1 TABLET BY MOUTH EVERY DAY AS NEEDED 30 tablet 1    amLODIPine (NORVASC) 10 MG tablet Pt to take one pill once a day 90 tablet 3    aspirin (ECOTRIN) 81 MG EC tablet Take 1 tablet (81 mg total) by mouth once daily. HOLD until cleared by your ENT doctor and your Neurologist.  0    atorvastatin (LIPITOR) 20 MG tablet TAKE 1 TABLET BY MOUTH EVERY DAY 90 tablet 3    carvedilol (COREG) 6.25 MG tablet Take 1 tablet (6.25 mg total) by mouth 2 (two) times daily with meals. (Patient taking differently: Take 9.375 mg by mouth 2 (two) times daily with meals. 1.5 tablets twice daily) 60 tablet 6    coenzyme Q10 (CO Q-10) 100 mg capsule Take 100 mg by mouth once daily.      denosumab  "(PROLIA) 60 mg/mL Syrg Inject 60 mg into the skin. Every 6 months      diclofenac sodium (VOLTAREN) 1 % Gel Apply 2 g topically 2 (two) times daily. Use gloves to apply (Patient taking differently: Apply 2 g topically 2 (two) times daily. Use gloves to apply  Pt using prn.) 100 g 1    fish oil-omega-3 fatty acids 300-1,000 mg capsule Take 1,000 mg by mouth once daily.       folic acid (FOLVITE) 1 MG tablet TAKE 1 TABLET (1 MG TOTAL) BY MOUTH ONCE DAILY. 90 tablet 3    furosemide (LASIX) 20 MG tablet Take 1 tablet (20 mg total) by mouth once daily. (Patient taking differently: Take 20 mg by mouth. 1 tablet 2 days per week per  9/14/18) 30 tablet 11    hydroxychloroquine (PLAQUENIL) 200 mg tablet Take 1 tablet (200 mg total) by mouth once daily. 30 tablet 1    lisinopril (PRINIVIL,ZESTRIL) 40 MG tablet TAKE 1 TABLET BY MOUTH EVERY DAY 90 tablet 3    methotrexate 2.5 MG Tab TAKE 10 TABLETS (25 MG TOTAL) BY MOUTH EVERY 7 DAYS. 120 tablet 0    sertraline (ZOLOFT) 25 MG tablet Take 1 tablet (25 mg total) by mouth once daily. 30 tablet 11    spironolactone (ALDACTONE) 25 MG tablet Take 1 tablet (25 mg total) by mouth once daily. 30 tablet 11    VITAMIN B COMPLEX ORAL Take 400 mg by mouth once daily. Pt stated that she does not take vitamin b everyday.      vitamin E 400 UNIT capsule Take 400 Units by mouth once daily. Pt taking PRN       No current facility-administered medications on file prior to visit.        Physical Exam:   BP (!) 132/59   Pulse 66   Ht 5' 2" (1.575 m)   Wt 57.2 kg (126 lb 3.4 oz)   BMI 23.08 kg/m²      Constitutional: No apparent distress, conversant  HEENT: Sclera anicteric, extraocular movements intact  Neck: No jugular venous distension, no carotid bruits  CV: Regular rate and rhythm, no murmurs rubs or gallops, normal S1/S2  Pulm: Clear to auscultation bilaterally  GI: Abdomen soft, no palpable masses  Extremities: 1+ bilateral lower extremity edema, warm with palpable " pulses  Skin: No ecchymosis, erythema, or ulcers  Psych: Alert and oriented to person place location, appropriate affect  Neuro: No focal deficits    Labs:     Blood Tests:  Lab Results   Component Value Date     (H) 2018     (L) 2018    K 4.8 2018    CL 95 2018    CO2 24 2018    BUN 29 (H) 2018    CREATININE 1.2 2018     2018    HGBA1C 6.0 2014    MG 1.8 10/04/2014    AST 21 2018    ALT 21 2018    ALBUMIN 3.4 (L) 2018    PROT 6.6 2018    BILITOT 0.7 2018    WBC 4.76 2018    HGB 10.8 (L) 2018    HCT 32.7 (L) 2018    MCV 95 2018     2018    INR 1.0 2014    TSH 2.038 2018       Lab Results   Component Value Date    CHOL 132 2018    HDL 66 2018    TRIG 48 2018       Lab Results   Component Value Date    LDLCALC 56.4 (L) 2018       Urine Tests:  Lab Results   Component Value Date    COLORU Yellow 2016    APPEARANCEUA Clear 2016    PHUR 7.0 2016    SPECGRAV 1.010 2016    PROTEINUA Negative 2016    GLUCUA Negative 2016    KETONESU Negative 2016    BILIRUBINUA Negative 2016    OCCULTUA Negative 2016    NITRITE Negative 2016    UROBILINOGEN Negative 2016    LEUKOCYTESUR 2+ (A) 2016    CREATRANDUR 57.0 2016       Imaging:     Echocardiogram  TTE 16  CONCLUSIONS     1 - Moderate left atrial enlargement.     2 - Eccentric hypertrophy.     3 - Normal left ventricular systolic function (EF 55-60%).     4 - Normal right ventricular systolic function .     5 - The estimated PA systolic pressure is 26 mmHg.     6 - Mild aortic regurgitation.     7 - Mild to moderate mitral regurgitation.     8 - Mild tricuspid regurgitation.      Stress testing  None     Cath Lab  None     Other  None     EK18 - atrial paced with 1st degree AVB, LAFB    Assessment:     1. Chronic  diastolic CHF (congestive heart failure)    2. Pure hypercholesterolemia    3. Essential hypertension    4. LAFB (left anterior fascicular block)    5. Pacemaker    6. PVC (premature ventricular contraction)    7. RBBB        Plan:     Chronic diastolic CHF (congestive heart failure)  Appears to be euvolemic to dry on exam. She has bilateral lower extremity edema, which is most likely due to amlodipine use. Suspect hyponatermia is due to volume depletion, given rise in creatinine. Will refer to ED for management of hyponatremia, which has been persistent outpatient and did not correct with correction of volume status.     Essential hypertension  Controlled on amlodipine 10mg daily, coreg 6.25mg BID, lisinopril 40mg daily, and spironolactone 25mg daily. Would prefer to discontinue amlodipine and up-titrate medications for heart failure while inpatient.     Pure hypercholesterolemia  On moderate intensity atorvastatin.     Bifascicular block  Wide QRS, syncope s/p pacemaker  Followed by Dr Ivet Carrasco. I     PFO (patent foramen ovale)  On ASA.     History of CVA (cerebrovascular accident)  On ASA and statin.    Signed:  Shade Arreguin MD  Cardiology     9/24/2018 11:38 AM    Follow-up:     Future Appointments   Date Time Provider Department Center   9/24/2018 11:00 AM Jackson Arreguin III, MD [facility] CARDIO [place]   10/1/2018 10:30 AM Jackson Arreguin III, MD [facility] CARDIO [place]   10/8/2018 11:00 AM LAB, [facility] LAB [place]   10/17/2018 10:00 AM EPO, INJECTION [facility] ID INF [facility]   10/17/2018 11:00 AM Isai Smith MD [facility] RHEUM [facility]   11/19/2018  8:00 AM HOME MONITOR DEVICE CHECK, [facility] ARRHYTH [facility]   2/19/2019  8:00 AM HOME MONITOR DEVICE CHECK, [facility] ARRHYTH Y[facility] Bertrand

## 2018-09-24 NOTE — ED NOTES
LOC: The patient is awake, alert and aware of environment with an appropriate affect, the patient is oriented x 3 and speaking appropriately.  APPEARANCE: Patient resting comfortably and in no acute distress, patient is clean and well groomed, patient's clothing is properly fastened.  SKIN: The skin is warm and dry, patient has normal skin turgor; bruising in multiple stages of healing noted to bilateral upper extremities   MUSKULOSKELETAL: Patient moving all extremities well, no obvious swelling or deformities noted.  RESPIRATORY: Airway is open and patent, respirations are spontaneous, patient has a normal effort and rate. Breath sounds are clear and equal bilaterally.  CARDIAC: Normal heart sounds. No peripheral edema.  ABDOMEN: Soft and non tender to palpation, no distention noted. Bowel sounds present.  NEURO: No neuro deficits, hand grasp equal, no drift noted, no facial droop noted. Speech is clear.

## 2018-09-24 NOTE — ED NOTES
Pt ambulated to restroom with assistance of ER tech. Pt returned from restroom and reconnected to cardiac monitor and pulse ox, blood pressure cycling every thirty minutes. Side rails up x two call light in reach bed locked and in low position. VSS. NAD noted

## 2018-09-25 ENCOUNTER — TELEPHONE (OUTPATIENT)
Dept: FAMILY MEDICINE | Facility: CLINIC | Age: 83
End: 2018-09-25

## 2018-09-25 ENCOUNTER — TELEPHONE (OUTPATIENT)
Dept: ENDOCRINOLOGY | Facility: CLINIC | Age: 83
End: 2018-09-25

## 2018-09-25 VITALS
DIASTOLIC BLOOD PRESSURE: 74 MMHG | HEART RATE: 72 BPM | BODY MASS INDEX: 23.13 KG/M2 | HEIGHT: 62 IN | TEMPERATURE: 98 F | WEIGHT: 125.69 LBS | RESPIRATION RATE: 18 BRPM | SYSTOLIC BLOOD PRESSURE: 182 MMHG | OXYGEN SATURATION: 94 %

## 2018-09-25 PROBLEM — R60.0 PERIPHERAL EDEMA: Status: RESOLVED | Noted: 2018-01-08 | Resolved: 2018-09-25

## 2018-09-25 PROBLEM — N17.9 AKI (ACUTE KIDNEY INJURY): Status: RESOLVED | Noted: 2018-09-24 | Resolved: 2018-09-25

## 2018-09-25 LAB
ALBUMIN SERPL BCP-MCNC: 3.4 G/DL
ALP SERPL-CCNC: 58 U/L
ALT SERPL W/O P-5'-P-CCNC: 23 U/L
ANION GAP SERPL CALC-SCNC: 7 MMOL/L
AST SERPL-CCNC: 21 U/L
BASOPHILS # BLD AUTO: 0.04 K/UL
BASOPHILS NFR BLD: 0.9 %
BILIRUB SERPL-MCNC: 0.8 MG/DL
BUN SERPL-MCNC: 33 MG/DL
CALCIUM SERPL-MCNC: 8.8 MG/DL
CHLORIDE SERPL-SCNC: 104 MMOL/L
CO2 SERPL-SCNC: 22 MMOL/L
CREAT SERPL-MCNC: 1.1 MG/DL
DIFFERENTIAL METHOD: ABNORMAL
EOSINOPHIL # BLD AUTO: 0.2 K/UL
EOSINOPHIL NFR BLD: 4.1 %
ERYTHROCYTE [DISTWIDTH] IN BLOOD BY AUTOMATED COUNT: 13.6 %
EST. GFR  (AFRICAN AMERICAN): 51.8 ML/MIN/1.73 M^2
EST. GFR  (NON AFRICAN AMERICAN): 44.9 ML/MIN/1.73 M^2
GLUCOSE SERPL-MCNC: 94 MG/DL
HCT VFR BLD AUTO: 31.2 %
HGB BLD-MCNC: 10.9 G/DL
IMM GRANULOCYTES # BLD AUTO: 0.02 K/UL
IMM GRANULOCYTES NFR BLD AUTO: 0.5 %
LYMPHOCYTES # BLD AUTO: 1 K/UL
LYMPHOCYTES NFR BLD: 23.1 %
MAGNESIUM SERPL-MCNC: 1.9 MG/DL
MCH RBC QN AUTO: 31.9 PG
MCHC RBC AUTO-ENTMCNC: 34.9 G/DL
MCV RBC AUTO: 91 FL
MONOCYTES # BLD AUTO: 0.6 K/UL
MONOCYTES NFR BLD: 14.6 %
NEUTROPHILS # BLD AUTO: 2.5 K/UL
NEUTROPHILS NFR BLD: 56.8 %
NRBC BLD-RTO: 0 /100 WBC
PHOSPHATE SERPL-MCNC: 3.4 MG/DL
PLATELET # BLD AUTO: 176 K/UL
PMV BLD AUTO: 10.5 FL
POTASSIUM SERPL-SCNC: 4.5 MMOL/L
PROT SERPL-MCNC: 6.2 G/DL
RBC # BLD AUTO: 3.42 M/UL
SODIUM SERPL-SCNC: 133 MMOL/L
WBC # BLD AUTO: 4.38 K/UL

## 2018-09-25 PROCEDURE — 83735 ASSAY OF MAGNESIUM: CPT

## 2018-09-25 PROCEDURE — G0378 HOSPITAL OBSERVATION PER HR: HCPCS

## 2018-09-25 PROCEDURE — 85025 COMPLETE CBC W/AUTO DIFF WBC: CPT

## 2018-09-25 PROCEDURE — 36415 COLL VENOUS BLD VENIPUNCTURE: CPT

## 2018-09-25 PROCEDURE — 84100 ASSAY OF PHOSPHORUS: CPT

## 2018-09-25 PROCEDURE — 25000003 PHARM REV CODE 250: Performed by: PHYSICIAN ASSISTANT

## 2018-09-25 PROCEDURE — 80053 COMPREHEN METABOLIC PANEL: CPT

## 2018-09-25 PROCEDURE — 63600175 PHARM REV CODE 636 W HCPCS: Performed by: PHYSICIAN ASSISTANT

## 2018-09-25 PROCEDURE — A4216 STERILE WATER/SALINE, 10 ML: HCPCS | Performed by: PHYSICIAN ASSISTANT

## 2018-09-25 PROCEDURE — 99217 PR OBSERVATION CARE DISCHARGE: CPT | Mod: ,,, | Performed by: PHYSICIAN ASSISTANT

## 2018-09-25 RX ADMIN — HEPARIN SODIUM 5000 UNITS: 5000 INJECTION, SOLUTION INTRAVENOUS; SUBCUTANEOUS at 05:09

## 2018-09-25 RX ADMIN — Medication 100 MG: at 09:09

## 2018-09-25 RX ADMIN — CARVEDILOL 6.25 MG: 3.12 TABLET, FILM COATED ORAL at 09:09

## 2018-09-25 RX ADMIN — ALPRAZOLAM 0.12 MG: 0.25 TABLET ORAL at 09:09

## 2018-09-25 RX ADMIN — Medication 3 ML: at 05:09

## 2018-09-25 RX ADMIN — FOLIC ACID 1 MG: 1 TABLET ORAL at 09:09

## 2018-09-25 RX ADMIN — SERTRALINE HYDROCHLORIDE 25 MG: 25 TABLET ORAL at 09:09

## 2018-09-25 RX ADMIN — ATORVASTATIN CALCIUM 20 MG: 10 TABLET, FILM COATED ORAL at 09:09

## 2018-09-25 NOTE — ASSESSMENT & PLAN NOTE
Peripheral Edema  Likely due to over diuresis  -  with improvement to 133 (baseline) and CHARLENE resolved with hydration and holding lisinopril, lasix, spironolactone.   - Per cardiology, amlodipine held due to peripheral edema. Discharged in stable condition. - Recommended to DC lisinopril (hyponatremia) and amlodipine (edema). Resumed lasix and spironolactone.  - PCP f/u in one week.

## 2018-09-25 NOTE — TELEPHONE ENCOUNTER
----- Message from Katerina Lemon sent at 9/25/2018  3:03 PM CDT -----  Contact: pt  Pt was d/c from the hospital and thinks she was told that she did not have to keep the appt with Dr. Arreguin on 10/1/18 and wants to be sure.  She can be reached at [phone].    Thank you

## 2018-09-25 NOTE — ASSESSMENT & PLAN NOTE
-2DCFD EF 55% (2016)  - continue home coreg  - DC'd  Lisinopril, continue lasix and spironolactone

## 2018-09-25 NOTE — DISCHARGE SUMMARY
"[facility] Hospital Medicine  Discharge Summary      Patient Name: Karly Sandoval  MRN: [id number]  Admission Date: 9/24/2018  Hospital Length of Stay: 0 days  Discharge Date and Time:  09/25/2018 9:14 AM  Attending Physician: Kristina Wolf MD   Discharging Provider: Nika Taylor PA-C  Primary Care Provider: Uday Allen MD  [facility] Medicine Team: [facility] MED F Nika Taylor PA-C    HPI:   Karly Sandoval is an 88F with HTN, HLD, CAD, diastolic CHF pEF (2016 echo 55%), CVA (2014, no residual deficits), RA, who is being admitted to observation for hyponatremia. The patient reports she was referred to the ED by her cardiologist for having low sodium and leg swelling. She states that her lower extremities have been swelling for "months". She reports that 1-2 weeks ago her cardiologist recommended taking her Lasix daily rather than 3 times per week. She endorses nocturia. She denies fevers, muscle cramps, HA, confusion, LOC, CP, SOB, N/V/D.     In ED, VSS, afebrile without leukocytosis. Hyponatremic (Na+ 129), Cr 1.5 (BL 0.8-1.2), BUN 44 (BL ~20s), BNP 97 (decreased from 127 on 9/13).    * No surgery found *      Hospital Course:   Admitted to observation for hyponatremia and CHARLENE.  with improvement to 133 (baseline) and CHARLENE resolved with hydration and holding lisinopril, lasix, spironolactone. Per cardiology, amlodipine held due to peripheral edema. Discharged in stable condition. Recommended to DC lisinopril (hyponatremia) and amlodipine (edema). Resumed lasix and spironolactone. PCP f/u in one week.      Consults:     * Hyponatremia-resolved as of 9/25/2018    Peripheral Edema  Likely due to over diuresis  -  with improvement to 133 (baseline) and CHARLENE resolved with hydration and holding lisinopril, lasix, spironolactone.   - Per cardiology, amlodipine held due to peripheral edema. Discharged in stable condition. - Recommended to DC lisinopril (hyponatremia) and " amlodipine (edema). Resumed lasix and spironolactone.  - PCP f/u in one week.         CHARLENE (acute kidney injury)-resolved as of 9/25/2018    Cr 1.5 on admission (BL 0.8-1.2)  - hold home lasix, lisinopril, spironolactone  - avoid nephrotoxic agents  - resolved; Cr 1.1 (baseline) at discharge        Anxiety    - continue home zoloft and xanax        Pacemaker    Stable        History of CVA (cerebrovascular accident)    CVA 2014, no residual deficits  - continue home ASA 81mg daily        Anemia of chronic disease    H/H stable 11.5/33.2  - continue home folvite        Chronic diastolic CHF (congestive heart failure)    -2DCFD EF 55% (2016)  - continue home coreg  - DC'd  Lisinopril, continue lasix and spironolactone           Hyperlipidemia    Stable and chronic  - continue home Lipitor        Hypertension    Stable on admission  - continue home coreg, lasix, spironolactone  - DC'd home lisinopril and amlodipine          Final Active Diagnoses:    Diagnosis Date Noted POA    Anxiety [F41.9] 02/01/2018 Yes    Pacemaker [Z95.0] 03/25/2015 Yes    History of CVA (cerebrovascular accident) [Z86.73] 10/29/2014 Not Applicable    Anemia of chronic disease [D63.8] 03/22/2014 Yes    Chronic diastolic CHF (congestive heart failure) [I50.32] 02/11/2014 Yes    Hyperlipidemia [E78.5]  Yes    Hypertension [I10] 09/11/2012 Yes      Problems Resolved During this Admission:    Diagnosis Date Noted Date Resolved POA    PRINCIPAL PROBLEM:  Hyponatremia [E87.1] 07/22/2016 09/25/2018 Yes    CHARLENE (acute kidney injury) [N17.9] 09/24/2018 09/25/2018 Yes    Peripheral edema [R60.9] 01/08/2018 09/25/2018 Yes       Discharged Condition: good    Disposition: Home or Self Care    Follow Up:  Follow-up Information     Uday Allen MD In 1 week.    Specialty:  Family Medicine  Contact information:  [address]  [address]  [place] [id number] [phone]                 Patient Instructions:      Notify your health care provider if  you experience any of the following:  increased confusion or weakness     Notify your health care provider if you experience any of the following:  persistent dizziness, light-headedness, or visual disturbances     Activity as tolerated       Significant Diagnostic Studies: Labs: All labs within the past 24 hours have been reviewed    Pending Diagnostic Studies:     None         Medications:  Reconciled Home Medications:      Medication List      CHANGE how you take these medications    carvedilol 6.25 MG tablet  Commonly known as:  COREG  Take 1 tablet (6.25 mg total) by mouth 2 (two) times daily with meals.  What changed:    · how much to take  · additional instructions     furosemide 20 MG tablet  Commonly known as:  LASIX  Take 1 tablet (20 mg total) by mouth once daily.  What changed:    · when to take this  · additional instructions        CONTINUE taking these medications    ALPRAZolam 0.25 MG tablet  Commonly known as:  XANAX  TAKE 1 TABLET BY MOUTH EVERY DAY AS NEEDED     aspirin 81 MG EC tablet  Commonly known as:  ECOTRIN  Take 1 tablet (81 mg total) by mouth once daily. HOLD until cleared by your ENT doctor and your Neurologist.     atorvastatin 20 MG tablet  Commonly known as:  LIPITOR  TAKE 1 TABLET BY MOUTH EVERY DAY     CO Q-10 100 mg capsule  Generic drug:  coenzyme Q10  Take 100 mg by mouth once daily.     fish oil-omega-3 fatty acids 300-1,000 mg capsule  Take 1,000 mg by mouth once daily.     folic acid 1 MG tablet  Commonly known as:  FOLVITE  TAKE 1 TABLET (1 MG TOTAL) BY MOUTH ONCE DAILY.     hydroxychloroquine 200 mg tablet  Commonly known as:  PLAQUENIL  Take 1 tablet (200 mg total) by mouth once daily.     methotrexate 2.5 MG Tab  TAKE 10 TABLETS (25 MG TOTAL) BY MOUTH EVERY 7 DAYS.     PROLIA 60 mg/mL Syrg  Generic drug:  denosumab  Inject 60 mg into the skin. Every 6 months     sertraline 25 MG tablet  Commonly known as:  ZOLOFT  Take 1 tablet (25 mg total) by mouth once daily.      spironolactone 25 MG tablet  Commonly known as:  ALDACTONE  Take 1 tablet (25 mg total) by mouth once daily.     VITAMIN B COMPLEX ORAL  Take 400 mg by mouth once daily. Pt stated that she does not take vitamin b everyday.     vitamin E 400 UNIT capsule  Take 400 Units by mouth once daily. Pt taking PRN        STOP taking these medications    amLODIPine 10 MG tablet  Commonly known as:  NORVASC     diclofenac sodium 1 % Gel  Commonly known as:  VOLTAREN     lisinopril 40 MG tablet  Commonly known as:  PRINIVIL,ZESTRIL            Indwelling Lines/Drains at time of discharge:   Lines/Drains/Airways          None          Time spent on the discharge of patient: >30 minutes  Patient was seen and examined on the date of discharge and determined to be suitable for discharge.         Nika Taylor PA-C  Department of Hospital Medicine  [facility]

## 2018-09-25 NOTE — PLAN OF CARE
Problem: Patient Care Overview  Goal: Plan of Care Review  Outcome: Ongoing (interventions implemented as appropriate)  Pt is alert and oriented x4. Pt admitted for hyponatremia last sodium level was 129 on yesterday, pt to have labs drawn this am. Pt had no complaints of pain this shift. Pt was receiving Iv fluids of NS at 75 and was stopped at 0305 this am per order. Pt voided several times this shift, no bm noted. VS have been stable, bp a little elevated througout shift but has hydralazine prn. Will continue to monitor.

## 2018-09-25 NOTE — NURSING
Discharge instructions provided to patient and family. Patient and family verbalizes understanding.

## 2018-09-25 NOTE — TELEPHONE ENCOUNTER
----- Message from Shelli Ochoa sent at 9/25/2018  2:52 PM CDT -----  Contact: Self [phone]  Patient is requesting to schedule a hospital follow up in 1-2 weeks. Please advice

## 2018-09-25 NOTE — HOSPITAL COURSE
Admitted to observation for hyponatremia and CHARLENE.  with improvement to 133 (baseline) and CHARLENE resolved with hydration and holding lisinopril, lasix, spironolactone. Per cardiology, amlodipine held due to peripheral edema. Discharged in stable condition. Recommended to DC lisinopril (hyponatremia) and amlodipine (edema). Resumed lasix and spironolactone. PCP f/u in one week.

## 2018-09-25 NOTE — ASSESSMENT & PLAN NOTE
Stable on admission  - continue home coreg, lasix, spironolactone  - DC'd home lisinopril and amlodipine

## 2018-09-25 NOTE — PLAN OF CARE
09/25/18 3900   Final Note   Assessment Type Final Discharge Note     Patient discharged home with no needs prior to this CM completing a discharge planning assessment. Message sent to Dr. Uday Allen's (PCP) nurse requesting a hospital follow up appointment in 1-2 weeks. Dr. Allen's nurse to call the patient with appointment date & time.

## 2018-09-25 NOTE — ASSESSMENT & PLAN NOTE
Cr 1.5 on admission (BL 0.8-1.2)  - hold home lasix, lisinopril, spironolactone  - avoid nephrotoxic agents  - resolved; Cr 1.1 (baseline) at discharge

## 2018-09-25 NOTE — H&P
"[facility] Hospital Medicine  History & Physical    Patient Name: Karly Sandoval  MRN: [id number]  Admission Date: 9/24/2018  Attending Physician: Kristina Wolf MD   Primary Care Provider: Uday Allen MD    [facility] Medicine Team: [facility] MED F Ney Liz PA-C     Patient information was obtained from patient, past medical records and ER records.     Subjective:     Principal Problem:Hyponatremia    Chief Complaint:   Chief Complaint   Patient presents with    Abnormal Lab     sodium 127        HPI: Karly Sandoval is an 88F with HTN, HLD, CAD, diastolic CHF pEF (2016 echo 55%), CVA (2014, no residual deficits), RA, who is being admitted to observation for hyponatremia. The patient reports she was referred to the ED by her cardiologist for having low sodium and leg swelling. She states that her lower extremities have been swelling for "months". She reports that 1-2 weeks ago her cardiologist recommended taking her Lasix daily rather than 3 times per week. She endorses nocturia. She denies fevers, muscle cramps, HA, confusion, LOC, CP, SOB, N/V/D.     In ED, VSS, afebrile without leukocytosis. Hyponatremic (Na+ 129), Cr 1.5 (BL 0.8-1.2), BUN 44 (BL ~20s), BNP 97 (decreased from 127 on 9/13).    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)     has pacemaker       Past Surgical History:   Procedure " Laterality Date    APPENDECTOMY      BREAST SURGERY      CARDIAC PACEMAKER PLACEMENT  11/2014    for syncope and RBBB/LAHB    CHOLECYSTECTOMY      EYE SURGERY      HEMORRHOID SURGERY      HYSTERECTOMY      1966    SKIN BIOPSY      VASCULAR SURGERY      VERTEBROPLASTY         Review of patient's allergies indicates:   Allergen Reactions    Amoxicillin Other (See Comments)     unknown    Bactrim [sulfamethoxazole-trimethoprim] Other (See Comments)     unknown    Omeprazole Other (See Comments)     Muscle and joint pain    Rocephin [ceftriaxone] Other (See Comments)     Severe acute generalized pain    Penicillins Rash       No current facility-administered medications on file prior to encounter.      Current Outpatient Medications on File Prior to Encounter   Medication Sig    ALPRAZolam (XANAX) 0.25 MG tablet TAKE 1 TABLET BY MOUTH EVERY DAY AS NEEDED    amLODIPine (NORVASC) 10 MG tablet Pt to take one pill once a day    aspirin (ECOTRIN) 81 MG EC tablet Take 1 tablet (81 mg total) by mouth once daily. HOLD until cleared by your ENT doctor and your Neurologist.    atorvastatin (LIPITOR) 20 MG tablet TAKE 1 TABLET BY MOUTH EVERY DAY    carvedilol (COREG) 6.25 MG tablet Take 1 tablet (6.25 mg total) by mouth 2 (two) times daily with meals. (Patient taking differently: Take 9.375 mg by mouth 2 (two) times daily with meals. 1.5 tablets twice daily)    coenzyme Q10 (CO Q-10) 100 mg capsule Take 100 mg by mouth once daily.    denosumab (PROLIA) 60 mg/mL Syrg Inject 60 mg into the skin. Every 6 months    diclofenac sodium (VOLTAREN) 1 % Gel Apply 2 g topically 2 (two) times daily. Use gloves to apply (Patient taking differently: Apply 2 g topically 2 (two) times daily. Use gloves to apply  Pt using prn.)    fish oil-omega-3 fatty acids 300-1,000 mg capsule Take 1,000 mg by mouth once daily.     folic acid (FOLVITE) 1 MG tablet TAKE 1 TABLET (1 MG TOTAL) BY MOUTH ONCE DAILY.    furosemide (LASIX) 20  MG tablet Take 1 tablet (20 mg total) by mouth once daily. (Patient taking differently: Take 20 mg by mouth. 1 tablet 2 days per week per  9/14/18)    hydroxychloroquine (PLAQUENIL) 200 mg tablet Take 1 tablet (200 mg total) by mouth once daily.    lisinopril (PRINIVIL,ZESTRIL) 40 MG tablet TAKE 1 TABLET BY MOUTH EVERY DAY    methotrexate 2.5 MG Tab TAKE 10 TABLETS (25 MG TOTAL) BY MOUTH EVERY 7 DAYS.    sertraline (ZOLOFT) 25 MG tablet Take 1 tablet (25 mg total) by mouth once daily.    spironolactone (ALDACTONE) 25 MG tablet Take 1 tablet (25 mg total) by mouth once daily.    VITAMIN B COMPLEX ORAL Take 400 mg by mouth once daily. Pt stated that she does not take vitamin b everyday.    vitamin E 400 UNIT capsule Take 400 Units by mouth once daily. Pt taking PRN     Family History     Problem Relation (Age of Onset)    Diabetes Mellitus Mother, Brother    Heart disease Father    Hypertension Father, Mother    Leukemia Father    Parkinsonism Brother (68)        Tobacco Use    Smoking status: Never Smoker    Smokeless tobacco: Never Used   Substance and Sexual Activity    Alcohol use: No     Comment: [facility]    Drug use: No    Sexual activity: No     Partners: Male     Birth control/protection: None     Review of Systems   Constitutional: Positive for appetite change (decreased ) and fatigue. Negative for chills, diaphoresis, fever and unexpected weight change.   HENT: Negative for congestion, sinus pressure and sore throat.    Eyes: Negative for discharge and itching.   Respiratory: Positive for cough (chronic). Negative for chest tightness, shortness of breath and wheezing.    Cardiovascular: Positive for leg swelling (BLE). Negative for chest pain and palpitations.   Gastrointestinal: Negative for abdominal pain, diarrhea, nausea and vomiting.   Genitourinary: Positive for frequency. Negative for difficulty urinating, dysuria and hematuria.   Musculoskeletal: Negative for arthralgias and  myalgias.   Skin: Negative for color change and pallor.   Neurological: Negative for dizziness, syncope, light-headedness, numbness and headaches.   Psychiatric/Behavioral: Negative for confusion. The patient is nervous/anxious.      Objective:     Vital Signs (Most Recent):  Temp: 97.6 °F (36.4 °C) (09/24/18 1537)  Pulse: 63 (09/24/18 1537)  Resp: 12 (09/24/18 1537)  BP: (!) 159/70 (09/24/18 1537)  SpO2: 99 % (09/24/18 1537) Vital Signs (24h Range):  Temp:  [97.6 °F (36.4 °C)-97.8 °F (36.6 °C)] 97.6 °F (36.4 °C)  Pulse:  [63-67] 63  Resp:  [12-18] 12  SpO2:  [96 %-99 %] 99 %  BP: (120-159)/(59-70) 159/70     Weight: 57.2 kg (126 lb)  Body mass index is 23.05 kg/m².    Physical Exam   Constitutional: She is oriented to person, place, and time. No distress.   HENT:   Head: Normocephalic and atraumatic.   Eyes: Right eye exhibits no discharge. Left eye exhibits no discharge.   Neck: Normal range of motion. Neck supple.   Cardiovascular: Normal rate, regular rhythm and normal heart sounds.   Pulmonary/Chest: Effort normal and breath sounds normal. No respiratory distress. She has no wheezes. She has no rales.   Abdominal: Soft. Bowel sounds are normal. She exhibits no distension and no mass. There is tenderness (pt endorses suprapubic tenderness). There is no guarding.   Musculoskeletal: She exhibits edema and tenderness (BLE). She exhibits no deformity.   Neurological: She is alert and oriented to person, place, and time.   Skin: Skin is warm and dry. She is not diaphoretic.   Psychiatric: She has a normal mood and affect. Her behavior is normal.           Significant Labs: All pertinent labs within the past 24 hours have been reviewed.    Significant Imaging: I have reviewed all pertinent imaging results/findings within the past 24 hours.    Assessment/Plan:     * Hyponatremia    Peripheral Edema  Likely due to over diuresis  - continue gentle IVF overnight  - hold home amlodipine, lasix  - reassess in AM        CHARLENE  (acute kidney injury)    Cr 1.5 on admission (BL 0.8-1.2)  - hold home lasix, lisinopril, spironolactone  - avoid nephrotoxic agents  - continue to monitor        Anxiety    - continue home zoloft and xanax PRN        Pacemaker    Stable        History of CVA (cerebrovascular accident)    CVA 2014, no residual deficits  - continue home ASA 81mg daily        Anemia of chronic disease    H/H stable 11.5/33.2  - continue home folvite  - continue to monitor        Chronic diastolic CHF (congestive heart failure)    -2DCFD EF 55% (2016)  - continue home coreg  - hold home lasix, lisinopril in setting of CHARLENE/ hyponatremia          Hyperlipidemia    Stable and chronic  - continue home Lipitor        Hypertension    Stable on admission  - continue home coreg  - hold home lasix, lisinopril, spironolactone in setting of CHARLENE          VTE Risk Mitigation (From admission, onward)        Ordered     heparin (porcine) injection 5,000 Units  Every 8 hours      09/24/18 1606     IP VTE HIGH RISK PATIENT  Once      09/24/18 1606     Place TOM hose  Until discontinued      09/24/18 1348             Ney Liz PA-C  Department of Hospital Medicine   [facility]

## 2018-09-26 ENCOUNTER — OFFICE VISIT (OUTPATIENT)
Dept: FAMILY MEDICINE | Facility: CLINIC | Age: 83
End: 2018-09-26
Payer: MEDICARE

## 2018-09-26 ENCOUNTER — TELEPHONE (OUTPATIENT)
Dept: ELECTROPHYSIOLOGY | Facility: CLINIC | Age: 83
End: 2018-09-26

## 2018-09-26 VITALS
DIASTOLIC BLOOD PRESSURE: 66 MMHG | HEART RATE: 67 BPM | BODY MASS INDEX: 23 KG/M2 | HEIGHT: 62 IN | WEIGHT: 125 LBS | TEMPERATURE: 98 F | SYSTOLIC BLOOD PRESSURE: 150 MMHG | OXYGEN SATURATION: 96 %

## 2018-09-26 DIAGNOSIS — R60.0 PERIPHERAL EDEMA: ICD-10-CM

## 2018-09-26 DIAGNOSIS — N18.30 CKD (CHRONIC KIDNEY DISEASE), STAGE III: ICD-10-CM

## 2018-09-26 DIAGNOSIS — M05.79 RHEUMATOID ARTHRITIS INVOLVING MULTIPLE SITES WITH POSITIVE RHEUMATOID FACTOR: ICD-10-CM

## 2018-09-26 DIAGNOSIS — D50.9 IRON DEFICIENCY ANEMIA, UNSPECIFIED IRON DEFICIENCY ANEMIA TYPE: ICD-10-CM

## 2018-09-26 DIAGNOSIS — N17.9 AKI (ACUTE KIDNEY INJURY): ICD-10-CM

## 2018-09-26 DIAGNOSIS — E87.1 HYPONATREMIA: Primary | ICD-10-CM

## 2018-09-26 DIAGNOSIS — I10 ESSENTIAL HYPERTENSION: ICD-10-CM

## 2018-09-26 PROCEDURE — 99999 PR PBB SHADOW E&M-EST. PATIENT-LVL V: CPT | Mod: PBBFAC,,, | Performed by: FAMILY MEDICINE

## 2018-09-26 PROCEDURE — 99214 OFFICE O/P EST MOD 30 MIN: CPT | Mod: S$PBB,,, | Performed by: FAMILY MEDICINE

## 2018-09-26 PROCEDURE — 99215 OFFICE O/P EST HI 40 MIN: CPT | Mod: PBBFAC,PO | Performed by: FAMILY MEDICINE

## 2018-09-26 RX ORDER — LISINOPRIL 10 MG/1
10 TABLET ORAL DAILY
Qty: 30 TABLET | Refills: 11 | Status: ON HOLD | OUTPATIENT
Start: 2018-09-26 | End: 2018-12-03 | Stop reason: HOSPADM

## 2018-09-26 NOTE — PATIENT INSTRUCTIONS
Blood pressure    Stay off the Norvasc (amlodipine)  Continue:  -Coreg (carvedilol) 6.25 mg twice daily  -Spironolactone 25 mg daily  -Furosemide (Lasix) 20 mg daily  -ADD BACK low dose Lisinopril 10 mg daily    Recheck sodium/potassium/kidney function in 2 weeks    If sodium is lower, we may back down the lasix from everyday (you were taking the lasix more frequently recently to counteract the leg swelling which was likely worse from the Norvasc, so now you are off the Norvasc and the swelling is better, you may not necessarily need lasix every day. )      Low iron  You can try over the counter iron sulfate (also known as ferrous sulfate) 65 mg daily to help boost your iron levels. Taking this with a vitamin C tablet (250-500 mg) will increase iron absorption.

## 2018-09-26 NOTE — PROGRESS NOTES
(Portions of this note were dictated using voice recognition software and may contain dictation related errors in spelling/grammar/syntax not found on text review)    CC:   Chief Complaint   Patient presents with    Hospital Follow Up       HPI: 88 y.o. female   Last visit with me in June.  Recently hospitalized on 09/24/2018 to observation secondary to hyponatremia.  Had reported to ED by her cardiologist for having low sodium and leg swelling.  Does have a history of hyponatremia in the past from thiazide diuretics and thus her HCTZ was removed.  For her hypertension had been on lisinopril 40 mg daily, Coreg 6.25 mg b.i.d., spironolactone 25 mg added by her cardiologist September 17th, amlodipine 10 mg, half pill per day.  Lasix 20 mg the patient was taking once every couple of days but was recently advised on taking on a daily basis secondary to her leg edema.  Admission sodium was 129, BNP 97, creatinine 1.5, BUN 44.  Hyponatremia in CHARLENE  treated with hydration.    On discharge was recommended that she is stop lisinopril and amlodipine secondary to hyponatremia and edema respectively.  Have resumed her Lasix 20 mg daily and spironolactone 25 mg daily.  She continues on carvedilol 6.25 b.i.d..  Has been trying to use leg compression stockings which are difficult to pull off and on.  However, she does notice or swelling is somewhat improving.  Denies any chest pain or shortness of breath.  Blood pressure is elevated today, recheck at 150/66    Also her rheumatologist had drawn some labs which show stable chronic anemia.  Ferritin normal but a slight depressed iron saturation.  States that she has had iron deficiency anemia for a long time.  Has tried iron in the past but vomited constipated her.  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)     has pacemaker       Past Surgical History:   Procedure Laterality Date    APPENDECTOMY      BREAST SURGERY      CARDIAC PACEMAKER PLACEMENT  11/2014    for syncope and RBBB/LAHB    CHOLECYSTECTOMY      EYE SURGERY      HEMORRHOID SURGERY      HYSTERECTOMY      1966    SKIN BIOPSY      VASCULAR SURGERY      VERTEBROPLASTY         Family History   Problem Relation Age of Onset    Leukemia Father     Heart disease Father     Hypertension Father     Diabetes Mellitus Mother     Hypertension Mother     Diabetes Mellitus Brother     Parkinsonism Brother 68    Heart attack Neg Hx     Heart failure Neg Hx     Hyperlipidemia Neg Hx     Stroke Neg Hx        Social History     Socioeconomic History    Marital status:      Spouse name: Not on file    Number of children: Not on file    Years of education: Not on file    Highest education level: Not on file   Social Needs    Financial resource strain: Not on file    Food insecurity - worry: Not on file    Food insecurity - inability: Not on file    Transportation needs - medical: Not on file    Transportation needs - non-medical: Not on file   Occupational History    Not on file   Tobacco Use    Smoking status: Never Smoker    Smokeless tobacco: Never Used   Substance and Sexual Activity    Alcohol use: No     Comment: [facility]    Drug use: No    Sexual activity: No     Partners: Male     Birth control/protection: None   Other Topics Concern    Not on file   Social History Narrative     Not on file     Lab Results   Component Value Date    WBC 4.38 09/25/2018    HGB 10.9 (L) 09/25/2018    HCT 31.2 (L) 09/25/2018     09/25/2018    CHOL 132 06/22/2018    TRIG 48 06/22/2018    HDL 66 06/22/2018    ALT 23 09/25/2018    AST 21 09/25/2018     (L) 09/25/2018    K 4.5 09/25/2018     09/25/2018    CREATININE 1.1 09/25/2018    CALCIUM 8.8 09/25/2018    BUN 33 (H) 09/25/2018    CO2 22 (L) 09/25/2018    TSH 2.038 06/22/2018    INR 1.0 11/21/2014    HGBA1C 6.0 01/27/2014    LDLCALC 56.4 (L) 06/22/2018    GLU 94 09/25/2018         Sodium   Date Value Ref Range Status   09/25/2018 133 (L) 136 - 145 mmol/L Final   09/24/2018 129 (L) 136 - 145 mmol/L Final   09/21/2018 127 (L) 136 - 145 mmol/L Final   09/13/2018 128 (L) 136 - 145 mmol/L Final   06/22/2018 135 (L) 136 - 145 mmol/L Final   03/09/2018 134 (L) 136 - 145 mmol/L Final   01/24/2018 135 (L) 136 - 145 mmol/L Final     Creatinine   Date Value Ref Range Status   09/25/2018 1.1 0.5 - 1.4 mg/dL Final   09/24/2018 1.5 (H) 0.5 - 1.4 mg/dL Final   09/21/2018 1.2 0.5 - 1.4 mg/dL Final   09/13/2018 0.9 0.5 - 1.4 mg/dL Final   06/22/2018 0.8 0.5 - 1.4 mg/dL Final   03/09/2018 0.8 0.5 - 1.4 mg/dL Final   01/24/2018 0.8 0.5 - 1.4 mg/dL Final     Hemoglobin   Date Value Ref Range Status   09/25/2018 10.9 (L) 12.0 - 16.0 g/dL Final   09/24/2018 11.5 (L) 12.0 - 16.0 g/dL Final   06/22/2018 10.8 (L) 12.0 - 16.0 g/dL Final   02/01/2018 11.6 (L) 12.0 - 16.0 g/dL Final   10/05/2017 10.8 (L) 12.0 - 16.0 g/dL Final   06/09/2017 11.2 (L) 12.0 - 16.0 g/dL Final   05/26/2017 11.1 (L) 12.0 - 16.0 g/dL Final     Ferritin normal, iron saturation low at 13% in July of 2018      ROS:  GENERAL: No fever, chills, fatigability or weight loss.  Positive for anxiety  SKIN: No rashes, no itching.  HEAD: No headaches.  EYES: No visual changes  EARS: No ear pain or changes in hearing.  NOSE: No congestion or rhinorrhea.  MOUTH & THROAT: No hoarseness, change in voice, or  sore throat.  NODES: Denies swollen glands.  CHEST: Denies RODAS, cyanosis, wheezing, cough and sputum production.  CARDIOVASCULAR: Denies chest pain, PND, orthopnea.  ABDOMEN: No nausea, vomiting, or changes in bowel function.  URINARY: No flank pain, dysuria or hematuria.  PERIPHERAL VASCULAR: No claudication or cyanosis.  MUSCULOSKELETAL: No acute issues.  NEUROLOGIC: No weakness or numbness.    Vital signs reviewed  Wt Readings from Last 10 Encounters:   09/26/18 1449 56.7 kg (125 lb 0 oz)   09/24/18 1537 57 kg (125 lb 10.6 oz)   09/24/18 1207 57.2 kg (126 lb)   09/24/18 1057 57.2 kg (126 lb 3.4 oz)   09/17/18 1139 58.7 kg (129 lb 6.6 oz)   08/01/18 1044 57.2 kg (126 lb)   07/30/18 1038 57.3 kg (126 lb 6.9 oz)   07/11/18 1255 58.2 kg (128 lb 3.2 oz)   07/07/18 0208 57.2 kg (126 lb)   06/19/18 0954 58.1 kg (128 lb 1.4 oz)   04/16/18 1011 57 kg (125 lb 10.6 oz)       PE:   APPEARANCE: Well nourished, well developed, in no acute distress.    HEAD: Normocephalic, atraumatic.  EYES:.   Conjunctivae noninjected.  NECK: Supple with no cervical lymphadenopathy.  No carotid bruits.  No thyromegaly  CHEST: Good inspiratory effort. Lungs clear to auscultation with no wheezes or crackles.  CARDIOVASCULAR: Normal S1, S2. No rubs, murmurs, or gallops.  ABDOMEN: Bowel sounds normal. Not distended. Soft. No tenderness or masses. No organomegaly.  EXTREMITIES:  1+ pitting edema BLE, improved per patient and from last assessments  PSYCHIATRIC anxious affect    IMPRESSION  1. Hyponatremia    2. Essential hypertension    3. Peripheral edema    4. CHARLENE (acute kidney injury)    5. CKD (chronic kidney disease), stage III    6. Iron deficiency anemia, unspecified iron deficiency anemia type    7. Rheumatoid arthritis involving multiple sites with positive rheumatoid factor            PLAN    Orders Placed This Encounter   Procedures    Basic metabolic panel       Reviewed cardiology/hospital documentation, labs as above  Instructions to  patient:    Blood pressure/low-sodium    Stay off the Norvasc (amlodipine)  Continue:  -Coreg (carvedilol) 6.25 mg twice daily  -Spironolactone 25 mg daily  -Furosemide (Lasix) 20 mg daily  -ADD BACK low dose Lisinopril 10 mg daily    Recheck sodium/potassium/kidney function in 2 weeks    If sodium is lower, we may back down the lasix from everyday (you were taking the lasix more frequently recently to counteract the leg swelling which was likely worse from the Norvasc, so now you are off the Norvasc and the swelling is better, you may not necessarily need lasix every day. )      Low iron  You can try back on over the counter iron sulfate (also known as ferrous sulfate) 65 mg daily to help boost your iron levels. Taking this with a vitamin C tablet (250-500 mg) will increase iron absorption.  Could also take with a stool softener if this gives you constipation      Follow-up in office in the next 4-6 weeks or sooner if needed

## 2018-09-26 NOTE — TELEPHONE ENCOUNTER
Cynthia,  Ms Felix was recently discharged from the hospital. Can we make sure she has an appointment 1-2 weeks after discharge to make sure she is doing well?    Thanks,  Shade Arreguin

## 2018-09-26 NOTE — TELEPHONE ENCOUNTER
On 9/17/18, Mrs. Farooq originally scheduled pt for 10/01/18 apt.  On 9/24/18, pt called nervously re low sodium results and came in [facility] on 9/24/18 and saw Dr. Arreguin re results.  Pt was admitted to hospital, in which I adv Dr. Arreguin of 10/1/18 apt that was still scheduled.  Dr. Arreguin adv me that pt is to come in a couple weeks after being discharged.  I called Rene, son, to advise him of what Dr. Arreguin said, no answer & I left a message, WITHOUT cancelling 10/1/18 apt before talking with the son Rene.    Mrs. Sandoval called today (9/26/18) to adv she was discharged from hospital and to see about follow up apt with Dr. Arreguin.  Yeni Simpson called Rene, son, re Mrs. Sandoval's message to adv him they can keep 10/1/18 apt to see Dr. Arreguin.  Per Rene Mancilla (pt's son) verbalized understanding & confirmed apt.

## 2018-09-30 ENCOUNTER — EXTERNAL CHRONIC CARE MANAGEMENT (OUTPATIENT)
Dept: PRIMARY CARE CLINIC | Facility: CLINIC | Age: 83
End: 2018-09-30
Payer: MEDICARE

## 2018-09-30 PROCEDURE — 99490 CHRNC CARE MGMT STAFF 1ST 20: CPT | Mod: PBBFAC,PO | Performed by: FAMILY MEDICINE

## 2018-09-30 PROCEDURE — 99490 CHRNC CARE MGMT STAFF 1ST 20: CPT | Mod: S$PBB,,, | Performed by: FAMILY MEDICINE

## 2018-10-01 ENCOUNTER — OFFICE VISIT (OUTPATIENT)
Dept: CARDIOLOGY | Facility: CLINIC | Age: 83
End: 2018-10-01
Payer: MEDICARE

## 2018-10-01 VITALS
HEART RATE: 60 BPM | DIASTOLIC BLOOD PRESSURE: 66 MMHG | HEIGHT: 62 IN | WEIGHT: 123 LBS | BODY MASS INDEX: 22.63 KG/M2 | SYSTOLIC BLOOD PRESSURE: 159 MMHG

## 2018-10-01 DIAGNOSIS — I50.32 CHRONIC DIASTOLIC CHF (CONGESTIVE HEART FAILURE): Primary | ICD-10-CM

## 2018-10-01 DIAGNOSIS — Z86.73 HISTORY OF CVA (CEREBROVASCULAR ACCIDENT): ICD-10-CM

## 2018-10-01 DIAGNOSIS — I45.10 RBBB: ICD-10-CM

## 2018-10-01 DIAGNOSIS — Q21.12 PFO (PATENT FORAMEN OVALE): ICD-10-CM

## 2018-10-01 DIAGNOSIS — Z95.0 PACEMAKER: ICD-10-CM

## 2018-10-01 DIAGNOSIS — E78.00 PURE HYPERCHOLESTEROLEMIA: ICD-10-CM

## 2018-10-01 DIAGNOSIS — I49.3 PVC (PREMATURE VENTRICULAR CONTRACTION): ICD-10-CM

## 2018-10-01 DIAGNOSIS — I44.4 LAFB (LEFT ANTERIOR FASCICULAR BLOCK): ICD-10-CM

## 2018-10-01 DIAGNOSIS — I10 ESSENTIAL HYPERTENSION: ICD-10-CM

## 2018-10-01 PROCEDURE — 99999 PR PBB SHADOW E&M-EST. PATIENT-LVL III: CPT | Mod: PBBFAC,,, | Performed by: INTERNAL MEDICINE

## 2018-10-01 PROCEDURE — 99214 OFFICE O/P EST MOD 30 MIN: CPT | Mod: S$PBB,,, | Performed by: INTERNAL MEDICINE

## 2018-10-01 PROCEDURE — 99213 OFFICE O/P EST LOW 20 MIN: CPT | Mod: PBBFAC,PO | Performed by: INTERNAL MEDICINE

## 2018-10-01 NOTE — PROGRESS NOTES
Cardiology Clinic Note  Reason for Visit: post hospitalization f/u    HPI:     Karly Sandoval is a 88 y.o. F with HTN, HLD, bifascicular block (RBBB, LAFB) and unexplained syncope with wide QRS s/p Hieu dcPPM, HFpEF, PFO, PVCs, and rheumatoid arthritis, who presents for follow up after one day hospitalization last week.     At her last visit, she denied heart failure symptoms but labs showed persistent hyponatremia due to suspected volume depletion. She was referred to the ED. She was admitted for one night with IV hydration. Amlodipine was discontinued. Lisinopril was also held due to suspicion of this worsening hyponatremia. Lasix was resumed at daily (dose prior to HF exac was 3x/week). Her BP is now uncontrolled. She reports frequent anxiety and upset stomach. Her baseline weight is 120-122lb at home.    Prior cardiovascular issues:  None    ROS:    Constitution: Negative for fever or chills.  HENT: Negative for  headaches.  Eyes: Negative for blurred vision.   Cardiovascular: See above  Pulmonary: Negative for SOB. Negative for cough.   Gastrointestinal: Negative for nausea/vomiting.   : Negative for dysuria.   Skin: Negative for rashes.  Neurological: Negative for focal weakness.  Psychological: Negative for depression.    Medications:     Current Outpatient Medications on File Prior to Visit   Medication Sig Dispense Refill    ALPRAZolam (XANAX) 0.25 MG tablet TAKE 1 TABLET BY MOUTH EVERY DAY AS NEEDED 30 tablet 1    aspirin (ECOTRIN) 81 MG EC tablet Take 1 tablet (81 mg total) by mouth once daily. HOLD until cleared by your ENT doctor and your Neurologist.  0    atorvastatin (LIPITOR) 20 MG tablet TAKE 1 TABLET BY MOUTH EVERY DAY 90 tablet 3    carvedilol (COREG) 6.25 MG tablet Take 1 tablet (6.25 mg total) by mouth 2 (two) times daily with meals. (Patient taking differently: Take 9.375 mg by mouth 2 (two) times daily with meals. 1.5 tablets twice daily) 60 tablet 6    coenzyme Q10 (CO Q-10)  "100 mg capsule Take 100 mg by mouth once daily.      denosumab (PROLIA) 60 mg/mL Syrg Inject 60 mg into the skin. Every 6 months      fish oil-omega-3 fatty acids 300-1,000 mg capsule Take 1,000 mg by mouth once daily.       folic acid (FOLVITE) 1 MG tablet TAKE 1 TABLET (1 MG TOTAL) BY MOUTH ONCE DAILY. 90 tablet 3    furosemide (LASIX) 20 MG tablet Take 1 tablet (20 mg total) by mouth once daily. (Patient taking differently: Take 20 mg by mouth. 1 tablet 2 days per week per  9/14/18) 30 tablet 11    hydroxychloroquine (PLAQUENIL) 200 mg tablet Take 1 tablet (200 mg total) by mouth once daily. 30 tablet 1    lisinopril 10 MG tablet Take 1 tablet (10 mg total) by mouth once daily. 30 tablet 11    methotrexate 2.5 MG Tab TAKE 10 TABLETS (25 MG TOTAL) BY MOUTH EVERY 7 DAYS. 120 tablet 0    sertraline (ZOLOFT) 25 MG tablet Take 1 tablet (25 mg total) by mouth once daily. 30 tablet 11    spironolactone (ALDACTONE) 25 MG tablet Take 1 tablet (25 mg total) by mouth once daily. 30 tablet 11    VITAMIN B COMPLEX ORAL Take 400 mg by mouth once daily. Pt stated that she does not take vitamin b everyday.      vitamin E 400 UNIT capsule Take 400 Units by mouth once daily. Pt taking PRN       No current facility-administered medications on file prior to visit.        Physical Exam:   BP (!) 159/66   Pulse 60   Ht 5' 2" (1.575 m)   Wt 55.8 kg (123 lb 0.3 oz)   LMP  (LMP Unknown)   BMI 22.50 kg/m²      Constitutional: No apparent distress, conversant  HEENT: Sclera anicteric, extraocular movements intact  Neck: No jugular venous distension, no carotid bruits  CV: Regular rate and rhythm, ectopic beats  Pulm: Clear to auscultation bilaterally  GI: Abdomen soft, no palpable masses  Extremities: No lower extremity edema, warm with palpable pulses  Skin: No ecchymosis, erythema, or ulcers  Psych: Alert and oriented to person place location, appropriate affect  Neuro: No focal deficits    Labs:     Blood " Tests:  Lab Results   Component Value Date    BNP 97 2018     (L) 2018    K 4.5 2018     2018    CO2 22 (L) 2018    BUN 33 (H) 2018    CREATININE 1.1 2018    GLU 94 2018    HGBA1C 6.0 2014    MG 1.9 2018    AST 21 2018    ALT 23 2018    ALBUMIN 3.4 (L) 2018    PROT 6.2 2018    BILITOT 0.8 2018    WBC 4.38 2018    HGB 10.9 (L) 2018    HCT 31.2 (L) 2018    MCV 91 2018     2018    INR 1.0 2014    TSH 2.038 2018       Lab Results   Component Value Date    CHOL 132 2018    HDL 66 2018    TRIG 48 2018       Lab Results   Component Value Date    LDLCALC 56.4 (L) 2018       Urine Tests:  Lab Results   Component Value Date    COLORU Yellow 2018    APPEARANCEUA Clear 2018    PHUR 6.0 2018    SPECGRAV 1.010 2018    PROTEINUA Negative 2018    GLUCUA Negative 2018    KETONESU Negative 2018    BILIRUBINUA Negative 2018    OCCULTUA 1+ (A) 2018    NITRITE Negative 2018    UROBILINOGEN Negative 2018    LEUKOCYTESUR Trace (A) 2018    CREATRANDUR 57.0 2016       Imaging:     Echocardiogram  TTE 16  CONCLUSIONS     1 - Moderate left atrial enlargement.     2 - Eccentric hypertrophy.     3 - Normal left ventricular systolic function (EF 55-60%).     4 - Normal right ventricular systolic function .     5 - The estimated PA systolic pressure is 26 mmHg.     6 - Mild aortic regurgitation.     7 - Mild to moderate mitral regurgitation.     8 - Mild tricuspid regurgitation.      Stress testing  None     Cath Lab  None     Other  None     EK18 - atrial paced with 1st degree AVB, LAFB    Assessment:     1. Chronic diastolic CHF (congestive heart failure)    2. Essential hypertension    3. Pure hypercholesterolemia    4. LAFB (left anterior fascicular block)    5. Pacemaker    6.  PFO (patent foramen ovale)    7. PVC (premature ventricular contraction)    8. RBBB    9. History of CVA (cerebrovascular accident)        Plan:     Chronic diastolic CHF (congestive heart failure)  Appears to be euvolemic on exam. Edema resolved with discontinuation of amlodipine.  Will decrease to lasix 20mg 3x/week.   Instructed to keep daily weights and take an extra dose if weight increases 3lb in one day or 5lb in one week.     Essential hypertension  Will restart lisinopril 40mg daily.  Continue coreg 6.25mg BID and spironolactone 25mg daily.  Check BMP before next visit.     Pure hypercholesterolemia  On moderate intensity atorvastatin.     Bifascicular block  Wide QRS, syncope s/p pacemaker  Followed by Dr Ivet Carrasco.     PFO (patent foramen ovale)  On ASA.     History of CVA (cerebrovascular accident)  On ASA and statin.    Signed:  Shade Arreguin MD  Cardiology     10/1/2018 11:52 AM    Follow-up:     Future Appointments   Date Time Provider Department Center   10/1/2018 10:30 AM Jackson Arreguin III, MD [facility] CARDIO [place]   10/8/2018 11:00 AM LABJT LAB [place]   10/17/2018 10:00 AM EPO, INJECTION [facility] ID INF [facility]   10/17/2018 11:00 AM Isai Smith MD [facility] RHEUM [facility]   11/6/2018  3:20 PM Uday Allen MD [facility] MED [facility]   11/19/2018  8:00 AM HOME MONITOR DEVICE CHECK, [facility] ARRHYTH [facility]   2/19/2019  8:00 AM HOME MONITOR DEVICE CHECK, [facility] ARRHYTH Yaya Bertrand

## 2018-10-08 ENCOUNTER — OFFICE VISIT (OUTPATIENT)
Dept: URGENT CARE | Facility: CLINIC | Age: 83
End: 2018-10-08
Payer: MEDICARE

## 2018-10-08 ENCOUNTER — HOSPITAL ENCOUNTER (INPATIENT)
Facility: HOSPITAL | Age: 83
LOS: 3 days | Discharge: HOME HEALTH | DRG: 682 | End: 2018-10-11
Attending: EMERGENCY MEDICINE | Admitting: INTERNAL MEDICINE
Payer: MEDICARE

## 2018-10-08 VITALS
OXYGEN SATURATION: 97 % | HEIGHT: 62 IN | SYSTOLIC BLOOD PRESSURE: 167 MMHG | TEMPERATURE: 99 F | HEART RATE: 54 BPM | RESPIRATION RATE: 18 BRPM | BODY MASS INDEX: 22.63 KG/M2 | DIASTOLIC BLOOD PRESSURE: 53 MMHG | WEIGHT: 123 LBS

## 2018-10-08 DIAGNOSIS — R11.10 VOMITING, INTRACTABILITY OF VOMITING NOT SPECIFIED, PRESENCE OF NAUSEA NOT SPECIFIED, UNSPECIFIED VOMITING TYPE: Primary | ICD-10-CM

## 2018-10-08 DIAGNOSIS — I10 ESSENTIAL HYPERTENSION: ICD-10-CM

## 2018-10-08 DIAGNOSIS — E87.5 HYPERKALEMIA: ICD-10-CM

## 2018-10-08 DIAGNOSIS — D63.8 ANEMIA OF CHRONIC DISEASE: ICD-10-CM

## 2018-10-08 DIAGNOSIS — I49.8 ATRIAL ARRHYTHMIA: ICD-10-CM

## 2018-10-08 DIAGNOSIS — E78.00 PURE HYPERCHOLESTEROLEMIA: ICD-10-CM

## 2018-10-08 DIAGNOSIS — I50.32 CHRONIC CONGESTIVE HEART FAILURE WITH LEFT VENTRICULAR DIASTOLIC DYSFUNCTION: ICD-10-CM

## 2018-10-08 DIAGNOSIS — R19.7 VOMITING AND DIARRHEA: ICD-10-CM

## 2018-10-08 DIAGNOSIS — R89.9 ABNORMAL LABORATORY TEST: ICD-10-CM

## 2018-10-08 DIAGNOSIS — R26.89 DECREASED FUNCTIONAL MOBILITY: ICD-10-CM

## 2018-10-08 DIAGNOSIS — I71.40 ABDOMINAL AORTIC ANEURYSM (AAA) WITHOUT RUPTURE: ICD-10-CM

## 2018-10-08 DIAGNOSIS — K86.2 CYST OF PANCREAS: ICD-10-CM

## 2018-10-08 DIAGNOSIS — R74.8 ELEVATED LIPASE: ICD-10-CM

## 2018-10-08 DIAGNOSIS — M54.9 BACK PAIN, UNSPECIFIED BACK LOCATION, UNSPECIFIED BACK PAIN LATERALITY, UNSPECIFIED CHRONICITY: ICD-10-CM

## 2018-10-08 DIAGNOSIS — Z86.73 HISTORY OF CVA (CEREBROVASCULAR ACCIDENT): ICD-10-CM

## 2018-10-08 DIAGNOSIS — R06.2 WHEEZING: ICD-10-CM

## 2018-10-08 DIAGNOSIS — R93.5 ABNORMAL CT OF THE ABDOMEN: ICD-10-CM

## 2018-10-08 DIAGNOSIS — R11.10 VOMITING AND DIARRHEA: ICD-10-CM

## 2018-10-08 DIAGNOSIS — E87.1 HYPONATREMIA: ICD-10-CM

## 2018-10-08 DIAGNOSIS — K85.90 ACUTE PANCREATITIS, UNSPECIFIED COMPLICATION STATUS, UNSPECIFIED PANCREATITIS TYPE: ICD-10-CM

## 2018-10-08 DIAGNOSIS — N17.9 AKI (ACUTE KIDNEY INJURY): Primary | ICD-10-CM

## 2018-10-08 PROBLEM — R19.8 RIGHT PELVIC ADNEXAL FLUID COLLECTION: Status: ACTIVE | Noted: 2018-10-08

## 2018-10-08 LAB
ALBUMIN SERPL BCP-MCNC: 3.5 G/DL
ALP SERPL-CCNC: 55 U/L
ALT SERPL W/O P-5'-P-CCNC: 29 U/L
AMYLASE SERPL-CCNC: 164 U/L
ANION GAP SERPL CALC-SCNC: 10 MMOL/L
ANION GAP SERPL CALC-SCNC: 9 MMOL/L
AST SERPL-CCNC: 22 U/L
BASOPHILS # BLD AUTO: 0.02 K/UL
BASOPHILS NFR BLD: 0.4 %
BILIRUB SERPL-MCNC: 0.6 MG/DL
BILIRUB UR QL STRIP: NEGATIVE
BUN SERPL-MCNC: 36 MG/DL
BUN SERPL-MCNC: 42 MG/DL
CALCIUM SERPL-MCNC: 8.6 MG/DL
CALCIUM SERPL-MCNC: 9.2 MG/DL
CHLORIDE SERPL-SCNC: 93 MMOL/L
CHLORIDE SERPL-SCNC: 98 MMOL/L
CLARITY UR: CLEAR
CO2 SERPL-SCNC: 20 MMOL/L
CO2 SERPL-SCNC: 23 MMOL/L
COLOR UR: YELLOW
CREAT SERPL-MCNC: 1.5 MG/DL
CREAT SERPL-MCNC: 1.7 MG/DL
DIFFERENTIAL METHOD: ABNORMAL
EOSINOPHIL # BLD AUTO: 0.2 K/UL
EOSINOPHIL NFR BLD: 3.2 %
ERYTHROCYTE [DISTWIDTH] IN BLOOD BY AUTOMATED COUNT: 13.7 %
EST. GFR  (AFRICAN AMERICAN): 31 ML/MIN/1.73 M^2
EST. GFR  (AFRICAN AMERICAN): 36 ML/MIN/1.73 M^2
EST. GFR  (NON AFRICAN AMERICAN): 27 ML/MIN/1.73 M^2
EST. GFR  (NON AFRICAN AMERICAN): 31 ML/MIN/1.73 M^2
ESTIMATED AVG GLUCOSE: 123 MG/DL
GLUCOSE SERPL-MCNC: 103 MG/DL
GLUCOSE SERPL-MCNC: 109 MG/DL (ref 70–110)
GLUCOSE SERPL-MCNC: 98 MG/DL
GLUCOSE UR QL STRIP: NEGATIVE
HBA1C MFR BLD HPLC: 5.9 %
HCT VFR BLD AUTO: 30.3 %
HGB BLD-MCNC: 10.5 G/DL
HGB UR QL STRIP: NEGATIVE
KETONES UR QL STRIP: NEGATIVE
LACTATE SERPL-SCNC: 0.7 MMOL/L
LEUKOCYTE ESTERASE UR QL STRIP: NEGATIVE
LIPASE SERPL-CCNC: 230 U/L
LYMPHOCYTES # BLD AUTO: 0.9 K/UL
LYMPHOCYTES NFR BLD: 16.1 %
MCH RBC QN AUTO: 31.3 PG
MCHC RBC AUTO-ENTMCNC: 34.7 G/DL
MCV RBC AUTO: 90 FL
MONOCYTES # BLD AUTO: 0.3 K/UL
MONOCYTES NFR BLD: 5.9 %
NEUTROPHILS # BLD AUTO: 4.1 K/UL
NEUTROPHILS NFR BLD: 73.7 %
NITRITE UR QL STRIP: NEGATIVE
PH UR STRIP: 7 [PH] (ref 5–8)
PLATELET # BLD AUTO: 207 K/UL
PMV BLD AUTO: 10.6 FL
POC ANION GAP: 17 MMOL/L (ref 10–20)
POC BUN: 39 MMOL/L (ref 8–26)
POC CHLORIDE: 91 MMOL/L (ref 98–109)
POC CREATININE: 1.8 MG/DL (ref 0.6–1.3)
POC HEMATOCRIT: 29 %PCV (ref 37–47)
POC HEMOGLOBIN: 9.9 G/DL (ref 12.5–16)
POC ICA: 1.13 MMOL/L (ref 1.12–1.32)
POC POTASSIUM: 5 MMOL/L (ref 3.5–4.9)
POC SODIUM: 126 MMOL/L (ref 138–146)
POC TCO2: 24 MMOL/L (ref 24–29)
POTASSIUM SERPL-SCNC: 4.5 MMOL/L
POTASSIUM SERPL-SCNC: 4.9 MMOL/L
PROT SERPL-MCNC: 6.8 G/DL
PROT UR QL STRIP: NEGATIVE
RBC # BLD AUTO: 3.35 M/UL
SODIUM SERPL-SCNC: 126 MMOL/L
SODIUM SERPL-SCNC: 127 MMOL/L
SP GR UR STRIP: 1.02 (ref 1–1.03)
URN SPEC COLLECT METH UR: NORMAL
UROBILINOGEN UR STRIP-ACNC: NEGATIVE EU/DL
WBC # BLD AUTO: 5.58 K/UL

## 2018-10-08 PROCEDURE — 85025 COMPLETE CBC W/AUTO DIFF WBC: CPT

## 2018-10-08 PROCEDURE — 80047 BASIC METABLC PNL IONIZED CA: CPT | Mod: QW,S$GLB,, | Performed by: NURSE PRACTITIONER

## 2018-10-08 PROCEDURE — 25000003 PHARM REV CODE 250: Performed by: PHYSICIAN ASSISTANT

## 2018-10-08 PROCEDURE — 99215 OFFICE O/P EST HI 40 MIN: CPT | Mod: S$GLB,,, | Performed by: NURSE PRACTITIONER

## 2018-10-08 PROCEDURE — 83690 ASSAY OF LIPASE: CPT

## 2018-10-08 PROCEDURE — 93010 ELECTROCARDIOGRAM REPORT: CPT | Mod: ,,, | Performed by: INTERNAL MEDICINE

## 2018-10-08 PROCEDURE — 82150 ASSAY OF AMYLASE: CPT

## 2018-10-08 PROCEDURE — 25000003 PHARM REV CODE 250: Performed by: STUDENT IN AN ORGANIZED HEALTH CARE EDUCATION/TRAINING PROGRAM

## 2018-10-08 PROCEDURE — 83036 HEMOGLOBIN GLYCOSYLATED A1C: CPT

## 2018-10-08 PROCEDURE — 96360 HYDRATION IV INFUSION INIT: CPT

## 2018-10-08 PROCEDURE — 80053 COMPREHEN METABOLIC PANEL: CPT

## 2018-10-08 PROCEDURE — 63600175 PHARM REV CODE 636 W HCPCS: Performed by: STUDENT IN AN ORGANIZED HEALTH CARE EDUCATION/TRAINING PROGRAM

## 2018-10-08 PROCEDURE — 99285 EMERGENCY DEPT VISIT HI MDM: CPT | Mod: 25

## 2018-10-08 PROCEDURE — 93005 ELECTROCARDIOGRAM TRACING: CPT

## 2018-10-08 PROCEDURE — 81003 URINALYSIS AUTO W/O SCOPE: CPT

## 2018-10-08 PROCEDURE — 11000001 HC ACUTE MED/SURG PRIVATE ROOM

## 2018-10-08 PROCEDURE — 96361 HYDRATE IV INFUSION ADD-ON: CPT

## 2018-10-08 PROCEDURE — 83605 ASSAY OF LACTIC ACID: CPT

## 2018-10-08 PROCEDURE — 80048 BASIC METABOLIC PNL TOTAL CA: CPT

## 2018-10-08 RX ORDER — HEPARIN SODIUM 5000 [USP'U]/ML
5000 INJECTION, SOLUTION INTRAVENOUS; SUBCUTANEOUS EVERY 8 HOURS
Status: DISCONTINUED | OUTPATIENT
Start: 2018-10-08 | End: 2018-10-11 | Stop reason: HOSPADM

## 2018-10-08 RX ORDER — HYDRALAZINE HYDROCHLORIDE 25 MG/1
25 TABLET, FILM COATED ORAL EVERY 8 HOURS
Status: DISCONTINUED | OUTPATIENT
Start: 2018-10-08 | End: 2018-10-11

## 2018-10-08 RX ORDER — LOPERAMIDE HYDROCHLORIDE 2 MG/1
2 CAPSULE ORAL 3 TIMES DAILY
Qty: 15 CAPSULE | Refills: 0 | Status: CANCELLED | OUTPATIENT
Start: 2018-10-08 | End: 2018-10-15

## 2018-10-08 RX ORDER — HYDROXYCHLOROQUINE SULFATE 200 MG/1
200 TABLET, FILM COATED ORAL DAILY
Status: DISCONTINUED | OUTPATIENT
Start: 2018-10-09 | End: 2018-10-11 | Stop reason: HOSPADM

## 2018-10-08 RX ORDER — ONDANSETRON 4 MG/1
4 TABLET, ORALLY DISINTEGRATING ORAL EVERY 6 HOURS PRN
Qty: 20 TABLET | Refills: 0 | Status: CANCELLED | OUTPATIENT
Start: 2018-10-08

## 2018-10-08 RX ORDER — SODIUM CHLORIDE 0.9 % (FLUSH) 0.9 %
5 SYRINGE (ML) INJECTION
Status: DISCONTINUED | OUTPATIENT
Start: 2018-10-08 | End: 2018-10-11 | Stop reason: HOSPADM

## 2018-10-08 RX ORDER — ONDANSETRON 8 MG/1
8 TABLET, ORALLY DISINTEGRATING ORAL EVERY 8 HOURS PRN
Status: DISCONTINUED | OUTPATIENT
Start: 2018-10-08 | End: 2018-10-11 | Stop reason: HOSPADM

## 2018-10-08 RX ORDER — ACETAMINOPHEN 500 MG
500 TABLET ORAL EVERY 6 HOURS PRN
Status: DISCONTINUED | OUTPATIENT
Start: 2018-10-08 | End: 2018-10-11 | Stop reason: HOSPADM

## 2018-10-08 RX ORDER — IBUPROFEN 200 MG
24 TABLET ORAL
Status: DISCONTINUED | OUTPATIENT
Start: 2018-10-08 | End: 2018-10-11 | Stop reason: HOSPADM

## 2018-10-08 RX ORDER — IBUPROFEN 200 MG
16 TABLET ORAL
Status: DISCONTINUED | OUTPATIENT
Start: 2018-10-08 | End: 2018-10-11 | Stop reason: HOSPADM

## 2018-10-08 RX ORDER — ASPIRIN 81 MG/1
81 TABLET ORAL NIGHTLY
Status: DISCONTINUED | OUTPATIENT
Start: 2018-10-08 | End: 2018-10-11 | Stop reason: HOSPADM

## 2018-10-08 RX ORDER — CARVEDILOL 6.25 MG/1
6.25 TABLET ORAL 2 TIMES DAILY WITH MEALS
Status: DISCONTINUED | OUTPATIENT
Start: 2018-10-08 | End: 2018-10-11 | Stop reason: HOSPADM

## 2018-10-08 RX ORDER — SODIUM CHLORIDE 9 MG/ML
INJECTION, SOLUTION INTRAVENOUS CONTINUOUS
Status: ACTIVE | OUTPATIENT
Start: 2018-10-08 | End: 2018-10-09

## 2018-10-08 RX ORDER — GLUCAGON 1 MG
1 KIT INJECTION
Status: DISCONTINUED | OUTPATIENT
Start: 2018-10-08 | End: 2018-10-11 | Stop reason: HOSPADM

## 2018-10-08 RX ADMIN — HEPARIN SODIUM 5000 UNITS: 5000 INJECTION, SOLUTION INTRAVENOUS; SUBCUTANEOUS at 10:10

## 2018-10-08 RX ADMIN — ASPIRIN 81 MG: 81 TABLET, COATED ORAL at 08:10

## 2018-10-08 RX ADMIN — HYDRALAZINE HYDROCHLORIDE 25 MG: 25 TABLET, FILM COATED ORAL at 10:10

## 2018-10-08 RX ADMIN — SODIUM CHLORIDE: 0.9 INJECTION, SOLUTION INTRAVENOUS at 10:10

## 2018-10-08 RX ADMIN — SODIUM CHLORIDE 1000 ML: 0.9 INJECTION, SOLUTION INTRAVENOUS at 05:10

## 2018-10-08 RX ADMIN — CARVEDILOL 6.25 MG: 6.25 TABLET, FILM COATED ORAL at 08:10

## 2018-10-08 NOTE — PATIENT INSTRUCTIONS
Discussed going to ER due to abnormal blood work, n/v, blood in stool and both patient and son agree this should occur     Results for orders placed or performed in visit on 10/08/18   POCT Chemistry Panel   Result Value Ref Range    POC Sodium 126 (A) 138 - 146 MMOL/L    POC Potassium 5.0 (A) 3.5 - 4.9 MMOL/L    POC Chloride 91 (A) 98 - 109 MMOL/L    POC BUN 39 (A) 8 - 26 MMOL/L    POC Glucose 109 70 - 110 MG/DL    POC Creatinine 1.8 (A) 0.6 - 1.3 mg/dL    POC iCA 1.13 1.12 - 1.32 MMOL/L    POC TCO2 24 24 - 29 MMOL/L    POC Hematocrit 29 (A) 37 - 47 %PCV    POC Hemoglobin 9.9 (A) 12.5 - 16 g/dL    POC Anion Gap 17 10.0 - 20 MMOL/L

## 2018-10-08 NOTE — ED NOTES
Pt escorted by RN to restroom.  Pt ambulates with cane and steady gait.  Yellow footie socks applied.

## 2018-10-08 NOTE — ED PROVIDER NOTES
Encounter Date: 10/8/2018       History     Chief Complaint   Patient presents with    Emesis     c/o vomiting and diarrhea since Saturday. Also c/o blood in stool. Went to urgent care today and was sent to the ER for these symptoms as well as some abnormal labs today     89 yo female with multiple co morbidities presents to the ED with complaints of nausea, vomiting, and diarrhea x 3 days. Patient states she has had several episodes of loose stools per day and 1 episode of vomiting per day with minimal nausea. She states she feels as though her abdomin is bloated today and noticed from blood on her stool today. She states she does have hemorrhoids and they occasionally bleed. She denies abdominal pain, rectal pain, blood between bowel movements, dysuria, fever.       The history is provided by the patient. No  was used.     Review of patient's allergies indicates:   Allergen Reactions    Amoxicillin Other (See Comments)     unknown    Bactrim [sulfamethoxazole-trimethoprim] Other (See Comments)     unknown    Omeprazole Other (See Comments)     Muscle and joint pain    Rocephin [ceftriaxone] Other (See Comments)     Severe acute generalized pain    Penicillins Rash     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)     has pacemaker     Past Surgical History:   Procedure Laterality Date    APPENDECTOMY      BREAST  SURGERY      CARDIAC PACEMAKER PLACEMENT  11/2014    for syncope and RBBB/LAHB    CHOLECYSTECTOMY      EYE SURGERY      HEMORRHOID SURGERY      HYSTERECTOMY      1966    SKIN BIOPSY      VASCULAR SURGERY      VERTEBROPLASTY       Family History   Problem Relation Age of Onset    Leukemia Father     Heart disease Father     Hypertension Father     Diabetes Mellitus Mother     Hypertension Mother     Diabetes Mellitus Brother     Parkinsonism Brother 68    Heart attack Neg Hx     Heart failure Neg Hx     Hyperlipidemia Neg Hx     Stroke Neg Hx      Social History     Tobacco Use    Smoking status: Never Smoker    Smokeless tobacco: Never Used   Substance Use Topics    Alcohol use: No     [facility]    Drug use: No     Review of Systems   Constitutional: Negative for fever.   Gastrointestinal: Positive for abdominal distention, blood in stool, diarrhea, nausea and vomiting. Negative for abdominal pain, anal bleeding and rectal pain.   All other systems reviewed and are negative.      Physical Exam     Initial Vitals [10/08/18 1458]   BP Pulse Resp Temp SpO2   (!) 147/66 62 20 98.5 °F (36.9 °C) 98 %      MAP       --         Physical Exam    Nursing note and vitals reviewed.  Constitutional: Vital signs are normal. She appears well-developed and well-nourished. No distress.   HENT:   Head: Normocephalic and atraumatic.   Nose: Nose normal.   Eyes: Conjunctivae and lids are normal.   Neck: Normal range of motion and phonation normal.   Cardiovascular: Normal rate, regular rhythm and normal heart sounds.   Pulmonary/Chest: Effort normal and breath sounds normal.   Abdominal: Soft. Normal appearance and bowel sounds are normal. She exhibits distension. She exhibits no fluid wave and no mass. There is no tenderness. There is no rigidity and no guarding.   Musculoskeletal: Normal range of motion.   Neurological: She is alert and oriented to person, place, and time.   Skin: Skin is warm and dry.    Psychiatric: She has a normal mood and affect. Her speech is normal and behavior is normal. Judgment and thought content normal. Cognition and memory are normal.         ED Course   Procedures  Labs Reviewed   CBC W/ AUTO DIFFERENTIAL - Abnormal; Notable for the following components:       Result Value    RBC 3.35 (*)     Hemoglobin 10.5 (*)     Hematocrit 30.3 (*)     MCH 31.3 (*)     Lymph # 0.9 (*)     Gran% 73.7 (*)     Lymph% 16.1 (*)     All other components within normal limits   COMPREHENSIVE METABOLIC PANEL - Abnormal; Notable for the following components:    Sodium 126 (*)     Chloride 93 (*)     BUN, Bld 42 (*)     Creatinine 1.7 (*)     eGFR if  31 (*)     eGFR if non  27 (*)     All other components within normal limits   LIPASE - Abnormal; Notable for the following components:    Lipase 230 (*)     All other components within normal limits   AMYLASE - Abnormal; Notable for the following components:    Amylase 164 (*)     All other components within normal limits   URINALYSIS, REFLEX TO URINE CULTURE    Narrative:     Preferred Collection Type->Urine, Clean Catch   AMYLASE          Imaging Results          CT Renal Stone Study ABD Pelvis WO (Final result)  Result time 10/08/18 17:44:32    Final result by Figueroa Chapman MD (10/08/18 17:44:32)                 Impression:      2.1 cm fluid attenuation lesion within the pancreatic head.  No significant inflammatory changes surrounding the pancreas.  Differential considerations would include a pancreatic pseudocyst versus cystic neoplasm of the pancreas.  MRCP/MRI of the abdomen without and with contrast may be obtained for further evaluation.    Minimal fluid prominence of the small bowel loops.  The findings may represent enteritis.    Scattered colonic diverticula without acute diverticulitis.    3.8 cm fluid attenuation lesion within the right adnexa.  Pelvic ultrasound may be obtained for further evaluation.    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    Additional findings as above.      Electronically signed by: Figueroa Chapman MD  Date:    10/08/2018  Time:    17:44             Narrative:    EXAMINATION:  CT RENAL STONE STUDY ABD PELVIS WO    CLINICAL HISTORY:  vomiting, diarrhea, elevated lipase;    TECHNIQUE:  Axial CT scan of the abdomen and pelvis was obtained from the level of the hemidiaphragms to the pubic symphysis without intravenous contrast. Coronal and sagittal reformats were obtained.  The overall examination is somewhat degraded by motion.    COMPARISON:  Abdomen x-ray dated 10/08/2018.    FINDINGS:  There are no pleural effusions.  There is no evidence of a pneumothorax.  There are nodular airspace opacities within the lingula.    There are partially visualized pacemaker leads.  There are extensive mitral annular calcifications.  No pericardial effusion is present.  There are extensive calcifications along the course of the abdominal aorta and its branch vessels.  There is suggestion of there is aneurysm or dilatation of the suprarenal abdominal aorta measuring 3.5 cm.  There is no evidence of lymphadenopathy in the abdomen or pelvis.    The esophagus, stomach, and duodenum are within normal limits.  There is minimal distention of the small bowel loops.  The appendix is not visualized.  There are no secondary findings of acute appendicitis.  There is extensive colonic diverticula without evidence of acute diverticulitis.    The liver is unremarkable.  The patient is status post cholecystectomy.  There is dilatation of the extrahepatic biliary tree.  The spleen is unremarkable.  There is poor definition of the pancreas.  There is a 2.1 cm cystic lesion in the region of the pancreatic head.  This is poorly defined due to adjacent soft tissue.    The adrenal glands are within normal limits.  There is no  evidence of nephrolithiasis.  The distal ureters are poorly delineated.  There is no evidence of obstructing stone in the ureters.  The urinary bladder is distended.  The patient is status post hysterectomy.  There is a 3.8 x 3.6 cm fluid attenuation lesion in the right adnexa.  The remaining adnexal structures are within normal limits.    There is no evidence of free fluid in the pelvis.  There is no evidence of free air.  There is no evidence of pneumatosis.    The psoas margins are unremarkable.  The abdominal wall is within normal limits.  There are chronic appearing left-sided rib fractures.  There are bilateral pars defects at the L5-S1 level associated grade 1 anterolisthesis of L5 on S1.                               X-Ray Abdomen Flat And Erect (Final result)  Result time 10/08/18 16:18:10    Final result by Xu Flores MD (10/08/18 16:18:10)                 Impression:      Stable abdominal exam.  No acute or obstructive process.      Electronically signed by: Xu Flores MD  Date:    10/08/2018  Time:    16:18             Narrative:    EXAMINATION:  XR ABDOMEN FLAT AND ERECT    CLINICAL HISTORY:  Vomiting, unspecified    TECHNIQUE:  Flat and erect AP views of the abdomen were performed.    COMPARISON:  Two thousand fifteen    FINDINGS:  Postop changes gallbladder fossa, left subclavian pacemaker, postop vertebroplasty dorsal spine vertebral body stable.  No free air or abnormal GI tract distention.  Mild fecal debris large bowel distribution.  Vascular type calcifications within pelvis and aorta stable.                                 Medical Decision Making:   Initial Assessment:   89 yo female with diarrhea, vomiting, and nausea x 3 days with blood on stool x 1 episode today. BS normal, abdomen is distended but soft and non tender. Normal heart and lung exam. Afrebile, NAD, VSS.   Differential Diagnosis:   Enteritis, bowel obstruction, pancreatitis, hemorrhoids,  CHARLENE  Clinical Tests:    Lab Tests: Ordered and Reviewed  Radiological Study: Ordered and Reviewed  ED Management:  Labs, imaging, iv fluids.   Lipase 230, amylase 164. Sodium 126, chloride 93, BUN/Cr 42/ 1.7, GFR 27. Stable anemia. CT scan shows 2.1 cm fluid attenuation lesion within the pancreatic head.  No significant inflammatory changes surrounding the pancreas.  Differential considerations would include a pancreatic pseudocyst versus cystic neoplasm of the pancreas. Minimal fluid prominence of the small bowel loops.  The findings may represent enteritis.  6:45pm  [facility] internal medicine paged.   7:25PM  Patient will be admitted to [facility] internal medicine.                    ED Course as of Nov 27 1110   Mon Oct 08, 2018   1910 Attestation: The patient was seen independently from the midlevel or resident. The management and disposition was discussed with me.   The patient presents the emergency department with abdominal pain and nausea vomiting. The patient has an elevated lipase in the emergency department today, her sodium is 126, her chloride is 93.  Her creatinine is 1.7.  She had a CT of her abdomen which shows a cystic mass at the pancreatic head.  Differential includes pseudocyst and cystic mass. She will be admitted to the [facility] hospitalist service for further workup and evaluation of this mass and her vomiting and pain.  [ST]      ED Course User Index  [ST] Emily Carlos MD     Clinical Impression:   The primary encounter diagnosis was CHARLENE (acute kidney injury). Diagnoses of Vomiting and diarrhea, Abnormal laboratory test, and Acute pancreatitis, unspecified complication status, unspecified pancreatitis type were also pertinent to this visit.                             Dolores dAler PA-C  10/08/18 2058       Dolores Adler PA-C  11/27/18 1110

## 2018-10-08 NOTE — ED NOTES
Patient identifiers verified and correct for Karly Sandoval.  Arrived walking, gait is steady. Reports nausea, vomiting, along with frequent formed stools since this past Saturday. Denies taking any medicine to relieve symptoms. Denies pain, discomfort or fever.    LOC: The patient is awake, alert and aware of environment with an appropriate affect, the patient is oriented x 3 and speaking appropriately.  APPEARANCE: Patient resting comfortably and in no acute distress, patient is clean and well groomed, patient's clothing is properly fastened.  SKIN: The skin is warm and dry, color consistent with ethnicity, patient has normal skin turgor and moist mucus membranes, skin intact, no breakdown or bruising noted.  MUSCULOSKELETAL: Patient moving all extremities spontaneously, no obvious swelling or deformities noted.  RESPIRATORY: Airway is open and patent, respirations are spontaneous, patient has a normal effort and rate, no accessory muscle use noted, bilateral breath sounds clear.  CARDIAC: Patient has a normal rate and regular rhythm, no periphreal edema noted, capillary refill < 3 seconds.  ABDOMEN: Soft and non tender to palpation, no distention noted, normoactive bowel sounds present in all four quadrants.  NEUROLOGIC Reports laser surgery bilaterally, eyes open spontaneously, behavior appropriate to situation, follows commands, facial expression symmetrical, bilateral hand grasp equal and even, purposeful motor response noted, normal sensation in all extremities when touched with a finger.

## 2018-10-08 NOTE — PROGRESS NOTES
"Subjective:       Patient ID: Karly Sandoval is a 88 y.o. female.    Vitals:  height is 5' 2" (1.575 m) and weight is 55.8 kg (123 lb). Her temperature is 98.8 °F (37.1 °C). Her blood pressure is 167/53 (abnormal) and her pulse is 54 (abnormal). Her respiration is 18 and oxygen saturation is 97%.     Chief Complaint: Nausea    Nausea and vomiting Saturday, better yesterday. Today, had oatmeal and tolerated well but lunch she had avocado ngoc cheese sandwich and vomited.   Denies blood in the vomited   Bright red blood states she does have hemorrhoids, denies painful BM   Last time diarrhea today 4-5 times, yesterday 3 all day           Nausea   This is a new problem. Episode onset: 2days  The problem occurs constantly. The problem has been unchanged. Associated symptoms include nausea and vomiting. Pertinent negatives include no abdominal pain, chest pain, chills or fever. The symptoms are aggravated by eating. She has tried nothing for the symptoms.     Review of Systems   Constitution: Positive for decreased appetite. Negative for chills and fever.   Cardiovascular: Negative for chest pain.   Respiratory: Negative for shortness of breath.    Musculoskeletal: Negative for back pain.   Gastrointestinal: Positive for diarrhea, nausea and vomiting. Negative for abdominal pain, constipation, hematochezia and melena.   Genitourinary: Negative.  Negative for dysuria.       Objective:      Physical Exam   Constitutional: She is oriented to person, place, and time. She appears well-developed and well-nourished. She is cooperative.  Non-toxic appearance. She does not appear ill. No distress.   HENT:   Head: Normocephalic and atraumatic.   Right Ear: Hearing, tympanic membrane, external ear and ear canal normal.   Left Ear: Hearing, tympanic membrane, external ear and ear canal normal.   Nose: Nose normal. No mucosal edema, rhinorrhea or nasal deformity. No epistaxis. Right sinus exhibits no maxillary sinus tenderness and " no frontal sinus tenderness. Left sinus exhibits no maxillary sinus tenderness and no frontal sinus tenderness.   Mouth/Throat: Uvula is midline, oropharynx is clear and moist and mucous membranes are normal. No trismus in the jaw. Normal dentition. No uvula swelling. No posterior oropharyngeal erythema.   Eyes: Conjunctivae and lids are normal. Pupils are equal, round, and reactive to light. Right eye exhibits no discharge. Left eye exhibits no discharge. No scleral icterus.   Sclera clear bilat   Neck: Trachea normal, normal range of motion, full passive range of motion without pain and phonation normal. Neck supple.   Cardiovascular: Normal rate, regular rhythm, normal heart sounds, intact distal pulses and normal pulses.   Pulmonary/Chest: Effort normal and breath sounds normal. No respiratory distress.   Abdominal: Soft. Normal appearance and bowel sounds are normal. She exhibits no distension and no pulsatile midline mass. There is no tenderness. There is no guarding.   Musculoskeletal: Normal range of motion. She exhibits no edema or deformity.   Neurological: She is alert and oriented to person, place, and time. She exhibits normal muscle tone. Coordination normal.   Skin: Skin is warm, dry and intact. She is not diaphoretic. No pallor.   Psychiatric: She has a normal mood and affect. Her speech is normal and behavior is normal. Judgment and thought content normal. Cognition and memory are normal.   Nursing note and vitals reviewed.      Assessment:       1. Vomiting, intractability of vomiting not specified, presence of nausea not specified, unspecified vomiting type    2. Hyponatremia    3. Hyperkalemia        Plan:         Vomiting, intractability of vomiting not specified, presence of nausea not specified, unspecified vomiting type  -     POCT Chemistry Panel      Patient Instructions     Discussed going to ER due to abnormal blood work, n/v, blood in stool and both patient and son agree this should  occur     Results for orders placed or performed in visit on 10/08/18   POCT Chemistry Panel   Result Value Ref Range    POC Sodium 126 (A) 138 - 146 MMOL/L    POC Potassium 5.0 (A) 3.5 - 4.9 MMOL/L    POC Chloride 91 (A) 98 - 109 MMOL/L    POC BUN 39 (A) 8 - 26 MMOL/L    POC Glucose 109 70 - 110 MG/DL    POC Creatinine 1.8 (A) 0.6 - 1.3 mg/dL    POC iCA 1.13 1.12 - 1.32 MMOL/L    POC TCO2 24 24 - 29 MMOL/L    POC Hematocrit 29 (A) 37 - 47 %PCV    POC Hemoglobin 9.9 (A) 12.5 - 16 g/dL    POC Anion Gap 17 10.0 - 20 MMOL/L

## 2018-10-09 PROBLEM — K86.2 CYST OF PANCREAS: Status: ACTIVE | Noted: 2018-10-09

## 2018-10-09 LAB
ALBUMIN SERPL BCP-MCNC: 2.8 G/DL
ALP SERPL-CCNC: 47 U/L
ALT SERPL W/O P-5'-P-CCNC: 25 U/L
ANION GAP SERPL CALC-SCNC: 8 MMOL/L
AST SERPL-CCNC: 21 U/L
BASOPHILS # BLD AUTO: 0.02 K/UL
BASOPHILS NFR BLD: 0.4 %
BILIRUB SERPL-MCNC: 0.7 MG/DL
BNP SERPL-MCNC: 762 PG/ML
BUN SERPL-MCNC: 35 MG/DL
CALCIUM SERPL-MCNC: 8.2 MG/DL
CHLORIDE SERPL-SCNC: 100 MMOL/L
CHOLEST SERPL-MCNC: 107 MG/DL
CHOLEST/HDLC SERPL: 1.8 {RATIO}
CO2 SERPL-SCNC: 20 MMOL/L
CREAT SERPL-MCNC: 1.5 MG/DL
CREAT UR-MCNC: 30.7 MG/DL
DIFFERENTIAL METHOD: ABNORMAL
EOSINOPHIL # BLD AUTO: 0.1 K/UL
EOSINOPHIL NFR BLD: 2 %
ERYTHROCYTE [DISTWIDTH] IN BLOOD BY AUTOMATED COUNT: 13.7 %
EST. GFR  (AFRICAN AMERICAN): 36 ML/MIN/1.73 M^2
EST. GFR  (NON AFRICAN AMERICAN): 31 ML/MIN/1.73 M^2
FERRITIN SERPL-MCNC: 321 NG/ML
FOLATE SERPL-MCNC: >40 NG/ML
GLUCOSE SERPL-MCNC: 85 MG/DL
HCT VFR BLD AUTO: 26.2 %
HDLC SERPL-MCNC: 58 MG/DL
HDLC SERPL: 54.2 %
HGB BLD-MCNC: 9 G/DL
IRON SERPL-MCNC: 66 UG/DL
LDLC SERPL CALC-MCNC: 42.2 MG/DL
LYMPHOCYTES # BLD AUTO: 0.7 K/UL
LYMPHOCYTES NFR BLD: 15.9 %
MCH RBC QN AUTO: 31.3 PG
MCHC RBC AUTO-ENTMCNC: 34.4 G/DL
MCV RBC AUTO: 91 FL
MONOCYTES # BLD AUTO: 0.3 K/UL
MONOCYTES NFR BLD: 5.8 %
NEUTROPHILS # BLD AUTO: 3.4 K/UL
NEUTROPHILS NFR BLD: 75.5 %
NONHDLC SERPL-MCNC: 49 MG/DL
PLATELET # BLD AUTO: 175 K/UL
PMV BLD AUTO: 10.1 FL
POCT GLUCOSE: 89 MG/DL (ref 70–110)
POTASSIUM SERPL-SCNC: 4.6 MMOL/L
PROT SERPL-MCNC: 5.5 G/DL
RBC # BLD AUTO: 2.88 M/UL
SATURATED IRON: 25 %
SODIUM SERPL-SCNC: 128 MMOL/L
SODIUM UR-SCNC: 60 MMOL/L
TOTAL IRON BINDING CAPACITY: 266 UG/DL
TRANSFERRIN SERPL-MCNC: 180 MG/DL
TRIGL SERPL-MCNC: 34 MG/DL
TSH SERPL DL<=0.005 MIU/L-ACNC: 1.88 UIU/ML
UUN UR-MCNC: 405 MG/DL
VIT B12 SERPL-MCNC: 1810 PG/ML
WBC # BLD AUTO: 4.52 K/UL

## 2018-10-09 PROCEDURE — G8988 SELF CARE GOAL STATUS: HCPCS | Mod: CI

## 2018-10-09 PROCEDURE — 63600175 PHARM REV CODE 636 W HCPCS: Performed by: STUDENT IN AN ORGANIZED HEALTH CARE EDUCATION/TRAINING PROGRAM

## 2018-10-09 PROCEDURE — 83540 ASSAY OF IRON: CPT

## 2018-10-09 PROCEDURE — 97530 THERAPEUTIC ACTIVITIES: CPT

## 2018-10-09 PROCEDURE — G8978 MOBILITY CURRENT STATUS: HCPCS | Mod: CJ

## 2018-10-09 PROCEDURE — 84443 ASSAY THYROID STIM HORMONE: CPT

## 2018-10-09 PROCEDURE — 80061 LIPID PANEL: CPT

## 2018-10-09 PROCEDURE — G8979 MOBILITY GOAL STATUS: HCPCS | Mod: CH

## 2018-10-09 PROCEDURE — 25000003 PHARM REV CODE 250: Performed by: STUDENT IN AN ORGANIZED HEALTH CARE EDUCATION/TRAINING PROGRAM

## 2018-10-09 PROCEDURE — 85025 COMPLETE CBC W/AUTO DIFF WBC: CPT

## 2018-10-09 PROCEDURE — 97161 PT EVAL LOW COMPLEX 20 MIN: CPT

## 2018-10-09 PROCEDURE — 84540 ASSAY OF URINE/UREA-N: CPT

## 2018-10-09 PROCEDURE — 83880 ASSAY OF NATRIURETIC PEPTIDE: CPT

## 2018-10-09 PROCEDURE — 82570 ASSAY OF URINE CREATININE: CPT

## 2018-10-09 PROCEDURE — 11000001 HC ACUTE MED/SURG PRIVATE ROOM

## 2018-10-09 PROCEDURE — 36415 COLL VENOUS BLD VENIPUNCTURE: CPT

## 2018-10-09 PROCEDURE — 25000242 PHARM REV CODE 250 ALT 637 W/ HCPCS: Performed by: STUDENT IN AN ORGANIZED HEALTH CARE EDUCATION/TRAINING PROGRAM

## 2018-10-09 PROCEDURE — 80053 COMPREHEN METABOLIC PANEL: CPT

## 2018-10-09 PROCEDURE — 94640 AIRWAY INHALATION TREATMENT: CPT

## 2018-10-09 PROCEDURE — 97165 OT EVAL LOW COMPLEX 30 MIN: CPT

## 2018-10-09 PROCEDURE — 82607 VITAMIN B-12: CPT

## 2018-10-09 PROCEDURE — 82746 ASSAY OF FOLIC ACID SERUM: CPT

## 2018-10-09 PROCEDURE — 94761 N-INVAS EAR/PLS OXIMETRY MLT: CPT

## 2018-10-09 PROCEDURE — 82728 ASSAY OF FERRITIN: CPT

## 2018-10-09 PROCEDURE — 84300 ASSAY OF URINE SODIUM: CPT

## 2018-10-09 PROCEDURE — G8987 SELF CARE CURRENT STATUS: HCPCS | Mod: CJ

## 2018-10-09 RX ORDER — ALPRAZOLAM 0.25 MG/1
0.25 TABLET ORAL ONCE
Status: COMPLETED | OUTPATIENT
Start: 2018-10-09 | End: 2018-10-09

## 2018-10-09 RX ORDER — IPRATROPIUM BROMIDE AND ALBUTEROL SULFATE 2.5; .5 MG/3ML; MG/3ML
3 SOLUTION RESPIRATORY (INHALATION) EVERY 6 HOURS PRN
Status: DISCONTINUED | OUTPATIENT
Start: 2018-10-09 | End: 2018-10-11 | Stop reason: HOSPADM

## 2018-10-09 RX ORDER — SODIUM CHLORIDE 9 MG/ML
INJECTION, SOLUTION INTRAVENOUS CONTINUOUS
Status: ACTIVE | OUTPATIENT
Start: 2018-10-09 | End: 2018-10-10

## 2018-10-09 RX ADMIN — SODIUM CHLORIDE: 0.9 INJECTION, SOLUTION INTRAVENOUS at 08:10

## 2018-10-09 RX ADMIN — HYDROXYCHLOROQUINE SULFATE 200 MG: 200 TABLET, FILM COATED ORAL at 08:10

## 2018-10-09 RX ADMIN — HEPARIN SODIUM 5000 UNITS: 5000 INJECTION, SOLUTION INTRAVENOUS; SUBCUTANEOUS at 05:10

## 2018-10-09 RX ADMIN — ONDANSETRON 8 MG: 8 TABLET, ORALLY DISINTEGRATING ORAL at 07:10

## 2018-10-09 RX ADMIN — ALPRAZOLAM 0.25 MG: 0.25 TABLET ORAL at 05:10

## 2018-10-09 RX ADMIN — IPRATROPIUM BROMIDE AND ALBUTEROL SULFATE 3 ML: .5; 3 SOLUTION RESPIRATORY (INHALATION) at 07:10

## 2018-10-09 RX ADMIN — ASPIRIN 81 MG: 81 TABLET, COATED ORAL at 08:10

## 2018-10-09 RX ADMIN — CARVEDILOL 6.25 MG: 6.25 TABLET, FILM COATED ORAL at 05:10

## 2018-10-09 RX ADMIN — HEPARIN SODIUM 5000 UNITS: 5000 INJECTION, SOLUTION INTRAVENOUS; SUBCUTANEOUS at 08:10

## 2018-10-09 RX ADMIN — CARVEDILOL 6.25 MG: 6.25 TABLET, FILM COATED ORAL at 08:10

## 2018-10-09 RX ADMIN — HYDRALAZINE HYDROCHLORIDE 25 MG: 25 TABLET, FILM COATED ORAL at 02:10

## 2018-10-09 RX ADMIN — HEPARIN SODIUM 5000 UNITS: 5000 INJECTION, SOLUTION INTRAVENOUS; SUBCUTANEOUS at 02:10

## 2018-10-09 RX ADMIN — HYDRALAZINE HYDROCHLORIDE 25 MG: 25 TABLET, FILM COATED ORAL at 05:10

## 2018-10-09 RX ADMIN — HYDRALAZINE HYDROCHLORIDE 25 MG: 25 TABLET, FILM COATED ORAL at 08:10

## 2018-10-09 RX ADMIN — SODIUM CHLORIDE: 0.9 INJECTION, SOLUTION INTRAVENOUS at 07:10

## 2018-10-09 NOTE — PLAN OF CARE
Patient still complaining of anxiety after 0.25mg Xanax. Denies any chest pain or SOB, however is mildly tachypnic, O2 saturation 93% on room air. On exam has bilateral expiratory wheezes.     Will try duoneb and reassess. Will also order chest xray as patient has not had one this admission.     Alissa Mohan MD  [facility] PGY 3

## 2018-10-09 NOTE — PLAN OF CARE
"I was notified the Ms. Sandoval was experiencing anxiety. Went to exam patient at bed side. Somewhat pressured speech and mildy tremulous on exam. Upon questioning, patient states she has taken Xanax "on and off for years." States she is has been taking her home dose of Xanax 0.25mg QD for the last 3 weeks. This medication is listed on her home medication list. Would not normally recommend benzos in patient of this age but given the likelihood that this is withdrawal sx, will give her a one time dose of Xanax 0.25 mg. Will allow the primary team to schedule this regularly tomorrow if they deem necessary.     ZBIGNIEW Anderson MD  [facility]  "

## 2018-10-09 NOTE — PROGRESS NOTES
"[facility] Medicine Progress Note    Primary Team: \[facility]\"" Hospitalist Team A  Attending Physician: Peewee Randall MD  Resident: Alo  Intern: Igor    Subjective:      NAOE. Patient still complains of nausea this AM that started around 7 AM. No abdominal pain, vomiting, bowel movements, fever or chills. Still with decreased appetite. States she is anxious. Reports some tenderness in her lower extremities that has been a chronic condition. Denies CP/ SOB, palpitations, dizziness or HA.     Objective:     Last 24 Hour Vital Signs:  BP  Min: 140/64  Max: 170/76  Temp  Av.8 °F (36.6 °C)  Min: 97 °F (36.1 °C)  Max: 98.8 °F (37.1 °C)  Pulse  Av.7  Min: 54  Max: 80  Resp  Av  Min: 16  Max: 20  SpO2  Av.6 %  Min: 94 %  Max: 98 %  Height  Av' 2" (157.5 cm)  Min: 5' 2" (157.5 cm)  Max: 5' 2" (157.5 cm)  Weight  Av.9 kg (121 lb)  Min: 54.4 kg (120 lb)  Max: 55.8 kg (123 lb)  I/O last 3 completed shifts:  In: -   Out: 625 [Urine:625]    Physical Examination:  General: NAD, non-toxic, calm, cooperative, pleasant  Head: normocephalic, atraumatic   Eyes: EOMI, PERRL, non-icteric  Ears, Nose, Throat: OP clear without exudate, pink and moist MM, ext aud canals clear  Neck: full ROM, no JVD  Cardiovascular: RRR, II/VI BENJAMIN murmur loudest at RUSB without radiation, no extra heart sounds, peripheral pulses 2+ throughout  Pulmonary: CTABL, normal work of breathing without accessory muscle use, symmetric chest rise  Abdomen: mildly distended, soft, non-tender in any quadrant, normoactive BS, no organomegaly appreciated, no grey-christine or bushra's signs  MSKL: normal ROM  Lymphatic: no cervical LAD  Skin: without cyanosis, clubbing, or edema   Neurologic: AAOx4, CNII-XII intact, no focal deficits appreciated    Laboratory:  Laboratory Data Reviewed: yes  Pertinent Findings:  Recent Labs   Lab  10/08/18   1555  10/08/18   2115  10/09/18   0437   WBC  5.58   --   4.52   HGB  10.5*   --   9.0*   HCT  30.3*   " --   26.2*   PLT  207   --   175   MCV  90   --   91   RDW  13.7   --   13.7   NA  126*  127*  128*   K  4.9  4.5  4.6   CL  93*  98  100   CO2  23  20*  20*   BUN  42*  36*  35*   CREATININE  1.7*  1.5*  1.5*   GLU  103  98  85   PROT  6.8   --   5.5*   ALBUMIN  3.5   --   2.8*   BILITOT  0.6   --   0.7   AST  22   --   21   ALKPHOS  55   --   47*   ALT  29   --   25     Microbiology Data Reviewed: yes  Pertinent Findings:  None    Other Results:    Radiology Data Reviewed: yes  Pertinent Findings:    US Abdomen:   2.8 x 1.6 x 1.5 cm cystic lesion in the pancreatic head.  Differential considerations remain between cystic neoplasm of the pancreas and pancreatic pseudocyst.  MRCP/ERCP follow-up is suggested.    Status post cholecystectomy.  No abnormality in the gallbladder fossa.    No evidence of intrahepatic or extrahepatic biliary dilatation.    Current Medications:     Infusions:   sodium chloride 0.9% 100 mL/hr at 10/08/18 2245        Scheduled:   aspirin  81 mg Oral QHS    carvedilol  6.25 mg Oral BID WM    heparin (porcine)  5,000 Units Subcutaneous Q8H    hydrALAZINE  25 mg Oral Q8H    hydroxychloroquine  200 mg Oral Daily        PRN:  acetaminophen, dextrose 50%, dextrose 50%, glucagon (human recombinant), glucose, glucose, ondansetron, sodium chloride 0.9%      Assessment:     Karly Sandoval is a 88 y.o.female with  Patient Active Problem List    Diagnosis Date Noted    CHARLENE (acute kidney injury) 10/08/2018    Abnormal CT of the abdomen 10/08/2018    Chronic congestive heart failure with left ventricular diastolic dysfunction 10/08/2018    Elevated lipase 10/08/2018    Right pelvic adnexal fluid collection 10/08/2018    Abdominal aortic aneurysm (AAA) without rupture 10/08/2018    Anxiety 02/01/2018    Nocturia 06/01/2017    Decreased functional mobility 08/17/2016    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    Vitamin D deficiency 09/22/2015    Osteoarthritis of both knees 06/12/2015     Pacemaker 03/25/2015    RBBB 11/14/2014    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    History of CVA (cerebrovascular accident) 10/29/2014    Mild protein-calorie malnutrition 10/02/2014    Fall 09/30/2014    PVC (premature ventricular contraction) 04/28/2014    Back pain 03/22/2014    Anemia of chronic disease 03/22/2014    Atrial arrhythmia 02/11/2014    Chronic diastolic CHF (congestive heart failure) 02/11/2014    PFO (patent foramen ovale) 02/11/2014    Hyperlipidemia     RA (rheumatoid arthritis) 09/11/2012    Hypertension 09/11/2012    Osteopenia 09/11/2012    GERD (gastroesophageal reflux disease) 09/11/2012        Plan:     Acute kidney injury  -Cr 1.7, BUN 42 eGFR 27  Baseline Cr 0.9  -reported history of diarrhea and decreased PO intake  -BUN/Cr >20  - FEUrea: 56.5% suggesting intrinsic renal disease  CT renal stone study with no evidence of nephrolithiasis  -IVF bolus in ED, NS @100 mL/h on floor   -Repeat Cr 1.5 this AM, slightly improved  -Will discuss renal US today     Diarrhea and nausea  -3 days of subjective dark loose stools, denies BRBPR  -Pt has history of scattered diverticulosis and hemorrhoids  -Possibly gastroenteritis causing dehydration w/ CHARLENE  -FOBT, pending  -will monitor and treat symptomatically     Pancreatic head lesion  -poorly defined 2.1 cm cystic lesion seen in the head of pancreas on CT renal stone study   -Lipase slightly elevated to 230, amylase 164  -abdomen non-tender, no evidence of pancreatitis on CT scan   -LFTs normal  -Will consider further work up with MRCP/MRI with and without contrast once CHARLENE resolved     Abdominal aortic aneurism  -New diagnosis of 3.5 cm aneurysmal dilatation of suprarenal abdominal aorta  -No issues currently  -Will tightly control BP     Fluid in right adnexa  -3.8 cm fluid attenuation lesion within the right adnexa.  -Pelvic ultrasound ordered for further investigation     Hyponatremia  -Na+ 126 at admission   baseline is ~135  -MIVF NS at 100 mL/h   -daily CMPs     Normocytic anemia  -H/H 10.5/30.3 MCV 90  baseline Hgb 10-11  -anemia panel pending     Chronic diastolic heart failure with preserved EF  -Echo from 2014 shows significant diastolic dysfunction. More recent Echo from 2016 indeterminate.   -Continue home coreg 6.25 BID  will hold lasix, lisinopril, and aldactone in setting of CHARLENE  -No current issues  euvolemic to slightly dry on exam     Coronary artery disease  -atherosclerosis seen on previous imaging (CT thorax 2014)  -Continue lipitor 20  -Not currently on ASA at home, ordered     Hypertension, essential  -hypertensive in ED, pt unclear if she took BP meds today  -restart home BB  Hydralazine TID with parameters scheduled  -ACEi and spironolactone held 2/2 CHARLENE  -continue to monitor closely      Rheumatoid arthritis  -no current issues  -continue home plaquenil     CODE: Full  PPX: SQH  DIET: Cardiac     DISPO: Pending improvement in kidney function and work up for pancreatic/adnexal lesion. Will need referral to case management as pt is requesting discharge to a nursing home.        Miguel Costa MD  [facility] Internal Medicine HO-1    [facility] Medicine Hospitalist Pager numbers:   [facility] Hospitalist Medicine Team A (Tonia/Yordan): [phone]  [facility] Hospitalist Medicine Team B (Kang/Mimi):  [phone]

## 2018-10-09 NOTE — PLAN OF CARE
Problem: Patient Care Overview  Goal: Plan of Care Review  Outcome: Ongoing (interventions implemented as appropriate)  Reviewed the plan of care with the pt. Pt denies any pain overnight. Pt presented with distended abdomen. IV fluids initiated. Urine sample collected. No true red alarms noted on tele  The bed is lock and in the lowest position. The call light is in reach and safety measures are in place. The pt verbalizes full understanding of their plan of care.

## 2018-10-09 NOTE — PLAN OF CARE
TN went to meet with patient, camille López at bedside. Patient lives at home alone. She has a rolling walker and cane. She does not have home health. Patient does have a service that comes one time a week to do housecleaning, etc. TN did ask patient and family about nursing home placement. Patient declined. TN informed them therapy is recommending home health on discharge. TN gave them a home health integrated provider list to review. They are interested in assisted living or independent living in the near future. They have TN permission to contact Tracie Hope with [facility].    Future Appointments   Date Time Provider Department Center   10/17/2018 10:00 AM EPO, INJECTION [facility] INF [facility]   10/17/2018 11:00 AM [facility] [address]   10/25/2018 10:00 AM LAB, [facility] LAB [place]   10/29/2018 10:30 AM Jackson Arreguin III, MD [facility] CARDIO [place]   11/6/2018  3:20 PM Uday Allen MD [facility]   11/19/2018  8:00 AM HOME MONITOR DEVICE CHECK, [facility] AR[facility] [address]   2/19/2019  8:00 AM HOME MONITOR DEVICE CHECK, [facility] [address]        10/09/18 1312   Discharge Assessment   Assessment Type Discharge Planning Assessment   Confirmed/corrected address and phone number on facesheet? Yes   Assessment information obtained from? Patient   Prior to hospitilization cognitive status: Alert/Oriented   Prior to hospitalization functional status: Assistive Equipment   Current cognitive status: Alert/Oriented   Current Functional Status: Assistive Equipment   Facility Arrived From: Home   Lives With alone   Able to Return to Prior Arrangements yes   Is patient able to care for self after discharge? Yes   Who are your caregiver(s) and their phone number(s)? Rene Sandoval [phone]    Patient's perception of discharge disposition home health   Readmission Within The Last 30 Days no previous admission in last 30 days    Equipment Currently Used at Home walker, rolling;cane, straight   Does the patient have transportation home? Yes   Transportation Available family or friend will provide   Dialysis Name and Scheduled days N/A   Discharge Plan A Home with family;Home Health   Discharge Plan B Home with family   Patient/Family In Agreement With Plan yes     Alysa Phipps RN  Transition Navigator  [phone]

## 2018-10-09 NOTE — PT/OT/SLP EVAL
Physical Therapy Evaluation    Patient Name:  Karly Sandoval   MRN:  [id number]    Recommendations:     Discharge Recommendations:  home with home health, home health PT, home health OT   Discharge Equipment Recommendations: none   Barriers to discharge: None    Assessment:     Karly Sandoval is a 88 y.o. female admitted with a medical diagnosis of CHARLENE (acute kidney injury).  She presents with the following impairments/functional limitations:  weakness, impaired functional mobilty, impaired balance, gait instability, impaired endurance, impaired cardiopulmonary response to activity, decreased safety awareness . Patient with decline in functional mobility. Would benefit from Home Health O T and PT assessment.    Rehab Prognosis:  good; patient would benefit from acute skilled PT services to address these deficits and reach maximum level of function.      Recent Surgery: * No surgery found *      Plan:     During this hospitalization, patient to be seen 6 x/week to address the above listed problems via gait training, therapeutic activities, therapeutic exercises  · Plan of Care Expires:  11/09/18   Plan of Care Reviewed with: patient, son    Subjective     Communicated with primary nurse prior to session.  Patient found supine  upon PT entry to room, agreeable to evaluation.      Chief Complaint: weakness  Patient comments/goals: go home  Pain/Comfort:  · Pain Rating 1: 0/10  · Pain Rating Post-Intervention 1: 0/10    Patients cultural, spiritual, Jainism conflicts given the current situation:      Living Environment:  Lives alone in [facility] family close by for visits  Prior to admission, patients level of function was independent.  Patient has the following equipment: cane, straight, walker, rolling.  DME owned (not currently used): none.  Upon discharge, patient will have assistance from family  .    Objective:     Patient found with: telemetry, peripheral IV     General Precautions: Standard, fall    Orthopedic Precautions:N/A   Braces: N/A     Exams:  · RLE ROM: WFL  · RLE Strength: WFL  · LLE ROM: WFL  · LLE Strength: WFL    Functional Mobility:  · Bed Mobility:     · Supine to Sit: stand by assistance  · Transfers:     · Sit to Stand:  contact guard assistance with straight cane  · Gait: 70 ft with SPC and CG for safety  · Balance: fair with AD    AM-PAC 6 CLICK MOBILITY  Total Score:21       Therapeutic Activities and Exercises:   na    Patient left up in chair with all lines intact, call button in reach and chair alarm on.    GOALS:   Multidisciplinary Problems     Physical Therapy Goals        Problem: Physical Therapy Goal    Goal Priority Disciplines Outcome Goal Variances Interventions   Physical Therapy Goal     PT, PT/OT Ongoing (interventions implemented as appropriate)     Description:  Goals to be met by: 2018     Patient will increase functional independence with mobility by performin. Supine to sit with Modified [place]  2. Sit to stand transfer with Modified [place]  3. Gait  x 150 feet with Modified [place] using Rolling Walker.                       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)     has pacemaker       Past Surgical History:   Procedure Laterality Date    APPENDECTOMY      BREAST SURGERY      CARDIAC PACEMAKER PLACEMENT  2014    for syncope and RBBB/LAHB     CHOLECYSTECTOMY      EYE SURGERY      HEMORRHOID SURGERY      HYSTERECTOMY      1966    SKIN BIOPSY      VASCULAR SURGERY      VERTEBROPLASTY         Clinical Decision Making:     History  Co-morbidities and personal factors that may impact the plan of care Examination  Body Structures and Functions, activity limitations and participation restrictions that may impact the plan of care Clinical Presentation   Decision Making/ Complexity Score   Co-morbidities:   [] Time since onset of injury / illness / exacerbation  [] Status of current condition  [x]Patient's cognitive status and safety concerns    [] Multiple Medical Problems (see med hx)  Personal Factors:   [] Patient's age  [] Prior Level of function   [x] Patient's home situation (environment and family support)  [] Patient's level of motivation  [] Expected progression of patient      HISTORY:(criteria)    [] 67929 - no personal factors/history    [x] 09164 - has 1-2 personal factor/comorbidity     [] 84896 - has >3 personal factor/comorbidity     Body Regions:  [] Objective examination findings  [] Head     []  Neck  [] Trunk   [] Upper Extremity  [x] Lower Extremity    Body Systems:  [] For communication ability, affect, cognition, language, and learning style: the assessment of the ability to make needs known, consciousness, orientation (person, place, and time), expected emotional /behavioral responses, and learning preferences (eg, learning barriers, education  needs)  [x] For the neuromuscular system: a general assessment of gross coordinated movement (eg, balance, gait, locomotion, transfers, and transitions) and motor function  (motor control and motor learning)  [x] For the musculoskeletal system: the assessment of gross symmetry, gross range of motion, gross strength, height, and weight  [] For the integumentary system: the assessment of pliability(texture), presence of scar formation, skin color, and skin integrity  [x] For  cardiovascular/pulmonary system: the assessment of heart rate, respiratory rate, blood pressure, and edema     Activity limitations:    [x] Patient's cognitive status and saf ety concerns          [] Status of current condition      [] Weight bearing restriction  [] Cardiopulmunary Restriction    Participation Restrictions:   [] Goals and goal agreement with the patient     [] Rehab potential (prognosis) and probable outcome      Examination of Body System: (criteria)    [] 83779 - addressing 1-2 elements    [x] 43406 - addressing a total of 3 or more elements     [] 29785 -  Addressing a total of 4 or more elements         Clinical Presentation: (criteria)  Stable - 60431     On examination of body system using standardized tests and measures patient presents with 1-2 elements from any of the following: body structures and functions, activity limitations, and/or participation restrictions.  Leading to a clinical presentation that is considered stable and/or uncomplicated                              Clinical Decision Making  (Eval Complexity):  Low- 54560     Time Tracking:     PT Received On: 10/09/18  PT Start Time: 1150     PT Stop Time: 1211  PT Total Time (min): 21 min     Billable Minutes: Evaluation 20      Poncho Decker, PT  10/09/2018

## 2018-10-09 NOTE — PT/OT/SLP EVAL
Occupational Therapy   Evaluation & tx    Name: Karly Sandoval  MRN: [id number]  Admitting Diagnosis:  CHARLENE (acute kidney injury)      Recommendations:     Discharge Recommendations: home health OT, home health PT  Discharge Equipment Recommendations:  none  Barriers to discharge:  Decreased caregiver support    History:     Occupational Profile:  Living Environment: alone in H w/ 4STE & BHR; has T/S combo  Previous level of function: MI  Roles and Routines: light IADLs; PRN driving  Equipment Used at Home:  bedside commode, bath bench, cane, straight, raised toilet(life alert)  Assistance upon Discharge: 24hr S/A    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)     has pacemaker       Past Surgical History:   Procedure Laterality Date    APPENDECTOMY      BREAST SURGERY      CARDIAC PACEMAKER PLACEMENT  11/2014    for syncope and RBBB/LAHB    CHOLECYSTECTOMY      EYE SURGERY      HEMORRHOID SURGERY      HYSTERECTOMY      1966    SKIN BIOPSY      VASCULAR SURGERY      VERTEBROPLASTY         Subjective     Chief Complaint: N/V  Patient/Family Comments/goals: return home    Pain/Comfort:  · Pain Rating 1: 0/10  · Pain Rating Post-Intervention 1: 0/10    Patients cultural, spiritual, Methodist conflicts given the current situation:      Objective:     Communicated with: nsg prior to session.  Patient found  peripheral IV, telemetry, bed  alarm upon OT entry to room.    General Precautions: Standard, fall   Orthopedic Precautions:N/A   Braces: N/A     Occupational Performance:    Bed Mobility:    · Patient completed Supine to Sit with supervision    Functional Mobility/Transfers:  · Patient completed Sit <> Stand Transfer with stand by assistance and contact guard assistance  with  straight cane   · Patient completed Bed <> Chair Transfer using Step Transfer technique with stand by assistance and contact guard assistance with straight cane  · Functional Mobility: via SPC around room w/ SB-CGA    Activities of Daily Living:  ·     Cognitive/Visual Perceptual:  Grossly WFL    Physical Exam:  B UEs WFL at 4/5    Sit balance: F+  Stand balance: F- to F    AMPAC 6 Click ADL:  AMPA Total Score: 20    Treatment & Education:  OT/PT co-eval & tx completed this date. Pt lives alone in [facility] w/ 4STE & B[facility]; has T/S combo. PLOF: MI w/ SPC (outside home only) w/ all fxnl tasks incl light IADLs & PRN driving. Currently, pt SBA-CGA via SPC around room & noted SOB w/ mod exertion but cont at 93% on RA. Edu/tx re: HEP & rec (I) vs A'ed living facility at d/c. Pt/son verbalized understanding. Pt left up in b/s chair w/ chair alarm & nsg notified.    Education:    Patient left up in chair with all lines intact, call button in reach, chair alarm on, nsg notified and son present    Assessment:   Pt presents w/ decreased overall endurance/conditioning, balance/mobility & coordination w/ subsequent decline in (I)/safety w/ BADLs, fxnl mobility & fxnl t/f's. OT 3x/wk to increase phys/fxnl status & maximize potential to achieve established goals for d/c-->home w/ HHOT/PT & no DME.  Education:      Karly Sandoval is a 88 y.o. female with a medical diagnosis of CHARLENE (acute kidney injury).  She presents with the following performance deficits affecting function: weakness, impaired endurance, gait instability, impaired functional mobilty, impaired self care skills, impaired  "balance, decreased lower extremity function, decreased safety awareness, impaired cardiopulmonary response to activity.      Rehab Prognosis: Good; patient would benefit from acute skilled OT services to address these deficits and reach maximum level of function.         Clinical Decision Makin.  OT Low:  "Pt evaluation falls under low complexity for evaluation coding due to performance deficits noted in 1-3 areas as stated above and 0 co-morbities affecting current functional status. Data obtained from problem focused assessments. No modifications or assistance was required for completion of evaluation. Only brief occupational profile and history review completed."     Plan:     Patient to be seen 3 x/week to address the above listed problems via self-care/home management, therapeutic activities, therapeutic exercises  · Plan of Care Expires: 18  · Plan of Care Reviewed with: patient, son    This Plan of care has been discussed with the patient who was involved in its development and understands and is in agreement with the identified goals and treatment plan    GOALS:   Multidisciplinary Problems     Occupational Therapy Goals        Problem: Occupational Therapy Goal    Goal Priority Disciplines Outcome Interventions   Occupational Therapy Goal     OT, PT/OT Ongoing (interventions implemented as appropriate)    Description:  Goals to be met by: 18     Patient will increase functional independence with ADLs by performing:    LE Dressing with Supervision.  Grooming while standing at sink with Supervision.  Toileting from toilet with Supervision for hygiene and clothing management.   Toilet transfer to toilet with Supervision.  Increased functional strength to WFL for ADLs.                      Time Tracking:     OT Date of Treatment: 10/09/18  OT Start Time: 1145  OT Stop Time: 1211  OT Total Time (min): 26 min    Billable Minutes:Evaluation 10  Therapeutic Activity 11  Total Time 21    Amalia " DACIA Brothers  10/9/2018

## 2018-10-09 NOTE — H&P
"[facility] Medicine H&P Note     Admitting Team: [facility] Hospitalist Team A  Attending Physician: Peewee Randall M.D.  Resident: Yuri Prajapati  Intern: Miguel Costa     Date of Admit: 10/8/2018    Chief Complaint     Loose bowels x 3 days    Subjective:      History of Present Illness:    Karly Sandoval is an 88 year old female, with a past medical history of diverticulitis, CVA (2014 w/o residual deficits), HTN, HLD, HFpEF, RA/OA, hx of atrial arrhythmias s/p single lead pacemaker, who presents to ED with primary complaint of diarrhea for 2 days and one day of nausea and vomiting x 1.    Ms. Sandoval was in her usual state of health until Saturday when she began having frequent loose stools. She states that sometime around noon, she had the first of many "loose and dark" bowel movements. This continued for three days and on morning of admission, patient began to feel nauseated and endorses one episode of non-bloody, non-bilious emesis. She states that she has had no appetite and decreased PO intake over this time. She also associates some mild abdominal distension and increased fatigue/lethargy, but she denies any abdominal pain, BRBPR fevers, chills, dizziness, blurry vision, chest pain or shortness of breath.     Patient reports no known history of gall stones. She denies any alcohol use for at least 4 years. The only medication change she reports is recently being off of lisinopril and being restarted on it, but she is unclear why. She denies any sick contacts or recent antibiotic use.    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)     has pacemaker       Past Surgical History:  Past Surgical History:   Procedure Laterality Date    APPENDECTOMY      BREAST SURGERY      CARDIAC PACEMAKER PLACEMENT  11/2014    for syncope and RBBB/LAHB    CHOLECYSTECTOMY      EYE SURGERY      HEMORRHOID SURGERY      HYSTERECTOMY      1966    SKIN BIOPSY      VASCULAR SURGERY      VERTEBROPLASTY         Allergies:  Review of patient's allergies indicates:   Allergen Reactions    Amoxicillin Other (See Comments)     unknown    Bactrim [sulfamethoxazole-trimethoprim] Other (See Comments)     unknown    Omeprazole Other (See Comments)     Muscle and joint pain    Rocephin [ceftriaxone] Other (See Comments)     Severe acute generalized pain    Penicillins Rash       Home Medications:  Prior to Admission medications    Medication Sig Start Date End Date Taking? Authorizing Provider   aspirin (ECOTRIN) 81 MG EC tablet Take 1 tablet (81 mg total) by mouth once daily. HOLD until cleared by your ENT doctor and your Neurologist. 10/4/14  Yes Nicolás Lr MD   atorvastatin (LIPITOR) 20 MG tablet TAKE 1 TABLET BY MOUTH EVERY DAY 7/12/18  Yes Uday Allen MD   carvedilol (COREG) 6.25 MG tablet Take 1 tablet (6.25 mg total) by mouth 2 (two) times daily with meals. 8/13/18  Yes Briseida Muñoz MD   folic acid (FOLVITE) 1 MG tablet TAKE 1 TABLET (1 MG TOTAL) BY MOUTH ONCE DAILY. 6/1/18 6/1/19 Yes Isai Smith MD   furosemide (LASIX) 20 MG tablet Take 1 tablet (20 mg total) by mouth once daily. 2/8/18 2/8/19 Yes Nestor Amezcua MD   hydroxychloroquine (PLAQUENIL) 200 mg tablet Take 1 tablet (200 mg total) by mouth once daily. 7/11/18  Yes Bernabe Huber DO   lisinopril 10 MG tablet Take 1 tablet (10 mg total) by mouth  once daily. 9/26/18 9/26/19 Yes Uday Allen MD   sertraline (ZOLOFT) 25 MG tablet Take 1 tablet (25 mg total) by mouth once daily. 6/19/18  Yes Uday Allen MD   spironolactone (ALDACTONE) 25 MG tablet Take 1 tablet (25 mg total) by mouth once daily. 9/17/18 9/17/19 Yes Jackson Arreguin III, MD   ALPRAZolam (XANAX) 0.25 MG tablet TAKE 1 TABLET BY MOUTH EVERY DAY AS NEEDED 8/24/18   Uday Allen MD   calcium carbonate (CALCIUM 600 ORAL) Take 1 tablet by mouth 2 (two) times daily.    Historical Provider, MD   coenzyme Q10 (CO Q-10) 100 mg capsule Take 100 mg by mouth once daily.    Historical Provider, MD   denosumab (PROLIA) 60 mg/mL Syrg Inject 60 mg into the skin. Every 6 months    Historical Provider, MD   fish oil-omega-3 fatty acids 300-1,000 mg capsule Take 1,000 mg by mouth once daily.     Historical Provider, MD   methotrexate 2.5 MG Tab TAKE 10 TABLETS (25 MG TOTAL) BY MOUTH EVERY 7 DAYS. 6/26/18 6/26/19  Isai Smith MD   VITAMIN B COMPLEX ORAL Take 400 mg by mouth once daily. Pt stated that she does not take vitamin b everyday.    Historical Provider, MD   vitamin E 400 UNIT capsule Take 400 Units by mouth once daily. Pt taking PRN    Historical Provider, MD       Family History:  Family History   Problem Relation Age of Onset    Leukemia Father     Heart disease Father     Hypertension Father     Diabetes Mellitus Mother     Hypertension Mother     Diabetes Mellitus Brother     Parkinsonism Brother 68    Heart attack Neg Hx     Heart failure Neg Hx     Hyperlipidemia Neg Hx     Stroke Neg Hx        Social History:  Social History     Tobacco Use    Smoking status: Never Smoker    Smokeless tobacco: Never Used   Substance Use Topics    Alcohol use: No     Comment: mitchell    Drug use: No       Review of Systems:  Pertinent items are noted in HPI. All other systems are reviewed and are negative.    Health Maintaince :   Primary Care Physician:   "Alejandro    Objective:   Last 24 Hour Vital Signs:  BP  Min: 147/66  Max: 170/76  Temp  Av.7 °F (37.1 °C)  Min: 98.5 °F (36.9 °C)  Max: 98.8 °F (37.1 °C)  Pulse  Av.8  Min: 54  Max: 68  Resp  Av  Min: 18  Max: 20  SpO2  Av.2 %  Min: 95 %  Max: 98 %  Height  Av' 2" (157.5 cm)  Min: 5' 2" (157.5 cm)  Max: 5' 2" (157.5 cm)  Weight  Av.1 kg (121 lb 8 oz)  Min: 54.4 kg (120 lb)  Max: 55.8 kg (123 lb)  Body mass index is 21.95 kg/m².  I/O last 3 completed shifts:  In: -   Out: 150 [Urine:150]    Physical Examination:  General: NAD, non-toxic, calm, cooperative, pleasant  Head: normocephalic, atraumatic   Eyes: EOMI, PERRL, non-icteric  Ears, Nose, Throat: no gross auditory deficit detected via conversation on normal room volume , without rhinorrhea, Oropharynx clear and without exudate or erythema, pink moist MM  Neck: full ROM, no JVD  Cardiovascular: RRR, without murmurs or rubs, without extra heart sounds, peripheral pulses 2+ throughout  Pulmonary: CTABL, normal work of breathing without accessory muscle use, symmetric chest rise  Abdomen: mildly distended, soft, non-tender, normoactive BS, no organomegaly appreciated, no grey-christine or bushra's signs  MSKL: normal ROM  Lymphatic: no cervical LAD  Skin: without cyanosis, clubbing, or edema   Neurologic: AAOx4, CNII-XII intact, no focal deficits appreciated    Laboratory:  Most Recent Data:  CBC:   Lab Results   Component Value Date    WBC 5.58 10/08/2018    HGB 10.5 (L) 10/08/2018    HCT 30.3 (L) 10/08/2018     10/08/2018    MCV 90 10/08/2018    RDW 13.7 10/08/2018       BMP:   Lab Results   Component Value Date     (L) 10/08/2018    K 4.9 10/08/2018    CL 93 (L) 10/08/2018    CO2 23 10/08/2018    BUN 42 (H) 10/08/2018    CREATININE 1.7 (H) 10/08/2018     10/08/2018    CALCIUM 9.2 10/08/2018    MG 1.9 2018    PHOS 3.4 2018     LFTs:   Lab Results   Component Value Date    PROT 6.8 10/08/2018    ALBUMIN 3.5 " 10/08/2018    BILITOT 0.6 10/08/2018    AST 22 10/08/2018    ALKPHOS 55 10/08/2018    ALT 29 10/08/2018     Coags:   Lab Results   Component Value Date    INR 1.0 11/21/2014     FLP:   Lab Results   Component Value Date    CHOL 132 06/22/2018    HDL 66 06/22/2018    LDLCALC 56.4 (L) 06/22/2018    TRIG 48 06/22/2018    CHOLHDL 50.0 06/22/2018     DM:   Lab Results   Component Value Date    HGBA1C 6.0 01/27/2014    LDLCALC 56.4 (L) 06/22/2018    CREATININE 1.7 (H) 10/08/2018     Thyroid:   Lab Results   Component Value Date    TSH 2.038 06/22/2018     Anemia:   Lab Results   Component Value Date    IRON 48 07/11/2018    TIBC 358 07/11/2018    FERRITIN 154 07/11/2018    DWBMOSVA49 1471 (H) 07/11/2018    FOLATE >40.0 (H) 07/11/2018     Cardiac:   Lab Results   Component Value Date    TROPONINI 0.012 01/27/2014    BNP 97 09/24/2018     Urinalysis:   Lab Results   Component Value Date    LABURIN No growth 02/12/2014    COLORU Yellow 10/08/2018    SPECGRAV 1.025 10/08/2018    NITRITE Negative 10/08/2018    KETONESU Negative 10/08/2018    UROBILINOGEN Negative 10/08/2018    WBCUA 2 09/24/2018       Trended Lab Data:  Recent Labs   Lab  10/08/18   1555   WBC  5.58   HGB  10.5*   HCT  30.3*   PLT  207   MCV  90   RDW  13.7   NA  126*   K  4.9   CL  93*   CO2  23   BUN  42*   CREATININE  1.7*   GLU  103   PROT  6.8   ALBUMIN  3.5   BILITOT  0.6   AST  22   ALKPHOS  55   ALT  29       Trended Cardiac Data:  No results for input(s): TROPONINI, CKTOTAL, CKMB, BNP in the last 168 hours.    Radiology:  Imaging Results          CT Renal Stone Study ABD Pelvis WO (Final result)  Result time 10/08/18 17:44:32    Final result by Figueroa Chapman MD (10/08/18 17:44:32)                 Impression:      2.1 cm fluid attenuation lesion within the pancreatic head.  No significant inflammatory changes surrounding the pancreas.  Differential considerations would include a pancreatic pseudocyst versus cystic neoplasm of the pancreas.  MRCP/MRI of  the abdomen without and with contrast may be obtained for further evaluation.    Minimal fluid prominence of the small bowel loops.  The findings may represent enteritis.    Scattered colonic diverticula without acute diverticulitis.    3.8 cm fluid attenuation lesion within the right adnexa.  Pelvic ultrasound may be obtained for further evaluation.    3.5 cm aneurysmal dilatation of the suprarenal abdominal aorta along with advanced atherosclerotic disease of the abdominal aorta and its branch vessels.    Additional findings as above.      Electronically signed by: Figueroa Chapman MD  Date:    10/08/2018  Time:    17:44             Narrative:    EXAMINATION:  CT RENAL STONE STUDY ABD PELVIS WO    CLINICAL HISTORY:  vomiting, diarrhea, elevated lipase;    TECHNIQUE:  Axial CT scan of the abdomen and pelvis was obtained from the level of the hemidiaphragms to the pubic symphysis without intravenous contrast. Coronal and sagittal reformats were obtained.  The overall examination is somewhat degraded by motion.    COMPARISON:  Abdomen x-ray dated 10/08/2018.    FINDINGS:  There are no pleural effusions.  There is no evidence of a pneumothorax.  There are nodular airspace opacities within the lingula.    There are partially visualized pacemaker leads.  There are extensive mitral annular calcifications.  No pericardial effusion is present.  There are extensive calcifications along the course of the abdominal aorta and its branch vessels.  There is suggestion of there is aneurysm or dilatation of the suprarenal abdominal aorta measuring 3.5 cm.  There is no evidence of lymphadenopathy in the abdomen or pelvis.    The esophagus, stomach, and duodenum are within normal limits.  There is minimal distention of the small bowel loops.  The appendix is not visualized.  There are no secondary findings of acute appendicitis.  There is extensive colonic diverticula without evidence of acute diverticulitis.    The liver is  unremarkable.  The patient is status post cholecystectomy.  There is dilatation of the extrahepatic biliary tree.  The spleen is unremarkable.  There is poor definition of the pancreas.  There is a 2.1 cm cystic lesion in the region of the pancreatic head.  This is poorly defined due to adjacent soft tissue.    The adrenal glands are within normal limits.  There is no evidence of nephrolithiasis.  The distal ureters are poorly delineated.  There is no evidence of obstructing stone in the ureters.  The urinary bladder is distended.  The patient is status post hysterectomy.  There is a 3.8 x 3.6 cm fluid attenuation lesion in the right adnexa.  The remaining adnexal structures are within normal limits.    There is no evidence of free fluid in the pelvis.  There is no evidence of free air.  There is no evidence of pneumatosis.    The psoas margins are unremarkable.  The abdominal wall is within normal limits.  There are chronic appearing left-sided rib fractures.  There are bilateral pars defects at the L5-S1 level associated grade 1 anterolisthesis of L5 on S1.                               X-Ray Abdomen Flat And Erect (Final result)  Result time 10/08/18 16:18:10    Final result by Xu Flores MD (10/08/18 16:18:10)                 Impression:      Stable abdominal exam.  No acute or obstructive process.      Electronically signed by: Xu Flores MD  Date:    10/08/2018  Time:    16:18             Narrative:    EXAMINATION:  XR ABDOMEN FLAT AND ERECT    CLINICAL HISTORY:  Vomiting, unspecified    TECHNIQUE:  Flat and erect AP views of the abdomen were performed.    COMPARISON:  Two thousand fifteen    FINDINGS:  Postop changes gallbladder fossa, left subclavian pacemaker, postop vertebroplasty dorsal spine vertebral body stable.  No free air or abnormal GI tract distention.  Mild fecal debris large bowel distribution.  Vascular type calcifications within pelvis and aorta stable.                                      Assessment:     Karly Sandoval is a 88 y.o. 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7, BUN 42 eGFR 27  Baseline Cr 0.9  -reported history of diarrhea and decreased PO intake  -BUN/Cr >20, likely pre-renal etiology, urine electrolytes ordered  -IVF bolus while in ED  gentle fluids once to floor    Diarrhea and nausea  -3 days of subjective dark loose stools, denies BRBPR  -Pt has history of scattered diverticulosis  -Possibly gastroenteritis causing dehydration w/ CHARLENE  -FOBT ordered  -will monitor and treat symptomatically    Pancreatic head lesion  -poorly defined 2.1 cm cystic lesion seen in the head of pancreas on CT renal stone study   -Lipase slightly elevated to 230, amylase 164  -abdomen non-tender, no evidence of pancreatitis on CT scan   -LFTs normal  -Will consider further work up with MRCP/MRI with and without contrast once CHARLENE resolved    Abdominal aortic aneurism  -New diagnosis of 3.5 cm aneurysmal dilatation of suprarenal  abdominal aorta  -No issues currently  -Will tightly control BP    Fluid in right adnexa  -3.8 cm fluid attenuation lesion within the right adnexa.  -Pelvic ultrasound ordered for further investigation    Hyponatremia  -Na+ 126 at admission  baseline is ~135  -MIVF NS at 100 mL/h   -daily CMPs    Normocytic anemia  -H/H 10.5/30.3 MCV 90  baseline Hgb 10-11  -anemia panel pending    Chronic diastolic heart failure with preserved EF  -Echo from 2014 shows significant diastolic dysfunction. More recent Echo from 2016 indeterminate.   -Continue home coreg 6.25 BID  will hold lasix, lisinopril, and aldactone in setting of CHARLENE  -No current issues  euvolemic to slightly dry on exam    Coronary artery disease  -atherosclerosis seen on previous imaging (CT thorax 2014)  -Continue lipitor 20  -Not currently on ASA at home, ordered    Hypertension, essential  -hypertensive in ED, pt unclear if she took BP meds today  -restart home BB  Hydralazine TID with parameters scheduled  -ACEi and spironolactone held 2/2 CHARLENE  -continue to monitor closely     Rheumatoid arthritis  -no current issues  -continue home plaquenil    CODE: Full  PPX: SQH  DIET: Cardiac    DISPO: Pending improvement in kidney function and work up for pancreatic/adnexal lesion. Will need referral to case management as pt is requesting discharge to a nursing home.      Miguel Costa MD  [facility] HO-1    [facility] Medicine Hospitalist Pager numbers:   [facility] Hospitalist Medicine Team A (Tonia/Yordan): [phone]  [facility] Hospitalist Medicine Team B (Kang/Mimi):  [phone]

## 2018-10-09 NOTE — PLAN OF CARE
Problem: Occupational Therapy Goal  Goal: Occupational Therapy Goal  Goals to be met by: 11/09/18     Patient will increase functional independence with ADLs by performing:    LE Dressing with Supervision.  Grooming while standing at sink with Supervision.  Toileting from toilet with Supervision for hygiene and clothing management.   Toilet transfer to toilet with Supervision.  Increased functional strength to WFL for ADLs.    Outcome: Ongoing (interventions implemented as appropriate)  OT/PT co-eval & tx completed this date. Pt lives alone in [facility] w/ 4STE & BHR; has T/S combo. PLOF: MI w/ SPC (outside home only) w/ all fxnl tasks incl light IADLs & PRN driving. Currently, pt SBA-CGA via SPC around room & noted SOB w/ mod exertion but cont at 93% on RA. Edu/tx re: HEP & rec (I) vs A'ed living facility at d/c. Pt/son verbalized understanding. Pt left up in b/s chair w/ chair alarm & nsg notified.    Pt presents w/ decreased overall endurance/conditioning, balance/mobility & coordination w/ subsequent decline in (I)/safety w/ BADLs, fxnl mobility & fxnl t/f's. OT 3x/wk to increase phys/fxnl status & maximize potential to achieve established goals for d/c-->home w/ HHOT/PT & no DME.

## 2018-10-09 NOTE — PLAN OF CARE
Problem: Physical Therapy Goal  Goal: Physical Therapy Goal  Goals to be met by: 2018     Patient will increase functional independence with mobility by performin. Supine to sit with Modified [place]  2. Sit to stand transfer with Modified [place]  3. Gait  x 150 feet with Modified [place] using Rolling Walker.     Outcome: Ongoing (interventions implemented as appropriate)  Recommend Home with Home Health  No DME needs apparent

## 2018-10-10 LAB
ALBUMIN SERPL BCP-MCNC: 3 G/DL
ALP SERPL-CCNC: 54 U/L
ALT SERPL W/O P-5'-P-CCNC: 38 U/L
ANION GAP SERPL CALC-SCNC: 8 MMOL/L
AST SERPL-CCNC: 27 U/L
BASOPHILS # BLD AUTO: 0.01 K/UL
BASOPHILS NFR BLD: 0.1 %
BILIRUB SERPL-MCNC: 0.8 MG/DL
BUN SERPL-MCNC: 35 MG/DL
CALCIUM SERPL-MCNC: 8 MG/DL
CANCER AG19-9 SERPL-ACNC: <2 U/ML
CHLORIDE SERPL-SCNC: 102 MMOL/L
CO2 SERPL-SCNC: 19 MMOL/L
CREAT SERPL-MCNC: 1.5 MG/DL
DIFFERENTIAL METHOD: ABNORMAL
EOSINOPHIL # BLD AUTO: 0.1 K/UL
EOSINOPHIL NFR BLD: 0.6 %
ERYTHROCYTE [DISTWIDTH] IN BLOOD BY AUTOMATED COUNT: 13.8 %
EST. GFR  (AFRICAN AMERICAN): 36 ML/MIN/1.73 M^2
EST. GFR  (NON AFRICAN AMERICAN): 31 ML/MIN/1.73 M^2
GLUCOSE SERPL-MCNC: 137 MG/DL
HCT VFR BLD AUTO: 28.1 %
HGB BLD-MCNC: 9.5 G/DL
LYMPHOCYTES # BLD AUTO: 0.6 K/UL
LYMPHOCYTES NFR BLD: 7.3 %
MCH RBC QN AUTO: 30.8 PG
MCHC RBC AUTO-ENTMCNC: 33.8 G/DL
MCV RBC AUTO: 91 FL
MONOCYTES # BLD AUTO: 0.3 K/UL
MONOCYTES NFR BLD: 3.5 %
NEUTROPHILS # BLD AUTO: 7.2 K/UL
NEUTROPHILS NFR BLD: 88.1 %
PLATELET # BLD AUTO: 191 K/UL
PMV BLD AUTO: 9.9 FL
POTASSIUM SERPL-SCNC: 4.9 MMOL/L
PROT SERPL-MCNC: 5.9 G/DL
RBC # BLD AUTO: 3.08 M/UL
SODIUM SERPL-SCNC: 129 MMOL/L
WBC # BLD AUTO: 8.11 K/UL

## 2018-10-10 PROCEDURE — 80053 COMPREHEN METABOLIC PANEL: CPT

## 2018-10-10 PROCEDURE — 25000003 PHARM REV CODE 250: Performed by: STUDENT IN AN ORGANIZED HEALTH CARE EDUCATION/TRAINING PROGRAM

## 2018-10-10 PROCEDURE — 94761 N-INVAS EAR/PLS OXIMETRY MLT: CPT

## 2018-10-10 PROCEDURE — 63600175 PHARM REV CODE 636 W HCPCS: Performed by: STUDENT IN AN ORGANIZED HEALTH CARE EDUCATION/TRAINING PROGRAM

## 2018-10-10 PROCEDURE — 27000221 HC OXYGEN, UP TO 24 HOURS

## 2018-10-10 PROCEDURE — 97116 GAIT TRAINING THERAPY: CPT

## 2018-10-10 PROCEDURE — 94640 AIRWAY INHALATION TREATMENT: CPT

## 2018-10-10 PROCEDURE — 25000242 PHARM REV CODE 250 ALT 637 W/ HCPCS: Performed by: STUDENT IN AN ORGANIZED HEALTH CARE EDUCATION/TRAINING PROGRAM

## 2018-10-10 PROCEDURE — 86301 IMMUNOASSAY TUMOR CA 19-9: CPT

## 2018-10-10 PROCEDURE — 36415 COLL VENOUS BLD VENIPUNCTURE: CPT

## 2018-10-10 PROCEDURE — 85025 COMPLETE CBC W/AUTO DIFF WBC: CPT

## 2018-10-10 PROCEDURE — 97530 THERAPEUTIC ACTIVITIES: CPT

## 2018-10-10 PROCEDURE — 11000001 HC ACUTE MED/SURG PRIVATE ROOM

## 2018-10-10 RX ORDER — FOLIC ACID 1 MG/1
1 TABLET ORAL DAILY
Status: DISCONTINUED | OUTPATIENT
Start: 2018-10-10 | End: 2018-10-11 | Stop reason: HOSPADM

## 2018-10-10 RX ORDER — ATORVASTATIN CALCIUM 20 MG/1
20 TABLET, FILM COATED ORAL DAILY
Status: DISCONTINUED | OUTPATIENT
Start: 2018-10-10 | End: 2018-10-11 | Stop reason: HOSPADM

## 2018-10-10 RX ORDER — RAMELTEON 8 MG/1
8 TABLET ORAL NIGHTLY PRN
Status: DISCONTINUED | OUTPATIENT
Start: 2018-10-10 | End: 2018-10-11 | Stop reason: HOSPADM

## 2018-10-10 RX ORDER — SERTRALINE HYDROCHLORIDE 25 MG/1
25 TABLET, FILM COATED ORAL DAILY
Status: DISCONTINUED | OUTPATIENT
Start: 2018-10-10 | End: 2018-10-11 | Stop reason: HOSPADM

## 2018-10-10 RX ORDER — ALPRAZOLAM 0.25 MG/1
0.25 TABLET ORAL 2 TIMES DAILY PRN
Status: DISCONTINUED | OUTPATIENT
Start: 2018-10-10 | End: 2018-10-11 | Stop reason: HOSPADM

## 2018-10-10 RX ADMIN — HEPARIN SODIUM 5000 UNITS: 5000 INJECTION, SOLUTION INTRAVENOUS; SUBCUTANEOUS at 04:10

## 2018-10-10 RX ADMIN — CARVEDILOL 6.25 MG: 6.25 TABLET, FILM COATED ORAL at 08:10

## 2018-10-10 RX ADMIN — FOLIC ACID 1 MG: 1 TABLET ORAL at 04:10

## 2018-10-10 RX ADMIN — HEPARIN SODIUM 5000 UNITS: 5000 INJECTION, SOLUTION INTRAVENOUS; SUBCUTANEOUS at 09:10

## 2018-10-10 RX ADMIN — HYDROXYCHLOROQUINE SULFATE 200 MG: 200 TABLET, FILM COATED ORAL at 08:10

## 2018-10-10 RX ADMIN — ALPRAZOLAM 0.25 MG: 0.25 TABLET ORAL at 08:10

## 2018-10-10 RX ADMIN — RAMELTEON 8 MG: 8 TABLET, FILM COATED ORAL at 08:10

## 2018-10-10 RX ADMIN — ONDANSETRON 8 MG: 8 TABLET, ORALLY DISINTEGRATING ORAL at 03:10

## 2018-10-10 RX ADMIN — ASPIRIN 81 MG: 81 TABLET, COATED ORAL at 08:10

## 2018-10-10 RX ADMIN — HYDRALAZINE HYDROCHLORIDE 25 MG: 25 TABLET, FILM COATED ORAL at 04:10

## 2018-10-10 RX ADMIN — ATORVASTATIN CALCIUM 20 MG: 20 TABLET, FILM COATED ORAL at 04:10

## 2018-10-10 RX ADMIN — HYDRALAZINE HYDROCHLORIDE 25 MG: 25 TABLET, FILM COATED ORAL at 05:10

## 2018-10-10 RX ADMIN — HYDRALAZINE HYDROCHLORIDE 25 MG: 25 TABLET, FILM COATED ORAL at 09:10

## 2018-10-10 RX ADMIN — RAMELTEON 8 MG: 8 TABLET, FILM COATED ORAL at 01:10

## 2018-10-10 RX ADMIN — ALPRAZOLAM 0.25 MG: 0.25 TABLET ORAL at 04:10

## 2018-10-10 RX ADMIN — HEPARIN SODIUM 5000 UNITS: 5000 INJECTION, SOLUTION INTRAVENOUS; SUBCUTANEOUS at 05:10

## 2018-10-10 RX ADMIN — CARVEDILOL 6.25 MG: 6.25 TABLET, FILM COATED ORAL at 04:10

## 2018-10-10 RX ADMIN — IPRATROPIUM BROMIDE AND ALBUTEROL SULFATE 3 ML: .5; 3 SOLUTION RESPIRATORY (INHALATION) at 01:10

## 2018-10-10 RX ADMIN — SERTRALINE HYDROCHLORIDE 25 MG: 25 TABLET ORAL at 04:10

## 2018-10-10 NOTE — PROGRESS NOTES
Patient given breathing treatment for expiratory wheezes. Patient states she is very  Nervous/anxious. RN aware. Patient SpO2 97% on 1 lpm NC.

## 2018-10-10 NOTE — PLAN OF CARE
TN reviewed patient's clinicals. Nurse Ivana stated patient was resting at this time. TN will follow-up at a later time. Tracie Hope with [facility] [employer] did come and meet patient and son this morning. TN did speak with patient's son from [place] and business card given.    Future Appointments   Date Time Provider Department Center   10/17/2018 10:00 AM EPO, INJECTION [facility] ID INF [facility]   10/17/2018 11:00 AM Isai Smith MD [facility] RHEUM [address]   10/25/2018 10:00 AM LAB, [facility] [place]   10/29/2018 10:30 AM Jackson Arreguin III, MD [facility] CARDIO [place]   11/6/2018  3:20 PM Uday Allen MD [facility] MED [facility]   11/19/2018  8:00 AM HOME MONITOR DEVICE CHECK, [facility] [address]   2/19/2019  8:00 AM HOME MONITOR DEVICE CHECK, [facility] [address]        10/10/18 [id number]   Discharge Reassessment   Assessment Type Discharge Planning Reassessment   Discharge Plan A Home Health;Home   Discharge Plan B Home with family;Home Health     Alysa Phipps RN  Transition Navigator  [phone]

## 2018-10-10 NOTE — PLAN OF CARE
Problem: Physical Therapy Goal  Goal: Physical Therapy Goal  Goals to be met by: 2018     Patient will increase functional independence with mobility by performin. Supine to sit with Modified [place]  2. Sit to stand transfer with Modified [place]  3. Gait  x 150 feet with Modified [place] using Rolling Walker.      Outcome: Ongoing (interventions implemented as appropriate)     Pt continues to work and progress toward goals.

## 2018-10-10 NOTE — PT/OT/SLP PROGRESS
Occupational Therapy  Missed Visit    Patient Name:  Karly Sandoval   MRN:  [id number]    Patient not seen today secondary to having fly mtg w/ [facility] coordinator. Will follow-up as able.    DACIA Sherman  10/10/2018

## 2018-10-10 NOTE — PT/OT/SLP PROGRESS
Physical Therapy Treatment    Patient Name:  Karly Sandoval   MRN:  [id number]    Recommendations:     Discharge Recommendations:  home with home health, home health PT, home health OT   Discharge Equipment Recommendations: none   Barriers to discharge: None    Assessment:     Karly Sandoval is a 88 y.o. female admitted with a medical diagnosis of CHARLENE (acute kidney injury).  She presents with the following impairments/functional limitations:  weakness, impaired endurance, impaired functional mobilty, gait instability, decreased lower extremity function, decreased upper extremity function, impaired balance, decreased safety awareness, impaired cardiopulmonary response to activity. Pt very anxious throughout treatment session despite calming techniques used. Able to ambulated ~130 ft WITHOUT O2 donned and SpO2 levels taken throughout. Fluctuated between 88-94%. Would benefit from continued PT services to address all impairments listed above and increase out of bed functional mobility.    Rehab Prognosis:  Good; patient would benefit from acute skilled PT services to address these deficits and reach maximum level of function.      Recent Surgery: * No surgery found *      Plan:     During this hospitalization, patient to be seen 6 x/week to address the above listed problems via gait training, therapeutic activities, therapeutic exercises  · Plan of Care Expires:  11/09/18   Plan of Care Reviewed with: patient    Subjective     Communicated with nurse prior to session.  Patient found sitting in B/S chair with son present upon PT entry to room, agreeable to treatment.      Chief Complaint: No complaints expressed  Patient comments/goals: To get better and not be a burden to others  Pain/Comfort:  · Pain Rating 1: 0/10    Patients cultural, spiritual, Temple conflicts given the current situation: [employer]    Objective:     Patient found with: telemetry, peripheral IV     General Precautions: Standard, fall    Orthopedic Precautions:N/A   Braces: N/A     Functional Mobility:  · Transfers:     · Sit to Stand:  contact guard assistance with straight cane  · Gait:  ~130 ft with CGA. 1st ~30 ft with RW and ~100 ft with SPC      AM-PAC 6 CLICK MOBILITY  Turning over in bed (including adjusting bedclothes, sheets and blankets)?: 4  Sitting down on and standing up from a chair with arms (e.g., wheelchair, bedside commode, etc.): 3  Moving from lying on back to sitting on the side of the bed?: 4  Moving to and from a bed to a chair (including a wheelchair)?: 4  Need to walk in hospital room?: 3  Climbing 3-5 steps with a railing?: 3  Basic Mobility Total Score: 21       Therapeutic Activities and Exercises:   Pt very anxious before, during, and after treatment. Pt performed ambulation training ~130 ft with CGA. ~30 ft with RW at which time pt requested to use SPC for an additional  ~100ft. Son stated his Mom seldom uses the SPC at home and she does fine. O2 taken off for ambulation SpO2 levels taken throughout and fluctuated from 88-94%. O2 donned after treatment and pt set up to eat lunch.    Patient left up in chair with all lines intact, call button in reach, chair alarm on,  nurse notified and  son present..    GOALS:   Multidisciplinary Problems     Physical Therapy Goals        Problem: Physical Therapy Goal    Goal Priority Disciplines Outcome Goal Variances Interventions   Physical Therapy Goal     PT, PT/OT Ongoing (interventions implemented as appropriate)     Description:  Goals to be met by: 2018     Patient will increase functional independence with mobility by performin. Supine to sit with Modified [place]  2. Sit to stand transfer with Modified [place]  3. Gait  x 150 feet with Modified [place] using Rolling Walker.                       Time Tracking:     PT Received On: 10/10/18  PT Start Time: 1140     PT Stop Time: 1220  PT Total Time (min): 40 min     Billable Minutes: Gait  Training   23 and Therapeutic Activity  17    Treatment Type: Treatment  PT/PTA: PTA     PTA Visit Number: 1     Portia Singh, PROMISE  10/10/2018

## 2018-10-10 NOTE — PROGRESS NOTES
"[facility] Hospital Medicine Progress Note    Primary Team: [facility] Hospitalist Team A  Attending Physician: Peewee Randall MD  Resident: Alo  Intern: Igor    Subjective:      Patient reports she does not feel well this morning. Complains of "terrible" anxiety overnight and this morning in regards to family meeting scheduled for today in regards to pancreatic head lesion. Patient reports she was unable to sleep because of this and is feeling short of breath. Requesting PRN alprazolam from her home medication list. Denies chest pain, diarrhea, abdominal pain, fever, or constipation.      Objective:     Last 24 Hour Vital Signs:  BP  Min: 141/90  Max: 162/70  Temp  Av.6 °F (36.4 °C)  Min: 96.2 °F (35.7 °C)  Max: 98.5 °F (36.9 °C)  Pulse  Av.7  Min: 58  Max: 79  Resp  Av  Min: 18  Max: 23  SpO2  Av.9 %  Min: 92 %  Max: 99 %  I/O last 3 completed shifts:  In: 2360 [P.O.:375; I.V.:1985]  Out: 1175 [Urine:1175]    Physical Examination:  General: anxious, restless, sitting up in bed  Head: normocephalic, atraumatic   Eyes: EOMI, PERRL, non-icteric  Ears, Nose, Throat: OP clear without exudate, pink and moist MM, ext aud canals clear  Neck: full ROM, JVP 6-7cm  Cardiovascular: NRRR, II/VI BENJAMIN murmur loudest at RUSB without radiation, no extra heart sounds, peripheral pulses 2+ throughout  Pulmonary: CTABL, tachypnea  Abdomen: mildly distended, soft, non-tender in any quadrant, normoactive BS, no organomegaly appreciated,  MSKL: normal ROM, without gross deformities  Lymphatic: no cervical or axillary LAD  Skin: without cyanosis, clubbing. Has nonpitting edema to mid shins bilateral, but no dependent edema  Neurologic: AAOx4, following commands    Laboratory:  Laboratory Data Reviewed: yes  Pertinent Findings:  Recent Labs   Lab  10/08/18   1555  10/08/18   2115  10/09/18   0437  10/10/18   0433   WBC  5.58   --   4.52  8.11   HGB  10.5*   --   9.0*  9.5*   HCT  30.3*   --   26.2*  28.1*   PLT  207   --   " 175  191   MCV  90   --   91  91   RDW  13.7   --   13.7  13.8   NA  126*  127*  128*  129*   K  4.9  4.5  4.6  4.9   CL  93*  98  100  102   CO2  23  20*  20*  19*   BUN  42*  36*  35*  35*   CREATININE  1.7*  1.5*  1.5*  1.5*   GLU  103  98  85  137*   PROT  6.8   --   5.5*  5.9*   ALBUMIN  3.5   --   2.8*  3.0*   BILITOT  0.6   --   0.7  0.8   AST  22   --   21  27   ALKPHOS  55   --   47*  54*   ALT  29   --   25  38     Microbiology Data Reviewed: yes  Pertinent Findings:  None    Other Results:    Radiology Data Reviewed: yes  Pertinent Findings:  CXR pending read; notable for increased pulmonary vasculature      US Abdomen:   2.8 x 1.6 x 1.5 cm cystic lesion in the pancreatic head.  Differential considerations remain between cystic neoplasm of the pancreas and pancreatic pseudocyst.  MRCP/ERCP follow-up is suggested.    Status post cholecystectomy.  No abnormality in the gallbladder fossa.    No evidence of intrahepatic or extrahepatic biliary dilatation.    Current Medications:     Infusions:       Scheduled:   aspirin  81 mg Oral QHS    carvedilol  6.25 mg Oral BID WM    heparin (porcine)  5,000 Units Subcutaneous Q8H    hydrALAZINE  25 mg Oral Q8H    hydroxychloroquine  200 mg Oral Daily        PRN:  acetaminophen, albuterol-ipratropium, ALPRAZolam, dextrose 50%, dextrose 50%, glucagon (human recombinant), glucose, glucose, ondansetron, ramelteon, sodium chloride 0.9%      Assessment:     Karly AGUILERA Lori is a 88 y.o.female with  Patient Active Problem List    Diagnosis Date Noted    Cyst of pancreas 10/09/2018    CHARLENE (acute kidney injury) 10/08/2018    Abnormal CT of the abdomen 10/08/2018    Chronic congestive heart failure with left ventricular diastolic dysfunction 10/08/2018    Elevated lipase 10/08/2018    Right pelvic adnexal fluid collection 10/08/2018    Abdominal aortic aneurysm (AAA) without rupture 10/08/2018    Anxiety 02/01/2018    Nocturia 06/01/2017    Decreased functional  mobility 08/17/2016    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    Vitamin D deficiency 09/22/2015    Osteoarthritis of both knees 06/12/2015    Pacemaker 03/25/2015    RBBB 11/14/2014    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    History of CVA (cerebrovascular accident) 10/29/2014    Mild protein-calorie malnutrition 10/02/2014    Fall 09/30/2014    PVC (premature ventricular contraction) 04/28/2014    Back pain 03/22/2014    Anemia of chronic disease 03/22/2014    Atrial arrhythmia 02/11/2014    Chronic diastolic CHF (congestive heart failure) 02/11/2014    PFO (patent foramen ovale) 02/11/2014    Hyperlipidemia     RA (rheumatoid arthritis) 09/11/2012    Hypertension 09/11/2012    Osteopenia 09/11/2012    GERD (gastroesophageal reflux disease) 09/11/2012        Plan:     Acute kidney injury (KDIGO stage I)  - Cr 1.7, BUN 42 eGFR 27 on admission  Baseline Cr 0.9  - reported history of diarrhea and decreased PO intake, suggest prerenal etiology (BUN/Cr >20)  - FEUrea: 56.5% suggesting intrinsic renal disease  CT renal stone study with no evidence of nephrolithiasis  - s/p 4 L NS this admission  - Repeat Cr 1.5 this AM, unchanged from yesterday, appears euvolemic on current assessment     Diarrhea and nausea  -3 days of subjective dark loose stools, denies BRBPR  -Pt has history of scattered diverticulosis and hemorrhoids  -Possibly gastroenteritis causing dehydration w/ CHARLENE  -FOBT, pending  -will monitor and treat symptomatically     Pancreatic head lesion  -poorly defined 2.1 cm cystic lesion seen in the head of pancreas on CT renal stone study   -Lipase slightly elevated to 230, amylase 164  -abdomen non-tender, no evidence of pancreatitis on CT scan   -LFTs normal  -Will consider further work up with MRCP/MRI with and without contrast once CHRALENE resolved. To have family meeting today to discuss care plan     Abdominal aortic aneurism  -New diagnosis of 3.5 cm aneurysmal dilatation of  suprarenal abdominal aorta  -No issues currently  -Will tightly control BP     Fluid in right adnexa  -3.8 cm fluid attenuation lesion within the right adnexa.  -Pelvic ultrasound ordered for further investigation     Hypovolemic Hyponatremia  -Na+ 126 at admission  baseline is ~135  -MIVF NS at 100 mL/h   -daily CMPs; improved to 129 on current CMP     Normocytic anemia  -H/H 10.5/30.3 MCV 90  baseline Hgb 10-11  -anemia panel pending     Chronic diastolic heart failure with preserved EF  -Echo from 2014 shows significant diastolic dysfunction. More recent Echo from 2016 indeterminate.   -Continue home coreg 6.25 BID  will hold lasix, lisinopril, and aldactone in setting of CHARLENE  -No current issues  euvolemic to slightly dry on exam     Coronary artery disease  -atherosclerosis seen on previous imaging (CT thorax 2014)  -Continue lipitor 20, asa 81     Hypertension, essential  -hypertensive in ED, pt unclear if she took BP meds today  -restart home BB  Hydralazine TID with parameters scheduled  -ACEi and spironolactone held 2/2 CHARLENE  -continue to monitor closely      Rheumatoid arthritis  -no current issues  -continue home plaquenil     CODE: Full  PPX: SQH  DIET: Cardiac     DISPO: Pending improvement in kidney function and family discussion for pancreatic/adnexal lesion. Home health PT/OT on discharge        Javier Prajapati MD  [facility] Internal Medicine HO-III    [facility] Medicine Hospitalist Pager numbers:   [facility] Hospitalist Medicine Team A (Tonia/Yordan): [phone]  [facility] Hospitalist Medicine Team B (Kang/Mimi):  [phone]

## 2018-10-10 NOTE — PROGRESS NOTES
.Pharmacy New Medication Education    Patient accepted medication education.    Pharmacy educated patient on name and purpose of medications and possible side effects, using the teach-back method.     Current Inpatient Medication Orders   acetaminophen tablet 500 mg   albuterol-ipratropium 2.5 mg-0.5 mg/3 mL nebulizer solution 3 mL   aspirin EC tablet 81 mg   carvedilol tablet 6.25 mg   dextrose 50% injection 12.5 g   dextrose 50% injection 25 g   glucagon (human recombinant) injection 1 mg   glucose chewable tablet 16 g   glucose chewable tablet 24 g   heparin (porcine) injection 5,000 Units   hydrALAZINE tablet 25 mg   hydroxychloroquine tablet 200 mg   ondansetron disintegrating tablet 8 mg   ramelteon tablet 8 mg   sodium chloride 0.9% flush 5 mL       Learners of pharmacy medication education included:  Patient    Patient +/- learner response:  Verbalized Understanding, Teachback

## 2018-10-10 NOTE — PLAN OF CARE
Problem: Patient Care Overview  Goal: Plan of Care Review  Outcome: Ongoing (interventions implemented as appropriate)  Patient remained in bed with bed alarm on, nonskid socks and yellow fall risk band. ID band on, bed in lowest position, and call light within reach. Patient denied any pain for the shift. Patient remained of 2L NC O2. Bruises noted on abdomen. No additional skin issues noted. Patient positions self easily in bed. RAC IV was accidentally tugged out. New LFA 22G. Remained intact for remainder of shift. Patient kept on 100ml'hr NS until  420. Vital signs stable. Paced rhythm on telemetry. Will continue to monitor.

## 2018-10-11 VITALS
HEIGHT: 62 IN | HEART RATE: 65 BPM | BODY MASS INDEX: 22.08 KG/M2 | TEMPERATURE: 96 F | RESPIRATION RATE: 16 BRPM | OXYGEN SATURATION: 95 % | SYSTOLIC BLOOD PRESSURE: 151 MMHG | DIASTOLIC BLOOD PRESSURE: 64 MMHG | WEIGHT: 120 LBS

## 2018-10-11 PROBLEM — N17.9 AKI (ACUTE KIDNEY INJURY): Status: RESOLVED | Noted: 2018-10-08 | Resolved: 2018-10-11

## 2018-10-11 LAB
ALBUMIN SERPL BCP-MCNC: 2.7 G/DL
ALP SERPL-CCNC: 54 U/L
ALT SERPL W/O P-5'-P-CCNC: 40 U/L
ANION GAP SERPL CALC-SCNC: 9 MMOL/L
AST SERPL-CCNC: 27 U/L
BASOPHILS # BLD AUTO: 0 K/UL
BASOPHILS NFR BLD: 0 %
BILIRUB SERPL-MCNC: 0.7 MG/DL
BILIRUB UR QL STRIP: NEGATIVE
BUN SERPL-MCNC: 34 MG/DL
CALCIUM SERPL-MCNC: 8.1 MG/DL
CHLORIDE SERPL-SCNC: 102 MMOL/L
CLARITY UR: CLEAR
CO2 SERPL-SCNC: 17 MMOL/L
COLOR UR: YELLOW
CREAT SERPL-MCNC: 1.5 MG/DL
DIFFERENTIAL METHOD: ABNORMAL
EOSINOPHIL # BLD AUTO: 0 K/UL
EOSINOPHIL NFR BLD: 0.1 %
ERYTHROCYTE [DISTWIDTH] IN BLOOD BY AUTOMATED COUNT: 14 %
EST. GFR  (AFRICAN AMERICAN): 36 ML/MIN/1.73 M^2
EST. GFR  (NON AFRICAN AMERICAN): 31 ML/MIN/1.73 M^2
GLUCOSE SERPL-MCNC: 141 MG/DL
GLUCOSE UR QL STRIP: NEGATIVE
HCT VFR BLD AUTO: 26.8 %
HGB BLD-MCNC: 9.2 G/DL
HGB UR QL STRIP: NEGATIVE
KETONES UR QL STRIP: NEGATIVE
LEUKOCYTE ESTERASE UR QL STRIP: NEGATIVE
LYMPHOCYTES # BLD AUTO: 0.5 K/UL
LYMPHOCYTES NFR BLD: 5.6 %
MCH RBC QN AUTO: 31.3 PG
MCHC RBC AUTO-ENTMCNC: 34.3 G/DL
MCV RBC AUTO: 91 FL
MONOCYTES # BLD AUTO: 0.5 K/UL
MONOCYTES NFR BLD: 5.1 %
NEUTROPHILS # BLD AUTO: 8 K/UL
NEUTROPHILS NFR BLD: 88.9 %
NITRITE UR QL STRIP: NEGATIVE
PH UR STRIP: 6 [PH] (ref 5–8)
PLATELET # BLD AUTO: 177 K/UL
PMV BLD AUTO: 9.8 FL
POTASSIUM SERPL-SCNC: 4.6 MMOL/L
PROT SERPL-MCNC: 5.5 G/DL
PROT UR QL STRIP: NEGATIVE
RBC # BLD AUTO: 2.94 M/UL
SODIUM SERPL-SCNC: 128 MMOL/L
SP GR UR STRIP: >=1.03 (ref 1–1.03)
URN SPEC COLLECT METH UR: ABNORMAL
UROBILINOGEN UR STRIP-ACNC: NEGATIVE EU/DL
WBC # BLD AUTO: 8.97 K/UL

## 2018-10-11 PROCEDURE — 63600175 PHARM REV CODE 636 W HCPCS: Performed by: STUDENT IN AN ORGANIZED HEALTH CARE EDUCATION/TRAINING PROGRAM

## 2018-10-11 PROCEDURE — 85025 COMPLETE CBC W/AUTO DIFF WBC: CPT

## 2018-10-11 PROCEDURE — 36415 COLL VENOUS BLD VENIPUNCTURE: CPT

## 2018-10-11 PROCEDURE — 81003 URINALYSIS AUTO W/O SCOPE: CPT

## 2018-10-11 PROCEDURE — 94761 N-INVAS EAR/PLS OXIMETRY MLT: CPT

## 2018-10-11 PROCEDURE — 97116 GAIT TRAINING THERAPY: CPT

## 2018-10-11 PROCEDURE — 25000003 PHARM REV CODE 250: Performed by: STUDENT IN AN ORGANIZED HEALTH CARE EDUCATION/TRAINING PROGRAM

## 2018-10-11 PROCEDURE — 97530 THERAPEUTIC ACTIVITIES: CPT

## 2018-10-11 PROCEDURE — 97535 SELF CARE MNGMENT TRAINING: CPT

## 2018-10-11 PROCEDURE — 80053 COMPREHEN METABOLIC PANEL: CPT

## 2018-10-11 RX ORDER — SODIUM CHLORIDE 9 MG/ML
INJECTION, SOLUTION INTRAVENOUS CONTINUOUS
Status: DISCONTINUED | OUTPATIENT
Start: 2018-10-11 | End: 2018-10-11

## 2018-10-11 RX ORDER — LISINOPRIL 5 MG/1
10 TABLET ORAL DAILY
Status: DISCONTINUED | OUTPATIENT
Start: 2018-10-11 | End: 2018-10-11 | Stop reason: HOSPADM

## 2018-10-11 RX ADMIN — ACETAMINOPHEN 500 MG: 500 TABLET ORAL at 01:10

## 2018-10-11 RX ADMIN — CARVEDILOL 6.25 MG: 6.25 TABLET, FILM COATED ORAL at 05:10

## 2018-10-11 RX ADMIN — HEPARIN SODIUM 5000 UNITS: 5000 INJECTION, SOLUTION INTRAVENOUS; SUBCUTANEOUS at 05:10

## 2018-10-11 RX ADMIN — SERTRALINE HYDROCHLORIDE 25 MG: 25 TABLET ORAL at 09:10

## 2018-10-11 RX ADMIN — CARVEDILOL 6.25 MG: 6.25 TABLET, FILM COATED ORAL at 09:10

## 2018-10-11 RX ADMIN — FOLIC ACID 1 MG: 1 TABLET ORAL at 09:10

## 2018-10-11 RX ADMIN — HYDROXYCHLOROQUINE SULFATE 200 MG: 200 TABLET, FILM COATED ORAL at 09:10

## 2018-10-11 RX ADMIN — ALPRAZOLAM 0.25 MG: 0.25 TABLET ORAL at 09:10

## 2018-10-11 RX ADMIN — HYDRALAZINE HYDROCHLORIDE 25 MG: 25 TABLET, FILM COATED ORAL at 05:10

## 2018-10-11 RX ADMIN — LISINOPRIL 10 MG: 5 TABLET ORAL at 09:10

## 2018-10-11 RX ADMIN — ATORVASTATIN CALCIUM 20 MG: 20 TABLET, FILM COATED ORAL at 09:10

## 2018-10-11 NOTE — NURSING
Discharge discussed with pt and son. Pt and family  acknowledged understanding of discharge medications and follow up appts. Pts walker will arrive at home on Friday. Also  services will follow up with pt when home.  t with  0 distress at this time. IV removed with catheter intact.Denies pain or discomfort upon discharge. Hospital transport called for assistance.

## 2018-10-11 NOTE — PLAN OF CARE
Problem: Patient Care Overview  Goal: Plan of Care Review  Outcome: Ongoing (interventions implemented as appropriate)  Flow changed per O2 guidelines. Will continue to monitor.  Patient found on 2  LPM NC, O2 sats notated.

## 2018-10-11 NOTE — PT/OT/SLP PROGRESS
Occupational Therapy   Treatment    Name: Karly Sandoval  MRN: [id number]  Admitting Diagnosis:  CHARLENE (acute kidney injury)       Recommendations:     Discharge Recommendations: nursing facility, skilled(changed from initial eval)  Discharge Equipment Recommendations:  walker, rolling, bath bench(changed from initial eval)  Barriers to discharge:  Decreased caregiver support    Subjective     Communicated with: nsg prior to session.  Pain/Comfort:  · Pain Rating 1: 0/10  · Pain Rating Post-Intervention 1: 0/10    Patients cultural, spiritual, [employer]ism conflicts given the current situation:      Objective:     Patient found with: peripheral IV, telemetry, bed alarm    General Precautions: Standard, fall   Orthopedic Precautions:N/A   Braces:       Occupational Performance:    Bed Mobility:    · Patient completed Sit to Supine with stand by assistance     Functional Mobility/Transfers:  · Patient completed Sit <> Stand Transfer with contact guard assistance  with  rolling walker   · Patient completed Toilet Transfer Step Transfer technique with contact guard assistance and minimum assistance with  rolling walker and grab bars**POOR SAFETY  · Functional Mobility: via RW around room for ADLs w/ CG-Min A    Activities of Daily Living:  · Grooming: contact guard assistance standing  · Toileting: stand by assistance and contact guard assistance on toilet    Patient left HOB elevated with all lines intact, call button in reach, bed alarm on, nsg notified and son present    [facility] 6 Click:  AMPA Total Score: 17    Treatment & Education:  Pt found up in b/s chair & agreeable to OT this PM. Pt perf the following: standing & amb via RW around room w/ CGA; G/H standing at sink w/ SB-CGA x ~5min; toil t/f & toileting w/ SB-CGA & LOB x 1 w/ Min A to recover. Pt returned to supine w/ SBA. Edu/tx re: pt's decline in phys status since admit & new need to have 24hr S/A for at least a few days upon returning home; rec TTB & HEP.  Pt/son verbalized understanding.      Education:    Assessment:   Pt tremulous throughout tx w/ noted labored breathing/wheezing w/ min exertion. Pt verbalized numerous times how bad she was feeling & that she hadn't moved her bowels since admit. Nsg notified. Direct contact w/ CM & phone contact w/ MD re: pt's change in phys/fxnl status & need for RW, TTB & 24hr S/A upon return home. CM & MD verbalized understanding. Cont w/ OT per POC.    Karly Sandoval is a 88 y.o. female with a medical diagnosis of CHARLENE (acute kidney injury).  She presents with ..  Performance deficits affecting function are weakness, impaired endurance, gait instability, impaired functional mobilty, impaired self care skills, impaired balance, decreased lower extremity function, decreased upper extremity function, decreased coordination, decreased safety awareness, impaired cardiopulmonary response to activity.      Rehab Prognosis:  good; patient would benefit from acute skilled OT services to address these deficits and reach maximum level of function.       Plan:     Patient to be seen 5 x/week(changed from eval) to address the above listed problems via self-care/home management, therapeutic activities, therapeutic exercises  · Plan of Care Expires: 11/09/18  · Plan of Care Reviewed with: patient, son    This Plan of care has been discussed with the patient who was involved in its development and understands and is in agreement with the identified goals and treatment plan    GOALS:   Multidisciplinary Problems     Occupational Therapy Goals        Problem: Occupational Therapy Goal    Goal Priority Disciplines Outcome Interventions   Occupational Therapy Goal     OT, PT/OT Ongoing (interventions implemented as appropriate)    Description:  Goals to be met by: 11/09/18     Patient will increase functional independence with ADLs by performing:    LE Dressing with Supervision.  Grooming while standing at sink with Supervision.  Toileting from  toilet with Supervision for hygiene and clothing management.   Toilet transfer to toilet with Supervision.  Increased functional strength to WFL for ADLs.                      Time Tracking:     OT Date of Treatment: 10/11/18  OT Start Time: 1505  OT Stop Time: 1550  OT Total Time (min): 45 min    Billable Minutes:Self Care/Home Management 30  Therapeutic Activity 15  Total Time 45    DACIA Sherman  10/11/2018

## 2018-10-11 NOTE — PROGRESS NOTES
"[facility] Hospital Medicine Progress Note    Primary Team: [facility] Hospitalist Team A  Attending Physician: Peewee Randall MD  Resident: Alo  Intern: Igor    Subjective:      Patient is anxious and tremulous this morning. She states that she had a bad night but cannot elaborate. Reports no more diarrhea or vomiting. She denies fevers, chills, or abdominal pain. She endorses some nausea and decreased appetite and she suspects this is because she is a "nervous joce".     Objective:     Last 24 Hour Vital Signs:  BP  Min: 136/64  Max: 231/91  Temp  Av.5 °F (36.4 °C)  Min: 96.8 °F (36 °C)  Max: 97.9 °F (36.6 °C)  Pulse  Av.5  Min: 60  Max: 80  Resp  Av.7  Min: 16  Max: 20  SpO2  Av.9 %  Min: 96 %  Max: 98 %  I/O last 3 completed shifts:  In: 2410 [P.O.:425; I.V.:1985]  Out: 550 [Urine:550]    Physical Examination:  General: anxious, restless, gittery  Head: normocephalic, atraumatic   Eyes: EOMI, PERRL, non-icteric  Ears, Nose, Throat: OP clear without exudate, pink and moist MM, ext aud canals clear  Neck: full ROM, JVP 6-7cm  Cardiovascular: NRRR, II/VI BENJAMIN murmur loudest at RUSB without radiation, no extra heart sounds, peripheral pulses 2+ throughout  Pulmonary: CTABL, tachypnic  Abdomen: mildly distended, soft, non-tender in any quadrant, normoactive BS, no organomegaly appreciated,  MSKL: normal ROM, without gross deformities  Lymphatic: no cervical or axillary LAD  Skin: without cyanosis, clubbing. Has nonpitting edema to mid shins bilaterally, but no dependent edema  Neurologic: AAOx4, following commands    Laboratory:  Laboratory Data Reviewed: yes  Pertinent Findings:  Recent Labs   Lab  10/09/18   0437  10/10/18   0433  10/11/18   0351   WBC  4.52  8.11  8.97   HGB  9.0*  9.5*  9.2*   HCT  26.2*  28.1*  26.8*   PLT  175  191  177   MCV  91  91  91   RDW  13.7  13.8  14.0   NA  128*  129*  128*   K  4.6  4.9  4.6   CL  100  102  102   CO2  20*  19*  17*   BUN  35*  35*  34*   CREATININE  1.5* "  1.5*  1.5*   GLU  85  137*  141*   PROT  5.5*  5.9*  5.5*   ALBUMIN  2.8*  3.0*  2.7*   BILITOT  0.7  0.8  0.7   AST  21  27  27   ALKPHOS  47*  54*  54*   ALT  25  38  40       Microbiology Data Reviewed: yes  Pertinent Findings:  None new    Other Results:  EKG (my interpretation):   None new    Radiology Data Reviewed: yes  Pertinent Findings:  None new    Current Medications:     Infusions:       Scheduled:   aspirin  81 mg Oral QHS    atorvastatin  20 mg Oral Daily    carvedilol  6.25 mg Oral BID WM    folic acid  1 mg Oral Daily    heparin (porcine)  5,000 Units Subcutaneous Q8H    hydrALAZINE  25 mg Oral Q8H    hydroxychloroquine  200 mg Oral Daily    sertraline  25 mg Oral Daily        PRN:  acetaminophen, albuterol-ipratropium, ALPRAZolam, dextrose 50%, dextrose 50%, glucagon (human recombinant), glucose, glucose, ondansetron, ramelteon, sodium chloride 0.9%      Assessment:     Karly Whitesideni is a 88 y.o.female with  Patient Active Problem List    Diagnosis Date Noted    Cyst of pancreas 10/09/2018    CHARLENE (acute kidney injury) 10/08/2018    Abnormal CT of the abdomen 10/08/2018    Chronic congestive heart failure with left ventricular diastolic dysfunction 10/08/2018    Elevated lipase 10/08/2018    Right pelvic adnexal fluid collection 10/08/2018    Abdominal aortic aneurysm (AAA) without rupture 10/08/2018    Anxiety 02/01/2018    Nocturia 06/01/2017    Decreased functional mobility 08/17/2016    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    Vitamin D deficiency 09/22/2015    Osteoarthritis of both knees 06/12/2015    Pacemaker 03/25/2015    RBBB 11/14/2014    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    History of CVA (cerebrovascular accident) 10/29/2014    Mild protein-calorie malnutrition 10/02/2014    Fall 09/30/2014    PVC (premature ventricular contraction) 04/28/2014    Back pain 03/22/2014    Anemia of chronic disease 03/22/2014    Atrial arrhythmia  02/11/2014    Chronic diastolic CHF (congestive heart failure) 02/11/2014    PFO (patent foramen ovale) 02/11/2014    Hyperlipidemia     RA (rheumatoid arthritis) 09/11/2012    Hypertension 09/11/2012    Osteopenia 09/11/2012    GERD (gastroesophageal reflux disease) 09/11/2012        Plan:     Acute kidney injury (KDIGO stage I), resolved  - Cr 1.7, BUN 42 eGFR 27 on admission  previous baseline Cr 0.9  - reported history of diarrhea and decreased PO intake, suggest prerenal etiology (BUN/Cr >20)  - FEUrea: 56.5% suggesting intrinsic renal disease  CT renal stone study with no evidence of nephrolithiasis  - s/p 4 L NS this admission  - Repeat Cr 1.5 this AM eGFR 31, unchanged over 3 days, appears euvolemic on current assessment  Likely new baseline renal function     Diarrhea and nausea, resolved  -3 days of subjective dark loose stools, denies BRBPR  -Pt has history of scattered diverticulosis and hemorrhoids  -Possibly gastroenteritis causing dehydration w/ CHARLENE  -cont to monitor and treat symptomatically     Pancreatic head lesion  -poorly defined 2.1 cm cystic lesion seen in the head of pancreas on CT renal stone study   -Lipase slightly elevated to 230, amylase 164  -abdomen non-tender, no evidence of pancreatitis on CT scan   -LFTs normal, CA 19-9 <2.0  -Will discuss utility of further workup with MRI/MRCP at family meeting today.     Anxiety  -Pt takes zoloft and PRN xanax for RUSTAM at home  -Reports increased anxiety since admission  -restarted zoloft, PRN xanax 0.25mg ordered    Abdominal aortic aneurism  -New diagnosis of 3.5 cm aneurysmal dilatation of suprarenal abdominal aorta  -No issues currently  -Will tightly control BP     Fluid in right adnexa  -3.8 cm fluid attenuation lesion within the right adnexa.  -Pelvic ultrasound shows 3.8cm cystic lesion in cul-de-sac likely representing either a peritoneal or ovarian cyst     Hypovolemic Hyponatremia  -Na+ 126 at admission  baseline is  ~135  -s/p 4L NS  -daily CMPs; 128 and stable on current CMP     Anemia of chronic inflammation  -H/H 10.5/30.3 MCV 90  baseline Hgb 10-11  -anemia panel c/w AOCI     Chronic diastolic heart failure with preserved EF  -Echo from 2014 shows significant diastolic dysfunction. More recent Echo from 2016 indeterminate.   -Continue home coreg 6.25 BID  held lasix, lisinopril, and aldactone in setting of CHARLENE  -No current issues  euvolemic to slightly dry on exam     Coronary artery disease  -atherosclerosis seen on previous imaging (CT thorax 2014)  -Continue lipitor 20, asa 81     Hypertension, essential  -hypertensive in ED, pt unclear if she took BP meds on DOA  -restarted home BB  Hydralazine TID with parameters scheduled  -Home ACEi restarted as BP is not well controlled and CHARLENE likely resolved with new baseline fx  -continue to monitor closely      Rheumatoid arthritis  -no current issues  -continue home plaquenil     CODE: Full  PPX: SQH  DIET: Cardiac     DISPO: Pending hyponatremic improvement and family decision on pancreatic lesion work up.       Miguel Costa MD  [facility] Internal Medicine HO-1    [facility] Medicine Hospitalist Pager numbers:   [facility] Hospitalist Medicine Team A (Tonia/Yordan): [phone]  [facility] Hospitalist Medicine Team B (Kang/Mimi):  [phone]

## 2018-10-11 NOTE — PLAN OF CARE
Problem: Patient Care Overview  Goal: Plan of Care Review  Outcome: Ongoing (interventions implemented as appropriate)  Patient remained in bed with bed alarm on, nonskid socks and yellow fall risk band. ID band on, bed in lowest position, and call light within reach. Plan of care reviewed with patient. Patient denied any pain for the shift. Patient remained of 2L NC O2. Bruises noted on abdomen. No additional skin issues noted. Patient positions self easily in bed. LFA 22G remained intact. Received PRN sleep aid at 2015 and patient sleep soundly.  Patient kept on 100ml'hr NS until  420. Vital signs stable. Paced rhythm on telemetry with PVCs. Will continue to monitor.

## 2018-10-11 NOTE — PLAN OF CARE
TN went to meet with patient. Patient's two sons at bedside. Son still reviewing list for home health. TN will also leave message for earlier PCP follow-up.    Family preference is [facility]. TN will send facesheet to them to see if they can accept patient. --Accepted.    [facility] TN paged MD team to correct home health orders.--Sent.    --Update: TN spoke with Amalia from OT. She stated patient needs a walker for home. TN will notify MD team. Patient and son are also aware. All questions answered.    Since patient has medicare walker will have to be out-sourced. TN notified son and patient. Walker will be delivered to the house once ordered and approved.     1613--TN [facility]    10/12 at 09:30am--TN called [facility] and spoke with Brionna. She stated will give walker order to [place] to outsource.     Future Appointments   Date Time Provider Department Center   10/17/2018 10:00 AM EPO, INJECTION [facility] ID INF [facility]   10/17/2018 11:00 AM Isai Smith MD [facility] RHEUM [address]   10/25/2018 10:00 AM LAB, [facility] [place]   10/29/2018 10:30 AM Jackson Arreguin III, MD [facility] CARDIO [place]   11/6/2018  3:20 PM Uday Allen MD [facility] MED [facility]   11/19/2018  8:00 AM HOME MONITOR DEVICE CHECK, [facility] [address]   2/19/2019  8:00 AM HOME MONITOR DEVICE CHECK, [facility] [address]     Follow-up With  Details  Why  Contact Info   [facility]      Phone Number: [phone]   Uday Allen MD  In 1 week  Appointment Scheduled-- left message to schedule earlier follow-up.  [address]  [address]  [place] [address]  [phone]   [facility]    Home Health "[facility]  [address]  [address]  [place] [address]  [phone]      10/11/18 1346   Final Note   Assessment Type Final Discharge Note   Discharge Disposition Home-Health   What phone number can be called  within the next 1-3 days to see how you are doing after discharge? [phone]   Hospital Follow Up  Appt(s) scheduled? Yes   Right Care Referral Info   Post Acute Recommendation Home-care   Referral Type Home Health   Facility Name [facility]     Alysa Phipps RN  Transition Navigator  [phone]

## 2018-10-11 NOTE — PLAN OF CARE
Problem: Occupational Therapy Goal  Goal: Occupational Therapy Goal  Goals to be met by: 11/09/18     Patient will increase functional independence with ADLs by performing:    LE Dressing with Supervision.  Grooming while standing at sink with Supervision.  Toileting from toilet with Supervision for hygiene and clothing management.   Toilet transfer to toilet with Supervision.  Increased functional strength to WFL for ADLs.     Outcome: Ongoing (interventions implemented as appropriate)  Pt found up in b/s chair & agreeable to OT this PM. Pt perf the following: standing & amb via RW around room w/ CGA; G/H standing at sink w/ SB-CGA x ~5min; toil t/f & toileting w/ SB-CGA & LOB x 1 w/ Min A to recover. Pt returned to supine w/ SBA. Edu/tx re: pt's decline in phys status since admit & new need to have 24hr S/A for at least a few days upon returning home; rec TTB & HEP. Pt/son verbalized understanding.    Pt tremulous throughout tx w/ noted labored breathing/wheezing w/ min exertion. Pt verbalized numerous times how bad she was feeling & that she hadn't moved her bowels since admit. Nsg notified. Direct contact w/ CM & phone contact w/ MD re: pt's change in phys/fxnl status & need for RW, TTB & 24hr S/A upon return home. CM & MD verbalized understanding. Cont w/ OT per POC.

## 2018-10-11 NOTE — PT/OT/SLP PROGRESS
Physical Therapy Treatment    Patient Name:  Karly Sandoval   MRN:  [id number]    Recommendations:     Discharge Recommendations:  (possible HH will discuss with PT)   Discharge Equipment Recommendations: none   Barriers to discharge: decreased mobility,endurance and strength    Assessment:     Karly Sandoval is a 88 y.o. female admitted with a medical diagnosis of CHARLENE (acute kidney injury).  She presents with the following impairments/functional limitations:  weakness, impaired endurance, impaired functional mobilty, gait instability, impaired balance, decreased lower extremity function, decreased safety awareness, impaired coordination, impaired cardiopulmonary response to activity ,pt with good participation and required SBA/CGA with mobility and transfers,pt's son leaving to go back to [place] tomorrow and pt lives alone,will discuss further with PT to assess pt's discharge plan.    Rehab Prognosis:  Good; patient would benefit from acute skilled PT services to address these deficits and reach maximum level of function.      Recent Surgery: * No surgery found *      Plan:     During this hospitalization, patient to be seen 6 x/week to address the above listed problems via gait training, therapeutic activities, therapeutic exercises  · Plan of Care Expires:  11/09/18   Plan of Care Reviewed with: patient    Subjective     Communicated with nsg prior to session.  Patient found supine upon PT entry to room, agreeable to treatment.      Chief Complaint: n/a  Patient comments/goals: pt states she is getting old  Pain/Comfort:  · Pain Rating 1: 0/10    Patients cultural, spiritual, Taoism conflicts given the current situation: [place]    Objective:     Patient found with: peripheral IV, telemetry     General Precautions: Standard, fall   Orthopedic Precautions:N/A   Braces: N/A     Functional Mobility:  · Bed Mobility:     · Supine to Sit: stand by assistance  · Transfers:     · Sit to Stand:  stand by  assistance with rolling walker  · Bed to Chair: stand by assistance and contact guard assistance with  rolling walker  using  ambulation  · Toilet Transfer: stand by assistance and contact guard assistance with  rolling walker  using  ambulation  · Gait: amb ~10' X 1 and ~50' X 2 with RW and SBA/CGA  · Balance: fair standing balance with RW      AM-PAC 6 CLICK MOBILITY  Turning over in bed (including adjusting bedclothes, sheets and blankets)?: 4  Sitting down on and standing up from a chair with arms (e.g., wheelchair, bedside commode, etc.): 3  Moving from lying on back to sitting on the side of the bed?: 3  Moving to and from a bed to a chair (including a wheelchair)?: 3  Need to walk in hospital room?: 3  Climbing 3-5 steps with a railing?: 2  Basic Mobility Total Score: 18       Therapeutic Activities and Exercises: pt's O2 sats on rm air at rest 97%,during and post gait 93%,nsg notified and O2 doffed in room       Patient left up in chair with all lines intact, call button in reach, chair alarm on, nsg notified and son present..    GOALS: see general POC  Multidisciplinary Problems     Physical Therapy Goals        Problem: Physical Therapy Goal    Goal Priority Disciplines Outcome Goal Variances Interventions   Physical Therapy Goal     PT, PT/OT Ongoing (interventions implemented as appropriate)     Description:  Goals to be met by: 2018     Patient will increase functional independence with mobility by performin. Supine to sit with Modified [place]  2. Sit to stand transfer with Modified [place]  3. Gait  x 150 feet with Modified [place] using Rolling Walker.                       Time Tracking:     PT Received On: 10/11/18  PT Start Time: 08     PT Stop Time: 0840  PT Total Time (min): 28 min     Billable Minutes: Gait Training 16 and Therapeutic Activity 12    Treatment Type: Treatment  PT/PTA: PTA     PTA Visit Number: 2     Sherwin Amaya, PTA  10/11/2018

## 2018-10-11 NOTE — NURSING
Pt in bed at this time. Pt up in her chair most of the day with sons at bed side. Pt with episodes of anxiousness and uncertaintainty about everything. Pt AAO able to tell staff her needs. Up and down to the BR today voiding very little. 02 on at 1L this evening. Restarted on zoloft and given xanax to assist with anxiety. Helped some. Pt participated in PT today with 0 problems. Zofran given for 1 time episode of nausea. Med effective. Alarm on bed and call light within reach.

## 2018-10-11 NOTE — DISCHARGE SUMMARY
"[facility] Hospital Medicine Discharge Summary    Primary Team: [facility] Hospitalist Team A  Attending Physician: Peewee Randall MD  Resident: Yuri Prajapati  Intern: Miguel Costa    Date of Admit: 10/8/2018  Date of Discharge: 10/11/2018    Discharge to: Home  Condition: Stable    Discharge Diagnoses     Patient Active Problem List   Diagnosis    RA (rheumatoid arthritis)    Hypertension    Osteopenia    GERD (gastroesophageal reflux disease)    Hyperlipidemia    Atrial arrhythmia    Chronic diastolic CHF (congestive heart failure)    PFO (patent foramen ovale)    Back pain    Anemia of chronic disease    PVC (premature ventricular contraction)    Fall    Mild protein-calorie malnutrition    History of CVA (cerebrovascular accident)    RBBB    LAFB (left anterior fascicular block)    Pacemaker    Osteoarthritis of both knees    Long term methotrexate user    Vitamin D deficiency    Decreased functional mobility    Nocturia    Anxiety    Abnormal CT of the abdomen    Chronic congestive heart failure with left ventricular diastolic dysfunction    Elevated lipase    Right pelvic adnexal fluid collection    Abdominal aortic aneurysm (AAA) without rupture    Cyst of pancreas       Consultants and Procedures     Consultants:  None    Procedures:   None    Imaging:  XR abdomen  CT renal stone study  US abdomen  US pelvis  CXR    Brief History of Present Illness      Karly Sandoval is an 88 year old female, with a past medical history of diverticulitis, CVA (2014 w/o residual deficits), HTN, HLD, HFpEF, RA/OA, hx of atrial arrhythmias s/p single lead pacemaker, who presents to ED with primary complaint of diarrhea for 2 days and one day of nausea and vomiting x 1.     Ms. Sandoval was in her usual state of health until Saturday PTA when she began having frequent loose stools. She states that sometime around noon, she had the first of many "loose and dark" bowel movements. This continued for three " days and on morning of admission, patient began to feel nauseated and endorses one episode of non-bloody, non-bilious emesis. She states that she has had no appetite and decreased PO intake over this time. She also associates some mild abdominal distension and increased fatigue/lethargy, but she denies any abdominal pain, BRBPR, fevers, chills, dizziness, blurry vision, chest pain or shortness of breath.      Patient reports no known history of gall stones. She denies any alcohol use for at least 4 years. The only medication change she reports is recently being off of lisinopril and being restarted on it, but she is unclear why. She denied any sick contacts or recent antibiotic use.      Hospital Course By Problem with Pertinent Findings     Acute kidney injury (KDIGO stage I), resolved  - Cr 1.7, BUN 42 eGFR 27 on admission  previous baseline Cr 0.9  - reported history of diarrhea and decreased PO intake, suggest prerenal etiology (BUN/Cr >20)  - FEUrea: 56.5% suggesting intrinsic renal disease  CT renal stone study with no evidence of nephrolithiasis  - s/p 4 L NS this admission  - Repeat Cr 1.5, eGFR 31 on morning of discharge, unchanged over 3 days, appears euvolemic on exam  Likely new baseline renal function  -To diagnose likely CKD will have to repeat in ~30 days  -renal not following as inpatient  recommend outpatient referral to nephrology     Diarrhea and nausea, resolved  -3 days of subjective dark loose stools, denies BRBPR  -Pt has history of scattered diverticulosis and hemorrhoids  -Possibly gastroenteritis causing dehydration w/ CHARLENE  -no episodes of diarrhea in hospital  -tx symptomatically with zofran PRN     Pancreatic head lesion  -poorly defined 2.1 cm cystic lesion seen in the head of pancreas on CT renal stone study   -Lipase slightly elevated to 230, amylase 164 on admission  -abdomen non-tender, no evidence of pancreatitis on CT scan   -LFTs normal, CA 19-9 <2.0  -Utility of further work  up with MRI/MRCP discussed with family. They felt that it wasn't pressing at this time. Will discuss options with PCP.     Anxiety  -Pt takes zoloft and PRN xanax for RUSTAM at home  -Reports increased anxiety during admission  -restarted zoloft, PRN xanax 0.25mg ordered     Abdominal aortic aneurism  -New diagnosis of 3.5 cm aneurysmal dilatation of suprarenal abdominal aorta  -No issues currently  -BP closely monitored while IP  -Will likely need annual US screening as an outpatient     Right adnexal lesion  -3.8 cm fluid attenuation lesion within the right adnexa.  -Pelvic ultrasound shows 3.8cm cystic lesion in cul-de-sac likely representing either a peritoneal or ovarian cyst, no current issues     Hypovolemic Hyponatremia  -Na+ 126 at admission  baseline is ~135  -s/p 4L NS  -daily CMPs; 128 and stable on latest CMP     Anemia of chronic inflammation  -H/H 10.5/30.3 MCV 90  baseline Hgb 10-11  -anemia panel c/w AOCI     Chronic diastolic heart failure with preserved EF  -Echo from 2014 shows significant diastolic dysfunction. More recent Echo from 2016 indeterminate.   -Continue home coreg 6.25 BID  temporarily held lasix, lisinopril, and aldactone in setting of CHARLENE  -No current issues  euvolemic on exam     Coronary artery disease  -atherosclerosis seen on previous imaging (CT thorax 2014)  -Continue lipitor 20, asa 81     Hypertension, essential  -hypertensive in ED, pt unclear if she took BP meds on DOA  -restarted home BB  Hydralazine PRN with parameters while inpatient  -Home ACEi restarted as BP is not well controlled and CHARLENE likely resolved with new baseline fx     Rheumatoid arthritis  -no current issues  -continued home plaquenil        Discharge Medications      Karly Sandoval   Home Medication Instructions NORA:[id number]    Printed on:10/11/18 2059   Medication Information                      ALPRAZolam (XANAX) 0.25 MG tablet  TAKE 1 TABLET BY MOUTH EVERY DAY AS NEEDED             aspirin  (ECOTRIN) 81 MG EC tablet  Take 1 tablet (81 mg total) by mouth once daily. HOLD until cleared by your ENT doctor and your Neurologist.             atorvastatin (LIPITOR) 20 MG tablet  TAKE 1 TABLET BY MOUTH EVERY DAY             calcium carbonate (CALCIUM 600 ORAL)  Take 1 tablet by mouth 2 (two) times daily.             carvedilol (COREG) 6.25 MG tablet  Take 1 tablet (6.25 mg total) by mouth 2 (two) times daily with meals.             coenzyme Q10 (CO Q-10) 100 mg capsule  Take 100 mg by mouth once daily.             denosumab (PROLIA) 60 mg/mL Syrg  Inject 60 mg into the skin. Every 6 months             fish oil-omega-3 fatty acids 300-1,000 mg capsule  Take 1,000 mg by mouth once daily.              folic acid (FOLVITE) 1 MG tablet  TAKE 1 TABLET (1 MG TOTAL) BY MOUTH ONCE DAILY.             furosemide (LASIX) 20 MG tablet  Take 1 tablet (20 mg total) by mouth once daily.             hydroxychloroquine (PLAQUENIL) 200 mg tablet  Take 1 tablet (200 mg total) by mouth once daily.             lisinopril 10 MG tablet  Take 1 tablet (10 mg total) by mouth once daily.             methotrexate 2.5 MG Tab  TAKE 10 TABLETS (25 MG TOTAL) BY MOUTH EVERY 7 DAYS.             sertraline (ZOLOFT) 25 MG tablet  Take 1 tablet (25 mg total) by mouth once daily.             VITAMIN B COMPLEX ORAL  Take 400 mg by mouth once daily. Pt stated that she does not take vitamin b everyday.             vitamin E 400 UNIT capsule  Take 400 Units by mouth once daily. Pt taking PRN                 Discharge Information:   Diet:  Cardiac/Renal    Physical Activity:  Advance as tolerated             Instructions:  1. Take all medications as prescribed  2. Keep all follow-up appointments  3. Return to the hospital or call your primary care physicians if any worsening symptoms such as fever, chest pain, shortness of breath, return of symptoms, or any other concerns.    Follow-Up Appointments:  Keep all previously scheduled appointments  including:  Infectious disease on 10/17/18  Cardiology on 10/29/18  PCP, Dr. Allen,  on 11/6/18      Miguel Costa MD  [facility], [facility]

## 2018-10-11 NOTE — PLAN OF CARE
Problem: Physical Therapy Goal  Goal: Physical Therapy Goal  Goals to be met by: 2018     Patient will increase functional independence with mobility by performin. Supine to sit with Modified [place]  2. Sit to stand transfer with Modified [place]  3. Gait  x 150 feet with Modified [place] using Rolling Walker.      Outcome: Ongoing (interventions implemented as appropriate)  Goals ongoing

## 2018-10-12 ENCOUNTER — PATIENT OUTREACH (OUTPATIENT)
Dept: ADMINISTRATIVE | Facility: CLINIC | Age: 83
End: 2018-10-12

## 2018-10-12 NOTE — PATIENT INSTRUCTIONS
Discharge Instructions for Acute Kidney Injury  You have been diagnosed with acute kidney injury. This means that your kidneys are not working properly. When both kidneys are healthy, they help filter out fluid and waste from the blood and body. Acute kidney injury has many causes. These include urinary blockages, infection, lack of enough blood supply, and medicines that can injure kidneys. In some cases, acute kidney injury is short-term (temporary), lasting several days to a few months. This is because the kidney can repair itself. But acute kidney injury can also result in chronic kidney disease or end stage renal failure. Here are some instructions for you to follow as you recover.  Home care  · Follow any instructions for eating and drinking given to you by your healthcare provider.  ¨ Drink less fluid, if instructed by your healthcare provider.  ¨ Keep a record of everything you eat and drink.  · Measure the amount of urine and stool you have each day.  · Weigh yourself every day, at the same time of day, and in the same kind of clothes. Keep a daily record of your daily weights.  · Take your temperature every day. Keep a record of the results.  · Learn to take your own blood pressure. Keep a record of your results. Ask your healthcare provider when you should seek emergency medical attention. Your provider will tell you which blood pressure reading is dangerous.  · Avoid contact with people who have infections (colds, bronchitis, or skin conditions).  · Practice good personal hygiene. This is especially important if you have a catheter in place when you leave the hospital. Doing so helps keep you safe from infection.  · Take your medicines exactly as directed.  · You may require frequent blood and urine tests to monitor your kidney function.  Follow-up care  Follow up with your healthcare provider, or as advised.  When to seek medical care  Call your healthcare provider right away if you have any of the  following:  · Signs of bladder infection (urinating more often than usual, or burning, pain, bleeding, or hesitancy when you urinate)  · Signs of infection around your catheter (redness, swelling, warmth, or drainage)  · Rapid weight loss or weight gain, such as 3 pounds or more in 24 hours or 6 pounds or more in 7 days  · Fever above 100.4°F (38.0°C) or chills  · Muscle aches  · Night sweats  · Very little or no urine output  · Swelling of your hands, legs, or feet  · Back pain  · Abdominal pain  · Extreme tiredness   Date Last Reviewed: 2/1/2017  © 7684-9934 [employer]. [address] [place] [address], [place] [address]. All rights reserved. This information is not intended as a substitute for professional medical care. Always follow your healthcare professional's instructions.

## 2018-10-16 ENCOUNTER — TELEPHONE (OUTPATIENT)
Dept: FAMILY MEDICINE | Facility: CLINIC | Age: 83
End: 2018-10-16

## 2018-10-16 NOTE — TELEPHONE ENCOUNTER
----- Message from Bret Rojas sent at 10/16/2018 10:57 AM CDT -----  Contact: Abigail (daughter in law)/[phone]  Patient's daughter in law called to speak with the nurse about the patient's declining health and the need for a handicapped placard.    Please call and advise.

## 2018-10-17 ENCOUNTER — OFFICE VISIT (OUTPATIENT)
Dept: RHEUMATOLOGY | Facility: CLINIC | Age: 83
DRG: 543 | End: 2018-10-17
Payer: MEDICARE

## 2018-10-17 ENCOUNTER — INFUSION (OUTPATIENT)
Dept: INFECTIOUS DISEASES | Facility: HOSPITAL | Age: 83
End: 2018-10-17
Attending: INTERNAL MEDICINE
Payer: MEDICARE

## 2018-10-17 ENCOUNTER — HOSPITAL ENCOUNTER (INPATIENT)
Facility: HOSPITAL | Age: 83
LOS: 5 days | Discharge: NURSING, REHAB OR LONG-TERM CARE | DRG: 543 | End: 2018-10-23
Attending: EMERGENCY MEDICINE | Admitting: INTERNAL MEDICINE
Payer: MEDICARE

## 2018-10-17 ENCOUNTER — HOSPITAL ENCOUNTER (EMERGENCY)
Facility: HOSPITAL | Age: 83
Discharge: ANOTHER HOSPITAL | End: 2018-10-17
Attending: EMERGENCY MEDICINE
Payer: MEDICARE

## 2018-10-17 VITALS
HEART RATE: 60 BPM | BODY MASS INDEX: 24.17 KG/M2 | SYSTOLIC BLOOD PRESSURE: 165 MMHG | WEIGHT: 128 LBS | DIASTOLIC BLOOD PRESSURE: 63 MMHG | HEIGHT: 61 IN

## 2018-10-17 VITALS — WEIGHT: 125.69 LBS | HEIGHT: 61 IN | BODY MASS INDEX: 23.73 KG/M2

## 2018-10-17 VITALS
HEART RATE: 66 BPM | WEIGHT: 123 LBS | BODY MASS INDEX: 22.63 KG/M2 | HEIGHT: 62 IN | TEMPERATURE: 98 F | SYSTOLIC BLOOD PRESSURE: 175 MMHG | DIASTOLIC BLOOD PRESSURE: 75 MMHG | OXYGEN SATURATION: 98 % | RESPIRATION RATE: 16 BRPM

## 2018-10-17 DIAGNOSIS — N39.490 OVERFLOW INCONTINENCE OF URINE: ICD-10-CM

## 2018-10-17 DIAGNOSIS — M54.50 ACUTE MIDLINE LOW BACK PAIN WITHOUT SCIATICA: ICD-10-CM

## 2018-10-17 DIAGNOSIS — M54.50 ACUTE BILATERAL LOW BACK PAIN WITHOUT SCIATICA: ICD-10-CM

## 2018-10-17 DIAGNOSIS — R52 PAIN: ICD-10-CM

## 2018-10-17 DIAGNOSIS — N39.41 URINARY INCONTINENCE, URGE: ICD-10-CM

## 2018-10-17 DIAGNOSIS — M85.80 OSTEOPENIA, UNSPECIFIED LOCATION: Primary | ICD-10-CM

## 2018-10-17 DIAGNOSIS — M54.9 BACK PAIN: ICD-10-CM

## 2018-10-17 DIAGNOSIS — N39.45 CONTINUOUS LEAKAGE OF URINE: ICD-10-CM

## 2018-10-17 DIAGNOSIS — M54.50 ACUTE RIGHT-SIDED LOW BACK PAIN WITHOUT SCIATICA: Primary | ICD-10-CM

## 2018-10-17 DIAGNOSIS — M54.5 ACUTE MIDLINE LOW BACK PAIN, WITH SCIATICA PRESENCE UNSPECIFIED: ICD-10-CM

## 2018-10-17 DIAGNOSIS — E87.1 HYPONATREMIA: Primary | ICD-10-CM

## 2018-10-17 PROBLEM — R32 URINARY INCONTINENCE: Status: ACTIVE | Noted: 2018-10-17

## 2018-10-17 LAB
ALBUMIN SERPL BCP-MCNC: 3.2 G/DL
ALP SERPL-CCNC: 70 U/L
ALT SERPL W/O P-5'-P-CCNC: 44 U/L
ANION GAP SERPL CALC-SCNC: 12 MMOL/L
AST SERPL-CCNC: 28 U/L
BASOPHILS # BLD AUTO: 0.02 K/UL
BASOPHILS NFR BLD: 0.2 %
BILIRUB SERPL-MCNC: 0.8 MG/DL
BILIRUB UR QL STRIP: NEGATIVE
BUN SERPL-MCNC: 23 MG/DL
CALCIUM SERPL-MCNC: 9.2 MG/DL
CHLORIDE SERPL-SCNC: 92 MMOL/L
CLARITY UR: CLEAR
CO2 SERPL-SCNC: 25 MMOL/L
COLOR UR: YELLOW
CREAT SERPL-MCNC: 1.5 MG/DL
DIFFERENTIAL METHOD: ABNORMAL
EOSINOPHIL # BLD AUTO: 0.2 K/UL
EOSINOPHIL NFR BLD: 2.1 %
ERYTHROCYTE [DISTWIDTH] IN BLOOD BY AUTOMATED COUNT: 14.1 %
EST. GFR  (AFRICAN AMERICAN): 36 ML/MIN/1.73 M^2
EST. GFR  (NON AFRICAN AMERICAN): 31 ML/MIN/1.73 M^2
GLUCOSE SERPL-MCNC: 100 MG/DL
GLUCOSE UR QL STRIP: NEGATIVE
HCT VFR BLD AUTO: 29.6 %
HGB BLD-MCNC: 10.3 G/DL
HGB UR QL STRIP: NEGATIVE
KETONES UR QL STRIP: NEGATIVE
LEUKOCYTE ESTERASE UR QL STRIP: NEGATIVE
LYMPHOCYTES # BLD AUTO: 1 K/UL
LYMPHOCYTES NFR BLD: 12.5 %
MCH RBC QN AUTO: 31.3 PG
MCHC RBC AUTO-ENTMCNC: 34.8 G/DL
MCV RBC AUTO: 90 FL
MONOCYTES # BLD AUTO: 1.3 K/UL
MONOCYTES NFR BLD: 15.2 %
NEUTROPHILS # BLD AUTO: 5.4 K/UL
NEUTROPHILS NFR BLD: 66.2 %
NITRITE UR QL STRIP: NEGATIVE
PH UR STRIP: 7 [PH] (ref 5–8)
PLATELET # BLD AUTO: 221 K/UL
PMV BLD AUTO: 9.1 FL
POTASSIUM SERPL-SCNC: 3.9 MMOL/L
PROT SERPL-MCNC: 6.3 G/DL
PROT UR QL STRIP: NEGATIVE
RBC # BLD AUTO: 3.29 M/UL
SODIUM SERPL-SCNC: 129 MMOL/L
SP GR UR STRIP: 1.01 (ref 1–1.03)
URN SPEC COLLECT METH UR: NORMAL
UROBILINOGEN UR STRIP-ACNC: NEGATIVE EU/DL
WBC # BLD AUTO: 8.22 K/UL

## 2018-10-17 PROCEDURE — 99285 EMERGENCY DEPT VISIT HI MDM: CPT | Mod: GC,,, | Performed by: EMERGENCY MEDICINE

## 2018-10-17 PROCEDURE — 99222 1ST HOSP IP/OBS MODERATE 55: CPT | Mod: GC,,, | Performed by: NEUROLOGICAL SURGERY

## 2018-10-17 PROCEDURE — 99285 EMERGENCY DEPT VISIT HI MDM: CPT | Mod: 25,27

## 2018-10-17 PROCEDURE — 99999 PR PBB SHADOW E&M-EST. PATIENT-LVL IV: CPT | Mod: PBBFAC,,, | Performed by: INTERNAL MEDICINE

## 2018-10-17 PROCEDURE — 99213 OFFICE O/P EST LOW 20 MIN: CPT | Mod: S$PBB,,, | Performed by: INTERNAL MEDICINE

## 2018-10-17 PROCEDURE — 25000003 PHARM REV CODE 250: Performed by: EMERGENCY MEDICINE

## 2018-10-17 PROCEDURE — 96361 HYDRATE IV INFUSION ADD-ON: CPT

## 2018-10-17 PROCEDURE — 96374 THER/PROPH/DIAG INJ IV PUSH: CPT

## 2018-10-17 PROCEDURE — 80053 COMPREHEN METABOLIC PANEL: CPT

## 2018-10-17 PROCEDURE — 81003 URINALYSIS AUTO W/O SCOPE: CPT

## 2018-10-17 PROCEDURE — 99214 OFFICE O/P EST MOD 30 MIN: CPT | Mod: PBBFAC | Performed by: INTERNAL MEDICINE

## 2018-10-17 PROCEDURE — 63600175 PHARM REV CODE 636 W HCPCS: Performed by: EMERGENCY MEDICINE

## 2018-10-17 PROCEDURE — 85025 COMPLETE CBC W/AUTO DIFF WBC: CPT

## 2018-10-17 PROCEDURE — 25000003 PHARM REV CODE 250: Performed by: STUDENT IN AN ORGANIZED HEALTH CARE EDUCATION/TRAINING PROGRAM

## 2018-10-17 RX ORDER — ACETAMINOPHEN 500 MG
1000 TABLET ORAL
Status: COMPLETED | OUTPATIENT
Start: 2018-10-17 | End: 2018-10-17

## 2018-10-17 RX ORDER — SPIRONOLACTONE 100 MG/1
100 TABLET, FILM COATED ORAL DAILY
Status: ON HOLD | COMMUNITY
End: 2018-10-21

## 2018-10-17 RX ORDER — LEFLUNOMIDE 20 MG/1
20 TABLET ORAL DAILY
Qty: 30 TABLET | Refills: 11 | Status: CANCELLED | OUTPATIENT
Start: 2018-10-17 | End: 2019-10-17

## 2018-10-17 RX ORDER — SODIUM CHLORIDE 9 MG/ML
1000 INJECTION, SOLUTION INTRAVENOUS
Status: COMPLETED | OUTPATIENT
Start: 2018-10-17 | End: 2018-10-18

## 2018-10-17 RX ORDER — CARVEDILOL 3.12 MG/1
6.25 TABLET ORAL ONCE
Status: COMPLETED | OUTPATIENT
Start: 2018-10-17 | End: 2018-10-17

## 2018-10-17 RX ORDER — MORPHINE SULFATE 4 MG/ML
4 INJECTION, SOLUTION INTRAMUSCULAR; INTRAVENOUS
Status: COMPLETED | OUTPATIENT
Start: 2018-10-17 | End: 2018-10-17

## 2018-10-17 RX ORDER — OXYCODONE AND ACETAMINOPHEN 5; 325 MG/1; MG/1
1 TABLET ORAL
Status: COMPLETED | OUTPATIENT
Start: 2018-10-17 | End: 2018-10-17

## 2018-10-17 RX ADMIN — SODIUM CHLORIDE 1000 ML: 0.9 INJECTION, SOLUTION INTRAVENOUS at 10:10

## 2018-10-17 RX ADMIN — ACETAMINOPHEN 1000 MG: 500 TABLET, FILM COATED ORAL at 03:10

## 2018-10-17 RX ADMIN — MORPHINE SULFATE 4 MG: 4 INJECTION INTRAVENOUS at 03:10

## 2018-10-17 RX ADMIN — CARVEDILOL 6.25 MG: 3.12 TABLET, FILM COATED ORAL at 10:10

## 2018-10-17 RX ADMIN — OXYCODONE HYDROCHLORIDE AND ACETAMINOPHEN 1 TABLET: 5; 325 TABLET ORAL at 10:10

## 2018-10-17 ASSESSMENT — ROUTINE ASSESSMENT OF PATIENT INDEX DATA (RAPID3)
PSYCHOLOGICAL DISTRESS SCORE: 1.1
MDHAQ FUNCTION SCORE: 2.2
FATIGUE SCORE: 0
TOTAL RAPID3 SCORE: 5.78
PATIENT GLOBAL ASSESSMENT SCORE: 2
AM STIFFNESS SCORE: 0, NO
PAIN SCORE: 8

## 2018-10-17 NOTE — PATIENT INSTRUCTIONS
Please stop taking Methotrexate until instructed. We will restart once your kidney function improves  Please continue taking Plaquenil 200 mg daily  Please go to the emergency room to be evaluated

## 2018-10-17 NOTE — PROGRESS NOTES
I have personally taken the history and examined the patient and agree with the resident,s note as stated above. No pain or swelling in joints. Had stopped methotrexate last week, continues hydroxychloroquine 200mg daily. This am awakened with severe lumbosacral and right flank pain, and has had 4 episodes urinary incontinence associated  With acute LBP. Normal BM this am, formed. No fecal incontinence. No fever or chills.    Exam 97.8 165/63 no tender or swollen joints. Chest is clear to ausc  LE with 4.8/5 psoas and quad bilat, normal distal 2+ knees, trace ankles.    +right flank and LS spine tenderness      Hospitalized [facility] 10/8/18- 10/11/18  Acute kidney injury (KDIGO stage I), resolved  - Cr 1.7, BUN 42 eGFR 27 on admission  previous baseline Cr 0.9  - reported history of diarrhea and decreased PO intake, suggest prerenal etiology (BUN/Cr >20)  - FEUrea: 56.5% suggesting intrinsic renal disease  CT renal stone study with no evidence of nephrolithiasis  - s/p 4 L NS this admission  - Repeat Cr 1.5, eGFR 31 on morning of discharge, unchanged over 3 days, appears euvolemic on exam  Likely new baseline renal function  -To diagnose likely CKD will have to repeat in ~30 days  -renal not following as inpatient  recommend outpatient referral to nephrology     Diarrhea and nausea, resolved  -3 days of subjective dark loose stools, denies BRBPR  -Pt has history of scattered diverticulosis and hemorrhoids  -Possibly gastroenteritis causing dehydration w/ CHARLENE  -no episodes of diarrhea in hospital  -tx symptomatically with zofran PRN     Pancreatic head lesion  -poorly defined 2.1 cm cystic lesion seen in the head of pancreas on CT renal stone study   -Lipase slightly elevated to 230, amylase 164 on admission  -abdomen non-tender, no evidence of pancreatitis on CT scan   -LFTs normal, CA 19-9 <2.0  -Utility of further work up with MRI/MRCP discussed with family. They felt that it wasn't pressing at this  time. Will discuss options with PCP.     Anxiety  -Pt takes zoloft and PRN xanax for RUSTAM at home  -Reports increased anxiety during admission  -restarted zoloft, PRN xanax 0.25mg ordered     Abdominal aortic aneurism  -New diagnosis of 3.5 cm aneurysmal dilatation of suprarenal abdominal aorta  -No issues currently  -BP closely monitored while IP  -Will likely need annual US screening as an outpatient     Right adnexal lesion  -3.8 cm fluid attenuation lesion within the right adnexa.  -Pelvic ultrasound shows 3.8cm cystic lesion in cul-de-sac likely representing either a peritoneal or ovarian cyst, no current issues     Hypovolemic Hyponatremia  -Na+ 126 at admission  baseline is ~135  -s/p 4L NS  -daily CMPs; 128 and stable on latest CMP     Anemia of chronic inflammation  -H/H 10.5/30.3 MCV 90  baseline Hgb 10-11  -anemia panel c/w AOCI     Chronic diastolic heart failure with preserved EF  -Echo from 2014 shows significant diastolic dysfunction. More recent Echo from 2016 indeterminate.   -Continue home coreg 6.25 BID  temporarily held lasix, lisinopril, and aldactone in setting of CHARLENE  -No current issues  euvolemic on exam     Coronary artery disease  -atherosclerosis seen on previous imaging (CT thorax 2014)  -Continue lipitor 20, asa 81     Hypertension, essential  -hypertensive in ED, pt unclear if she took BP meds on DOA  -restarted home BB  Hydralazine PRN with parameters while inpatient  -Home ACEi restarted as BP is not well controlled and CHARLENE likely resolved with new baseline fx     Rheumatoid arthritis  -no current issues  -continued home plaquenil    Acute atraumatic low back pain with associated urinary incotinence x 4 this am  RA in remission TJ= 0 SJ=0  Osteoporosis with history spinal compression fracture    No more methotrexate given 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

## 2018-10-17 NOTE — PROGRESS NOTES
Patient was due now for her Prolia injections but when she arrived She was not feeling well, confused, lower back pain. Prolia dose was held. Dr fernandez notified

## 2018-10-17 NOTE — ED PROVIDER NOTES
Encounter Date: 10/17/2018    SCRIBE #1 NOTE: I, Mike Sloan, am scribing for, and in the presence of,  Dr. Acosta Mckeon. I have scribed the entire note.       History     Chief Complaint   Patient presents with    Back Pain     Was sent to ED by PCP for lower back pain and urinary problems     Time seen by provider: 2:24 PM    This is a 88 y.o. female w h/o rheumatoid arthritis who presents with complaint of lower back pain that began today. Patient sent to the ED by Rheumatologist for acute lower back pain and urinary incontinence. She denies any dysuria, fever, or vomiting. Pt was able to ambulate this morning with some difficulty. She denies any lower extremity weakness or numbness. Denies saddle anesthesia      The history is provided by the patient.     Review of patient's allergies indicates:   Allergen Reactions    Amoxicillin Other (See Comments)     unknown    Bactrim [sulfamethoxazole-trimethoprim] Other (See Comments)     unknown    Omeprazole Other (See Comments)     Muscle and joint pain    Rocephin [ceftriaxone] Other (See Comments)     Severe acute generalized pain    Penicillins Rash     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)     has pacemaker     Past Surgical History:   Procedure Laterality Date    APPENDECTOMY      BREAST SURGERY      CARDIAC PACEMAKER PLACEMENT  11/2014    for syncope and RBBB/LAHB     CHOLECYSTECTOMY      EYE SURGERY      HEMORRHOID SURGERY      HYSTERECTOMY      1966    SKIN BIOPSY      VASCULAR SURGERY      VERTEBROPLASTY       Family History   Problem Relation Age of Onset    Leukemia Father     Heart disease Father     Hypertension Father     Diabetes Mellitus Mother     Hypertension Mother     Diabetes Mellitus Brother     Parkinsonism Brother 68    Heart attack Neg Hx     Heart failure Neg Hx     Hyperlipidemia Neg Hx     Stroke Neg Hx      Social History     Tobacco Use    Smoking status: Never Smoker    Smokeless tobacco: Never Used   Substance Use Topics    Alcohol use: No     Comment: [facility]    Drug use: No     Review of Systems   Constitutional: Negative for chills and fever.   HENT: Negative for congestion, rhinorrhea and sore throat.    Eyes: Negative for redness and visual disturbance.   Respiratory: Negative for cough, shortness of breath and wheezing.    Cardiovascular: Negative for chest pain and palpitations.   Gastrointestinal: Negative for abdominal pain, diarrhea, nausea and vomiting.   Genitourinary: Negative for dysuria and hematuria.        Urinary incontinence.   Musculoskeletal: Positive for back pain. Negative for myalgias and neck pain.   Skin: Negative for rash.   Neurological: Negative for dizziness, weakness and light-headedness.   Psychiatric/Behavioral: Negative for confusion.   All other systems reviewed and are negative.      Physical Exam     Initial Vitals [10/17/18 1330]   BP Pulse Resp Temp SpO2   (!) 167/81 62 16 98.1 °F (36.7 °C) 98 %      MAP       --         Physical Exam    Nursing note and vitals reviewed.  Constitutional: She appears well-developed and well-nourished. No distress.   HENT:   Head: Normocephalic and atraumatic.   Eyes: Conjunctivae and EOM are normal. Pupils are equal, round, and reactive to light.   Neck: Normal range of motion. Neck supple.   Cardiovascular: Normal rate, regular rhythm, normal heart sounds and  intact distal pulses.   No murmur heard.  Pulmonary/Chest: Breath sounds normal. No respiratory distress. She has no wheezes.   Abdominal: Soft. She exhibits no distension. There is tenderness (suprapubic abdominal).   Poor rectal tone, + saddle anesthesia   Musculoskeletal: Normal range of motion. She exhibits no edema.   Neurological: She is alert and oriented to person, place, and time. She has normal strength. No cranial nerve deficit.   Skin: Skin is warm and dry.         ED Course   Procedures  Labs Reviewed   CBC W/ AUTO DIFFERENTIAL - Abnormal; Notable for the following components:       Result Value    RBC 3.29 (*)     Hemoglobin 10.3 (*)     Hematocrit 29.6 (*)     MCH 31.3 (*)     MPV 9.1 (*)     Mono # 1.3 (*)     Lymph% 12.5 (*)     Mono% 15.2 (*)     All other components within normal limits   COMPREHENSIVE METABOLIC PANEL - Abnormal; Notable for the following components:    Sodium 129 (*)     Chloride 92 (*)     Creatinine 1.5 (*)     Albumin 3.2 (*)     eGFR if  36 (*)     eGFR if non  31 (*)     All other components within normal limits   URINALYSIS          Imaging Results          CT Lumbar Spine Without Contrast (Final result)  Result time 10/17/18 17:02:17    Final result by Haider Alarcon MD (10/17/18 17:02:17)                 Impression:      Minimally displaced fracture of the left L3 transverse process, unchanged since March 8, 2018.    Small bilateral pleural effusions.      Electronically signed by: Haider Alarcon MD  Date:    10/17/2018  Time:    17:02             Narrative:    EXAMINATION:  CT LUMBAR SPINE WITHOUT CONTRAST    CLINICAL HISTORY:  Low back pain, risk factors (osteoporosis or chronic steroid use or elderly);    TECHNIQUE:  Low-dose axial, sagittal and coronal reformations are obtained through the lumbar spine.  Contrast was not administered.    COMPARISON:  CT abdomen March 8, 2018.    FINDINGS:  Small bilateral pleural  effusions.    Minimally displaced fracture of the left L3 transverse process unchanged since recent CT..    The lumbar spine demonstrates mild grade 1 anterolisthesis of L5 over S1, as well as chronic left-sided L5-S1 pars defect.  Vertebral body heights are maintained.    No suspicious bone lesion.    Calcified coronary atherosclerosis extending into the bilateral common iliac arteries.    Moderate and severe multilevel lumbar spine degenerative disc disease and facet DJD, most prominent at L2-3 and L5-S1.                               CT Thoracic Spine Without Contrast (Final result)  Result time 10/17/18 17:09:08    Final result by Haider Alarcon MD (10/17/18 17:09:08)                 Impression:      Age indeterminate compression deformities with associated cement within the T8 and T9 vertebral bodies.    Small bilateral pleural effusions.      Electronically signed by: Haider Alarcon MD  Date:    10/17/2018  Time:    17:09             Narrative:    EXAMINATION:  CT THORACIC SPINE WITHOUT CONTRAST    CLINICAL HISTORY:  back pain;    TECHNIQUE:  Helical CT images.    COMPARISON:  March 20, 2014.  MRI March 21, 2014.    FINDINGS:  Minimal grade 1 anterolisthesis of C6 over C7, unchanged since March 20, 2014.    The thoracic spine demonstrates focal kyphosis at levels T8 and T9.  Associated age indeterminate compression deformities with vertebral body cement in place.    Thoracic spine vertebral body height is otherwise maintained.  Thoracic spine is also otherwise maintained.  No subluxation.  No suspicious bone lesion.  No acute displaced fracture.  There is near complete loss of T8 vertebral body height.    Small bilateral pleural effusions.                                 Medical Decision Making:   Clinical Tests:   Lab Tests: Ordered and Reviewed  Radiological Study: Ordered and Reviewed  ED Management:  No UTI. Pt has new back pain and overflow incontinence with post void residual of almost 500cc and poor  rectal tone with saddle anesthesia.  Considering these findings, I have concern for cauda equina syndrome. Discussed case with Dr Verdin for nsg who recommends transfer for pacemaker compatible MRI at [facility] and nsg evaluation there              Attending Attestation:           Physician Attestation for Scribe:  Physician Attestation Statement for Scribe #1: I, Acosta Mckeon, reviewed documentation, as scribed by Mike Sloan in my presence, and it is both accurate and complete.                    Clinical Impression:     1. Acute right-sided low back pain without sciatica    2. Pain    3. Back pain    4. Overflow incontinence of urine            Disposition:   Disposition: Transferred  Condition: Serious                        Acosta Mckeon MD  10/17/18 9135

## 2018-10-18 PROBLEM — N39.41 URGE INCONTINENCE: Status: ACTIVE | Noted: 2018-10-18

## 2018-10-18 PROBLEM — E87.1 HYPONATREMIA: Status: ACTIVE | Noted: 2018-10-18

## 2018-10-18 LAB
ANION GAP SERPL CALC-SCNC: 11 MMOL/L
APTT BLDCRRT: 23.6 SEC
BASOPHILS # BLD AUTO: 0.06 K/UL
BASOPHILS NFR BLD: 0.8 %
BILIRUB UR QL STRIP: NEGATIVE
BUN SERPL-MCNC: 26 MG/DL
CALCIUM SERPL-MCNC: 8.7 MG/DL
CHLORIDE SERPL-SCNC: 97 MMOL/L
CLARITY UR REFRACT.AUTO: CLEAR
CO2 SERPL-SCNC: 22 MMOL/L
COLOR UR AUTO: YELLOW
CREAT SERPL-MCNC: 1.4 MG/DL
DIFFERENTIAL METHOD: ABNORMAL
EOSINOPHIL # BLD AUTO: 0.2 K/UL
EOSINOPHIL NFR BLD: 2.3 %
ERYTHROCYTE [DISTWIDTH] IN BLOOD BY AUTOMATED COUNT: 14 %
EST. GFR  (AFRICAN AMERICAN): 38.7 ML/MIN/1.73 M^2
EST. GFR  (NON AFRICAN AMERICAN): 33.6 ML/MIN/1.73 M^2
GLUCOSE SERPL-MCNC: 56 MG/DL
GLUCOSE UR QL STRIP: NEGATIVE
HCT VFR BLD AUTO: 28.9 %
HGB BLD-MCNC: 10.1 G/DL
HGB UR QL STRIP: NEGATIVE
IMM GRANULOCYTES # BLD AUTO: 0.22 K/UL
IMM GRANULOCYTES NFR BLD AUTO: 2.9 %
INR PPP: 1.1
KETONES UR QL STRIP: ABNORMAL
LEUKOCYTE ESTERASE UR QL STRIP: NEGATIVE
LYMPHOCYTES # BLD AUTO: 0.9 K/UL
LYMPHOCYTES NFR BLD: 12.3 %
MCH RBC QN AUTO: 31.9 PG
MCHC RBC AUTO-ENTMCNC: 34.9 G/DL
MCV RBC AUTO: 91 FL
MONOCYTES # BLD AUTO: 0.9 K/UL
MONOCYTES NFR BLD: 12.3 %
NEUTROPHILS # BLD AUTO: 5.2 K/UL
NEUTROPHILS NFR BLD: 69.4 %
NITRITE UR QL STRIP: NEGATIVE
NRBC BLD-RTO: 0 /100 WBC
OSMOLALITY SERPL: 278 MOSM/KG
OSMOLALITY SERPL: 285 MOSM/KG
OSMOLALITY UR: 355 MOSM/KG
PH UR STRIP: 6 [PH] (ref 5–8)
PLATELET # BLD AUTO: 199 K/UL
PMV BLD AUTO: 9.5 FL
POTASSIUM SERPL-SCNC: 4.1 MMOL/L
PROT UR QL STRIP: NEGATIVE
PROTHROMBIN TIME: 11.7 SEC
RBC # BLD AUTO: 3.17 M/UL
SODIUM SERPL-SCNC: 130 MMOL/L
SODIUM UR-SCNC: 79 MMOL/L
SP GR UR STRIP: 1.01 (ref 1–1.03)
URN SPEC COLLECT METH UR: ABNORMAL
UROBILINOGEN UR STRIP-ACNC: NEGATIVE EU/DL
UUN UR-MCNC: 377 MG/DL
WBC # BLD AUTO: 7.46 K/UL

## 2018-10-18 PROCEDURE — 83930 ASSAY OF BLOOD OSMOLALITY: CPT | Mod: 91

## 2018-10-18 PROCEDURE — 85610 PROTHROMBIN TIME: CPT

## 2018-10-18 PROCEDURE — 99223 1ST HOSP IP/OBS HIGH 75: CPT | Mod: AI,GC,, | Performed by: INTERNAL MEDICINE

## 2018-10-18 PROCEDURE — 25000003 PHARM REV CODE 250: Performed by: STUDENT IN AN ORGANIZED HEALTH CARE EDUCATION/TRAINING PROGRAM

## 2018-10-18 PROCEDURE — 99232 SBSQ HOSP IP/OBS MODERATE 35: CPT | Mod: ,,, | Performed by: PHYSICIAN ASSISTANT

## 2018-10-18 PROCEDURE — 84540 ASSAY OF URINE/UREA-N: CPT

## 2018-10-18 PROCEDURE — 20600001 HC STEP DOWN PRIVATE ROOM

## 2018-10-18 PROCEDURE — 83935 ASSAY OF URINE OSMOLALITY: CPT

## 2018-10-18 PROCEDURE — 81003 URINALYSIS AUTO W/O SCOPE: CPT

## 2018-10-18 PROCEDURE — 83930 ASSAY OF BLOOD OSMOLALITY: CPT

## 2018-10-18 PROCEDURE — 63600175 PHARM REV CODE 636 W HCPCS: Performed by: STUDENT IN AN ORGANIZED HEALTH CARE EDUCATION/TRAINING PROGRAM

## 2018-10-18 PROCEDURE — 80048 BASIC METABOLIC PNL TOTAL CA: CPT

## 2018-10-18 PROCEDURE — 36415 COLL VENOUS BLD VENIPUNCTURE: CPT

## 2018-10-18 PROCEDURE — 85025 COMPLETE CBC W/AUTO DIFF WBC: CPT

## 2018-10-18 PROCEDURE — 85730 THROMBOPLASTIN TIME PARTIAL: CPT

## 2018-10-18 PROCEDURE — 84300 ASSAY OF URINE SODIUM: CPT

## 2018-10-18 RX ORDER — ONDANSETRON 2 MG/ML
4 INJECTION INTRAMUSCULAR; INTRAVENOUS ONCE
Status: COMPLETED | OUTPATIENT
Start: 2018-10-18 | End: 2018-10-18

## 2018-10-18 RX ORDER — GLUCAGON 1 MG
1 KIT INJECTION
Status: DISCONTINUED | OUTPATIENT
Start: 2018-10-18 | End: 2018-10-23 | Stop reason: HOSPADM

## 2018-10-18 RX ORDER — SERTRALINE HYDROCHLORIDE 25 MG/1
25 TABLET, FILM COATED ORAL DAILY
Status: DISCONTINUED | OUTPATIENT
Start: 2018-10-18 | End: 2018-10-23 | Stop reason: HOSPADM

## 2018-10-18 RX ORDER — HYDROXYCHLOROQUINE SULFATE 200 MG/1
200 TABLET, FILM COATED ORAL DAILY
Status: DISCONTINUED | OUTPATIENT
Start: 2018-10-18 | End: 2018-10-23 | Stop reason: HOSPADM

## 2018-10-18 RX ORDER — IBUPROFEN 200 MG
16 TABLET ORAL
Status: DISCONTINUED | OUTPATIENT
Start: 2018-10-18 | End: 2018-10-23 | Stop reason: HOSPADM

## 2018-10-18 RX ORDER — ACETAMINOPHEN 325 MG/1
650 TABLET ORAL EVERY 6 HOURS PRN
Status: DISCONTINUED | OUTPATIENT
Start: 2018-10-18 | End: 2018-10-23 | Stop reason: HOSPADM

## 2018-10-18 RX ORDER — IBUPROFEN 200 MG
24 TABLET ORAL
Status: DISCONTINUED | OUTPATIENT
Start: 2018-10-18 | End: 2018-10-23 | Stop reason: HOSPADM

## 2018-10-18 RX ORDER — SODIUM CHLORIDE 0.9 % (FLUSH) 0.9 %
5 SYRINGE (ML) INJECTION
Status: DISCONTINUED | OUTPATIENT
Start: 2018-10-18 | End: 2018-10-23 | Stop reason: HOSPADM

## 2018-10-18 RX ORDER — ATORVASTATIN CALCIUM 20 MG/1
20 TABLET, FILM COATED ORAL DAILY
Status: DISCONTINUED | OUTPATIENT
Start: 2018-10-18 | End: 2018-10-23 | Stop reason: HOSPADM

## 2018-10-18 RX ORDER — FOLIC ACID 1 MG/1
1 TABLET ORAL DAILY
Status: DISCONTINUED | OUTPATIENT
Start: 2018-10-18 | End: 2018-10-23 | Stop reason: HOSPADM

## 2018-10-18 RX ORDER — DEXTROSE 50 % IN WATER (D50W) INTRAVENOUS SYRINGE
12.5
Status: DISCONTINUED | OUTPATIENT
Start: 2018-10-18 | End: 2018-10-23 | Stop reason: HOSPADM

## 2018-10-18 RX ORDER — ALPRAZOLAM 0.25 MG/1
0.25 TABLET ORAL NIGHTLY PRN
Status: DISCONTINUED | OUTPATIENT
Start: 2018-10-18 | End: 2018-10-19

## 2018-10-18 RX ORDER — HYDRALAZINE HYDROCHLORIDE 25 MG/1
25 TABLET, FILM COATED ORAL EVERY 8 HOURS PRN
Status: DISCONTINUED | OUTPATIENT
Start: 2018-10-18 | End: 2018-10-23 | Stop reason: HOSPADM

## 2018-10-18 RX ORDER — CARVEDILOL 6.25 MG/1
6.25 TABLET ORAL 2 TIMES DAILY WITH MEALS
Status: DISCONTINUED | OUTPATIENT
Start: 2018-10-18 | End: 2018-10-21

## 2018-10-18 RX ORDER — DEXTROSE 50 % IN WATER (D50W) INTRAVENOUS SYRINGE
25
Status: DISCONTINUED | OUTPATIENT
Start: 2018-10-18 | End: 2018-10-23 | Stop reason: HOSPADM

## 2018-10-18 RX ADMIN — ALPRAZOLAM 0.25 MG: 0.25 TABLET ORAL at 09:10

## 2018-10-18 RX ADMIN — ACETAMINOPHEN 650 MG: 325 TABLET ORAL at 02:10

## 2018-10-18 RX ADMIN — ACETAMINOPHEN 650 MG: 325 TABLET ORAL at 09:10

## 2018-10-18 RX ADMIN — HYDROXYCHLOROQUINE SULFATE 200 MG: 200 TABLET, FILM COATED ORAL at 09:10

## 2018-10-18 RX ADMIN — CARVEDILOL 6.25 MG: 6.25 TABLET, FILM COATED ORAL at 04:10

## 2018-10-18 RX ADMIN — FOLIC ACID 1 MG: 1 TABLET ORAL at 09:10

## 2018-10-18 RX ADMIN — ONDANSETRON 4 MG: 2 INJECTION INTRAMUSCULAR; INTRAVENOUS at 02:10

## 2018-10-18 RX ADMIN — ATORVASTATIN CALCIUM 20 MG: 20 TABLET, FILM COATED ORAL at 09:10

## 2018-10-18 RX ADMIN — SERTRALINE HYDROCHLORIDE 25 MG: 25 TABLET ORAL at 09:10

## 2018-10-18 RX ADMIN — CARVEDILOL 6.25 MG: 6.25 TABLET, FILM COATED ORAL at 09:10

## 2018-10-18 NOTE — ASSESSMENT & PLAN NOTE
Home meds: Carvedilol 6.25 mg BID, lisinopril 10 mg daily, furosemide 20 mg dailiy  - carvedilol continued, lisinopril held (CHARLENE), furosemide held (euvolemic, urine sodium would be falsely elevated)

## 2018-10-18 NOTE — NURSING TRANSFER
Nursing Transfer Note      10/18/2018     Transfer to [id number] from ED.    Transfer via stretcher    Transfer with n/a    Transported by transporter    Medicines sent: n/a    Chart send with patient: Yes    Notified: son    Patient reassessed at: 10/18/18 @ 0300    Upon arrival to floor: pt oriented to room. Side rails up x 2. Call bell in reach. Pt denies any pain at this time. Pt w/out any complaints or concerns. Instructed to call w/ any needs.

## 2018-10-18 NOTE — ASSESSMENT & PLAN NOTE
Previous baseline near 0.8 earlier this year, for the last month pt has been near 1.5; recently admitted for CHARLENE and determined to possibly represent progression of CKD  - IVF given; urine and serum osmolality as well as urine sodium to determine cause of CHARLENE (previously intrinsic)

## 2018-10-18 NOTE — ASSESSMENT & PLAN NOTE
Chronic, noted since 09/2018, given 1L NS in ED with improvement 130 from 129  - UOsm, SOsm, Richelle  - Pt with concomitant SCr rise for the last month; previously admitted for CHARLENE and thought to represent possible new baseline. Pt may have renal losses contributing to hyponatremia as the SCr rise was similar in timing to the hyponatremia  - Son states that pt does not take much sodium in (HFpEF), states that this has been a problem in the past  - Aim to correct <4-6 per day

## 2018-10-18 NOTE — PLAN OF CARE
Problem: Patient Care Overview  Goal: Plan of Care Review  No acute changes on shift. Pt reported relief of back pain at start of shift. Pain gradually increasing as day goes on. PRN acetaminophen given with little relief. Pt ambulating to BR with assist x 1. No episodes of incontinence noted. Pt opted out of CT myelogram today. Will likely d/c tomorrow. No other issues noted. WCTM.

## 2018-10-18 NOTE — CONSULTS
[facility]  Neurosurgery  Consult Note    Consults  Subjective:     Chief Complaint/Reason for Admission: Back pain    History of Present Illness: 88 F presents for evaluation of back pain which started yesterday morning.  No inciting events, no falls.  Pain is nonradiating and is located in lower lumbar area.  No weakness in legs/parasthesias or numbness.  She has had urinary urgency but states she still feels sensation to void.  Pt ambulates at home with cane.      (Not in a hospital admission)    Review of patient's allergies indicates:   Allergen Reactions    Amoxicillin Other (See Comments)     unknown    Bactrim [sulfamethoxazole-trimethoprim] Other (See Comments)     unknown    Omeprazole Other (See Comments)     Muscle and joint pain    Rocephin [ceftriaxone] Other (See Comments)     Severe acute generalized pain    Penicillins Rash       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)     has pacemaker     Past Surgical History:   Procedure Laterality Date    APPENDECTOMY      BREAST SURGERY      CARDIAC PACEMAKER PLACEMENT  11/2014    for syncope and RBBB/LAHB    CHOLECYSTECTOMY      EYE SURGERY      HEMORRHOID SURGERY      HYSTERECTOMY      1966    SKIN BIOPSY      VASCULAR SURGERY      VERTEBROPLASTY       Family History     Problem Relation (Age of Onset)    Diabetes Mellitus Mother, Brother    Heart  disease Father    Hypertension Father, Mother    Leukemia Father    Parkinsonism Brother (68)        Tobacco Use    Smoking status: Never Smoker    Smokeless tobacco: Never Used   Substance and Sexual Activity    Alcohol use: No     Comment: [facility]    Drug use: No    Sexual activity: No     Partners: Male     Birth control/protection: None     Review of Systems  Objective:     Weight: 55.3 kg (122 lb)  Body mass index is 22.31 kg/m².  Vital Signs (Most Recent):  Pulse: 63 (10/17/18 2202)  Resp: 20 (10/17/18 2011)  BP: (!) 185/76 (10/17/18 2202)  SpO2: (!) 93 % (10/17/18 2202) Vital Signs (24h Range):  Temp:  [98.1 °F (36.7 °C)] 98.1 °F (36.7 °C)  Pulse:  [60-78] 63  Resp:  [14-20] 20  SpO2:  [91 %-98 %] 93 %  BP: (132-185)/(60-81) 185/76                           Neurosurgery Physical Exam   Awake, alert  5/5 in BUE/BLE  SILT  Reflexes symm  No clonus, no babinski    Significant Labs:  Recent Labs   Lab 10/17/18  1455      *   K 3.9   CL 92*   CO2 25   BUN 23   CREATININE 1.5*   CALCIUM 9.2     Recent Labs   Lab 10/17/18  1455   WBC 8.22   HGB 10.3*   HCT 29.6*        No results for input(s): LABPT, INR, APTT in the last 48 hours.  Microbiology Results (last 7 days)     ** No results found for the last 168 hours. **          Significant Diagnostics:  CT T/L spine: reviewed.    Assessment/Plan:     88 F with one day hx of nonradiating lumbar back pain and chronic compression frxs of T8/9.  -No acute NSGY intervention, low suspicion for cauda equina or conus medullaris syndrome  -Unable to get MRI d/t pacemaker  -Will need CT myelogram of T/L spine to assess for any neural compression.  -Admit to medicine for back pain, will follow.        Sukhdeep Carbone, DO  Neurosurgery  [facility]

## 2018-10-18 NOTE — SUBJECTIVE & OBJECTIVE
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)     has pacemaker       Past Surgical History:   Procedure Laterality Date    APPENDECTOMY      BREAST SURGERY      CARDIAC PACEMAKER PLACEMENT  11/2014    for syncope and RBBB/LAHB    CHOLECYSTECTOMY      EYE SURGERY      HEMORRHOID SURGERY      HYSTERECTOMY      1966    SKIN BIOPSY      VASCULAR SURGERY      VERTEBROPLASTY         Review of patient's allergies indicates:   Allergen Reactions    Amoxicillin Other (See Comments)     unknown    Bactrim [sulfamethoxazole-trimethoprim] Other (See Comments)     unknown    Omeprazole Other (See Comments)     Muscle and joint pain    Rocephin [ceftriaxone] Other (See Comments)     Severe acute generalized pain    Penicillins Rash       Current Facility-Administered Medications on File Prior to Encounter   Medication    [COMPLETED] acetaminophen tablet 1,000 mg    [COMPLETED] morphine injection 4 mg    [DISCONTINUED] denosumab (PROLIA) injection 60 mg     Current Outpatient Medications on File Prior to Encounter   Medication Sig    ALPRAZolam (XANAX) 0.25 MG tablet TAKE 1 TABLET BY MOUTH EVERY DAY AS NEEDED    aspirin (ECOTRIN) 81 MG EC tablet Take 1 tablet (81 mg total) by mouth once daily. HOLD until cleared by your ENT doctor and your Neurologist.    atorvastatin (LIPITOR) 20 MG tablet TAKE 1 TABLET BY MOUTH EVERY DAY    calcium  carbonate (CALCIUM 600 ORAL) Take 1 tablet by mouth 2 (two) times daily.    carvedilol (COREG) 6.25 MG tablet Take 1 tablet (6.25 mg total) by mouth 2 (two) times daily with meals.    coenzyme Q10 (CO Q-10) 100 mg capsule Take 100 mg by mouth once daily.    denosumab (PROLIA) 60 mg/mL Syrg Inject 60 mg into the skin. Every 6 months    fish oil-omega-3 fatty acids 300-1,000 mg capsule Take 1,000 mg by mouth once daily.     folic acid (FOLVITE) 1 MG tablet TAKE 1 TABLET (1 MG TOTAL) BY MOUTH ONCE DAILY.    furosemide (LASIX) 20 MG tablet Take 1 tablet (20 mg total) by mouth once daily.    hydroxychloroquine (PLAQUENIL) 200 mg tablet Take 1 tablet (200 mg total) by mouth once daily.    lisinopril 10 MG tablet Take 1 tablet (10 mg total) by mouth once daily.    sertraline (ZOLOFT) 25 MG tablet Take 1 tablet (25 mg total) by mouth once daily.    spironolactone (ALDACTONE) 100 MG tablet Take 100 mg by mouth once daily.    VITAMIN B COMPLEX ORAL Take 400 mg by mouth once daily. Pt stated that she does not take vitamin b everyday.    vitamin E 400 UNIT capsule Take 400 Units by mouth once daily. Pt taking PRN    [DISCONTINUED] methotrexate 2.5 MG Tab TAKE 10 TABLETS (25 MG TOTAL) BY MOUTH EVERY 7 DAYS.     Family History     Problem Relation (Age of Onset)    Diabetes Mellitus Mother, Brother    Heart disease Father    Hypertension Father, Mother    Leukemia Father    Parkinsonism Brother (68)        Tobacco Use    Smoking status: Never Smoker    Smokeless tobacco: Never Used   Substance and Sexual Activity    Alcohol use: No     Comment: [facility]    Drug use: No    Sexual activity: No     Partners: Male     Birth control/protection: None     Review of Systems   Constitutional: Negative for chills, diaphoresis, fatigue and fever.   Respiratory: Negative for cough, choking, chest tightness, shortness of breath and wheezing.    Cardiovascular: Negative for chest pain, palpitations and leg swelling.    Gastrointestinal: Negative for abdominal pain, blood in stool, constipation, diarrhea, nausea and vomiting.   Genitourinary: Positive for difficulty urinating, frequency and urgency. Negative for decreased urine volume, dysuria, enuresis, flank pain and hematuria.   Musculoskeletal: Positive for back pain. Negative for arthralgias, gait problem, joint swelling, myalgias, neck pain and neck stiffness.   Skin: Negative for color change, pallor, rash and wound.     Objective:     Vital Signs (Most Recent):  Pulse: 60 (10/18/18 0201)  Resp: 20 (10/17/18 2011)  BP: (!) 162/71 (10/18/18 0201)  SpO2: (!) 92 % (10/18/18 0201) Vital Signs (24h Range):  Temp:  [98.1 °F (36.7 °C)] 98.1 °F (36.7 °C)  Pulse:  [60-78] 60  Resp:  [14-20] 20  SpO2:  [91 %-98 %] 92 %  BP: (132-185)/(60-81) 162/71     Weight: 55.3 kg (122 lb)  Body mass index is 22.31 kg/m².    Physical Exam   Constitutional: She appears well-developed. No distress.   HENT:   Head: Normocephalic and atraumatic.   Right Ear: External ear normal.   Left Ear: External ear normal.   Nose: Nose normal.   Eyes: EOM are normal. Pupils are equal, round, and reactive to light.   Neck: No JVD present. No tracheal deviation present.   Cardiovascular: Normal rate and regular rhythm.   No murmur heard.  Pulmonary/Chest: Effort normal and breath sounds normal. No stridor.   Abdominal: Soft. Bowel sounds are normal. She exhibits no distension. There is no tenderness. No hernia.   Musculoskeletal: She exhibits no edema.   Neurological: She is alert. No cranial nerve deficit.   Skin: No rash noted. She is not diaphoretic.   Psychiatric: She has a normal mood and affect. Judgment normal.   Vitals reviewed.        CRANIAL NERVES     CN III, IV, VI   Pupils are equal, round, and reactive to light.  Extraocular motions are normal.        Significant Labs:   CBC:   Recent Labs   Lab 10/17/18  1455 10/18/18  0226   WBC 8.22 7.46   HGB 10.3* 10.1*   HCT 29.6* 28.9*    199     CMP:    Recent Labs   Lab 10/17/18  1455 10/18/18  0226   * 130*   K 3.9 4.1   CL 92* 97   CO2 25 22*    56*   BUN 23 26*   CREATININE 1.5* 1.4   CALCIUM 9.2 8.7   PROT 6.3  --    ALBUMIN 3.2*  --    BILITOT 0.8  --    ALKPHOS 70  --    AST 28  --    ALT 44  --    ANIONGAP 12 11   EGFRNONAA 31* 33.6*     Lactic Acid: No results for input(s): LACTATE in the last 48 hours.  Urine Studies:   Recent Labs   Lab 10/17/18  1343   COLORU Yellow   APPEARANCEUA Clear   PHUR 7.0   SPECGRAV 1.010   PROTEINUA Negative   GLUCUA Negative   KETONESU Negative   BILIRUBINUA Negative   OCCULTUA Negative   NITRITE Negative   UROBILINOGEN Negative   LEUKOCYTESUR Negative       Significant Imaging: I have reviewed all pertinent imaging results/findings within the past 24 hours.

## 2018-10-18 NOTE — ASSESSMENT & PLAN NOTE
Home meds: Carvedilol 6.25 mg BID, lisinopril 10 mg daily  - coreg continued, lisinopril held in the setting of questionable CHARLENE vs CKD progression  - PRN hydralazine 25 mg PO Q8H SBP >180 mmHg; may consider changing regimen of antihypertensives prior to discharge if SCr does not improve

## 2018-10-18 NOTE — PLAN OF CARE
Problem: Patient Care Overview  Goal: Plan of Care Review  Pt AAO x 4 & pt stated she's normally ambulates w/ a cane at  home. Pt aware that we need to collect a urine specimen. Pt to have a CT myelogram today. Pt denies any pain while lying still in bed. Instructed to call w/ any needs.

## 2018-10-18 NOTE — PROGRESS NOTES
Cardiology Note    Notified by NS team that pt has Hieu Pacemaker. Called device company: # [phone]. Device is NOT MRI Compatible. Also called EP fellow on call, Dr. Remy to discuss case.     Angie Nicole, PGY-4 (Cardiology Fellow)

## 2018-10-18 NOTE — ED PROVIDER NOTES
Encounter Date: 10/17/2018    SCRIBE #1 NOTE: I, Natanael Zepeda, am scribing for, and in the presence of,  Dr. Sloan. I have scribed the following portions of the note - the Resident attestation.       History     Chief Complaint   Patient presents with    Back Pain     Pt arrives as transfer from [place] with Dx of cauda equina. Pt c/o back pain, poor rectal tone, inability to empty bladder. Pt arrive for MRI.     This is a 88 y.o. female w h/o rheumatoid arthritis who presents with complaint of lower back pain that began today. Patient sent to the ED by Rheumatologist for acute lower back pain and urinary incontinence. She denies any dysuria, fever, or vomiting. Pt was able to ambulate this morning with some difficulty. She denies any lower extremity weakness or numbness. Denies saddle anesthesia.        The history is provided by the patient.           Review of patient's allergies indicates:   Allergen Reactions    Amoxicillin Other (See Comments)     unknown    Bactrim [sulfamethoxazole-trimethoprim] Other (See Comments)     unknown    Omeprazole Other (See Comments)     Muscle and joint pain    Rocephin [ceftriaxone] Other (See Comments)     Severe acute generalized pain    Penicillins Rash     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)     has pacemaker     Past Surgical History:   Procedure Laterality Date    APPENDECTOMY       BREAST SURGERY      CARDIAC PACEMAKER PLACEMENT  11/2014    for syncope and RBBB/LAHB    CHOLECYSTECTOMY      EYE SURGERY      HEMORRHOID SURGERY      HYSTERECTOMY      1966    SKIN BIOPSY      VASCULAR SURGERY      VERTEBROPLASTY       Family History   Problem Relation Age of Onset    Leukemia Father     Heart disease Father     Hypertension Father     Diabetes Mellitus Mother     Hypertension Mother     Diabetes Mellitus Brother     Parkinsonism Brother 68    Heart attack Neg Hx     Heart failure Neg Hx     Hyperlipidemia Neg Hx     Stroke Neg Hx      Social History     Tobacco Use    Smoking status: Never Smoker    Smokeless tobacco: Never Used   Substance Use Topics    Alcohol use: No     Comment: [facility]    Drug use: No     Review of Systems   Constitutional: Negative for activity change, chills, diaphoresis, fatigue and fever.   HENT: Negative for congestion, rhinorrhea, sinus pressure, sinus pain and sneezing.    Respiratory: Negative for cough, shortness of breath and wheezing.    Cardiovascular: Negative for chest pain, palpitations and leg swelling.   Gastrointestinal: Negative for abdominal distention, abdominal pain, blood in stool, constipation, diarrhea, nausea and vomiting.   Genitourinary: Positive for urgency. Negative for difficulty urinating, dysuria, flank pain and hematuria.   Musculoskeletal: Positive for arthralgias and back pain. Negative for neck pain and neck stiffness.   Neurological: Negative for dizziness, tremors, syncope, weakness and headaches.   Psychiatric/Behavioral: Positive for confusion and decreased concentration. Negative for agitation and behavioral problems.       Physical Exam     Initial Vitals   BP Pulse Resp Temp SpO2   10/17/18 1959 10/17/18 1959 10/17/18 1959 10/18/18 0300 10/17/18 1959   (!) 158/60 64 16 97.8 °F (36.6 °C) 96 %      MAP       --                Physical Exam    Constitutional: She appears well-developed and well-nourished. No  "distress.   HENT:   Head: Normocephalic and atraumatic.   Eyes: EOM are normal. Pupils are equal, round, and reactive to light.   Neck: Normal range of motion.   Cardiovascular: Normal rate, regular rhythm, normal heart sounds and intact distal pulses.   No murmur heard.  Pulmonary/Chest: Breath sounds normal. She has no wheezes. She has no rales.   Abdominal: Soft. Bowel sounds are normal. She exhibits no distension and no mass. There is no tenderness.   Musculoskeletal: Normal range of motion.   Neurological: She is alert and oriented to person, place, and time. No cranial nerve deficit. Gait normal.   4/5 power in left leg, right normal  Pt feels (tingling) when saddle area palpated   Skin: Skin is warm and dry.   Psychiatric: She has a normal mood and affect. Her behavior is normal.         ED Course   Procedures  Labs Reviewed   BASIC METABOLIC PANEL - Abnormal; Notable for the following components:       Result Value    Sodium 130 (*)     CO2 22 (*)     Glucose 56 (*)     BUN, Bld 26 (*)     eGFR if  38.7 (*)     eGFR if non  33.6 (*)     All other components within normal limits   CBC W/ AUTO DIFFERENTIAL - Abnormal; Notable for the following components:    RBC 3.17 (*)     Hemoglobin 10.1 (*)     Hematocrit 28.9 (*)     MCH 31.9 (*)     Immature Granulocytes 2.9 (*)     Immature Grans (Abs) 0.22 (*)     Lymph # 0.9 (*)     Lymph% 12.3 (*)     All other components within normal limits   PROTIME-INR   APTT          Imaging Results    None          Medical Decision Making:   History:   Old Medical Records: I decided to obtain old medical records.  Initial Assessment:   This is a 88 y.o. female w h/o rheumatoid arthritis who presents with complaint of lower back pain that began today. Patient sent to the ED by Rheumatologist for acute lower back pain and urinary incontinence.  Differential Diagnosis:   Concern for caudi equina syndrome given new onset low back pain with "tingling" " "of saddle area vs UTI/pyelo  ED Management:  Percocet 5-325 given for pain, NS infusion 100mL/hr    Most workup was performed at [facility] prior to arrival.    Concern for caudi equina syndrome, pt was transferred for cards to "turn off" pacemaker to allow for MRI, however cards eval and calls to  reveal that pacemaker is not compatible with MRI.  Neurosurgery consulted as Dr. Flores was contacted regarding initial transfer.  Currently assessing for myelogram as possibility to assess for caudi equina syndrome. Pt admitted to hospital medicine with plans for CT myelogram tomorrow.            Scribe Attestation:   Scribe #1: I performed the above scribed service and the documentation accurately describes the services I performed. I attest to the accuracy of the note.    Attending Attestation:   Physician Attestation Statement for Resident:  As the supervising MD   Physician Attestation Statement: I have personally seen and examined this patient.   I agree with the above history. -: 88 y.o. female transferred here from [facility] for concern of Cauda equina due to worsening low back pain and urinary incontinence that stated today. Worsening back pain that started today and noticed that urine when trying to stand up. Transferred here to have a MRI done but needs to have Cardiology evaluation to have pacemaker turned off before MRI was performed.   As the supervising MD I agree with the above PE.   -: On exam she has intact strength in the lower extremities. She is able to raise her right leg and left leg against gravity. 5/5 strength. she does not have any midline lumbar tenderness. Moving all extremities. No abdominal tenderness. Heart and lungs exam are normal.      As the supervising MD I agree with the above treatment, course, plan, and disposition.   -: Patient was seen evaluated by Cardiology service.  Unable to have MRI done due to her type of pacemaker.  Neurosurgery was consulted and was " recommended to have patient admitted for myelogram.  Patient was also found to have hyponatremia with lower extremity weakness on exam.  Will admit patient to hospital medicine to correct her hyponatremia and to have myelogram done upon Neurosurgery recommendation.  I have reviewed and agree with the residents interpretation of the following: lab data.  I have reviewed the following: old records at this facility.                       Clinical Impression:   The primary encounter diagnosis was Hyponatremia. Diagnoses of Acute midline low back pain without sciatica and Urinary incontinence, urge were also pertinent to this visit.                             Modesto Giron MD  Resident  10/18/18 0056       Ankit Sloan MD  10/18/18 6530

## 2018-10-18 NOTE — ED TRIAGE NOTES
Back Pain (Pt arrives as transfer from [place] with Dx of cauda equina. Pt c/o back pain, poor rectal tone, inability to empty bladder. Pt arrive for MRI.)

## 2018-10-18 NOTE — ASSESSMENT & PLAN NOTE
New onset (today), pt says that this hasn't happened before. She also mentions that she has the sensation of urinating more than she actually is  - UA negative at [place], repeated here.

## 2018-10-18 NOTE — ASSESSMENT & PLAN NOTE
1 day of acute low back pain, R>L, with report of decreased sphincter tone and saddle anesthesia at [place] ED but normal IAN and sensation at [facility] ED. NSGY state low suspicion for cauda equina but want non-emergent CT myelogram to r/o neural compression.  - CT myelogram T/L ordered per NSGY  - Pain control: given oxy-APAP 5-325 x1, states no pain

## 2018-10-18 NOTE — PLAN OF CARE
Uday Allen MD     Payor: MEDICARE / Plan: MEDICARE PART A & B / Product Type: Government /      Extended Emergency Contact Information  Primary Emergency Contact: Rene Sandoval  Address: [id number] [place] [address] [facility]  Home Phone: [phone]  Work Phone: [phone]  Mobile Phone: [phone]  Relation: Son  Secondary Emergency Contact: Abigail Sandoval  Address: [id number] [place] [address] [facility]  Home Phone: [phone]  Mobile Phone: [phone]  Relation: Daughter        10/18/18 1452   Discharge Assessment   Assessment Type Discharge Planning Assessment   Confirmed/corrected address and phone number on facesheet? Yes   Assessment information obtained from? Patient   Expected Length of Stay (days) 3   Communicated expected length of stay with patient/caregiver yes   Prior to hospitilization cognitive status: Alert/Oriented   Prior to hospitalization functional status: Assistive Equipment   Current cognitive status: Alert/Oriented   Current Functional Status: Assistive Equipment;Needs Assistance   Lives With alone   Able to Return to Prior Arrangements unable to determine at this time (comments)   Is patient able to care for self after discharge? Unable to determine at this time (comments)   Patient's perception of discharge disposition home or selfcare   Readmission Within The Last 30 Days previous discharge plan unsuccessful   If yes, most recent facility name: [facility]   Patient currently being followed by outpatient case management? No   Patient currently receives any other outside agency services? No   Equipment Currently Used at Home cane, straight;raised toilet;bedside commode;bath bench   Do you have any problems affording any of your prescribed medications? No   Is the patient taking medications as prescribed? yes   Does the patient have transportation home? Yes   Transportation Available  family or friend will provide   Does the patient receive services at the [facility]? No   Discharge Plan A Home with family;Home Health   Discharge Plan B Skilled Nursing Facility   Patient/Family In Agreement With Plan yes   Readmission Questionnaire   At the time of your discharge, did someone talk to you about what your health problems were? Yes   At the time of discharge, did someone talk to you about what to watch out for regarding worsening of your health problem? Yes   At the time of discharge, did someone talk to you about what to do if you experienced worsening of your health problem? Yes   At the time of discharge, did someone talk to you about which medication to take when you left the hospital and which ones to stop taking? Yes   At the time of discharge, did someone talk to you about when and where to follow up with a doctor after you left the hospital? Yes   What do you believe caused you to be sick enough to be re-admitted? back pain   How often do you need to have someone help you when you read instructions, pamphlets, or other written material from your doctor or pharmacy? Sometimes   Do you have problems taking your medications as prescribed? No   Do you have any problems affording any of  your prescribed medications? No   Do you have problems obtaining/receiving your medications? No   Does the patient have transportation to healthcare appointments? Yes   Living Arrangements house   Does the patient have family/friends to help with healtcare needs after discharge? yes   Does your caregiver provide all the help you need? Yes   Are you currently feeling confused? No   Are you currently having problems thinking? No   Are you currently having memory problems? No   Have you felt down, depressed, or hopeless? 0   Have you felt little interest or pleasure in doing things? 0   In the last 7 days, my sleep quality was: fair

## 2018-10-18 NOTE — HPI
"88F with RA transferred from [place] for Neurosurgery evaluation of new onset low back pain and urinary incontinence thought to be concerning for cauda equina syndrome. Pt says that early this morning she noticed that her low back hurt (sharp, worse with movement), which is new; pt does not chronically have back pain. Pt went to the Rheumatologist, where she complained of this pain and also mentioned new onset urinary incontinence (today). Pt was seen in [place] ED where she was reported to have had poor rectal tone, transferred to [facility] for NSGY eval. Non-contrasted CT T/L spine showed chronic T8/T9 fractures    At [facility] ED NSGY examined pt and recommended non-emergent CT myelogram; they stated presentation not concerning for cauda equina syndrome but wanted study to assess for neural compression. In the ED demonstrated no saddle anesthesia or loss of rectal tone; pt did report increased frequency of urination and the sensation of urinating a greater volume of urine than was voided. When asked about her incontinence, she says that she had the urge to go but could not get to a bathroom in time; denies hx of urinary or fecal incontinence. Denies n/v/d/c, blood in stool, chest pain, dyspnea, weakness. She does mention back pain greater R than L side and states that it is a "funny" feeling but that the pain is sharp and positional; denies recent trauma.  "

## 2018-10-18 NOTE — H&P
[facility] Medicine  History & Physical    Patient Name: Karly Sandoval  MRN: [id number]  Admission Date: 10/17/2018  Attending Physician: Tiffany Aguilar MD   Primary Care Provider: Uday Allen MD    [facility] Medicine Team: [facility] 3 Mariel Coleman MD     Patient information was obtained from patient, relative(s), past medical records and ER records.     Subjective:     Principal Problem:Acute bilateral low back pain without sciatica    Chief Complaint:   Chief Complaint   Patient presents with    Back Pain     Pt arrives as transfer from [place] with Dx of cauda equina. Pt c/o back pain, poor rectal tone, inability to empty bladder. Pt arrive for MRI.        HPI: 88F with RA transferred from [place] for Neurosurgery evaluation of new onset low back pain and urinary incontinence thought to be concerning for cauda equina syndrome. Pt says that early this morning she noticed that her low back hurt (sharp, worse with movement), which is new; pt does not chronically have back pain. Pt went to the Rheumatologist, where she complained of this pain and also mentioned new onset urinary incontinence (today). Pt was seen in [place] ED where she was reported to have had poor rectal tone, [place] [facility] [place]. Non-contrasted CT T/L spine showed chronic T8/T9 fractures    [place] [facility] [place]GY examined pt and recommended non-emergent CT myelogram; they stated presentation not concerning for cauda equina syndrome but wanted study to assess for neural compression. In the ED demonstrated no saddle anesthesia or loss of rectal tone; pt did report increased frequency of urination and the sensation of urinating a greater volume of urine than was voided. When asked about her incontinence, she says that she had the urge to go but could not get to a bathroom in time; denies hx of urinary or fecal incontinence. Denies n/v/d/c, blood in stool, chest pain, dyspnea, weakness. She does mention back  "pain greater R than L side and states that it is a "funny" feeling but that the pain is sharp and positional; denies recent trauma.    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)     has pacemaker       Past Surgical History:   Procedure Laterality Date    APPENDECTOMY      BREAST SURGERY      CARDIAC PACEMAKER PLACEMENT  11/2014    for syncope and RBBB/LAHB    CHOLECYSTECTOMY      EYE SURGERY      HEMORRHOID SURGERY      HYSTERECTOMY      1966    SKIN BIOPSY      VASCULAR SURGERY      VERTEBROPLASTY         Review of patient's allergies indicates:   Allergen Reactions    Amoxicillin Other (See Comments)     unknown    Bactrim [sulfamethoxazole-trimethoprim] Other (See Comments)     unknown    Omeprazole Other (See Comments)     Muscle and joint pain    Rocephin [ceftriaxone] Other (See Comments)     Severe acute generalized pain    Penicillins Rash       Current Facility-Administered Medications on File Prior to Encounter   Medication    [COMPLETED] acetaminophen tablet 1,000 mg    [COMPLETED] morphine injection 4 mg    [DISCONTINUED] denosumab (PROLIA) injection 60 mg     Current Outpatient Medications on File Prior to Encounter   Medication Sig    ALPRAZolam (XANAX) 0.25 MG tablet TAKE 1 TABLET BY MOUTH EVERY DAY AS NEEDED    aspirin (ECOTRIN) 81 MG EC tablet Take 1 tablet (81 mg total) by mouth once daily. HOLD until " cleared by your ENT doctor and your Neurologist.    atorvastatin (LIPITOR) 20 MG tablet TAKE 1 TABLET BY MOUTH EVERY DAY    calcium carbonate (CALCIUM 600 ORAL) Take 1 tablet by mouth 2 (two) times daily.    carvedilol (COREG) 6.25 MG tablet Take 1 tablet (6.25 mg total) by mouth 2 (two) times daily with meals.    coenzyme Q10 (CO Q-10) 100 mg capsule Take 100 mg by mouth once daily.    denosumab (PROLIA) 60 mg/mL Syrg Inject 60 mg into the skin. Every 6 months    fish oil-omega-3 fatty acids 300-1,000 mg capsule Take 1,000 mg by mouth once daily.     folic acid (FOLVITE) 1 MG tablet TAKE 1 TABLET (1 MG TOTAL) BY MOUTH ONCE DAILY.    furosemide (LASIX) 20 MG tablet Take 1 tablet (20 mg total) by mouth once daily.    hydroxychloroquine (PLAQUENIL) 200 mg tablet Take 1 tablet (200 mg total) by mouth once daily.    lisinopril 10 MG tablet Take 1 tablet (10 mg total) by mouth once daily.    sertraline (ZOLOFT) 25 MG tablet Take 1 tablet (25 mg total) by mouth once daily.    spironolactone (ALDACTONE) 100 MG tablet Take 100 mg by mouth once daily.    VITAMIN B COMPLEX ORAL Take 400 mg by mouth once daily. Pt stated that she does not take vitamin b everyday.    vitamin E 400 UNIT capsule Take 400 Units by mouth once daily. Pt taking PRN    [DISCONTINUED] methotrexate 2.5 MG Tab TAKE 10 TABLETS (25 MG TOTAL) BY MOUTH EVERY 7 DAYS.     Family History     Problem Relation (Age of Onset)    Diabetes Mellitus Mother, Brother    Heart disease Father    Hypertension Father, Mother    Leukemia Father    Parkinsonism Brother (68)        Tobacco Use    Smoking status: Never Smoker    Smokeless tobacco: Never Used   Substance and Sexual Activity    Alcohol use: No     Comment: [facility]    Drug use: No    Sexual activity: No     Partners: Male     Birth control/protection: None     Review of Systems   Constitutional: Negative for chills, diaphoresis, fatigue and fever.   Respiratory: Negative for cough, choking,  chest tightness, shortness of breath and wheezing.    Cardiovascular: Negative for chest pain, palpitations and leg swelling.   Gastrointestinal: Negative for abdominal pain, blood in stool, constipation, diarrhea, nausea and vomiting.   Genitourinary: Positive for difficulty urinating, frequency and urgency. Negative for decreased urine volume, dysuria, enuresis, flank pain and hematuria.   Musculoskeletal: Positive for back pain. Negative for arthralgias, gait problem, joint swelling, myalgias, neck pain and neck stiffness.   Skin: Negative for color change, pallor, rash and wound.     Objective:     Vital Signs (Most Recent):  Pulse: 60 (10/18/18 0201)  Resp: 20 (10/17/18 2011)  BP: (!) 162/71 (10/18/18 0201)  SpO2: (!) 92 % (10/18/18 0201) Vital Signs (24h Range):  Temp:  [98.1 °F (36.7 °C)] 98.1 °F (36.7 °C)  Pulse:  [60-78] 60  Resp:  [14-20] 20  SpO2:  [91 %-98 %] 92 %  BP: (132-185)/(60-81) 162/71     Weight: 55.3 kg (122 lb)  Body mass index is 22.31 kg/m².    Physical Exam   Constitutional: She appears well-developed. No distress.   HENT:   Head: Normocephalic and atraumatic.   Right Ear: External ear normal.   Left Ear: External ear normal.   Nose: Nose normal.   Eyes: EOM are normal. Pupils are equal, round, and reactive to light.   Neck: No JVD present. No tracheal deviation present.   Cardiovascular: Normal rate and regular rhythm.   No murmur heard.  Pulmonary/Chest: Effort normal and breath sounds normal. No stridor.   Abdominal: Soft. Bowel sounds are normal. She exhibits no distension. There is no tenderness. No hernia.   Musculoskeletal: She exhibits no edema.   Neurological: She is alert. No cranial nerve deficit.   Skin: No rash noted. She is not diaphoretic.   Psychiatric: She has a normal mood and affect. Judgment normal.   Vitals reviewed.        CRANIAL NERVES     CN III, IV, VI   Pupils are equal, round, and reactive to light.  Extraocular motions are normal.        Significant Labs:   CBC:    Recent Labs   Lab 10/17/18  1455 10/18/18  0226   WBC 8.22 7.46   HGB 10.3* 10.1*   HCT 29.6* 28.9*    199     CMP:   Recent Labs   Lab 10/17/18  1455 10/18/18  0226   * 130*   K 3.9 4.1   CL 92* 97   CO2 25 22*    56*   BUN 23 26*   CREATININE 1.5* 1.4   CALCIUM 9.2 8.7   PROT 6.3  --    ALBUMIN 3.2*  --    BILITOT 0.8  --    ALKPHOS 70  --    AST 28  --    ALT 44  --    ANIONGAP 12 11   EGFRNONAA 31* 33.6*     Lactic Acid: No results for input(s): LACTATE in the last 48 hours.  Urine Studies:   Recent Labs   Lab 10/17/18  1343   COLORU Yellow   APPEARANCEUA Clear   PHUR 7.0   SPECGRAV 1.010   PROTEINUA Negative   GLUCUA Negative   KETONESU Negative   BILIRUBINUA Negative   OCCULTUA Negative   NITRITE Negative   UROBILINOGEN Negative   LEUKOCYTESUR Negative       Significant Imaging: I have reviewed all pertinent imaging results/findings within the past 24 hours.    Assessment/Plan:     * Acute bilateral low back pain without sciatica    1 day of acute low back pain, R>L, with report of decreased sphincter tone and saddle anesthesia at [place] ED but normal IAN and sensation at [facility]. NSGY state low suspicion for cauda equina but want non-emergent CT myelogram to r/o neural compression.  - CT myelogram T/L ordered per NSGY  - Pain control: given oxy-APAP 5-325 x1, states no pain     Hyponatremia    Chronic, noted since 09/2018, given 1L NS in ED with improvement 130 from 129  - UOsm, SOsm, Richelle  - Pt with concomitant SCr rise for the last month; previously admitted for CHARLENE and thought to represent possible new baseline. Pt may have renal losses contributing to hyponatremia as the SCr rise was similar in timing to the hyponatremia  - Son states that pt does not take much sodium in (HFpEF), states that this has been a problem in the past  - Aim to correct <4-6 per day     CHARLENE (acute kidney injury)    Previous baseline near 0.8 earlier this year, for the last month pt has been near 1.5; recently  admitted for CHARLENE and determined to possibly represent progression of CKD  - IVF given; urine and serum osmolality as well as urine sodium to determine cause of CHARLENE (previously intrinsic)     Urge incontinence    New onset (today), pt says that this hasn't happened before. She also mentions that she has the sensation of urinating more than she actually is  - UA negative at [place], repeated here.     Anxiety    Home meds: Zoloft 25 mg daily  - Continued     History of CVA (cerebrovascular accident)    Home meds: ASA 81 mg, atorvastatin 20 mg  - Continued for secondary prevention     Chronic diastolic CHF (congestive heart failure)    Home meds: Carvedilol 6.25 mg BID, lisinopril 10 mg daily, furosemide 20 mg dailiy  - carvedilol continued, lisinopril held (CHARLENE), furosemide held (euvolemic, urine sodium would be falsely elevated)     Hypertension    Home meds: Carvedilol 6.25 mg BID, lisinopril 10 mg daily  - coreg continued, lisinopril held in the setting of questionable CHARLENE vs CKD progression  - PRN hydralazine 25 mg PO Q8H SBP >180 mmHg; may consider changing regimen of antihypertensives prior to discharge if SCr does not improve     RA (rheumatoid arthritis)    Home meds: Plaquenil 200 mg daily (was on mtx, DCd today at Rheumatology)  - Continued plaquenil       VTE Risk Mitigation (From admission, onward)        Ordered     Place sequential compression device  Until discontinued      10/18/18 0128     IP VTE HIGH RISK PATIENT  Once      10/18/18 0128             Mariel Coleman MD  Department of Hospital Medicine   [facility]

## 2018-10-19 LAB
ANION GAP SERPL CALC-SCNC: 8 MMOL/L
BASOPHILS # BLD AUTO: 0.03 K/UL
BASOPHILS NFR BLD: 0.5 %
BUN SERPL-MCNC: 28 MG/DL
CALCIUM SERPL-MCNC: 8.4 MG/DL
CHLORIDE SERPL-SCNC: 98 MMOL/L
CO2 SERPL-SCNC: 24 MMOL/L
CREAT SERPL-MCNC: 1.4 MG/DL
DIFFERENTIAL METHOD: ABNORMAL
EOSINOPHIL # BLD AUTO: 0.2 K/UL
EOSINOPHIL NFR BLD: 3 %
ERYTHROCYTE [DISTWIDTH] IN BLOOD BY AUTOMATED COUNT: 14.1 %
EST. GFR  (AFRICAN AMERICAN): 38.7 ML/MIN/1.73 M^2
EST. GFR  (NON AFRICAN AMERICAN): 33.6 ML/MIN/1.73 M^2
GLUCOSE SERPL-MCNC: 80 MG/DL
HCT VFR BLD AUTO: 27.4 %
HGB BLD-MCNC: 9.1 G/DL
IMM GRANULOCYTES # BLD AUTO: 0.09 K/UL
IMM GRANULOCYTES NFR BLD AUTO: 1.5 %
LYMPHOCYTES # BLD AUTO: 0.6 K/UL
LYMPHOCYTES NFR BLD: 10.2 %
MCH RBC QN AUTO: 31.1 PG
MCHC RBC AUTO-ENTMCNC: 33.2 G/DL
MCV RBC AUTO: 94 FL
MONOCYTES # BLD AUTO: 0.7 K/UL
MONOCYTES NFR BLD: 11 %
NEUTROPHILS # BLD AUTO: 4.5 K/UL
NEUTROPHILS NFR BLD: 73.8 %
NRBC BLD-RTO: 0 /100 WBC
PLATELET # BLD AUTO: 191 K/UL
PMV BLD AUTO: 9.6 FL
POTASSIUM SERPL-SCNC: 3.7 MMOL/L
RBC # BLD AUTO: 2.93 M/UL
SODIUM SERPL-SCNC: 130 MMOL/L
WBC # BLD AUTO: 6.1 K/UL

## 2018-10-19 PROCEDURE — 97165 OT EVAL LOW COMPLEX 30 MIN: CPT

## 2018-10-19 PROCEDURE — 85025 COMPLETE CBC W/AUTO DIFF WBC: CPT

## 2018-10-19 PROCEDURE — 97116 GAIT TRAINING THERAPY: CPT

## 2018-10-19 PROCEDURE — 97161 PT EVAL LOW COMPLEX 20 MIN: CPT

## 2018-10-19 PROCEDURE — 36415 COLL VENOUS BLD VENIPUNCTURE: CPT

## 2018-10-19 PROCEDURE — 63600175 PHARM REV CODE 636 W HCPCS: Performed by: HOSPITALIST

## 2018-10-19 PROCEDURE — 80048 BASIC METABOLIC PNL TOTAL CA: CPT

## 2018-10-19 PROCEDURE — 99232 SBSQ HOSP IP/OBS MODERATE 35: CPT | Mod: GC,,, | Performed by: HOSPITALIST

## 2018-10-19 PROCEDURE — G8979 MOBILITY GOAL STATUS: HCPCS | Mod: CJ

## 2018-10-19 PROCEDURE — 97530 THERAPEUTIC ACTIVITIES: CPT

## 2018-10-19 PROCEDURE — 25000003 PHARM REV CODE 250: Performed by: STUDENT IN AN ORGANIZED HEALTH CARE EDUCATION/TRAINING PROGRAM

## 2018-10-19 PROCEDURE — 86580 TB INTRADERMAL TEST: CPT | Performed by: HOSPITALIST

## 2018-10-19 PROCEDURE — 20600001 HC STEP DOWN PRIVATE ROOM

## 2018-10-19 PROCEDURE — G8978 MOBILITY CURRENT STATUS: HCPCS | Mod: CK

## 2018-10-19 RX ORDER — AMLODIPINE BESYLATE 10 MG/1
10 TABLET ORAL DAILY
Status: DISCONTINUED | OUTPATIENT
Start: 2018-10-19 | End: 2018-10-23 | Stop reason: HOSPADM

## 2018-10-19 RX ORDER — LIDOCAINE 50 MG/G
1 PATCH TOPICAL
Status: DISCONTINUED | OUTPATIENT
Start: 2018-10-19 | End: 2018-10-23 | Stop reason: HOSPADM

## 2018-10-19 RX ORDER — SODIUM CHLORIDE 9 MG/ML
INJECTION, SOLUTION INTRAVENOUS CONTINUOUS
Status: ACTIVE | OUTPATIENT
Start: 2018-10-19 | End: 2018-10-19

## 2018-10-19 RX ADMIN — HYDROXYCHLOROQUINE SULFATE 200 MG: 200 TABLET, FILM COATED ORAL at 09:10

## 2018-10-19 RX ADMIN — LIDOCAINE 1 PATCH: 50 PATCH TOPICAL at 04:10

## 2018-10-19 RX ADMIN — CARVEDILOL 6.25 MG: 6.25 TABLET, FILM COATED ORAL at 07:10

## 2018-10-19 RX ADMIN — AMLODIPINE BESYLATE 10 MG: 10 TABLET ORAL at 11:10

## 2018-10-19 RX ADMIN — Medication 5 UNITS: at 10:10

## 2018-10-19 RX ADMIN — ACETAMINOPHEN 650 MG: 325 TABLET ORAL at 09:10

## 2018-10-19 RX ADMIN — ATORVASTATIN CALCIUM 20 MG: 20 TABLET, FILM COATED ORAL at 09:10

## 2018-10-19 RX ADMIN — SODIUM CHLORIDE: 0.9 INJECTION, SOLUTION INTRAVENOUS at 01:10

## 2018-10-19 RX ADMIN — FOLIC ACID 1 MG: 1 TABLET ORAL at 09:10

## 2018-10-19 RX ADMIN — CARVEDILOL 6.25 MG: 6.25 TABLET, FILM COATED ORAL at 04:10

## 2018-10-19 RX ADMIN — ACETAMINOPHEN 650 MG: 325 TABLET ORAL at 04:10

## 2018-10-19 RX ADMIN — SERTRALINE HYDROCHLORIDE 25 MG: 25 TABLET ORAL at 09:10

## 2018-10-19 RX ADMIN — HYDRALAZINE HYDROCHLORIDE 25 MG: 25 TABLET ORAL at 04:10

## 2018-10-19 NOTE — ASSESSMENT & PLAN NOTE
New onset (on admission), pt says that this hasn't happened before. She also mentions that she has the sensation of urinating more than she actually is  - UA negative at [place], repeated here showing trace ketones else normal  - pt notes no incontinence today, but produces small volume of urine despite urge. Bedside bladder scan showing less than 200mL after voiding.

## 2018-10-19 NOTE — ASSESSMENT & PLAN NOTE
88 F with one day hx of nonradiating lumbar back pain and chronic compression frxs of T8/9.  -Pt is neurologically stable.  Currently with back pain, no LE weakness or paraesthesias   -No acute NSGY intervention.  low suspicion for cauda equina or conus medullaris syndrome  -Unable to get MRI d/t pacemaker  -Hold off CT myelogram, given low clinical suspicion.  Pt additionally not interested in surgery   -Continue current medical management per primary   -Please call with neurosurgery with questions

## 2018-10-19 NOTE — PT/OT/SLP EVAL
Occupational Therapy   Evaluation    Name: Karly Sandoval  MRN: [id number]  Admitting Diagnosis:  Acute bilateral low back pain without sciatica      Recommendations:     Discharge Recommendations: nursing facility, skilled(SS-SNF)  Discharge Equipment Recommendations:  none  Barriers to discharge:  Decreased caregiver support    History:     Occupational Profile:  Living Environment: Pt was living alone in a H with 3 [employer] [facility]. Pt son states she was having a sitter come throughout the week 2/2 pt being lonely.   Prior to admission, patients level of function was mod ( I) with all mobility, ADLs and most recently supervision for bathing in tub shower.  Pt uses a SPC for outside only. DME owned (not currently used): none.  Upon discharge, patient will have assistance from sitter possibly, family considering FLA[facility]IO.  Equipment Used at Home:  bedside commode, shower chair, walker, rolling, raised toilet    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)     has pacemaker       Past Surgical History:   Procedure Laterality Date    APPENDECTOMY      BREAST SURGERY      CARDIAC PACEMAKER PLACEMENT  11/2014    for syncope and RBBB/LAHB    CHOLECYSTECTOMY      EYE SURGERY      HEMORRHOID SURGERY      HYSTERECTOMY      1966    SKIN BIOPSY      VASCULAR SURGERY      VERTEBROPLASTY         Subjective     Pain/Comfort:  · Pain Rating 1: (Not  rated.)  · Location - Orientation 1: lower  · Location 1: back    Patients cultural, spiritual, Church conflicts given the current situation:      Objective:     Communicated with: RN prior to session.  Patient found call button in reach and   upon OT entry to room.    General Precautions: Standard, fall   Orthopedic Precautions:    Braces:       Occupational Performance:    Bed Mobility:    · Patient completed Rolling/Turning to Left with  minimum assistance  · Patient completed Scooting/Bridging with contact guard assistance  · Patient completed Supine to Sit with moderate assistance    Functional Mobility/Transfers:  · Patient completed Sit <> Stand Transfer with contact guard assistance  with  straight cane   · Patient completed Bed <> Chair Transfer using Step Transfer technique with contact guard assistance with rolling walker  · Functional Mobility: Pt walked ~ 80' initially with a SPC but also with a RW (increased stability) with CGA.    Activities of Daily Living:  · Grooming: supervision from sitting.  · Upper Body Dressing: minimum assistance   · Lower Body Dressing: total assistance unable due to back pain.    Cognitive/Visual Perceptual:  Cognitive/Psychosocial Skills:     -       Oriented to: Person, Place, Time and Situation   -       Safety awareness/insight to disability: intact     Physical Exam:  BUE AROM/MMT: WNL    AMPAC 6 Click ADL:  AMPAC Total Score: 18    Treatment & Education:  UE ROM/MMT  Bed mobility training / assessment  Functional mobility assessment  Sit/standing balance assessment  Educated on importance of sitting OOB in bedside chair to promote increased strength, endurance & breathing.  Discussed OT POC / Post-acute plan  Education:    Patient left up in chair with call button in reach    Assessment:     Karly Sandoval is a 88 y.o. female with a medical diagnosis of Acute bilateral low back pain without sciatica.  She presents with the following performance deficits  "affecting function: weakness, impaired endurance, impaired self care skills, impaired balance, gait instability, impaired functional mobilty, pain.  Pt. currently demonstrates decreased (I) with ADLs, functional mobility & t/fs as well as decreased overall strength, ROM, endurance and balance. Pt would benefit from skilled OT services as well as SS-SNF Placement to address these deficits and to facilitate improving (I) with daily tasks.    Rehab Prognosis: Good; patient would benefit from acute skilled OT services to address these deficits and reach maximum level of function.         Clinical Decision Makin.  OT Low:  "Pt evaluation falls under low complexity for evaluation coding due to performance deficits noted in 1-3 areas as stated above and 0 co-morbities affecting current functional status. Data obtained from problem focused assessments. No modifications or assistance was required for completion of evaluation. Only brief occupational profile and history review completed."     Plan:     Patient to be seen 3 x/week to address the above listed problems via self-care/home management, therapeutic activities, therapeutic exercises  · Plan of Care Expires: 18  · Plan of Care Reviewed with: patient, son    This Plan of care has been discussed with the patient who was involved in its development and understands and is in agreement with the identified goals and treatment plan    GOALS:   Multidisciplinary Problems     Occupational Therapy Goals        Problem: Occupational Therapy Goal    Goal Priority Disciplines Outcome Interventions   Occupational Therapy Goal     OT, PT/OT Ongoing (interventions implemented as appropriate)    Description:  Goals to be met by: 10/26/18    Patient will increase functional independence with ADLs by performing:    UE Dressing with Supervision.  LE Dressing with Minimal Assistance.  Grooming while standing at sink with Supervision.  Toileting from toilet with Stand-by " Assistance for hygiene and clothing management.   Supine to sit with Stand-by Assistance.  Step transfer with Supervision  Toilet transfer to toilet with Supervision.                      Time Tracking:     OT Date of Treatment: 10/19/18  OT Start Time: 1136  OT Stop Time: 1206  OT Total Time (min): 30 min    Billable Minutes:Evaluation 20  Therapeutic Activity 10    DACIA Valdivia  10/19/2018

## 2018-10-19 NOTE — SUBJECTIVE & OBJECTIVE
Interval History: as 10/19/2018 above    Review of Systems   Constitutional: Negative for chills and fever.   HENT: Negative for trouble swallowing and voice change.    Eyes: Negative for pain and visual disturbance.   Respiratory: Negative for shortness of breath and wheezing.    Cardiovascular: Negative for chest pain and palpitations.   Gastrointestinal: Negative for abdominal pain and constipation.   Genitourinary: Positive for difficulty urinating. Negative for flank pain.   Musculoskeletal: Positive for back pain. Negative for myalgias.   Skin: Negative for rash and wound.   Neurological: Negative for syncope and light-headedness.   Psychiatric/Behavioral: Positive for confusion. Negative for hallucinations.     Objective:     Vital Signs (Most Recent):  Temp: 97.5 °F (36.4 °C) (10/19/18 1139)  Pulse: 60 (10/19/18 1139)  Resp: 18 (10/19/18 1139)  BP: (!) 168/74 (10/19/18 1139)  SpO2: (!) 93 % (10/19/18 1139) Vital Signs (24h Range):  Temp:  [97.5 °F (36.4 °C)-98.1 °F (36.7 °C)] 97.5 °F (36.4 °C)  Pulse:  [60-68] 60  Resp:  [18-20] 18  SpO2:  [93 %-95 %] 93 %  BP: (155-196)/(68-86) 168/74     Weight: 54.7 kg (120 lb 9.6 oz)  Body mass index is 22.06 kg/m².    Intake/Output Summary (Last 24 hours) at 10/19/2018 1510  Last data filed at 10/19/2018 0448  Gross per 24 hour   Intake 350 ml   Output 850 ml   Net -500 ml      Physical Exam   Constitutional: She appears well-developed and well-nourished. No distress.   HENT:   Head: Normocephalic and atraumatic.   Eyes: EOM are normal. Pupils are equal, round, and reactive to light.   Cardiovascular: Normal rate, regular rhythm, normal heart sounds and intact distal pulses. Exam reveals no gallop and no friction rub.   No murmur heard.  Pulmonary/Chest: Effort normal and breath sounds normal. No stridor. No respiratory distress. She has no wheezes. She has no rales. She exhibits no tenderness.   Abdominal: Soft. Bowel sounds are normal. She exhibits no distension and  no mass. There is no tenderness. There is no rebound and no guarding. No hernia.   Neurological: She is alert. No sensory deficit. She exhibits normal muscle tone. Gait normal. GCS eye subscore is 4. GCS verbal subscore is 4. GCS motor subscore is 6.   Skin: She is not diaphoretic.       Significant Labs:   CBC:   Recent Labs   Lab 10/18/18  0226 10/19/18  0812   WBC 7.46 6.10   HGB 10.1* 9.1*   HCT 28.9* 27.4*    191     CMP:   Recent Labs   Lab 10/18/18  0226 10/19/18  0812   * 130*   K 4.1 3.7   CL 97 98   CO2 22* 24   GLU 56* 80   BUN 26* 28*   CREATININE 1.4 1.4   CALCIUM 8.7 8.4*   ANIONGAP 11 8   EGFRNONAA 33.6* 33.6*       Significant Imaging: I have reviewed all pertinent imaging results/findings within the past 24 hours.

## 2018-10-19 NOTE — HPI
88 F presents for evaluation of back pain which started yesterday morning.  No inciting events, no falls.  Pain is nonradiating and is located in lower lumbar area.  No weakness in legs/parasthesias or numbness.  She has had urinary urgency but states she still feels sensation to void.  Pt ambulates at home with cane.

## 2018-10-19 NOTE — ASSESSMENT & PLAN NOTE
"Chronic, noted since 09/2018, given 1L NS in ED with improvement 130 from 129  - UOsm 355, SOsm 278, Richelle 79 on admission  - Pt with concomitant SCr rise for the last month; previously admitted for CHARLENE and thought to represent possible new baseline. Pt may have renal losses contributing to hyponatremia as the SCr rise was similar in timing to the hyponatremia  - Son states that pt does not take much sodium in (HFpEF), states that this has been a problem in the past  - stable at 130 today  - Potentially 2/2 to mild hypovolemia or "tea and toast diet"  "

## 2018-10-19 NOTE — ASSESSMENT & PLAN NOTE
Home meds: Carvedilol 6.25 mg BID, lisinopril 10 mg daily  - coreg continued, lisinopril held in the setting of questionable CHARLENE vs CKD progression  - amlodipine 10mg po daily started 10/19/2018   - PRN hydralazine 25 mg PO Q8H SBP >180 mmHg; may consider changing regimen of antihypertensives prior to discharge if SCr does not improve

## 2018-10-19 NOTE — ASSESSMENT & PLAN NOTE
1 day of acute low back pain, R>L, with report of decreased sphincter tone and saddle anesthesia at [place] ED but normal IAN and sensation [place] [facility]. NSGY state low suspicion for cauda equina but want non-emergent CT myelogram to r/o neural compression.  - After discussion with primary team and specialists, pt does not wish to undergo CT myelogram at this time due to risk of further kidney damage  - PT/OT  - Acetaminophen 625 po q6h prn, Lidocaine patch

## 2018-10-19 NOTE — PROGRESS NOTES
"[facility] Hospital Medicine  Progress Note    Patient Name: Karly Sandoval  MRN: [id number]  Patient Class: IP- Inpatient   Admission Date: 10/17/2018  Length of Stay: 1 days  Attending Physician: Rena Holloway MD  Primary Care Provider: Uday Allen MD    [facility] Medicine Team: [facility]P MED 3 Derrell Penaloza DO    Subjective:     Principal Problem:Acute bilateral low back pain without sciatica    HPI:  88F with RA transferred from [place] for Neurosurgery evaluation of new onset low back pain and urinary incontinence thought to be concerning for cauda equina syndrome. Pt says that early this morning she noticed that her low back hurt (sharp, worse with movement), which is new; pt does not chronically have back pain. Pt went to the Rheumatologist, where she complained of this pain and also mentioned new onset urinary incontinence (today). Pt was seen in [place] ED where she was reported to have had poor rectal tone, transferred to [facility] for NSGY eval. Non-contrasted CT T/L spine showed chronic T8/T9 fractures    At [facility] NSGY examined pt and recommended non-emergent CT myelogram; they stated presentation not concerning for cauda equina syndrome but wanted study to assess for neural compression. In the ED demonstrated no saddle anesthesia or loss of rectal tone; pt did report increased frequency of urination and the sensation of urinating a greater volume of urine than was voided. When asked about her incontinence, she says that she had the urge to go but could not get to a bathroom in time; denies hx of urinary or fecal incontinence. Denies n/v/d/c, blood in stool, chest pain, dyspnea, weakness. She does mention back pain greater R than L side and states that it is a "funny" feeling but that the pain is sharp and positional; denies recent trauma.    Hospital Course:  10/19/2018 Pt reportedly anxious overnight, given home dose of xanax. Pt struggles with word finding, but noted that she " feels like she needs to urinate but is unable to do so.     Interval History: as 10/19/2018 above    Review of Systems   Constitutional: Negative for chills and fever.   HENT: Negative for trouble swallowing and voice change.    Eyes: Negative for pain and visual disturbance.   Respiratory: Negative for shortness of breath and wheezing.    Cardiovascular: Negative for chest pain and palpitations.   Gastrointestinal: Negative for abdominal pain and constipation.   Genitourinary: Positive for difficulty urinating. Negative for flank pain.   Musculoskeletal: Positive for back pain. Negative for myalgias.   Skin: Negative for rash and wound.   Neurological: Negative for syncope and light-headedness.   Psychiatric/Behavioral: Positive for confusion. Negative for hallucinations.     Objective:     Vital Signs (Most Recent):  Temp: 97.5 °F (36.4 °C) (10/19/18 1139)  Pulse: 60 (10/19/18 1139)  Resp: 18 (10/19/18 1139)  BP: (!) 168/74 (10/19/18 1139)  SpO2: (!) 93 % (10/19/18 1139) Vital Signs (24h Range):  Temp:  [97.5 °F (36.4 °C)-98.1 °F (36.7 °C)] 97.5 °F (36.4 °C)  Pulse:  [60-68] 60  Resp:  [18-20] 18  SpO2:  [93 %-95 %] 93 %  BP: (155-196)/(68-86) 168/74     Weight: 54.7 kg (120 lb 9.6 oz)  Body mass index is 22.06 kg/m².    Intake/Output Summary (Last 24 hours) at 10/19/2018 1510  Last data filed at 10/19/2018 0448  Gross per 24 hour   Intake 350 ml   Output 850 ml   Net -500 ml      Physical Exam   Constitutional: She appears well-developed and well-nourished. No distress.   HENT:   Head: Normocephalic and atraumatic.   Eyes: EOM are normal. Pupils are equal, round, and reactive to light.   Cardiovascular: Normal rate, regular rhythm, normal heart sounds and intact distal pulses. Exam reveals no gallop and no friction rub.   No murmur heard.  Pulmonary/Chest: Effort normal and breath sounds normal. No stridor. No respiratory distress. She has no wheezes. She has no rales. She exhibits no tenderness.   Abdominal:  Soft. Bowel sounds are normal. She exhibits no distension and no mass. There is no tenderness. There is no rebound and no guarding. No hernia.   Neurological: She is alert. No sensory deficit. She exhibits normal muscle tone. Gait normal. GCS eye subscore is 4. GCS verbal subscore is 4. GCS motor subscore is 6.   Skin: She is not diaphoretic.       Significant Labs:   CBC:   Recent Labs   Lab 10/18/18  0226 10/19/18  0812   WBC 7.46 6.10   HGB 10.1* 9.1*   HCT 28.9* 27.4*    191     CMP:   Recent Labs   Lab 10/18/18  0226 10/19/18  0812   * 130*   K 4.1 3.7   CL 97 98   CO2 22* 24   GLU 56* 80   BUN 26* 28*   CREATININE 1.4 1.4   CALCIUM 8.7 8.4*   ANIONGAP 11 8   EGFRNONAA 33.6* 33.6*       Significant Imaging: I have reviewed all pertinent imaging results/findings within the past 24 hours.    Assessment/Plan:      * Acute bilateral low back pain without sciatica    1 day of acute low back pain, R>L, with report of decreased sphincter tone and saddle anesthesia at [place] ED but normal IAN and sensation at [facility] ED. NSGY state low suspicion for cauda equina but want non-emergent CT myelogram to r/o neural compression.  - After discussion with primary team and specialists, pt does not wish to undergo CT myelogram at this time due to risk of further kidney damage  - PT/OT  - Acetaminophen 625 po q6h prn, Lidocaine patch     Urge incontinence    New onset (on admission), pt says that this hasn't happened before. She also mentions that she has the sensation of urinating more than she actually is  - UA negative at [place], repeated here showing trace ketones else normal  - pt notes no incontinence today, but produces small volume of urine despite urge. Bedside bladder scan showing less than 200mL after voiding.      Hyponatremia    Chronic, noted since 09/2018, given 1L NS in ED with improvement 130 from 129  - UOsm 355, SOsm 278, Richelle 79 on admission  - Pt with concomitant SCr rise for the last month;  "previously admitted for CHARLENE and thought to represent possible new baseline. Pt may have renal losses contributing to hyponatremia as the SCr rise was similar in timing to the hyponatremia  - Son states that pt does not take much sodium in (HFpEF), states that this has been a problem in the past  - stable at 130 today  - Potentially 2/2 to mild hypovolemia or "tea and toast diet"     CHARLENE (acute kidney injury)    Previous baseline near 0.8 earlier this year, for the last month pt has been near 1.5; recently admitted for CHARLENE and determined to possibly represent progression of CKD  - 1L IVF given in ED, creatinine improved to 1.4  - Will give an additional 1L NS over 10 hours and repeat BMP in am     Anxiety    Home meds: Zoloft 25 mg daily  - Continued  - pt given home dose of xanax overnight, awoke more confused. Will hold for now     History of CVA (cerebrovascular accident)    Home meds: ASA 81 mg, atorvastatin 20 mg  - Continued for secondary prevention     Chronic diastolic CHF (congestive heart failure)    Home meds: Carvedilol 6.25 mg BID, lisinopril 10 mg daily, furosemide 20 mg dailiy  - carvedilol continued, lisinopril held (CHARLENE), furosemide held (euvolemic, urine sodium would be falsely elevated)     Hypertension    Home meds: Carvedilol 6.25 mg BID, lisinopril 10 mg daily  - coreg continued, lisinopril held in the setting of questionable CHARLENE vs CKD progression  - amlodipine 10mg po daily started 10/19/2018   - PRN hydralazine 25 mg PO Q8H SBP >180 mmHg; may consider changing regimen of antihypertensives prior to discharge if SCr does not improve       RA (rheumatoid arthritis)    Home meds: Plaquenil 200 mg daily (was on mtx, DCd today at Rheumatology)  - Continued plaquenil       VTE Risk Mitigation (From admission, onward)        Ordered     Place sequential compression device  Until discontinued      10/18/18 0128     IP VTE HIGH RISK PATIENT  Once      10/18/18 0128              Derrell Penaloza, "   Department of Hospital Medicine   [facility]

## 2018-10-19 NOTE — ASSESSMENT & PLAN NOTE
Previous baseline near 0.8 earlier this year, for the last month pt has been near 1.5; recently admitted for CHARLENE and determined to possibly represent progression of CKD  - 1L IVF given in ED, creatinine improved to 1.4  - Will give an additional 1L NS over 10 hours and repeat BMP in am

## 2018-10-19 NOTE — PLAN OF CARE
Problem: Occupational Therapy Goal  Goal: Occupational Therapy Goal  Goals to be met by: 10/26/18    Patient will increase functional independence with ADLs by performing:    UE Dressing with Supervision.  LE Dressing with Minimal Assistance.  Grooming while standing at sink with Supervision.  Toileting from toilet with Stand-by Assistance for hygiene and clothing management.   Supine to sit with Stand-by Assistance.  Step transfer with Supervision  Toilet transfer to toilet with Supervision.    Outcome: Ongoing (interventions implemented as appropriate)  Evaluation completed and POC established.    DACIA Olea

## 2018-10-19 NOTE — PLAN OF CARE
Problem: Physical Therapy Goal  Goal: Physical Therapy Goal  Goals to be met by: 18     Patient will increase functional independence with mobility by performin. Supine to sit with Contact Guard Assistance  2. Sit to supine with MInimal Assistance  3. Sit to stand transfer with Stand-by Assistance  4. Bed to chair transfer with Stand-by Assistance using Rolling Walker  5. Gait  x 140 feet with Supervision using Rolling Walker.   6. Ascend/descend 3 stair with bilateral Handrails Contact Guard Assistance using LRD.     Outcome: Ongoing (interventions implemented as appropriate)  Patient evaluated today. All goals established are appropriate for patient progression at this time.   Rich Carlos PT, DPT  10/19/2018  Pager: [phone]

## 2018-10-19 NOTE — ASSESSMENT & PLAN NOTE
Home meds: Zoloft 25 mg daily  - Continued  - pt given home dose of xanax overnight, awoke more confused. Will hold for now

## 2018-10-19 NOTE — PROGRESS NOTES
[facility]  Neurosurgery  Progress Note    Subjective:     History of Present Illness: 88 F presents for evaluation of back pain which started yesterday morning.  No inciting events, no falls.  Pain is nonradiating and is located in lower lumbar area.  No weakness in legs/parasthesias or numbness.  She has had urinary urgency but states she still feels sensation to void.  Pt ambulates at home with cane.        Post-Op Info:  * No surgery found *         Interval History:  NAEON.  Pt reports continued back pain, somewhat improved today. Denies pain, paraesthesias, weakness in extremities.  Denies bowel/bladder dysfunction.        Medications:  Continuous Infusions:  Scheduled Meds:   atorvastatin  20 mg Oral Daily    carvedilol  6.25 mg Oral BID WM    folic acid  1 mg Oral Daily    hydroxychloroquine  200 mg Oral Daily    sertraline  25 mg Oral Daily     PRN Meds:acetaminophen, ALPRAZolam, dextrose 50 % in water (D50W), dextrose 50 % in water (D50W), glucagon (human recombinant), glucose, glucose, hydrALAZINE, sodium chloride 0.9%     Review of Systems  Objective:     Weight: 54.7 kg (120 lb 9.6 oz)  Body mass index is 22.06 kg/m².  Vital Signs (Most Recent):  Temp: 97.8 °F (36.6 °C) (10/18/18 2021)  Pulse: 60 (10/18/18 2021)  Resp: 20 (10/18/18 2021)  BP: (!) 172/76 (10/18/18 2021)  SpO2: 95 % (10/18/18 2021) Vital Signs (24h Range):  Temp:  [97.5 °F (36.4 °C)-98.5 °F (36.9 °C)] 97.8 °F (36.6 °C)  Pulse:  [60-67] 60  Resp:  [18-20] 20  SpO2:  [92 %-95 %] 95 %  BP: (142-183)/(62-78) 172/76     Date 10/18/18 0700 - 10/19/18 0659   Shift 6058-8490 4370-7862 7767-0007 24 Hour Total   INTAKE   P.O.  350  350   Shift Total(mL/kg)  350(6.4)  350(6.4)   OUTPUT   Urine(mL/kg/hr) 810(1.9) 150  960   Emesis/NG output  0  0   Other  0  0   Stool  0  0   Blood  0  0   Shift Total(mL/kg) 810(14.8) 150(2.7)  960(17.5)   Weight (kg) 54.7 54.7 54.7 54.7                        Neurosurgery Physical Exam    General: no distress  Neurologic: Alert and oriented. Thought content appropriate.  Head: normocephalic  GCS: Motor: 6/Verbal: 5/Eyes: 4 GCS Total: 15  Cranial nerves: face symmetric, tongue midline, pupils equal, round, reactive to light with accomodation, extraocular muscles intact  Sensory: response to light touch throughout  Motor Strength:full strength upper and lower extremities  Pronator Drift: no drift noted  Finger to nose normal  No focal numbness or weakness  Lungs:  normal respiratory effort  Abdomen: soft, non-tender   Extremities: no cyanosis or edema, or clubbing      Significant Labs:  Recent Labs   Lab 10/17/18  1455 10/18/18  0226    56*   * 130*   K 3.9 4.1   CL 92* 97   CO2 25 22*   BUN 23 26*   CREATININE 1.5* 1.4   CALCIUM 9.2 8.7     Recent Labs   Lab 10/17/18  1455 10/18/18  0226   WBC 8.22 7.46   HGB 10.3* 10.1*   HCT 29.6* 28.9*    199     Recent Labs   Lab 10/18/18  0226   INR 1.1   APTT 23.6     Microbiology Results (last 7 days)     ** No results found for the last 168 hours. **            Assessment/Plan:     * Acute bilateral low back pain without sciatica    88 F with one day hx of nonradiating lumbar back pain and chronic compression frxs of T8/9.  -Pt is neurologically stable.  Currently with back pain, no LE weakness or paraesthesias   -No acute NSGY intervention.  low suspicion for cauda equina or conus medullaris syndrome  -Unable to get MRI d/t pacemaker  -Hold off CT myelogram, given low clinical suspicion.  Pt additionally not interested in surgery   -Continue current medical management per primary   -Please call with neurosurgery with questions              KELVIN Cabral  Neurosurgery  [facility]

## 2018-10-19 NOTE — PT/OT/SLP EVAL
Physical Therapy Evaluation    Patient Name:  Karly Sandoval   MRN:  [id number]    Recommendations:     Discharge Recommendations:  (SS SNF)   Discharge Equipment Recommendations: none   Barriers to discharge: Decreased caregiver support    Assessment:     Karly Sandoval is a 88 y.o. female admitted with a medical diagnosis of Acute bilateral low back pain without sciatica.  She presents with the following impairments/functional limitations:  weakness, impaired endurance, gait instability, impaired functional mobilty, pain, decreased ROM, impaired joint extensibility Patient tolerated evaluation   well. Patient limited at this time by increased back pain with bed mobility. Pain appeared to improve with gait. Pt able to gait x 80 feet with RW and CGA, much improved compared to HHA with min A with SPC x 20 feet at first trial.  Patient will continue to require skilled PT services to address the above impairments to return to prior level of function as independent as possible. Discharge recommendation  To short stay skilled nursing facility  to maximize functional mobility, safety and decrease caregiver burden prior to returning home.       Rehab Prognosis:  good; patient would benefit from acute skilled PT services to address these deficits and reach maximum level of function.      Recent Surgery: * No surgery found *      Plan:     During this hospitalization, patient to be seen 4 x/week to address the above listed problems via gait training, therapeutic activities, therapeutic exercises, neuromuscular re-education  · Plan of Care Expires:  11/18/18   Plan of Care Reviewed with: patient, son    Subjective     Communicated with RN  prior to session.  Patient found supine upon PT entry to room, agreeable to evaluation.      Chief Complaint: pain in back  Patient comments/goals: to leave the hosptail   Pain/Comfort:  · Pain Rating 1: (pain with mobility in low back; tender to palpation at R lumbar paraspinal  musculature)    Patients cultural, spiritual, Anabaptism conflicts given the current situation: none stated    Living Environment:  Pt was living alone in a SSH with 3 [facility]. Pt son states she was having a sitter come throughout the week 2/2 pt being lonely.   Prior to admission, patients level of function was mod ( I) with all mobility and most recently supervision for bathing in tub shower.   Patient has the following equipment: (cane, straight; raised toilet; besdide commode; bath bench; RW).  DME owned (not currently used): none.  Upon discharge, patient will have assistance from sitter possibly, family considering residential.    Objective:     Patient found with: (all lines intact; none active)     General Precautions: Standard, fall   Orthopedic Precautions:N/A   Braces: N/A     Exams:  · Cognitive Exam:  Patient is oriented to Person, Place, Time and Situation  · Fine Motor Coordination: -       Intact  · Gross Motor Coordination:  WFL  · Postural Exam:  Patient presented with the following abnormalities: -       Rounded shoulders  · -       Forward head  · -       Abnormal trunk flexion  · -       Kyphosis  · -       Scoliosis  · Sensation: -       Intact  · Skin Integrity/Edema:      · -       multiple moles to entire back  · RLE ROM: WFL  · RLE Strength: WFL  · LLE ROM: WFL  LLE Strength: WFL   Pain with MMT of B LE referred to back   Tender to palpation superficially to R lumbo paraspinal musculature    Functional Mobility:  · Bed Mobility:  Rolling Left:  minimum assistance  · Supine to Sit: moderate assistance  · Transfers:  Sit to Stand:  contact guard assistance with straight cane  · Bed to Chair: contact guard assistance with  rolling walker  using  Step Transfer  · Gait: x 20 feet with SPC, HHA min ; x 80 feet with RW with CGA and increased stability and fluidty of step symmetry  · Balance: CGA with RW for BUE support; SBA for dynamic/static sitting    AM-PAC 6 CLICK MOBILITY  Total  Score:16       Therapeutic Activities and Exercises:  ·  Whiteboard updated in patients room to current assistance level  · All of patients questions were answered within the scope of PT  · Pt edu on log roll technique for back pain/mobility      Patient education  · Patient educated on the role of PT and POC  · Patient educated on importance  activity while in the hosptial per tolerance for improved endurance and to limit deconditioning   · Patient educated on safe transfers with nursing as appropriate  · Patient educated on energy conservation, pursed lip breathing  · Patient educated on proper transfer mechanics and safety    Patient left up in chair with all lines intact, call button in reach, RN notified and son present.    GOALS:   Multidisciplinary Problems     Physical Therapy Goals        Problem: Physical Therapy Goal    Goal Priority Disciplines Outcome Goal Variances Interventions   Physical Therapy Goal     PT, PT/OT Ongoing (interventions implemented as appropriate)     Description:  Goals to be met by: 18     Patient will increase functional independence with mobility by performin. Supine to sit with Contact Guard Assistance  2. Sit to supine with MInimal Assistance  3. Sit to stand transfer with Stand-by Assistance  4. Bed to chair transfer with Stand-by Assistance using Rolling Walker  5. Gait  x 140 feet with Supervision using Rolling Walker.   6. Ascend/descend 3 stair with bilateral Handrails Contact Guard Assistance using LRD.                       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)     has pacemaker       Past Surgical History:   Procedure Laterality Date    APPENDECTOMY      BREAST SURGERY      CARDIAC PACEMAKER PLACEMENT  11/2014    for syncope and RBBB/LAHB    CHOLECYSTECTOMY      EYE SURGERY      HEMORRHOID SURGERY      HYSTERECTOMY      1966    SKIN BIOPSY      VASCULAR SURGERY      VERTEBROPLASTY         Clinical Decision Making:     History  Co-morbidities and personal factors that may impact the plan of care Examination  Body Structures and Functions, activity limitations and participation restrictions that may impact the plan of care Clinical Presentation   Decision Making/ Complexity Score   Co-morbidities:   [] Time since onset of injury / illness / exacerbation  [] Status of current condition  []Patient's cognitive status and safety concerns    [x] Multiple Medical Problems (see med hx)  Personal Factors:   [] Patient's age  [] Prior Level of function   [] Patient's home situation (environment and family support)  [] Patient's level of motivation  [] Expected progression of patient      HISTORY:(criteria)    [] 01314 - no personal factors/history    [x] 94033 - has 1-2 personal factor/comorbidity     [] 20956 - has >3 personal factor/comorbidity     Body Regions:  [] Objective examination findings  [] Head     []  Neck  [x] Trunk   [] Upper Extremity  [] Lower Extremity    Body Systems:  [] For communication ability, affect, cognition, language, and learning style: the assessment of the ability to make needs known, consciousness, orientation (person, place, and time), expected emotional /behavioral responses, and learning preferences (eg, learning barriers, education  needs)  [x] For the neuromuscular system: a general assessment of gross coordinated movement (eg, balance,  gait, locomotion, transfers, and transitions) and motor function  (motor control and motor learning)  [x] For the musculoskeletal system: the assessment of gross symmetry, gross range of motion, gross strength, height, and weight  [] For the integumentary system: the assessment of pliability(texture), presence of scar formation, skin color, and skin integrity  [] For cardiovascular/pulmonary system: the assessment of heart rate, respiratory rate, blood pressure, and edema     Activity limitations:    [] Patient's cognitive status and saf ety concerns          [x] Status of current condition      [] Weight bearing restriction  [] Cardiopulmunary Restriction    Participation Restrictions:   [] Goals and goal agreement with the patient     [] Rehab potential (prognosis) and probable outcome      Examination of Body System: (criteria)    [] 11682 - addressing 1-2 elements    [x] 81674 - addressing a total of 3 or more elements     [] 47469 -  Addressing a total of 4 or more elements         Clinical Presentation: (criteria)  Stable - 20258     On examination of body system using standardized tests and measures patient presents with 1-2 elements from any of the following: body structures and functions, activity limitations, and/or participation restrictions.  Leading to a clinical presentation that is considered stable and/or uncomplicated                              Clinical Decision Making  (Eval Complexity):  Low- 44093     Time Tracking:     PT Received On: 10/19/18  PT Start Time: 1136     PT Stop Time: 1206  PT Total Time (min): 30 min     Billable Minutes: Evaluation x15 min and Gait Training x10 min      Rich Carlos, PT  10/19/2018

## 2018-10-19 NOTE — SUBJECTIVE & OBJECTIVE
Interval History:  NAEON.  Pt reports continued back pain, somewhat improved today. Denies pain, paraesthesias, weakness in extremities.  Denies bowel/bladder dysfunction.        Medications:  Continuous Infusions:  Scheduled Meds:   atorvastatin  20 mg Oral Daily    carvedilol  6.25 mg Oral BID WM    folic acid  1 mg Oral Daily    hydroxychloroquine  200 mg Oral Daily    sertraline  25 mg Oral Daily     PRN Meds:acetaminophen, ALPRAZolam, dextrose 50 % in water (D50W), dextrose 50 % in water (D50W), glucagon (human recombinant), glucose, glucose, hydrALAZINE, sodium chloride 0.9%     Review of Systems  Objective:     Weight: 54.7 kg (120 lb 9.6 oz)  Body mass index is 22.06 kg/m².  Vital Signs (Most Recent):  Temp: 97.8 °F (36.6 °C) (10/18/18 2021)  Pulse: 60 (10/18/18 2021)  Resp: 20 (10/18/18 2021)  BP: (!) 172/76 (10/18/18 2021)  SpO2: 95 % (10/18/18 2021) Vital Signs (24h Range):  Temp:  [97.5 °F (36.4 °C)-98.5 °F (36.9 °C)] 97.8 °F (36.6 °C)  Pulse:  [60-67] 60  Resp:  [18-20] 20  SpO2:  [92 %-95 %] 95 %  BP: (142-183)/(62-78) 172/76     Date 10/18/18 0700 - 10/19/18 0659   Shift 0958-0433 4686-7604 [id number] 24 Hour Total   INTAKE   P.O.  350  350   Shift Total(mL/kg)  350(6.4)  350(6.4)   OUTPUT   Urine(mL/kg/hr) 810(1.9) 150  960   Emesis/NG output  0  0   Other  0  0   Stool  0  0   Blood  0  0   Shift Total(mL/kg) 810(14.8) 150(2.7)  960(17.5)   Weight (kg) 54.7 54.7 54.7 54.7                        Neurosurgery Physical Exam   General: no distress  Neurologic: Alert and oriented. Thought content appropriate.  Head: normocephalic  GCS: Motor: 6/Verbal: 5/Eyes: 4 GCS Total: 15  Cranial nerves: face symmetric, tongue midline, pupils equal, round, reactive to light with accomodation, extraocular muscles intact  Sensory: response to light touch throughout  Motor Strength:full strength upper and lower extremities  Pronator Drift: no drift noted  Finger to nose normal  No focal numbness or weakness  Lungs:   normal respiratory effort  Abdomen: soft, non-tender   Extremities: no cyanosis or edema, or clubbing      Significant Labs:  Recent Labs   Lab 10/17/18  1455 10/18/18  0226    56*   * 130*   K 3.9 4.1   CL 92* 97   CO2 25 22*   BUN 23 26*   CREATININE 1.5* 1.4   CALCIUM 9.2 8.7     Recent Labs   Lab 10/17/18  1455 10/18/18  0226   WBC 8.22 7.46   HGB 10.3* 10.1*   HCT 29.6* 28.9*    199     Recent Labs   Lab 10/18/18  0226   INR 1.1   APTT 23.6     Microbiology Results (last 7 days)     ** No results found for the last 168 hours. **

## 2018-10-19 NOTE — HOSPITAL COURSE
Karly Sandoval was admitted to [facility] on 10/17/18 for neurosurgical evaluation of new onset lower back pain and urinary incontinence. CT of the spine showed L3 fracture and T8/T9 compression abnormality. Pt refused any neurosurgical intervention and she was transferred to Hospital Medicine for further evaluation and pain management. Pt was noted on admission to be anxious, stating that she feels like she needs to urinate but is unable to do so. Due to persistent constipation, patient was given lactulose in addition to miralax and senna-docusate with relief of symtpoms. Patient continued to endorse lower back pain for which she was given tylenol and a heat pack. Home blood pressure medications were resumed prior to discharge. She was discharged to [facility] on 10/23/18.

## 2018-10-20 LAB
ANION GAP SERPL CALC-SCNC: 7 MMOL/L
BUN SERPL-MCNC: 22 MG/DL
CALCIUM SERPL-MCNC: 8.6 MG/DL
CHLORIDE SERPL-SCNC: 99 MMOL/L
CO2 SERPL-SCNC: 24 MMOL/L
CREAT SERPL-MCNC: 1.3 MG/DL
EST. GFR  (AFRICAN AMERICAN): 42.3 ML/MIN/1.73 M^2
EST. GFR  (NON AFRICAN AMERICAN): 36.7 ML/MIN/1.73 M^2
GLUCOSE SERPL-MCNC: 123 MG/DL
POTASSIUM SERPL-SCNC: 4.1 MMOL/L
SODIUM SERPL-SCNC: 130 MMOL/L

## 2018-10-20 PROCEDURE — 36415 COLL VENOUS BLD VENIPUNCTURE: CPT

## 2018-10-20 PROCEDURE — 25000003 PHARM REV CODE 250: Performed by: STUDENT IN AN ORGANIZED HEALTH CARE EDUCATION/TRAINING PROGRAM

## 2018-10-20 PROCEDURE — 20600001 HC STEP DOWN PRIVATE ROOM

## 2018-10-20 PROCEDURE — 80048 BASIC METABOLIC PNL TOTAL CA: CPT

## 2018-10-20 PROCEDURE — 99233 SBSQ HOSP IP/OBS HIGH 50: CPT | Mod: GC,,, | Performed by: INTERNAL MEDICINE

## 2018-10-20 RX ORDER — AMITRIPTYLINE HYDROCHLORIDE 25 MG/1
25 TABLET, FILM COATED ORAL NIGHTLY
Status: DISCONTINUED | OUTPATIENT
Start: 2018-10-20 | End: 2018-10-20

## 2018-10-20 RX ORDER — AMITRIPTYLINE HYDROCHLORIDE 10 MG/1
10 TABLET, FILM COATED ORAL NIGHTLY
Status: DISCONTINUED | OUTPATIENT
Start: 2018-10-20 | End: 2018-10-22

## 2018-10-20 RX ORDER — BISACODYL 10 MG
10 SUPPOSITORY, RECTAL RECTAL DAILY PRN
Status: DISCONTINUED | OUTPATIENT
Start: 2018-10-20 | End: 2018-10-23 | Stop reason: HOSPADM

## 2018-10-20 RX ADMIN — ACETAMINOPHEN 650 MG: 325 TABLET ORAL at 01:10

## 2018-10-20 RX ADMIN — CARVEDILOL 6.25 MG: 6.25 TABLET, FILM COATED ORAL at 05:10

## 2018-10-20 RX ADMIN — ATORVASTATIN CALCIUM 20 MG: 20 TABLET, FILM COATED ORAL at 10:10

## 2018-10-20 RX ADMIN — BISACODYL 10 MG: 10 SUPPOSITORY RECTAL at 01:10

## 2018-10-20 RX ADMIN — SERTRALINE HYDROCHLORIDE 25 MG: 25 TABLET ORAL at 10:10

## 2018-10-20 RX ADMIN — AMLODIPINE BESYLATE 10 MG: 10 TABLET ORAL at 10:10

## 2018-10-20 RX ADMIN — AMITRIPTYLINE HYDROCHLORIDE 10 MG: 10 TABLET, FILM COATED ORAL at 08:10

## 2018-10-20 RX ADMIN — FOLIC ACID 1 MG: 1 TABLET ORAL at 10:10

## 2018-10-20 RX ADMIN — HYDROXYCHLOROQUINE SULFATE 200 MG: 200 TABLET, FILM COATED ORAL at 10:10

## 2018-10-20 RX ADMIN — LIDOCAINE 1 PATCH: 50 PATCH TOPICAL at 05:10

## 2018-10-20 RX ADMIN — CARVEDILOL 6.25 MG: 6.25 TABLET, FILM COATED ORAL at 10:10

## 2018-10-20 NOTE — ASSESSMENT & PLAN NOTE
Home meds: Plaquenil 200 mg daily (was on mtx, DCd at most recent Rheumatology visit)  - Continued plaquenil

## 2018-10-20 NOTE — ASSESSMENT & PLAN NOTE
New onset (on admission), pt says that this hasn't happened before. She also mentions that she has the sensation of urinating more than she actually is  - UA negative at [place], repeated here showing trace ketones else normal  - Pt notes urinary incontinence overnight, but noted feeling urinary retention yesterday.

## 2018-10-20 NOTE — ASSESSMENT & PLAN NOTE
acute low back pain, R>L, with report of decreased sphincter tone and saddle anesthesia at [place] ED but normal IAN and sensation at [facility] ED. NSGY state low suspicion for cauda equina but want non-emergent CT myelogram to r/o neural compression.  - After discussion with primary team and specialists, pt does not wish to undergo CT myelogram at this time due to risk of further kidney damage  - PT/OT, recommending SNF  - Acetaminophen 625 po q6h prn, Lidocaine patch  - amitriptyline 10mg po qhs

## 2018-10-20 NOTE — ASSESSMENT & PLAN NOTE
Home meds: Carvedilol 6.25 mg BID, lisinopril 10 mg daily  - coreg continued, lisinopril held in the setting of questionable CHARLENE vs CKD progression  - amlodipine 10mg po daily started 10/19   - PRN hydralazine 25 mg PO Q8H SBP >180 mmHg; may consider changing regimen of antihypertensives prior to discharge if SCr does not improve

## 2018-10-20 NOTE — PLAN OF CARE
Problem: Patient Care Overview  Goal: Plan of Care Review  Outcome: Ongoing (interventions implemented as appropriate)  Pt AAOx4, afebrile, free of falls. Pt having multiple episodes of urinary incontinence/frequency, refusing purewick and BSC at this time. Incontinence care provided. Pt anxious overnight, active listening and calming measures in place. New PIV placed and IVF administered until 0000, tolerated well. Pt c/o back pain when rolling in bed, denies pain medications throughout shift. No acute changes, WCTM,

## 2018-10-20 NOTE — ASSESSMENT & PLAN NOTE
Home meds: Zoloft 25 mg daily  - Continued  - pt given home dose of xanax overnight, awoke more confused. Will hold for now  - Will start 10mg amitriptyline qHS

## 2018-10-20 NOTE — PROGRESS NOTES
"[facility] Hospital Medicine  Progress Note    Patient Name: Karly Sandoval  MRN: [id number]  Patient Class: IP- Inpatient   Admission Date: 10/17/2018  Length of Stay: 2 days  Attending Physician: Leoncio Lackey MD  Primary Care Provider: Uday Allen MD    Hospital Medicine Team: [facility] 3 Derrell Penaloza DO    Subjective:     Principal Problem:Acute bilateral low back pain without sciatica    HPI:  88F with RA transferred from [place] for Neurosurgery evaluation of new onset low back pain and urinary incontinence thought to be concerning for cauda equina syndrome. Pt says that early this morning she noticed that her low back hurt (sharp, worse with movement), which is new; pt does not chronically have back pain. Pt went to the Rheumatologist, where she complained of this pain and also mentioned new onset urinary incontinence (today). Pt was seen in [place] ED where she was reported to have had poor rectal tone, transferred to [facility] for NSGY eval. Non-contrasted CT T/L spine showed chronic T8/T9 fractures    At [facility] NSGY examined pt and recommended non-emergent CT myelogram; they stated presentation not concerning for cauda equina syndrome but wanted study to assess for neural compression. In the ED demonstrated no saddle anesthesia or loss of rectal tone; pt did report increased frequency of urination and the sensation of urinating a greater volume of urine than was voided. When asked about her incontinence, she says that she had the urge to go but could not get to a bathroom in time; denies hx of urinary or fecal incontinence. Denies n/v/d/c, blood in stool, chest pain, dyspnea, weakness. She does mention back pain greater R than L side and states that it is a "funny" feeling but that the pain is sharp and positional; denies recent trauma.    Hospital Course:  10/19/2018 Pt reportedly anxious overnight, given home dose of xanax. Pt struggles with word finding, but noted that " "she feels like she needs to urinate but is unable to do so.   10/20/2018 Pt noting urinary incontinence overnight. Pt reporting depression, family reporting increased anxiety.     No new subjective & objective note has been filed under this hospital service since the last note was generated.    Assessment/Plan:      * Acute bilateral low back pain without sciatica    acute low back pain, R>L, with report of decreased sphincter tone and saddle anesthesia at [place] ED but normal IAN and sensation at [facility]. NSGY state low suspicion for cauda equina but want non-emergent CT myelogram to r/o neural compression.  - After discussion with primary team and specialists, pt does not wish to undergo CT myelogram at this time due to risk of further kidney damage  - PT/OT, recommending SNF  - Acetaminophen 625 po q6h prn, Lidocaine patch  - amitriptyline 10mg po qhs        Urge incontinence    New onset (on admission), pt says that this hasn't happened before. She also mentions that she has the sensation of urinating more than she actually is  - UA negative at [place], repeated here showing trace ketones else normal  - Pt notes urinary incontinence overnight, but noted feeling urinary retention yesterday.      Hyponatremia    Chronic, noted since 09/2018, given 1L NS in ED with improvement 130 from 129  - UOsm 355, SOsm 278, Richelle 79 on admission  - Pt with concomitant SCr rise for the last month; previously admitted for CHARLENE and thought to represent possible new baseline. Pt may have renal losses contributing to hyponatremia as the SCr rise was similar in timing to the hyponatremia  - Son states that pt does not take much sodium in (HFpEF), states that this has been a problem in the past  - stable at 130 today  - Potentially 2/2 to mild hypovolemia or "tea and toast diet"     CHARLENE (acute kidney injury)    Previous baseline near 0.8 earlier this year, for the last month pt has been near 1.5; recently admitted for CHARLENE and determined " to possibly represent progression of CKD  - 1L IVF given in ED, creatinine improved to 1.4  - additional 1L NS overnight, Cr 1.3 today       Anxiety    Home meds: Zoloft 25 mg daily  - Continued  - pt given home dose of xanax overnight, awoke more confused. Will hold for now  - Will start 10mg amitriptyline qHS      History of CVA (cerebrovascular accident)    Home meds: ASA 81 mg, atorvastatin 20 mg  - Continued for secondary prevention     Chronic diastolic CHF (congestive heart failure)    Home meds: Carvedilol 6.25 mg BID, lisinopril 10 mg daily, furosemide 20 mg dailiy  - carvedilol continued, lisinopril held (CHARLENE), furosemide held (euvolemic, urine sodium would be falsely elevated)       Hypertension    Home meds: Carvedilol 6.25 mg BID, lisinopril 10 mg daily  - coreg continued, lisinopril held in the setting of questionable CHARLENE vs CKD progression  - amlodipine 10mg po daily started 10/19   - PRN hydralazine 25 mg PO Q8H SBP >180 mmHg; may consider changing regimen of antihypertensives prior to discharge if SCr does not improve       RA (rheumatoid arthritis)    Home meds: Plaquenil 200 mg daily (was on mtx, DCd at most recent Rheumatology visit)  - Continued plaquenil       VTE Risk Mitigation (From admission, onward)        Ordered     Place sequential compression device  Until discontinued      10/18/18 0128     IP VTE HIGH RISK PATIENT  Once      10/18/18 0128              Derrell Penaloza DO  Department of Hospital Medicine   [facility]

## 2018-10-20 NOTE — ASSESSMENT & PLAN NOTE
Previous baseline near 0.8 earlier this year, for the last month pt has been near 1.5; recently admitted for CHARLENE and determined to possibly represent progression of CKD  - 1L IVF given in ED, creatinine improved to 1.4  - additional 1L NS overnight, Cr 1.3 today

## 2018-10-20 NOTE — PLAN OF CARE
Problem: Patient Care Overview  Goal: Plan of Care Review  Outcome: Ongoing (interventions implemented as appropriate)  Patient alert and oriented, walking to bathroom with standby assist. Pain controlled better today, able to stay up out of bed longer. Vitals WNL. No urinary retention but patient complains of abdominal discomfort from constipation, administered PRN suppository. Fall precautions in place.

## 2018-10-21 PROCEDURE — 25000003 PHARM REV CODE 250: Performed by: STUDENT IN AN ORGANIZED HEALTH CARE EDUCATION/TRAINING PROGRAM

## 2018-10-21 PROCEDURE — 20600001 HC STEP DOWN PRIVATE ROOM

## 2018-10-21 PROCEDURE — 99233 SBSQ HOSP IP/OBS HIGH 50: CPT | Mod: GC,,, | Performed by: INTERNAL MEDICINE

## 2018-10-21 RX ORDER — CARVEDILOL 12.5 MG/1
12.5 TABLET ORAL 2 TIMES DAILY WITH MEALS
Status: DISCONTINUED | OUTPATIENT
Start: 2018-10-21 | End: 2018-10-23 | Stop reason: HOSPADM

## 2018-10-21 RX ORDER — POLYETHYLENE GLYCOL 3350 17 G/17G
17 POWDER, FOR SOLUTION ORAL DAILY
Status: DISCONTINUED | OUTPATIENT
Start: 2018-10-21 | End: 2018-10-23 | Stop reason: HOSPADM

## 2018-10-21 RX ORDER — AMOXICILLIN 250 MG
1 CAPSULE ORAL 2 TIMES DAILY
Status: DISCONTINUED | OUTPATIENT
Start: 2018-10-21 | End: 2018-10-23 | Stop reason: HOSPADM

## 2018-10-21 RX ORDER — DEXTROMETHORPHAN HYDROBROMIDE, GUAIFENESIN 5; 100 MG/5ML; MG/5ML
650 LIQUID ORAL DAILY PRN
COMMUNITY

## 2018-10-21 RX ORDER — BISACODYL 10 MG
10 SUPPOSITORY, RECTAL RECTAL DAILY PRN
Status: ON HOLD | COMMUNITY
End: 2018-12-03 | Stop reason: HOSPADM

## 2018-10-21 RX ORDER — AMLODIPINE BESYLATE 10 MG/1
10 TABLET ORAL DAILY
COMMUNITY

## 2018-10-21 RX ADMIN — HYDROXYCHLOROQUINE SULFATE 200 MG: 200 TABLET, FILM COATED ORAL at 09:10

## 2018-10-21 RX ADMIN — ATORVASTATIN CALCIUM 20 MG: 20 TABLET, FILM COATED ORAL at 09:10

## 2018-10-21 RX ADMIN — POLYETHYLENE GLYCOL 3350 17 G: 17 POWDER, FOR SOLUTION ORAL at 11:10

## 2018-10-21 RX ADMIN — LIDOCAINE 1 PATCH: 50 PATCH TOPICAL at 03:10

## 2018-10-21 RX ADMIN — SENNOSIDES AND DOCUSATE SODIUM 1 TABLET: 8.6; 5 TABLET ORAL at 08:10

## 2018-10-21 RX ADMIN — SENNOSIDES AND DOCUSATE SODIUM 1 TABLET: 8.6; 5 TABLET ORAL at 11:10

## 2018-10-21 RX ADMIN — SERTRALINE HYDROCHLORIDE 25 MG: 25 TABLET ORAL at 09:10

## 2018-10-21 RX ADMIN — HYDRALAZINE HYDROCHLORIDE 25 MG: 25 TABLET ORAL at 05:10

## 2018-10-21 RX ADMIN — FOLIC ACID 1 MG: 1 TABLET ORAL at 09:10

## 2018-10-21 RX ADMIN — AMITRIPTYLINE HYDROCHLORIDE 10 MG: 10 TABLET, FILM COATED ORAL at 08:10

## 2018-10-21 RX ADMIN — AMLODIPINE BESYLATE 10 MG: 10 TABLET ORAL at 09:10

## 2018-10-21 RX ADMIN — CARVEDILOL 12.5 MG: 12.5 TABLET, FILM COATED ORAL at 05:10

## 2018-10-21 NOTE — ASSESSMENT & PLAN NOTE
Home meds: Zoloft 25 mg daily  - Continued  - pt given home dose of xanax overnight, awoke more confused. Will hold for now  - 10mg amitriptyline qHS

## 2018-10-21 NOTE — PLAN OF CARE
Problem: Patient Care Overview  Goal: Plan of Care Review  Outcome: Ongoing (interventions implemented as appropriate)  Pt is alert and oriented x4 and verbally responsive to care. VSS. C/o pain to lower back when moving. Pt has been urinating with no difficulties, although has urgency and can not make it to the restroom most times. Able to ambulate to and from bathroom with SBA. Pt sitting up in chair in room at this time, cont to monitor.

## 2018-10-21 NOTE — PLAN OF CARE
Problem: Patient Care Overview  Goal: Plan of Care Review  Outcome: Ongoing (interventions implemented as appropriate)  Pt AAOx4, afebrile, free of falls. Pt had incontinence episodes overnight, care provided. BP elevated this morning, PRN hydralazine given. Pt c/o pain intermittently, refusing pain medications. No acute change. D/C plan to SNF this week. BREANA.

## 2018-10-21 NOTE — ASSESSMENT & PLAN NOTE
"Chronic, noted since 09/2018, given 1L NS in ED with improvement 130 from 129  - UOsm 355, SOsm 278, Richelle 79 on admission  - Pt with concomitant SCr rise for the last month; previously admitted for CHARLENE and thought to represent possible new baseline. Pt may have renal losses contributing to hyponatremia as the SCr rise was similar in timing to the hyponatremia  - Son states that pt does not take much sodium in (HFpEF), states that this has been a problem in the past  - stable at 130   - Potentially 2/2 to mild hypovolemia or "tea and toast diet"  "

## 2018-10-21 NOTE — ASSESSMENT & PLAN NOTE
- Previous baseline near 0.8 earlier this year, for the last month pt has been near 1.5; recently admitted for CHARLENE and determined to possibly represent progression of CKD  - 1L IVF given in ED, creatinine improved to 1.4  - additional 1L NS 10/21  - As Cr now 1.2, will resume lisinopril

## 2018-10-21 NOTE — ASSESSMENT & PLAN NOTE
acute low back pain, R>L, with report of decreased sphincter tone and saddle anesthesia at [place] ED but normal IAN and sensation at [facility]. NSGY state low suspicion for cauda equina but want non-emergent CT myelogram to r/o neural compression.  - After discussion with primary team and specialists, pt does not wish to undergo CT myelogram at this time due to risk of further kidney damage  - PT/OT, recommending SNF  - Acetaminophen 625 po q6h prn, Lidocaine patch  - amitriptyline 10mg po qhs

## 2018-10-21 NOTE — ASSESSMENT & PLAN NOTE
- Previous baseline near 0.8 earlier this year, for the last month pt has been near 1.5; recently admitted for CHARLENE and determined to possibly represent progression of CKD  - 1L IVF given in ED, creatinine improved to 1.4  - additional 1L NS overnight, Cr 1.3 today

## 2018-10-21 NOTE — ASSESSMENT & PLAN NOTE
New onset (on admission), pt says that this hasn't happened before. She also mentions that she has the sensation of urinating more than she actually is  - UA negative at [place], repeated here showing trace ketones else normal  - Pt notes urinary incontinence overnight, but noted feeling urinary retention previously this admission.

## 2018-10-21 NOTE — SUBJECTIVE & OBJECTIVE
Interval History: as 10/21/2018 above    Review of Systems   Constitutional: Negative for chills and fever.   HENT: Negative for trouble swallowing and voice change.    Eyes: Negative for pain and visual disturbance.   Respiratory: Negative for shortness of breath and wheezing.    Cardiovascular: Negative for chest pain and palpitations.   Gastrointestinal: Positive for constipation. Negative for abdominal pain.   Genitourinary: Negative for difficulty urinating and flank pain.   Musculoskeletal: Positive for back pain. Negative for myalgias.   Skin: Negative for rash and wound.   Neurological: Negative for syncope and light-headedness.   Psychiatric/Behavioral: Positive for confusion. Negative for hallucinations.     Objective:     Vital Signs (Most Recent):  Temp: 98 °F (36.7 °C) (10/21/18 1152)  Pulse: 74 (10/21/18 1152)  Resp: 16 (10/21/18 1152)  BP: (!) 146/66 (10/21/18 1152)  SpO2: (!) 91 % (10/21/18 1152) Vital Signs (24h Range):  Temp:  [97.5 °F (36.4 °C)-98.5 °F (36.9 °C)] 98 °F (36.7 °C)  Pulse:  [65-86] 74  Resp:  [16-19] 16  SpO2:  [89 %-93 %] 91 %  BP: (142-197)/(64-84) 146/66     Weight: 54.7 kg (120 lb 9.6 oz)  Body mass index is 22.06 kg/m².    Intake/Output Summary (Last 24 hours) at 10/21/2018 1233  Last data filed at 10/20/2018 1700  Gross per 24 hour   Intake 480 ml   Output 350 ml   Net 130 ml      Physical Exam   Constitutional: She appears well-developed and well-nourished. No distress.   HENT:   Head: Normocephalic and atraumatic.   Eyes: EOM are normal. Pupils are equal, round, and reactive to light.   Cardiovascular: Normal rate, regular rhythm, normal heart sounds and intact distal pulses. Exam reveals no gallop and no friction rub.   No murmur heard.  Pulmonary/Chest: Effort normal and breath sounds normal. No stridor. No respiratory distress. She has no wheezes. She has no rales. She exhibits no tenderness.   Abdominal: Soft. Bowel sounds are normal. She exhibits no distension and no  mass. There is no tenderness. There is no rebound and no guarding. No hernia.   Neurological: She is alert. No sensory deficit. She exhibits normal muscle tone. Gait normal. GCS eye subscore is 4. GCS verbal subscore is 4. GCS motor subscore is 6.   Skin: She is not diaphoretic.       Significant Labs:   CBC:   No results for input(s): WBC, HGB, HCT, PLT in the last 48 hours.  CMP:   Recent Labs   Lab 10/20/18  1222   *   K 4.1   CL 99   CO2 24   *   BUN 22   CREATININE 1.3   CALCIUM 8.6*   ANIONGAP 7*   EGFRNONAA 36.7*       Significant Imaging: I have reviewed all pertinent imaging results/findings within the past 24 hours.

## 2018-10-21 NOTE — PROGRESS NOTES
"[facility] Hospital Medicine  Progress Note    Patient Name: Karly Sandoval  MRN: [id number]  Patient Class: IP- Inpatient   Admission Date: 10/17/2018  Length of Stay: 3 days  Attending Physician: Leoncio Lackey MD  Primary Care Provider: Uday Allen MD    Hospital Medicine Team: [facility] MED 3 Derrell Penaloza DO    Subjective:     Principal Problem:Acute bilateral low back pain without sciatica    HPI:  88F with RA transferred from [place] for Neurosurgery evaluation of new onset low back pain and urinary incontinence thought to be concerning for cauda equina syndrome. Pt says that early this morning she noticed that her low back hurt (sharp, worse with movement), which is new; pt does not chronically have back pain. Pt went to the Rheumatologist, where she complained of this pain and also mentioned new onset urinary incontinence (today). Pt was seen in [place] ED where she was reported to have had poor rectal tone, transferred to [facility] for NSGY eval. Non-contrasted CT T/L spine showed chronic T8/T9 fractures    At [facility] NSGY examined pt and recommended non-emergent CT myelogram; they stated presentation not concerning for cauda equina syndrome but wanted study to assess for neural compression. In the ED demonstrated no saddle anesthesia or loss of rectal tone; pt did report increased frequency of urination and the sensation of urinating a greater volume of urine than was voided. When asked about her incontinence, she says that she had the urge to go but could not get to a bathroom in time; denies hx of urinary or fecal incontinence. Denies n/v/d/c, blood in stool, chest pain, dyspnea, weakness. She does mention back pain greater R than L side and states that it is a "funny" feeling but that the pain is sharp and positional; denies recent trauma.    Hospital Course:  10/19/2018 Pt reportedly anxious overnight, given home dose of xanax. Pt struggles with word finding, but noted that " "she feels like she needs to urinate but is unable to do so.   10/20/2018 Pt noting urinary incontinence overnight. Pt reporting depression, family reporting increased anxiety.   10/21/2018 Pt slept through night with no acute events. Pt reporting continued constipation this AM.     No new subjective & objective note has been filed under this hospital service since the last note was generated.    Assessment/Plan:      * Acute bilateral low back pain without sciatica    acute low back pain, R>L, with report of decreased sphincter tone and saddle anesthesia at [place] ED but normal IAN and sensation at [facility]. NSGY state low suspicion for cauda equina but want non-emergent CT myelogram to r/o neural compression.  - After discussion with primary team and specialists, pt does not wish to undergo CT myelogram at this time due to risk of further kidney damage  - PT/OT, recommending SNF  - Acetaminophen 625 po q6h prn, Lidocaine patch  - amitriptyline 10mg po qhs        Urge incontinence    New onset (on admission), pt says that this hasn't happened before. She also mentions that she has the sensation of urinating more than she actually is  - UA negative at [place], repeated here showing trace ketones else normal  - Pt notes urinary incontinence overnight, but noted feeling urinary retention previously this admission.      Hyponatremia    Chronic, noted since 09/2018, given 1L NS in ED with improvement 130 from 129  - UOsm 355, SOsm 278, Richelle 79 on admission  - Pt with concomitant SCr rise for the last month; previously admitted for CHARLENE and thought to represent possible new baseline. Pt may have renal losses contributing to hyponatremia as the SCr rise was similar in timing to the hyponatremia  - Son states that pt does not take much sodium in (HFpEF), states that this has been a problem in the past  - stable at 130   - Potentially 2/2 to mild hypovolemia or "tea and toast diet"     CHARLENE (acute kidney injury)    - Previous " baseline near 0.8 earlier this year, for the last month pt has been near 1.5; recently admitted for CHARLENE and determined to possibly represent progression of CKD  - 1L IVF given in ED, creatinine improved to 1.4  - additional 1L NS overnight, Cr 1.3   - Recheck BMP tomorrow am, will consider resuming lisinopril if stable       Anxiety    Home meds: Zoloft 25 mg daily  - Continued  - pt given home dose of xanax overnight, awoke more confused. Will hold for now  - 10mg amitriptyline qHS      History of CVA (cerebrovascular accident)    Home meds: ASA 81 mg, atorvastatin 20 mg  - Continued for secondary prevention     Chronic diastolic CHF (congestive heart failure)    Home meds: Carvedilol 6.25 mg BID, lisinopril 10 mg daily, furosemide 20 mg dailiy  - carvedilol continued, lisinopril held (CHARLENE), furosemide held (euvolemic, urine sodium would be falsely elevated)       CKD (chronic kidney disease), stage III           Hyperlipidemia    continue atorvastatin       Hypertension    Home meds: Carvedilol 6.25 mg BID, lisinopril 10 mg daily  - coreg continued, lisinopril held in the setting of questionable CHARLENE vs CKD progression  - amlodipine 10mg po daily started 10/19   - PRN hydralazine 25 mg PO Q8H SBP >180 mmHg; may consider changing regimen of antihypertensives prior to discharge if SCr does not improve       RA (rheumatoid arthritis)    Home meds: Plaquenil 200 mg daily (was on mtx, DCd at most recent Rheumatology visit)  - Continued plaquenil       VTE Risk Mitigation (From admission, onward)        Ordered     Place sequential compression device  Until discontinued      10/18/18 0128     IP VTE HIGH RISK PATIENT  Once      10/18/18 0128              Derrell Penaloza DO  Department of Hospital Medicine   [facility]

## 2018-10-22 ENCOUNTER — TELEPHONE (OUTPATIENT)
Dept: ADMINISTRATIVE | Facility: CLINIC | Age: 83
End: 2018-10-22

## 2018-10-22 PROBLEM — K59.00 CONSTIPATION: Status: ACTIVE | Noted: 2018-10-22

## 2018-10-22 LAB
ANION GAP SERPL CALC-SCNC: 12 MMOL/L
BUN SERPL-MCNC: 25 MG/DL
CALCIUM SERPL-MCNC: 8.4 MG/DL
CHLORIDE SERPL-SCNC: 100 MMOL/L
CO2 SERPL-SCNC: 18 MMOL/L
CREAT SERPL-MCNC: 1.2 MG/DL
EST. GFR  (AFRICAN AMERICAN): 46.6 ML/MIN/1.73 M^2
EST. GFR  (NON AFRICAN AMERICAN): 40.4 ML/MIN/1.73 M^2
GLUCOSE SERPL-MCNC: 79 MG/DL
POTASSIUM SERPL-SCNC: 3.8 MMOL/L
SODIUM SERPL-SCNC: 130 MMOL/L
TB INDURATION 48 - 72 HR READ: 0 MM

## 2018-10-22 PROCEDURE — 97530 THERAPEUTIC ACTIVITIES: CPT

## 2018-10-22 PROCEDURE — 25000003 PHARM REV CODE 250: Performed by: INTERNAL MEDICINE

## 2018-10-22 PROCEDURE — 25000003 PHARM REV CODE 250: Performed by: STUDENT IN AN ORGANIZED HEALTH CARE EDUCATION/TRAINING PROGRAM

## 2018-10-22 PROCEDURE — 20600001 HC STEP DOWN PRIVATE ROOM

## 2018-10-22 PROCEDURE — 99232 SBSQ HOSP IP/OBS MODERATE 35: CPT | Mod: GC,,, | Performed by: INTERNAL MEDICINE

## 2018-10-22 PROCEDURE — 97535 SELF CARE MNGMENT TRAINING: CPT

## 2018-10-22 PROCEDURE — 80048 BASIC METABOLIC PNL TOTAL CA: CPT

## 2018-10-22 PROCEDURE — 97116 GAIT TRAINING THERAPY: CPT

## 2018-10-22 PROCEDURE — 36415 COLL VENOUS BLD VENIPUNCTURE: CPT

## 2018-10-22 PROCEDURE — 97110 THERAPEUTIC EXERCISES: CPT

## 2018-10-22 RX ORDER — LISINOPRIL 10 MG/1
10 TABLET ORAL DAILY
Status: DISCONTINUED | OUTPATIENT
Start: 2018-10-22 | End: 2018-10-23 | Stop reason: HOSPADM

## 2018-10-22 RX ORDER — LACTULOSE 10 G/15ML
15 SOLUTION ORAL ONCE
Status: COMPLETED | OUTPATIENT
Start: 2018-10-22 | End: 2018-10-22

## 2018-10-22 RX ORDER — LACTULOSE 10 G/15ML
15 SOLUTION ORAL ONCE
Status: DISCONTINUED | OUTPATIENT
Start: 2018-10-22 | End: 2018-10-22

## 2018-10-22 RX ADMIN — ACETAMINOPHEN 650 MG: 325 TABLET ORAL at 08:10

## 2018-10-22 RX ADMIN — ATORVASTATIN CALCIUM 20 MG: 20 TABLET, FILM COATED ORAL at 08:10

## 2018-10-22 RX ADMIN — HYDROXYCHLOROQUINE SULFATE 200 MG: 200 TABLET, FILM COATED ORAL at 08:10

## 2018-10-22 RX ADMIN — LISINOPRIL 10 MG: 10 TABLET ORAL at 08:10

## 2018-10-22 RX ADMIN — CARVEDILOL 12.5 MG: 12.5 TABLET, FILM COATED ORAL at 08:10

## 2018-10-22 RX ADMIN — AMLODIPINE BESYLATE 10 MG: 10 TABLET ORAL at 08:10

## 2018-10-22 RX ADMIN — SENNOSIDES AND DOCUSATE SODIUM 1 TABLET: 8.6; 5 TABLET ORAL at 08:10

## 2018-10-22 RX ADMIN — LIDOCAINE 1 PATCH: 50 PATCH TOPICAL at 06:10

## 2018-10-22 RX ADMIN — SERTRALINE HYDROCHLORIDE 25 MG: 25 TABLET ORAL at 08:10

## 2018-10-22 RX ADMIN — CARVEDILOL 12.5 MG: 12.5 TABLET, FILM COATED ORAL at 06:10

## 2018-10-22 RX ADMIN — FOLIC ACID 1 MG: 1 TABLET ORAL at 08:10

## 2018-10-22 RX ADMIN — POLYETHYLENE GLYCOL 3350 17 G: 17 POWDER, FOR SOLUTION ORAL at 08:10

## 2018-10-22 RX ADMIN — LACTULOSE 15 G: 20 SOLUTION ORAL at 08:10

## 2018-10-22 NOTE — PLAN OF CARE
Pt w/ recs for SNF - [facility] consult placed.       10/22/18 1154   Discharge Reassessment   Assessment Type Discharge Planning Reassessment   Discharge Plan A Skilled Nursing Facility   Discharge Plan B Home with family;Home Health   Patient choice form signed by patient/caregiver N/A   Can the patient answer the patient profile reliably? Yes, cognitively intact   How does the patient rate their overall health at the present time? Fair   Describe the patient's ability to walk at the present time. Walks with the help of equipment   How often would a person be available to care for the patient? Occasionally

## 2018-10-22 NOTE — PLAN OF CARE
Problem: Patient Care Overview  Goal: Plan of Care Review  Outcome: Ongoing (interventions implemented as appropriate)  Patient A&O x 4. Sitting in chair at start of shift, then moved to bed with assistance of staff. Vitals stable. Complains of lower back pain which comes on suddenly. Patient aware that she can receive Tylenol as needed; also offer heating packs and assist with positioning in bed. Patient declines Tylenol overnight. Slept most of the shift. Care ongoing.

## 2018-10-22 NOTE — PLAN OF CARE
Problem: Physical Therapy Goal  Goal: Physical Therapy Goal  Goals to be met by: 18     Patient will increase functional independence with mobility by performin. Supine to sit with Contact Guard Assistance  2. Sit to supine with MInimal Assistance  3. Sit to stand transfer with Stand-by Assistance  4. Bed to chair transfer with Stand-by Assistance using Rolling Walker  5. Gait  x 140 feet with Supervision using Rolling Walker.   6. Ascend/descend 3 stair with bilateral Handrails Contact Guard Assistance using LRD.      Pt progressing towards goals. continue with PT POC.Goals remain appropriate.  Boris Roberts PTA  10/22/2018

## 2018-10-22 NOTE — TELEPHONE ENCOUNTER
Home Health SOC 10/12/2018 - 12/10/2018 with [facility] ([facility] - Dr. Peewee Randall. SN, PT and OT services.

## 2018-10-22 NOTE — ASSESSMENT & PLAN NOTE
New onset (on admission), pt says that this hasn't happened before. She also mentions that she has the sensation of urinating more than she actually is  - UA negative at [place], repeated here showing trace ketones else normal  - Pt notes urinary incontinence 10/21, but noted feeling urinary retention previously this admission.

## 2018-10-22 NOTE — PT/OT/SLP PROGRESS
Occupational Therapy   Treatment    Name: Karly Sandoval  MRN: [id number]  Admitting Diagnosis:  Acute bilateral low back pain without sciatica       Recommendations:     Discharge Recommendations: nursing facility, skilled  Discharge Equipment Recommendations:  none  Barriers to discharge:  Decreased caregiver support    Subjective     Communicated with: RN prior to session.  Pain/Comfort:  · Location - Orientation 1: lower  · Location 1: back    Patients cultural, spiritual, Congregation conflicts given the current situation:      Objective:     Patient found with:      General Precautions: Standard, fall   Orthopedic Precautions:    Braces:       Occupational Performance:    Bed Mobility:    · Patient completed Rolling/Turning to Right with contact guard assistance  · Patient completed Scooting/Bridging with stand by assistance  · Patient completed Supine to Sit with minimum assistance     Functional Mobility/Transfers:  · Patient completed Sit <> Stand Transfer with contact guard assistance  with  rolling walker   · Patient completed Bed <> Chair Transfer using Step Transfer technique with stand by assistance with rolling walker  · Patient completed Toilet Transfer Step Transfer technique with minimum assistance with  rolling walker and grab bars  · Functional Mobility: Pt walked from bed>bathroom with SBA using a RW.    Activities of Daily Living:  · Grooming: stand by assistance for oral care in standing x ~ 5 minutes.  · Toileting: stand by assistance for hygiene from toilet.    Patient left up in chair with all lines intact and call button in reach    AMPAC 6 Click:  AMPAC Total Score: 19    Treatment & Education:  Pt educated on proper RW use and hand placement during functional t/fs.  Encouraged to sit OOB as long as able.  OT POC reviewed.  Education:    Assessment:     Karly Snadoval is a 88 y.o. female with a medical diagnosis of Acute bilateral low back pain without sciatica.  She is progressing  well towards goals but slightly limited by lower back pain. SS-SNF placement still appropriate to maximize functional (I). Performance deficits affecting function are weakness, gait instability, impaired balance, impaired endurance, impaired self care skills, impaired functional mobilty, pain.      Rehab Prognosis:  Good; patient would benefit from acute skilled OT services to address these deficits and reach maximum level of function.       Plan:     Patient to be seen 3 x/week to address the above listed problems via self-care/home management, therapeutic activities, therapeutic exercises  · Plan of Care Expires: 11/18/18  · Plan of Care Reviewed with: patient, son    This Plan of care has been discussed with the patient who was involved in its development and understands and is in agreement with the identified goals and treatment plan    GOALS:   Multidisciplinary Problems     Occupational Therapy Goals        Problem: Occupational Therapy Goal    Goal Priority Disciplines Outcome Interventions   Occupational Therapy Goal     OT, PT/OT Ongoing (interventions implemented as appropriate)    Description:  Goals to be met by: 10/26/18    Patient will increase functional independence with ADLs by performing:    UE Dressing with Supervision.  LE Dressing with Minimal Assistance.  Grooming while standing at sink with Supervision.  Toileting from toilet with Stand-by Assistance for hygiene and clothing management. MET 10/22  REVISED to (S).  Supine to sit with Stand-by Assistance.  Step transfer with Supervision  Toilet transfer to toilet with Supervision.                       Time Tracking:     OT Date of Treatment: 10/22/18  OT Start Time: 0838  OT Stop Time: 0905  OT Total Time (min): 27 min    Billable Minutes:Self Care/Home Management 14  Therapeutic Activity 13    DACIA Valdivia  10/22/2018

## 2018-10-22 NOTE — PROGRESS NOTES
"[facility] Hospital Medicine  Progress Note    Patient Name: Karly Sandoval  MRN: [id number]  Patient Class: IP- Inpatient   Admission Date: 10/17/2018  Length of Stay: 4 days  Attending Physician: Chong Saab MD  Primary Care Provider: Uday Allen MD    Hospital Medicine Team: [facility] HOSP MED 3 Santiago Gil MD    Subjective:     Principal Problem:Acute bilateral low back pain without sciatica    HPI:  88F with RA transferred from [facility] for Neurosurgery evaluation of new onset low back pain and urinary incontinence thought to be concerning for cauda equina syndrome. Pt says that early this morning she noticed that her low back hurt (sharp, worse with movement), which is new; pt does not chronically have back pain. Pt went to the Rheumatologist, where she complained of this pain and also mentioned new onset urinary incontinence (today). Pt was seen in [facility] ED where she was reported to have had poor rectal tone, transferred to [facility] for NSGY eval. Non-contrasted CT T/L spine showed chronic T8/T9 fractures    At [facility] ED NSGY examined pt and recommended non-emergent CT myelogram; they stated presentation not concerning for cauda equina syndrome but wanted study to assess for neural compression. In the ED demonstrated no saddle anesthesia or loss of rectal tone; pt did report increased frequency of urination and the sensation of urinating a greater volume of urine than was voided. When asked about her incontinence, she says that she had the urge to go but could not get to a bathroom in time; denies hx of urinary or fecal incontinence. Denies n/v/d/c, blood in stool, chest pain, dyspnea, weakness. She does mention back pain greater R than L side and states that it is a "funny" feeling but that the pain is sharp and positional; denies recent trauma.    Hospital Course:  10/19/2018 Pt reportedly anxious overnight, given home dose of xanax. Pt struggles with word finding, but noted that she " feels like she needs to urinate but is unable to do so.   10/20/2018 Pt noting urinary incontinence overnight. Pt reporting depression, family reporting increased anxiety.   10/21/2018 Pt slept through night with no acute events. Pt reporting continued constipation this AM  10/22/2018 Patient complained of lethargy this morning. Only new medication was amitriptyline for back pain/anxiety, so that was d/c'd. Due to persistent constipation, patient was given lactulose in addition to miralax and senna-docusate. Patient complained of back pain for which she was given tylenol and a heat pack. Renal function improving so lisinopril was added back       Interval History:  Patient complained of lethargy this morning. Only new medication was amitriptyline for back pain/anxiety. Continues to have low back pain and has not had a bowel movement during this admission       Review of Systems   Constitutional: Negative for chills and fever.   HENT: Negative for trouble swallowing and voice change.    Eyes: Negative for pain and visual disturbance.   Respiratory: Negative for shortness of breath and wheezing.    Cardiovascular: Negative for chest pain and palpitations.   Gastrointestinal: Positive for constipation. Negative for abdominal pain.   Genitourinary: Negative for difficulty urinating and flank pain.   Musculoskeletal: Positive for back pain. Negative for myalgias.   Skin: Negative for rash and wound.   Neurological: Negative for syncope and light-headedness.   Psychiatric/Behavioral: Positive for confusion. Negative for hallucinations.     Objective:     Vital Signs (Most Recent):  Temp: 97.2 °F (36.2 °C) (10/22/18 1646)  Pulse: 66 (10/22/18 1646)  Resp: 17 (10/22/18 1646)  BP: 132/60 (10/22/18 1646)  SpO2: (!) 92 % (10/22/18 1646) Vital Signs (24h Range):  Temp:  [97.2 °F (36.2 °C)-98.4 °F (36.9 °C)] 97.2 °F (36.2 °C)  Pulse:  [66-83] 66  Resp:  [16-20] 17  SpO2:  [92 %-96 %] 92 %  BP: (118-172)/(54-79) 132/60      Weight: 54.7 kg (120 lb 9.6 oz)  Body mass index is 22.06 kg/m².    Intake/Output Summary (Last 24 hours) at 10/22/2018 1728  Last data filed at 10/22/2018 0600  Gross per 24 hour   Intake 720 ml   Output 0 ml   Net 720 ml      Physical Exam   Constitutional: She appears well-developed and well-nourished. No distress.   HENT:   Head: Normocephalic and atraumatic.   Eyes: EOM are normal. Pupils are equal, round, and reactive to light.   Cardiovascular: Normal rate, regular rhythm, normal heart sounds and intact distal pulses. Exam reveals no gallop and no friction rub.   No murmur heard.  Pulmonary/Chest: Effort normal and breath sounds normal. No stridor. No respiratory distress. She has no wheezes. She has no rales. She exhibits no tenderness.   Abdominal: Soft. Bowel sounds are normal. She exhibits distension. She exhibits no mass. There is no tenderness. There is no rebound and no guarding. No hernia.   Neurological: She is alert. No sensory deficit. She exhibits normal muscle tone. Gait normal. GCS eye subscore is 4. GCS verbal subscore is 4. GCS motor subscore is 6.   Skin: She is not diaphoretic.       Significant Labs: All pertinent labs within the past 24 hours have been reviewed.    Significant Imaging: I have reviewed all pertinent imaging results/findings within the past 24 hours.    Assessment/Plan:      * Acute bilateral low back pain without sciatica    acute low back pain, R>L, with report of decreased sphincter tone and saddle anesthesia at [place] ED but normal IAN and sensation at [facility] ED. NSGY state low suspicion for cauda equina but want non-emergent CT myelogram to r/o neural compression.  - After discussion with primary team and specialists, pt does not wish to undergo CT myelogram at this time due to risk of further kidney damage  - PT/OT, recommending SNF  - Acetaminophen 625 po q6h prn, Lidocaine patch  - Stopped amitriptyline      Constipation    - continue miralax, senna-docusate   -  "added lactulose x 1 today      Urge incontinence    New onset (on admission), pt says that this hasn't happened before. She also mentions that she has the sensation of urinating more than she actually is  - UA negative at [place], repeated here showing trace ketones else normal  - Pt notes urinary incontinence 10/21, but noted feeling urinary retention previously this admission.      Hyponatremia    Chronic, noted since 09/2018, given 1L NS in ED with improvement 130 from 129  - UOsm 355, SOsm 278, Richelle 79 on admission  - Pt with concomitant SCr rise for the last month; previously admitted for CHARLENE and thought to represent possible new baseline. Pt may have renal losses contributing to hyponatremia as the SCr rise was similar in timing to the hyponatremia  - Son states that pt does not take much sodium in (HFpEF), states that this has been a problem in the past  - stable at 130   - Potentially 2/2 to mild hypovolemia or "tea and toast diet"     CHARLENE (acute kidney injury)    - Previous baseline near 0.8 earlier this year, for the last month pt has been near 1.5; recently admitted for CHARLENE and determined to possibly represent progression of CKD  - 1L IVF given in ED, creatinine improved to 1.4  - additional 1L NS 10/21  - As Cr now 1.2, will resume lisinopril       Anxiety    Home meds: Zoloft 25 mg daily  - Continued  - pt given home dose of xanax overnight, awoke more confused. Will hold for now  - stopped amitriptyline due to patient complaining of lethargy       History of CVA (cerebrovascular accident)    Home meds: ASA 81 mg, atorvastatin 20 mg  - Continued for secondary prevention     Chronic diastolic CHF (congestive heart failure)    Home meds: Carvedilol 6.25 mg BID, lisinopril 10 mg daily, furosemide 20 mg dailiy  - carvedilol continued, furosemide held (euvolemic, urine sodium would be falsely elevated), lisinopril resumed today        CKD (chronic kidney disease), stage III           Hyperlipidemia    continue " atorvastatin       Hypertension    Home meds: Carvedilol 6.25 mg BID, lisinopril 10 mg daily  - coreg continued  - amlodipine 10mg po daily started 10/19   - restarted lisinopril due to improved renal function        RA (rheumatoid arthritis)    Home meds: Plaquenil 200 mg daily (was on mtx, DCd at most recent Rheumatology visit)  - Continued plaquenil       VTE Risk Mitigation (From admission, onward)        Ordered     Place sequential compression device  Until discontinued      10/18/18 0128     IP VTE HIGH RISK PATIENT  Once      10/18/18 0128              Santiago Gil MD  Department of Hospital Medicine   [facility] [address]

## 2018-10-22 NOTE — ASSESSMENT & PLAN NOTE
Home meds: Carvedilol 6.25 mg BID, lisinopril 10 mg daily, furosemide 20 mg dailiy  - carvedilol continued, furosemide held (euvolemic, urine sodium would be falsely elevated), lisinopril resumed today

## 2018-10-22 NOTE — SUBJECTIVE & OBJECTIVE
Interval History:  Patient complained of lethargy this morning. Only new medication was amitriptyline for back pain/anxiety. Continues to have low back pain and has not had a bowel movement during this admission       Review of Systems   Constitutional: Negative for chills and fever.   HENT: Negative for trouble swallowing and voice change.    Eyes: Negative for pain and visual disturbance.   Respiratory: Negative for shortness of breath and wheezing.    Cardiovascular: Negative for chest pain and palpitations.   Gastrointestinal: Positive for constipation. Negative for abdominal pain.   Genitourinary: Negative for difficulty urinating and flank pain.   Musculoskeletal: Positive for back pain. Negative for myalgias.   Skin: Negative for rash and wound.   Neurological: Negative for syncope and light-headedness.   Psychiatric/Behavioral: Positive for confusion. Negative for hallucinations.     Objective:     Vital Signs (Most Recent):  Temp: 97.2 °F (36.2 °C) (10/22/18 1646)  Pulse: 66 (10/22/18 1646)  Resp: 17 (10/22/18 1646)  BP: 132/60 (10/22/18 1646)  SpO2: (!) 92 % (10/22/18 1646) Vital Signs (24h Range):  Temp:  [97.2 °F (36.2 °C)-98.4 °F (36.9 °C)] 97.2 °F (36.2 °C)  Pulse:  [66-83] 66  Resp:  [16-20] 17  SpO2:  [92 %-96 %] 92 %  BP: (118-172)/(54-79) 132/60     Weight: 54.7 kg (120 lb 9.6 oz)  Body mass index is 22.06 kg/m².    Intake/Output Summary (Last 24 hours) at 10/22/2018 1728  Last data filed at 10/22/2018 0600  Gross per 24 hour   Intake 720 ml   Output 0 ml   Net 720 ml      Physical Exam   Constitutional: She appears well-developed and well-nourished. No distress.   HENT:   Head: Normocephalic and atraumatic.   Eyes: EOM are normal. Pupils are equal, round, and reactive to light.   Cardiovascular: Normal rate, regular rhythm, normal heart sounds and intact distal pulses. Exam reveals no gallop and no friction rub.   No murmur heard.  Pulmonary/Chest: Effort normal and breath sounds normal. No  stridor. No respiratory distress. She has no wheezes. She has no rales. She exhibits no tenderness.   Abdominal: Soft. Bowel sounds are normal. She exhibits distension. She exhibits no mass. There is no tenderness. There is no rebound and no guarding. No hernia.   Neurological: She is alert. No sensory deficit. She exhibits normal muscle tone. Gait normal. GCS eye subscore is 4. GCS verbal subscore is 4. GCS motor subscore is 6.   Skin: She is not diaphoretic.       Significant Labs: All pertinent labs within the past 24 hours have been reviewed.    Significant Imaging: I have reviewed all pertinent imaging results/findings within the past 24 hours.

## 2018-10-22 NOTE — ASSESSMENT & PLAN NOTE
Home meds: Zoloft 25 mg daily  - Continued  - pt given home dose of xanax overnight, awoke more confused. Will hold for now  - stopped amitriptyline due to patient complaining of lethargy

## 2018-10-22 NOTE — PLAN OF CARE
Problem: Patient Care Overview  Goal: Plan of Care Review  Outcome: Ongoing (interventions implemented as appropriate)  Pt is alert and oriented x4 and verbally responsive to care. VSS. C/o minimal pain to lower back at times with movement. PRN tylenol given once this shift. Lactulose given this AM, pt with x3 BM this shift. Pt resting in bed at this time with call light In reach, cont to monitor.

## 2018-10-22 NOTE — PLAN OF CARE
Problem: Occupational Therapy Goal  Goal: Occupational Therapy Goal  Goals to be met by: 10/26/18    Patient will increase functional independence with ADLs by performing:    UE Dressing with Supervision.  LE Dressing with Minimal Assistance.  Grooming while standing at sink with Supervision.  Toileting from toilet with Stand-by Assistance for hygiene and clothing management. MET 10/22  REVISED to (S).  Supine to sit with Stand-by Assistance.  Step transfer with Supervision  Toilet transfer to toilet with Supervision.     Outcome: Ongoing (interventions implemented as appropriate)  Con't POC.    DACIA Valdivia

## 2018-10-22 NOTE — PHARMACY MED REC
"Admission Medication Reconciliation - Pharmacy Consult Note    The home medication history was taken by the medication reconciliation technician - Breann Vidales.    You may click on the "Admission" tab below to review the medication list.  Based on information gathered and subsequent review by the clinical pharmacist, the items below may need attention.    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       Please address this information as you see fit.  Feel free to contact us if you have any questions or require assistance.    PHARMACIST NAME  Marcela Stallings  EXT  [id number]                      .    .            "

## 2018-10-22 NOTE — PLAN OF CARE
SW met with pt, discussed rec for SS SNF. Pt in agreement with OSNF for d/c plan.     Aimee Moses, [facility] [phone]

## 2018-10-22 NOTE — ASSESSMENT & PLAN NOTE
acute low back pain, R>L, with report of decreased sphincter tone and saddle anesthesia at [place] [facility] but normal IAN and sensation [place] [facility]. NSGY state low suspicion for cauda equina but want non-emergent CT myelogram to r/o neural compression.  - After discussion with primary team and specialists, pt does not wish to undergo CT myelogram at this time due to risk of further kidney damage  - PT/OT, recommending SNF  - Acetaminophen 625 po q6h prn, Lidocaine patch  - Stopped amitriptyline

## 2018-10-22 NOTE — ASSESSMENT & PLAN NOTE
Home meds: Carvedilol 6.25 mg BID, lisinopril 10 mg daily  - coreg continued  - amlodipine 10mg po daily started 10/19   - restarted lisinopril due to improved renal function

## 2018-10-22 NOTE — PT/OT/SLP PROGRESS
Physical Therapy Treatment    Patient Name:  Karly Sandoval   MRN:  [id number]    Recommendations:     Discharge Recommendations:  nursing facility, skilled   Discharge Equipment Recommendations: none   Barriers to discharge: Decreased caregiver support    Assessment:     Karly Sandoval is a 88 y.o. female admitted with a medical diagnosis of Acute bilateral low back pain without sciatica.  She presents with the following impairments/functional limitations:  weakness, impaired endurance, impaired self care skills, gait instability, impaired functional mobilty, impaired balance, pain .   Pt Progressing with PT Intervention. Pt Progressing with improving gait distance. Pt would continue to benefit from skilled PT to address overall functional mobility and goals. Goals remain appropriate    Rehab Prognosis:  good; patient would benefit from acute skilled PT services to address these deficits and reach maximum level of function.      Recent Surgery: * No surgery found *      Plan:     During this hospitalization, patient to be seen 4 x/week to address the above listed problems via gait training, therapeutic activities, therapeutic exercises, neuromuscular re-education  · Plan of Care Expires:  11/18/18   Plan of Care Reviewed with: patient    Subjective     Communicated with RN prior to session.  Patient found seated upon PT entry to room, agreeable to treatment.      Chief Complaint: back pain  Patient comments/goals: I will try but I hurt when I move  Pain/Comfort:  · Pain Rating 1: (not rated)  · Location - Orientation 1: lower  · Location 1: back  · Pain Addressed 1: Pre-medicate for activity, Reposition, Cessation of Activity, Distraction  · Pain Rating Post-Intervention 1: (did not rate)    Patients cultural, spiritual, Christianity conflicts given the current situation: none    Objective:     Patient found with: (all lines intact)     General Precautions: Standard, fall   Orthopedic Precautions:N/A   Braces:  N/A     Functional Mobility:  · Transfers:  Sit to Stand:  contact guard assistance with RW  · Gait: 110 ft with RW with CGA for safety       AM-PAC 6 CLICK MOBILITY  Turning over in bed (including adjusting bedclothes, sheets and blankets)?: 3  Sitting down on and standing up from a chair with arms (e.g., wheelchair, bedside commode, etc.): 3  Moving from lying on back to sitting on the side of the bed?: 2  Moving to and from a bed to a chair (including a wheelchair)?: 3  Need to walk in hospital room?: 3  Climbing 3-5 steps with a railing?: 2  Basic Mobility Total Score: 16       Therapeutic Activities and Exercises:   Discussed/educated patient on progress, safety, importance of OOB mobility for improving outcomes, d/c, PT POC   Patient performed therex X 15 reps seated in bedside chair B LE AROM AP, LAQ, Hip Flexion, Hip Abd/Add   White board updated in patients room to current assistance level   Donned an extra gown and gripping socks   Pt encouraged to ambulate in hallways 3x/day with nursing or family assistance to improve endurance.   Pt safe to ambulate in hallway with RN or PCT assistance   Bedside table in front of patient and area set up for function, convenience, and safety. RN aware of patient's mobility needs and status. Questions/concerns addressed within PTA scope of practice; patient with no further questions      Patient left up in chair with all lines intact, call button in reach and nsg notified..    GOALS:   Multidisciplinary Problems     Physical Therapy Goals        Problem: Physical Therapy Goal    Goal Priority Disciplines Outcome Goal Variances Interventions   Physical Therapy Goal     PT, PT/OT Ongoing (interventions implemented as appropriate)     Description:  Goals to be met by: 18     Patient will increase functional independence with mobility by performin. Supine to sit with Contact Guard Assistance  2. Sit to supine with MInimal Assistance  3. Sit to stand transfer with  Stand-by Assistance  4. Bed to chair transfer with Stand-by Assistance using Rolling Walker  5. Gait  x 140 feet with Supervision using Rolling Walker.   6. Ascend/descend 3 stair with bilateral Handrails Contact Guard Assistance using LRD.                       Time Tracking:     PT Received On:    PT Start Time: 0947     PT Stop Time: 1026  PT Total Time (min): 39 min     Billable Minutes: Gait Training 15, Therapeutic Activity 12 and Therapeutic Exercise 12    Treatment Type: Treatment  PT/PTA: PTA     PTA Visit Number: 1     Boris Roberts PTA  10/22/2018

## 2018-10-23 ENCOUNTER — HOSPITAL ENCOUNTER (INPATIENT)
Facility: HOSPITAL | Age: 83
LOS: 21 days | Discharge: ANOTHER HOSPITAL | DRG: 559 | End: 2018-11-13
Attending: INTERNAL MEDICINE | Admitting: INTERNAL MEDICINE
Payer: MEDICARE

## 2018-10-23 VITALS
OXYGEN SATURATION: 95 % | HEIGHT: 62 IN | TEMPERATURE: 98 F | WEIGHT: 120.63 LBS | DIASTOLIC BLOOD PRESSURE: 59 MMHG | HEART RATE: 68 BPM | RESPIRATION RATE: 18 BRPM | BODY MASS INDEX: 22.2 KG/M2 | SYSTOLIC BLOOD PRESSURE: 111 MMHG

## 2018-10-23 DIAGNOSIS — S22.080A COMPRESSION FRACTURE OF T12 VERTEBRA: ICD-10-CM

## 2018-10-23 DIAGNOSIS — E87.1 HYPONATREMIA: ICD-10-CM

## 2018-10-23 DIAGNOSIS — M48.50XA VERTEBRAL COMPRESSION FRACTURE: Primary | ICD-10-CM

## 2018-10-23 DIAGNOSIS — M54.50 ACUTE BILATERAL LOW BACK PAIN WITHOUT SCIATICA: ICD-10-CM

## 2018-10-23 PROBLEM — Z79.899 CHRONIC PRESCRIPTION BENZODIAZEPINE USE: Status: ACTIVE | Noted: 2018-10-23

## 2018-10-23 PROBLEM — K86.2 CYST OF PANCREAS: Status: RESOLVED | Noted: 2018-10-09 | Resolved: 2018-10-23

## 2018-10-23 PROBLEM — R53.81 DEBILITY: Status: ACTIVE | Noted: 2018-10-23

## 2018-10-23 PROBLEM — R19.8 RIGHT PELVIC ADNEXAL FLUID COLLECTION: Status: RESOLVED | Noted: 2018-10-08 | Resolved: 2018-10-23

## 2018-10-23 PROCEDURE — 11000004 HC SNF PRIVATE

## 2018-10-23 PROCEDURE — 99239 HOSP IP/OBS DSCHRG MGMT >30: CPT | Mod: GC,,, | Performed by: INTERNAL MEDICINE

## 2018-10-23 PROCEDURE — 25000003 PHARM REV CODE 250: Performed by: INTERNAL MEDICINE

## 2018-10-23 PROCEDURE — 63600175 PHARM REV CODE 636 W HCPCS: Performed by: STUDENT IN AN ORGANIZED HEALTH CARE EDUCATION/TRAINING PROGRAM

## 2018-10-23 PROCEDURE — 97530 THERAPEUTIC ACTIVITIES: CPT

## 2018-10-23 PROCEDURE — 25000003 PHARM REV CODE 250: Performed by: STUDENT IN AN ORGANIZED HEALTH CARE EDUCATION/TRAINING PROGRAM

## 2018-10-23 PROCEDURE — 63600175 PHARM REV CODE 636 W HCPCS: Performed by: INTERNAL MEDICINE

## 2018-10-23 PROCEDURE — 97116 GAIT TRAINING THERAPY: CPT

## 2018-10-23 RX ORDER — AMLODIPINE BESYLATE 10 MG/1
10 TABLET ORAL DAILY
Status: CANCELLED | OUTPATIENT
Start: 2018-10-24

## 2018-10-23 RX ORDER — TAMSULOSIN HYDROCHLORIDE 0.4 MG/1
0.4 CAPSULE ORAL DAILY
Status: CANCELLED | OUTPATIENT
Start: 2018-10-24

## 2018-10-23 RX ORDER — LISINOPRIL 2.5 MG/1
10 TABLET ORAL DAILY
Status: DISCONTINUED | OUTPATIENT
Start: 2018-10-24 | End: 2018-10-25

## 2018-10-23 RX ORDER — DEXAMETHASONE 1 MG/1
2 TABLET ORAL EVERY 12 HOURS
Status: COMPLETED | OUTPATIENT
Start: 2018-10-23 | End: 2018-10-27

## 2018-10-23 RX ORDER — TAMSULOSIN HYDROCHLORIDE 0.4 MG/1
0.4 CAPSULE ORAL DAILY
Status: DISCONTINUED | OUTPATIENT
Start: 2018-10-23 | End: 2018-10-23 | Stop reason: HOSPADM

## 2018-10-23 RX ORDER — IBUPROFEN 200 MG
24 TABLET ORAL
Status: DISCONTINUED | OUTPATIENT
Start: 2018-10-23 | End: 2018-10-23

## 2018-10-23 RX ORDER — SODIUM CHLORIDE 0.9 % (FLUSH) 0.9 %
5 SYRINGE (ML) INJECTION
Status: CANCELLED | OUTPATIENT
Start: 2018-10-23

## 2018-10-23 RX ORDER — IBUPROFEN 200 MG
16 TABLET ORAL
Status: DISCONTINUED | OUTPATIENT
Start: 2018-10-23 | End: 2018-10-23

## 2018-10-23 RX ORDER — DEXTROSE 50 % IN WATER (D50W) INTRAVENOUS SYRINGE
25
Status: CANCELLED | OUTPATIENT
Start: 2018-10-23

## 2018-10-23 RX ORDER — ATORVASTATIN CALCIUM 20 MG/1
20 TABLET, FILM COATED ORAL DAILY
Status: CANCELLED | OUTPATIENT
Start: 2018-10-24

## 2018-10-23 RX ORDER — POLYETHYLENE GLYCOL 3350 17 G/17G
17 POWDER, FOR SOLUTION ORAL DAILY
Status: DISCONTINUED | OUTPATIENT
Start: 2018-10-24 | End: 2018-11-14 | Stop reason: HOSPADM

## 2018-10-23 RX ORDER — SERTRALINE HYDROCHLORIDE 25 MG/1
25 TABLET, FILM COATED ORAL DAILY
Status: CANCELLED | OUTPATIENT
Start: 2018-10-24

## 2018-10-23 RX ORDER — FOLIC ACID 1 MG/1
1 TABLET ORAL DAILY
Status: CANCELLED | OUTPATIENT
Start: 2018-10-24

## 2018-10-23 RX ORDER — DEXAMETHASONE 1 MG/1
2 TABLET ORAL EVERY 12 HOURS
Status: DISCONTINUED | OUTPATIENT
Start: 2018-10-23 | End: 2018-10-23 | Stop reason: HOSPADM

## 2018-10-23 RX ORDER — AMOXICILLIN 250 MG
1 CAPSULE ORAL 2 TIMES DAILY
Status: DISCONTINUED | OUTPATIENT
Start: 2018-10-23 | End: 2018-11-14 | Stop reason: HOSPADM

## 2018-10-23 RX ORDER — ATORVASTATIN CALCIUM 20 MG/1
20 TABLET, FILM COATED ORAL DAILY
Status: DISCONTINUED | OUTPATIENT
Start: 2018-10-24 | End: 2018-11-14 | Stop reason: HOSPADM

## 2018-10-23 RX ORDER — LIDOCAINE 50 MG/G
1 PATCH TOPICAL
Status: CANCELLED | OUTPATIENT
Start: 2018-10-23

## 2018-10-23 RX ORDER — HYDRALAZINE HYDROCHLORIDE 25 MG/1
25 TABLET, FILM COATED ORAL EVERY 8 HOURS PRN
Status: DISCONTINUED | OUTPATIENT
Start: 2018-10-23 | End: 2018-11-14 | Stop reason: HOSPADM

## 2018-10-23 RX ORDER — GLUCAGON 1 MG
1 KIT INJECTION
Status: DISCONTINUED | OUTPATIENT
Start: 2018-10-23 | End: 2018-10-23

## 2018-10-23 RX ORDER — DEXTROSE 50 % IN WATER (D50W) INTRAVENOUS SYRINGE
12.5
Status: DISCONTINUED | OUTPATIENT
Start: 2018-10-23 | End: 2018-10-23

## 2018-10-23 RX ORDER — RAMELTEON 8 MG/1
8 TABLET ORAL NIGHTLY PRN
Status: DISCONTINUED | OUTPATIENT
Start: 2018-10-23 | End: 2018-11-14 | Stop reason: HOSPADM

## 2018-10-23 RX ORDER — IBUPROFEN 200 MG
24 TABLET ORAL
Status: CANCELLED | OUTPATIENT
Start: 2018-10-23

## 2018-10-23 RX ORDER — GLUCAGON 1 MG
1 KIT INJECTION
Status: CANCELLED | OUTPATIENT
Start: 2018-10-23

## 2018-10-23 RX ORDER — ACETAMINOPHEN 325 MG/1
650 TABLET ORAL EVERY 6 HOURS PRN
Status: CANCELLED | OUTPATIENT
Start: 2018-10-23

## 2018-10-23 RX ORDER — SERTRALINE HYDROCHLORIDE 25 MG/1
25 TABLET, FILM COATED ORAL DAILY
Status: DISCONTINUED | OUTPATIENT
Start: 2018-10-24 | End: 2018-10-31

## 2018-10-23 RX ORDER — BISACODYL 10 MG
10 SUPPOSITORY, RECTAL RECTAL DAILY PRN
Status: DISCONTINUED | OUTPATIENT
Start: 2018-10-23 | End: 2018-11-14 | Stop reason: HOSPADM

## 2018-10-23 RX ORDER — LISINOPRIL 10 MG/1
10 TABLET ORAL DAILY
Status: CANCELLED | OUTPATIENT
Start: 2018-10-24

## 2018-10-23 RX ORDER — ACETAMINOPHEN 325 MG/1
650 TABLET ORAL EVERY 6 HOURS PRN
Status: DISCONTINUED | OUTPATIENT
Start: 2018-10-23 | End: 2018-11-14 | Stop reason: HOSPADM

## 2018-10-23 RX ORDER — DEXTROSE 50 % IN WATER (D50W) INTRAVENOUS SYRINGE
25
Status: DISCONTINUED | OUTPATIENT
Start: 2018-10-23 | End: 2018-10-23

## 2018-10-23 RX ORDER — CARVEDILOL 12.5 MG/1
12.5 TABLET ORAL 2 TIMES DAILY WITH MEALS
Status: DISCONTINUED | OUTPATIENT
Start: 2018-10-23 | End: 2018-11-14 | Stop reason: HOSPADM

## 2018-10-23 RX ORDER — SODIUM CHLORIDE 0.9 % (FLUSH) 0.9 %
5 SYRINGE (ML) INJECTION
Status: DISCONTINUED | OUTPATIENT
Start: 2018-10-23 | End: 2018-11-14 | Stop reason: HOSPADM

## 2018-10-23 RX ORDER — HYDRALAZINE HYDROCHLORIDE 25 MG/1
25 TABLET, FILM COATED ORAL EVERY 8 HOURS PRN
Status: CANCELLED | OUTPATIENT
Start: 2018-10-23

## 2018-10-23 RX ORDER — IBUPROFEN 200 MG
16 TABLET ORAL
Status: CANCELLED | OUTPATIENT
Start: 2018-10-23

## 2018-10-23 RX ORDER — AMOXICILLIN 250 MG
1 CAPSULE ORAL 2 TIMES DAILY
Status: CANCELLED | OUTPATIENT
Start: 2018-10-23

## 2018-10-23 RX ORDER — LIDOCAINE 50 MG/G
1 PATCH TOPICAL
Status: DISCONTINUED | OUTPATIENT
Start: 2018-10-23 | End: 2018-10-25

## 2018-10-23 RX ORDER — TAMSULOSIN HYDROCHLORIDE 0.4 MG/1
0.4 CAPSULE ORAL DAILY
Status: DISCONTINUED | OUTPATIENT
Start: 2018-10-24 | End: 2018-10-26

## 2018-10-23 RX ORDER — HYDROXYCHLOROQUINE SULFATE 200 MG/1
200 TABLET, FILM COATED ORAL DAILY
Status: CANCELLED | OUTPATIENT
Start: 2018-10-24

## 2018-10-23 RX ORDER — DEXTROSE 50 % IN WATER (D50W) INTRAVENOUS SYRINGE
12.5
Status: CANCELLED | OUTPATIENT
Start: 2018-10-23

## 2018-10-23 RX ORDER — POLYETHYLENE GLYCOL 3350 17 G/17G
17 POWDER, FOR SOLUTION ORAL DAILY
Status: CANCELLED | OUTPATIENT
Start: 2018-10-24

## 2018-10-23 RX ORDER — FOLIC ACID 1 MG/1
1 TABLET ORAL DAILY
Status: DISCONTINUED | OUTPATIENT
Start: 2018-10-24 | End: 2018-11-14 | Stop reason: HOSPADM

## 2018-10-23 RX ORDER — AMLODIPINE BESYLATE 10 MG/1
10 TABLET ORAL DAILY
Status: DISCONTINUED | OUTPATIENT
Start: 2018-10-24 | End: 2018-11-14 | Stop reason: HOSPADM

## 2018-10-23 RX ORDER — DEXAMETHASONE 1 MG/1
2 TABLET ORAL EVERY 12 HOURS
Status: CANCELLED | OUTPATIENT
Start: 2018-10-23 | End: 2018-10-28

## 2018-10-23 RX ORDER — CALCIUM CARBONATE 200(500)MG
500 TABLET,CHEWABLE ORAL 2 TIMES DAILY PRN
Status: CANCELLED | OUTPATIENT
Start: 2018-10-23

## 2018-10-23 RX ORDER — BISACODYL 10 MG
10 SUPPOSITORY, RECTAL RECTAL DAILY PRN
Status: CANCELLED | OUTPATIENT
Start: 2018-10-23

## 2018-10-23 RX ORDER — CALCIUM CARBONATE 200(500)MG
500 TABLET,CHEWABLE ORAL 2 TIMES DAILY PRN
Status: DISCONTINUED | OUTPATIENT
Start: 2018-10-23 | End: 2018-11-14 | Stop reason: HOSPADM

## 2018-10-23 RX ORDER — HYDROXYCHLOROQUINE SULFATE 200 MG/1
200 TABLET, FILM COATED ORAL DAILY
Status: DISCONTINUED | OUTPATIENT
Start: 2018-10-24 | End: 2018-11-14 | Stop reason: HOSPADM

## 2018-10-23 RX ORDER — CARVEDILOL 12.5 MG/1
12.5 TABLET ORAL 2 TIMES DAILY WITH MEALS
Status: CANCELLED | OUTPATIENT
Start: 2018-10-23

## 2018-10-23 RX ORDER — RAMELTEON 8 MG/1
8 TABLET ORAL NIGHTLY PRN
Status: CANCELLED | OUTPATIENT
Start: 2018-10-23

## 2018-10-23 RX ADMIN — ATORVASTATIN CALCIUM 20 MG: 20 TABLET, FILM COATED ORAL at 08:10

## 2018-10-23 RX ADMIN — HYDROXYCHLOROQUINE SULFATE 200 MG: 200 TABLET, FILM COATED ORAL at 08:10

## 2018-10-23 RX ADMIN — FOLIC ACID 1 MG: 1 TABLET ORAL at 08:10

## 2018-10-23 RX ADMIN — TAMSULOSIN HYDROCHLORIDE 0.4 MG: 0.4 CAPSULE ORAL at 12:10

## 2018-10-23 RX ADMIN — CARVEDILOL 12.5 MG: 12.5 TABLET, FILM COATED ORAL at 08:10

## 2018-10-23 RX ADMIN — AMLODIPINE BESYLATE 10 MG: 10 TABLET ORAL at 08:10

## 2018-10-23 RX ADMIN — SENNOSIDES AND DOCUSATE SODIUM 1 TABLET: 8.6; 5 TABLET ORAL at 08:10

## 2018-10-23 RX ADMIN — CARVEDILOL 12.5 MG: 12.5 TABLET, FILM COATED ORAL at 05:10

## 2018-10-23 RX ADMIN — ACETAMINOPHEN 650 MG: 325 TABLET ORAL at 07:10

## 2018-10-23 RX ADMIN — DEXAMETHASONE 2 MG: 1 TABLET ORAL at 12:10

## 2018-10-23 RX ADMIN — LISINOPRIL 10 MG: 10 TABLET ORAL at 08:10

## 2018-10-23 RX ADMIN — DEXAMETHASONE 2 MG: 1 TABLET ORAL at 08:10

## 2018-10-23 RX ADMIN — SERTRALINE HYDROCHLORIDE 25 MG: 25 TABLET ORAL at 08:10

## 2018-10-23 RX ADMIN — LIDOCAINE 1 PATCH: 50 PATCH TOPICAL at 07:10

## 2018-10-23 RX ADMIN — RAMELTEON 8 MG: 8 TABLET, FILM COATED ORAL at 09:10

## 2018-10-23 NOTE — PLAN OF CARE
Problem: Physical Therapy Goal  Goal: Physical Therapy Goal  Goals to be met by: 18     Patient will increase functional independence with mobility by performin. Supine to sit with Contact Guard Assistance  2. Sit to supine with MInimal Assistance  3. Sit to stand transfer with Stand-by Assistance  4. Bed to chair transfer with Stand-by Assistance using Rolling Walker  5. Gait  x 140 feet with Supervision using Rolling Walker.   6. Ascend/descend 3 stair with bilateral Handrails Contact Guard Assistance using LRD.      con't with PT POC, pt progressing towards goals.  Boris Rboerts, PTA  10/23/2018

## 2018-10-23 NOTE — ASSESSMENT & PLAN NOTE
Home meds: Carvedilol 6.25 mg BID, lisinopril 10 mg daily, furosemide 20 mg dailiy  - carvedilol continued, furosemide and Amlodipine

## 2018-10-23 NOTE — ASSESSMENT & PLAN NOTE
- Previous baseline near 0.8 earlier this year, for the last month pt has been near 1.5; recently admitted for CHARLENE and determined to possibly represent progression of CKD  - 1L IVF given in ED, creatinine improved to 1.4  - additional 1L NS 10/21  - Lisinopril restarted yesterday

## 2018-10-23 NOTE — ASSESSMENT & PLAN NOTE
New onset (on admission), pt says that this hasn't happened before. She also mentions that she has the sensation of urinating more than she actually is  - UA negative at [place], repeated here showing trace ketones else normal  - Pt notes urinary incontinence 10/21, but noted feeling urinary retention previously this admission.   - 10/23 complaints of urinary retention, tamsulosin trial

## 2018-10-23 NOTE — SUBJECTIVE & OBJECTIVE
Interval History:  NAEON. Pt had multiple bowel movements yesterday following lactulose, endorses continued feeling of constipation and urine retention. Pt also complaining of continued back pain. Will restart her home Xanax at 0.125 at night.     Review of Systems   Constitutional: Positive for activity change. Negative for chills and fever.   HENT: Negative for trouble swallowing and voice change.    Eyes: Negative for pain and visual disturbance.   Respiratory: Negative for shortness of breath and wheezing.    Cardiovascular: Negative for chest pain and palpitations.   Gastrointestinal: Positive for constipation. Negative for abdominal pain.   Genitourinary: Negative for difficulty urinating and flank pain.   Musculoskeletal: Positive for back pain. Negative for myalgias.   Skin: Negative for rash and wound.   Neurological: Negative for syncope and light-headedness.   Psychiatric/Behavioral: Positive for confusion. Negative for hallucinations.     Objective:     Vital Signs (Most Recent):  Temp: 97.8 °F (36.6 °C) (10/23/18 1157)  Pulse: 68 (10/23/18 1157)  Resp: 18 (10/23/18 1157)  BP: (!) 111/59 (10/23/18 1157)  SpO2: 95 % (10/23/18 1157) Vital Signs (24h Range):  Temp:  [97.2 °F (36.2 °C)-98 °F (36.7 °C)] 97.8 °F (36.6 °C)  Pulse:  [66-71] 68  Resp:  [16-18] 18  SpO2:  [92 %-98 %] 95 %  BP: (111-151)/(59-77) 111/59     Weight: 54.7 kg (120 lb 9.6 oz)  Body mass index is 22.06 kg/m².    Intake/Output Summary (Last 24 hours) at 10/23/2018 1328  Last data filed at 10/23/2018 0600  Gross per 24 hour   Intake 920 ml   Output 0 ml   Net 920 ml      Physical Exam   Constitutional: She appears well-developed and well-nourished. No distress.   HENT:   Head: Normocephalic and atraumatic.   Eyes: EOM are normal. Pupils are equal, round, and reactive to light.   Cardiovascular: Normal rate, regular rhythm, normal heart sounds and intact distal pulses. Exam reveals no gallop and no friction rub.   No murmur  heard.  Pulmonary/Chest: Effort normal and breath sounds normal. No stridor. No respiratory distress. She has no wheezes. She has no rales. She exhibits no tenderness.   Abdominal: Soft. Bowel sounds are normal. She exhibits distension. She exhibits no mass. There is no tenderness. There is no rebound and no guarding. No hernia.   Neurological: She is alert. No sensory deficit. She exhibits normal muscle tone. Gait normal. GCS eye subscore is 4. GCS verbal subscore is 4. GCS motor subscore is 6.   Skin: She is not diaphoretic.       Significant Labs: All pertinent labs within the past 24 hours have been reviewed.    Significant Imaging: I have reviewed all pertinent imaging results/findings within the past 24 hours.

## 2018-10-23 NOTE — DISCHARGE SUMMARY
"[facility] Hospital Medicine  Discharge Summary      Patient Name: Karly Sandoval  MRN: [id number]  Admission Date: 10/17/2018  Hospital Length of Stay: 5 days  Discharge Date and Time:  10/23/2018 2:19 PM  Attending Physician: Dheeraj Dietrich MD   Discharging Provider: Neo Weber MD  Primary Care Provider: Uday Allen MD  Hospital Medicine Team: [facility] 3 Neo Weber MD    HPI:   88F with RA transferred from [facility] for Neurosurgery evaluation of new onset low back pain and urinary incontinence thought to be concerning for cauda equina syndrome. Pt says that early this morning she noticed that her low back hurt (sharp, worse with movement), which is new; pt does not chronically have back pain. Pt went to the Rheumatologist, where she complained of this pain and also mentioned new onset urinary incontinence (today). Pt was seen in [facility] ED where she was reported to have had poor rectal tone, [place] [facility] [place]. Non-contrasted CT T/L spine showed chronic T8/T9 fractures    At [facility] [place]GY examined pt and recommended non-emergent CT myelogram; they stated presentation not concerning for cauda equina syndrome but wanted study to assess for neural compression. In the ED demonstrated no saddle anesthesia or loss of rectal tone; pt did report increased frequency of urination and the sensation of urinating a greater volume of urine than was voided. When asked about her incontinence, she says that she had the urge to go but could not get to a bathroom in time; denies hx of urinary or fecal incontinence. Denies n/v/d/c, blood in stool, chest pain, dyspnea, weakness. She does mention back pain greater R than L side and states that it is a "funny" feeling but that the pain is sharp and positional; denies recent trauma.    * No surgery found *      Hospital Course:   Karly Sandoval was [place] [facility] [place]18 for neurosurgical evaluation of new onset lower back pain " and urinary incontinence. CT of the spine showed L3 fracture and T8/T9 compression abnormality. Pt refused any neurosurgical intervention and she was transferred to Hospital Medicine for further evaluation and pain management. Pt was noted on admission to be anxious, stating that she feels like she needs to urinate but is unable to do so. Due to persistent constipation, patient was given lactulose in addition to miralax and senna-docusate with relief of symtpoms. Patient continued to endorse lower back pain for which she was given tylenol and a heat pack. Home blood pressure medications were resumed prior to discharge. She was discharged to [facility] on 10/23/18.       Consults:   Consults (From admission, onward)        Status Ordering Provider     Inpatient consult to [facility]  Once     Provider:  (Not yet assigned)    Acknowledged LOUIS MARTINS          No new Assessment & Plan notes have been filed under this hospital service since the last note was generated.  Service: Hospital Medicine    Final Active Diagnoses:    Diagnosis Date Noted POA    PRINCIPAL PROBLEM:  Closed compression fracture of L3 lumbar vertebra [S32.030A] 03/21/2014 Yes    Debility [R53.81] 10/23/2018 Yes    Chronic prescription benzodiazepine use [Z79.899] 10/23/2018 Not Applicable    Constipation [K59.00] 10/22/2018 Yes    Hyponatremia [E87.1] 10/18/2018 Yes    Urge incontinence [N39.41] 10/18/2018 Yes    Acute bilateral low back pain without sciatica [M54.5] 10/17/2018 Yes    CHARLENE (acute kidney injury) [N17.9] 10/08/2018 Yes    Anxiety [F41.9] 02/01/2018 Yes    Decreased functional mobility [R26.89] 08/17/2016 Yes    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] 09/22/2015 Not Applicable     Chronic    Pacemaker [Z95.0] 03/25/2015 Yes    History of CVA (cerebrovascular accident) [Z86.73] 10/29/2014 Not Applicable    Anemia of chronic disease [D63.8] 03/22/2014 Yes    Chronic diastolic CHF (congestive heart failure) [I50.32]  02/11/2014 Yes    CKD (chronic kidney disease), stage III [N18.3] 08/22/2013 Yes    Mixed hyperlipidemia [E78.2]  Yes    Essential hypertension [I10] 09/11/2012 Yes    RA (rheumatoid arthritis) [M06.9] 09/11/2012 Yes      Problems Resolved During this Admission:       Discharged Condition: good    Disposition: Skilled Nursing Facility    Follow Up:    Patient Instructions:   No discharge procedures on file.    Significant Diagnostic Studies: Labs:   BMP:   Recent Labs   Lab 10/22/18  0445   GLU 79   *   K 3.8      CO2 18*   BUN 25*   CREATININE 1.2   CALCIUM 8.4*   , CMP   Recent Labs   Lab 10/22/18  0445   *   K 3.8      CO2 18*   GLU 79   BUN 25*   CREATININE 1.2   CALCIUM 8.4*   ANIONGAP 12   ESTGFRAFRICA 46.6*   EGFRNONAA 40.4*   , CBC No results for input(s): WBC, HGB, HCT, PLT in the last 48 hours., INR   Lab Results   Component Value Date    INR 1.1 10/18/2018    INR 1.0 11/21/2014    INR 1.0 09/30/2014   , Lipid Panel   Lab Results   Component Value Date    CHOL 107 (L) 10/09/2018    HDL 58 10/09/2018    LDLCALC 42.2 (L) 10/09/2018    TRIG 34 10/09/2018    CHOLHDL 54.2 (H) 10/09/2018   , Troponin No results for input(s): TROPONINI in the last 168 hours., A1C:   Recent Labs   Lab 10/08/18  2058   HGBA1C 5.9*    and All labs within the past 24 hours have been reviewed    Radiology: CT scan: [place] and Thoracic Spine 10/17/18   Minimal grade 1 anterolisthesis of C6 over C7, unchanged since March 20, 2014.    The thoracic spine demonstrates focal kyphosis at levels T8 and T9.  Associated age indeterminate compression deformities with vertebral body cement in place.    Thoracic spine vertebral body height is otherwise maintained.  Thoracic spine is also otherwise maintained.  No subluxation.  No suspicious bone lesion.  No acute displaced fracture.  There is near complete loss of T8 vertebral body height.    Small bilateral pleural effusions.    Pending Diagnostic Studies:     None          Medications:  Transfer Medications (for Discharge Readmit only):   Current Facility-Administered Medications   Medication Dose Route Frequency Provider Last Rate Last Dose    acetaminophen tablet 650 mg  650 mg Oral Q6H PRN Derrell Penaloza DO   650 mg at 10/22/18 0836    alprazolam split tablet 0.125 mg  0.125 mg Oral QHS Dheeraj Dietrich MD        amLODIPine tablet 10 mg  10 mg Oral Daily Derrell Penaloza DO   10 mg at 10/23/18 0826    atorvastatin tablet 20 mg  20 mg Oral Daily Mariel Coleman MD   20 mg at 10/23/18 0822    bisacodyl suppository 10 mg  10 mg Rectal Daily PRN Derrell Penaloza DO   10 mg at 10/20/18 1322    carvedilol tablet 12.5 mg  12.5 mg Oral BID WM Derrell Penaloza DO   12.5 mg at 10/23/18 0825    dexamethasone tablet 2 mg  2 mg Oral Q12H Dheeraj Dietrich MD   2 mg at 10/23/18 1209    dextrose 50 % in water (D50W) injection 12.5 g  12.5 g Intravenous PRN Mariel Coleman MD        dextrose 50 % in water (D50W) injection 25 g  25 g Intravenous PRN Mariel Coleman MD        folic acid tablet 1 mg  1 mg Oral Daily Mariel Coleman MD   1 mg at 10/23/18 0823    glucagon (human recombinant) injection 1 mg  1 mg Intramuscular PRN Mariel Coleman MD        glucose chewable tablet 16 g  16 g Oral PRN Mariel Coleman MD        glucose chewable tablet 24 g  24 g Oral PRN Mariel Coleman MD        hydrALAZINE tablet 25 mg  25 mg Oral Q8H PRN Mariel Coleman MD   25 mg at 10/21/18 0526    hydroxychloroquine tablet 200 mg  200 mg Oral Daily Mariel Coleman MD   200 mg at 10/23/18 0823    lidocaine 5 % patch 1 patch  1 patch Transdermal Q24H Derrell Penaloza DO   1 patch at 10/22/18 1817    lisinopril tablet 10 mg  10 mg Oral Daily Santiago Gil MD   10 mg at 10/23/18 0822    polyethylene glycol packet 17 g  17 g Oral Daily Derrell Penaloza DO   17 g at 10/22/18 0837    senna-docusate 8.6-50 mg per tablet 1 tablet  1 tablet Oral BID Derrell Penaloza DO   1  tablet at 10/23/18 0822    sertraline tablet 25 mg  25 mg Oral Daily Mariel Coleman MD   25 mg at 10/23/18 0822    sodium chloride 0.9% flush 5 mL  5 mL Intravenous PRN Mariel Coleman MD        tamsulosin 24 hr capsule 0.4 mg  0.4 mg Oral Daily Dheeraj Dietrich MD   0.4 mg at 10/23/18 1209       Indwelling Lines/Drains at time of discharge:   Lines/Drains/Airways          None          Time spent on the discharge of patient: 45 minutes  Patient was seen and examined on the date of discharge and determined to be suitable for discharge.         Neo Weber MD  Department of Hospital Medicine  [facility]

## 2018-10-23 NOTE — PLAN OF CARE
Problem: Patient Care Overview  Goal: Plan of Care Review  Outcome: Ongoing (interventions implemented as appropriate)  Patient A&O x 4. Complains of intermittent lower back pain, declining pain medications. States that the pain is controlled overnight with pillow positioning. Patient makes frequent and major position changes in bed independently. Had 1 small BM early in the shift. No acute changes or complaints overnight. Plan for SNF placement when ready.

## 2018-10-23 NOTE — PT/OT/SLP PROGRESS
Physical Therapy Treatment    Patient Name:  Karly Sandoval   MRN:  [id number]    Recommendations:     Discharge Recommendations:  nursing facility, skilled   Discharge Equipment Recommendations: none   Barriers to discharge: Decreased caregiver support    Assessment:     Karly Sandoval is a 88 y.o. female admitted with a medical diagnosis of Acute bilateral low back pain without sciatica.  She presents with the following impairments/functional limitations:  weakness, impaired endurance, impaired self care skills, gait instability, impaired functional mobilty, impaired balance, pain .  Pt Progressing with PT Intervention. Pt motivated and cooperative with treatment.  Pt would continue to benefit from skilled PT to address overall functional mobility and goals. Goals remain appropriate      Rehab Prognosis:  good; patient would benefit from acute skilled PT services to address these deficits and reach maximum level of function.      Recent Surgery: * No surgery found *      Plan:     During this hospitalization, patient to be seen 4 x/week to address the above listed problems via gait training, therapeutic activities, therapeutic exercises, neuromuscular re-education  · Plan of Care Expires:  11/18/18   Plan of Care Reviewed with: patient    Subjective     Communicated with RN prior to session.  Patient found supine upon PT entry to room, agreeable to treatment.      Chief Complaint: I am ashamed of myself for getting like this  Patient comments/goals: I need to use the bathroom  Pain/Comfort:  · Pain Rating 1: (not rated)  · Location - Orientation 1: lower  · Location 1: back  · Pain Addressed 1: Pre-medicate for activity, Reposition, Cessation of Activity, Distraction  · Pain Rating Post-Intervention 1: (did not rate)    Patients cultural, spiritual, Yazidi conflicts given the current situation: none    Objective:     Patient found with: (all lines intact)     General Precautions: Standard, fall    Orthopedic Precautions:N/A   Braces: N/A     Functional Mobility:  · Bed Mobility:     · Rolling Left:  contact guard assistance  · Scooting: stand by assistance  · Supine to Sit: minimum assistance  · Transfers:     · Sit to Stand:  stand by assistance with rolling walker  · Toilet Transfer: contact guard assistance with  rolling walker  using  Step Transfer  · Gait: 115 ft with RW with CGA for safety vcs for placement of AD and upright posture      AM-PAC 6 CLICK MOBILITY  Turning over in bed (including adjusting bedclothes, sheets and blankets)?: 3  Sitting down on and standing up from a chair with arms (e.g., wheelchair, bedside commode, etc.): 3  Moving from lying on back to sitting on the side of the bed?: 2  Moving to and from a bed to a chair (including a wheelchair)?: 3  Need to walk in hospital room?: 3  Climbing 3-5 steps with a railing?: 2  Basic Mobility Total Score: 16       Therapeutic Activities and Exercises:   Discussed/educated patient on progress, safety, importance of OOB mobility for improving outcomes, d/c, PT POC   White board updated in patients room to current assistance level   Donned an extra gown and gripping socks   Pt encouraged to ambulate in hallways 3x/day with nursing or family assistance to improve endurance.   Pt safe to ambulate in hallway with RN or PCT assistance   Bedside table in front of patient and area set up for function, convenience, and safety. RN aware of patient's mobility needs and status. Questions/concerns addressed within PTA scope of practice; patient with no further questions        Patient left up in chair with all lines intact, call button in reach and nsg notified..           GOALS:   Multidisciplinary Problems     Physical Therapy Goals        Problem: Physical Therapy Goal    Goal Priority Disciplines Outcome Goal Variances Interventions   Physical Therapy Goal     PT, PT/OT Ongoing (interventions implemented as appropriate)     Description:  Goals to be  met by: 18     Patient will increase functional independence with mobility by performin. Supine to sit with Contact Guard Assistance  2. Sit to supine with MInimal Assistance  3. Sit to stand transfer with Stand-by Assistance  4. Bed to chair transfer with Stand-by Assistance using Rolling Walker  5. Gait  x 140 feet with Supervision using Rolling Walker.   6. Ascend/descend 3 stair with bilateral Handrails Contact Guard Assistance using LRD.                       Time Tracking:     PT Received On: 10/23/18  PT Start Time: 745     PT Stop Time: 809  PT Total Time (min): 24 min     Billable Minutes: Gait Training 14 and Therapeutic Activity 10    Treatment Type: Treatment  PT/PTA: PTA     PTA Visit Number: 2     Boris Roberts PTA  10/23/2018

## 2018-10-23 NOTE — ASSESSMENT & PLAN NOTE
acute low back pain, R>L, with report of decreased sphincter tone and saddle anesthesia at [place] ED but normal IAN and sensation at [facility]. NSGY state low suspicion for cauda equina but want non-emergent CT myelogram to r/o neural compression.  - After discussion with primary team and specialists, pt does not wish to undergo CT myelogram at this time due to risk of further kidney damage  - PT/OT, recommending SNF  - Acetaminophen 625 po q6h prn, Lidocaine patch  - Stopped amitriptyline due to pt drowsiness and per pt request   - Will restrat home xanax at 0.125 for anxiety  - Dexamethasone 2mg BID

## 2018-10-23 NOTE — PLAN OF CARE
SW called [facility] to schedule transport to [facility]. ETA 330pm. SW called nurse and informed about transport.

## 2018-10-23 NOTE — NURSING
Patient being discharged to [facility]. Transport will arrive at 3:30. IV removed. Report called to nurse Story at [facility].

## 2018-10-23 NOTE — ASSESSMENT & PLAN NOTE
Home meds: Zoloft 25 mg daily  - Continued  - Xanax 0.125 mg  - stopped amitriptyline due to patient complaining of lethargy

## 2018-10-23 NOTE — PROGRESS NOTES
"[facility] Hospital Medicine  Progress Note    Patient Name: Karly Sandoval  MRN: [id number]  Patient Class: IP- Inpatient   Admission Date: 10/17/2018  Length of Stay: 5 days  Attending Physician: Dheeraj Dietrich MD  Primary Care Provider: Uday Allen MD    [facility] Medicine Team: [facility] HOSP MED 3 Neo Weber MD    Subjective:     Principal Problem:Closed compression fracture of L3 lumbar vertebra    HPI:  88F with RA transferred from [place] for Neurosurgery evaluation of new onset low back pain and urinary incontinence thought to be concerning for cauda equina syndrome. Pt says that early this morning she noticed that her low back hurt (sharp, worse with movement), which is new; pt does not chronically have back pain. Pt went to the Rheumatologist, where she complained of this pain and also mentioned new onset urinary incontinence (today). Pt was seen in [place] ED where she was reported to have had poor rectal tone, transferred to [facility] for NSGY eval. Non-contrasted CT T/L spine showed chronic T8/T9 fractures    At [facility] ED NSGY examined pt and recommended non-emergent CT myelogram; they stated presentation not concerning for cauda equina syndrome but wanted study to assess for neural compression. In the ED demonstrated no saddle anesthesia or loss of rectal tone; pt did report increased frequency of urination and the sensation of urinating a greater volume of urine than was voided. When asked about her incontinence, she says that she had the urge to go but could not get to a bathroom in time; denies hx of urinary or fecal incontinence. Denies n/v/d/c, blood in stool, chest pain, dyspnea, weakness. She does mention back pain greater R than L side and states that it is a "funny" feeling but that the pain is sharp and positional; denies recent trauma.    Hospital Course:  10/19/2018 Pt reportedly anxious overnight, given home dose of xanax. Pt struggles with word finding, but noted that she " feels like she needs to urinate but is unable to do so.   10/20/2018 Pt noting urinary incontinence overnight. Pt reporting depression, family reporting increased anxiety.   10/21/2018 Pt slept through night with no acute events. Pt reporting continued constipation this AM  10/22/2018 Patient complained of lethargy this morning. Only new medication was amitriptyline for back pain/anxiety, so that was d/c'd. Due to persistent constipation, patient was given lactulose in addition to miralax and senna-docusate. Patient complained of back pain for which she was given tylenol and a heat pack. Renal function improving so lisinopril was added back       Interval History:  NAEON. Pt had multiple bowel movements yesterday following lactulose, endorses continued feeling of constipation and urine retention. Pt also complaining of continued back pain. Will restart her home Xanax at 0.125 at night.     Review of Systems   Constitutional: Positive for activity change. Negative for chills and fever.   HENT: Negative for trouble swallowing and voice change.    Eyes: Negative for pain and visual disturbance.   Respiratory: Negative for shortness of breath and wheezing.    Cardiovascular: Negative for chest pain and palpitations.   Gastrointestinal: Positive for constipation. Negative for abdominal pain.   Genitourinary: Negative for difficulty urinating and flank pain.   Musculoskeletal: Positive for back pain. Negative for myalgias.   Skin: Negative for rash and wound.   Neurological: Negative for syncope and light-headedness.   Psychiatric/Behavioral: Positive for confusion. Negative for hallucinations.     Objective:     Vital Signs (Most Recent):  Temp: 97.8 °F (36.6 °C) (10/23/18 1157)  Pulse: 68 (10/23/18 1157)  Resp: 18 (10/23/18 1157)  BP: (!) 111/59 (10/23/18 1157)  SpO2: 95 % (10/23/18 1157) Vital Signs (24h Range):  Temp:  [97.2 °F (36.2 °C)-98 °F (36.7 °C)] 97.8 °F (36.6 °C)  Pulse:  [66-71] 68  Resp:  [16-18] 18  SpO2:   [92 %-98 %] 95 %  BP: (111-151)/(59-77) 111/59     Weight: 54.7 kg (120 lb 9.6 oz)  Body mass index is 22.06 kg/m².    Intake/Output Summary (Last 24 hours) at 10/23/2018 1328  Last data filed at 10/23/2018 0600  Gross per 24 hour   Intake 920 ml   Output 0 ml   Net 920 ml      Physical Exam   Constitutional: She appears well-developed and well-nourished. No distress.   HENT:   Head: Normocephalic and atraumatic.   Eyes: EOM are normal. Pupils are equal, round, and reactive to light.   Cardiovascular: Normal rate, regular rhythm, normal heart sounds and intact distal pulses. Exam reveals no gallop and no friction rub.   No murmur heard.  Pulmonary/Chest: Effort normal and breath sounds normal. No stridor. No respiratory distress. She has no wheezes. She has no rales. She exhibits no tenderness.   Abdominal: Soft. Bowel sounds are normal. She exhibits distension. She exhibits no mass. There is no tenderness. There is no rebound and no guarding. No hernia.   Neurological: She is alert. No sensory deficit. She exhibits normal muscle tone. Gait normal. GCS eye subscore is 4. GCS verbal subscore is 4. GCS motor subscore is 6.   Skin: She is not diaphoretic.       Significant Labs: All pertinent labs within the past 24 hours have been reviewed.    Significant Imaging: I have reviewed all pertinent imaging results/findings within the past 24 hours.    Assessment/Plan:      Constipation    - continue miralax, senna-docusate   - added lactulose x 1 today      Urge incontinence    New onset (on admission), pt says that this hasn't happened before. She also mentions that she has the sensation of urinating more than she actually is  - UA negative at [place], repeated here showing trace ketones else normal  - Pt notes urinary incontinence 10/21, but noted feeling urinary retention previously this admission.   - 10/23 complaints of urinary retention, tamsulosin trial      Hyponatremia    Chronic, noted since 09/2018, given 1L NS  "in ED with improvement 130 from 129  - UOsm 355, SOsm 278, Richelle 79 on admission  - Pt with concomitant SCr rise for the last month; previously admitted for CHARLENE and thought to represent possible new baseline. Pt may have renal losses contributing to hyponatremia as the SCr rise was similar in timing to the hyponatremia  - Son states that pt does not take much sodium in (HFpEF), states that this has been a problem in the past  - stable at 130   - Potentially 2/2 to mild hypovolemia or "tea and toast diet"     Acute bilateral low back pain without sciatica    acute low back pain, R>L, with report of decreased sphincter tone and saddle anesthesia at [place] ED but normal IAN and sensation at [facility] ED. NSGY state low suspicion for cauda equina but want non-emergent CT myelogram to r/o neural compression.  - After discussion with primary team and specialists, pt does not wish to undergo CT myelogram at this time due to risk of further kidney damage  - PT/OT, recommending SNF  - Acetaminophen 625 po q6h prn, Lidocaine patch  - Stopped amitriptyline due to pt drowsiness and per pt request   - Will restrat home xanax at 0.125 for anxiety  - Dexamethasone 2mg BID     CHARLENE (acute kidney injury)    - Previous baseline near 0.8 earlier this year, for the last month pt has been near 1.5; recently admitted for CHARLENE and determined to possibly represent progression of CKD  - 1L IVF given in ED, creatinine improved to 1.4  - additional 1L NS 10/21  - Lisinopril restarted yesterday        Anxiety    Home meds: Zoloft 25 mg daily  - Continued  - Xanax 0.125 mg  - stopped amitriptyline due to patient complaining of lethargy       History of CVA (cerebrovascular accident)    Home meds: ASA 81 mg, atorvastatin 20 mg  - Continued for secondary prevention     Chronic diastolic CHF (congestive heart failure)    Home meds: Carvedilol 6.25 mg BID, lisinopril 10 mg daily, furosemide 20 mg dailiy  - carvedilol continued, furosemide and Amlodipine   "   CKD (chronic kidney disease), stage III           Mixed hyperlipidemia    continue atorvastatin       Essential hypertension    Home meds: Carvedilol 6.25 mg BID, lisinopril 10 mg daily  - coreg continued  - amlodipine 10mg po daily started 10/19   - restarted lisinopril due to improved renal function        RA (rheumatoid arthritis)    Home meds: Plaquenil 200 mg daily (was on mtx, DCd at most recent Rheumatology visit)  - Continued plaquenil       VTE Risk Mitigation (From admission, onward)        Ordered     Place sequential compression device  Until discontinued      10/18/18 0128     IP VTE HIGH RISK PATIENT  Once      10/18/18 0128              Neo Weber MD  Department of Hospital Medicine   [facility]

## 2018-10-24 ENCOUNTER — PATIENT OUTREACH (OUTPATIENT)
Dept: ADMINISTRATIVE | Facility: CLINIC | Age: 83
End: 2018-10-24

## 2018-10-24 PROBLEM — R33.9 URINARY RETENTION: Status: ACTIVE | Noted: 2018-10-24

## 2018-10-24 PROCEDURE — 97110 THERAPEUTIC EXERCISES: CPT

## 2018-10-24 PROCEDURE — 25000003 PHARM REV CODE 250: Performed by: INTERNAL MEDICINE

## 2018-10-24 PROCEDURE — 97802 MEDICAL NUTRITION INDIV IN: CPT

## 2018-10-24 PROCEDURE — 11000004 HC SNF PRIVATE

## 2018-10-24 PROCEDURE — 97535 SELF CARE MNGMENT TRAINING: CPT

## 2018-10-24 PROCEDURE — 25000003 PHARM REV CODE 250: Performed by: STUDENT IN AN ORGANIZED HEALTH CARE EDUCATION/TRAINING PROGRAM

## 2018-10-24 PROCEDURE — 97165 OT EVAL LOW COMPLEX 30 MIN: CPT

## 2018-10-24 PROCEDURE — 99305 1ST NF CARE MODERATE MDM 35: CPT | Mod: AI,,, | Performed by: INTERNAL MEDICINE

## 2018-10-24 PROCEDURE — 63600175 PHARM REV CODE 636 W HCPCS: Performed by: STUDENT IN AN ORGANIZED HEALTH CARE EDUCATION/TRAINING PROGRAM

## 2018-10-24 PROCEDURE — 97162 PT EVAL MOD COMPLEX 30 MIN: CPT

## 2018-10-24 PROCEDURE — 97116 GAIT TRAINING THERAPY: CPT

## 2018-10-24 PROCEDURE — 97530 THERAPEUTIC ACTIVITIES: CPT

## 2018-10-24 RX ORDER — FUROSEMIDE 20 MG/1
20 TABLET ORAL
Status: DISCONTINUED | OUTPATIENT
Start: 2018-10-26 | End: 2018-10-25

## 2018-10-24 RX ADMIN — DEXAMETHASONE 2 MG: 1 TABLET ORAL at 10:10

## 2018-10-24 RX ADMIN — HYDROXYCHLOROQUINE SULFATE 200 MG: 200 TABLET, FILM COATED ORAL at 10:10

## 2018-10-24 RX ADMIN — TAMSULOSIN HYDROCHLORIDE 0.4 MG: 0.4 CAPSULE ORAL at 10:10

## 2018-10-24 RX ADMIN — FOLIC ACID 1 MG: 1 TABLET ORAL at 10:10

## 2018-10-24 RX ADMIN — CARVEDILOL 12.5 MG: 12.5 TABLET, FILM COATED ORAL at 10:10

## 2018-10-24 RX ADMIN — LISINOPRIL 10 MG: 2.5 TABLET ORAL at 10:10

## 2018-10-24 RX ADMIN — ACETAMINOPHEN 650 MG: 325 TABLET ORAL at 11:10

## 2018-10-24 RX ADMIN — CARVEDILOL 12.5 MG: 12.5 TABLET, FILM COATED ORAL at 05:10

## 2018-10-24 RX ADMIN — ATORVASTATIN CALCIUM 20 MG: 20 TABLET, FILM COATED ORAL at 10:10

## 2018-10-24 RX ADMIN — AMLODIPINE BESYLATE 10 MG: 10 TABLET ORAL at 10:10

## 2018-10-24 RX ADMIN — SERTRALINE HYDROCHLORIDE 25 MG: 25 TABLET ORAL at 10:10

## 2018-10-24 RX ADMIN — SENNOSIDES AND DOCUSATE SODIUM 1 TABLET: 8.6; 5 TABLET ORAL at 10:10

## 2018-10-24 RX ADMIN — LIDOCAINE 1 PATCH: 50 PATCH TOPICAL at 04:10

## 2018-10-24 RX ADMIN — ALPRAZOLAM 0.12 MG: 0.25 TABLET ORAL at 10:10

## 2018-10-24 NOTE — PLAN OF CARE
Problem: Physical Therapy Goal  Goal: Physical Therapy Goal  Goals to be met by: 11 days     Patient will increase functional independence with mobility by performin. Supine to sit with Modified [place]  2. Sit to supine with Modified [place]  3. Sit to stand transfer with Supervision  4. Bed to chair transfer with Supervision using Rolling Walker  5. Gait  x 150 feet with Supervision using Rolling Walker.   6. Wheelchair propulsion x150 feet with Supervision using bilateral uppper extremities  7. Ascend/Descend 4 inch curb step with Stand-by Assistance using Rolling Walker.  8. Lower extremity exercise program x20 reps per handout, with supervision    Outcome: Ongoing (interventions implemented as appropriate)  PT eval completed. Pt will begin PT POC.    Elida Ayala, PT  10/24/2018

## 2018-10-24 NOTE — PLAN OF CARE
Problem: Occupational Therapy Goal  Goal: Occupational Therapy Goal  Goals to be met by: 11 days     Patient will increase functional independence with ADLs by performing:    UE Dressing with Set-up Assistance.  LE Dressing with Supervision.  Grooming while standing at sink with Supervision.  Toileting from toilet with Supervision for hygiene and clothing management.   Bathing from  shower chair/bench with Supervision.  Supine to sit with Modified [place] /c HOB flat and no handrails.  Stand pivot transfers with Supervision.  Toilet transfer to toilet with Supervision.  Upper extremity exercise program 3 x 15 reps per handout, with independence.  Patient will complete a functional standing activity for 10 min with S in order to perform household tasks.   Outcome: Ongoing (interventions implemented as appropriate)  Patient's goals are set.   DACIA Regalado  10/24/2018

## 2018-10-24 NOTE — HPI
Chief Complaint/Reason for Admission: Acute bilateral low back pain without sciatica    History of Present Illness:  Patient is a 88 y.o. 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  She was evaluated by neurosurgery with normal IAN and sensation with low suspicion for cauda equina syndrome.  CT myelogram was considered but given low suspicion for condition, risk of kidney injury and patient's disinterest in surgery, procedure was not done.  She complains of pain to her back today rating it 6/10 but controlled with tylenol.  She did well in therapy per her bu is worn out subsequently.  She has a poor appetite.      The patient has been admitted to SNF for ongoing PT/OT due to insufficient progress to go home safely from the hospital.    Records from last hospital stay reviewed and summarized above.

## 2018-10-24 NOTE — PLAN OF CARE
Problem: Patient Care Overview  Goal: Plan of Care Review  Outcome: Ongoing (interventions implemented as appropriate)   10/24/18 0313   Coping/Psychosocial   Plan Of Care Reviewed With patient       Problem: Fall Risk (Adult)  Intervention: Reduce Risk/Promote Restraint Free Environment   10/24/18 0313   Safety Interventions   Environmental Safety Modification assistive device/personal items within reach;clutter free environment maintained;lighting adjusted;mobility aid in reach;room organization consistent   Prevent [place] Drop/Fall   Safety/Security Measures bed alarm set     Intervention: Patient Rounds   10/24/18 0313   Safety Interventions   Patient Rounds bed in low position;bed wheels locked;call light in reach;ID band on;clutter free environment maintained;placement of personal items at bedside;toileting offered;visualized patient     Intervention: Safety Promotion/Fall Prevention   10/24/18 0313   Safety Interventions   Safety Promotion/Fall Prevention assistive device/personal item within reach;bed alarm set;commode/urinal/bedpan at bedside;Fall Risk reviewed with patient/family;Fall Risk signage in place;lighting adjusted;upper side rails raised x 2, lower siderails raised x 1 (Peds only)       Goal: Identify Related Risk Factors and Signs and Symptoms  Related risk factors and signs and symptoms are identified upon initiation of Human Response Clinical Practice Guideline (CPG)  Outcome: Ongoing (interventions implemented as appropriate)   10/24/18 0313   Fall Risk   Related Risk Factors (Fall Risk) age-related changes;gait/mobility problems;confusion/agitation   Signs and Symptoms (Fall Risk) presence of risk factors

## 2018-10-24 NOTE — PT/OT/SLP EVAL
PhysicalTherapy   Evaluation    Karly Sandoval   MRN: [id number]     PT Received On: 10/24/18  PT Start Time: 1102     PT Stop Time: 1200    PT Total Time (min): 58 min       Billable Minutes:  Evaluation 15, Gait Training 13, Therapeutic Activity 20, Therapeutic Exercise 10 and Total Time 43    Diagnosis: Debility following hyponatremia, pt found to have chronic T8/9 compression fractures  Past Medical History:   Diagnosis Date    Abdominal aortic aneurysm (AAA) without rupture 10/8/2018    Abnormal CT of the abdomen 10/8/2018    Acid reflux     Anemia     Anxiety     Atrial arrhythmia 2/11/2014    Carotid artery occlusion     Chronic prescription benzodiazepine use 10/23/2018    Compression fracture of thoracic vertebra 6/25/2014    CVA (cerebral infarction) 2014    Cyst of pancreas 10/9/2018    Diastolic heart failure     echo 2016 ef 55% +Diastolic dysf    Diverticulosis     Encounter for blood transfusion     GERD (gastroesophageal reflux disease) 9/11/2012    History of cholelithiasis     Hyperlipidemia     Hypertension     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    Osteoarthritis     Osteoarthritis of both knees 6/12/2015    Osteopenia     PFO (patent foramen ovale) 2/11/2014    PVC (premature ventricular contraction) 4/28/2014    RVOT origin -- benign     RBBB 11/14/2014    Rheumatoid arthritis(714.0)     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)     has pacemaker    Right pelvic adnexal fluid collection 10/8/2018      Past Surgical History:   Procedure Laterality Date    APPENDECTOMY      BREAST SURGERY      CARDIAC PACEMAKER PLACEMENT  11/2014    for syncope and RBBB/LAHB    CHOLECYSTECTOMY      EYE SURGERY      HEMORRHOID SURGERY      HYSTERECTOMY      1966    SKIN BIOPSY      VASCULAR SURGERY      VERTEBROPLASTY           General Precautions: Standard, fall  Orthopedic Precautions: N/A   Braces: N/A    Do you have any  cultural, spiritual, Yarsanism conflicts, given your current situation?: none    Patient History:  Lives With: alone  Living Arrangements: house  Home Accessibility: stairs to enter home  Home Layout: Able to live on 1st floor  Number of Stairs to Enter Home: 1  Stair Railings at Home: none  Transportation Available: family or friend will provide  Living Environment Comment: Pt lives alone in a 1SH w/ 1 SMITHA and a tub/shower combo w/ a grab bar. Upon D/C pt has plans to move in to [facility] where she will have an apartment w/ a walk-in shower.   Equipment Currently Used at Home: bedside commode, cane, straight, grab bar, raised toilet, shower chair  DME owned (not currently used): rolling walker    Previous Level of Function: PTA pt was Sathish w/ mobility only using a SPC for community distances. Pt required SPV/light assistance for safety w/ bathing. Pt has had no falls in the past few years.   Ambulation Skills: needs device  Transfer Skills: independent  ADL Skills: needs device    Subjective:  Communicated with patient prior to session.  Pt agreeable to session.   Chief Complaint: back pain  Patient goals: return to PLOF    Pain/Comfort  Pain Rating 1: 7/10  Location - Side 1: Bilateral  Location - Orientation 1: lower  Location 1: back  Pain Addressed 1: Pre-medicate for activity, Reposition, Nurse notified  Pain Rating Post-Intervention 1: 7/10    Objective:  Patient found sitting in bedside chair w/ son present.      Cognitive Exam:  Oriented to: Person, Place, Time and Situation  Follows Commands/attention: Follows two-step commands  Communication: clear/fluent  Safety awareness/insight to disability: intact    Physical Exam:  Postural examination/scapula alignment:    -       Rounded shoulders  -       Forward head  -       Kyphosis    Skin integrity: Visible skin intact  Edema: None noted     Sensation:      -       Intact    Upper Extremity Range of Motion:  Right Upper Extremity: WFL  Left Upper Extremity:  WFL    Upper Extremity Strength:  Right Upper Extremity: WFL  Left Upper Extremity: WFL    Lower Extremity Range of Motion:  Right Lower Extremity: WFL  Left Lower Extremity: WFL    Lower Extremity Strength:  Right Lower Extremity: WFL except HF 4/5  Left Lower Extremity: WFL except HF 4/5     Fine motor coordination:     -       Intact    Gross motor coordination: WFL      AM-PAC 6 CLICK MOBILITY  Total Score:18    Bed Mobility:  Sit<>Supine:on bed w/ SBA, HOB flat and w/o side rail  inc'd time and cueing required    Transfers:  Sit<>Stand: to/from w/c, EOB, and bedside commode, all w/ RW and SBA for safety  Stand Pivot Transfer: bedside chair>EOB, to/from bedside commode, and w/c>bedside chair, all w/ RW and SBA for safety  Cueing for forward scoot and hand placement    Gait:  Amb multiple trials  In room 3 trials (10ft, 20ft, and 10ft) to/from bathroom and bed, all w/ RW and CGA for safety  In gym 120ft w/ RW and CGA for safety  Cueing for upright posture and to inc step length/height for floor clearance     Therex:  Seated BLE therex x10 reps (HF, LAQ, AP)  inc'd time required    Balance:  Static stand w/ RW and Min/CGA for stability due to slight posterior lean, while PT assisted w/ LBD following toileting    Additional Treatment:  Pt required Holly overall for toileting including SPT to/from commode w/ RW and SBA for safety, pericare in sitting w/ SPV, and Holly for LBE (diaper and scrub pants)  Pt educated on PT role and POC. Pt verb understanding.    Patient left up in chair with call button in reach.    Assessment:  Karly Sandoval is a 88 y.o. female with a medical diagnosis of Debility following hyponatremia, pt found to have chronic T8/9 compression fractures.  Pt presents w/ previous pertinent co-morbidities and personal factors including urge incontinence, low back pain, anxiety, rheumatoid arthritis, and scoliosis. Pt currently presents w/ the below mentioned deficits requiring Min/CGA/SBA for  transfers and ambulation w/ RW. Pt's clinical presentation is evolving w/ changing characteristics including new back pain which affects her mobility. These factors classify pt as a MODERATE complexity evaluation. Pt is appropriate for skilled PT and will begin PT POC.    Rehab identified problem list/impairments: weakness, impaired endurance, impaired self care skills, impaired functional mobilty, gait instability, impaired balance, decreased lower extremity function, pain    Rehab potential is good.    Activity tolerance: Good    Discharge recommendations: assisted living facility     Barriers to discharge: Decreased caregiver support    Equipment recommendations: wheelchair     GOALS:   Multidisciplinary Problems     Physical Therapy Goals        Problem: Physical Therapy Goal    Goal Priority Disciplines Outcome Goal Variances Interventions   Physical Therapy Goal     PT, PT/OT Ongoing (interventions implemented as appropriate)     Description:  Goals to be met by: 11 days     Patient will increase functional independence with mobility by performin. Supine to sit with Modified [place]  2. Sit to supine with Modified [place]  3. Sit to stand transfer with Supervision  4. Bed to chair transfer with Supervision using Rolling Walker  5. Gait  x 150 feet with Supervision using Rolling Walker.   6. Wheelchair propulsion x150 feet with Supervision using bilateral uppper extremities  7. Ascend/Descend 4 inch curb step with Stand-by Assistance using Rolling Walker.  8. Lower extremity exercise program x20 reps per handout, with supervision                      PLAN:    Patient to be seen (5-6X/week)  to address the above listed problems via gait training, therapeutic activities, therapeutic exercises, wheelchair management/training  Plan of Care Expires: 18    Elida Ayala, PT 10/24/2018

## 2018-10-24 NOTE — CONSULTS
"  [facility] [place] [facility] - Skilled Nursing Care  Adult Nutrition  Consult Note    SUMMARY     Recommendations    Recommendation/Intervention: Continue cardiac diet; add boost plus BID; encourage intake  Goals: po >50% of meals and ONS acceptance in next 5 days  Nutrition Goal Status: new  Communication of RD Recs: other (comment)(POC)    Reason for Assessment    Diagnosis: (hyponatremia; debility)  Relevant Medical History: HTN; anemia; HLD; GERD  Interdisciplinary Rounds: attended  General Information Comments: Pt was very tired today. Saw her around lunch time, po 0% due to being so tired. Reported her appetite as been fair ~50% po, feels like she forces herself to eat. Pt agreed to try Boost plus BID to increase intake. No wt loss reported by pt, per chart 5% wt loss x 1 month. NFPE completed on 10/24/18, results below. RD to follow up.   Nutrition Discharge Planning: d/c cardiac diet     Nutrition Risk Screen    Nutrition Risk Screen: no indicators present    Nutrition/Diet History    Do you have any cultural, spiritual, Alevism conflicts, given your current situation?: none  Factors Affecting Nutritional Intake: decreased appetite(fatigue)    Anthropometrics    Temp: 98 °F (36.7 °C)  Height Method: Stated  Height: 5' 2" (157.5 cm)  Height (inches): 62 in  Weight Method: Standard Scale  Weight: 54.2 kg (119 lb 7.8 oz)  Weight (lb): 119.49 lb  Ideal Body Weight (IBW), Female: 110 lb  % Ideal Body Weight, Female (lb): 108.63 lb  BMI (Calculated): 21.9  BMI Grade: 18.5-24.9 - normal  Weight Loss: unintentional  Usual Body Weight (UBW), k.82 kg  % Usual Body Weight: 95.59       Lab/Procedures/Meds    Pertinent Labs Reviewed: reviewed  Pertinent Labs Comments: Na 130; BUN 25; Ca 8.4; GFR 40.4  Pertinent Medications Reviewed: reviewed  Pertinent Medications Comments: statin; carvedilol; dexamethasone; folic acid; polyethylene glycol; senna-docusate; sertraline; tamsulosin     Physical Findings/Assessment  NFPE completed on " 10/24/18  Moderate muscle loss in clavicle; shoulder; and hand region  Moderate subcutaneous fat loss in triceps region   Mild edema  Overall Physical Appearance: advanced age, loss of muscle mass, loss of subcutaneous fat  Tubes: (-)  Oral/Mouth Cavity: WDL    Estimated/Assessed Needs    Weight Used For Calorie Calculations: 54.2 kg (119 lb 7.8 oz)  Energy Calorie Requirements (kcal): 7501-3987 kcal  Energy Need Method: Kcal/kg((25-30 kcal/kg))  Protein Requirements: 54-65 gm((1.0-1.2 gm/kg))  Weight Used For Protein Calculations: 54.2 kg (119 lb 7.8 oz)  Fluid Requirements (mL): 1 mL/kcal or per MD  Fluid Need Method: RDA Method  RDA Method (mL): 1355  CHO Requirement: -      Nutrition Prescription Ordered    Current Diet Order: Cardiac   Comments: Honor food preferences; encourage po intake at meal times  Oral Nutrition Supplement: add boost plus BID    Evaluation of Received Nutrient/Fluid Intake    Energy Calories Required: not meeting needs  Protein Required: not meeting needs  Fluid Required: meeting needs  Tolerance: tolerating  % Intake of Estimated Energy Needs: 25 - 50 %  % Meal Intake: 25 - 50 %    Nutrition Risk    Level of Risk/Frequency of Follow-up: high     Assessment and Plan    Moderate Malnutrition in the context of Acute Illness/Injury    Related to (etiology):  Decreased appetite and fatigue     Signs and Symptoms (as evidenced by):  Energy Intake: <75% of estimated energy requirement for > 1 week  Body Fat Depletion: moderate depletion of triceps   Muscle Mass Depletion: moderate depletion of clavicle region, scapular region and interosseous muscle   Weight Loss: 5% x 1 month   Fluid Accumulation: mild    Interventions/Recommendations (treatment strategy):  Continue cardiac diet; add boost plus BID; encourage intake    Nutrition Diagnosis Status:  New      Monitor and Evaluation    Food and Nutrient Intake: energy intake  Food and Nutrient Adminstration: diet order  Physical Activity and  Function: nutrition-related ADLs and IADLs  Anthropometric Measurements: weight, weight change  Biochemical Data, Medical Tests and Procedures: electrolyte and renal panel, gastrointestinal profile, glucose/endocrine profile, inflammatory profile, lipid profile  Nutrition-Focused Physical Findings: overall appearance     Nutrition Follow-Up    RD Follow-up?: Yes

## 2018-10-24 NOTE — ASSESSMENT & PLAN NOTE
Urge incontinence history.  She was started in tamsulosin therapy; urinating without difficulty currently.

## 2018-10-24 NOTE — SUBJECTIVE & OBJECTIVE
Past Medical History:   Diagnosis Date    Abdominal aortic aneurysm (AAA) without rupture 10/8/2018    Abnormal CT of the abdomen 10/8/2018    Acid reflux     Anemia     Anxiety     Atrial arrhythmia 2/11/2014    Carotid artery occlusion     Chronic prescription benzodiazepine use 10/23/2018    Compression fracture of thoracic vertebra 6/25/2014    CVA (cerebral infarction) 2014    Cyst of pancreas 10/9/2018    Diastolic heart failure     echo 2016 ef 55% +Diastolic dysf    Diverticulosis     Encounter for blood transfusion     GERD (gastroesophageal reflux disease) 9/11/2012    History of cholelithiasis     Hyperlipidemia     Hypertension     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    Osteoarthritis     Osteoarthritis of both knees 6/12/2015    Osteopenia     PFO (patent foramen ovale) 2/11/2014    PVC (premature ventricular contraction) 4/28/2014    RVOT origin -- benign     RBBB 11/14/2014    Rheumatoid arthritis(714.0)     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)     has pacemaker    Right pelvic adnexal fluid collection 10/8/2018       Past Surgical History:   Procedure Laterality Date    APPENDECTOMY      BREAST SURGERY      CARDIAC PACEMAKER PLACEMENT  11/2014    for syncope and RBBB/LAHB    CHOLECYSTECTOMY      EYE SURGERY      HEMORRHOID SURGERY      HYSTERECTOMY      1966    SKIN BIOPSY      VASCULAR SURGERY      VERTEBROPLASTY         Review of patient's allergies indicates:   Allergen Reactions    Amoxicillin Other (See Comments)     unknown    Bactrim [sulfamethoxazole-trimethoprim] Other (See Comments)     unknown    Omeprazole Other (See Comments)     Muscle and joint pain    Rocephin [ceftriaxone] Other (See Comments)     Severe acute generalized pain    Penicillins Rash       No current facility-administered medications on file prior to encounter.      Current Outpatient Medications on File Prior to Encounter    Medication Sig    acetaminophen (TYLENOL) 650 MG TbSR Take 650 mg by mouth daily as needed (PAIN).    ALPRAZolam (XANAX) 0.25 MG tablet TAKE 1 TABLET BY MOUTH EVERY DAY AS NEEDED (Patient taking differently: TAKE ½ TABLET BY MOUTH TWICE DAILY)    amLODIPine (NORVASC) 10 MG tablet Take 10 mg by mouth once daily.    aspirin (ECOTRIN) 81 MG EC tablet Take 1 tablet (81 mg total) by mouth once daily. HOLD until cleared by your ENT doctor and your Neurologist. (Patient taking differently: Take 81 mg by mouth once daily. )    atorvastatin (LIPITOR) 20 MG tablet TAKE 1 TABLET BY MOUTH EVERY DAY    bisacodyl (DULCOLAX) 10 mg Supp Place 10 mg rectally daily as needed (CONSTIPATION).    calcium carbonate (CALCIUM 600 ORAL) Take 1 tablet by mouth 2 (two) times daily.    carvedilol (COREG) 6.25 MG tablet Take 1 tablet (6.25 mg total) by mouth 2 (two) times daily with meals.    coenzyme Q10 (CO Q-10) 100 mg capsule Take 100 mg by mouth once daily.    denosumab (PROLIA) 60 mg/mL Syrg Inject 60 mg into the skin. Every 6 months    fish oil-omega-3 fatty acids 300-1,000 mg capsule Take 1,000 mg by mouth once daily.     folic acid (FOLVITE) 1 MG tablet TAKE 1 TABLET (1 MG TOTAL) BY MOUTH ONCE DAILY.    furosemide (LASIX) 20 MG tablet Take 1 tablet (20 mg total) by mouth once daily. (Patient taking differently: Take 20 mg by mouth every Mon, Wed, Fri. )    hydroxychloroquine (PLAQUENIL) 200 mg tablet Take 1 tablet (200 mg total) by mouth once daily.    lisinopril 10 MG tablet Take 1 tablet (10 mg total) by mouth once daily.    psyllium (METAMUCIL) powder Take 1 packet by mouth daily as needed (CONSTIPATION).    sertraline (ZOLOFT) 25 MG tablet Take 1 tablet (25 mg total) by mouth once daily.     Family History     Problem Relation (Age of Onset)    Diabetes Mellitus Mother, Brother    Heart disease Father    Hypertension Father, Mother    Leukemia Father    Parkinsonism Brother (68)        Tobacco Use    Smoking  status: Never Smoker    Smokeless tobacco: Never Used   Substance and Sexual Activity    Alcohol use: No     Comment: [facility]    Drug use: No    Sexual activity: No     Partners: Male     Birth control/protection: None     Review of Systems   Constitutional: no fever or chills  Eyes: no visual changes  ENT: no nasal congestion or sore throat  Respiratory: no cough or shortness of breath  Cardiovascular: no chest pain or palpitations  Gastrointestinal: no nausea or vomiting, no abdominal pain; last BM: 10/23  Genitourinary: no hematuria or dysuria  Integument/Breast: no rash or pruritis  Hematologic/Lymphatic: no easy bruising or lymphadenopathy  Allergy/Immunology: no postnasal drip  Musculoskeletal: no arthralgias or myalgias  Neurological: no seizures or tremors  Behavioral/Psych: no auditory or visual hallucinations  Endocrine: no heat or cold intolerance      Objective:     Vital Signs (Most Recent):  Temp: 98 °F (36.7 °C) (10/24/18 0958)  Pulse: 69 (10/24/18 0958)  Resp: 18 (10/24/18 0958)  BP: (!) 144/61 (10/24/18 0958)  SpO2: 96 % (10/24/18 0958) Vital Signs (24h Range):  Temp:  [98 °F (36.7 °C)] 98 °F (36.7 °C)  Pulse:  [68-69] 69  Resp:  [18] 18  SpO2:  [96 %] 96 %  BP: (133-144)/(60-61) 144/61     Weight: 54.2 kg (119 lb 7.8 oz)  Body mass index is 21.85 kg/m².    Physical Exam  General: well developed, well nourished, no distress  HENT: Head:normocephalic, atraumatic. Ears:bilateral external ear canals normal. Nose: Nares normal. Septum midline. Mucosa normal. Throat: lips, mucosa, and tongue normal and no throat erythema.  Eyes: conjunctivae/corneas clear.  EOM normal.  Neck: supple, symmetrical, trachea midline, no JVD and thyroid not enlarged.   Lungs:  clear to auscultation bilaterally and normal respiratory effort  Cardiovascular: Heart: regular rate and rhythm, S1, S2 normal, no murmur, click, rub or gallop. Chest Wall: no tenderness. Extremities: no cyanosis, trace BLE edema, no clubbing.  Pulses: 2+ and symmetric.  Abdomen/Rectal: Abdomen: soft, non-tender non-distended; bowel sounds normal; no masses,  no organomegaly.   Skin: Skin color, texture, turgor normal. No rashes or lesions.  Musculoskeletal: no clubbing, cyanosis; + kyphosis  Lymph Nodes: No cervical or supraclavicular adenopathy  Neurologic: generalized weakness rated 4/5 in all extremities. No focal numbness or weakness.  Psych/Behavioral:  Alert and oriented, appropriate affect.    Significant Labs:   Recent Labs   Lab 10/18/18  0226 10/19/18  0812   WBC 7.46 6.10   HGB 10.1* 9.1*   HCT 28.9* 27.4*    191     Recent Labs   Lab 10/19/18  0812 10/20/18  1222 10/22/18  0445   * 130* 130*   K 3.7 4.1 3.8   CL 98 99 100   CO2 24 24 18*   BUN 28* 22 25*   CREATININE 1.4 1.3 1.2   CALCIUM 8.4* 8.6* 8.4*

## 2018-10-24 NOTE — PLAN OF CARE
Problem: Patient Care Overview  Goal: Plan of Care Review  Moderate Malnutrition in the context of Acute Illness/Injury    Related to (etiology):  Decreased appetite and fatigue     Signs and Symptoms (as evidenced by):  Energy Intake: <75% of estimated energy requirement for > 1 week  Body Fat Depletion: moderate depletion of triceps   Muscle Mass Depletion: moderate depletion of clavicle region, scapular region and interosseous muscle   Weight Loss: 5% x 1 month   Fluid Accumulation: mild    Interventions/Recommendations (treatment strategy):  Continue cardiac diet; add boost plus BID; encourage intake    Nutrition Diagnosis Status:  New

## 2018-10-24 NOTE — ASSESSMENT & PLAN NOTE
Continue chronic therapy with sertraline.  Patient did not tolerate therapy with amitriptyline due to lethargy  Continue chronic xanax nightly as she takes at home; GDR not to be attempted due to acute illness and failure of alternative therapy with risk of decompensating condition and prohibiting rehabilitation potential

## 2018-10-24 NOTE — ASSESSMENT & PLAN NOTE
Likely multifactorial involving RA, chronic compression fractures and old left L3 transverse process  Continue tylenol prn pain and lidocaine patch daily.  Continue dexamethasone therapy with end date of 10/28  -continue PT/OT to increase ambulation, ADL performance and endurance  -continue TOM's and SCD's for DVT prophylaxis  -continue fall precautions  -continue senokot-s and miralax to prevent constipation; hold for frequent or loose stooling

## 2018-10-24 NOTE — ASSESSMENT & PLAN NOTE
Continue hydroxychloroquine and folic acid.  MTX discontinued by Rheumatology.  She will chito f/u with rheum on discharge from SNF.

## 2018-10-24 NOTE — ASSESSMENT & PLAN NOTE
Chronic and controlled  Continue therapy with carvedilol, amlodipine and lisinopril  Will continue to monitor and adjust regimen as necessary.

## 2018-10-24 NOTE — PT/OT/SLP EVAL
Occupational Therapy  Evaluation/Treatment    Karly Sandoval   MRN: [id number]   Admitting Diagnosis: Debility following hyponatremia, pt found to have chronic T8/9 compression fractures        OT Date of Treatment: 10/24/18   OT Start Time: 0930  OT Stop Time: 1035  OT Total Time (min): 65 min    Billable Minutes:  Evaluation 15  Self Care/Home Management 50    Diagnosis: Debility following hyponatremia, pt found to have chronic T8/9 compression fractures        Past Medical History:   Diagnosis Date    Abdominal aortic aneurysm (AAA) without rupture 10/8/2018    Abnormal CT of the abdomen 10/8/2018    Acid reflux     Anemia     Anxiety     Atrial arrhythmia 2/11/2014    Carotid artery occlusion     Chronic prescription benzodiazepine use 10/23/2018    Compression fracture of thoracic vertebra 6/25/2014    CVA (cerebral infarction) 2014    Cyst of pancreas 10/9/2018    Diastolic heart failure     echo 2016 ef 55% +Diastolic dysf    Diverticulosis     Encounter for blood transfusion     GERD (gastroesophageal reflux disease) 9/11/2012    History of cholelithiasis     Hyperlipidemia     Hypertension     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    Osteoarthritis     Osteoarthritis of both knees 6/12/2015    Osteopenia     PFO (patent foramen ovale) 2/11/2014    PVC (premature ventricular contraction) 4/28/2014    RVOT origin -- benign     RBBB 11/14/2014    Rheumatoid arthritis(714.0)     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)     has pacemaker    Right pelvic adnexal fluid collection 10/8/2018      Past Surgical History:   Procedure Laterality Date    APPENDECTOMY      BREAST SURGERY      CARDIAC PACEMAKER PLACEMENT  11/2014    for syncope and RBBB/LAHB    CHOLECYSTECTOMY      EYE SURGERY      HEMORRHOID SURGERY      HYSTERECTOMY      1966    SKIN BIOPSY      VASCULAR SURGERY      VERTEBROPLASTY           General Precautions:  Standard, fall  Orthopedic Precautions: N/A  Braces: N/A          Patient History:  Lives With: alone  Living Arrangements: house  Home Accessibility: stairs to enter home  Home Layout: Able to live on 1st floor  Number of Stairs to Enter Home: 1  Stair Railings at Home: none  Transportation Available: family or friend will provide  Living Environment Comment: Patient lives alone in a 1 SH with 1 SMITHA and a tub/shower combo with grab bar. Patient has a BSC, but has not used it yet. Patient reports that her family may have plans for her to discharge somewhere else. Patient has a son who lives here and one lives out of town. Patient reports she was (I) PTA. Patient has a SC also  Equipment Currently Used at Home: bedside commode, cane, straight, bath bench    Prior level of function:   Bed Mobility/Transfers: needs device  Grooming: independent  Bathing: needs device  Upper Body Dressing: independent  Lower Body Dressing: independent  Toileting: independent  Home Management Skills: independent        Dominant hand: right    Subjective:  Communicated with patient prior to session.    Chief Complaint: back pain  Patient/Family stated goals: return to PLOF    Pain/Comfort  Pain Rating 1: 7/10  Location - Side 1: Bilateral  Location - Orientation 1: lower  Location 1: back  Pain Addressed 1: Reposition, Distraction, Cessation of Activity, Pre-medicate for activity  Pain Rating Post-Intervention 1: 7/10    Objective:        Cognitive Exam:  Oriented to: Person, Place, Time and Situation  Follows Commands/attention: Follows multistep  commands  Communication: clear/fluent  Memory:  Poor immediate recall   Safety awareness/insight to disability: intact  Coping skills/emotional control: Appropriate to situation    Visual/perceptual:  Intact    Physical Exam:  Postural examination/scapula alignment:    -       Rounded shoulders  -       Forward head  -       Kyphosis  Skin integrity: Visible skin intact  Edema: None noted       Sensation:      -       Intact    Upper Extremity Range of Motion:  Right Upper Extremity: WFL  Left Upper Extremity: WFL    Upper Extremity Strength:  Right Upper Extremity: WFL  Left Upper Extremity: WFL   Strength: WFL    Fine motor coordination:      -       Intact    Gross motor coordination: WFL    Occupational Performance:    Functional Mobility/Transfers:  · Patient completed Sit <> Stand Transfer with contact guard assistance  with  rolling walker   · Patient completed Toilet Transfer bedside chair>toilet using RW /c functional ambulation; toilet>BSC using RW /c functional ambulation; BSC>bedside chair /c functional ambulation technique using RW    Activities of Daily Living:  · Feeding:  setup    · Grooming: minimum assistance: combing hair and oral care with dentures.     · Bathing: total assistance per PCT report  · Upper Body Dressing: contact guard assistance [place] front gown. Donning undershirt and pullover shirt  · Lower Body Dressing: minimum assistance [place] and donning socks and pants.   · Toileting: maximal assistance      [facility] 6 Click:  [facility] Total Score: 16    Additional Treatment:  Safety, ADL retraining, functional mobility training, discharge planning    Patient left up in chair with call button in reach and all needs met.     Assessment:  Karly Sandoval is a 88 y.o. female with a medical diagnosis of Debility following hyponatremia, pt found to have chronic T8/9 compression fractures and presents with the deficits listed below. Patient will benefit from skilled OT services to achieve maximal independence, ensure safety, and reduce burden of care prior to discharge. Pt evaluation falls under low complexity for evaluation coding due to performance deficits noted in 1-3 areas as stated above and 0 co-morbities affecting current functional status. Data obtained from problem focused assessments. No modifications or assistance was required for completion of evaluation. Only brief  occupational profile and history review completed.    Rehab identified problem list/impairments: weakness, impaired endurance, impaired self care skills, impaired functional mobilty, gait instability, impaired balance, decreased lower extremity function, pain    Rehab potential is good    Activity tolerance: Good    Discharge recommendations: assisted living facility     Barriers to discharge: Decreased caregiver support     Equipment recommendations: wheelchair     GOALS:   Multidisciplinary Problems     Occupational Therapy Goals        Problem: Occupational Therapy Goal    Goal Priority Disciplines Outcome Interventions   Occupational Therapy Goal     OT, PT/OT Ongoing (interventions implemented as appropriate)    Description:  Goals to be met by: 11 days     Patient will increase functional independence with ADLs by performing:    UE Dressing with Set-up Assistance.  LE Dressing with Supervision.  Grooming while standing at sink with Supervision.  Toileting from toilet with Supervision for hygiene and clothing management.   Bathing from  shower chair/bench with Supervision.  Supine to sit with Modified [place] /c HOB flat and no handrails.  Stand pivot transfers with Supervision.  Toilet transfer to toilet with Supervision.  Upper extremity exercise program 3 x 15 reps per handout, with independence.  Patient will complete a functional standing activity for 10 min with S in order to perform household tasks.                     PLAN: Patient to be seen 5 x/week to address the above listed problems via self-care/home management, therapeutic activities, therapeutic exercises  Plan of Care expires: 11/24/18  Plan of Care reviewed with: patient    DACIA Regalado  10/24/2018

## 2018-10-24 NOTE — H&P
[facility] [place] [facility] - Skilled Nursing Care  Department of [facility] Medicine  History & Physical    Patient Name: Karly Sandoval  MRN: [id number]  Code Status: Full Code  Admission Date: 10/23/2018  Attending Physician: Edna Henry MD   Primary Care Provider: Uday Allen MD    Subjective:     Principal Problem:Acute bilateral low back pain without sciatica     HPI: Chief Complaint/Reason for Admission: Acute bilateral low back pain without sciatica    History of Present Illness:  Patient is a 88 y.o. 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  She was evaluated by neurosurgery with normal IAN and sensation with low suspicion for cauda equina syndrome.  CT myelogram was considered but given low suspicion for condition, risk of kidney injury and patient's disinterest in surgery, procedure was not done.  She complains of pain to her back today rating it 6/10 but controlled with tylenol.  She did well in therapy per her bu is worn out subsequently.  She has a poor appetite.      The patient has been admitted to SNF for ongoing PT/OT due to insufficient progress to go home safely from the hospital.    Records from last hospital stay reviewed and summarized above.     Past Medical History:   Diagnosis Date    Abdominal aortic aneurysm (AAA) without rupture 10/8/2018    Abnormal CT of the abdomen 10/8/2018    Acid reflux     Anemia     Anxiety     Atrial arrhythmia 2/11/2014    Carotid artery occlusion     Chronic prescription benzodiazepine use 10/23/2018    Compression fracture of thoracic vertebra 6/25/2014    CVA (cerebral infarction) 2014    Cyst of pancreas 10/9/2018    Diastolic heart failure     echo 2016 ef 55% +Diastolic dysf    Diverticulosis     Encounter for blood transfusion     GERD  (gastroesophageal reflux disease) 9/11/2012    History of cholelithiasis     Hyperlipidemia     Hypertension     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    Osteoarthritis     Osteoarthritis of both knees 6/12/2015    Osteopenia     PFO (patent foramen ovale) 2/11/2014    PVC (premature ventricular contraction) 4/28/2014    RVOT origin -- benign     RBBB 11/14/2014    Rheumatoid arthritis(714.0)     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)     has pacemaker    Right pelvic adnexal fluid collection 10/8/2018       Past Surgical History:   Procedure Laterality Date    APPENDECTOMY      BREAST SURGERY      CARDIAC PACEMAKER PLACEMENT  11/2014    for syncope and RBBB/LAHB    CHOLECYSTECTOMY      EYE SURGERY      HEMORRHOID SURGERY      HYSTERECTOMY      1966    SKIN BIOPSY      VASCULAR SURGERY      VERTEBROPLASTY         Review of patient's allergies indicates:   Allergen Reactions    Amoxicillin Other (See Comments)     unknown    Bactrim [sulfamethoxazole-trimethoprim] Other (See Comments)     unknown    Omeprazole Other (See Comments)     Muscle and joint pain    Rocephin [ceftriaxone] Other (See Comments)     Severe acute generalized pain    Penicillins Rash       No current facility-administered medications on file prior to encounter.      Current Outpatient Medications on File Prior to Encounter   Medication Sig    acetaminophen (TYLENOL) 650 MG TbSR Take 650 mg by mouth daily as needed (PAIN).    ALPRAZolam (XANAX) 0.25 MG tablet TAKE 1 TABLET BY MOUTH EVERY DAY AS NEEDED (Patient taking differently: TAKE ½ TABLET BY MOUTH TWICE DAILY)    amLODIPine (NORVASC) 10 MG tablet Take 10 mg by mouth once daily.    aspirin (ECOTRIN) 81 MG EC tablet Take 1 tablet (81 mg total) by mouth once daily. HOLD until cleared by your ENT doctor and your Neurologist. (Patient taking differently: Take 81 mg by mouth once daily. )    atorvastatin  (LIPITOR) 20 MG tablet TAKE 1 TABLET BY MOUTH EVERY DAY    bisacodyl (DULCOLAX) 10 mg Supp Place 10 mg rectally daily as needed (CONSTIPATION).    calcium carbonate (CALCIUM 600 ORAL) Take 1 tablet by mouth 2 (two) times daily.    carvedilol (COREG) 6.25 MG tablet Take 1 tablet (6.25 mg total) by mouth 2 (two) times daily with meals.    coenzyme Q10 (CO Q-10) 100 mg capsule Take 100 mg by mouth once daily.    denosumab (PROLIA) 60 mg/mL Syrg Inject 60 mg into the skin. Every 6 months    fish oil-omega-3 fatty acids 300-1,000 mg capsule Take 1,000 mg by mouth once daily.     folic acid (FOLVITE) 1 MG tablet TAKE 1 TABLET (1 MG TOTAL) BY MOUTH ONCE DAILY.    furosemide (LASIX) 20 MG tablet Take 1 tablet (20 mg total) by mouth once daily. (Patient taking differently: Take 20 mg by mouth every Mon, Wed, Fri. )    hydroxychloroquine (PLAQUENIL) 200 mg tablet Take 1 tablet (200 mg total) by mouth once daily.    lisinopril 10 MG tablet Take 1 tablet (10 mg total) by mouth once daily.    psyllium (METAMUCIL) powder Take 1 packet by mouth daily as needed (CONSTIPATION).    sertraline (ZOLOFT) 25 MG tablet Take 1 tablet (25 mg total) by mouth once daily.     Family History     Problem Relation (Age of Onset)    Diabetes Mellitus Mother, Brother    Heart disease Father    Hypertension Father, Mother    Leukemia Father    Parkinsonism Brother (68)        Tobacco Use    Smoking status: Never Smoker    Smokeless tobacco: Never Used   Substance and Sexual Activity    Alcohol use: No     Comment: [facility]    Drug use: No    Sexual activity: No     Partners: Male     Birth control/protection: None     Review of Systems   Constitutional: no fever or chills  Eyes: no visual changes  ENT: no nasal congestion or sore throat  Respiratory: no cough or shortness of breath  Cardiovascular: no chest pain or palpitations  Gastrointestinal: no nausea or vomiting, no abdominal pain; last BM: 10/23  Genitourinary: no hematuria  or dysuria  Integument/Breast: no rash or pruritis  Hematologic/Lymphatic: no easy bruising or lymphadenopathy  Allergy/Immunology: no postnasal drip  Musculoskeletal: no arthralgias or myalgias  Neurological: no seizures or tremors  Behavioral/Psych: no auditory or visual hallucinations  Endocrine: no heat or cold intolerance      Objective:     Vital Signs (Most Recent):  Temp: 98 °F (36.7 °C) (10/24/18 0958)  Pulse: 69 (10/24/18 0958)  Resp: 18 (10/24/18 0958)  BP: (!) 144/61 (10/24/18 0958)  SpO2: 96 % (10/24/18 0958) Vital Signs (24h Range):  Temp:  [98 °F (36.7 °C)] 98 °F (36.7 °C)  Pulse:  [68-69] 69  Resp:  [18] 18  SpO2:  [96 %] 96 %  BP: (133-144)/(60-61) 144/61     Weight: 54.2 kg (119 lb 7.8 oz)  Body mass index is 21.85 kg/m².    Physical Exam  General: well developed, well nourished, no distress  HENT: Head:normocephalic, atraumatic. Ears:bilateral external ear canals normal. Nose: Nares normal. Septum midline. Mucosa normal. Throat: lips, mucosa, and tongue normal and no throat erythema.  Eyes: conjunctivae/corneas clear.  EOM normal.  Neck: supple, symmetrical, trachea midline, no JVD and thyroid not enlarged.   Lungs:  clear to auscultation bilaterally and normal respiratory effort  Cardiovascular: Heart: regular rate and rhythm, S1, S2 normal, no murmur, click, rub or gallop. Chest Wall: no tenderness. Extremities: no cyanosis, trace BLE edema, no clubbing. Pulses: 2+ and symmetric.  Abdomen/Rectal: Abdomen: soft, non-tender non-distended; bowel sounds normal; no masses,  no organomegaly.   Skin: Skin color, texture, turgor normal. No rashes or lesions.  Musculoskeletal: no clubbing, cyanosis; + kyphosis  Lymph Nodes: No cervical or supraclavicular adenopathy  Neurologic: generalized weakness rated 4/5 in all extremities. No focal numbness or weakness.  Psych/Behavioral:  Alert and oriented, appropriate affect.    Significant Labs:   Recent Labs   Lab 10/18/18  0226 10/19/18  0812   WBC 7.46 6.10    HGB 10.1* 9.1*   HCT 28.9* 27.4*    191     Recent Labs   Lab 10/19/18  0812 10/20/18  1222 10/22/18  0445   * 130* 130*   K 3.7 4.1 3.8   CL 98 99 100   CO2 24 24 18*   BUN 28* 22 25*   CREATININE 1.4 1.3 1.2   CALCIUM 8.4* 8.6* 8.4*           Assessment/Plan:     * Acute bilateral low back pain without sciatica    Likely multifactorial involving RA, chronic compression fractures and old left L3 transverse process  Continue tylenol prn pain and lidocaine patch daily.  Continue dexamethasone therapy with end date of 10/28  -continue PT/OT to increase ambulation, ADL performance and endurance  -continue TOM's and SCD's for DVT prophylaxis  -continue fall precautions  -continue senokot-s and miralax to prevent constipation; hold for frequent or loose stooling       RA (rheumatoid arthritis)    Continue hydroxychloroquine and folic acid.  MTX discontinued by Rheumatology.  She will chito f/u with rheum on discharge from SNF.      Urinary retention    Urge incontinence history.  She was started in tamsulosin therapy; urinating without difficulty currently.      Hyponatremia    Chronic and stable  Will continue to monitor with twice weekly labs.     Anxiety    Continue chronic therapy with sertraline.  Patient did not tolerate therapy with amitriptyline due to lethargy  Continue chronic xanax nightly as she takes at home; GDR not to be attempted due to acute illness and failure of alternative therapy with risk of decompensating condition and prohibiting rehabilitation potential     Pacemaker    Unable to have MRI spine due to presence.       Chronic diastolic CHF (congestive heart failure)    -currently compensated on clinical exam  -continue current medical therapy with lisinopril, coreg, furosemide (dose reduced due to recent CHARLENE and poor po intake but dexamethasone therapy currently)  -continue low Na diet to treat  -continue daily weight monitoring with adjustment of diuretic therapy as necessary to  treat weight gains > 2-3 pounds in 24-48 hours  -continue daily pulse oximetry monitoring; proceed with CXR imaging and adjustment of diuretic therapy as necessary to treat new or worsening hypoxia  -intermittent clinical exam monitoring with adjustment of diuretic regimen as necessary to treat signs of volume overload  -f/u with cardiology as scheduled         Essential hypertension    Chronic and controlled  Continue therapy with carvedilol, amlodipine and lisinopril  Will continue to monitor and adjust regimen as necessary.         I certify that SNF services are required to be given on an inpatient basis because Karly Sandoval's needs for skilled nursing care and/or skilled rehabilitation are required on a daily basis and such services can only practically be provided in a skilled nursing facility setting and are for an ongoing condition for which she received inpatient care in the hospital.     Future Appointments   Date Time Provider Department Center   10/29/2018 10:30 AM Jackson Arreguin III, MD [facility] CARDIO [place]   11/6/2018  3:20 PM Uday Allen MD [facility] FAM MED [facility]   11/19/2018  8:00 AM HOME MONITOR DEVICE CHECK, [facility] [address]   1/14/2019 11:30 AM Isai Smith MD [facility] [address]   2/19/2019  8:00 AM HOME MONITOR DEVICE CHECK, [facility] [address]   Reschedule cardiology and fam med appt to occur on discharge from SNF.     Patient's care plan and discharge planning will be discussed by the SNF team in IDT meeting weekly. Medications to be reviewed and discussed with the SNF unit clinical pharmacist.    Edna Henry MD  Department of Hospital Medicine  [facility] [place] [facility] - [facility] Nursing Care

## 2018-10-24 NOTE — ASSESSMENT & PLAN NOTE
-currently compensated on clinical exam  -continue current medical therapy with lisinopril, coreg, furosemide (dose reduced due to recent CHARLENE and poor po intake but dexamethasone therapy currently)  -continue low Na diet to treat  -continue daily weight monitoring with adjustment of diuretic therapy as necessary to treat weight gains > 2-3 pounds in 24-48 hours  -continue daily pulse oximetry monitoring; proceed with CXR imaging and adjustment of diuretic therapy as necessary to treat new or worsening hypoxia  -intermittent clinical exam monitoring with adjustment of diuretic regimen as necessary to treat signs of volume overload  -f/u with cardiology as scheduled

## 2018-10-25 LAB
ANION GAP SERPL CALC-SCNC: 6 MMOL/L
BASOPHILS # BLD AUTO: 0.01 K/UL
BASOPHILS NFR BLD: 0.2 %
BUN SERPL-MCNC: 48 MG/DL
CALCIUM SERPL-MCNC: 8.4 MG/DL
CHLORIDE SERPL-SCNC: 100 MMOL/L
CO2 SERPL-SCNC: 21 MMOL/L
CREAT SERPL-MCNC: 1.7 MG/DL
DIFFERENTIAL METHOD: ABNORMAL
EOSINOPHIL # BLD AUTO: 0 K/UL
EOSINOPHIL NFR BLD: 0 %
ERYTHROCYTE [DISTWIDTH] IN BLOOD BY AUTOMATED COUNT: 13.9 %
EST. GFR  (AFRICAN AMERICAN): 30.6 ML/MIN/1.73 M^2
EST. GFR  (NON AFRICAN AMERICAN): 26.5 ML/MIN/1.73 M^2
GLUCOSE SERPL-MCNC: 113 MG/DL
HCT VFR BLD AUTO: 25.6 %
HGB BLD-MCNC: 8.8 G/DL
IMM GRANULOCYTES # BLD AUTO: 0.11 K/UL
IMM GRANULOCYTES NFR BLD AUTO: 1.9 %
LYMPHOCYTES # BLD AUTO: 0.5 K/UL
LYMPHOCYTES NFR BLD: 8.7 %
MAGNESIUM SERPL-MCNC: 1.6 MG/DL
MCH RBC QN AUTO: 31.1 PG
MCHC RBC AUTO-ENTMCNC: 34.4 G/DL
MCV RBC AUTO: 91 FL
MONOCYTES # BLD AUTO: 0.4 K/UL
MONOCYTES NFR BLD: 7 %
NEUTROPHILS # BLD AUTO: 4.8 K/UL
NEUTROPHILS NFR BLD: 82.2 %
NRBC BLD-RTO: 0 /100 WBC
PHOSPHATE SERPL-MCNC: 3.8 MG/DL
PLATELET # BLD AUTO: 235 K/UL
PMV BLD AUTO: 10.5 FL
POTASSIUM SERPL-SCNC: 4.7 MMOL/L
RBC # BLD AUTO: 2.83 M/UL
SODIUM SERPL-SCNC: 127 MMOL/L
WBC # BLD AUTO: 5.85 K/UL

## 2018-10-25 PROCEDURE — 97530 THERAPEUTIC ACTIVITIES: CPT

## 2018-10-25 PROCEDURE — 36415 COLL VENOUS BLD VENIPUNCTURE: CPT

## 2018-10-25 PROCEDURE — 25000003 PHARM REV CODE 250: Performed by: INTERNAL MEDICINE

## 2018-10-25 PROCEDURE — 84100 ASSAY OF PHOSPHORUS: CPT

## 2018-10-25 PROCEDURE — 99309 SBSQ NF CARE MODERATE MDM 30: CPT | Mod: ,,, | Performed by: NURSE PRACTITIONER

## 2018-10-25 PROCEDURE — 25000003 PHARM REV CODE 250: Performed by: NURSE PRACTITIONER

## 2018-10-25 PROCEDURE — 11000004 HC SNF PRIVATE

## 2018-10-25 PROCEDURE — 85025 COMPLETE CBC W/AUTO DIFF WBC: CPT

## 2018-10-25 PROCEDURE — 97110 THERAPEUTIC EXERCISES: CPT

## 2018-10-25 PROCEDURE — 25000003 PHARM REV CODE 250: Performed by: STUDENT IN AN ORGANIZED HEALTH CARE EDUCATION/TRAINING PROGRAM

## 2018-10-25 PROCEDURE — 80048 BASIC METABOLIC PNL TOTAL CA: CPT

## 2018-10-25 PROCEDURE — 83735 ASSAY OF MAGNESIUM: CPT

## 2018-10-25 PROCEDURE — 97116 GAIT TRAINING THERAPY: CPT

## 2018-10-25 PROCEDURE — 63600175 PHARM REV CODE 636 W HCPCS: Performed by: STUDENT IN AN ORGANIZED HEALTH CARE EDUCATION/TRAINING PROGRAM

## 2018-10-25 RX ORDER — LIDOCAINE 50 MG/G
2 PATCH TOPICAL
Status: DISCONTINUED | OUTPATIENT
Start: 2018-10-26 | End: 2018-11-14 | Stop reason: HOSPADM

## 2018-10-25 RX ORDER — SODIUM CHLORIDE 9 MG/ML
INJECTION, SOLUTION INTRAVENOUS CONTINUOUS
Status: ACTIVE | OUTPATIENT
Start: 2018-10-25 | End: 2018-10-26

## 2018-10-25 RX ADMIN — CARVEDILOL 12.5 MG: 12.5 TABLET, FILM COATED ORAL at 05:10

## 2018-10-25 RX ADMIN — SENNOSIDES AND DOCUSATE SODIUM 1 TABLET: 8.6; 5 TABLET ORAL at 09:10

## 2018-10-25 RX ADMIN — LIDOCAINE 1 PATCH: 50 PATCH TOPICAL at 05:10

## 2018-10-25 RX ADMIN — FOLIC ACID 1 MG: 1 TABLET ORAL at 08:10

## 2018-10-25 RX ADMIN — CARVEDILOL 12.5 MG: 12.5 TABLET, FILM COATED ORAL at 08:10

## 2018-10-25 RX ADMIN — ATORVASTATIN CALCIUM 20 MG: 20 TABLET, FILM COATED ORAL at 08:10

## 2018-10-25 RX ADMIN — SERTRALINE HYDROCHLORIDE 25 MG: 25 TABLET ORAL at 08:10

## 2018-10-25 RX ADMIN — DEXAMETHASONE 2 MG: 1 TABLET ORAL at 08:10

## 2018-10-25 RX ADMIN — LISINOPRIL 10 MG: 2.5 TABLET ORAL at 08:10

## 2018-10-25 RX ADMIN — AMLODIPINE BESYLATE 10 MG: 10 TABLET ORAL at 08:10

## 2018-10-25 RX ADMIN — SODIUM CHLORIDE: 0.9 INJECTION, SOLUTION INTRAVENOUS at 09:10

## 2018-10-25 RX ADMIN — SENNOSIDES AND DOCUSATE SODIUM 1 TABLET: 8.6; 5 TABLET ORAL at 08:10

## 2018-10-25 RX ADMIN — TAMSULOSIN HYDROCHLORIDE 0.4 MG: 0.4 CAPSULE ORAL at 08:10

## 2018-10-25 RX ADMIN — ALPRAZOLAM 0.12 MG: 0.25 TABLET ORAL at 09:10

## 2018-10-25 RX ADMIN — DEXAMETHASONE 2 MG: 1 TABLET ORAL at 09:10

## 2018-10-25 NOTE — ASSESSMENT & PLAN NOTE
Chronic and stable  Will continue to monitor with twice weekly labs.    10/25/18  Chronic, sodium down to 127.  Will give IVF as she appears dry and admits to not eating much.  Repeat Lab on Monday.  PharmD cautions use of Plaquenil if sodium continues to drop.

## 2018-10-25 NOTE — PROGRESS NOTES
[facility] [place] [facility]  Department of [facility] Medicine  Progress Note    Patient Name: Karly Sandoval  MRN: [id number]  Code Status: Full Code  Admission Date: 10/23/2018  Length of Stay: 2 days  Attending Physician: Edna Henry MD  Primary Care Provider: Uday lAlen MD    Subjective:     Principal Problem:Acute bilateral low back pain without sciatica    HPI:  Chief Complaint/Reason for Admission: Acute bilateral low back pain without sciatica    History of Present Illness:  Patient is a 88 y.o. 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  She was evaluated by neurosurgery with normal IAN and sensation with low suspicion for cauda equina syndrome.  CT myelogram was considered but given low suspicion for condition, risk of kidney injury and patient's disinterest in surgery, procedure was not done.  She complains of pain to her back today rating it 6/10 but controlled with tylenol.  She did well in therapy per her bu is worn out subsequently.  She has a poor appetite.      The patient has been admitted to SNF for ongoing PT/OT due to insufficient progress to go home safely from the hospital.    Records from last hospital stay reviewed and summarized above.     Interval History:   10/25/18  Patient seen at bedside, son is present.  She reports having worsening back pain to mid back.  States symptoms started after therapy.  She has no report paraesthesia or pain radiation down legs.  Patient said current pain regimen is not helping.  Son inquired about use of back brace to help with pain control.  Advised him, would discuss with MD.  He also reports patient with history of RA and is followed by Dr. Smith.  Son reports patient [facility] if deemed  appropriate.  Patient reports she does not have a good appetite but tries to eat.  She reports she is drinking fluids and urinating and having BM without issues.      Discussed case with Dr. Henry, she suggest no need to repeat imaging at this time but suggest increasing lidoderm patch to 2 and try use of abdominal binder to support back.  Will order and monitor.           Review of Systems   Constitutional: Negative for appetite change, fatigue and fever.   Respiratory: Negative for cough and shortness of breath.    Cardiovascular: Negative for chest pain, palpitations and leg swelling.   Gastrointestinal: Negative for abdominal pain, constipation, diarrhea, nausea and vomiting.   Endocrine: Negative for polydipsia, polyphagia and polyuria.   Genitourinary: Negative for dysuria and frequency.   Musculoskeletal: Positive for arthralgias, back pain and myalgias.   Skin: Negative for rash.   Psychiatric/Behavioral: Negative for confusion and hallucinations.     Objective:     Vital Signs (Most Recent):  Temp: 97.9 °F (36.6 °C) (10/25/18 0835)  Pulse: 65 (10/25/18 0835)  Resp: 18 (10/25/18 0835)  BP: 136/63 (10/25/18 0835)  SpO2: 95 % (10/25/18 0835) Vital Signs (24h Range):  Temp:  [97.9 °F (36.6 °C)-98.4 °F (36.9 °C)] 97.9 °F (36.6 °C)  Pulse:  [65-68] 65  Resp:  [18] 18  SpO2:  [95 %] 95 %  BP: (126-136)/(62-63) 136/63     Weight: 54.2 kg (119 lb 7.8 oz)  Body mass index is 21.85 kg/m².    Intake/Output Summary (Last 24 hours) at 10/25/2018 1642  Last data filed at 10/25/2018 1200  Gross per 24 hour   Intake 720 ml   Output --   Net 720 ml      Physical Exam   Constitutional: She is oriented to person, place, and time. She appears well-developed and well-nourished.   Patient is kyphotic   Cardiovascular: Normal rate, regular rhythm, normal heart sounds and intact distal pulses.   No murmur heard.  Pulmonary/Chest: Effort normal and breath sounds normal. No respiratory distress.   Abdominal: Soft. Normal appearance  and bowel sounds are normal. She exhibits no distension.   Musculoskeletal: Normal range of motion. She exhibits edema.   Mid-Back is not TTP.   Neurological: She is alert and oriented to person, place, and time. She has normal strength.   Skin: Skin is warm, dry and intact. Capillary refill takes less than 2 seconds.   Psychiatric: She has a normal mood and affect.       Significant Labs:   BMP:   Recent Labs   Lab 10/25/18  0530   *   *   K 4.7      CO2 21*   BUN 48*   CREATININE 1.7*   CALCIUM 8.4*   MG 1.6     CBC:   Recent Labs   Lab 10/25/18  0530   WBC 5.85   HGB 8.8*   HCT 25.6*          Significant Imaging: n/a    Assessment/Plan:      * Acute bilateral low back pain without sciatica    Likely multifactorial involving RA, chronic compression fractures and old left L3 transverse process  Continue tylenol prn pain and lidocaine patch daily.  Continue dexamethasone therapy with end date of 10/28  -continue PT/OT to increase ambulation, ADL performance and endurance  -continue TOM's and SCD's for DVT prophylaxis  -continue fall precautions  -continue senokot-s and miralax to prevent constipation; hold for frequent or loose stooling    10/25/18  Patient reports pain to back is worse after therapy today.  Discussed with Dr. Henry, she suggested changing to 2 lidoderm patches and continue Tylenol dosing as ordered.  No need to re-image at this time.  Continue TOM/SCD for dvt ppx.  Will try using an abdominal binder to see if this helps with pain with therapy sessions.       Urinary retention    Urge incontinence history.  She was started in tamsulosin therapy; urinating without difficulty currently.     10/25/18  No complaints today, continue Flomax as ordered.     Hyponatremia    Chronic and stable  Will continue to monitor with twice weekly labs.    10/25/18  Chronic, sodium down to 127.  Will give IVF as she appears dry and admits to not eating much.  Repeat Lab on Monday.  PharmD  cautions use of Plaquenil if sodium continues to drop.     Anxiety    Continue chronic therapy with sertraline.  Patient did not tolerate therapy with amitriptyline due to lethargy  Continue chronic xanax nightly as she takes at home; GDR not to be attempted due to acute illness and failure of alternative therapy with risk of decompensating condition and prohibiting rehabilitation potential    10/25/18  Chronic, continue Zoloft as ordered.  Per MD, unable to tolerate Elavil.  Xanax qhs as she chronically takes.  Continue to monitor.     Pacemaker    Unable to have MRI spine due to presence.    10/25/18  Chronic.  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     Chronic diastolic CHF (congestive heart failure)    -currently compensated on clinical exam  -continue current medical therapy with lisinopril, coreg, furosemide (dose reduced due to recent CHARLENE and poor po intake but dexamethasone therapy currently)  -continue low Na diet to treat  -continue daily weight monitoring with adjustment of diuretic therapy as necessary to treat weight gains > 2-3 pounds in 24-48 hours  -continue daily pulse oximetry monitoring; proceed with CXR imaging and adjustment of diuretic therapy as necessary to treat new or worsening hypoxia  -intermittent clinical exam monitoring with adjustment of diuretic regimen as necessary to treat signs of volume overload  -f/u with cardiology as scheduled    10/25/18  Labs indicated patient is dehydrated.  Will give  cc total per Dr. Henry request and hold lasix for now.  Patient with lower leg edema that is chronic.  No reports of SOB.  Monitor weights.  Wt Readings from Last 1 Encounters:   10/23/18 1632 54.2 kg (119 lb 7.8 oz)     Continue b-blocker as ordered.       Essential hypertension    Chronic and controlled  Continue therapy with carvedilol, amlodipine and lisinopril  Will continue to monitor and adjust regimen as necessary.    10/25/18  BP Readings from Last 3 Encounters:    10/25/18 136/63   10/23/18 (!) 111/59   10/17/18 (!) 175/75     continue Norvasc, coreg and Hydralazine PRN as ordered.  Will stop ACE for now.  BP is stable.       RA (rheumatoid arthritis)    Continue hydroxychloroquine and folic acid.  MTX discontinued by Rheumatology.  She will chito f/u with rheum on discharge from SNF.     10/25/18  Follow by Dr. Smith in Rheum, will need f/u after discharge.  Continue Plaquenil and MTX per MD orders.  Sodium level is low but chronic, will need to monitor given concurrent use of Plaquenil.           Warren Barry, DAY  Department of Hospital Medicine  [facility] [place] [facility] - Skilled Nursing Care

## 2018-10-25 NOTE — ASSESSMENT & PLAN NOTE
Urge incontinence history.  She was started in tamsulosin therapy; urinating without difficulty currently.     10/25/18  No complaints today, continue Flomax as ordered.

## 2018-10-25 NOTE — ASSESSMENT & PLAN NOTE
Chronic and controlled  Continue therapy with carvedilol, amlodipine and lisinopril  Will continue to monitor and adjust regimen as necessary.    10/25/18  BP Readings from Last 3 Encounters:   10/25/18 136/63   10/23/18 (!) 111/59   10/17/18 (!) 175/75     continue Norvasc, coreg and Hydralazine PRN as ordered.  Will stop ACE for now.  BP is stable.

## 2018-10-25 NOTE — SUBJECTIVE & OBJECTIVE
Interval History:   10/25/18  Patient seen at bedside, son is present.  She reports having worsening back pain to mid back.  States symptoms started after therapy.  She has no report paraesthesia or pain radiation down legs.  Patient said current pain regimen is not helping.  Son inquired about use of back brace to help with pain control.  Advised him, would discuss with MD.  He also reports patient with history of RA and is followed by Dr. Smith.  Son reports patient is going to [facility] to [facility] when [facility] if deemed appropriate.  Patient reports she does not have a good appetite but tries to eat.  She reports she is drinking fluids and urinating and having BM without issues.      Discussed case with Dr. Henry, she suggest no need to repeat imaging at this time but suggest increasing lidoderm patch to 2 and try use of abdominal binder to support back.  Will order and monitor.           Review of Systems   Constitutional: Negative for appetite change, fatigue and fever.   Respiratory: Negative for cough and shortness of breath.    Cardiovascular: Negative for chest pain, palpitations and leg swelling.   Gastrointestinal: Negative for abdominal pain, constipation, diarrhea, nausea and vomiting.   Endocrine: Negative for polydipsia, polyphagia and polyuria.   Genitourinary: Negative for dysuria and frequency.   Musculoskeletal: Positive for arthralgias, back pain and myalgias.   Skin: Negative for rash.   Psychiatric/Behavioral: Negative for confusion and hallucinations.     Objective:     Vital Signs (Most Recent):  Temp: 97.9 °F (36.6 °C) (10/25/18 0835)  Pulse: 65 (10/25/18 0835)  Resp: 18 (10/25/18 0835)  BP: 136/63 (10/25/18 0835)  SpO2: 95 % (10/25/18 0835) Vital Signs (24h Range):  Temp:  [97.9 °F (36.6 °C)-98.4 °F (36.9 °C)] 97.9 °F (36.6 °C)  Pulse:  [65-68] 65  Resp:  [18] 18  SpO2:  [95 %] 95 %  BP: (126-136)/(62-63) 136/63     Weight: 54.2 kg (119 lb 7.8 oz)  Body mass index is 21.85  kg/m².    Intake/Output Summary (Last 24 hours) at 10/25/2018 1642  Last data filed at 10/25/2018 1200  Gross per 24 hour   Intake 720 ml   Output --   Net 720 ml      Physical Exam   Constitutional: She is oriented to person, place, and time. She appears well-developed and well-nourished.   Patient is kyphotic   Cardiovascular: Normal rate, regular rhythm, normal heart sounds and intact distal pulses.   No murmur heard.  Pulmonary/Chest: Effort normal and breath sounds normal. No respiratory distress.   Abdominal: Soft. Normal appearance and bowel sounds are normal. She exhibits no distension.   Musculoskeletal: Normal range of motion. She exhibits edema.   Mid-Back is not TTP.   Neurological: She is alert and oriented to person, place, and time. She has normal strength.   Skin: Skin is warm, dry and intact. Capillary refill takes less than 2 seconds.   Psychiatric: She has a normal mood and affect.       Significant Labs:   BMP:   Recent Labs   Lab 10/25/18  0530   *   *   K 4.7      CO2 21*   BUN 48*   CREATININE 1.7*   CALCIUM 8.4*   MG 1.6     CBC:   Recent Labs   Lab 10/25/18  0530   WBC 5.85   HGB 8.8*   HCT 25.6*          Significant Imaging: n/a

## 2018-10-25 NOTE — ASSESSMENT & PLAN NOTE
Likely multifactorial involving RA, chronic compression fractures and old left L3 transverse process  Continue tylenol prn pain and lidocaine patch daily.  Continue dexamethasone therapy with end date of 10/28  -continue PT/OT to increase ambulation, ADL performance and endurance  -continue TOM's and SCD's for DVT prophylaxis  -continue fall precautions  -continue senokot-s and miralax to prevent constipation; hold for frequent or loose stooling    10/25/18  Patient reports pain to back is worse after therapy today.  Discussed with Dr. Henry, she suggested changing to 2 lidoderm patches and continue Tylenol dosing as ordered.  No need to re-image at this time.  Continue TOM/SCD for dvt ppx.  Will try using an abdominal binder to see if this helps with pain with therapy sessions.

## 2018-10-25 NOTE — ASSESSMENT & PLAN NOTE
Unable to have MRI spine due to presence.    10/25/18  Chronic.  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

## 2018-10-25 NOTE — ASSESSMENT & PLAN NOTE
Continue hydroxychloroquine and folic acid.  MTX discontinued by Rheumatology.  She will chito f/u with rheum on discharge from [facility].     10/25/18  Follow by Dr. Smith in Rheum, will need f/u after discharge.  Continue Plaquenil and MTX per MD orders.  Sodium level is low but chronic, will need to monitor given concurrent use of Plaquenil.

## 2018-10-25 NOTE — PLAN OF CARE
Problem: Occupational Therapy Goal  Goal: Occupational Therapy Goal  Goals to be met by: 11 days     Patient will increase functional independence with ADLs by performing:    UE Dressing with Set-up Assistance.  LE Dressing with Supervision.  Grooming while standing at sink with Supervision.  Toileting from toilet with Supervision for hygiene and clothing management.   Bathing from  shower chair/bench with Supervision.  Supine to sit with Modified [place] /c HOB flat and no handrails.  Stand pivot transfers with Supervision.  Toilet transfer to toilet with Supervision.  Upper extremity exercise program 3 x 15 reps per handout, with independence.  Patient will complete a functional standing activity for 10 min with S in order to perform household tasks.    Outcome: Ongoing (interventions implemented as appropriate)  Progressing. DACIA Cui  10/25/2018

## 2018-10-25 NOTE — ASSESSMENT & PLAN NOTE
-currently compensated on clinical exam  -continue current medical therapy with lisinopril, coreg, furosemide (dose reduced due to recent CHARLENE and poor po intake but dexamethasone therapy currently)  -continue low Na diet to treat  -continue daily weight monitoring with adjustment of diuretic therapy as necessary to treat weight gains > 2-3 pounds in 24-48 hours  -continue daily pulse oximetry monitoring; proceed with CXR imaging and adjustment of diuretic therapy as necessary to treat new or worsening hypoxia  -intermittent clinical exam monitoring with adjustment of diuretic regimen as necessary to treat signs of volume overload  -f/u with cardiology as scheduled    10/25/18  Labs indicated patient is dehydrated.  Will give  cc total per Dr. Henry request and hold lasix for now.  Patient with lower leg edema that is chronic.  No reports of SOB.  Monitor weights.  Wt Readings from Last 1 Encounters:   10/23/18 1632 54.2 kg (119 lb 7.8 oz)     Continue b-blocker as ordered.

## 2018-10-25 NOTE — PT/OT/SLP PROGRESS
"Occupational Therapy  Treatment    Karly Sandoval   MRN: [id number]   Admitting Diagnosis: Acute bilateral low back pain without sciatica    OT Date of Treatment: 10/25/18  Total Time (min): 40 min    Billable Minutes:  Therapeutic Activity 30 and Therapeutic Exercise 15    General Precautions: Standard, fall  Orthopedic Precautions: N/A  Braces: N/A         Subjective:  Communicated with pt and son prior to session.  "I think I am wet"    Pain/Comfort  Pain Rating 1: 7/10  Location - Orientation 1: lower  Location 1: back  Pain Addressed 1: Pre-medicate for activity  Pain Rating Post-Intervention 1: 7/10    Objective:  Patient found with: (seated in w/c with son present)    Occupational Performance:      Functional Mobility/Transfers:  · Patient completed Sit <> Stand Transfer with contact guard assistance  with  rolling walker   · Patient completed Toilet Transfer Stand Pivot technique with contact guard assistance with  rolling walker and grab bars  · Functional Mobility: Pt stood from w/c and ambulated to bathroom to toilet with CGA ; pt stood from toilet with grab bar and RW and t/f to w/c. Pt stood from w/c at table with CGA    Activities of Daily Living:  · Grooming: supervision seated  · Toileting: minimum assistance on toilet with RW and grab bar-assist with brief    AMPAC 6 Click:  AMPAC Total Score:      OT Exercises: UE Ergometer 15 min to increase functional endurance and strength for ADLs    Additional Treatment:  Pt stood at table x 6 min and SBA while performing an FM reaching task to increase standing tolerance and balance for household tasks  Pt educated re: sit to stand technique; safety with RW; toileting and upright posture    Patient left up in chair with PT in gym    ASSESSMENT:  Karly Sandoval is a 88 y.o. female with a medical diagnosis of Acute bilateral low back pain without sciatica . Pt tolerated tx and demonstrated increased indep with standing tolerance,toileting and ambulation in " room . Pt cont to be limited by pain;however,progressing toward goals. Pt cont to benefit from OT to increase functional indep and safety to return to PLOF.    Rehab identified problem list/impairments: weakness, impaired endurance, impaired self care skills, impaired functional mobilty, gait instability, impaired balance, decreased lower extremity function, pain    Rehab potential is good    Activity tolerance: Good    Discharge recommendations: assisted living facility     Barriers to discharge: Decreased caregiver support     Equipment recommendations: wheelchair     GOALS:   Multidisciplinary Problems     Occupational Therapy Goals        Problem: Occupational Therapy Goal    Goal Priority Disciplines Outcome Interventions   Occupational Therapy Goal     OT, PT/OT Ongoing (interventions implemented as appropriate)    Description:  Goals to be met by: 11 days     Patient will increase functional independence with ADLs by performing:    UE Dressing with Set-up Assistance.  LE Dressing with Supervision.  Grooming while standing at sink with Supervision.  Toileting from toilet with Supervision for hygiene and clothing management.   Bathing from  shower chair/bench with Supervision.  Supine to sit with Modified [place] /c HOB flat and no handrails.  Stand pivot transfers with Supervision.  Toilet transfer to toilet with Supervision.  Upper extremity exercise program 3 x 15 reps per handout, with independence.  Patient will complete a functional standing activity for 10 min with S in order to perform household tasks.                     Plan:  Patient to be seen 5 x/week to address the above listed problems via self-care/home management, therapeutic activities, therapeutic exercises  Plan of Care expires: 11/24/18  Plan of Care reviewed with: patient    DACIA Cui  10/25/2018

## 2018-10-25 NOTE — ASSESSMENT & PLAN NOTE
Continue chronic therapy with sertraline.  Patient did not tolerate therapy with amitriptyline due to lethargy  Continue chronic xanax nightly as she takes at home; GDR not to be attempted due to acute illness and failure of alternative therapy with risk of decompensating condition and prohibiting rehabilitation potential    10/25/18  Chronic, continue Zoloft as ordered.  Per MD, unable to tolerate Elavil.  Xanax qhs as she chronically takes.  Continue to monitor.

## 2018-10-25 NOTE — PLAN OF CARE
Problem: Physical Therapy Goal  Goal: Physical Therapy Goal  Goals to be met by: 11 days     Patient will increase functional independence with mobility by performin. Supine to sit with Modified [place]  2. Sit to supine with Modified [place]  3. Sit to stand transfer with Supervision  4. Bed to chair transfer with Supervision using Rolling Walker  5. Gait  x 150 feet with Supervision using Rolling Walker.   6. Wheelchair propulsion x150 feet with Supervision using bilateral uppper extremities  7. Ascend/Descend 4 inch curb step with Stand-by Assistance using Rolling Walker.  8. Lower extremity exercise program x20 reps per handout, with supervision     Outcome: Ongoing (interventions implemented as appropriate)  LTGs remain appropriate. Pt will continue PT POC.    Elida Ayala, PT  10/25/2018

## 2018-10-25 NOTE — PT/OT/SLP PROGRESS
Physical Therapy  Treatment    Karly Sandoval   MRN: [id number]   Admitting Diagnosis: Acute bilateral low back pain without sciatica    PT Received On: 10/25/18  Total Time (min): 47       Billable Minutes:  Gait Training 15, Therapeutic Activity 15, Therapeutic Exercise 17 and Total Time 47    Treatment Type: Treatment  PT/PTA: PT     PTA Visit Number: 0       General Precautions: Standard, fall  Orthopedic Precautions: N/A   Braces: N/A    Do you have any cultural, spiritual, Temple conflicts, given your current situation?: none    Subjective:  Communicated with patient prior to session.  Pt agreeable to session but reporting inc'd back pain.    Pain/Comfort  Pain Rating 1: 8/10  Location - Side 1: Bilateral  Location - Orientation 1: lower  Location 1: back  Pain Addressed 1: Pre-medicate for activity, Reposition  Pain Rating Post-Intervention 1: 8/10    Objective:  Patient found sitting in w/c following OT session.       AM-PAC 6 CLICK MOBILITY  Total Score:17    Bed Mobility:  Sit>Supine:on mat w/ SBA  Supine>Sit: on mat w/ ModA for trunk, inc'd time and cueing required for logroll technique    Transfers:  Sit<>Stand: to/from w/c (2 trials) w/ RW and [place] to rise  Stand Pivot Transfer: w/c<>EOM and w/c>bedside chair, all w/ RW and [place] to rise  Cueing for hand/foot placement along w/ forward scoot/lean    Gait:  Amb 2 trials (100ft each) w/ RW and CGA for safety  Cueing for upright posture  Limited in distance by fatigue     Advanced Gait:  Curb Step: pt declined due to back pain    Wheelchair Mobility:  Patient propels w/c 30ft w/ BUE and SBA  inc'd time and cueing required for technique     Therex:  Supine therex 2x15 reps (GS, SAQ, AP)  Seated therex 1x15 reps (HF and LAQ)    Patient left up in chair with call button in reach and son present.    Assessment:  Karly Sandoval is a 88 y.o. female with a medical diagnosis of Acute bilateral low back pain without sciatica.  Pt was limited t/o session by  back pain. She required inc'd assistance for transfers due to pain and weakness. She will continue PT POC.    Rehab identified problem list/impairments: weakness, impaired endurance, impaired self care skills, impaired functional mobilty, gait instability, impaired balance, decreased lower extremity function, pain    Rehab potential is good.    Activity tolerance: Fair    Discharge recommendations: assisted living facility     Barriers to discharge: Decreased caregiver support    Equipment recommendations: wheelchair     GOALS:   Multidisciplinary Problems     Physical Therapy Goals        Problem: Physical Therapy Goal    Goal Priority Disciplines Outcome Goal Variances Interventions   Physical Therapy Goal     PT, PT/OT Ongoing (interventions implemented as appropriate)     Description:  Goals to be met by: 11 days     Patient will increase functional independence with mobility by performin. Supine to sit with Modified [place]  2. Sit to supine with Modified [place]  3. Sit to stand transfer with Supervision  4. Bed to chair transfer with Supervision using Rolling Walker  5. Gait  x 150 feet with Supervision using Rolling Walker.   6. Wheelchair propulsion x150 feet with Supervision using bilateral uppper extremities  7. Ascend/Descend 4 inch curb step with Stand-by Assistance using Rolling Walker.  8. Lower extremity exercise program x20 reps per handout, with supervision                      PLAN:    Patient to be seen (5-6X/week)  to address the above listed problems via gait training, therapeutic activities, therapeutic exercises, wheelchair management/training  Plan of Care expires: 18  Plan of Care reviewed with: patient, camille Ayala, PT  10/25/2018

## 2018-10-25 NOTE — CLINICAL REVIEW
Clinical Pharmacy Chart Review Note      Admit Date: 10/23/2018   LOS: 2 days       Karly Sandoval is a 88 y.o. female admitted to SNF for PT/OT after hospitalization for acute bilateral low back pain without sciatica.    Active Hospital Problems    Diagnosis  POA    *Acute bilateral low back pain without sciatica [M54.5]  Yes    Urinary retention [R33.9]  Yes    Hyponatremia [E87.1]  Yes    Anxiety [F41.9]  Yes    Pacemaker [Z95.0]  Yes    Chronic diastolic CHF (congestive heart failure) [I50.32]  Yes    RA (rheumatoid arthritis) [M06.9]  Yes    Essential hypertension [I10]  Yes      Resolved Hospital Problems   No resolved problems to display.     Review of patient's allergies indicates:   Allergen Reactions    Amoxicillin Other (See Comments)     unknown    Bactrim [sulfamethoxazole-trimethoprim] Other (See Comments)     unknown    Omeprazole Other (See Comments)     Muscle and joint pain    Rocephin [ceftriaxone] Other (See Comments)     Severe acute generalized pain    Penicillins Rash     Patient Active Problem List    Diagnosis Date Noted    Urinary retention 10/24/2018    Debility 10/23/2018    Chronic prescription benzodiazepine use 10/23/2018    Constipation 10/22/2018    Hyponatremia 10/18/2018    Urge incontinence 10/18/2018    Acute bilateral low back pain without sciatica 10/17/2018    CHARLENE (acute kidney injury) 10/08/2018    Abdominal aortic aneurysm (AAA) without rupture 10/08/2018    Anxiety 02/01/2018    Decreased functional mobility 08/17/2016    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    Vitamin D deficiency 09/22/2015    Pacemaker 03/25/2015    History of CVA (cerebrovascular accident) 10/29/2014    Anemia of chronic disease 03/22/2014    Closed compression fracture of L3 lumbar vertebra 03/21/2014    Chronic diastolic CHF (congestive heart failure) 02/11/2014    CKD (chronic kidney disease), stage III 08/22/2013    Mixed hyperlipidemia     RA (rheumatoid  arthritis) 09/11/2012    Essential hypertension 09/11/2012    Osteopenia 09/11/2012       Scheduled Meds:    ALPRAZolam  0.125 mg Oral Nightly    amLODIPine  10 mg Oral Daily    atorvastatin  20 mg Oral Daily    carvedilol  12.5 mg Oral BID WM    dexamethasone  2 mg Oral Q12H    folic acid  1 mg Oral Daily    hydroxychloroquine  200 mg Oral Daily    lidocaine  1 patch Transdermal Q24H    polyethylene glycol  17 g Oral Daily    senna-docusate 8.6-50 mg  1 tablet Oral BID    sertraline  25 mg Oral Daily    tamsulosin  0.4 mg Oral Daily     Continuous Infusions:    sodium chloride 0.9%       PRN Meds: acetaminophen, bisacodyl, calcium carbonate, hydrALAZINE, ramelteon, sodium chloride 0.9%    OBJECTIVE:     Vital Signs (Last 24H)  Temp:  [97.9 °F (36.6 °C)-98.4 °F (36.9 °C)]   Pulse:  [68]   Resp:  [18]   BP: (126-136)/(62-63)   SpO2:  [95 %]     Laboratory:  CBC:   Recent Labs   Lab 10/19/18  0812 10/25/18  0530   WBC 6.10 5.85   RBC 2.93* 2.83*   HGB 9.1* 8.8*   HCT 27.4* 25.6*    235   MCV 94 91   MCH 31.1* 31.1*   MCHC 33.2 34.4     BMP:   Recent Labs   Lab 10/20/18  1222 10/22/18  0445 10/25/18  0530   * 79 113*   * 130* 127*   K 4.1 3.8 4.7   CL 99 100 100   CO2 24 18* 21*   BUN 22 25* 48*   CREATININE 1.3 1.2 1.7*   CALCIUM 8.6* 8.4* 8.4*   MG  --   --  1.6     CMP:   Recent Labs   Lab 10/20/18  1222 10/22/18  0445 10/25/18  0530   * 79 113*   CALCIUM 8.6* 8.4* 8.4*   * 130* 127*   K 4.1 3.8 4.7   CO2 24 18* 21*   CL 99 100 100   BUN 22 25* 48*   CREATININE 1.3 1.2 1.7*         ASSESSMENT/PLAN:     Active Hospital Problems    Diagnosis    *Acute bilateral low back pain without sciatica   --PT/OT: APAP 650 mg q6hrs prn mild pain; lidocaine 5% patch (2) daily;   Dexamethasone 2 mg q12hrs (stop date 10/28)  --bowel regimen for constipation; hold for loose or frequent stools: senna-docusate twice daily; Miralax daily; bisacodyl 10 mg supp daily prn  --DVT prophylaxis:  TEDs and SCDs       Urinary retention   --Tamsulosin 0.4 mg daily      Hyponatremia   --Chronic and stable  --Continue to monitor with twice weekly labs      Anxiety   --Sertraline 25 mg daily  --Alprazolam 0.125 mg nightly  Monitor: mental status for depression, suicide ideation, anxiety, serotonin syndrome, hyponatremia, drowsiness, dizziness, oversedation      Pacemaker   --No acute issues      Chronic diastolic CHF (congestive heart failure)   **Monitor weight and adjust diuretic treatment as necessary to treat 2-3 pound weight gain in 24-48 hours  --Furosemide, lisinopril on hold for now in the presence of CHARLENE (monitor BMP)  --Carvedilol 12.5 mg twice daily    Wt Readings from Last 1 Encounters:   10/23/18 1632 54.2 kg (119 lb 7.8 oz)     BMP  Lab Results   Component Value Date     (L) 10/25/2018    K 4.7 10/25/2018     10/25/2018    CO2 21 (L) 10/25/2018    BUN 48 (H) 10/25/2018    CREATININE 1.7 (H) 10/25/2018    CALCIUM 8.4 (L) 10/25/2018    ANIONGAP 6 (L) 10/25/2018    ESTGFRAFRICA 30.6 (A) 10/25/2018    EGFRNONAA 26.5 (A) 10/25/2018         RA (rheumatoid arthritis)   --Hydroxychloroquine 200 mg daily  --Folic acid 1 mg daily      Essential hypertension   **Monitor BP and pulse  --Amlodipine 10 mg daily  --Carvedilol 12.5 mg twice daily  --Hydralazine 25 mg q8hrs prn SBP>180    BP Readings from Last 3 Encounters:   10/25/18 136/63   10/23/18 (!) 111/59   10/17/18 (!) 175/75     Pulse Readings from Last 3 Encounters:   10/25/18 65   10/23/18 68   10/17/18 66         Hyperlipidemia  --Atorvastatin 20 mg daily    Lab Results   Component Value Date    CHOL 107 (L) 10/09/2018    CHOL 132 06/22/2018    CHOL 136 01/05/2018     Lab Results   Component Value Date    HDL 58 10/09/2018    HDL 66 06/22/2018    HDL 71 01/05/2018     Lab Results   Component Value Date    LDLCALC 42.2 (L) 10/09/2018    LDLCALC 56.4 (L) 06/22/2018    LDLCALC 56.6 (L) 01/05/2018     Lab Results   Component Value Date     TRIG 34 10/09/2018    TRIG 48 06/22/2018    TRIG 42 01/05/2018     Lab Results   Component Value Date    CHOLHDL 54.2 (H) 10/09/2018    CHOLHDL 50.0 06/22/2018    CHOLHDL 52.2 (H) 01/05/2018     Lab Results   Component Value Date    ALT 44 10/17/2018    AST 28 10/17/2018    ALKPHOS 70 10/17/2018    BILITOT 0.8 10/17/2018                  I have reviewed the medications in compliance with CMS Regulation F329 of the MADDI Appendix PP.      Carol Hood, Pharm. D.  Clinical Pharmacist  [facility]

## 2018-10-25 NOTE — HOSPITAL COURSE
10/25/18  Patient seen at bedside, son is present.  She reports having worsening back pain to mid back.  States symptoms started after therapy.  She has no report paraesthesia or pain radiation down legs.  Patient said current pain regimen is not helping.  Son inquired about use of back brace to help with pain control.  Advised him, would discuss with MD.  He also reports patient with history of RA and is followed by Dr. Smith.  Son reports patient is going to go to [facility] when discharge if deemed appropriate.  Patient reports she does not have a good appetite but tries to eat.  She reports she is drinking fluids and urinating and having BM without issues.      Discussed case with Dr. Henry, she suggest no need to repeat imaging at this time but suggest increasing lidoderm patch to 2 and try use of abdominal binder to support back.  Will order and monitor.       11/2/18  Patient seen at bedside, she reports she is still having back pain.  Therapy reports she did a little better this morning with her session.  She is starting to retain urine and required I&O cath today, bladder scan showed ~254 cc of urine.  Patient is concerned about her lack of progress.  Therapy feels her progress has declined since admission as her eval was much better.  However, patient with acute T12 compression fracture.  She has follow up with spine clinic and a TSLO brace has been obtained for her.  Pain medication has been adjusted to optimize pain control, ordered Tramadol 25 mg tid PRN which she has only taken 1 dose so far.  Will change scheduled Xanax to PRN dosing as this is how it is ordered for her at home.    11/7/18 at 12 pm  Patient still with back pain with movement.  Earliest appointment with Neurosurgery is next week.  Continue to wear back brace.  Patient had not taken Lortab elixir prior to rounding and encouraged her to use to assist with pain control.  She has agreed to take today and informed nursing to  bring a dose to her.      1355 went back to see patient, she reports lortab gave her minimal relief but was nervous she would get sleepy and fall out of wheelchair.  Son is currently at bedside.  Explained to patient again, the importance of using her pain medication to help minimize her pain.  She has agreed to comply and will reassess in 24 hours.    11/12/18  Patient seen at bedside, she is reporting SOB and abdominal distension.  She reports having BM, denies n/v.  CXR and abd x-ray ordered.  Labs reviewed, WBC is up from previous lab draw and pulse ox is 94% on 1 liter.  She still complains of back pain which has limited her progression with therapy.  She was suppose to see NSGY tomorrow but appointment was cancelled.  I spoke with NSGY PA this evening, advised her of patient's issues.  She is recommending patient see the surgeon as she cannot provide any information relating to surgical fixation or vertebralplasty.  She states she will discuss with Dr. Worthington in AM to determine if patient can be seen by him or Dr. Leon.  Family updated on plan of care.      11/13/14  Patient seen at bedside, she is not having as much difficulty breathing today as she did on 11/12.  She still in significant back pain.  Son is upset over cancellation of her NSGY appointment.  He has requested patient be sent to [facility] ED where NSGY could see her today.  Labs reviewed, her sodium is 129, BUN/Cr up to 41/1.5 from 34/1.4 the day prior.  eGFR is 30.9.  She is currently on IV lasix given recent CXR findings.  Will change Avelox to Levoquin as UA is also indicating an infection.  She is currently afebrile.  Called [facility] ED and gave report to Dr. Kapoor.  Patient transported per family request and was admitted.

## 2018-10-25 NOTE — PLAN OF CARE
Problem: Fall Risk (Adult)  Goal: Absence of Falls  Patient will demonstrate the desired outcomes by discharge/transition of care.  Outcome: Ongoing (interventions implemented as appropriate)  Pt.. Had no falls this shift.will continue to monitor.

## 2018-10-26 LAB
ANION GAP SERPL CALC-SCNC: 9 MMOL/L
BUN SERPL-MCNC: 62 MG/DL
CALCIUM SERPL-MCNC: 8.8 MG/DL
CHLORIDE SERPL-SCNC: 100 MMOL/L
CO2 SERPL-SCNC: 18 MMOL/L
CREAT SERPL-MCNC: 1.7 MG/DL
EST. GFR  (AFRICAN AMERICAN): 30.6 ML/MIN/1.73 M^2
EST. GFR  (NON AFRICAN AMERICAN): 26.5 ML/MIN/1.73 M^2
GLUCOSE SERPL-MCNC: 106 MG/DL
POTASSIUM SERPL-SCNC: 4.8 MMOL/L
SODIUM SERPL-SCNC: 127 MMOL/L

## 2018-10-26 PROCEDURE — 97110 THERAPEUTIC EXERCISES: CPT

## 2018-10-26 PROCEDURE — 25000003 PHARM REV CODE 250: Performed by: NURSE PRACTITIONER

## 2018-10-26 PROCEDURE — 97535 SELF CARE MNGMENT TRAINING: CPT

## 2018-10-26 PROCEDURE — 80048 BASIC METABOLIC PNL TOTAL CA: CPT

## 2018-10-26 PROCEDURE — 11000004 HC SNF PRIVATE

## 2018-10-26 PROCEDURE — 25000003 PHARM REV CODE 250: Performed by: STUDENT IN AN ORGANIZED HEALTH CARE EDUCATION/TRAINING PROGRAM

## 2018-10-26 PROCEDURE — 25000003 PHARM REV CODE 250: Performed by: INTERNAL MEDICINE

## 2018-10-26 PROCEDURE — 36415 COLL VENOUS BLD VENIPUNCTURE: CPT

## 2018-10-26 PROCEDURE — 94761 N-INVAS EAR/PLS OXIMETRY MLT: CPT

## 2018-10-26 PROCEDURE — 63600175 PHARM REV CODE 636 W HCPCS: Performed by: STUDENT IN AN ORGANIZED HEALTH CARE EDUCATION/TRAINING PROGRAM

## 2018-10-26 PROCEDURE — 97530 THERAPEUTIC ACTIVITIES: CPT

## 2018-10-26 PROCEDURE — 97116 GAIT TRAINING THERAPY: CPT

## 2018-10-26 RX ORDER — ACETAMINOPHEN 500 MG
500 TABLET ORAL 3 TIMES DAILY
Status: DISCONTINUED | OUTPATIENT
Start: 2018-10-26 | End: 2018-10-29

## 2018-10-26 RX ORDER — SODIUM CHLORIDE 9 MG/ML
INJECTION, SOLUTION INTRAVENOUS CONTINUOUS
Status: ACTIVE | OUTPATIENT
Start: 2018-10-26 | End: 2018-10-26

## 2018-10-26 RX ADMIN — ACETAMINOPHEN 500 MG: 500 TABLET, FILM COATED ORAL at 03:10

## 2018-10-26 RX ADMIN — HYDROXYCHLOROQUINE SULFATE 200 MG: 200 TABLET, FILM COATED ORAL at 12:10

## 2018-10-26 RX ADMIN — FOLIC ACID 1 MG: 1 TABLET ORAL at 12:10

## 2018-10-26 RX ADMIN — SERTRALINE HYDROCHLORIDE 25 MG: 25 TABLET ORAL at 12:10

## 2018-10-26 RX ADMIN — DEXAMETHASONE 2 MG: 1 TABLET ORAL at 12:10

## 2018-10-26 RX ADMIN — CARVEDILOL 12.5 MG: 12.5 TABLET, FILM COATED ORAL at 12:10

## 2018-10-26 RX ADMIN — SENNOSIDES AND DOCUSATE SODIUM 1 TABLET: 8.6; 5 TABLET ORAL at 08:10

## 2018-10-26 RX ADMIN — AMLODIPINE BESYLATE 10 MG: 10 TABLET ORAL at 12:10

## 2018-10-26 RX ADMIN — ATORVASTATIN CALCIUM 20 MG: 20 TABLET, FILM COATED ORAL at 12:10

## 2018-10-26 RX ADMIN — SODIUM CHLORIDE: 0.9 INJECTION, SOLUTION INTRAVENOUS at 04:10

## 2018-10-26 RX ADMIN — POLYETHYLENE GLYCOL 3350 17 G: 17 POWDER, FOR SOLUTION ORAL at 12:10

## 2018-10-26 RX ADMIN — ACETAMINOPHEN 500 MG: 500 TABLET, FILM COATED ORAL at 08:10

## 2018-10-26 RX ADMIN — CARVEDILOL 12.5 MG: 12.5 TABLET, FILM COATED ORAL at 05:10

## 2018-10-26 RX ADMIN — ALPRAZOLAM 0.12 MG: 0.25 TABLET ORAL at 08:10

## 2018-10-26 RX ADMIN — TAMSULOSIN HYDROCHLORIDE 0.4 MG: 0.4 CAPSULE ORAL at 12:10

## 2018-10-26 RX ADMIN — DEXAMETHASONE 2 MG: 1 TABLET ORAL at 08:10

## 2018-10-26 RX ADMIN — LIDOCAINE 2 PATCH: 50 PATCH TOPICAL at 04:10

## 2018-10-26 NOTE — PT/OT/SLP PROGRESS
Occupational Therapy  Treatment    Karly Sandoval   MRN: [id number]   Admitting Diagnosis: Acute bilateral low back pain without sciatica    OT Date of Treatment: 10/26/18  Total Time (min): 60 min    Billable Minutes:  Self Care/Home Management 50 and Therapeutic Exercise 10    General Precautions: Standard, fall  Orthopedic Precautions: N/A  Braces: N/A     Subjective:  Communicated with patient prior to session.    Pain/Comfort  Pain Rating 1: 8/10  Location - Side 1: Bilateral  Location - Orientation 1: lower  Location 1: back  Pain Addressed 1: Reposition, Distraction  Pain Rating Post-Intervention 1: 8/10    Objective:       Occupational Performance:    Functional Mobility/Transfers:  · Patient completed Sit <> Stand Transfer with contact guard assistance  with  rolling walker   · Patient completed Toilet Transfer w/c>toilet using RW with functional ambulation; toilet>BSC using RW with functional mobility; BSC>bedside chair using RW with functional mobility technique    Activities of Daily Living:  · Feeding:  setup    · Grooming: minimum assistance oral care, washing face, and hair care seated   · Bathing: moderate assistance spongebath seated on BSC   · Upper Body Dressing: stand by assistance Donning and doffing button down shirt. Donning pullover shirt  · Lower Body Dressing: minimum assistance Donning pants and socks    [facility] 6 Click:  [facility] Total Score: 17    OT Exercises: Patient performed 1 x 15 in all planes and joints focusing to improve strength and endurance to increase independence with ADLs.     Patient left up in chair with call button in reach and all needs met.     ASSESSMENT:  Karly Sandoval is a 88 y.o. female with a medical diagnosis of Acute bilateral low back pain without sciatica and presents with the deficits listed below. Patient tolerated treatment session and was motivated to complete tasks. Patient continues to benefit from skilled OT services to achieve maximal  independence.    Rehab identified problem list/impairments: weakness, impaired endurance, impaired self care skills, impaired functional mobilty, gait instability, impaired balance, decreased lower extremity function, pain    Rehab potential is good    Activity tolerance: Good    Discharge recommendations: assisted living facility     Barriers to discharge: Decreased caregiver support     Equipment recommendations: wheelchair     GOALS:   Multidisciplinary Problems     Occupational Therapy Goals        Problem: Occupational Therapy Goal    Goal Priority Disciplines Outcome Interventions   Occupational Therapy Goal     OT, PT/OT Ongoing (interventions implemented as appropriate)    Description:  Goals to be met by: 11 days     Patient will increase functional independence with ADLs by performing:    UE Dressing with Set-up Assistance.  LE Dressing with Supervision.  Grooming while standing at sink with Supervision.  Toileting from toilet with Supervision for hygiene and clothing management.   Bathing from  shower chair/bench with Supervision.  Supine to sit with Modified [place] /c HOB flat and no handrails.  Stand pivot transfers with Supervision.  Toilet transfer to toilet with Supervision.  Upper extremity exercise program 3 x 15 reps per handout, with independence.  Patient will complete a functional standing activity for 10 min with S in order to perform household tasks.                     Plan:  Patient to be seen 5 x/week to address the above listed problems via self-care/home management, therapeutic activities, therapeutic exercises  Plan of Care expires: 11/24/18  Plan of Care reviewed with: patient    DACIA Regalado  10/26/2018

## 2018-10-26 NOTE — PLAN OF CARE
Problem: Occupational Therapy Goal  Goal: Occupational Therapy Goal  Goals to be met by: 11 days     Patient will increase functional independence with ADLs by performing:    UE Dressing with Set-up Assistance.  LE Dressing with Supervision.  Grooming while standing at sink with Supervision.  Toileting from toilet with Supervision for hygiene and clothing management.   Bathing from  shower chair/bench with Supervision.  Supine to sit with Modified [place] /c HOB flat and no handrails.  Stand pivot transfers with Supervision.  Toilet transfer to toilet with Supervision.  Upper extremity exercise program 3 x 15 reps per handout, with independence.  Patient will complete a functional standing activity for 10 min with S in order to perform household tasks.    Outcome: Ongoing (interventions implemented as appropriate)  Patient's goals are appropriate.   DACIA Regalado  10/26/2018

## 2018-10-26 NOTE — PT/OT/SLP PROGRESS
Physical Therapy  Treatment    Kraly Sandoval   MRN: [id number]   Admitting Diagnosis: Acute bilateral low back pain without sciatica    PT Received On: 10/26/18  Total Time (min): 53       Billable Minutes:  Gait Training 15, Therapeutic Activity 23 and Therapeutic Exercise 15    Treatment Type: Treatment  PT/PTA: PT     PTA Visit Number: 0       General Precautions: Standard, fall  Orthopedic Precautions: N/A   Braces: N/A    Do you have any cultural, spiritual, Latter-day conflicts, given your current situation?: none    Subjective:  Communicated with pt prior to session.  Agreeable to PT services.    Pain/Comfort  Pain Rating 1: 8/10  Location - Side 1: Bilateral  Location - Orientation 1: lower  Location 1: back  Pain Addressed 1: Reposition  Pain Rating Post-Intervention 1: 8/10    Objective:  Patient found seated in wheelchair.   AM-PAC 6 CLICK MOBILITY  Total Score:17    Bed Mobility:  Sit>Supine:Mod A for mgmt of LEs  Supine>Sit: Mod A for mgmt of trunk and LEs (via log-roll)    Transfers:  Sit<>Stand: CGA from WC  Stand Pivot Transfer: CGA-Min A - requires upper extremity support     Gait:  Amb 85+85 feet with use of rolling walker with SBA and cues for proper upright posture.     Therex:  Seated- hip flexion, long arc quads, ankle pumps with knee extension x 20 reps BLE    Balance:  Needs UE support while stand and moving due to instability and poor posture.    Additional Treatment:  Kinesiotape applied to pt's R buttocks (at point of pain) with 25% tension in middle of tape (1 vertical piece, 1 horizontal piece) to alleviate sciatic nerve pain.    Stretched pt's hamstrings and IT band passively x 2 trials (x20 second hold)    Patient left up in chair with call button in reach.    Assessment:  Karly Sandoval is a 88 y.o. female with a medical diagnosis of Acute bilateral low back pain without sciatica.  Ms. Sandoval will benefit from continued PT services. If the kinesiotape proves to alleviate  sciatic pain on her R side, I will apply the tape to her L side as well for pain relief of sciatic nerve distribution.    Rehab identified problem list/impairments: weakness, impaired endurance, impaired self care skills, impaired functional mobilty, gait instability, impaired balance, decreased lower extremity function, pain    Rehab potential is good.    Activity tolerance: Good    Discharge recommendations: assisted living facility     Barriers to discharge: Decreased caregiver support    Equipment recommendations: wheelchair     GOALS:   Multidisciplinary Problems     Physical Therapy Goals        Problem: Physical Therapy Goal    Goal Priority Disciplines Outcome Goal Variances Interventions   Physical Therapy Goal     PT, PT/OT Ongoing (interventions implemented as appropriate)     Description:  Goals to be met by: 11 days     Patient will increase functional independence with mobility by performin. Supine to sit with Modified [place].  2. Sit to supine with Modified [place].  3. Sit to stand transfer with Supervision.  4. Bed to chair transfer with Supervision using Rolling Walker.  5. Gait  x 150 feet with Supervision using Rolling Walker.   6. Wheelchair propulsion x150 feet with Supervision using bilateral uppper extremities.  7. Ascend/Descend 4 inch curb step with Stand-by Assistance using Rolling Walker.  8. Lower extremity exercise program x20 reps per handout, with supervision.                       PLAN:    Patient to be seen (5-6X/week)  to address the above listed problems via gait training, therapeutic activities, therapeutic exercises, wheelchair management/training  Plan of Care expires: 18  Plan of Care reviewed with: patient, camille COY Venessa, PT  10/26/2018

## 2018-10-26 NOTE — PLAN OF CARE
Problem: Physical Therapy Goal  Goal: Physical Therapy Goal  Goals to be met by: 11 days     Patient will increase functional independence with mobility by performin. Supine to sit with Modified [place].  2. Sit to supine with Modified [place].  3. Sit to stand transfer with Supervision.  4. Bed to chair transfer with Supervision using Rolling Walker.  5. Gait  x 150 feet with Supervision using Rolling Walker.   6. Wheelchair propulsion x150 feet with Supervision using bilateral uppper extremities.  7. Ascend/Descend 4 inch curb step with Stand-by Assistance using Rolling Walker.  8. Lower extremity exercise program x20 reps per handout, with supervision.     Outcome: Ongoing (interventions implemented as appropriate)  Goals remain appropriate.

## 2018-10-27 LAB
ANION GAP SERPL CALC-SCNC: 5 MMOL/L
BUN SERPL-MCNC: 65 MG/DL
CALCIUM SERPL-MCNC: 8.3 MG/DL
CHLORIDE SERPL-SCNC: 99 MMOL/L
CO2 SERPL-SCNC: 20 MMOL/L
CREAT SERPL-MCNC: 1.4 MG/DL
EST. GFR  (AFRICAN AMERICAN): 38.7 ML/MIN/1.73 M^2
EST. GFR  (NON AFRICAN AMERICAN): 33.6 ML/MIN/1.73 M^2
GLUCOSE SERPL-MCNC: 116 MG/DL
POTASSIUM SERPL-SCNC: 5.1 MMOL/L
SODIUM SERPL-SCNC: 124 MMOL/L

## 2018-10-27 PROCEDURE — 97530 THERAPEUTIC ACTIVITIES: CPT

## 2018-10-27 PROCEDURE — 80048 BASIC METABOLIC PNL TOTAL CA: CPT

## 2018-10-27 PROCEDURE — 97110 THERAPEUTIC EXERCISES: CPT

## 2018-10-27 PROCEDURE — 25000003 PHARM REV CODE 250: Performed by: INTERNAL MEDICINE

## 2018-10-27 PROCEDURE — 25000003 PHARM REV CODE 250: Performed by: STUDENT IN AN ORGANIZED HEALTH CARE EDUCATION/TRAINING PROGRAM

## 2018-10-27 PROCEDURE — 63600175 PHARM REV CODE 636 W HCPCS: Performed by: STUDENT IN AN ORGANIZED HEALTH CARE EDUCATION/TRAINING PROGRAM

## 2018-10-27 PROCEDURE — 11000004 HC SNF PRIVATE

## 2018-10-27 PROCEDURE — 97116 GAIT TRAINING THERAPY: CPT

## 2018-10-27 PROCEDURE — 25000003 PHARM REV CODE 250: Performed by: NURSE PRACTITIONER

## 2018-10-27 RX ORDER — SODIUM CHLORIDE 9 MG/ML
INJECTION, SOLUTION INTRAVENOUS CONTINUOUS
Status: ACTIVE | OUTPATIENT
Start: 2018-10-27 | End: 2018-10-28

## 2018-10-27 RX ADMIN — CARVEDILOL 12.5 MG: 12.5 TABLET, FILM COATED ORAL at 04:10

## 2018-10-27 RX ADMIN — LIDOCAINE 2 PATCH: 50 PATCH TOPICAL at 04:10

## 2018-10-27 RX ADMIN — SENNOSIDES AND DOCUSATE SODIUM 1 TABLET: 8.6; 5 TABLET ORAL at 09:10

## 2018-10-27 RX ADMIN — SENNOSIDES AND DOCUSATE SODIUM 1 TABLET: 8.6; 5 TABLET ORAL at 08:10

## 2018-10-27 RX ADMIN — DEXAMETHASONE 2 MG: 1 TABLET ORAL at 08:10

## 2018-10-27 RX ADMIN — ALPRAZOLAM 0.12 MG: 0.25 TABLET ORAL at 08:10

## 2018-10-27 RX ADMIN — HYDROXYCHLOROQUINE SULFATE 200 MG: 200 TABLET, FILM COATED ORAL at 09:10

## 2018-10-27 RX ADMIN — ACETAMINOPHEN 500 MG: 500 TABLET, FILM COATED ORAL at 09:10

## 2018-10-27 RX ADMIN — ACETAMINOPHEN 500 MG: 500 TABLET, FILM COATED ORAL at 04:10

## 2018-10-27 RX ADMIN — ACETAMINOPHEN 500 MG: 500 TABLET, FILM COATED ORAL at 08:10

## 2018-10-27 RX ADMIN — SERTRALINE HYDROCHLORIDE 25 MG: 25 TABLET ORAL at 09:10

## 2018-10-27 RX ADMIN — ATORVASTATIN CALCIUM 20 MG: 20 TABLET, FILM COATED ORAL at 09:10

## 2018-10-27 RX ADMIN — DEXAMETHASONE 2 MG: 1 TABLET ORAL at 09:10

## 2018-10-27 RX ADMIN — POLYETHYLENE GLYCOL 3350 17 G: 17 POWDER, FOR SOLUTION ORAL at 12:10

## 2018-10-27 RX ADMIN — CARVEDILOL 12.5 MG: 12.5 TABLET, FILM COATED ORAL at 09:10

## 2018-10-27 RX ADMIN — SODIUM CHLORIDE: 0.9 INJECTION, SOLUTION INTRAVENOUS at 12:10

## 2018-10-27 RX ADMIN — FOLIC ACID 1 MG: 1 TABLET ORAL at 09:10

## 2018-10-27 RX ADMIN — AMLODIPINE BESYLATE 10 MG: 10 TABLET ORAL at 09:10

## 2018-10-27 NOTE — PLAN OF CARE
Problem: Occupational Therapy Goal  Goal: Occupational Therapy Goal  Goals to be met by: 11 days     Patient will increase functional independence with ADLs by performing:    UE Dressing with Set-up Assistance.  LE Dressing with Supervision.  Grooming while standing at sink with Supervision.  Toileting from toilet with Supervision for hygiene and clothing management.   Bathing from  shower chair/bench with Supervision.  Supine to sit with Modified [place] /c HOB flat and no handrails.  Stand pivot transfers with Supervision.  Toilet transfer to toilet with Supervision.  Upper extremity exercise program 3 x 15 reps per handout, with independence.  Patient will complete a functional standing activity for 10 min with S in order to perform household tasks.    Outcome: Ongoing (interventions implemented as appropriate)  Progressing. DACIA Cui  10/27/2018

## 2018-10-27 NOTE — PT/OT/SLP PROGRESS
"Occupational Therapy  Treatment    Karly Sandoval   MRN: [id number]   Admitting Diagnosis: Acute bilateral low back pain without sciatica    OT Date of Treatment: 10/27/18  Total Time (min): 33 min    Billable Minutes:  Therapeutic Activity 23 and Therapeutic Exercise 10    General Precautions: Standard, fall  Orthopedic Precautions: N/A  Braces: N/A         Subjective:  Communicated with pt prior to session.  "I am so tired. I could take a nap"    Pain/Comfort  Pain Rating 1: 5/10  Location - Side 1: Bilateral  Location - Orientation 1: lower  Location 1: back  Pain Addressed 1: Pre-medicate for activity, Reposition, Cessation of Activity  Pain Rating Post-Intervention 1: 5/10    Objective:  Patient found with: (seated in w/c)    Occupational Performance:    Functional Mobility/Transfers:  · Patient completed Sit <> Stand Transfer with stand by assistance  with  rolling walker   · Functional Mobility: Pt stood from w/c at table with SBA and increased time and cues for hand placement. Pt stood from w/c with RW SBA and ambulated across room with CGA to sit in bedside chair    Activities of Daily Living:  · Feeding:  setup in bedside chair .    [facility] 6 Click:  [facility] Total Score: 17    OT Exercises: UE Ergometer 10 min to increase functional endurance for ADLs    Additional Treatment:  Pt stood at table with sup while performing a FM cognitive task with min A for cues to complete x 8 min     Patient left up in chair with call button in reach and eating lunch    ASSESSMENT:  Karly Sandoval is a 88 y.o. female with a medical diagnosis of Acute bilateral low back pain without sciatica . Pt tolerated tx and demonstrated increased standing tolerance, functional mobility and sit to stand technique. Pt cont to benefit from OT to increase functional indep and safety to return to PLOF with assist as needed.    Rehab identified problem list/impairments: weakness, impaired endurance, impaired self care skills, impaired " functional mobilty, gait instability, impaired balance, decreased lower extremity function, pain    Rehab potential is good    Activity tolerance: Good    Discharge recommendations: assisted living facility     Barriers to discharge: Decreased caregiver support     Equipment recommendations: wheelchair     GOALS:   Multidisciplinary Problems     Occupational Therapy Goals        Problem: Occupational Therapy Goal    Goal Priority Disciplines Outcome Interventions   Occupational Therapy Goal     OT, PT/OT Ongoing (interventions implemented as appropriate)    Description:  Goals to be met by: 11 days     Patient will increase functional independence with ADLs by performing:    UE Dressing with Set-up Assistance.  LE Dressing with Supervision.  Grooming while standing at sink with Supervision.  Toileting from toilet with Supervision for hygiene and clothing management.   Bathing from  shower chair/bench with Supervision.  Supine to sit with Modified [place] /c HOB flat and no handrails.  Stand pivot transfers with Supervision.  Toilet transfer to toilet with Supervision.  Upper extremity exercise program 3 x 15 reps per handout, with independence.  Patient will complete a functional standing activity for 10 min with S in order to perform household tasks.                     Plan:  Patient to be seen 5 x/week to address the above listed problems via self-care/home management, therapeutic activities, therapeutic exercises  Plan of Care expires: 11/24/18  Plan of Care reviewed with: patient    DACIA Cui  10/27/2018

## 2018-10-27 NOTE — PT/OT/SLP PROGRESS
"Physical Therapy  Treatment    Karly Sandoval   MRN: [id number]   Admitting Diagnosis: Acute bilateral low back pain without sciatica    PT Received On: 10/27/18  Total Time (min): 38       Billable Minutes:  Gait Training 13, Therapeutic Activity 10, Therapeutic Exercise 15 and Total Time 38    Treatment Type: Treatment  PT/PTA: PT     PTA Visit Number: 0       General Precautions: Standard, fall  Orthopedic Precautions: N/A   Braces: N/A    Do you have any cultural, spiritual, Buddhist conflicts, given your current situation?: none    Subjective:  Communicated with patient prior to session.  Pt agreeable to session.    Pain/Comfort  Pain Rating 1: 6/10  Location - Side 1: Bilateral  Location - Orientation 1: lower  Location 1: back  Pain Addressed 1: Pre-medicate for activity, Reposition  Pain Rating Post-Intervention 1: 6/10    Objective:  Patient found sitting in w/c       AM-PAC 6 CLICK MOBILITY  Total Score:16    Bed Mobility:  Sit>Supine:on mat w/ ModA for BLE  Supine>Sit: on mat w/ Holly to logroll prior to sitting EOM  Cueing and inc'd time required    Transfers:  Sit<>Stand: to/from w/c (2 trials) w/ RW and Holly to rise  Stand Pivot Transfer: w/c<>EOM w/ RW and [place] to rise  Cueing for hand/foot placement and forward lean    Gait:  Amb 120ft (x2 trials) w/ RW and CGA for safety, 1 standing rest break b/w trials  Cueing for upright posture and to inc step length/height     Advanced Gait:  Curb Step: asc/ashatni 4" curb step w/ RW and Holly for stability and RW management  Cueing for technique    Therex:  Supine BLE therex 2x15 reps (GS, SAQ, AP)  Seated BLE therex 1x15 reps (HF and LAQ)    Patient left up in chair with call button in reach.    Assessment:  Karly Sandoval is a 88 y.o. female with a medical diagnosis of Acute bilateral low back pain without sciatica.  Pt lópez session well despite back pain. She was able to inc amb distance. Pt is progressing well and will continue PT POC.    Rehab identified " problem list/impairments: weakness, impaired endurance, impaired self care skills, impaired functional mobilty, gait instability, impaired balance, decreased lower extremity function, pain    Rehab potential is good.    Activity tolerance: Good    Discharge recommendations: assisted living facility     Barriers to discharge: Decreased caregiver support    Equipment recommendations: wheelchair     GOALS:   Multidisciplinary Problems     Physical Therapy Goals        Problem: Physical Therapy Goal    Goal Priority Disciplines Outcome Goal Variances Interventions   Physical Therapy Goal     PT, PT/OT Ongoing (interventions implemented as appropriate)     Description:  Goals to be met by: 11 days     Patient will increase functional independence with mobility by performin. Supine to sit with Modified [place].  2. Sit to supine with Modified [place].  3. Sit to stand transfer with Supervision.  4. Bed to chair transfer with Supervision using Rolling Walker.  5. Gait  x 150 feet with Supervision using Rolling Walker.   6. Wheelchair propulsion x150 feet with Supervision using bilateral uppper extremities.  7. Ascend/Descend 4 inch curb step with Stand-by Assistance using Rolling Walker.  8. Lower extremity exercise program x20 reps per handout, with supervision.                       PLAN:    Patient to be seen (5-6X/week)  to address the above listed problems via gait training, therapeutic activities, therapeutic exercises, wheelchair management/training  Plan of Care expires: 18  Plan of Care reviewed with: patient, camille    Elida Ayala, PT  10/27/2018

## 2018-10-27 NOTE — PLAN OF CARE
Problem: Pressure Ulcer Risk (Jake Scale) (Adult,Obstetrics,Pediatric)  Goal: Skin Integrity  Patient will demonstrate the desired outcomes by discharge/transition of care.  Outcome: Ongoing (interventions implemented as appropriate)  Pressure Ulcer Risk: Encouraged patient to turn throughout the shift to help minimize her risk of pressure ulcer. Encouraged patient to call for staff assistance if she needs assistance to turn . Patient verbalized understanding. Will maintain safety precautions and follow up as needed.

## 2018-10-27 NOTE — PLAN OF CARE
Problem: Physical Therapy Goal  Goal: Physical Therapy Goal  Goals to be met by: 11 days     Patient will increase functional independence with mobility by performin. Supine to sit with Modified [place].  2. Sit to supine with Modified [place].  3. Sit to stand transfer with Supervision.  4. Bed to chair transfer with Supervision using Rolling Walker.  5. Gait  x 150 feet with Supervision using Rolling Walker.   6. Wheelchair propulsion x150 feet with Supervision using bilateral uppper extremities.  7. Ascend/Descend 4 inch curb step with Stand-by Assistance using Rolling Walker.  8. Lower extremity exercise program x20 reps per handout, with supervision.      Outcome: Ongoing (interventions implemented as appropriate)  LTGs remain appropriate. Pt will continue PT POC.    Elida Ayala, PT  10/27/2018

## 2018-10-28 LAB
ANION GAP SERPL CALC-SCNC: 7 MMOL/L
BACTERIA #/AREA URNS AUTO: ABNORMAL /HPF
BILIRUB UR QL STRIP: NEGATIVE
BUN SERPL-MCNC: 63 MG/DL
CALCIUM SERPL-MCNC: 8.1 MG/DL
CHLORIDE SERPL-SCNC: 98 MMOL/L
CLARITY UR REFRACT.AUTO: ABNORMAL
CO2 SERPL-SCNC: 18 MMOL/L
COLOR UR AUTO: YELLOW
CREAT SERPL-MCNC: 1.5 MG/DL
EST. GFR  (AFRICAN AMERICAN): 35.6 ML/MIN/1.73 M^2
EST. GFR  (NON AFRICAN AMERICAN): 30.9 ML/MIN/1.73 M^2
GLUCOSE SERPL-MCNC: 190 MG/DL
GLUCOSE UR QL STRIP: NEGATIVE
HGB UR QL STRIP: NEGATIVE
HYALINE CASTS UR QL AUTO: 0 /LPF
KETONES UR QL STRIP: NEGATIVE
LEUKOCYTE ESTERASE UR QL STRIP: ABNORMAL
MICROSCOPIC COMMENT: ABNORMAL
NITRITE UR QL STRIP: NEGATIVE
OSMOLALITY UR: 531 MOSM/KG
PH UR STRIP: 8 [PH] (ref 5–8)
POTASSIUM SERPL-SCNC: 4.8 MMOL/L
PROT UR QL STRIP: ABNORMAL
RBC #/AREA URNS AUTO: 2 /HPF (ref 0–4)
SODIUM SERPL-SCNC: 123 MMOL/L
SODIUM UR-SCNC: 23 MMOL/L
SP GR UR STRIP: 1.01 (ref 1–1.03)
SQUAMOUS #/AREA URNS AUTO: 1 /HPF
URN SPEC COLLECT METH UR: ABNORMAL
WBC #/AREA URNS AUTO: >100 /HPF (ref 0–5)

## 2018-10-28 PROCEDURE — 87077 CULTURE AEROBIC IDENTIFY: CPT

## 2018-10-28 PROCEDURE — 25000003 PHARM REV CODE 250: Performed by: NURSE PRACTITIONER

## 2018-10-28 PROCEDURE — 84300 ASSAY OF URINE SODIUM: CPT

## 2018-10-28 PROCEDURE — 25000003 PHARM REV CODE 250: Performed by: INTERNAL MEDICINE

## 2018-10-28 PROCEDURE — 87088 URINE BACTERIA CULTURE: CPT

## 2018-10-28 PROCEDURE — 87086 URINE CULTURE/COLONY COUNT: CPT

## 2018-10-28 PROCEDURE — 11000004 HC SNF PRIVATE

## 2018-10-28 PROCEDURE — 87186 SC STD MICRODIL/AGAR DIL: CPT | Mod: 59

## 2018-10-28 PROCEDURE — 25000003 PHARM REV CODE 250: Performed by: STUDENT IN AN ORGANIZED HEALTH CARE EDUCATION/TRAINING PROGRAM

## 2018-10-28 PROCEDURE — 80048 BASIC METABOLIC PNL TOTAL CA: CPT

## 2018-10-28 PROCEDURE — 81001 URINALYSIS AUTO W/SCOPE: CPT

## 2018-10-28 PROCEDURE — 83935 ASSAY OF URINE OSMOLALITY: CPT

## 2018-10-28 RX ORDER — CIPROFLOXACIN 250 MG/1
250 TABLET, FILM COATED ORAL EVERY 12 HOURS
Status: COMPLETED | OUTPATIENT
Start: 2018-10-28 | End: 2018-10-31

## 2018-10-28 RX ADMIN — ACETAMINOPHEN 500 MG: 500 TABLET, FILM COATED ORAL at 08:10

## 2018-10-28 RX ADMIN — HYDROXYCHLOROQUINE SULFATE 200 MG: 200 TABLET, FILM COATED ORAL at 08:10

## 2018-10-28 RX ADMIN — POLYETHYLENE GLYCOL 3350 17 G: 17 POWDER, FOR SOLUTION ORAL at 03:10

## 2018-10-28 RX ADMIN — FOLIC ACID 1 MG: 1 TABLET ORAL at 08:10

## 2018-10-28 RX ADMIN — LIDOCAINE 2 PATCH: 50 PATCH TOPICAL at 04:10

## 2018-10-28 RX ADMIN — CIPROFLOXACIN HYDROCHLORIDE 250 MG: 250 TABLET, FILM COATED ORAL at 08:10

## 2018-10-28 RX ADMIN — ACETAMINOPHEN 500 MG: 500 TABLET, FILM COATED ORAL at 03:10

## 2018-10-28 RX ADMIN — SODIUM CHLORIDE TAB 1 GM 1 G: 1 TAB at 02:10

## 2018-10-28 RX ADMIN — SODIUM CHLORIDE TAB 1 GM 1 G: 1 TAB at 08:10

## 2018-10-28 RX ADMIN — ATORVASTATIN CALCIUM 20 MG: 20 TABLET, FILM COATED ORAL at 08:10

## 2018-10-28 RX ADMIN — SERTRALINE HYDROCHLORIDE 25 MG: 25 TABLET ORAL at 08:10

## 2018-10-28 RX ADMIN — AMLODIPINE BESYLATE 10 MG: 10 TABLET ORAL at 08:10

## 2018-10-28 RX ADMIN — SENNOSIDES AND DOCUSATE SODIUM 1 TABLET: 8.6; 5 TABLET ORAL at 08:10

## 2018-10-28 RX ADMIN — ALPRAZOLAM 0.12 MG: 0.25 TABLET ORAL at 08:10

## 2018-10-28 RX ADMIN — CARVEDILOL 12.5 MG: 12.5 TABLET, FILM COATED ORAL at 08:10

## 2018-10-28 RX ADMIN — CARVEDILOL 12.5 MG: 12.5 TABLET, FILM COATED ORAL at 05:10

## 2018-10-28 NOTE — PLAN OF CARE
Problem: Fall Risk (Adult)  Goal: Identify Related Risk Factors and Signs and Symptoms  Related risk factors and signs and symptoms are identified upon initiation of Human [employer] Clinical Practice Guideline (CPG)  Outcome: Ongoing (interventions implemented as appropriate)   10/28/18 0204   Fall Risk   Related Risk Factors (Fall Risk) fatigue/slow reaction;gait/mobility problems

## 2018-10-28 NOTE — PROGRESS NOTES
Dr. Henry informed per charge nurse of pt's test results for urinalysis, sodiu, urine random, and osmolality urine . Charge nurse verbalized that Dr. Henry stated will add on a urinalysis culture .

## 2018-10-29 ENCOUNTER — TELEPHONE (OUTPATIENT)
Dept: ADMINISTRATIVE | Facility: CLINIC | Age: 83
End: 2018-10-29

## 2018-10-29 LAB
ANION GAP SERPL CALC-SCNC: 4 MMOL/L
ANISOCYTOSIS BLD QL SMEAR: SLIGHT
BASOPHILS # BLD AUTO: ABNORMAL K/UL
BASOPHILS NFR BLD: 0 %
BUN SERPL-MCNC: 58 MG/DL
BURR CELLS BLD QL SMEAR: ABNORMAL
CALCIUM SERPL-MCNC: 8.4 MG/DL
CHLORIDE SERPL-SCNC: 98 MMOL/L
CO2 SERPL-SCNC: 21 MMOL/L
CREAT SERPL-MCNC: 1.3 MG/DL
DIFFERENTIAL METHOD: ABNORMAL
EOSINOPHIL # BLD AUTO: ABNORMAL K/UL
EOSINOPHIL NFR BLD: 1 %
ERYTHROCYTE [DISTWIDTH] IN BLOOD BY AUTOMATED COUNT: 14 %
EST. GFR  (AFRICAN AMERICAN): 42.3 ML/MIN/1.73 M^2
EST. GFR  (NON AFRICAN AMERICAN): 36.7 ML/MIN/1.73 M^2
GLUCOSE SERPL-MCNC: 76 MG/DL
HCT VFR BLD AUTO: 27.9 %
HGB BLD-MCNC: 9.7 G/DL
IMM GRANULOCYTES # BLD AUTO: ABNORMAL K/UL
IMM GRANULOCYTES NFR BLD AUTO: ABNORMAL %
LYMPHOCYTES # BLD AUTO: ABNORMAL K/UL
LYMPHOCYTES NFR BLD: 8 %
MAGNESIUM SERPL-MCNC: 1.9 MG/DL
MCH RBC QN AUTO: 31.3 PG
MCHC RBC AUTO-ENTMCNC: 34.8 G/DL
MCV RBC AUTO: 90 FL
METAMYELOCYTES NFR BLD MANUAL: 1 %
MONOCYTES # BLD AUTO: ABNORMAL K/UL
MONOCYTES NFR BLD: 8 %
MYELOCYTES NFR BLD MANUAL: 2 %
NEUTROPHILS NFR BLD: 77 %
NEUTS BAND NFR BLD MANUAL: 3 %
NRBC BLD-RTO: 0 /100 WBC
OSMOLALITY SERPL: 278 MOSM/KG
OVALOCYTES BLD QL SMEAR: ABNORMAL
PHOSPHATE SERPL-MCNC: 3.1 MG/DL
PLATELET # BLD AUTO: 264 K/UL
PMV BLD AUTO: 10.3 FL
POIKILOCYTOSIS BLD QL SMEAR: SLIGHT
POTASSIUM SERPL-SCNC: 5 MMOL/L
RBC # BLD AUTO: 3.1 M/UL
SODIUM SERPL-SCNC: 123 MMOL/L
WBC # BLD AUTO: 7.05 K/UL

## 2018-10-29 PROCEDURE — 36415 COLL VENOUS BLD VENIPUNCTURE: CPT

## 2018-10-29 PROCEDURE — 80048 BASIC METABOLIC PNL TOTAL CA: CPT

## 2018-10-29 PROCEDURE — 85007 BL SMEAR W/DIFF WBC COUNT: CPT

## 2018-10-29 PROCEDURE — 25000003 PHARM REV CODE 250: Performed by: NURSE PRACTITIONER

## 2018-10-29 PROCEDURE — 85027 COMPLETE CBC AUTOMATED: CPT

## 2018-10-29 PROCEDURE — 83930 ASSAY OF BLOOD OSMOLALITY: CPT

## 2018-10-29 PROCEDURE — 83735 ASSAY OF MAGNESIUM: CPT

## 2018-10-29 PROCEDURE — 97803 MED NUTRITION INDIV SUBSEQ: CPT

## 2018-10-29 PROCEDURE — 11000004 HC SNF PRIVATE

## 2018-10-29 PROCEDURE — 84100 ASSAY OF PHOSPHORUS: CPT

## 2018-10-29 PROCEDURE — 25000003 PHARM REV CODE 250: Performed by: INTERNAL MEDICINE

## 2018-10-29 PROCEDURE — 97535 SELF CARE MNGMENT TRAINING: CPT

## 2018-10-29 PROCEDURE — 25000003 PHARM REV CODE 250: Performed by: STUDENT IN AN ORGANIZED HEALTH CARE EDUCATION/TRAINING PROGRAM

## 2018-10-29 RX ORDER — FUROSEMIDE 20 MG/1
20 TABLET ORAL DAILY
Status: COMPLETED | OUTPATIENT
Start: 2018-10-29 | End: 2018-10-29

## 2018-10-29 RX ORDER — ACETAMINOPHEN 500 MG
1000 TABLET ORAL 3 TIMES DAILY
Status: DISCONTINUED | OUTPATIENT
Start: 2018-10-29 | End: 2018-11-08

## 2018-10-29 RX ADMIN — FOLIC ACID 1 MG: 1 TABLET ORAL at 09:10

## 2018-10-29 RX ADMIN — CIPROFLOXACIN HYDROCHLORIDE 250 MG: 250 TABLET, FILM COATED ORAL at 09:10

## 2018-10-29 RX ADMIN — ALPRAZOLAM 0.12 MG: 0.25 TABLET ORAL at 09:10

## 2018-10-29 RX ADMIN — ACETAMINOPHEN 1000 MG: 500 TABLET, FILM COATED ORAL at 04:10

## 2018-10-29 RX ADMIN — AMLODIPINE BESYLATE 10 MG: 10 TABLET ORAL at 09:10

## 2018-10-29 RX ADMIN — ATORVASTATIN CALCIUM 20 MG: 20 TABLET, FILM COATED ORAL at 09:10

## 2018-10-29 RX ADMIN — SODIUM CHLORIDE TAB 1 GM 1 G: 1 TAB at 09:10

## 2018-10-29 RX ADMIN — LIDOCAINE 2 PATCH: 50 PATCH TOPICAL at 05:10

## 2018-10-29 RX ADMIN — SENNOSIDES AND DOCUSATE SODIUM 1 TABLET: 8.6; 5 TABLET ORAL at 09:10

## 2018-10-29 RX ADMIN — CARVEDILOL 12.5 MG: 12.5 TABLET, FILM COATED ORAL at 05:10

## 2018-10-29 RX ADMIN — SERTRALINE HYDROCHLORIDE 25 MG: 25 TABLET ORAL at 09:10

## 2018-10-29 RX ADMIN — CARVEDILOL 12.5 MG: 12.5 TABLET, FILM COATED ORAL at 09:10

## 2018-10-29 RX ADMIN — POLYETHYLENE GLYCOL 3350 17 G: 17 POWDER, FOR SOLUTION ORAL at 09:10

## 2018-10-29 RX ADMIN — HYDROXYCHLOROQUINE SULFATE 200 MG: 200 TABLET, FILM COATED ORAL at 09:10

## 2018-10-29 RX ADMIN — ACETAMINOPHEN 500 MG: 500 TABLET, FILM COATED ORAL at 09:10

## 2018-10-29 RX ADMIN — FUROSEMIDE 20 MG: 20 TABLET ORAL at 12:10

## 2018-10-29 RX ADMIN — ACETAMINOPHEN 1000 MG: 500 TABLET, FILM COATED ORAL at 09:10

## 2018-10-29 NOTE — TELEPHONE ENCOUNTER
Clinical Status:    Progress made/or loss:    Therapies Involved with Care:      PT:    Ambulation:    Sit to Stand:    Pivot:    Bed Function:    Transfers:    Bed Mobility:    OT:    Toileting:    Hygiene:    Upper Body dressing:    Lower Body dressing:    Clothing management:    ADL's:    ST    Diet Order    Cognition:      Barriers to Progress:    New Treatments:    Projected LOS:    Projected Discharge date:    Discharge Disposition:

## 2018-10-29 NOTE — PROGRESS NOTES
Attended IDT meeting at [facility] to obtain patient update -      Clinical Status:Patient has been complaining of back pain when moving, will try to get her a brace so see if that will help.    Progress made/or loss:    Therapies Involved with Care:      PT:    Ambulation:240 ft. CG    Sit to Stand:    Pivot:    Bed Function:    Transfers:mod assist    Bed Mobility:    OT:    Toileting:max assist    Hygiene:    Upper Body dressing:mod assist    Lower Body dressing:mod assist    Clothing management:    ADL's:    ST    Diet Order    Cognition:      Barriers to Progress:none    New Treatments:    Projected LOS:    Projected Discharge date:11/2/2018 - requesting a 7 day extension to work on ADL's. Patient is having some trouble with ADL's. New D/C date 11/9/2018    Discharge Disposition:

## 2018-10-29 NOTE — PROGRESS NOTES
Informed per  that patient was taken for xray to main [facility] via w/c per hospital transportation.

## 2018-10-29 NOTE — PLAN OF CARE
Problem: Occupational Therapy Goal  Goal: Occupational Therapy Goal  Goals to be met by: 11 days     Patient will increase functional independence with ADLs by performing:    UE Dressing with Set-up Assistance.  LE Dressing with Supervision.  Grooming while standing at sink with Supervision.  Toileting from toilet with Supervision for hygiene and clothing management.   Bathing from  shower chair/bench with Supervision.  Supine to sit with Modified [place] /c HOB flat and no handrails.  Stand pivot transfers with Supervision.  Toilet transfer to toilet with Supervision.  Upper extremity exercise program 3 x 15 reps per handout, with independence.  Patient will complete a functional standing activity for 10 min with S in order to perform household tasks.    Outcome: Ongoing (interventions implemented as appropriate)  Patient's goals are appropriate.   DACIA Regalado  10/29/2018

## 2018-10-29 NOTE — PLAN OF CARE
Problem: Fall Risk (Adult)  Goal: Absence of Falls  Patient will demonstrate the desired outcomes by discharge/transition of care.  Outcome: Ongoing (interventions implemented as appropriate)  Pt did not have any falls during this shift. Staff will continue to educated pt on patient safety. All safety measure maintained.

## 2018-10-29 NOTE — PT/OT/SLP PROGRESS
Physical Therapy      Patient Name:  Karly Sandoval   MRN:  [id number]    Patient not seen today secondary to [place] [facility] [place]  . Will follow-up per PT POC tomorrow.    Elida Ayala, PT   10/29/2018

## 2018-10-29 NOTE — PROGRESS NOTES
Patient back from xray, patient back in bed now, ndn, afterwards nurse practitioner Asha spoke with patient. Call light in reach. Will monitor.

## 2018-10-29 NOTE — PT/OT/SLP PROGRESS
Occupational Therapy  Treatment    Karly Sandoval   MRN: [id number]   Admitting Diagnosis: Acute bilateral low back pain without sciatica    OT Date of Treatment: 10/29/18  Total Time (min): 60 min    Billable Minutes:  Self Care/Home Management 60    General Precautions: Standard, fall  Orthopedic Precautions: N/A  Braces: N/A         Subjective:  Communicated with patient prior to session.    Pain/Comfort  Pain Rating 1: 6/10  Location - Side 1: Bilateral  Location - Orientation 1: lower  Location 1: back  Pain Addressed 1: Reposition, Distraction  Pain Rating Post-Intervention 1: 6/10    Objective:       Occupational Performance:    Bed Mobility:    · Patient completed Supine to Sit with minimum assistance     Functional Mobility/Transfers:  · Patient completed Sit <> Stand Transfer with minimum assistance  with  no assistive device   · Patient completed Bed>w/c; w/c>bedside chair Transfer using Stand Pivot technique with minimum assistance with no assistive device  · Patient completed w/c<>toilet stand pivot using grab bar with min A     Activities of Daily Living:  · Feeding:  setup    · Grooming: supervision Combing hair  · Bathing: moderate assistance spongeabath while on toilet   · Upper Body Dressing: contact guard assistance [place] and donning pullover shirt and button down shirt  · Lower Body Dressing: maximal assistance Donning pants.   · Toileting: maximal assistance      AMPA 6 Click:  AMPAC Total Score: 15    Patient left up in chair with call button in reach and all needs met.     ASSESSMENT:  Karly Sandoval is a 88 y.o. female with a medical diagnosis of Acute bilateral low back pain without sciatica and presents with the deficits listed below. Patient tolerated treatment session, but did have a lot of difficulty with ADLs due to her pain. Patient continues to benefit from skilled OT services to achieve maximal independence.    Rehab identified problem list/impairments: weakness, impaired  endurance, impaired self care skills, impaired functional mobilty, gait instability, impaired balance, decreased lower extremity function, pain    Rehab potential is good    Activity tolerance: Good    Discharge recommendations: assisted living facility     Barriers to discharge: Decreased caregiver support     Equipment recommendations: wheelchair     GOALS:   Multidisciplinary Problems     Occupational Therapy Goals        Problem: Occupational Therapy Goal    Goal Priority Disciplines Outcome Interventions   Occupational Therapy Goal     OT, PT/OT Ongoing (interventions implemented as appropriate)    Description:  Goals to be met by: 11 days     Patient will increase functional independence with ADLs by performing:    UE Dressing with Set-up Assistance.  LE Dressing with Supervision.  Grooming while standing at sink with Supervision.  Toileting from toilet with Supervision for hygiene and clothing management.   Bathing from  shower chair/bench with Supervision.  Supine to sit with Modified [place] /c HOB flat and no handrails.  Stand pivot transfers with Supervision.  Toilet transfer to toilet with Supervision.  Upper extremity exercise program 3 x 15 reps per handout, with independence.  Patient will complete a functional standing activity for 10 min with S in order to perform household tasks.                     Plan:  Patient to be seen 5 x/week to address the above listed problems via self-care/home management, therapeutic activities, therapeutic exercises  Plan of Care expires: 11/24/18  Plan of Care reviewed with: patient    DACIA Regalado  10/29/2018

## 2018-10-29 NOTE — PROGRESS NOTES
" [facility] - Skilled Nursing Care  Adult Nutrition  Progress Note    SUMMARY       Recommendations    Recommendation/Intervention: Continue cardiac diet; add boost plus BID; encourage intake  Goals: po >50% of meals and ONS acceptance in next 5 days  Nutrition Goal Status: progressing towards goal  Communication of RD Recs: other (comment)(POC)    Reason for Assessment    Reason for Assessment: RD follow-up  Diagnosis: (hyponatremia; debility)  Relevant Medical History: HTN; anemia; HLD; GERD  Interdisciplinary Rounds: did not attend  General Information Comments: Spoke with pt and son. Reported fair appetite, po ~50-75%. Pt receiving boost plus TID, drinking one at each meal. Will monitor for fluid restriction. No N/V/C/D reported at this time, LBM 10/29/18. RD to follow up.   Nutrition Discharge Planning: d/c cardiac diet     Nutrition Risk Screen    Nutrition Risk Screen: no indicators present    Nutrition/Diet History    Do you have any cultural, spiritual, Islam conflicts, given your current situation?: none  Factors Affecting Nutritional Intake: decreased appetite(fatigue)    Anthropometrics    Temp: 98.2 °F (36.8 °C)  Height Method: Stated  Height: 5' 2" (157.5 cm)  Height (inches): 62 in  Weight Method: Standard Scale  Weight: 54.1 kg (119 lb 4.3 oz)  Weight (lb): 119.27 lb  Ideal Body Weight (IBW), Female: 110 lb  % Ideal Body Weight, Female (lb): 108.63 lb  BMI (Calculated): 21.9  BMI Grade: 18.5-24.9 - normal  Weight Loss: unintentional  Usual Body Weight (UBW), k.82 kg  % Usual Body Weight: 95.59   wt is stable     Lab/Procedures/Meds    Pertinent Labs Reviewed: reviewed  Pertinent Labs Comments: Na 123; BUN 58; Ca 8.4  Pertinent Medications Reviewed: reviewed  Pertinent Medications Comments: acetaminophen; ciprofloxacin; furosemide; sodium chloride     Physical Findings/Assessment  NFPE completed on 10/24/18  Overall Physical Appearance: advanced age, loss of muscle mass, loss of subcutaneous " fat  Tubes: (-)  Oral/Mouth Cavity: WDL    Estimated/Assessed Needs    Weight Used For Calorie Calculations: 54.2 kg (119 lb 7.8 oz)  Energy Calorie Requirements (kcal): 2808-1532 kcal  Energy Need Method: Kcal/kg((25-30 kcal/kg))  Protein Requirements: 54-65 gm((1.0-1.2 gm/kg))  Weight Used For Protein Calculations: 54.2 kg (119 lb 7.8 oz)  Fluid Requirements (mL): 9651-0740 mL  Fluid Need Method: RDA Method  RDA Method (mL): 1355  CHO Requirement: 1 mL/kcal or per MD      Nutrition Prescription Ordered    Current Diet Order: cardiac   Nutrition Order Comments: 1500 mL fluid restriction  Oral Nutrition Supplement: Boost plus TID    Evaluation of Received Nutrient/Fluid Intake    Energy Calories Required: meeting needs  Protein Required: meeting needs  Fluid Required: meeting needs  Tolerance: tolerating  % Intake of Estimated Energy Needs: 75 - 100 %  % Meal Intake: 50 - 75 %    Nutrition Risk    Level of Risk/Frequency of Follow-up: low     Assessment and Plan  Moderate Malnutrition in the context of Acute Illness/Injury     Related to (etiology):  Decreased appetite and fatigue      Signs and Symptoms (as evidenced by):  Energy Intake: <75% of estimated energy requirement for > 1 week  Body Fat Depletion: moderate depletion of triceps   Muscle Mass Depletion: moderate depletion of clavicle region, scapular region and interosseous muscle   Weight Loss: 5% x 1 month   Fluid Accumulation: mild     Interventions/Recommendations (treatment strategy):  Continue cardiac diet; add boost plus BID; encourage intake     Nutrition Diagnosis Status:  Continues    Monitor and Evaluation    Food and Nutrient Intake: energy intake  Food and Nutrient Adminstration: diet order  Physical Activity and Function: nutrition-related ADLs and IADLs  Anthropometric Measurements: weight, weight change  Biochemical Data, Medical Tests and Procedures: electrolyte and renal panel, gastrointestinal profile, glucose/endocrine profile,  inflammatory profile, lipid profile  Nutrition-Focused Physical Findings: overall appearance     Nutrition Follow-Up    RD Follow-up?: Yes

## 2018-10-30 PROBLEM — S22.080A COMPRESSION FRACTURE OF T12 VERTEBRA: Status: ACTIVE | Noted: 2018-10-30

## 2018-10-30 PROBLEM — N30.00 ACUTE CYSTITIS WITHOUT HEMATURIA: Status: ACTIVE | Noted: 2018-10-30

## 2018-10-30 LAB
ANION GAP SERPL CALC-SCNC: 5 MMOL/L
BACTERIA UR CULT: NORMAL
BACTERIA UR CULT: NORMAL
BUN SERPL-MCNC: 54 MG/DL
CALCIUM SERPL-MCNC: 8.3 MG/DL
CHLORIDE SERPL-SCNC: 97 MMOL/L
CO2 SERPL-SCNC: 22 MMOL/L
CREAT SERPL-MCNC: 1.3 MG/DL
EST. GFR  (AFRICAN AMERICAN): 42.3 ML/MIN/1.73 M^2
EST. GFR  (NON AFRICAN AMERICAN): 36.7 ML/MIN/1.73 M^2
GLUCOSE SERPL-MCNC: 84 MG/DL
POTASSIUM SERPL-SCNC: 4.4 MMOL/L
SODIUM SERPL-SCNC: 124 MMOL/L

## 2018-10-30 PROCEDURE — 25000003 PHARM REV CODE 250: Performed by: INTERNAL MEDICINE

## 2018-10-30 PROCEDURE — 25000003 PHARM REV CODE 250: Performed by: NURSE PRACTITIONER

## 2018-10-30 PROCEDURE — 11000004 HC SNF PRIVATE

## 2018-10-30 PROCEDURE — 97116 GAIT TRAINING THERAPY: CPT

## 2018-10-30 PROCEDURE — 97530 THERAPEUTIC ACTIVITIES: CPT

## 2018-10-30 PROCEDURE — 36415 COLL VENOUS BLD VENIPUNCTURE: CPT

## 2018-10-30 PROCEDURE — 97110 THERAPEUTIC EXERCISES: CPT

## 2018-10-30 PROCEDURE — 80048 BASIC METABOLIC PNL TOTAL CA: CPT

## 2018-10-30 PROCEDURE — 25000003 PHARM REV CODE 250: Performed by: STUDENT IN AN ORGANIZED HEALTH CARE EDUCATION/TRAINING PROGRAM

## 2018-10-30 PROCEDURE — 99308 SBSQ NF CARE LOW MDM 20: CPT | Mod: ,,, | Performed by: INTERNAL MEDICINE

## 2018-10-30 RX ADMIN — FOLIC ACID 1 MG: 1 TABLET ORAL at 09:10

## 2018-10-30 RX ADMIN — HYDROXYCHLOROQUINE SULFATE 200 MG: 200 TABLET, FILM COATED ORAL at 09:10

## 2018-10-30 RX ADMIN — CIPROFLOXACIN HYDROCHLORIDE 250 MG: 250 TABLET, FILM COATED ORAL at 09:10

## 2018-10-30 RX ADMIN — ATORVASTATIN CALCIUM 20 MG: 20 TABLET, FILM COATED ORAL at 09:10

## 2018-10-30 RX ADMIN — SERTRALINE HYDROCHLORIDE 25 MG: 25 TABLET ORAL at 09:10

## 2018-10-30 RX ADMIN — SENNOSIDES AND DOCUSATE SODIUM 1 TABLET: 8.6; 5 TABLET ORAL at 09:10

## 2018-10-30 RX ADMIN — ACETAMINOPHEN 1000 MG: 500 TABLET, FILM COATED ORAL at 02:10

## 2018-10-30 RX ADMIN — RAMELTEON 8 MG: 8 TABLET, FILM COATED ORAL at 10:10

## 2018-10-30 RX ADMIN — ACETAMINOPHEN 1000 MG: 500 TABLET, FILM COATED ORAL at 09:10

## 2018-10-30 RX ADMIN — AMLODIPINE BESYLATE 10 MG: 10 TABLET ORAL at 09:10

## 2018-10-30 RX ADMIN — CARVEDILOL 12.5 MG: 12.5 TABLET, FILM COATED ORAL at 04:10

## 2018-10-30 RX ADMIN — LIDOCAINE 2 PATCH: 50 PATCH TOPICAL at 02:10

## 2018-10-30 RX ADMIN — ALPRAZOLAM 0.12 MG: 0.25 TABLET ORAL at 09:10

## 2018-10-30 RX ADMIN — CARVEDILOL 12.5 MG: 12.5 TABLET, FILM COATED ORAL at 09:10

## 2018-10-30 NOTE — PLAN OF CARE
Problem: Physical Therapy Goal  Goal: Physical Therapy Goal  Goals to be met by: 11 days     Patient will increase functional independence with mobility by performin. Supine to sit with Modified [place].  2. Sit to supine with Modified [place].  3. Sit to stand transfer with Supervision.  4. Bed to chair transfer with Supervision using Rolling Walker.  5. Gait  x 150 feet with Supervision using Rolling Walker.   6. Wheelchair propulsion x150 feet with Supervision using bilateral uppper extremities.  7. Ascend/Descend 4 inch curb step with Stand-by Assistance using Rolling Walker.  8. Lower extremity exercise program x20 reps per handout, with supervision.      Outcome: Ongoing (interventions implemented as appropriate)  LTGs remain appropriate. Pt will continue PT POC.    Elida Ayala, PT  10/30/2018

## 2018-10-30 NOTE — PLAN OF CARE
Problem: Occupational Therapy Goal  Goal: Occupational Therapy Goal  Goals to be met by: 11 days     Patient will increase functional independence with ADLs by performing:    UE Dressing with Set-up Assistance.  LE Dressing with Supervision.  Grooming while standing at sink with Supervision.  Toileting from toilet with Supervision for hygiene and clothing management.   Bathing from  shower chair/bench with Supervision.  Supine to sit with Modified [place] /c HOB flat and no handrails.  Stand pivot transfers with Supervision.  Toilet transfer to toilet with Supervision.  Upper extremity exercise program 3 x 15 reps per handout, with independence.  Patient will complete a functional standing activity for 10 min with S in order to perform household tasks.    Outcome: Ongoing (interventions implemented as appropriate)  Patient's goals are appropriate.   DACIA Regalado  10/30/2018

## 2018-10-30 NOTE — H&P
[facility] [place] [facility] [place] [facility]  Department of [facility] Medicine  Progress Note    Patient Name: Karly Sandoval  MRN: [id number]  Code Status: Full Code  Admission Date: 10/23/2018  Attending Physician: Edna Henry MD   Primary Care Provider: Uday Allen MD    Subjective:     Principal Problem:Compression fracture of T12 vertebra     HPI: Chief Complaint/Reason for Admission: Acute bilateral low back pain without sciatica    History of Present Illness:  Patient is a 88 y.o. 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  She was evaluated by neurosurgery with normal IAN and sensation with low suspicion for cauda equina syndrome.  CT myelogram was considered but given low suspicion for condition, risk of kidney injury and patient's disinterest in surgery, procedure was not done.  She complains of pain to her back today rating it 6/10 but controlled with tylenol.  She did well in therapy per her bu is worn out subsequently.  She has a poor appetite.      The patient has been admitted to SNF for ongoing PT/OT due to insufficient progress to go home safely from the hospital.    Records from last hospital stay reviewed and summarized above.     Interval History: patient remains with back pain; she is not sure pain is improved from yesterday; her son is at the bedside and updated regarding Na and compression fracture    Review of Systems   Constitutional: Positive for appetite change and fatigue.   Respiratory: Negative for cough and shortness of breath.    Cardiovascular: Positive for leg swelling. Negative for chest pain.   Genitourinary: Negative for dysuria and flank pain.   Musculoskeletal: Positive for arthralgias and back pain.      .vicky  Objective:     Vital Signs (Most Recent):  Temp: 98 °F (36.7 °C)  (10/30/18 0732)  Pulse: (!) 59 (10/30/18 1059)  Resp: 18 (10/30/18 0732)  BP: 136/62 (10/30/18 1059)  SpO2: 95 % (10/30/18 0732) Vital Signs (24h Range):  Temp:  [97.8 °F (36.6 °C)-98 °F (36.7 °C)] 98 °F (36.7 °C)  Pulse:  [59-65] 59  Resp:  [18-19] 18  SpO2:  [95 %-97 %] 95 %  BP: (136-155)/(62-71) 136/62     Weight: 55.2 kg (121 lb 11.1 oz)  Body mass index is 22.26 kg/m².  No intake or output data in the 24 hours ending 10/30/18 1525   Physical Exam   Constitutional: She appears well-developed and well-nourished.   HENT:   Head: Normocephalic and atraumatic.   Abdominal: Soft. Bowel sounds are normal.   Musculoskeletal: She exhibits edema and deformity.   + kyphosis   Skin: Skin is warm and dry.   Psychiatric: She has a normal mood and affect.   +Poor memory        Significant Labs:   Recent Results (from the past 24 hour(s))   Basic metabolic panel    Collection Time: 10/30/18  5:41 AM   Result Value Ref Range    Sodium 124 (L) 136 - 145 mmol/L    Potassium 4.4 3.5 - 5.1 mmol/L    Chloride 97 95 - 110 mmol/L    CO2 22 (L) 23 - 29 mmol/L    Glucose 84 70 - 110 mg/dL    BUN, Bld 54 (H) 8 - 23 mg/dL    Creatinine 1.3 0.5 - 1.4 mg/dL    Calcium 8.3 (L) 8.7 - 10.5 mg/dL    Anion Gap 5 (L) 8 - 16 mmol/L    eGFR if African American 42.3 (A) >60 mL/min/1.73 m^2    eGFR if non  36.7 (A) >60 mL/min/1.73 m^2       Microbiology Results (last 7 days)     Procedure Component Value Units Date/Time    Urine culture [[id number]] Collected:  10/28/18 1454    Order Status:  Completed Specimen:  Urine from Clean Catch Updated:  10/29/18 1952     Urine Culture, Routine --     GRAM NEGATIVE FILI  > 100,000 cfu/ml  Identification and susceptibility pending      Narrative:       CXURN added per Dr. Henry, order ID [id number] 10/28/18 19:27    Urine culture [[id number]]     Order Status:  Completed Specimen:  Urine             Assessment/Plan:     * Compression fracture of T12 vertebra    New on imaging but likely present  since admit  Continue lidoderm patches and scheduled tylenol  TLSO brace ordered.  Patient will need f/u with neurosurgery next available.     Acute cystitis without hematuria    Cipro therapy started on 10/28 due to + U/A.  Continue to monitor culture results with adjustment in regimen as necessary.     Hyponatremia    Chronic and stable  Will continue to monitor with twice weekly labs.    10/25/18  Chronic, sodium down to 127.  Will give IVF as she appears dry and admits to not eating much.  Repeat Lab on Monday.  PharmD cautions use of Plaquenil if sodium continues to drop.    10/30/2018  Appears related to CHF based on labs.  Continue fluid restriction (which she does on her own due to poor po intake)  Continue lasix diuresis.        Chronic diastolic CHF (congestive heart failure)    -currently compensated on clinical exam  -continue current medical therapy with lisinopril, coreg, furosemide (dose reduced due to recent CHARLENE and poor po intake but dexamethasone therapy currently)  -continue low Na diet to treat  -continue daily weight monitoring with adjustment of diuretic therapy as necessary to treat weight gains > 2-3 pounds in 24-48 hours  -continue daily pulse oximetry monitoring; proceed with CXR imaging and adjustment of diuretic therapy as necessary to treat new or worsening hypoxia  -intermittent clinical exam monitoring with adjustment of diuretic regimen as necessary to treat signs of volume overload  -f/u with cardiology as scheduled    10/25/18  Labs indicated patient is dehydrated.  Will give  cc total per Dr. Henry request and hold lasix for now.  Patient with lower leg edema that is chronic.  No reports of SOB.  Monitor weights.  Wt Readings from Last 1 Encounters:   10/30/18 0600 55.2 kg (121 lb 11.1 oz)   10/29/18 0556 54.1 kg (119 lb 4.3 oz)   10/23/18 1632 54.2 kg (119 lb 7.8 oz)     Continue b-blocker as ordered.    10/30/2018:  Patient with LE edema and hyponatremia with lasix therapy  resumed on 10/29.  Will continue to monitor labs and weights.      .vicky Henry MD  Department of Hospital Medicine  [facility] - Skilled Nursing Care

## 2018-10-30 NOTE — ASSESSMENT & PLAN NOTE
-currently compensated on clinical exam  -continue current medical therapy with lisinopril, coreg, furosemide (dose reduced due to recent CHARLENE and poor po intake but dexamethasone therapy currently)  -continue low Na diet to treat  -continue daily weight monitoring with adjustment of diuretic therapy as necessary to treat weight gains > 2-3 pounds in 24-48 hours  -continue daily pulse oximetry monitoring; proceed with CXR imaging and adjustment of diuretic therapy as necessary to treat new or worsening hypoxia  -intermittent clinical exam monitoring with adjustment of diuretic regimen as necessary to treat signs of volume overload  -f/u with cardiology as scheduled    10/25/18  Labs indicated patient is dehydrated.  Will give  cc total per Dr. Henry request and hold lasix for now.  Patient with lower leg edema that is chronic.  No reports of SOB.  Monitor weights.  Wt Readings from Last 1 Encounters:   10/30/18 0600 55.2 kg (121 lb 11.1 oz)   10/29/18 0556 54.1 kg (119 lb 4.3 oz)   10/23/18 1632 54.2 kg (119 lb 7.8 oz)     Continue b-blocker as ordered.    10/30/2018:  Patient with LE edema and hyponatremia with lasix therapy resumed on 10/29.  Will continue to monitor labs and weights.

## 2018-10-30 NOTE — PT/OT/SLP PROGRESS
Occupational Therapy  Treatment    Karly Sandoval   MRN: [id number]   Admitting Diagnosis: Compression fracture of T12 vertebra    OT Date of Treatment: 10/30/18  Total Time (min): 40 min    Billable Minutes:  Therapeutic Activity 32 and Therapeutic Exercise 8    General Precautions: Standard, fall  Orthopedic Precautions: N/A  Braces: TLSO (OOB)         Subjective:  Communicated with patient prior to session.    Pain/Comfort  Pain Rating 1: 4/10  Location - Side 1: Bilateral  Location - Orientation 1: lower  Location 1: back  Pain Addressed 1: Reposition, Distraction  Pain Rating Post-Intervention 1: 4/10    Objective:       Occupational Performance:    Functional Mobility/Transfers:  · Patient completed Sit <> Stand Transfer with minimum assistance  with  hand-held assist   · Patient completed w/c>bed Transfer using Stand Pivot technique with minimum assistance with hand-held assist    AMPA 6 Click:  [facility] Total Score: 15    OT Exercises: UE Ergometer 8 min for improving endurance to increase independence with ADLs.     Additional Treatment:  Patient performed a visual perception activity using parquetry blocks in standing for activity tolerance with S in order to perform household tasks.     Patient performed a functional B UE task folding items with FM component using clothespins in standing with S for activity tolerance in order to perform laundry tasks.     Patient left sitting EOB with nursing present and all needs met.     ASSESSMENT:  Karly Sandoval is a 88 y.o. female with a medical diagnosis of Compression fracture of T12 vertebra and presents with the deficits listed below. Patient tolerated treatment session and was motivated to complete tasks. Patient continues to benefit from skilled OT services to achieve maximal independence.    Rehab identified problem list/impairments: weakness, impaired endurance, impaired self care skills, impaired functional mobilty, gait instability, impaired balance,  decreased lower extremity function, pain    Rehab potential is good    Activity tolerance: Good    Discharge recommendations: assisted living facility     Barriers to discharge: Decreased caregiver support     Equipment recommendations: wheelchair     GOALS:   Multidisciplinary Problems     Occupational Therapy Goals        Problem: Occupational Therapy Goal    Goal Priority Disciplines Outcome Interventions   Occupational Therapy Goal     OT, PT/OT Ongoing (interventions implemented as appropriate)    Description:  Goals to be met by: 11 days     Patient will increase functional independence with ADLs by performing:    UE Dressing with Set-up Assistance.  LE Dressing with Supervision.  Grooming while standing at sink with Supervision.  Toileting from toilet with Supervision for hygiene and clothing management.   Bathing from  shower chair/bench with Supervision.  Supine to sit with Modified [place] /c HOB flat and no handrails.  Stand pivot transfers with Supervision.  Toilet transfer to toilet with Supervision.  Upper extremity exercise program 3 x 15 reps per handout, with independence.  Patient will complete a functional standing activity for 10 min with S in order to perform household tasks.                     Plan:  Patient to be seen 5 x/week to address the above listed problems via self-care/home management, therapeutic activities, therapeutic exercises  Plan of Care expires: 11/24/18  Plan of Care reviewed with: patient    DACIA Regalado  10/30/2018

## 2018-10-30 NOTE — ASSESSMENT & PLAN NOTE
Chronic and stable  Will continue to monitor with twice weekly labs.    10/25/18  Chronic, sodium down to 127.  Will give IVF as she appears dry and admits to not eating much.  Repeat Lab on Monday.  PharmD cautions use of Plaquenil if sodium continues to drop.    10/30/2018  Appears related to CHF based on labs.  Continue fluid restriction (which she does on her own due to poor po intake)  Continue lasix diuresis.

## 2018-10-30 NOTE — PLAN OF CARE
Problem: Patient Care Overview  Goal: Plan of Care Review  Outcome: Ongoing (interventions implemented as appropriate)  Ms felix remains AAO; pt is sitting up in w/c in patients' activity room. She is eating lunch with the other patients. No distress noted. Safety measures maintained. Will continue with plan of care.

## 2018-10-30 NOTE — PROGRESS NOTES
Ms Sandoval remains up in w/c at thie time. TLSO brace is in progress. Pt received verbal teaching on the importance of wearing brace when she is in w/c or out of bed. Ms Sandoval verbalized understanding of information. Will continuje to monitor.

## 2018-10-30 NOTE — ASSESSMENT & PLAN NOTE
Cipro therapy started on 10/28 due to + U/A.  Continue to monitor culture results with adjustment in regimen as necessary.

## 2018-10-30 NOTE — SUBJECTIVE & OBJECTIVE
Interval History: patient remains with back pain; she is not sure pain is improved from yesterday; her son is at the bedside and updated regarding Na and compression fracture    Review of Systems   Constitutional: Positive for appetite change and fatigue.   Respiratory: Negative for cough and shortness of breath.    Cardiovascular: Positive for leg swelling. Negative for chest pain.   Genitourinary: Negative for dysuria and flank pain.   Musculoskeletal: Positive for arthralgias and back pain.      .vicky  Objective:     Vital Signs (Most Recent):  Temp: 98 °F (36.7 °C) (10/30/18 0732)  Pulse: (!) 59 (10/30/18 1059)  Resp: 18 (10/30/18 0732)  BP: 136/62 (10/30/18 1059)  SpO2: 95 % (10/30/18 0732) Vital Signs (24h Range):  Temp:  [97.8 °F (36.6 °C)-98 °F (36.7 °C)] 98 °F (36.7 °C)  Pulse:  [59-65] 59  Resp:  [18-19] 18  SpO2:  [95 %-97 %] 95 %  BP: (136-155)/(62-71) 136/62     Weight: 55.2 kg (121 lb 11.1 oz)  Body mass index is 22.26 kg/m².  No intake or output data in the 24 hours ending 10/30/18 1525   Physical Exam   Constitutional: She appears well-developed and well-nourished.   HENT:   Head: Normocephalic and atraumatic.   Abdominal: Soft. Bowel sounds are normal.   Musculoskeletal: She exhibits edema and deformity.   + kyphosis   Skin: Skin is warm and dry.   Psychiatric: She has a normal mood and affect.   +Poor memory        Significant Labs:   Recent Results (from the past 24 hour(s))   Basic metabolic panel    Collection Time: 10/30/18  5:41 AM   Result Value Ref Range    Sodium 124 (L) 136 - 145 mmol/L    Potassium 4.4 3.5 - 5.1 mmol/L    Chloride 97 95 - 110 mmol/L    CO2 22 (L) 23 - 29 mmol/L    Glucose 84 70 - 110 mg/dL    BUN, Bld 54 (H) 8 - 23 mg/dL    Creatinine 1.3 0.5 - 1.4 mg/dL    Calcium 8.3 (L) 8.7 - 10.5 mg/dL    Anion Gap 5 (L) 8 - 16 mmol/L    eGFR if African American 42.3 (A) >60 mL/min/1.73 m^2    eGFR if non  36.7 (A) >60 mL/min/1.73 m^2       Microbiology Results (last 7  days)     Procedure Component Value Units Date/Time    Urine culture [[id number]] Collected:  10/28/18 1454    Order Status:  Completed Specimen:  Urine from Clean Catch Updated:  10/29/18 1952     Urine Culture, Routine --     GRAM NEGATIVE FILI  > 100,000 cfu/ml  Identification and susceptibility pending      Narrative:       CXURN added per Dr. Henry, order ID [id number] 10/28/18 19:27    Urine culture [[id number]]     Order Status:  Completed Specimen:  Urine

## 2018-10-30 NOTE — PLAN OF CARE
Problem: Patient Care Overview  Goal: Plan of Care Review  Outcome: Ongoing (interventions implemented as appropriate)   10/30/18 0330   Coping/Psychosocial   Plan Of Care Reviewed With patient       Problem: Fall Risk (Adult)  Goal: Absence of Falls  Patient will demonstrate the desired outcomes by discharge/transition of care.  Outcome: Ongoing (interventions implemented as appropriate)   10/30/18 0330   Fall Risk (Adult)   Absence of Falls making progress toward outcome       Problem: Pressure Ulcer Risk (Jake Scale) (Adult,Obstetrics,Pediatric)  Goal: Skin Integrity  Patient will demonstrate the desired outcomes by discharge/transition of care.  Outcome: Ongoing (interventions implemented as appropriate)   10/30/18 0330   Pressure Ulcer Risk (Jake Scale) (Adult,Obstetrics,Pediatric)   Skin Integrity making progress toward outcome

## 2018-10-30 NOTE — PT/OT/SLP PROGRESS
"Physical Therapy  Treatment    Karly Sandoval   MRN: [id number]   Admitting Diagnosis: Acute bilateral low back pain without sciatica    PT Received On: 10/30/18  Total Time (min): 53       Billable Minutes:  Gait Training 15, Therapeutic Activity 15, Therapeutic Exercise 23 and Total Time 53    Treatment Type: Treatment  PT/PTA: PT     PTA Visit Number: 0       General Precautions: Standard, fall  Orthopedic Precautions: spinal precautions   Braces: (abdominal brace as needed for pain)    Do you have any cultural, spiritual, Scientology conflicts, given your current situation?: none    Subjective:  Communicated with patient prior to session.  Pt agreeable to session.    Pain/Comfort  Pain Rating 1: 7/10  Location - Side 1: Bilateral  Location - Orientation 1: lower  Location 1: back  Pain Addressed 1: Pre-medicate for activity, Reposition  Pain Rating Post-Intervention 1: 7/10    Objective:  Patient found sitting in w/c.  PT donned pt's abdominal brace at start of session- newly ordered for back pain. At end of session Dr. Henry notified PT and patient that pt has a new spinal fracture and a new back brace will be ordered.        AM-PAC 6 CLICK MOBILITY  Total Score:17    Bed Mobility:  Sit>Supine:on mat w/ Holly for LLE  Supine>Sit: on mat w/ Holly to roll to sidelying, SBA for slidelying>sit w/ cueing  Logroll technique    Transfers:  Sit<>Stand: to/from w/c (3 trials) w/ RW and Holly to rise  Stand Pivot Transfer: w/c<>EOM w/ RW and Holly to rise  Cueing for forward scoot/lean and for hand/foot placement    Gait:  Amb 2 trials (120ft each) w/ RW and Min/CGA for stability  Cueing for upright posture and to remain inside RW  Limited in distance by fatigue     Advanced Gait:  Curb Step: asc/[place] 4" curb w/ RW and ModA for stability and RW management  Cueing for technique/sequencing    Therex:  Supine and seated BLE therex 2x10 reps (GS, SAQ, AP, QS, HS, ABd/ADd, HF) w/ cueing for form and AAROM as needed for " RLE    Patient left up in chair with call button in reach and MD, NP, and son  present.    Assessment:  Karly Sandoval is a 88 y.o. female with a medical diagnosis of Acute bilateral low back pain without sciatica.  Pt continues to be limited t/o sessions by back pain and fatigue/weakness. She also continues to require cueing for transfer techniques and gait mechanics. She will continue PT POC.    Rehab identified problem list/impairments: weakness, impaired endurance, impaired self care skills, impaired functional mobilty, gait instability, impaired balance, decreased lower extremity function, pain    Rehab potential is good.    Activity tolerance: Fair    Discharge recommendations: assisted living facility     Barriers to discharge: Decreased caregiver support    Equipment recommendations: wheelchair     GOALS:   Multidisciplinary Problems     Physical Therapy Goals        Problem: Physical Therapy Goal    Goal Priority Disciplines Outcome Goal Variances Interventions   Physical Therapy Goal     PT, PT/OT Ongoing (interventions implemented as appropriate)     Description:  Goals to be met by: 11 days     Patient will increase functional independence with mobility by performin. Supine to sit with Modified [place].  2. Sit to supine with Modified [place].  3. Sit to stand transfer with Supervision.  4. Bed to chair transfer with Supervision using Rolling Walker.  5. Gait  x 150 feet with Supervision using Rolling Walker.   6. Wheelchair propulsion x150 feet with Supervision using bilateral uppper extremities.  7. Ascend/Descend 4 inch curb step with Stand-by Assistance using Rolling Walker.  8. Lower extremity exercise program x20 reps per handout, with supervision.                       PLAN:    Patient to be seen (5-6X/week)  to address the above listed problems via gait training, therapeutic activities, therapeutic exercises, wheelchair management/training  Plan of Care expires:  11/23/18  Plan of Care reviewed with: patient, camille AGUILERA Jamie, PT  10/30/2018

## 2018-10-31 ENCOUNTER — EXTERNAL CHRONIC CARE MANAGEMENT (OUTPATIENT)
Dept: PRIMARY CARE CLINIC | Facility: CLINIC | Age: 83
End: 2018-10-31
Payer: MEDICARE

## 2018-10-31 PROCEDURE — 99490 CHRNC CARE MGMT STAFF 1ST 20: CPT | Mod: S$PBB,,, | Performed by: FAMILY MEDICINE

## 2018-10-31 PROCEDURE — 97530 THERAPEUTIC ACTIVITIES: CPT

## 2018-10-31 PROCEDURE — 97110 THERAPEUTIC EXERCISES: CPT

## 2018-10-31 PROCEDURE — 11000004 HC SNF PRIVATE

## 2018-10-31 PROCEDURE — 25000003 PHARM REV CODE 250: Performed by: NURSE PRACTITIONER

## 2018-10-31 PROCEDURE — 25000003 PHARM REV CODE 250: Performed by: INTERNAL MEDICINE

## 2018-10-31 PROCEDURE — 97116 GAIT TRAINING THERAPY: CPT

## 2018-10-31 PROCEDURE — 99490 CHRNC CARE MGMT STAFF 1ST 20: CPT | Mod: PBBFAC,PO | Performed by: FAMILY MEDICINE

## 2018-10-31 PROCEDURE — 25000003 PHARM REV CODE 250: Performed by: STUDENT IN AN ORGANIZED HEALTH CARE EDUCATION/TRAINING PROGRAM

## 2018-10-31 RX ORDER — ESCITALOPRAM OXALATE 10 MG/1
10 TABLET ORAL DAILY
Status: DISCONTINUED | OUTPATIENT
Start: 2018-10-31 | End: 2018-11-14 | Stop reason: HOSPADM

## 2018-10-31 RX ORDER — CIPROFLOXACIN 250 MG/1
250 TABLET, FILM COATED ORAL EVERY 12 HOURS
Status: COMPLETED | OUTPATIENT
Start: 2018-10-31 | End: 2018-11-04

## 2018-10-31 RX ADMIN — FOLIC ACID 1 MG: 1 TABLET ORAL at 10:10

## 2018-10-31 RX ADMIN — ALPRAZOLAM 0.12 MG: 0.25 TABLET ORAL at 09:10

## 2018-10-31 RX ADMIN — HYDROXYCHLOROQUINE SULFATE 200 MG: 200 TABLET, FILM COATED ORAL at 10:10

## 2018-10-31 RX ADMIN — ACETAMINOPHEN 1000 MG: 500 TABLET, FILM COATED ORAL at 10:10

## 2018-10-31 RX ADMIN — SERTRALINE HYDROCHLORIDE 25 MG: 25 TABLET ORAL at 10:10

## 2018-10-31 RX ADMIN — ATORVASTATIN CALCIUM 20 MG: 20 TABLET, FILM COATED ORAL at 10:10

## 2018-10-31 RX ADMIN — CIPROFLOXACIN HYDROCHLORIDE 250 MG: 250 TABLET, FILM COATED ORAL at 10:10

## 2018-10-31 RX ADMIN — ESCITALOPRAM 10 MG: 10 TABLET, FILM COATED ORAL at 04:10

## 2018-10-31 RX ADMIN — CIPROFLOXACIN HYDROCHLORIDE 250 MG: 250 TABLET, FILM COATED ORAL at 09:10

## 2018-10-31 RX ADMIN — CARVEDILOL 12.5 MG: 12.5 TABLET, FILM COATED ORAL at 07:10

## 2018-10-31 RX ADMIN — AMLODIPINE BESYLATE 10 MG: 10 TABLET ORAL at 10:10

## 2018-10-31 RX ADMIN — ACETAMINOPHEN 1000 MG: 500 TABLET, FILM COATED ORAL at 09:10

## 2018-10-31 RX ADMIN — LIDOCAINE 2 PATCH: 50 PATCH TOPICAL at 04:10

## 2018-10-31 RX ADMIN — SENNOSIDES AND DOCUSATE SODIUM 1 TABLET: 8.6; 5 TABLET ORAL at 10:10

## 2018-10-31 RX ADMIN — CARVEDILOL 12.5 MG: 12.5 TABLET, FILM COATED ORAL at 04:10

## 2018-10-31 RX ADMIN — SENNOSIDES AND DOCUSATE SODIUM 1 TABLET: 8.6; 5 TABLET ORAL at 09:10

## 2018-10-31 NOTE — PLAN OF CARE
Problem: Patient Care Overview  Goal: Plan of Care Review  Outcome: Ongoing (interventions implemented as appropriate)  POC reviewed with patient and patients son.  Interventions documented on Flow sheet and on MAR.

## 2018-10-31 NOTE — PLAN OF CARE
Problem: Patient Care Overview  Goal: Plan of Care Review  Outcome: Ongoing (interventions implemented as appropriate)   10/31/18 0625   Coping/Psychosocial   Plan Of Care Reviewed With patient       Problem: Fall Risk (Adult)  Goal: Absence of Falls  Patient will demonstrate the desired outcomes by discharge/transition of care.  Outcome: Ongoing (interventions implemented as appropriate)   10/31/18 0625   Fall Risk (Adult)   Absence of Falls making progress toward outcome

## 2018-10-31 NOTE — PLAN OF CARE
Problem: Physical Therapy Goal  Goal: Physical Therapy Goal  Goals to be met by: 11 days     Patient will increase functional independence with mobility by performin. Supine to sit with Modified [place].  2. Sit to supine with Modified [place].  3. Sit to stand transfer with Supervision.  4. Bed to chair transfer with Supervision using Rolling Walker.  5. Gait  x 150 feet with Supervision using Rolling Walker.   6. Wheelchair propulsion x150 feet with Supervision using bilateral uppper extremities.  7. Ascend/Descend 4 inch curb step with Stand-by Assistance using Rolling Walker.  8. Lower extremity exercise program x20 reps per handout, with supervision.      Outcome: Ongoing (interventions implemented as appropriate)  LTGs remain appropriate. Pt will continue PT POC.    Elida Ayala, PT  10/31/2018

## 2018-10-31 NOTE — PT/OT/SLP PROGRESS
Physical Therapy  Treatment    Karly Sandoval   MRN: [id number]   Admitting Diagnosis: Compression fracture of T12 vertebra    PT Received On: 10/31/18  Total Time (min): 53       Billable Minutes:  Gait Training 15, Therapeutic Activity 25, Therapeutic Exercise 13 and Total Time 53    Treatment Type: Treatment  PT/PTA: PT     PTA Visit Number: 0       General Precautions: Standard, fall  Orthopedic Precautions: spinal precautions   Braces: TLSO    Do you have any cultural, spiritual, Islam conflicts, given your current situation?: none    Subjective:  Communicated with patient prior to session.  Pt agreeable to session but reporting discomfort w/ TLSO along w/ back pain.   TLSO was adjusted multiple times t/o session.     Pain/Comfort  Pain Rating 1: 8/10  Location - Side 1: Bilateral  Location - Orientation 1: lower  Location 1: back  Pain Addressed 1: Pre-medicate for activity, Reposition, Other (see comments)(TLSO adjusted)  Pain Rating Post-Intervention 1: 8/10    Objective:  Patient found sitting in bedside chair w/ TLSO.       AM-PAC 6 CLICK MOBILITY  Total Score:16    Bed Mobility:  Not performed    Transfers:  Sit<>Stand: to/from w/c and bedside commode (multiple trials each), all w/ RW and Holly to rise  Stand Pivot Transfer: bedside chair>w/c and bedside commode>w/c, each w/ RW and Holly to rise  Cueing for hand/foot placement and for forward lean  inc'd time required to fully rise    Gait:  Amb 3 trials (83ft each) w/ RW and Min/CGA for stability  Cueing for upright posture and to remain inside RW  Limited in distance by fatigue     Therex:  Seated BLE therex 2x10 reps (HF w/ AAROM BLE, LAQ, AP)    Balance:  Static stand w/ RW and CGA 2 trials (each ~1min) while PT assisted w/ hygiene and LBD following toileting.     Additional Treatment:  Pt required TotalA overall for toileting including Holly for SPT to/from commode w/ RW, TotalA for posterior hygiene, and TotalA for LBD.     Patient left up in  chair with OT present.    Assessment:  Karly Sandoval is a 88 y.o. female with a medical diagnosis of Compression fracture of T12 vertebra.  Pt continues to be limited t/o sessions by back pain and overall weakness. She remains at a Holly level for both transfers and ambulation. She also requires inc'd time to complete functional tasks due to pain. She will continue PT POC. Pt will require assistance upon D/C.    Rehab identified problem list/impairments: weakness, impaired endurance, impaired self care skills, impaired functional mobilty, gait instability, impaired balance, decreased lower extremity function, pain    Rehab potential is fair.    Activity tolerance: Fair    Discharge recommendations: assisted living facility     Barriers to discharge: Decreased caregiver support    Equipment recommendations: wheelchair     GOALS:   Multidisciplinary Problems     Physical Therapy Goals        Problem: Physical Therapy Goal    Goal Priority Disciplines Outcome Goal Variances Interventions   Physical Therapy Goal     PT, PT/OT Ongoing (interventions implemented as appropriate)     Description:  Goals to be met by: 11 days     Patient will increase functional independence with mobility by performin. Supine to sit with Modified [place].  2. Sit to supine with Modified [place].  3. Sit to stand transfer with Supervision.  4. Bed to chair transfer with Supervision using Rolling Walker.  5. Gait  x 150 feet with Supervision using Rolling Walker.   6. Wheelchair propulsion x150 feet with Supervision using bilateral uppper extremities.  7. Ascend/Descend 4 inch curb step with Stand-by Assistance using Rolling Walker.  8. Lower extremity exercise program x20 reps per handout, with supervision.                       PLAN:    Patient to be seen (5-6X/week)  to address the above listed problems via gait training, therapeutic activities, therapeutic exercises, wheelchair management/training  Plan of Care  expires: 11/23/18  Plan of Care reviewed with: patient, camille AGUILERA Jamie, PT  10/31/2018

## 2018-10-31 NOTE — PT/OT/SLP PROGRESS
Occupational Therapy  Treatment    Karly Sandoval   MRN: [id number]   Admitting Diagnosis: Compression fracture of T12 vertebra    OT Date of Treatment: 10/31/18  Total Time (min): 40 min    Billable Minutes:  Therapeutic Activity 15 and Therapeutic Exercise 25    General Precautions: Standard, fall  Orthopedic Precautions: N/A  Braces: TLSO       Subjective:  Communicated with patient prior to session.    Pain/Comfort  Pain Rating 1: 8/10  Location - Side 1: Bilateral  Location - Orientation 1: lower  Location 1: back  Pain Addressed 1: Reposition, Distraction  Pain Rating Post-Intervention 1: 8/10    Objective:       Occupational Performance:    Bed Mobility:    · Patient completed Sit to Supine with minimum assistance     Functional Mobility/Transfers:  · Patient completed Sit <> Stand Transfer with minimal assistance with  hand-held assist   · Patient completed w/c>bed Transfer using Stand Pivot technique with minimal assistance with no assistive device  · Attempted toilet transfer, but patient adamantly decline due to pain in her back when attempting to perform sit<>stand to toilet using grab bar.     Activities of Daily Living:  · Attempted to have patient to perform LE dressing, but patient adamantly declined due to back pain.     [facility] 6 Click:  AMPA Total Score: 15    OT Exercises:  Patient performed B UE ROM exercises 3 x 10 using 2# dowel roshan in all planes and joints focusing to improve strength and endurance to increase independence with ADLs.     Additional Treatment:  Patient performed a functional activity with FM component using clothespins in standing with S for activity tolerance in order to increase independence with self care tasks.     Patient left supine with call button in reach and all needs met (son and daughter in law present)    ASSESSMENT:  Karly Sandoval is a 88 y.o. female with a medical diagnosis of Compression fracture of T12 vertebra and presents with the deficits listed below.  Patient tolerated treatment session and was limited with participating in therapy due to pain. Patient continues to benefit from skilled OT services to achieve maximal independence.    Rehab identified problem list/impairments: weakness, impaired endurance, impaired self care skills, impaired functional mobilty, gait instability, impaired balance, decreased lower extremity function, pain    Rehab potential is good    Activity tolerance: Fair    Discharge recommendations: assisted living facility     Barriers to discharge: Decreased caregiver support     Equipment recommendations: wheelchair     GOALS:   Multidisciplinary Problems     Occupational Therapy Goals        Problem: Occupational Therapy Goal    Goal Priority Disciplines Outcome Interventions   Occupational Therapy Goal     OT, PT/OT Ongoing (interventions implemented as appropriate)    Description:  Goals to be met by: 11 days     Patient will increase functional independence with ADLs by performing:    UE Dressing with Set-up Assistance.  LE Dressing with Supervision.  Grooming while standing at sink with Supervision.  Toileting from toilet with Supervision for hygiene and clothing management.   Bathing from  shower chair/bench with Supervision.  Supine to sit with Modified [place] /c HOB flat and no handrails.  Stand pivot transfers with Supervision.  Toilet transfer to toilet with Supervision.  Upper extremity exercise program 3 x 15 reps per handout, with independence.  Patient will complete a functional standing activity for 10 min with S in order to perform household tasks.                     Plan:  Patient to be seen 5 x/week to address the above listed problems via self-care/home management, therapeutic activities, therapeutic exercises  Plan of Care expires: 11/24/18  Plan of Care reviewed with: patient    DACIA Regalado  10/31/2018

## 2018-10-31 NOTE — PLAN OF CARE
Problem: Occupational Therapy Goal  Goal: Occupational Therapy Goal  Goals to be met by: 11 days     Patient will increase functional independence with ADLs by performing:    UE Dressing with Set-up Assistance.  LE Dressing with Supervision.  Grooming while standing at sink with Supervision.  Toileting from toilet with Supervision for hygiene and clothing management.   Bathing from  shower chair/bench with Supervision.  Supine to sit with Modified [place] /c HOB flat and no handrails.  Stand pivot transfers with Supervision.  Toilet transfer to toilet with Supervision.  Upper extremity exercise program 3 x 15 reps per handout, with independence.  Patient will complete a functional standing activity for 10 min with S in order to perform household tasks.    Outcome: Ongoing (interventions implemented as appropriate)  Patient's goals are appropriate.   DACIA Regalado  10/31/2018

## 2018-11-01 LAB
ANION GAP SERPL CALC-SCNC: 5 MMOL/L
ANISOCYTOSIS BLD QL SMEAR: SLIGHT
BASO STIPL BLD QL SMEAR: ABNORMAL
BASOPHILS # BLD AUTO: ABNORMAL K/UL
BASOPHILS NFR BLD: 0 %
BUN SERPL-MCNC: 31 MG/DL
BURR CELLS BLD QL SMEAR: ABNORMAL
CALCIUM SERPL-MCNC: 8.7 MG/DL
CHLORIDE SERPL-SCNC: 97 MMOL/L
CO2 SERPL-SCNC: 22 MMOL/L
CREAT SERPL-MCNC: 0.9 MG/DL
DIFFERENTIAL METHOD: ABNORMAL
EOSINOPHIL # BLD AUTO: ABNORMAL K/UL
EOSINOPHIL NFR BLD: 2 %
ERYTHROCYTE [DISTWIDTH] IN BLOOD BY AUTOMATED COUNT: 13.7 %
EST. GFR  (AFRICAN AMERICAN): >60 ML/MIN/1.73 M^2
EST. GFR  (NON AFRICAN AMERICAN): 57.3 ML/MIN/1.73 M^2
GLUCOSE SERPL-MCNC: 91 MG/DL
HCT VFR BLD AUTO: 28.3 %
HGB BLD-MCNC: 9.8 G/DL
IMM GRANULOCYTES # BLD AUTO: ABNORMAL K/UL
IMM GRANULOCYTES NFR BLD AUTO: ABNORMAL %
LYMPHOCYTES # BLD AUTO: ABNORMAL K/UL
LYMPHOCYTES NFR BLD: 8 %
MAGNESIUM SERPL-MCNC: 1.9 MG/DL
MCH RBC QN AUTO: 31.1 PG
MCHC RBC AUTO-ENTMCNC: 34.6 G/DL
MCV RBC AUTO: 90 FL
METAMYELOCYTES NFR BLD MANUAL: 2 %
MONOCYTES # BLD AUTO: ABNORMAL K/UL
MONOCYTES NFR BLD: 6 %
MYELOCYTES NFR BLD MANUAL: 2 %
NEUTROPHILS NFR BLD: 77 %
NEUTS BAND NFR BLD MANUAL: 3 %
NRBC BLD-RTO: 0 /100 WBC
OVALOCYTES BLD QL SMEAR: ABNORMAL
PHOSPHATE SERPL-MCNC: 3.1 MG/DL
PLATELET # BLD AUTO: 237 K/UL
PMV BLD AUTO: 10.2 FL
POIKILOCYTOSIS BLD QL SMEAR: SLIGHT
POTASSIUM SERPL-SCNC: 4.5 MMOL/L
RBC # BLD AUTO: 3.15 M/UL
SODIUM SERPL-SCNC: 124 MMOL/L
WBC # BLD AUTO: 7.35 K/UL

## 2018-11-01 PROCEDURE — 97110 THERAPEUTIC EXERCISES: CPT

## 2018-11-01 PROCEDURE — 97530 THERAPEUTIC ACTIVITIES: CPT

## 2018-11-01 PROCEDURE — 25000003 PHARM REV CODE 250: Performed by: NURSE PRACTITIONER

## 2018-11-01 PROCEDURE — 84100 ASSAY OF PHOSPHORUS: CPT

## 2018-11-01 PROCEDURE — 11000004 HC SNF PRIVATE

## 2018-11-01 PROCEDURE — 25000003 PHARM REV CODE 250: Performed by: STUDENT IN AN ORGANIZED HEALTH CARE EDUCATION/TRAINING PROGRAM

## 2018-11-01 PROCEDURE — 85027 COMPLETE CBC AUTOMATED: CPT

## 2018-11-01 PROCEDURE — 97535 SELF CARE MNGMENT TRAINING: CPT

## 2018-11-01 PROCEDURE — 83735 ASSAY OF MAGNESIUM: CPT

## 2018-11-01 PROCEDURE — 97116 GAIT TRAINING THERAPY: CPT

## 2018-11-01 PROCEDURE — 25000003 PHARM REV CODE 250: Performed by: INTERNAL MEDICINE

## 2018-11-01 PROCEDURE — 80048 BASIC METABOLIC PNL TOTAL CA: CPT

## 2018-11-01 PROCEDURE — 36415 COLL VENOUS BLD VENIPUNCTURE: CPT

## 2018-11-01 PROCEDURE — 85007 BL SMEAR W/DIFF WBC COUNT: CPT

## 2018-11-01 RX ADMIN — ACETAMINOPHEN 1000 MG: 500 TABLET, FILM COATED ORAL at 09:11

## 2018-11-01 RX ADMIN — ACETAMINOPHEN 1000 MG: 500 TABLET, FILM COATED ORAL at 08:11

## 2018-11-01 RX ADMIN — CIPROFLOXACIN HYDROCHLORIDE 250 MG: 250 TABLET, FILM COATED ORAL at 09:11

## 2018-11-01 RX ADMIN — ACETAMINOPHEN 1000 MG: 500 TABLET, FILM COATED ORAL at 02:11

## 2018-11-01 RX ADMIN — CARVEDILOL 12.5 MG: 12.5 TABLET, FILM COATED ORAL at 04:11

## 2018-11-01 RX ADMIN — TRAMADOL HYDROCHLORIDE 25 MG: 50 TABLET, FILM COATED ORAL at 02:11

## 2018-11-01 RX ADMIN — FOLIC ACID 1 MG: 1 TABLET ORAL at 09:11

## 2018-11-01 RX ADMIN — AMLODIPINE BESYLATE 10 MG: 10 TABLET ORAL at 09:11

## 2018-11-01 RX ADMIN — ESCITALOPRAM 10 MG: 10 TABLET, FILM COATED ORAL at 09:11

## 2018-11-01 RX ADMIN — ALPRAZOLAM 0.12 MG: 0.25 TABLET ORAL at 08:11

## 2018-11-01 RX ADMIN — HYDROXYCHLOROQUINE SULFATE 200 MG: 200 TABLET, FILM COATED ORAL at 09:11

## 2018-11-01 RX ADMIN — CARVEDILOL 12.5 MG: 12.5 TABLET, FILM COATED ORAL at 09:11

## 2018-11-01 RX ADMIN — LIDOCAINE 2 PATCH: 50 PATCH TOPICAL at 04:11

## 2018-11-01 RX ADMIN — ATORVASTATIN CALCIUM 20 MG: 20 TABLET, FILM COATED ORAL at 09:11

## 2018-11-01 RX ADMIN — RAMELTEON 8 MG: 8 TABLET, FILM COATED ORAL at 08:11

## 2018-11-01 RX ADMIN — CIPROFLOXACIN HYDROCHLORIDE 250 MG: 250 TABLET, FILM COATED ORAL at 08:11

## 2018-11-01 RX ADMIN — SENNOSIDES AND DOCUSATE SODIUM 1 TABLET: 8.6; 5 TABLET ORAL at 08:11

## 2018-11-01 NOTE — PT/OT/SLP PROGRESS
"Occupational Therapy  Treatment    Karly Sandoval   MRN: [id number]   Admitting Diagnosis: Compression fracture of T12 vertebra    OT Date of Treatment: 11/01/18  Total Time (min): 54 min    Billable Minutes:  Self Care/Home Management 40 and Therapeutic Activity 14    General Precautions: Standard, fall  Orthopedic Precautions: N/A  Braces: N/A         Subjective:  Communicated with pt prior to session.  "I am so stupid.. I am sorry I am such a pain"    Pain/Comfort  Pain Rating 1: 9/10  Location - Side 1: Bilateral  Location - Orientation 1: lower  Location 1: back  Pain Addressed 1: Nurse notified, Cessation of Activity, Reposition  Pain Rating Post-Intervention 1: 9/10    Objective:  Patient found with: (supine in bed)    Occupational Performance:    Bed Mobility:    · Patient completed Scooting/Bridging with moderate assistance seated to EOB  · Patient completed Supine to Sit with stand by assistance and with side rail increased time    Functional Mobility/Transfers:  · Patient completed Sit <> Stand Transfer with contact guard assistance  with  rolling walker   · Patient completed Toilet Transfer Stand Pivot technique with contact guard assistance with  rolling walker  · Functional Mobility: Pt t/f from bed to BSC with RW CGA and stood from BSC with RW and t/f to w/c CGA    Activities of Daily Living:  · Feeding:  setup i w/c .  · Grooming: minimum assistance seated  · Bathing: maximal assistance from BSC  · Upper Body Dressing: moderate assistance total with TLSO; sup with button up  · Lower Body Dressing: maximal assistance depends and pants and socks--min with R sock; total L sock; max with pants and depends  · Toileting: maximal assistance on BSC--pt assisted with front hygiene and pulling up pants    [facility] 6 Click:  [facility] Total Score: 15          Patient left up in chair with call button in reach    ASSESSMENT:  Karly Sandoval is a 88 y.o. female with a medical diagnosis of Compression fracture of " T12 vertebra . Pt with increased pain limiting indep. Pt requires extensive assist with self care. Limited assist with mobiltiy. Pt wiil need 24 hour assist upon d/c.Pt cont to benefit from OT to increase functional indep and safety.    Rehab identified problem list/impairments: weakness, impaired endurance, impaired self care skills, impaired functional mobilty, gait instability, impaired balance, decreased lower extremity function, pain    Rehab potential is good    Activity tolerance: Good    Discharge recommendations: nursing facility, basic(pt need 24  hour care)     Barriers to discharge: Decreased caregiver support     Equipment recommendations: wheelchair     GOALS:   Multidisciplinary Problems     Occupational Therapy Goals        Problem: Occupational Therapy Goal    Goal Priority Disciplines Outcome Interventions   Occupational Therapy Goal     OT, PT/OT Ongoing (interventions implemented as appropriate)    Description:  Goals to be met by: 11 days     Patient will increase functional independence with ADLs by performing:    UE Dressing with Set-up Assistance.  LE Dressing with Supervision.  Grooming while standing at sink with Supervision.  Toileting from toilet with Supervision for hygiene and clothing management.   Bathing from  shower chair/bench with Supervision.  Supine to sit with Modified [place] /c HOB flat and no handrails.  Stand pivot transfers with Supervision.  Toilet transfer to toilet with Supervision.  Upper extremity exercise program 3 x 15 reps per handout, with independence.  Patient will complete a functional standing activity for 10 min with S in order to perform household tasks.                     Plan:  Patient to be seen 5 x/week to address the above listed problems via self-care/home management, therapeutic activities, therapeutic exercises  Plan of Care expires: 11/24/18  Plan of Care reviewed with: patient    DACIA Cui  11/01/2018

## 2018-11-01 NOTE — PLAN OF CARE
Problem: Physical Therapy Goal  Goal: Physical Therapy Goal  Goals to be met by: 18    Patient will increase functional independence with mobility by performin. Supine to/from sit with SBA.  2. Sit to stand transfer with SBA.  3. Bed to chair transfer with SBA using Rolling Walker.  4. Gait  x 100 feet with SBA using Rolling Walker.   5. Wheelchair propulsion x50 feet with Supervision using bilateral uppper extremities.  6. Ascend/Descend 4 inch curb step with Stand-by Assistance using Rolling Walker. (discontinued)  7. Lower extremity exercise program x20 reps per handout, with assistance as needed.      Outcome: Revised  Goals revised due to slow patient progress. Pt will continue PT POC.    Elida Ayala, PT  2018

## 2018-11-01 NOTE — PT/OT/SLP PROGRESS
"Physical Therapy  Treatment    Karly Sandoval   MRN: [id number]   Admitting Diagnosis: Compression fracture of T12 vertebra    PT Received On: 11/01/18  Total Time (min): 38       Billable Minutes:  Gait Training 15, Therapeutic Activity 15, Therapeutic Exercise 8 and Total Time 38    Treatment Type: Treatment  PT/PTA: PT     PTA Visit Number: 0       General Precautions: Standard, fall  Orthopedic Precautions: spinal precautions   Braces: TLSO    Do you have any cultural, spiritual, Mormonism conflicts, given your current situation?: none    Subjective:  Communicated with patient prior to session.  "I hurt more than ever."    Pain/Comfort  Pain Rating 1: 9/10  Location - Side 1: Bilateral  Location - Orientation 1: lower  Location 1: back  Pain Addressed 1: Pre-medicate for activity, Reposition, Other (see comments)(TLSO adjusted)  Pain Rating Post-Intervention 1: 9/10    Objective:  Patient found sitting in w/c w/ TLSO.       AM-PAC 6 CLICK MOBILITY  Total Score:16    Bed Mobility:  Not performed    Transfers:  Sit<>Stand: to/from w/c (2 trials) w/ RW and Holly to rise  Cueing for hand/foot placement    Gait:  Amb 2 trials (120ft and 15ft) w/ RW and CGA for safety  Cueing to remain inside RW and for upright posture     Wheelchair Mobility:  Patient propels w/c 40ft w/ BUE and mostly SBA, Holly required to turn  Cueing for propulsion technique  Limited in distance by back pain and fatigue     Therex:  Seated BLE therex 2x10 reps (HF, LAQ, AP) w/ AAROM as needed    Additional Treatment:  Seated UBE x3min - pt declined to continue due to pain  TLSO was adjusted and cold pack applied to low back w/ cover for protection of skin.     Patient left up in chair with call button in reach.    Assessment:  Karly Sandoval is a 88 y.o. female with a medical diagnosis of Compression fracture of T12 vertebra.  Pt continues to be limited t/o sessions by severe back pain. She remains at a Holly level overall for mobility. Goals " have been revised due to slow patient progress. Pt will more than likely require 24hour care upon D/C for safety and fall prevention.    Rehab identified problem list/impairments: weakness, impaired endurance, impaired self care skills, impaired functional mobilty, gait instability, impaired balance, decreased lower extremity function, pain    Rehab potential is fair.    Activity tolerance: Fair    Discharge recommendations: nursing facility, basic(pt needs 24hour care)     Barriers to discharge: Decreased caregiver support    Equipment recommendations: wheelchair     GOALS:   Multidisciplinary Problems     Physical Therapy Goals        Problem: Physical Therapy Goal    Goal Priority Disciplines Outcome Goal Variances Interventions   Physical Therapy Goal     PT, PT/OT Revised     Description:  Goals to be met by: 18    Patient will increase functional independence with mobility by performin. Supine to/from sit with SBA.  2. Sit to stand transfer with SBA.  3. Bed to chair transfer with SBA using Rolling Walker.  4. Gait  x 100 feet with SBA using Rolling Walker.   5. Wheelchair propulsion x50 feet with Supervision using bilateral uppper extremities.  6. Ascend/Descend 4 inch curb step with Stand-by Assistance using Rolling Walker. (discontinued)  7. Lower extremity exercise program x20 reps per handout, with assistance as needed.                        PLAN:    Patient to be seen (5-6X/week)  to address the above listed problems via gait training, therapeutic activities, therapeutic exercises, wheelchair management/training  Plan of Care expires: 18  Plan of Care reviewed with: patient, camille AGUILERA Jamie, PT  2018

## 2018-11-01 NOTE — PLAN OF CARE
Problem: Occupational Therapy Goal  Goal: Occupational Therapy Goal  Goals to be met by: 11 days     Patient will increase functional independence with ADLs by performing:    UE Dressing with Set-up Assistance.  LE Dressing with Supervision.  Grooming while standing at sink with Supervision.  Toileting from toilet with Supervision for hygiene and clothing management.   Bathing from  shower chair/bench with Supervision.  Supine to sit with Modified [place] /c HOB flat and no handrails.  Stand pivot transfers with Supervision.  Toilet transfer to toilet with Supervision.  Upper extremity exercise program 3 x 15 reps per handout, with independence.  Patient will complete a functional standing activity for 10 min with S in order to perform household tasks.    Outcome: Ongoing (interventions implemented as appropriate)  Increased pain. DACIA Cui  11/1/2018

## 2018-11-01 NOTE — CHAPLAIN
had a conversation with patient.    Facts (Spiritual Perception of Illness): Patient is experiencing an intense amount of pain and wants God to relieve her pain.       Feelings (Emotions/Experiences/Coping): Patient is experieincing sadness because she is in pain and living with uncertainty because she does not know when her pain will end.  She also has an active ashley because she believes that God can relieve or end her pain.           Family/Friends: Patient does not mention any family or friends and there are no visible signs of outside support.         Ashley (Belief/Meaning/Philosophy): Patient believes that God can relieve people from their pain and requested prayer to communicate to God that she wants God to relieve her pain.   responded by providing prayer support.      Future (Spiritual Care Plan of Action): Patient will request further pastoral support as needed.

## 2018-11-01 NOTE — PLAN OF CARE
Problem: Fall Risk (Adult)  Goal: Absence of Falls  Patient will demonstrate the desired outcomes by discharge/transition of care.  Outcome: Ongoing (interventions implemented as appropriate)  Pt. Had no falls at this time.will continue to monitor.

## 2018-11-02 PROCEDURE — 25000003 PHARM REV CODE 250: Performed by: NURSE PRACTITIONER

## 2018-11-02 PROCEDURE — 97535 SELF CARE MNGMENT TRAINING: CPT

## 2018-11-02 PROCEDURE — 25000003 PHARM REV CODE 250: Performed by: STUDENT IN AN ORGANIZED HEALTH CARE EDUCATION/TRAINING PROGRAM

## 2018-11-02 PROCEDURE — 99309 SBSQ NF CARE MODERATE MDM 30: CPT | Mod: ,,, | Performed by: NURSE PRACTITIONER

## 2018-11-02 PROCEDURE — 97530 THERAPEUTIC ACTIVITIES: CPT

## 2018-11-02 PROCEDURE — 97110 THERAPEUTIC EXERCISES: CPT

## 2018-11-02 PROCEDURE — 11000004 HC SNF PRIVATE

## 2018-11-02 PROCEDURE — 25000003 PHARM REV CODE 250: Performed by: INTERNAL MEDICINE

## 2018-11-02 PROCEDURE — 97116 GAIT TRAINING THERAPY: CPT

## 2018-11-02 RX ORDER — LISINOPRIL 2.5 MG/1
10 TABLET ORAL DAILY
Status: DISCONTINUED | OUTPATIENT
Start: 2018-11-02 | End: 2018-11-14 | Stop reason: HOSPADM

## 2018-11-02 RX ADMIN — HYDROXYCHLOROQUINE SULFATE 200 MG: 200 TABLET, FILM COATED ORAL at 08:11

## 2018-11-02 RX ADMIN — ATORVASTATIN CALCIUM 20 MG: 20 TABLET, FILM COATED ORAL at 08:11

## 2018-11-02 RX ADMIN — AMLODIPINE BESYLATE 10 MG: 10 TABLET ORAL at 08:11

## 2018-11-02 RX ADMIN — CARVEDILOL 12.5 MG: 12.5 TABLET, FILM COATED ORAL at 08:11

## 2018-11-02 RX ADMIN — ACETAMINOPHEN 1000 MG: 500 TABLET, FILM COATED ORAL at 08:11

## 2018-11-02 RX ADMIN — RAMELTEON 8 MG: 8 TABLET, FILM COATED ORAL at 08:11

## 2018-11-02 RX ADMIN — LISINOPRIL 10 MG: 2.5 TABLET ORAL at 03:11

## 2018-11-02 RX ADMIN — SENNOSIDES AND DOCUSATE SODIUM 1 TABLET: 8.6; 5 TABLET ORAL at 08:11

## 2018-11-02 RX ADMIN — ESCITALOPRAM 10 MG: 10 TABLET, FILM COATED ORAL at 08:11

## 2018-11-02 RX ADMIN — LIDOCAINE 2 PATCH: 50 PATCH TOPICAL at 04:11

## 2018-11-02 RX ADMIN — FOLIC ACID 1 MG: 1 TABLET ORAL at 08:11

## 2018-11-02 RX ADMIN — CIPROFLOXACIN HYDROCHLORIDE 250 MG: 250 TABLET, FILM COATED ORAL at 08:11

## 2018-11-02 RX ADMIN — ACETAMINOPHEN 1000 MG: 500 TABLET, FILM COATED ORAL at 03:11

## 2018-11-02 RX ADMIN — CARVEDILOL 12.5 MG: 12.5 TABLET, FILM COATED ORAL at 04:11

## 2018-11-02 NOTE — ASSESSMENT & PLAN NOTE
Urge incontinence history.  She was started in tamsulosin therapy; urinating without difficulty currently.     10/25/18  No complaints today, continue Flomax as ordered.    11/2/18  Required I&O cath today.  Continue Flomax as ordered.  No reports of urinary burning.  Treating for K Pneumoniae and Proteus UTI with Cipro.  No fevers and WBC is normal.

## 2018-11-02 NOTE — ASSESSMENT & PLAN NOTE
Unable to have MRI spine due to presence.    10/25/18  Chronic.  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    11/2/18  Chronic, follow up with device clinic after discharge.

## 2018-11-02 NOTE — ASSESSMENT & PLAN NOTE
Cipro therapy started on 10/28 due to + U/A.  Continue to monitor culture results with adjustment in regimen as necessary.    11/2/18  Urine culture grew 2 organisms (Proteus and Klebsiella) both sensitive to Cipro.  Continue Cipro to complete 7 day treatment course

## 2018-11-02 NOTE — PT/OT/SLP PROGRESS
Physical Therapy  Treatment    Karly Sandoval   MRN: [id number]   Admitting Diagnosis: Compression fracture of T12 vertebra    PT Received On: 11/02/18  Total Time (min): 44       Billable Minutes:  Gait Training 14, Therapeutic Activity 15, Therapeutic Exercise 15 and Total Time 44    Treatment Type: Treatment  PT/PTA: PT     PTA Visit Number: 0       General Precautions: Standard, fall  Orthopedic Precautions: spinal precautions   Braces: TLSO    Do you have any cultural, spiritual, Scientology conflicts, given your current situation?: none    Subjective:  Communicated with patient prior to session.  Pt agreeable to session but continues to report back pain that inc's w/ mvmt.     Pain/Comfort  Pain Rating 1: 9/10  Location - Side 1: Bilateral  Location - Orientation 1: lower  Location 1: back  Pain Addressed 1: Pre-medicate for activity, Reposition, Other (see comments)(TLSO adjusted)    Objective:  Patient found sitting in w/c.       AM-PAC 6 CLICK MOBILITY  Total Score:16    Bed Mobility:  Not performed    Transfers:  Sit<>Stand: to/from w/c (2 trials) w/ RW and Min/CGA to rise/control descent  Stand Pivot Transfer: w/c<>EOM w/ RW and Min/CGA  Cueing for foot placement and forward weight shifting    Gait:  Amb 2 trials (145ft and 160ft) w/ RW and CGA for stability/safety  Cueing to remain inside RW and for upright posture     Therex:  Seated BLE therex 2x10 reps (HF, LAQ, AP)    Balance:  Seated balloon tap EOM x3min w/ SBA, cueing for upright and midline posture, pt tends to have posterior lean w/ dynamic sitting balance    Additional Treatment:  TLSO was adjusted t/o session in order to assure proper fit.     Patient left up in chair with call button in reach.    Assessment:  Karly Sandoval is a 88 y.o. female with a medical diagnosis of Compression fracture of T12 vertebra.  Pt continues to be limited t/o sessions by back pain. She remains at a Min/CGA level for mobility. She will continue PT  POC.    Rehab identified problem list/impairments: weakness, impaired endurance, impaired self care skills, impaired functional mobilty, gait instability, impaired balance, decreased lower extremity function, pain    Rehab potential is fair.    Activity tolerance: Fair    Discharge recommendations: nursing facility, basic(pt needs 24hour care)     Barriers to discharge: Decreased caregiver support    Equipment recommendations: wheelchair     GOALS:   Multidisciplinary Problems     Physical Therapy Goals        Problem: Physical Therapy Goal    Goal Priority Disciplines Outcome Goal Variances Interventions   Physical Therapy Goal     PT, PT/OT Ongoing (interventions implemented as appropriate)     Description:  Goals to be met by: 18    Patient will increase functional independence with mobility by performin. Supine to/from sit with SBA.  2. Sit to stand transfer with SBA.  3. Bed to chair transfer with SBA using Rolling Walker.  4. Gait  x 100 feet with SBA using Rolling Walker.   5. Wheelchair propulsion x50 feet with Supervision using bilateral uppper extremities.  6. Ascend/Descend 4 inch curb step with Stand-by Assistance using Rolling Walker. (discontinued)  7. Lower extremity exercise program x20 reps per handout, with assistance as needed.                        PLAN:    Patient to be seen (5-6X/week)  to address the above listed problems via gait training, therapeutic activities, therapeutic exercises, wheelchair management/training  Plan of Care expires: 18  Plan of Care reviewed with: patient, camille    Elida Ayala, PT  2018

## 2018-11-02 NOTE — SUBJECTIVE & OBJECTIVE
Interval History:   10/25/18  Patient seen at bedside, son is present.  She reports having worsening back pain to mid back.  States symptoms started after therapy.  She has no report paraesthesia or pain radiation down legs.  Patient said current pain regimen is not helping.  Son inquired about use of back brace to help with pain control.  Advised him, would discuss with MD.  He also reports patient with history of RA and is followed by Dr. Smith.  Son reports patient is [facility] if deemed appropriate.  Patient reports she does not have a good appetite but tries to eat.  She reports she is drinking fluids and urinating and having BM without issues.      Discussed case with Dr. Henry, she suggest no need to repeat imaging at this time but suggest increasing lidoderm patch to 2 and try use of abdominal binder to support back.  Will order and monitor.       11/2/18  Patient seen at bedside, she reports she is still having back pain.  Therapy reports she did a little better this morning with her session.  She is starting to retain urine and required I&O cath today, bladder scan showed ~254 cc of urine.  Patient is concerned about her lack of progress.  Therapy feels her progress has declined since admission as her eval was much better.  However, patient with acute T12 compression fracture.  She has follow up with spine clinic and a TSLO brace has been obtained for her.  Pain medication has been adjusted to optimize pain control, ordered Tramadol 25 mg tid PRN which she has only taken 1 dose so far.  Will change scheduled Xanax to PRN dosing as this is how it is ordered for her at home.        Review of Systems   Constitutional: Negative for fatigue and fever.   Respiratory: Negative for cough and shortness of breath.    Cardiovascular: Negative for chest pain, palpitations and leg swelling.   Gastrointestinal: Negative for abdominal pain, constipation, diarrhea, nausea and  vomiting.   Genitourinary:        Urinary retention   Musculoskeletal: Positive for arthralgias, back pain and myalgias.     Objective:     Vital Signs (Most Recent):  Temp: 98.1 °F (36.7 °C) (11/01/18 2100)  Pulse: 60 (11/01/18 2100)  Resp: 20 (11/01/18 2100)  BP: (!) 161/70 (11/02/18 0830)  SpO2: 95 % (11/01/18 2100) Vital Signs (24h Range):  Temp:  [98.1 °F (36.7 °C)] 98.1 °F (36.7 °C)  Pulse:  [60] 60  Resp:  [20] 20  SpO2:  [95 %] 95 %  BP: (149-161)/(70-88) 161/70     Weight: 55.2 kg (121 lb 11.1 oz)  Body mass index is 22.26 kg/m².    Intake/Output Summary (Last 24 hours) at 11/2/2018 1254  Last data filed at 11/2/2018 1200  Gross per 24 hour   Intake 620 ml   Output --   Net 620 ml      Physical Exam   Constitutional: She is oriented to person, place, and time. She appears well-developed and well-nourished.   Patient is kyphotic   Cardiovascular: Normal rate, regular rhythm, normal heart sounds and intact distal pulses.   No murmur heard.  Pulmonary/Chest: Effort normal and breath sounds normal. No respiratory distress.   Abdominal: Soft. Normal appearance and bowel sounds are normal. She exhibits no distension.   Musculoskeletal: Normal range of motion. She exhibits edema.   TSLO in place.   Neurological: She is alert and oriented to person, place, and time. She has normal strength.   Skin: Skin is warm, dry and intact. Capillary refill takes less than 2 seconds.   Psychiatric: She has a normal mood and affect.       Significant Labs:   BMP:   Recent Labs   Lab 11/01/18  0528   GLU 91   *   K 4.5   CL 97   CO2 22*   BUN 31*   CREATININE 0.9   CALCIUM 8.7   MG 1.9     CBC:   Recent Labs   Lab 11/01/18 0528   WBC 7.35   HGB 9.8*   HCT 28.3*          Significant Imaging: n/a

## 2018-11-02 NOTE — PLAN OF CARE
Problem: Occupational Therapy Goal  Goal: Occupational Therapy Goal  Goals to be met by: 11 days     Patient will increase functional independence with ADLs by performing:    UE Dressing with Set-up Assistance.  LE Dressing with Supervision.  Grooming while standing at sink with Supervision.  Toileting from toilet with Supervision for hygiene and clothing management.   Bathing from  shower chair/bench with Supervision.  Supine to sit with Modified [place] /c HOB flat and no handrails.  Stand pivot transfers with Supervision.  Toilet transfer to toilet with Supervision.  Upper extremity exercise program 3 x 15 reps per handout, with independence.  Patient will complete a functional standing activity for 10 min with S in order to perform household tasks.    Outcome: Ongoing (interventions implemented as appropriate)  Patient's goals are appropriate.   DACIA Regalado  11/2/2018

## 2018-11-02 NOTE — ASSESSMENT & PLAN NOTE
-currently compensated on clinical exam  -continue current medical therapy with lisinopril, coreg, furosemide (dose reduced due to recent CHARLENE and poor po intake but dexamethasone therapy currently)  -continue low Na diet to treat  -continue daily weight monitoring with adjustment of diuretic therapy as necessary to treat weight gains > 2-3 pounds in 24-48 hours  -continue daily pulse oximetry monitoring; proceed with CXR imaging and adjustment of diuretic therapy as necessary to treat new or worsening hypoxia  -intermittent clinical exam monitoring with adjustment of diuretic regimen as necessary to treat signs of volume overload  -f/u with cardiology as scheduled    10/25/18  Labs indicated patient is dehydrated.  Will give  cc total per Dr. Henry request and hold lasix for now.  Patient with lower leg edema that is chronic.  No reports of SOB.  Monitor weights.  Wt Readings from Last 1 Encounters:   11/01/18 0600 55.2 kg (121 lb 11.1 oz)   10/30/18 0600 55.2 kg (121 lb 11.1 oz)   10/29/18 0556 54.1 kg (119 lb 4.3 oz)   10/23/18 1632 54.2 kg (119 lb 7.8 oz)     Continue b-blocker as ordered.    10/30/2018:  Patient with LE edema and hyponatremia with lasix therapy resumed on 10/29.  Will continue to monitor labs and weights.     11/2/18  Appears compensated, weight is stable, no reports of SOB.  Continue ACE, b-blocker and continue to hold lasix.  No reports of chest pain or SOB.

## 2018-11-02 NOTE — PLAN OF CARE
Problem: Physical Therapy Goal  Goal: Physical Therapy Goal  Goals to be met by: 18    Patient will increase functional independence with mobility by performin. Supine to/from sit with SBA.  2. Sit to stand transfer with SBA.  3. Bed to chair transfer with SBA using Rolling Walker.  4. Gait  x 100 feet with SBA using Rolling Walker.   5. Wheelchair propulsion x50 feet with Supervision using bilateral uppper extremities.  6. Ascend/Descend 4 inch curb step with Stand-by Assistance using Rolling Walker. (discontinued)  7. Lower extremity exercise program x20 reps per handout, with assistance as needed.       Outcome: Ongoing (interventions implemented as appropriate)  LTGs remain appropriate. Pt will continue PT POC.    Elida Ayala, PT  2018

## 2018-11-02 NOTE — ASSESSMENT & PLAN NOTE
Chronic and stable  Will continue to monitor with twice weekly labs.    10/25/18  Chronic, sodium down to 127.  Will give IVF as she appears dry and admits to not eating much.  Repeat Lab on Monday.  PharmD cautions use of Plaquenil if sodium continues to drop.    10/30/2018  Appears related to CHF based on labs.  Continue fluid restriction (which she does on her own due to poor po intake)  Continue lasix diuresis.     11/2/18  Continue fluid restriction, sodium level chronically low.  Continue to monitor biweekly labs.  Lasix currently on hold, will consider resuming on Monday if renal function stable.

## 2018-11-02 NOTE — PT/OT/SLP PROGRESS
Occupational Therapy  Treatment    Karly Sandoval   MRN: [id number]   Admitting Diagnosis: Compression fracture of T12 vertebra    OT Date of Treatment: 11/02/18  Total Time (min): 45 min    Billable Minutes:  Self Care/Home Management 30 and Therapeutic Exercise 15    General Precautions: Standard, fall  Orthopedic Precautions: N/A  Braces: N/A         Subjective:  Communicated with patient prior to session.    Pain/Comfort  Pain Rating 1: 8/10  Location - Side 1: Bilateral  Location - Orientation 1: lower  Location 1: back  Pain Addressed 1: Reposition, Distraction  Pain Rating Post-Intervention 1: 8/10    Objective:       Occupational Performance:    Bed Mobility:    · Patient completed Sit to Supine with minimum assistance      Functional Mobility/Transfers:  · Patient completed Sit <> Stand Transfer with Min A   · Patient completed w/c>bed Transfer using Stand Pivot technique with minimum assistance with hand-held assist  · Patient completed Toilet Transfer Stand Pivot technique with stand by assistance with  grab bars    Activities of Daily Living:  · Lower Body Dressing: Moderate assistance to doff and don pants  · Toileting: moderate assistance     [facility] 6 Click:  [facility] Total Score: 15    OT Exercises: UE Ergometer 15 min for improving endurance to increase independence with ADLs.     Patient left supine with call button in reach and all needs met.     ASSESSMENT:  Karly Sandoval is a 88 y.o. female with a medical diagnosis of Compression fracture of T12 vertebra and presents with the deficits listed below. Patient tolerated treatment session, but still has a significant amount of back pain. Patient continues to benefit from skilled OT services to achieve maximal independence.    Rehab identified problem list/impairments: weakness, impaired endurance, impaired self care skills, impaired functional mobilty, gait instability, impaired balance, decreased lower extremity function, pain    Rehab potential is  good    Activity tolerance: Fair (Limited due to pain)    Discharge recommendations: nursing facility, basic(pt need 24  hour care)     Barriers to discharge: Decreased caregiver support     Equipment recommendations: wheelchair     GOALS:   Multidisciplinary Problems     Occupational Therapy Goals        Problem: Occupational Therapy Goal    Goal Priority Disciplines Outcome Interventions   Occupational Therapy Goal     OT, PT/OT Ongoing (interventions implemented as appropriate)    Description:  Goals to be met by: 11 days     Patient will increase functional independence with ADLs by performing:    UE Dressing with Set-up Assistance.  LE Dressing with Supervision.  Grooming while standing at sink with Supervision.  Toileting from toilet with Supervision for hygiene and clothing management.   Bathing from  shower chair/bench with Supervision.  Supine to sit with Modified [place] /c HOB flat and no handrails.  Stand pivot transfers with Supervision.  Toilet transfer to toilet with Supervision.  Upper extremity exercise program 3 x 15 reps per handout, with independence.  Patient will complete a functional standing activity for 10 min with S in order to perform household tasks.                     Plan:  Patient to be seen 5 x/week to address the above listed problems via self-care/home management, therapeutic activities, therapeutic exercises  Plan of Care expires: 11/24/18  Plan of Care reviewed with: patient    DACIA Regalado  11/02/2018

## 2018-11-02 NOTE — ASSESSMENT & PLAN NOTE
Continue chronic therapy with sertraline.  Patient did not tolerate therapy with amitriptyline due to lethargy  Continue chronic xanax nightly as she takes at home; GDR not to be attempted due to acute illness and failure of alternative therapy with risk of decompensating condition and prohibiting rehabilitation potential    10/25/18  Chronic, continue Zoloft as ordered.  Per MD, unable to tolerate Elavil.  Xanax qhs as she chronically takes.  Continue to monitor.    11/2/18  Family request Zoloft be discontinue and Lexapro be restarted.  They felt she did better with Lexapro.  Will change Xanax to PRN dosing today.  Recommend she follow up with PCP.

## 2018-11-02 NOTE — ASSESSMENT & PLAN NOTE
Continue hydroxychloroquine and folic acid.  MTX discontinued by Rheumatology.  She will chito f/u with rheum on discharge from [facility].     10/25/18  Follow by Dr. Smith in Rhe[facility], will need f/u after discharge.  Continue Plaquenil and MTX per MD orders.  Sodium level is low but chronic, will need to monitor given concurrent use of Plaquenil.      11/2/18  Follow up with Rheumatology after discharge.  Continue Plaquenil and MTX as ordered.  Hyponatremia is chronically low and stable, continue to monitor.

## 2018-11-02 NOTE — ASSESSMENT & PLAN NOTE
New on imaging but likely present since admit  Continue lidoderm patches and scheduled tylenol  TLSO brace ordered.  Patient will need f/u with neurosurgery next available.    11/2/18  Pain persist despite change in treatment plan.  Added Tramadol yesterday but only took one dose.  Set to see Neurosurgery next week.  Patient is not amendable to surgery at this time but has said she will see what her son, Rene, thinks.  Continue Lidoderm patch, scheduled tylenol and continue to wear TSLO brace when out of bed.

## 2018-11-02 NOTE — ASSESSMENT & PLAN NOTE
Likely multifactorial involving RA, chronic compression fractures and old left L3 transverse process  Continue tylenol prn pain and lidocaine patch daily.  Continue dexamethasone therapy with end date of 10/28  -continue PT/OT to increase ambulation, ADL performance and endurance  -continue TOM's and SCD's for DVT prophylaxis  -continue fall precautions  -continue senokot-s and miralax to prevent constipation; hold for frequent or loose stooling    10/25/18  Patient reports pain to back is worse after therapy today.  Discussed with Dr. Henry, she suggested changing to 2 lidoderm patches and continue Tylenol dosing as ordered.  No need to re-image at this time.  Continue TOM/SCD for dvt ppx.  Will try using an abdominal binder to see if this helps with pain with therapy sessions.    11/2/18  Now with TSLO and recent x-ray showed compression fracture at T12 which is new.    Set appointment with Neurosurgery to discuss treatment options.  Continue TOM/SCD for dvt ppx.  Added Tramadol PRN to assist with pain control and continue Tylenol as ordered.

## 2018-11-02 NOTE — ASSESSMENT & PLAN NOTE
Chronic and controlled  Continue therapy with carvedilol, amlodipine and lisinopril  Will continue to monitor and adjust regimen as necessary.    10/25/18  BP Readings from Last 3 Encounters:   11/02/18 (!) 161/70   10/23/18 (!) 111/59   10/17/18 (!) 175/75     continue Norvasc, coreg and Hydralazine PRN as ordered.  Will stop ACE for now.  BP is stable.    11/2/18  BP mildly elevated, will restart ACE and monitor renal function, last creatinine down to 0.9.  Continue Norvasc and Coreg at current dosing.  Hydralazine PRN as ordered.

## 2018-11-02 NOTE — PROGRESS NOTES
[facility] - [facility]illed Nursing Care  Department of [facility] Medicine  Progress Note    Patient Name: Karly Sandoval  MRN: [id number]  Code Status: Full Code  Admission Date: 10/23/2018  Length of Stay: 10 days  Attending Physician: Amanda Peters MD  Primary Care Provider: Uday Allen MD    Subjective:     Principal Problem:Compression fracture of T12 vertebra    HPI:  Chief Complaint/Reason for Admission: Acute bilateral low back pain without sciatica    History of Present Illness:  Patient is a 88 y.o. 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  She was evaluated by neurosurgery with normal IAN and sensation with low suspicion for cauda equina syndrome.  CT myelogram was considered but given low suspicion for condition, risk of kidney injury and patient's disinterest in surgery, procedure was not done.  She complains of pain to her back today rating it 6/10 but controlled with tylenol.  She did well in therapy per her bu is worn out subsequently.  She has a poor appetite.      The patient has been admitted to SNF for ongoing PT/OT due to insufficient progress to go home safely from the hospital.    Records from last hospital stay reviewed and summarized above.     Interval History:   10/25/18  Patient seen at bedside, son is present.  She reports having worsening back pain to mid back.  States symptoms started after therapy.  She has no report paraesthesia or pain radiation down legs.  Patient said current pain regimen is not helping.  Son inquired about use of back brace to help with pain control.  Advised him, would discuss with MD.  He also reports patient with history of RA and is followed by Dr. Smith.  Son reports patient is going [facility] if deemed appropriate.   Patient reports she does not have a good appetite but tries to eat.  She reports she is drinking fluids and urinating and having BM without issues.      Discussed case with Dr. Henry, she suggest no need to repeat imaging at this time but suggest increasing lidoderm patch to 2 and try use of abdominal binder to support back.  Will order and monitor.       11/2/18  Patient seen at bedside, she reports she is still having back pain.  Therapy reports she did a little better this morning with her session.  She is starting to retain urine and required I&O cath today, bladder scan showed ~254 cc of urine.  Patient is concerned about her lack of progress.  Therapy feels her progress has declined since admission as her eval was much better.  However, patient with acute T12 compression fracture.  She has follow up with spine clinic and a TSLO brace has been obtained for her.  Pain medication has been adjusted to optimize pain control, ordered Tramadol 25 mg tid PRN which she has only taken 1 dose so far.  Will change scheduled Xanax to PRN dosing as this is how it is ordered for her at home.        Review of Systems   Constitutional: Negative for fatigue and fever.   Respiratory: Negative for cough and shortness of breath.    Cardiovascular: Negative for chest pain, palpitations and leg swelling.   Gastrointestinal: Negative for abdominal pain, constipation, diarrhea, nausea and vomiting.   Genitourinary:        Urinary retention   Musculoskeletal: Positive for arthralgias, back pain and myalgias.     Objective:     Vital Signs (Most Recent):  Temp: 98.1 °F (36.7 °C) (11/01/18 2100)  Pulse: 60 (11/01/18 2100)  Resp: 20 (11/01/18 2100)  BP: (!) 161/70 (11/02/18 0830)  SpO2: 95 % (11/01/18 2100) Vital Signs (24h Range):  Temp:  [98.1 °F (36.7 °C)] 98.1 °F (36.7 °C)  Pulse:  [60] 60  Resp:  [20] 20  SpO2:  [95 %] 95 %  BP: (149-161)/(70-88) 161/70     Weight: 55.2 kg (121 lb 11.1 oz)  Body mass index is 22.26  kg/m².    Intake/Output Summary (Last 24 hours) at 11/2/2018 1254  Last data filed at 11/2/2018 1200  Gross per 24 hour   Intake 620 ml   Output --   Net 620 ml      Physical Exam   Constitutional: She is oriented to person, place, and time. She appears well-developed and well-nourished.   Patient is kyphotic   Cardiovascular: Normal rate, regular rhythm, normal heart sounds and intact distal pulses.   No murmur heard.  Pulmonary/Chest: Effort normal and breath sounds normal. No respiratory distress.   Abdominal: Soft. Normal appearance and bowel sounds are normal. She exhibits no distension.   Musculoskeletal: Normal range of motion. She exhibits edema.   TSLO in place.   Neurological: She is alert and oriented to person, place, and time. She has normal strength.   Skin: Skin is warm, dry and intact. Capillary refill takes less than 2 seconds.   Psychiatric: She has a normal mood and affect.       Significant Labs:   BMP:   Recent Labs   Lab 11/01/18  0528   GLU 91   *   K 4.5   CL 97   CO2 22*   BUN 31*   CREATININE 0.9   CALCIUM 8.7   MG 1.9     CBC:   Recent Labs   Lab 11/01/18  0528   WBC 7.35   HGB 9.8*   HCT 28.3*          Significant Imaging: n/a    Assessment/Plan:      * Compression fracture of T12 vertebra    New on imaging but likely present since admit  Continue lidoderm patches and scheduled tylenol  TLSO brace ordered.  Patient will need f/u with neurosurgery next available.    11/2/18  Pain persist despite change in treatment plan.  Added Tramadol yesterday but only took one dose.  Set to see Neurosurgery next week.  Patient is not amendable to surgery at this time but has said she will see what her son, Rene, thinks.  Continue Lidoderm patch, scheduled tylenol and continue to wear TSLO brace when out of bed.     Acute cystitis without hematuria    Cipro therapy started on 10/28 due to + U/A.  Continue to monitor culture results with adjustment in regimen as  necessary.    11/2/18  Urine culture grew 2 organisms (Proteus and Klebsiella) both sensitive to Cipro.  Continue Cipro to complete 7 day treatment course     Urinary retention    Urge incontinence history.  She was started in tamsulosin therapy; urinating without difficulty currently.     10/25/18  No complaints today, continue Flomax as ordered.    11/2/18  Required I&O cath today.  Continue Flomax as ordered.  No reports of urinary burning.  Treating for K Pneumoniae and Proteus UTI with Cipro.  No fevers and WBC is normal.     Hyponatremia    Chronic and stable  Will continue to monitor with twice weekly labs.    10/25/18  Chronic, sodium down to 127.  Will give IVF as she appears dry and admits to not eating much.  Repeat Lab on Monday.  PharmD cautions use of Plaquenil if sodium continues to drop.    10/30/2018  Appears related to CHF based on labs.  Continue fluid restriction (which she does on her own due to poor po intake)  Continue lasix diuresis.     11/2/18  Continue fluid restriction, sodium level chronically low.  Continue to monitor biweekly labs.  Lasix currently on hold, will consider resuming on Monday if renal function stable.       Acute bilateral low back pain without sciatica    Likely multifactorial involving RA, chronic compression fractures and old left L3 transverse process  Continue tylenol prn pain and lidocaine patch daily.  Continue dexamethasone therapy with end date of 10/28  -continue PT/OT to increase ambulation, ADL performance and endurance  -continue TOM's and SCD's for DVT prophylaxis  -continue fall precautions  -continue senokot-s and miralax to prevent constipation; hold for frequent or loose stooling    10/25/18  Patient reports pain to back is worse after therapy today.  Discussed with Dr. Henry, she suggested changing to 2 lidoderm patches and continue Tylenol dosing as ordered.  No need to re-image at this time.  Continue TOM/SCD for dvt ppx.  Will try using an abdominal  binder to see if this helps with pain with therapy sessions.    11/2/18  Now with TSLO and recent x-ray showed compression fracture at T12 which is new.    Set appointment with Neurosurgery to discuss treatment options.  Continue TOM/SCD for dvt ppx.  Added Tramadol PRN to assist with pain control and continue Tylenol as ordered.       Anxiety    Continue chronic therapy with sertraline.  Patient did not tolerate therapy with amitriptyline due to lethargy  Continue chronic xanax nightly as she takes at home; GDR not to be attempted due to acute illness and failure of alternative therapy with risk of decompensating condition and prohibiting rehabilitation potential    10/25/18  Chronic, continue Zoloft as ordered.  Per MD, unable to tolerate Elavil.  Xanax qhs as she chronically takes.  Continue to monitor.    11/2/18  Family request Zoloft be discontinue and Lexapro be restarted.  They felt she did better with Lexapro.  Will change Xanax to PRN dosing today.  Recommend she follow up with PCP.     Pacemaker    Unable to have MRI spine due to presence.    10/25/18  Chronic.  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    11/2/18  Chronic, follow up with device clinic after discharge.     Chronic diastolic CHF (congestive heart failure)    -currently compensated on clinical exam  -continue current medical therapy with lisinopril, coreg, furosemide (dose reduced due to recent CHARLENE and poor po intake but dexamethasone therapy currently)  -continue low Na diet to treat  -continue daily weight monitoring with adjustment of diuretic therapy as necessary to treat weight gains > 2-3 pounds in 24-48 hours  -continue daily pulse oximetry monitoring; proceed with CXR imaging and adjustment of diuretic therapy as necessary to treat new or worsening hypoxia  -intermittent clinical exam monitoring with adjustment of diuretic regimen as necessary to treat signs of volume overload  -f/u with cardiology as  scheduled    10/25/18  Labs indicated patient is dehydrated.  Will give  cc total per Dr. Henry request and hold lasix for now.  Patient with lower leg edema that is chronic.  No reports of SOB.  Monitor weights.  Wt Readings from Last 1 Encounters:   11/01/18 0600 55.2 kg (121 lb 11.1 oz)   10/30/18 0600 55.2 kg (121 lb 11.1 oz)   10/29/18 0556 54.1 kg (119 lb 4.3 oz)   10/23/18 1632 54.2 kg (119 lb 7.8 oz)     Continue b-blocker as ordered.    10/30/2018:  Patient with LE edema and hyponatremia with lasix therapy resumed on 10/29.  Will continue to monitor labs and weights.     11/2/18  Appears compensated, weight is stable, no reports of SOB.  Continue ACE, b-blocker and continue to hold lasix.  No reports of chest pain or SOB.     Essential hypertension    Chronic and controlled  Continue therapy with carvedilol, amlodipine and lisinopril  Will continue to monitor and adjust regimen as necessary.    10/25/18  BP Readings from Last 3 Encounters:   11/02/18 (!) 161/70   10/23/18 (!) 111/59   10/17/18 (!) 175/75     continue Norvasc, coreg and Hydralazine PRN as ordered.  Will stop ACE for now.  BP is stable.    11/2/18  BP mildly elevated, will restart ACE and monitor renal function, last creatinine down to 0.9.  Continue Norvasc and Coreg at current dosing.  Hydralazine PRN as ordered.     RA (rheumatoid arthritis)    Continue hydroxychloroquine and folic acid.  MTX discontinued by Rheumatology.  She will chito f/u with rheum on discharge from [facility].     10/25/18  Follow by Dr. Smith in Rhe[facility], will need f/u after discharge.  Continue Plaquenil and MTX per MD orders.  Sodium level is low but chronic, will need to monitor given concurrent use of Plaquenil.      11/2/18  Follow up with Rheumatology after discharge.  Continue Plaquenil and MTX as ordered.  Hyponatremia is chronically low and stable, continue to monitor.         Warren Barry NP  Department of Hospital Medicine  [facility]

## 2018-11-03 PROCEDURE — 25000003 PHARM REV CODE 250: Performed by: NURSE PRACTITIONER

## 2018-11-03 PROCEDURE — 25000003 PHARM REV CODE 250: Performed by: STUDENT IN AN ORGANIZED HEALTH CARE EDUCATION/TRAINING PROGRAM

## 2018-11-03 PROCEDURE — 11000004 HC SNF PRIVATE

## 2018-11-03 PROCEDURE — 25000003 PHARM REV CODE 250: Performed by: INTERNAL MEDICINE

## 2018-11-03 RX ADMIN — AMLODIPINE BESYLATE 10 MG: 10 TABLET ORAL at 08:11

## 2018-11-03 RX ADMIN — ATORVASTATIN CALCIUM 20 MG: 20 TABLET, FILM COATED ORAL at 08:11

## 2018-11-03 RX ADMIN — FOLIC ACID 1 MG: 1 TABLET ORAL at 08:11

## 2018-11-03 RX ADMIN — TRAMADOL HYDROCHLORIDE 25 MG: 50 TABLET, FILM COATED ORAL at 08:11

## 2018-11-03 RX ADMIN — CARVEDILOL 12.5 MG: 12.5 TABLET, FILM COATED ORAL at 08:11

## 2018-11-03 RX ADMIN — HYDROXYCHLOROQUINE SULFATE 200 MG: 200 TABLET, FILM COATED ORAL at 08:11

## 2018-11-03 RX ADMIN — CIPROFLOXACIN HYDROCHLORIDE 250 MG: 250 TABLET, FILM COATED ORAL at 09:11

## 2018-11-03 RX ADMIN — CIPROFLOXACIN HYDROCHLORIDE 250 MG: 250 TABLET, FILM COATED ORAL at 08:11

## 2018-11-03 RX ADMIN — LIDOCAINE 2 PATCH: 50 PATCH TOPICAL at 05:11

## 2018-11-03 RX ADMIN — ACETAMINOPHEN 1000 MG: 500 TABLET, FILM COATED ORAL at 09:11

## 2018-11-03 RX ADMIN — LISINOPRIL 10 MG: 2.5 TABLET ORAL at 08:11

## 2018-11-03 RX ADMIN — ACETAMINOPHEN 1000 MG: 500 TABLET, FILM COATED ORAL at 02:11

## 2018-11-03 RX ADMIN — ACETAMINOPHEN 1000 MG: 500 TABLET, FILM COATED ORAL at 08:11

## 2018-11-03 RX ADMIN — ESCITALOPRAM 10 MG: 10 TABLET, FILM COATED ORAL at 08:11

## 2018-11-03 RX ADMIN — SENNOSIDES AND DOCUSATE SODIUM 1 TABLET: 8.6; 5 TABLET ORAL at 08:11

## 2018-11-03 RX ADMIN — SENNOSIDES AND DOCUSATE SODIUM 1 TABLET: 8.6; 5 TABLET ORAL at 09:11

## 2018-11-03 RX ADMIN — CARVEDILOL 12.5 MG: 12.5 TABLET, FILM COATED ORAL at 05:11

## 2018-11-04 PROCEDURE — 97110 THERAPEUTIC EXERCISES: CPT

## 2018-11-04 PROCEDURE — 11000004 HC SNF PRIVATE

## 2018-11-04 PROCEDURE — 25000003 PHARM REV CODE 250: Performed by: INTERNAL MEDICINE

## 2018-11-04 PROCEDURE — 25000003 PHARM REV CODE 250: Performed by: STUDENT IN AN ORGANIZED HEALTH CARE EDUCATION/TRAINING PROGRAM

## 2018-11-04 PROCEDURE — 25000003 PHARM REV CODE 250: Performed by: NURSE PRACTITIONER

## 2018-11-04 PROCEDURE — 97530 THERAPEUTIC ACTIVITIES: CPT

## 2018-11-04 PROCEDURE — 97535 SELF CARE MNGMENT TRAINING: CPT

## 2018-11-04 RX ADMIN — RAMELTEON 8 MG: 8 TABLET, FILM COATED ORAL at 09:11

## 2018-11-04 RX ADMIN — FOLIC ACID 1 MG: 1 TABLET ORAL at 10:11

## 2018-11-04 RX ADMIN — ATORVASTATIN CALCIUM 20 MG: 20 TABLET, FILM COATED ORAL at 10:11

## 2018-11-04 RX ADMIN — ESCITALOPRAM 10 MG: 10 TABLET, FILM COATED ORAL at 10:11

## 2018-11-04 RX ADMIN — CARVEDILOL 12.5 MG: 12.5 TABLET, FILM COATED ORAL at 10:11

## 2018-11-04 RX ADMIN — CIPROFLOXACIN HYDROCHLORIDE 250 MG: 250 TABLET, FILM COATED ORAL at 10:11

## 2018-11-04 RX ADMIN — HYDROXYCHLOROQUINE SULFATE 200 MG: 200 TABLET, FILM COATED ORAL at 10:11

## 2018-11-04 RX ADMIN — SENNOSIDES AND DOCUSATE SODIUM 1 TABLET: 8.6; 5 TABLET ORAL at 10:11

## 2018-11-04 RX ADMIN — ACETAMINOPHEN 1000 MG: 500 TABLET, FILM COATED ORAL at 02:11

## 2018-11-04 RX ADMIN — LIDOCAINE 2 PATCH: 50 PATCH TOPICAL at 05:11

## 2018-11-04 RX ADMIN — ACETAMINOPHEN 1000 MG: 500 TABLET, FILM COATED ORAL at 10:11

## 2018-11-04 RX ADMIN — CARVEDILOL 12.5 MG: 12.5 TABLET, FILM COATED ORAL at 05:11

## 2018-11-04 RX ADMIN — ALPRAZOLAM 0.12 MG: 0.25 TABLET ORAL at 09:11

## 2018-11-04 RX ADMIN — LISINOPRIL 10 MG: 2.5 TABLET ORAL at 10:11

## 2018-11-04 RX ADMIN — AMLODIPINE BESYLATE 10 MG: 10 TABLET ORAL at 10:11

## 2018-11-04 NOTE — PLAN OF CARE
Problem: Fall Risk (Adult)  Goal: Absence of Falls  Patient will demonstrate the desired outcomes by discharge/transition of care.  Outcome: Ongoing (interventions implemented as appropriate)  Fall Risk: Encouraged patient to continue to call for staff assistance with all transfers. Explained to patient due to her recent change in medical condition, she is at increased risk for falls. Verbalized understanding. Will maintain safety precautions and follow up as needed.

## 2018-11-04 NOTE — PT/OT/SLP PROGRESS
Occupational Therapy  Treatment    Karly Sandoval   MRN: [id number]   Admitting Diagnosis: Compression fracture of T12 vertebra    OT Date of Treatment: 11/04/18   Total Time (min): 45min       Billable Minutes:  Therapeutic Activity 25 and Therapeutic Exercise 20    General Precautions: Standard, fall  Orthopedic Precautions: spinal precautions  Braces: TLSO         Subjective:  Communicated with nsg prior to session.  Pt stated she was in pain and brace needed to be adjusted    Pain/Comfort  Pain Rating 1: 7/10  Location - Side 1: Bilateral  Location - Orientation 1: lower  Location 1: back  Pain Addressed 1: Reposition    Objective:   Pt was up in chair upon arrival      Functional Mobility/Transfers:  · Patient completed Sit <> Stand Transfer with minimum assistance  with  rolling walker   · Functional Mobility: Pt with Min  A with sit<stand from chair to WC with RW.     Activities of Daily Living:  · Grooming: stand by assistance  Pt with SBA combed hair, oral hygiene, and washed face seated at the sink  · Pt. TLSO adjusted for comfort on this date with Total A    AMPAC 6 Click:  AMPAC Total Score: 15    OT Exercises: AROM Pt with #1 dowel performed chest presses and elbow flex/ext with 1x10 reps. Pt performed BUE AROM there ex without weight for  shoulder abduction and flexion     Additional Treatment:  Pt performed  therapeutic activity of  folding towels to increase ROM and endurance with household chores.  Pt engaged in   fine motor and hand strengthening activity of  Screwing and unscrewing  Jars of various sizes . Pt performed arch ring  backward and forward directions  to increase ROM, and UE strength. Pt performed balloon toss x 4 sets 10 reps to increase ROM, hand/eye coordination  And to improve muscle endurance,    Patient left up in chair with call button in reach    ASSESSMENT:  Karly Sandoval is a 88 y.o. female with a medical diagnosis of Compression fracture of T12 vertebra . OT will  continue POC to encourage particiaptions and self care to manage daily ADLS. Pt. Tolerated session fairly but continues to have pain in back with activity and requires encouragement for participation    Rehab identified problem list/impairments: weakness, impaired endurance, impaired self care skills, impaired functional mobilty, gait instability, impaired balance, decreased lower extremity function, pain    Rehab potential is fair    Activity tolerance: Fair    Discharge recommendations: nursing facility, basic(pt need 24  hour care)     Barriers to discharge: Decreased caregiver support     Equipment recommendations: wheelchair     GOALS:   Multidisciplinary Problems     Occupational Therapy Goals        Problem: Occupational Therapy Goal    Goal Priority Disciplines Outcome Interventions   Occupational Therapy Goal     OT, PT/OT Ongoing (interventions implemented as appropriate)    Description:  Goals to be met by: 11 days     Patient will increase functional independence with ADLs by performing:    UE Dressing with Set-up Assistance.  LE Dressing with Supervision.  Grooming while standing at sink with Supervision.  Toileting from toilet with Supervision for hygiene and clothing management.   Bathing from  shower chair/bench with Supervision.  Supine to sit with Modified [place] /c HOB flat and no handrails.  Stand pivot transfers with Supervision.  Toilet transfer to toilet with Supervision.  Upper extremity exercise program 3 x 15 reps per handout, with independence.  Patient will complete a functional standing activity for 10 min with S in order to perform household tasks.                     Plan:  Patient to be seen 5 x/week to address the above listed problems via self-care/home management, therapeutic activities, therapeutic exercises  Plan of Care expires: 11/24/18  Plan of Care reviewed with: patient    Severiano CASSIDY Gonzalez  11/04/2018     I certify that I was present in the room directing the student  in service delivery and guiding them using my skilled judgment. As the co-signing therapist I have reviewed the students documentation and am responsible for the treatment, assessment, and plan.

## 2018-11-04 NOTE — PLAN OF CARE
Problem: Occupational Therapy Goal  Goal: Occupational Therapy Goal  Goals to be met by: 11 days     Patient will increase functional independence with ADLs by performing:    UE Dressing with Set-up Assistance.  LE Dressing with Supervision.  Grooming while standing at sink with Supervision.  Toileting from toilet with Supervision for hygiene and clothing management.   Bathing from  shower chair/bench with Supervision.  Supine to sit with Modified [place] /c HOB flat and no handrails.  Stand pivot transfers with Supervision.  Toilet transfer to toilet with Supervision.  Upper extremity exercise program 3 x 15 reps per handout, with independence.  Patient will complete a functional standing activity for 10 min with S in order to perform household tasks.    Tolerated session fairly

## 2018-11-04 NOTE — PLAN OF CARE
Problem: Patient Care Overview  Goal: Plan of Care Review  Outcome: Ongoing (interventions implemented as appropriate)   11/04/18 0428   Coping/Psychosocial   Plan Of Care Reviewed With patient       Problem: Fall Risk (Adult)  Goal: Identify Related Risk Factors and Signs and Symptoms  Related risk factors and signs and symptoms are identified upon initiation of Human Response Clinical Practice Guideline (CPG)  Outcome: Ongoing (interventions implemented as appropriate)   11/04/18 0428   Fall Risk   Related Risk Factors (Fall Risk) fatigue/slow reaction;fear of falling;gait/mobility problems

## 2018-11-05 ENCOUNTER — TELEPHONE (OUTPATIENT)
Dept: ADMINISTRATIVE | Facility: CLINIC | Age: 83
End: 2018-11-05

## 2018-11-05 LAB
ANION GAP SERPL CALC-SCNC: 4 MMOL/L
BASOPHILS # BLD AUTO: 0.02 K/UL
BASOPHILS NFR BLD: 0.4 %
BUN SERPL-MCNC: 27 MG/DL
CALCIUM SERPL-MCNC: 8.3 MG/DL
CHLORIDE SERPL-SCNC: 99 MMOL/L
CO2 SERPL-SCNC: 26 MMOL/L
CREAT SERPL-MCNC: 1.1 MG/DL
DIFFERENTIAL METHOD: ABNORMAL
EOSINOPHIL # BLD AUTO: 0.2 K/UL
EOSINOPHIL NFR BLD: 3.3 %
ERYTHROCYTE [DISTWIDTH] IN BLOOD BY AUTOMATED COUNT: 14.2 %
EST. GFR  (AFRICAN AMERICAN): 51.8 ML/MIN/1.73 M^2
EST. GFR  (NON AFRICAN AMERICAN): 44.9 ML/MIN/1.73 M^2
GLUCOSE SERPL-MCNC: 95 MG/DL
HCT VFR BLD AUTO: 25.5 %
HGB BLD-MCNC: 8.8 G/DL
IMM GRANULOCYTES # BLD AUTO: 0.08 K/UL
IMM GRANULOCYTES NFR BLD AUTO: 1.6 %
LYMPHOCYTES # BLD AUTO: 0.8 K/UL
LYMPHOCYTES NFR BLD: 14.9 %
MAGNESIUM SERPL-MCNC: 1.9 MG/DL
MCH RBC QN AUTO: 30.9 PG
MCHC RBC AUTO-ENTMCNC: 34.5 G/DL
MCV RBC AUTO: 90 FL
MONOCYTES # BLD AUTO: 0.7 K/UL
MONOCYTES NFR BLD: 13.6 %
NEUTROPHILS # BLD AUTO: 3.4 K/UL
NEUTROPHILS NFR BLD: 66.2 %
NRBC BLD-RTO: 0 /100 WBC
PHOSPHATE SERPL-MCNC: 3.2 MG/DL
PLATELET # BLD AUTO: 205 K/UL
PMV BLD AUTO: 10 FL
POTASSIUM SERPL-SCNC: 4.2 MMOL/L
RBC # BLD AUTO: 2.85 M/UL
SODIUM SERPL-SCNC: 129 MMOL/L
WBC # BLD AUTO: 5.16 K/UL

## 2018-11-05 PROCEDURE — 97116 GAIT TRAINING THERAPY: CPT

## 2018-11-05 PROCEDURE — 97803 MED NUTRITION INDIV SUBSEQ: CPT

## 2018-11-05 PROCEDURE — 11000004 HC SNF PRIVATE

## 2018-11-05 PROCEDURE — 97110 THERAPEUTIC EXERCISES: CPT

## 2018-11-05 PROCEDURE — 83735 ASSAY OF MAGNESIUM: CPT

## 2018-11-05 PROCEDURE — 97530 THERAPEUTIC ACTIVITIES: CPT

## 2018-11-05 PROCEDURE — 85025 COMPLETE CBC W/AUTO DIFF WBC: CPT

## 2018-11-05 PROCEDURE — 36415 COLL VENOUS BLD VENIPUNCTURE: CPT

## 2018-11-05 PROCEDURE — 80048 BASIC METABOLIC PNL TOTAL CA: CPT

## 2018-11-05 PROCEDURE — 25000003 PHARM REV CODE 250: Performed by: NURSE PRACTITIONER

## 2018-11-05 PROCEDURE — 25000003 PHARM REV CODE 250: Performed by: INTERNAL MEDICINE

## 2018-11-05 PROCEDURE — 84100 ASSAY OF PHOSPHORUS: CPT

## 2018-11-05 PROCEDURE — 25000003 PHARM REV CODE 250: Performed by: STUDENT IN AN ORGANIZED HEALTH CARE EDUCATION/TRAINING PROGRAM

## 2018-11-05 RX ADMIN — CARVEDILOL 12.5 MG: 12.5 TABLET, FILM COATED ORAL at 08:11

## 2018-11-05 RX ADMIN — AMLODIPINE BESYLATE 10 MG: 10 TABLET ORAL at 08:11

## 2018-11-05 RX ADMIN — LIDOCAINE 2 PATCH: 50 PATCH TOPICAL at 04:11

## 2018-11-05 RX ADMIN — HYDROXYCHLOROQUINE SULFATE 200 MG: 200 TABLET, FILM COATED ORAL at 08:11

## 2018-11-05 RX ADMIN — SENNOSIDES AND DOCUSATE SODIUM 1 TABLET: 8.6; 5 TABLET ORAL at 08:11

## 2018-11-05 RX ADMIN — FOLIC ACID 1 MG: 1 TABLET ORAL at 08:11

## 2018-11-05 RX ADMIN — CARVEDILOL 12.5 MG: 12.5 TABLET, FILM COATED ORAL at 04:11

## 2018-11-05 RX ADMIN — ATORVASTATIN CALCIUM 20 MG: 20 TABLET, FILM COATED ORAL at 08:11

## 2018-11-05 RX ADMIN — ALPRAZOLAM 0.12 MG: 0.25 TABLET ORAL at 08:11

## 2018-11-05 RX ADMIN — ACETAMINOPHEN 1000 MG: 500 TABLET, FILM COATED ORAL at 03:11

## 2018-11-05 RX ADMIN — RAMELTEON 8 MG: 8 TABLET, FILM COATED ORAL at 08:11

## 2018-11-05 RX ADMIN — LISINOPRIL 10 MG: 2.5 TABLET ORAL at 08:11

## 2018-11-05 RX ADMIN — ACETAMINOPHEN 1000 MG: 500 TABLET, FILM COATED ORAL at 08:11

## 2018-11-05 RX ADMIN — ESCITALOPRAM 10 MG: 10 TABLET, FILM COATED ORAL at 08:11

## 2018-11-05 NOTE — PROGRESS NOTES
" [facility] - [facility] Care  Adult Nutrition  Progress Note    SUMMARY       Recommendations  Recommendation/Intervention: Continue cardiac diet, 1.5 L fluid restriction, RD to follow and encourage po.  Goals: po >50% of meals and ONS acceptance in next 5 days  Nutrition Goal Status: progressing towards goal  Reason for Assessment    Reason for Assessment: RD follow-up  Diagnosis: (hyponatremia; debility)  Relevant Medical History: HTN; anemia; HLD; GERD  Interdisciplinary Rounds: attended  General Information Comments: pt complaining about how meds make her feel , reported at IDT, discussed with NP  Nutrition Discharge Planning: d/c cardiac diet   Nutrition/Diet History    Do you have any cultural, spiritual, Worship conflicts, given your current situation?: none  Factors Affecting Nutritional Intake: decreased appetite(fatigue)    Anthropometrics    Temp: 97.4 °F (36.3 °C)  Height Method: Stated  Height: 5' 2" (157.5 cm)  Height (inches): 62 in  Weight Method: Bed Scale  Weight: 55.6 kg (122 lb 9.2 oz)  Weight (lb): 122.58 lb  Ideal Body Weight (IBW), Female: 110 lb  % Ideal Body Weight, Female (lb): 108.63 lb  BMI (Calculated): 21.9  BMI Grade: 18.5-24.9 - normal  Weight Loss: unintentional  Usual Body Weight (UBW), k.82 kg  % Usual Body Weight: 95.59       Lab/Procedures/Meds    Pertinent Labs Reviewed: reviewed  Pertinent Medications Reviewed: reviewed    Physical Findings/Assessment    Overall Physical Appearance: advanced age, loss of muscle mass, loss of subcutaneous fat  Tubes: (-)  Oral/Mouth Cavity: WDL    Estimated/Assessed Needs    Weight Used For Calorie Calculations: 54.2 kg (119 lb 7.8 oz)  Energy Calorie Requirements (kcal): 6474-7461 kcal  Energy Need Method: Kcal/kg((25-30 kcal/kg))  Protein Requirements: 54-65 gm((1.0-1.2 gm/kg))  Weight Used For Protein Calculations: 54.2 kg (119 lb 7.8 oz)  Fluid Requirements (mL): 6583-9747 mL  Fluid Need Method: RDA Method  RDA Method (mL): " 1355  CHO Requirement: 1 mL/kcal or per MD      Nutrition Prescription Ordered    Current Diet Order: Cardiac , 1.5L fluid restriction  Nutrition Order Comments: pt eating 50% with Boost plus bid that may be enough to meet needs and maintain wt  Oral Nutrition Supplement: boost plus taking    Evaluation of Received Nutrient/Fluid Intake    Energy Calories Required: meeting needs  Protein Required: meeting needs  Fluid Required: meeting needs  Tolerance: tolerating  % Intake of Estimated Energy Needs: 50 - 75 %  % Meal Intake: 50 - 75 %    Nutrition Risk  High    Assessment and Plan  Moderate Malnutrition in the context of Acute Illness/Injury     Related to (etiology):  Decreased appetite and fatigue      Signs and Symptoms (as evidenced by):  Energy Intake: <75% of estimated energy requirement for > 1 week  Body Fat Depletion: moderate depletion of triceps   Muscle Mass Depletion: moderate depletion of clavicle region, scapular region and interosseous muscle   Weight Loss: 5% x 1 month   Fluid Accumulation: mild     Monitor and Evaluation    Food and Nutrient Intake: energy intake  Food and Nutrient Adminstration: diet order  Physical Activity and Function: nutrition-related ADLs and IADLs  Anthropometric Measurements: weight, weight change  Biochemical Data, Medical Tests and Procedures: electrolyte and renal panel, gastrointestinal profile, glucose/endocrine profile, inflammatory profile, lipid profile  Nutrition-Focused Physical Findings: overall appearance     Nutrition Follow-Up    RD Follow-up?: Yes

## 2018-11-05 NOTE — PLAN OF CARE
Problem: Occupational Therapy Goal  Goal: Occupational Therapy Goal  Goals to be met by: 11 days     Patient will increase functional independence with ADLs by performing:    UE Dressing with Set-up Assistance.  LE Dressing with Supervision.  Grooming while standing at sink with Supervision.  Toileting from toilet with Supervision for hygiene and clothing management.   Bathing from  shower chair/bench with Supervision.  Supine to sit with Modified [place] /c HOB flat and no handrails.  Stand pivot transfers with Supervision.  Toilet transfer to toilet with Supervision.  Upper extremity exercise program 3 x 15 reps per handout, with independence.  Patient will complete a functional standing activity for 10 min with S in order to perform household tasks.    Outcome: Ongoing (interventions implemented as appropriate)  Severe pain limiations. DACIA Cui  11/5/2018

## 2018-11-05 NOTE — PLAN OF CARE
Problem: Patient Care Overview  Goal: Plan of Care Review  Outcome: Ongoing (interventions implemented as appropriate)   11/05/18 0040   Coping/Psychosocial   Plan Of Care Reviewed With patient       Problem: Fall Risk (Adult)  Goal: Identify Related Risk Factors and Signs and Symptoms  Related risk factors and signs and symptoms are identified upon initiation of Human Response Clinical Practice Guideline (CPG)  Outcome: Ongoing (interventions implemented as appropriate)   11/05/18 0040   Fall Risk   Related Risk Factors (Fall Risk) fatigue/slow reaction;fear of falling;gait/mobility problems

## 2018-11-05 NOTE — PT/OT/SLP PROGRESS
Physical Therapy  Treatment    Karly Sandoval   MRN: [id number]   Admitting Diagnosis: Compression fracture of T12 vertebra    PT Received On: 11/05/18  Total Time (min): 38       Billable Minutes:  Gait Training 15, Therapeutic Activity 10, Therapeutic Exercise 13 and Total Time 38    Treatment Type: Treatment  PT/PTA: PT     PTA Visit Number: 0       General Precautions: Standard, fall  Orthopedic Precautions: spinal precautions   Braces: TLSO    Do you have any cultural, spiritual, Cheondoism conflicts, given your current situation?: none    Subjective:  Communicated with patient prior to session.  Pt agreeable to session despite back pain.     Pain/Comfort  Pain Rating 1: 7/10  Location - Side 1: Bilateral  Location - Orientation 1: lower  Location 1: back  Pain Addressed 1: Pre-medicate for activity, Reposition, Other (see comments)(TLSO adjusted)  Pain Rating Post-Intervention 1: 7/10    Objective:  Patient found sitting in bedside chair w/ TLSO.       AM-PAC 6 CLICK MOBILITY  Total Score:16    Bed Mobility:  Not performed    Transfers:  Sit<>Stand: to/from w/c (3 trials) w/ RW and CGA for safety  Stand Pivot Transfer: bedside chair>w/c w/ RW and CGA for safety  Cueing for hand/foot placement    Gait:  Amb 185ft w/ RW and CGA for safety  Cueing for upright posture and to remain inside RW     Therex:  Seated BLE therex 2x15 reps (GS, HF, LAQ, AP)    Balance:  Standing balloon tap 2 trials (8ati84b and 45s) w/ RW and Min/CGA for stability  Limited in time by fatigue  Mild instability noted t/o both trials    Patient left up in chair with call button in reach.    Assessment:  Karly Sandoval is a 88 y.o. female with a medical diagnosis of Compression fracture of T12 vertebra.  Pt lópez session well w/ good participation. She was able to inc amb distance and slightly improve transfers. She is progressing slowly due to back pain. Pt will continue PT POC. She is recommended to have 24hour assistance upon D/C for  safety and fall prevention.    Rehab identified problem list/impairments: weakness, impaired endurance, impaired self care skills, impaired functional mobilty, gait instability, impaired balance, decreased lower extremity function, pain    Rehab potential is good.    Activity tolerance: Good    Discharge recommendations: nursing facility, basic(pt needs 24hour care)     Barriers to discharge: Decreased caregiver support    Equipment recommendations: wheelchair     GOALS:   Multidisciplinary Problems     Physical Therapy Goals        Problem: Physical Therapy Goal    Goal Priority Disciplines Outcome Goal Variances Interventions   Physical Therapy Goal     PT, PT/OT Ongoing (interventions implemented as appropriate)     Description:  Goals to be met by: 18    Patient will increase functional independence with mobility by performin. Supine to/from sit with SBA.  2. Sit to stand transfer with SBA.  3. Bed to chair transfer with SBA using Rolling Walker.  4. Gait  x 100 feet with SBA using Rolling Walker.   5. Wheelchair propulsion x50 feet with Supervision using bilateral uppper extremities.  6. Ascend/Descend 4 inch curb step with Stand-by Assistance using Rolling Walker. (discontinued)  7. Lower extremity exercise program x20 reps per handout, with assistance as needed.                        PLAN:    Patient to be seen (5-6X/week)  to address the above listed problems via gait training, therapeutic activities, therapeutic exercises, wheelchair management/training  Plan of Care expires: 18  Plan of Care reviewed with: patient, camille Ayala, PT  2018

## 2018-11-05 NOTE — CLINICAL REVIEW
13 day RE-CERTIFICATION:    I certify that Karly Sandoval needs continued inpatient skilled care on a daily basis per RUG level for PT and ST.    I estimate that the duration of inpatient skilled care will be 4 additional days with an expected discharge on 11/9/2018.      On discharge from [facility], she will receive nursing home snf placement with f/u appointments after skilled care as noted below:    Future Appointments   Date Time Provider Department Center   11/13/2018  8:00 AM KELVIN Ashley [facility] NEUROS [facility]   11/19/2018  8:00 AM HOME MONITOR DEVICE CHECK, [facility] JAMEEL Javier [facility]   1/14/2019 11:30 AM Isai Smith MD [facility]   2/19/2019  8:00 AM HOME MONITOR DEVICE CHECK, [facility] JAMEEL Bertrand       Continued SNF care is for problems which arose while being treated at [facility] for an ongoing condition for which she received inpatient care in a hospital.

## 2018-11-05 NOTE — PROGRESS NOTES
Attended the IDT meeting at [facility] to obtain patient update -    Clinical Status:Pt. Was c/o more pain - found T12 compression fracture. Back brace was ordered & placed on the patient to help with the pain. The patient refuses to have surgery at this time. Neuro surgery appt on 11/13/2018. Therapy is extending her D/C date to 11/16/2018 due to appt on 11/13/2018    Progress made/or loss:    Therapies Involved with Care:      PT:    Ambulation:200 ft. CG    Sit to Stand:    Pivot:    Bed Function:    Transfers:mod assist    Bed Mobility:    OT:    Toileting:max assist    Hygiene:    Upper Body dressing:mod assist    Lower Body dressing:mod assist    Clothing management:    ADL's:    ST    Diet Order    Cognition:      Barriers to Progress:    New Treatments:    Projected LOS:    Projected Discharge date:11/16/2018    Discharge Disposition:

## 2018-11-05 NOTE — PT/OT/SLP PROGRESS
"Occupational Therapy  Treatment    Karly Sandoval   MRN: [id number]   Admitting Diagnosis: Compression fracture of T12 vertebra    OT Date of Treatment: 11/05/18  Total Time (min): 40 min    Billable Minutes:  Therapeutic Activity 30 and Therapeutic Exercise 10    General Precautions: Standard, fall  Orthopedic Precautions: spinal precautions  Braces: TLSO         Subjective:  Communicated with pt prior to session.  "I feel so doped up.  I am in so much pain. I am trying"    Pain/Comfort  Pain Rating 1: 7/10  Location - Side 1: Left  Location - Orientation 1: lower  Location 1: back  Pain Addressed 1: Pre-medicate for activity, Cessation of Activity, Reposition  Pain Rating Post-Intervention 1: 8/10    Objective:  Patient found with: (seated in w/c with LSO)    Occupational Performance:      Functional Mobility/Transfers:  · Patient completed Sit <> Stand Transfer with contact guard assistance  with  no assistive device   · Functional Mobility: Pt stood from w/c at table with CGA and cues for hand placement, technique and encouragement. Pt with increased time  · Pt stood at table with SBA to CGA x 4 min to perform reaching task across midline with LUE then RUE to increase standing tolerance and balance for self care tasks  · Pt is total A for w/c transport on unit and in room    Activities of Daily Living:  · Lower Body Dressing: moderate assistance pt doffed and donned right sock with min A for ankle over knee--increased time, breaks and encouragement; pt required max A with left sock due to increased pain in left side    AMPAC 6 Click:  AMPAC Total Score: 14    OT Exercises: UE Ergometer 2.5 minutes prior to increased pain in left side    Additional Treatment:  Provided pt with LUE stretching with shoulder abduction and elbow flexion--did not increase pain. Pt reported decreased pain  Son entered upon end of session and reported to him increased pain and performance in OT    Patient left up in chair with son and " g-son present and eating in lunch room    ASSESSMENT:  Karly Sandoval is a 88 y.o. female with a medical diagnosis of Compression fracture of T12 vertebra . Pt cont to cooperate;however, very limited due to severe pain. Pt cont to require extensive assist with self care;however, limited assist with functional mobility. Pt cont to benefit from OT to increase functional indep and safety. Pt will need 24 hour assist upon d/c.    Rehab identified problem list/impairments: weakness, impaired endurance, impaired self care skills, impaired functional mobilty, gait instability, impaired balance, decreased lower extremity function, pain    Rehab potential is good    Activity tolerance: Good    Discharge recommendations: nursing facility, basic(pt need 24  hour care)     Barriers to discharge: Decreased caregiver support     Equipment recommendations: wheelchair     GOALS:   Multidisciplinary Problems     Occupational Therapy Goals        Problem: Occupational Therapy Goal    Goal Priority Disciplines Outcome Interventions   Occupational Therapy Goal     OT, PT/OT Ongoing (interventions implemented as appropriate)    Description:  Goals to be met by: 11 days     Patient will increase functional independence with ADLs by performing:    UE Dressing with Set-up Assistance.  LE Dressing with Supervision.  Grooming while standing at sink with Supervision.  Toileting from toilet with Supervision for hygiene and clothing management.   Bathing from  shower chair/bench with Supervision.  Supine to sit with Modified [place] /c HOB flat and no handrails.  Stand pivot transfers with Supervision.  Toilet transfer to toilet with Supervision.  Upper extremity exercise program 3 x 15 reps per handout, with independence.  Patient will complete a functional standing activity for 10 min with S in order to perform household tasks.                     Plan:  Patient to be seen 5 x/week to address the above listed problems via  self-care/home management, therapeutic activities, therapeutic exercises  Plan of Care expires: 11/24/18  Plan of Care reviewed with: patient    DACIA Cui  11/05/2018

## 2018-11-06 DIAGNOSIS — Z95.0 CARDIAC PACEMAKER IN SITU: Primary | ICD-10-CM

## 2018-11-06 DIAGNOSIS — I50.32 CHRONIC DIASTOLIC CHF (CONGESTIVE HEART FAILURE): ICD-10-CM

## 2018-11-06 PROCEDURE — 25000003 PHARM REV CODE 250: Performed by: STUDENT IN AN ORGANIZED HEALTH CARE EDUCATION/TRAINING PROGRAM

## 2018-11-06 PROCEDURE — 97530 THERAPEUTIC ACTIVITIES: CPT

## 2018-11-06 PROCEDURE — 97535 SELF CARE MNGMENT TRAINING: CPT

## 2018-11-06 PROCEDURE — 25000003 PHARM REV CODE 250: Performed by: INTERNAL MEDICINE

## 2018-11-06 PROCEDURE — 25000003 PHARM REV CODE 250: Performed by: NURSE PRACTITIONER

## 2018-11-06 PROCEDURE — 97116 GAIT TRAINING THERAPY: CPT

## 2018-11-06 PROCEDURE — 97110 THERAPEUTIC EXERCISES: CPT

## 2018-11-06 PROCEDURE — 11000004 HC SNF PRIVATE

## 2018-11-06 RX ORDER — HYDROCODONE BITARTRATE AND ACETAMINOPHEN 7.5; 325 MG/15ML; MG/15ML
5 SOLUTION ORAL EVERY 8 HOURS PRN
Status: DISCONTINUED | OUTPATIENT
Start: 2018-11-06 | End: 2018-11-14 | Stop reason: HOSPADM

## 2018-11-06 RX ADMIN — ATORVASTATIN CALCIUM 20 MG: 20 TABLET, FILM COATED ORAL at 09:11

## 2018-11-06 RX ADMIN — ACETAMINOPHEN 1000 MG: 500 TABLET, FILM COATED ORAL at 03:11

## 2018-11-06 RX ADMIN — FOLIC ACID 1 MG: 1 TABLET ORAL at 09:11

## 2018-11-06 RX ADMIN — AMLODIPINE BESYLATE 10 MG: 10 TABLET ORAL at 09:11

## 2018-11-06 RX ADMIN — LIDOCAINE 2 PATCH: 50 PATCH TOPICAL at 05:11

## 2018-11-06 RX ADMIN — POLYETHYLENE GLYCOL 3350 17 G: 17 POWDER, FOR SOLUTION ORAL at 09:11

## 2018-11-06 RX ADMIN — CARVEDILOL 12.5 MG: 12.5 TABLET, FILM COATED ORAL at 09:11

## 2018-11-06 RX ADMIN — LISINOPRIL 10 MG: 2.5 TABLET ORAL at 09:11

## 2018-11-06 RX ADMIN — SENNOSIDES AND DOCUSATE SODIUM 1 TABLET: 8.6; 5 TABLET ORAL at 09:11

## 2018-11-06 RX ADMIN — HYDROXYCHLOROQUINE SULFATE 200 MG: 200 TABLET, FILM COATED ORAL at 09:11

## 2018-11-06 RX ADMIN — RAMELTEON 8 MG: 8 TABLET, FILM COATED ORAL at 08:11

## 2018-11-06 RX ADMIN — ACETAMINOPHEN 1000 MG: 500 TABLET, FILM COATED ORAL at 09:11

## 2018-11-06 RX ADMIN — CARVEDILOL 12.5 MG: 12.5 TABLET, FILM COATED ORAL at 05:11

## 2018-11-06 RX ADMIN — ESCITALOPRAM 10 MG: 10 TABLET, FILM COATED ORAL at 09:11

## 2018-11-06 RX ADMIN — SENNOSIDES AND DOCUSATE SODIUM 1 TABLET: 8.6; 5 TABLET ORAL at 08:11

## 2018-11-06 RX ADMIN — ACETAMINOPHEN 1000 MG: 500 TABLET, FILM COATED ORAL at 08:11

## 2018-11-06 NOTE — PT/OT/SLP PROGRESS
"Occupational Therapy  Treatment    Karly Sandoval   MRN: [id number]   Admitting Diagnosis: Compression fracture of T12 vertebra    OT Date of Treatment: 11/06/18  Total Time (min): 44 min    Billable Minutes:  Self Care/Home Management 24 and Therapeutic Activity 20    General Precautions: Standard, fall  Orthopedic Precautions: spinal precautions  Braces: TLSO         Subjective:  Communicated with pt prior to session.  "I have such pains in my stomach and back.  I am sorry I am such a pain"  Nurse aware     Pain/Comfort  Pain Rating 1: 7/10  Location - Side 1: Bilateral  Location - Orientation 1: lower  Location 1: back  Pain Addressed 1: Pre-medicate for activity, Cessation of Activity, Reposition  Pain Rating Post-Intervention 1: 7/10    Objective:  Patient found with: (supine in bed)    Occupational Performance:    Bed Mobility:    · Patient completed Supine to Sit with supervision, with side rail and flat bed     Functional Mobility/Transfers:  · Patient completed Sit <> Stand Transfer with stand by assistance  with  rolling walker   · Patient completed Toilet Transfer Stand Pivot technique with contact guard assistance with  rolling walker  · Functional Mobility: Pt stood from bed with SBA and RW--increased time, cues and encouragement. Pt t/f to BSC with RW CGA; stood from BSC 2x with RW SBA and t/f to w/c CGA  · Pt required CGA to min A for standing balance with attempting to use UEs for self care    Activities of Daily Living:  · Grooming: supervision and setup seated at sink  · Bathing: maximal assistance seated on BSC  · Upper Body Dressing: maximal assistance -min A with button up shirt ; sup with pullover; total A with TLSO  · Lower Body Dressing: maximal assistance -brief total A standing; pants max A; socks max A  · Toileting: maximal assistance from [facility]    AMPA 6 Click:  [facility] Total Score: 15        Additional Treatment:  Pt educated on AE;however, pt unable to perform; t/f technique, safety, wear " of TLSO,   Pt's family outside upon completion of session--reported pt's functional status    Patient left up in chair with call button in reach and nurse notified    ASSESSMENT:  Karly Sandoval is a 88 y.o. female with a medical diagnosis of Compression fracture of T12 vertebra . Pt cont to be limited by pain and requires extensive assist with LBD, toileting and bathing. Pt will need 24 hour assist. Cont to benefit from OT to increase functional indep..    Rehab identified problem list/impairments: weakness, impaired endurance, impaired self care skills, impaired functional mobilty, gait instability, impaired balance, decreased lower extremity function, pain    Rehab potential is fair    Activity tolerance: Fair    Discharge recommendations: nursing facility, basic(pt need 24  hour care)     Barriers to discharge: Decreased caregiver support     Equipment recommendations: wheelchair     GOALS:   Multidisciplinary Problems     Occupational Therapy Goals        Problem: Occupational Therapy Goal    Goal Priority Disciplines Outcome Interventions   Occupational Therapy Goal     OT, PT/OT Ongoing (interventions implemented as appropriate)    Description:  Goals to be met by: 11 days     Patient will increase functional independence with ADLs by performing:    UE Dressing with Set-up Assistance.--not met  REVISED to mod A  LE Dressing with Supervision.--not met  REVISED to mod A  Grooming while standing at sink with Supervision.--not met  REVISED to mod I seated at sink  Toileting from toilet with Supervision for hygiene and clothing management. --not met  REVISED to mod A  Bathing from  shower chair/bench with Supervision.--mot met  REVISED to mod A  Supine to sit with Modified [place] /c HOB flat and no handrails.  Stand pivot transfers with Supervision.-not met  REVISED to SBA  Toilet transfer to toilet with Supervision.-not met  REVISED to SBA  Upper extremity exercise program 3 x 15 reps per handout, with  independence.  Patient will complete a functional standing activity for 10 min with S in order to perform household tasks. --not met  REVISED to 6 min SBA                     Plan:  Patient to be seen 5 x/week to address the above listed problems via self-care/home management, therapeutic activities, therapeutic exercises  Plan of Care expires: 11/24/18  Plan of Care reviewed with: patient    DACIA Cui  11/06/2018

## 2018-11-06 NOTE — PLAN OF CARE
Problem: Occupational Therapy Goal  Goal: Occupational Therapy Goal  Goals to be met by: 11 days     Patient will increase functional independence with ADLs by performing:    UE Dressing with Set-up Assistance.--not met  REVISED to mod A  LE Dressing with Supervision.--not met  REVISED to mod A  Grooming while standing at sink with Supervision.--not met  REVISED to mod I seated at sink  Toileting from toilet with Supervision for hygiene and clothing management. --not met  REVISED to mod A  Bathing from  shower chair/bench with Supervision.--mot met  REVISED to mod A  Supine to sit with Modified [place] /c HOB flat and no handrails.  Stand pivot transfers with Supervision.-not met  REVISED to SBA  Toilet transfer to toilet with Supervision.-not met  REVISED to SBA  Upper extremity exercise program 3 x 15 reps per handout, with independence.  Patient will complete a functional standing activity for 10 min with S in order to perform household tasks. --not met  REVISED to 6 min SBA   Outcome: Ongoing (interventions implemented as appropriate)  Goals revised.  DACIA Cui  11/6/2018

## 2018-11-06 NOTE — PT/OT/SLP PROGRESS
Physical Therapy  Treatment    Karly Sandoval   MRN: [id number]   Admitting Diagnosis: Compression fracture of T12 vertebra    PT Received On: 11/06/18  Total Time (min): 42       Billable Minutes:  Gait Training 15, Therapeutic Activity 12, Therapeutic Exercise 15 and Total Time 42    Treatment Type: Treatment  PT/PTA: PT     PTA Visit Number: 0       General Precautions: Standard, fall  Orthopedic Precautions: spinal precautions   Braces: TLSO    Do you have any cultural, spiritual, Confucianism conflicts, given your current situation?: none    Subjective:  Communicated with patient prior to session.  Pt agreeable to session but reporting inc'd back pain.     Pain/Comfort  Pain Rating 1: 8/10  Location - Side 1: Bilateral  Location - Orientation 1: lower  Location 1: back  Pain Addressed 1: Pre-medicate for activity, Reposition, Other (see comments)(TLSO adjusted t/o session)  Pain Rating Post-Intervention 1: 8/10    Objective:  Patient found sitting in w/c. Patient found with: TLSO     AM-PAC 6 CLICK MOBILITY  Total Score:14    Bed Mobility:  Sit>Supine:on mat w/ ModA for trunk  Supine>Sit: on mat w/ ModA for trunk  Cueing and inc'd time required due to pain    Transfers:  Sit<>Stand: to/from w/c (2 trials) and from EOM (1 trial), all w/ RW and CGA to rise but Holly for forward weight shifting due to back pain w/ trunk extension  Stand Pivot Transfer: w/c>EOM and w/c>bedside chair w/ RW and Min/CGA for stability and forward weight shifting  Cueing for foot placement and forward weight shifting    Gait:  Amb 3 trials (38ft, 38ft, and 5ft) w/ RW and Min/CGA for stability  Cueing to remain inside RW  Limited in distance by back pain     Therex:  Supine and seated BLE therex 2x10 reps (GS, SAQ, AP, HS, ABd/ADd, HF w/ AAROM for BLE, LAQ)    Patient left up in chair with call button in reach.    Assessment:  Karly Sandoval is a 88 y.o. female with a medical diagnosis of Compression fracture of T12 vertebra.  Pt was  very limited t/o session by pain. She was only agreeable to short ambulation distances. Pt also continues to require Holly for stability w/ transfers and ambulation. She will require 24hour assistance upon D/C.    Rehab identified problem list/impairments: weakness, impaired endurance, impaired self care skills, impaired functional mobilty, gait instability, impaired balance, decreased lower extremity function, pain    Rehab potential is fair.    Activity tolerance: Fair    Discharge recommendations: nursing facility, basic(pt needs 24hour care)     Barriers to discharge: Decreased caregiver support    Equipment recommendations: wheelchair     GOALS:   Multidisciplinary Problems     Physical Therapy Goals        Problem: Physical Therapy Goal    Goal Priority Disciplines Outcome Goal Variances Interventions   Physical Therapy Goal     PT, PT/OT Ongoing (interventions implemented as appropriate)     Description:  Goals to be met by: 18    Patient will increase functional independence with mobility by performin. Supine to/from sit with SBA.  2. Sit to stand transfer with SBA.  3. Bed to chair transfer with SBA using Rolling Walker.  4. Gait  x 100 feet with SBA using Rolling Walker.   5. Wheelchair propulsion x50 feet with Supervision using bilateral uppper extremities.  6. Ascend/Descend 4 inch curb step with Stand-by Assistance using Rolling Walker. (discontinued)  7. Lower extremity exercise program x20 reps per handout, with assistance as needed.                        PLAN:    Patient to be seen (5-6X/week)  to address the above listed problems via gait training, therapeutic activities, therapeutic exercises, wheelchair management/training  Plan of Care expires: 18  Plan of Care reviewed with: patient, camille    Elida M Jamie, PT  2018

## 2018-11-07 PROBLEM — N30.00 ACUTE CYSTITIS WITHOUT HEMATURIA: Status: RESOLVED | Noted: 2018-10-30 | Resolved: 2018-11-07

## 2018-11-07 PROCEDURE — 97530 THERAPEUTIC ACTIVITIES: CPT

## 2018-11-07 PROCEDURE — 97110 THERAPEUTIC EXERCISES: CPT

## 2018-11-07 PROCEDURE — 25000003 PHARM REV CODE 250: Performed by: NURSE PRACTITIONER

## 2018-11-07 PROCEDURE — 97116 GAIT TRAINING THERAPY: CPT

## 2018-11-07 PROCEDURE — 99308 SBSQ NF CARE LOW MDM 20: CPT | Mod: ,,, | Performed by: NURSE PRACTITIONER

## 2018-11-07 PROCEDURE — 25000003 PHARM REV CODE 250: Performed by: INTERNAL MEDICINE

## 2018-11-07 PROCEDURE — 11000004 HC SNF PRIVATE

## 2018-11-07 PROCEDURE — 86580 TB INTRADERMAL TEST: CPT | Performed by: NURSE PRACTITIONER

## 2018-11-07 PROCEDURE — 63600175 PHARM REV CODE 636 W HCPCS: Performed by: NURSE PRACTITIONER

## 2018-11-07 PROCEDURE — 25000003 PHARM REV CODE 250: Performed by: STUDENT IN AN ORGANIZED HEALTH CARE EDUCATION/TRAINING PROGRAM

## 2018-11-07 RX ORDER — FUROSEMIDE 20 MG/1
20 TABLET ORAL DAILY
Status: COMPLETED | OUTPATIENT
Start: 2018-11-07 | End: 2018-11-07

## 2018-11-07 RX ADMIN — CARVEDILOL 12.5 MG: 12.5 TABLET, FILM COATED ORAL at 05:11

## 2018-11-07 RX ADMIN — ESCITALOPRAM 10 MG: 10 TABLET, FILM COATED ORAL at 08:11

## 2018-11-07 RX ADMIN — ALPRAZOLAM 0.12 MG: 0.25 TABLET ORAL at 10:11

## 2018-11-07 RX ADMIN — AMLODIPINE BESYLATE 10 MG: 10 TABLET ORAL at 08:11

## 2018-11-07 RX ADMIN — CARVEDILOL 12.5 MG: 12.5 TABLET, FILM COATED ORAL at 08:11

## 2018-11-07 RX ADMIN — SENNOSIDES AND DOCUSATE SODIUM 1 TABLET: 8.6; 5 TABLET ORAL at 08:11

## 2018-11-07 RX ADMIN — FOLIC ACID 1 MG: 1 TABLET ORAL at 08:11

## 2018-11-07 RX ADMIN — ATORVASTATIN CALCIUM 20 MG: 20 TABLET, FILM COATED ORAL at 08:11

## 2018-11-07 RX ADMIN — HYDROXYCHLOROQUINE SULFATE 200 MG: 200 TABLET, FILM COATED ORAL at 08:11

## 2018-11-07 RX ADMIN — FUROSEMIDE 20 MG: 20 TABLET ORAL at 03:11

## 2018-11-07 RX ADMIN — LIDOCAINE 2 PATCH: 50 PATCH TOPICAL at 03:11

## 2018-11-07 RX ADMIN — ACETAMINOPHEN 1000 MG: 500 TABLET, FILM COATED ORAL at 08:11

## 2018-11-07 RX ADMIN — LISINOPRIL 10 MG: 2.5 TABLET ORAL at 08:11

## 2018-11-07 RX ADMIN — HYDROCODONE BITARTRATE AND ACETAMINOPHEN 5 ML: 7.5; 325 SOLUTION ORAL at 12:11

## 2018-11-07 RX ADMIN — Medication 5 UNITS: at 10:11

## 2018-11-07 RX ADMIN — ACETAMINOPHEN 1000 MG: 500 TABLET, FILM COATED ORAL at 03:11

## 2018-11-07 NOTE — PT/OT/SLP PROGRESS
"Physical Therapy  Treatment    Karly Sandoval   MRN: [id number]   Admitting Diagnosis: Compression fracture of T12 vertebra    PT Received On: 11/07/18  Total Time (min): 47       Billable Minutes:  Gait Training 15, Therapeutic Activity 10, Therapeutic Exercise 22 and Total Time 47    Treatment Type: Treatment  PT/PTA: PT     PTA Visit Number: 0       General Precautions: Standard, fall  Orthopedic Precautions: spinal precautions   Braces: TLSO    Do you have any cultural, spiritual, Denominational conflicts, given your current situation?: none    Subjective:  Communicated with patient prior to session.  "I'm worse than ever." (regarding pain)    Pain/Comfort  Pain Rating 1: 7/10  Location - Side 1: Left  Location - Orientation 1: posterior  Location 1: back  Pain Addressed 1: Pre-medicate for activity, Reposition, Other (see comments)(TLSO adjusted t/o session)  Pain Rating Post-Intervention 1: 7/10    Objective:  Patient found sitting in w/c. Patient found with: TLSO     AM-PAC 6 CLICK MOBILITY  Total Score:15    Bed Mobility:  Sit>Supine:on mat w/ MaxA for LLE and trunk repositioning  Supine>Sit: on mat w/ CGA at trunk, cueing for technique  inc'd time required for bed mobility tasks    Transfers:  Sit<>Stand: to/from w/c (2 trials) w/ RW and Min/CGA to rise. Holly for anterior weight shift  Stand Pivot Transfer: w/c<>EOM w/ RW and Min/CGA to rise and for anterior weight shift  Cueing for hand/foot placement    Gait:  Amb 2 trials (90ft each) w/ RW and Min/CGA for safety  Cueing for upright posture and to remain inside RW     Therex:  Supine and seated BLE therex 2x15 reps (GS, SAQ, AP, HS, Abd/Add, HF, LAQ) w/ AAROM for HS, HF, LAQ    Patient left up in chair with call button in reach.    Assessment:  Karly Sandoval is a 88 y.o. female with a medical diagnosis of Compression fracture of T12 vertebra.  Pt continues to be very limited t/o sessions by back pain. She requires inc'd time to complete tasks. Her pain " inc's w/ sit<>stand transfers. She will continue PT POC but is recommended to have 24hour assistance upon D/C.    Rehab identified problem list/impairments: weakness, impaired endurance, impaired self care skills, impaired functional mobilty, gait instability, impaired balance, decreased lower extremity function, pain    Rehab potential is fair.    Activity tolerance: Fair    Discharge recommendations: nursing facility, basic(pt needs 24hour care)     Barriers to discharge: Decreased caregiver support    Equipment recommendations: wheelchair     GOALS:   Multidisciplinary Problems     Physical Therapy Goals        Problem: Physical Therapy Goal    Goal Priority Disciplines Outcome Goal Variances Interventions   Physical Therapy Goal     PT, PT/OT Ongoing (interventions implemented as appropriate)     Description:  Goals to be met by: 18    Patient will increase functional independence with mobility by performin. Supine to/from sit with SBA.  2. Sit to stand transfer with SBA.  3. Bed to chair transfer with SBA using Rolling Walker.  4. Gait  x 100 feet with SBA using Rolling Walker.   5. Wheelchair propulsion x50 feet with Supervision using bilateral uppper extremities.  6. Ascend/Descend 4 inch curb step with Stand-by Assistance using Rolling Walker. (discontinued)  7. Lower extremity exercise program x20 reps per handout, with assistance as needed.                        PLAN:    Patient to be seen (5-6X/week)  to address the above listed problems via gait training, therapeutic activities, therapeutic exercises, wheelchair management/training  Plan of Care expires: 18  Plan of Care reviewed with: patient, camille AGUILERA Jamie, PT  2018

## 2018-11-07 NOTE — ASSESSMENT & PLAN NOTE
Continue chronic therapy with sertraline.  Patient did not tolerate therapy with amitriptyline due to lethargy  Continue chronic xanax nightly as she takes at home; GDR not to be attempted due to acute illness and failure of alternative therapy with risk of decompensating condition and prohibiting rehabilitation potential    10/25/18  Chronic, continue Zoloft as ordered.  Per MD, unable to tolerate Elavil.  Xanax qhs as she chronically takes.  Continue to monitor.    11/2/18  Family request Zoloft be discontinue and Lexapro be restarted.  They felt she did better with Lexapro.  Will change Xanax to PRN dosing today.  Recommend she follow up with PCP.    11/7/18  Anxiety has improved since resuming Lexapro.  Xanax now PRN.  Continue treatment regimen and follow up with PCP.

## 2018-11-07 NOTE — ASSESSMENT & PLAN NOTE
Urge incontinence history.  She was started in tamsulosin therapy; urinating without difficulty currently.     10/25/18  No complaints today, continue Flomax as ordered.    11/2/18  Required I&O cath today.  Continue Flomax as ordered.  No reports of urinary burning.  Treating for K Pneumoniae and Proteus UTI with Cipro.  No fevers and WBC is normal.    11/7/18  Improved no complaints.  No longer on Flomax.

## 2018-11-07 NOTE — SUBJECTIVE & OBJECTIVE
Interval History:   10/25/18  Patient seen at bedside, son is present.  She reports having worsening back pain to mid back.  States symptoms started after therapy.  She has no report paraesthesia or pain radiation down legs.  Patient said current pain regimen is not helping.  Son inquired about use of back brace to help with pain control.  Advised him, would discuss with MD.  He also reports patient with history of RA and is followed by Dr. Smith.  Son reports patient is going to go [facility] if deemed appropriate.  Patient reports she does not have a good appetite but tries to eat.  She reports she is drinking fluids and urinating and having BM without issues.      Discussed case with Dr. Henry, she suggest no need to repeat imaging at this time but suggest increasing lidoderm patch to 2 and try use of abdominal binder to support back.  Will order and monitor.       11/2/18  Patient seen at bedside, she reports she is still having back pain.  Therapy reports she did a little better this morning with her session.  She is starting to retain urine and required I&O cath today, bladder scan showed ~254 cc of urine.  Patient is concerned about her lack of progress.  Therapy feels her progress has declined since admission as her eval was much better.  However, patient with acute T12 compression fracture.  She has follow up with spine clinic and a TSLO brace has been obtained for her.  Pain medication has been adjusted to optimize pain control, ordered Tramadol 25 mg tid PRN which she has only taken 1 dose so far.  Will change scheduled Xanax to PRN dosing as this is how it is ordered for her at home.    11/7/18 at 12 pm  Patient still with back pain with movement.  Earliest appointment with Neurosurgery is next week.  Continue to wear back brace.  Patient had not taken Lortab elixir prior to rounding and encouraged her to use to assist with pain control.  She has agreed to take today and  informed nursing to bring a dose to her.      1355 went back to see patient, she reports lortab gave her minimal relief but was nervous she would get sleepy and fall out of wheelchair.  Son is currently at bedside.  Explained to patient again, the importance of using her pain medication to help minimize her pain.  She has agreed to comply and will reassess in 24 hours.        Review of Systems   Constitutional: Negative for fever.   Respiratory: Negative for cough and shortness of breath.    Cardiovascular: Negative for chest pain, palpitations and leg swelling.   Gastrointestinal: Negative for abdominal pain, constipation, diarrhea, nausea and vomiting.   Genitourinary:        Urinary retention   Musculoskeletal: Positive for arthralgias, back pain and myalgias.     Objective:     Vital Signs (Most Recent):  Temp: 97.8 °F (36.6 °C) (11/07/18 0824)  Pulse: 72 (11/07/18 0800)  Resp: 20 (11/07/18 0800)  BP: 132/68 (11/07/18 1100)  SpO2: (!) 92 % (11/07/18 0800) Vital Signs (24h Range):  Temp:  [97.8 °F (36.6 °C)-98.5 °F (36.9 °C)] 97.8 °F (36.6 °C)  Pulse:  [60-72] 72  Resp:  [20] 20  SpO2:  [92 %-96 %] 92 %  BP: (129-163)/(61-73) 132/68     Weight: 57.8 kg (127 lb 6.8 oz)  Body mass index is 23.31 kg/m².    Intake/Output Summary (Last 24 hours) at 11/7/2018 1357  Last data filed at 11/7/2018 1300  Gross per 24 hour   Intake 710 ml   Output 1042 ml   Net -332 ml      Physical Exam   Constitutional: She is oriented to person, place, and time. She appears well-developed and well-nourished.   Patient is kyphotic   Cardiovascular: Normal rate, regular rhythm, normal heart sounds and intact distal pulses.   No murmur heard.  Pulmonary/Chest: Effort normal and breath sounds normal. No respiratory distress.   Abdominal: Soft. Normal appearance and bowel sounds are normal. She exhibits no distension.   Musculoskeletal: Normal range of motion. She exhibits edema.   TSLO in place.   Neurological: She is alert and oriented to  person, place, and time. She has normal strength.   Skin: Skin is warm, dry and intact. Capillary refill takes less than 2 seconds.       Significant Labs: BMP: No results for input(s): GLU, NA, K, CL, CO2, BUN, CREATININE, CALCIUM, MG in the last 48 hours.  CBC: No results for input(s): WBC, HGB, HCT, PLT in the last 48 hours.    Significant Imaging: n/a

## 2018-11-07 NOTE — PLAN OF CARE
Problem: Occupational Therapy Goal  Goal: Occupational Therapy Goal  Goals to be met by: 11 days     Patient will increase functional independence with ADLs by performing:    UE Dressing with Set-up Assistance.--not met  REVISED to mod A  LE Dressing with Supervision.--not met  REVISED to mod A  Grooming while standing at sink with Supervision.--not met  REVISED to mod I seated at sink  Toileting from toilet with Supervision for hygiene and clothing management. --not met  REVISED to mod A  Bathing from  shower chair/bench with Supervision.--mot met  REVISED to mod A  Supine to sit with Modified Bureau /c HOB flat and no handrails.  Stand pivot transfers with Supervision.-not met  REVISED to SBA  Toilet transfer to toilet with Supervision.-not met  REVISED to SBA  Upper extremity exercise program 3 x 15 reps per handout, with independence.  Patient will complete a functional standing activity for 10 min with S in order to perform household tasks. --not met  REVISED to 6 min SBA    Outcome: Ongoing (interventions implemented as appropriate)  Progressing slowly. DACIA Cui  11/7/2018

## 2018-11-07 NOTE — ASSESSMENT & PLAN NOTE
Chronic and stable  Will continue to monitor with twice weekly labs.    10/25/18  Chronic, sodium down to 127.  Will give IVF as she appears dry and admits to not eating much.  Repeat Lab on Monday.  PharmD cautions use of Plaquenil if sodium continues to drop.    10/30/2018  Appears related to CHF based on labs.  Continue fluid restriction (which she does on her own due to poor po intake)  Continue lasix diuresis.     11/2/18  Continue fluid restriction, sodium level chronically low.  Continue to monitor biweekly labs.  Lasix currently on hold, will consider resuming on Monday if renal function stable.    11/7/18  Continue fluid restriction and repeat labs in AM.  Last sodium level up to 129 from 124.

## 2018-11-07 NOTE — ASSESSMENT & PLAN NOTE
Likely multifactorial involving RA, chronic compression fractures and old left L3 transverse process  Continue tylenol prn pain and lidocaine patch daily.  Continue dexamethasone therapy with end date of 10/28  -continue PT/OT to increase ambulation, ADL performance and endurance  -continue TOM's and SCD's for DVT prophylaxis  -continue fall precautions  -continue senokot-s and miralax to prevent constipation; hold for frequent or loose stooling    10/25/18  Patient reports pain to back is worse after therapy today.  Discussed with Dr. Henry, she suggested changing to 2 lidoderm patches and continue Tylenol dosing as ordered.  No need to re-image at this time.  Continue TOM/SCD for dvt ppx.  Will try using an abdominal binder to see if this helps with pain with therapy sessions.    11/2/18  Now with TSLO and recent x-ray showed compression fracture at T12 which is new.    Set appointment with Neurosurgery to discuss treatment options.  Continue TOM/SCD for dvt ppx.  Added Tramadol PRN to assist with pain control and continue Tylenol as ordered.    11/7/18  Patient now with new T12 compression fracture.  Continue TSLO and follow up with Neurosurgery.  Stopped Tramadol and use Lortab elixir PRN for pain control.  Continue TOM/SCD for dvt ppx.

## 2018-11-07 NOTE — ASSESSMENT & PLAN NOTE
Chronic and controlled  Continue therapy with carvedilol, amlodipine and lisinopril  Will continue to monitor and adjust regimen as necessary.    10/25/18  BP Readings from Last 3 Encounters:   11/07/18 132/68   10/23/18 (!) 111/59   10/17/18 (!) 175/75     continue Norvasc, coreg and Hydralazine PRN as ordered.  Will stop ACE for now.  BP is stable.    11/2/18  BP mildly elevated, will restart ACE and monitor renal function, last creatinine down to 0.9.  Continue Norvasc and Coreg at current dosing.  Hydralazine PRN as ordered.    11/7/18  BP is stable, continue Norvasc, Coreg and lisinopril.  Monitor renal panel in AM.  Hydralazine PRN, continue as ordered.

## 2018-11-07 NOTE — PROGRESS NOTES
[facility] [place] [facility]  Department of [facility] Medicine  Progress Note    Patient Name: Karly Sandoval  MRN: [id number]  Code Status: Full Code  Admission Date: 10/23/2018  Length of Stay: 15 days  Attending Physician: Edna Henry MD  Primary Care Provider: Uday Allen MD    Subjective:     Principal Problem:Compression fracture of T12 vertebra    HPI:  Chief Complaint/Reason for Admission: Acute bilateral low back pain without sciatica    History of Present Illness:  Patient is a 88 y.o. 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  She was evaluated by neurosurgery with normal IAN and sensation with low suspicion for cauda equina syndrome.  CT myelogram was considered but given low suspicion for condition, risk of kidney injury and patient's disinterest in surgery, procedure was not done.  She complains of pain to her back today rating it 6/10 but controlled with tylenol.  She did well in therapy per her bu is worn out subsequently.  She has a poor appetite.      The patient has been admitted to SNF for ongoing PT/OT due to insufficient progress to go home safely from the hospital.    Records from last hospital stay reviewed and summarized above.     Interval History:   10/25/18  Patient seen at bedside, son is present.  She reports having worsening back pain to mid back.  States symptoms started after therapy.  She has no report paraesthesia or pain radiation down legs.  Patient said current pain regimen is not helping.  Son inquired about use of back brace to help with pain control.  Advised him, would discuss with MD.  He also reports patient with history of RA and is followed by Dr. Smith.  Son reports patient is going to go to [facility] if deemed appropriate.   Patient reports she does not have a good appetite but tries to eat.  She reports she is drinking fluids and urinating and having BM without issues.      Discussed case with Dr. Henry, she suggest no need to repeat imaging at this time but suggest increasing lidoderm patch to 2 and try use of abdominal binder to support back.  Will order and monitor.       11/2/18  Patient seen at bedside, she reports she is still having back pain.  Therapy reports she did a little better this morning with her session.  She is starting to retain urine and required I&O cath today, bladder scan showed ~254 cc of urine.  Patient is concerned about her lack of progress.  Therapy feels her progress has declined since admission as her eval was much better.  However, patient with acute T12 compression fracture.  She has follow up with spine clinic and a TSLO brace has been obtained for her.  Pain medication has been adjusted to optimize pain control, ordered Tramadol 25 mg tid PRN which she has only taken 1 dose so far.  Will change scheduled Xanax to PRN dosing as this is how it is ordered for her at home.    11/7/18 at 12 pm  Patient still with back pain with movement.  Earliest appointment with Neurosurgery is next week.  Continue to wear back brace.  Patient had not taken Lortab elixir prior to rounding and encouraged her to use to assist with pain control.  She has agreed to take today and informed nursing to bring a dose to her.      1355 went back to see patient, she reports lortab gave her minimal relief but was nervous she would get sleepy and fall out of wheelchair.  Son is currently at bedside.  Explained to patient again, the importance of using her pain medication to help minimize her pain.  She has agreed to comply and will reassess in 24 hours.        Review of Systems   Constitutional: Negative for fever.   Respiratory: Negative for cough and shortness of breath.    Cardiovascular: Negative for chest pain, palpitations and  leg swelling.   Gastrointestinal: Negative for abdominal pain, constipation, diarrhea, nausea and vomiting.   Genitourinary:        Urinary retention   Musculoskeletal: Positive for arthralgias, back pain and myalgias.     Objective:     Vital Signs (Most Recent):  Temp: 97.8 °F (36.6 °C) (11/07/18 0824)  Pulse: 72 (11/07/18 0800)  Resp: 20 (11/07/18 0800)  BP: 132/68 (11/07/18 1100)  SpO2: (!) 92 % (11/07/18 0800) Vital Signs (24h Range):  Temp:  [97.8 °F (36.6 °C)-98.5 °F (36.9 °C)] 97.8 °F (36.6 °C)  Pulse:  [60-72] 72  Resp:  [20] 20  SpO2:  [92 %-96 %] 92 %  BP: (129-163)/(61-73) 132/68     Weight: 57.8 kg (127 lb 6.8 oz)  Body mass index is 23.31 kg/m².    Intake/Output Summary (Last 24 hours) at 11/7/2018 1357  Last data filed at 11/7/2018 1300  Gross per 24 hour   Intake 710 ml   Output 1042 ml   Net -332 ml      Physical Exam   Constitutional: She is oriented to person, place, and time. She appears well-developed and well-nourished.   Patient is kyphotic   Cardiovascular: Normal rate, regular rhythm, normal heart sounds and intact distal pulses.   No murmur heard.  Pulmonary/Chest: Effort normal and breath sounds normal. No respiratory distress.   Abdominal: Soft. Normal appearance and bowel sounds are normal. She exhibits no distension.   Musculoskeletal: Normal range of motion. She exhibits edema.   TSLO in place.   Neurological: She is alert and oriented to person, place, and time. She has normal strength.   Skin: Skin is warm, dry and intact. Capillary refill takes less than 2 seconds.       Significant Labs: BMP: No results for input(s): GLU, NA, K, CL, CO2, BUN, CREATININE, CALCIUM, MG in the last 48 hours.  CBC: No results for input(s): WBC, HGB, HCT, PLT in the last 48 hours.    Significant Imaging: n/a    Assessment/Plan:      * Compression fracture of T12 vertebra    New on imaging but likely present since admit  Continue lidoderm patches and scheduled tylenol  TLSO brace ordered.  Patient will  need f/u with neurosurgery next available.    11/2/18  Pain persist despite change in treatment plan.  Added Tramadol yesterday but only took one dose.  Set to see Neurosurgery next week.  Patient is not amendable to surgery at this time but has said she will see what her son, Rene, thinks.  Continue Lidoderm patch, scheduled tylenol and continue to wear TSLO brace when out of bed.    11/7/18  Treatment as above.  Future Appointments   Date Time Provider Department Center   11/13/2018  8:00 AM KELVIN Ashley [facility] NEUROS7 [facility]   11/19/2018  8:00 AM HOME MONITOR DEVICE CHECK, [facility] ARRRO [facility]   1/14/2019 11:30 AM Isai Smith MD [facility] RHEUM [facility]   2/19/2019  8:00 AM HOME MONITOR DEVICE CHECK, [facility]          Urinary retention    Urge incontinence history.  She was started in tamsulosin therapy; urinating without difficulty currently.     10/25/18  No complaints today, continue Flomax as ordered.    11/2/18  Required I&O cath today.  Continue Flomax as ordered.  No reports of urinary burning.  Treating for K Pneumoniae and Proteus UTI with Cipro.  No fevers and WBC is normal.    11/7/18  Improved no complaints.  No longer on Flomax.     Hyponatremia    Chronic and stable  Will continue to monitor with twice weekly labs.    10/25/18  Chronic, sodium down to 127.  Will give IVF as she appears dry and admits to not eating much.  Repeat Lab on Monday.  PharmD cautions use of Plaquenil if sodium continues to drop.    10/30/2018  Appears related to CHF based on labs.  Continue fluid restriction (which she does on her own due to poor po intake)  Continue lasix diuresis.     11/2/18  Continue fluid restriction, sodium level chronically low.  Continue to monitor biweekly labs.  Lasix currently on hold, will consider resuming on Monday if renal function stable.    11/7/18  Continue fluid restriction and repeat labs in AM.  Last sodium level up to 129 from 124.     Acute bilateral  low back pain without sciatica    Likely multifactorial involving RA, chronic compression fractures and old left L3 transverse process  Continue tylenol prn pain and lidocaine patch daily.  Continue dexamethasone therapy with end date of 10/28  -continue PT/OT to increase ambulation, ADL performance and endurance  -continue TOM's and SCD's for DVT prophylaxis  -continue fall precautions  -continue senokot-s and miralax to prevent constipation; hold for frequent or loose stooling    10/25/18  Patient reports pain to back is worse after therapy today.  Discussed with Dr. Henry, she suggested changing to 2 lidoderm patches and continue Tylenol dosing as ordered.  No need to re-image at this time.  Continue TOM/SCD for dvt ppx.  Will try using an abdominal binder to see if this helps with pain with therapy sessions.    11/2/18  Now with TSLO and recent x-ray showed compression fracture at T12 which is new.    Set appointment with Neurosurgery to discuss treatment options.  Continue TOM/SCD for dvt ppx.  Added Tramadol PRN to assist with pain control and continue Tylenol as ordered.    11/7/18  Patient now with new T12 compression fracture.  Continue TSLO and follow up with Neurosurgery.  Stopped Tramadol and use Lortab elixir PRN for pain control.  Continue TOM/SCD for dvt ppx.       Anxiety    Continue chronic therapy with sertraline.  Patient did not tolerate therapy with amitriptyline due to lethargy  Continue chronic xanax nightly as she takes at home; GDR not to be attempted due to acute illness and failure of alternative therapy with risk of decompensating condition and prohibiting rehabilitation potential    10/25/18  Chronic, continue Zoloft as ordered.  Per MD, unable to tolerate Elavil.  Xanax qhs as she chronically takes.  Continue to monitor.    11/2/18  Family request Zoloft be discontinue and Lexapro be restarted.  They felt she did better with Lexapro.  Will change Xanax to PRN dosing today.  Recommend  she follow up with PCP.    11/7/18  Anxiety has improved since resuming Lexapro.  Xanax now PRN.  Continue treatment regimen and follow up with PCP.     Chronic diastolic CHF (congestive heart failure)    -currently compensated on clinical exam  -continue current medical therapy with lisinopril, coreg, furosemide (dose reduced due to recent CHARLENE and poor po intake but dexamethasone therapy currently)  -continue low Na diet to treat  -continue daily weight monitoring with adjustment of diuretic therapy as necessary to treat weight gains > 2-3 pounds in 24-48 hours  -continue daily pulse oximetry monitoring; proceed with CXR imaging and adjustment of diuretic therapy as necessary to treat new or worsening hypoxia  -intermittent clinical exam monitoring with adjustment of diuretic regimen as necessary to treat signs of volume overload  -f/u with cardiology as scheduled    10/25/18  Labs indicated patient is dehydrated.  Will give  cc total per Dr. Henry request and hold lasix for now.  Patient with lower leg edema that is chronic.  No reports of SOB.  Monitor weights.  Wt Readings from Last 1 Encounters:   11/07/18 0546 57.8 kg (127 lb 6.8 oz)   11/06/18 0548 56.6 kg (124 lb 12.5 oz)   11/05/18 0600 55.6 kg (122 lb 9.2 oz)   11/04/18 0500 55.5 kg (122 lb 5.7 oz)   11/03/18 0600 55.4 kg (122 lb 2.2 oz)   11/01/18 0600 55.2 kg (121 lb 11.1 oz)   10/30/18 0600 55.2 kg (121 lb 11.1 oz)   10/29/18 0556 54.1 kg (119 lb 4.3 oz)   10/23/18 1632 54.2 kg (119 lb 7.8 oz)     Continue b-blocker as ordered.    10/30/2018:  Patient with LE edema and hyponatremia with lasix therapy resumed on 10/29.  Will continue to monitor labs and weights.     11/2/18  Appears compensated, weight is stable, no reports of SOB.  Continue ACE, b-blocker and continue to hold lasix.  No reports of chest pain or SOB.    11/7/18  Weight up 3 Kg from admit.  Still with LE edema.  Will give dose of Lasix today and check labs in AM.     Essential  hypertension    Chronic and controlled  Continue therapy with carvedilol, amlodipine and lisinopril  Will continue to monitor and adjust regimen as necessary.    10/25/18  BP Readings from Last 3 Encounters:   11/07/18 132/68   10/23/18 (!) 111/59   10/17/18 (!) 175/75     continue Norvasc, coreg and Hydralazine PRN as ordered.  Will stop ACE for now.  BP is stable.    11/2/18  BP mildly elevated, will restart ACE and monitor renal function, last creatinine down to 0.9.  Continue Norvasc and Coreg at current dosing.  Hydralazine PRN as ordered.    11/7/18  BP is stable, continue Norvasc, Coreg and lisinopril.  Monitor renal panel in AM.  Hydralazine PRN, continue as ordered.         Warren Barry, DAY  Department of Hospital Medicine  [facility] [place] [facility]

## 2018-11-07 NOTE — ASSESSMENT & PLAN NOTE
-currently compensated on clinical exam  -continue current medical therapy with lisinopril, coreg, furosemide (dose reduced due to recent CHARLENE and poor po intake but dexamethasone therapy currently)  -continue low Na diet to treat  -continue daily weight monitoring with adjustment of diuretic therapy as necessary to treat weight gains > 2-3 pounds in 24-48 hours  -continue daily pulse oximetry monitoring; proceed with CXR imaging and adjustment of diuretic therapy as necessary to treat new or worsening hypoxia  -intermittent clinical exam monitoring with adjustment of diuretic regimen as necessary to treat signs of volume overload  -f/u with cardiology as scheduled    10/25/18  Labs indicated patient is dehydrated.  Will give  cc total per Dr. Henry request and hold lasix for now.  Patient with lower leg edema that is chronic.  No reports of SOB.  Monitor weights.  Wt Readings from Last 1 Encounters:   11/07/18 0546 57.8 kg (127 lb 6.8 oz)   11/06/18 0548 56.6 kg (124 lb 12.5 oz)   11/05/18 0600 55.6 kg (122 lb 9.2 oz)   11/04/18 0500 55.5 kg (122 lb 5.7 oz)   11/03/18 0600 55.4 kg (122 lb 2.2 oz)   11/01/18 0600 55.2 kg (121 lb 11.1 oz)   10/30/18 0600 55.2 kg (121 lb 11.1 oz)   10/29/18 0556 54.1 kg (119 lb 4.3 oz)   10/23/18 1632 54.2 kg (119 lb 7.8 oz)     Continue b-blocker as ordered.    10/30/2018:  Patient with LE edema and hyponatremia with lasix therapy resumed on 10/29.  Will continue to monitor labs and weights.     11/2/18  Appears compensated, weight is stable, no reports of SOB.  Continue ACE, b-blocker and continue to hold lasix.  No reports of chest pain or SOB.    11/7/18  Weight up 3 Kg from admit.  Still with LE edema.  Will give dose of Lasix today and check labs in AM.

## 2018-11-07 NOTE — PT/OT/SLP PROGRESS
"Occupational Therapy  Treatment    Karly Sandoval   MRN: [id number]   Admitting Diagnosis: Compression fracture of T12 vertebra    OT Date of Treatment: 11/07/18  Total Time (min): 39 min    Billable Minutes:  Self Care/Home Management 14 and Therapeutic Activity 25    General Precautions: Standard, fall  Orthopedic Precautions: spinal precautions  Braces: TLSO         Subjective:  Communicated with pt prior to session.  "I am so helpless. I used to do everything on my own"    Pain/Comfort  Pain Rating 1: 8/10  Location - Side 1: Left  Location - Orientation 1: generalized  Location 1: back  Pain Addressed 1: Pre-medicate for activity, Cessation of Activity, Reposition  Pain Rating Post-Intervention 1: 8/10    Objective:  Patient found with: (seated on toilet with RW)    Occupational Performance:      Functional Mobility/Transfers:  · Patient completed Sit <> Stand Transfer with stand by assistance  with  rolling walker   · Patient completed Toilet Transfer Stand Pivot technique with minimum assistance with  rolling walker  · Functional Mobility: Pt stood from toilet with SBA and RW and turned to w/c min A. Pt stood from w/c at table with SBA--pt with increased time and encouragement to perform    Activities of Daily Living:  · Toileting: maximal assistance on toilet with RW    [facility] 6 Click:  [facility] Total Score: 16    OT Exercises: Pt attempted UBE;however, unable to tolerate due to back pain    Additional Treatment:  Pt stood at table x 2 min and SBA to perform an UE task with one hand to increase standing tolerance, balance and posture to increase indep with standing self care tasks;however, pt unable to tolerate due to pain      Patient left up in chair with tech transport    ASSESSMENT:  Karly Sandoval is a 88 y.o. female with a medical diagnosis of Compression fracture of T12 vertebra . Pt cont to be limited by pain resulting in decreased participation with self care tasks and therapeutic tasks. Pt has " potential to increase indep;however, severe pain is limiting progress. Cont OT to address limitations. Pt will need 24 hour care upon d/c.    Rehab identified problem list/impairments: weakness, impaired endurance, impaired self care skills, impaired functional mobilty, gait instability, impaired balance, decreased lower extremity function, pain    Rehab potential is fair    Activity tolerance: Fair    Discharge recommendations: nursing facility, basic(pt need 24  hour care)     Barriers to discharge: Decreased caregiver support     Equipment recommendations: wheelchair     GOALS:   Multidisciplinary Problems     Occupational Therapy Goals        Problem: Occupational Therapy Goal    Goal Priority Disciplines Outcome Interventions   Occupational Therapy Goal     OT, PT/OT Ongoing (interventions implemented as appropriate)    Description:  Goals to be met by: 11 days     Patient will increase functional independence with ADLs by performing:    UE Dressing with Set-up Assistance.--not met  REVISED to mod A  LE Dressing with Supervision.--not met  REVISED to mod A  Grooming while standing at sink with Supervision.--not met  REVISED to mod I seated at sink  Toileting from toilet with Supervision for hygiene and clothing management. --not met  REVISED to mod A  Bathing from  shower chair/bench with Supervision.--mot met  REVISED to mod A  Supine to sit with Modified [place] /c HOB flat and no handrails.  Stand pivot transfers with Supervision.-not met  REVISED to SBA  Toilet transfer to toilet with Supervision.-not met  REVISED to SBA  Upper extremity exercise program 3 x 15 reps per handout, with independence.  Patient will complete a functional standing activity for 10 min with S in order to perform household tasks. --not met  REVISED to 6 min SBA                     Plan:  Patient to be seen 5 x/week to address the above listed problems via self-care/home management, therapeutic activities, therapeutic  exercises  Plan of Care expires: 11/24/18  Plan of Care reviewed with: patient    DACIA Cui  11/07/2018

## 2018-11-07 NOTE — ASSESSMENT & PLAN NOTE
New on imaging but likely present since admit  Continue lidoderm patches and scheduled tylenol  TLSO brace ordered.  Patient will need f/u with neurosurgery next available.    11/2/18  Pain persist despite change in treatment plan.  Added Tramadol yesterday but only took one dose.  Set to see Neurosurgery next week.  Patient is not amendable to surgery at this time but has said she will see what her son, Rene, thinks.  Continue Lidoderm patch, scheduled tylenol and continue to wear TSLO brace when out of bed.    11/7/18  Treatment as above.  Future Appointments   Date Time Provider Department Center   11/13/2018  8:00 AM KELVIN Ashley [place] [id number] Yaya belinda   11/19/2018  8:00 AM HOME MONITOR DEVICE CHECK, [facility] JAMEEL Bertrand   1/14/2019 11:30 AM Isai Smith MD [facility] Yaya Bertrand   2/19/2019  8:00 AM HOME MONITOR DEVICE CHECK, [facility] JAMEEL Bertrand

## 2018-11-08 LAB
ANION GAP SERPL CALC-SCNC: 7 MMOL/L
BASOPHILS # BLD AUTO: 0.04 K/UL
BASOPHILS NFR BLD: 0.7 %
BUN SERPL-MCNC: 30 MG/DL
CALCIUM SERPL-MCNC: 8.7 MG/DL
CHLORIDE SERPL-SCNC: 98 MMOL/L
CO2 SERPL-SCNC: 26 MMOL/L
CREAT SERPL-MCNC: 1.2 MG/DL
DIFFERENTIAL METHOD: ABNORMAL
EOSINOPHIL # BLD AUTO: 0.1 K/UL
EOSINOPHIL NFR BLD: 1.8 %
ERYTHROCYTE [DISTWIDTH] IN BLOOD BY AUTOMATED COUNT: 14.5 %
EST. GFR  (AFRICAN AMERICAN): 46.6 ML/MIN/1.73 M^2
EST. GFR  (NON AFRICAN AMERICAN): 40.4 ML/MIN/1.73 M^2
GLUCOSE SERPL-MCNC: 96 MG/DL
HCT VFR BLD AUTO: 25.8 %
HGB BLD-MCNC: 8.7 G/DL
IMM GRANULOCYTES # BLD AUTO: 0.07 K/UL
IMM GRANULOCYTES NFR BLD AUTO: 1.1 %
LYMPHOCYTES # BLD AUTO: 0.9 K/UL
LYMPHOCYTES NFR BLD: 14.5 %
MAGNESIUM SERPL-MCNC: 2 MG/DL
MCH RBC QN AUTO: 30.9 PG
MCHC RBC AUTO-ENTMCNC: 33.7 G/DL
MCV RBC AUTO: 92 FL
MONOCYTES # BLD AUTO: 0.7 K/UL
MONOCYTES NFR BLD: 11.2 %
NEUTROPHILS # BLD AUTO: 4.4 K/UL
NEUTROPHILS NFR BLD: 70.7 %
NRBC BLD-RTO: 0 /100 WBC
PHOSPHATE SERPL-MCNC: 3.6 MG/DL
PLATELET # BLD AUTO: 197 K/UL
PMV BLD AUTO: 9.8 FL
POTASSIUM SERPL-SCNC: 4 MMOL/L
RBC # BLD AUTO: 2.82 M/UL
SODIUM SERPL-SCNC: 131 MMOL/L
WBC # BLD AUTO: 6.15 K/UL

## 2018-11-08 PROCEDURE — 97530 THERAPEUTIC ACTIVITIES: CPT

## 2018-11-08 PROCEDURE — 84100 ASSAY OF PHOSPHORUS: CPT

## 2018-11-08 PROCEDURE — 25000003 PHARM REV CODE 250: Performed by: INTERNAL MEDICINE

## 2018-11-08 PROCEDURE — 97110 THERAPEUTIC EXERCISES: CPT

## 2018-11-08 PROCEDURE — 11000004 HC SNF PRIVATE

## 2018-11-08 PROCEDURE — 36415 COLL VENOUS BLD VENIPUNCTURE: CPT

## 2018-11-08 PROCEDURE — 83735 ASSAY OF MAGNESIUM: CPT

## 2018-11-08 PROCEDURE — 97116 GAIT TRAINING THERAPY: CPT

## 2018-11-08 PROCEDURE — 25000003 PHARM REV CODE 250: Performed by: NURSE PRACTITIONER

## 2018-11-08 PROCEDURE — 85025 COMPLETE CBC W/AUTO DIFF WBC: CPT

## 2018-11-08 PROCEDURE — 25000003 PHARM REV CODE 250: Performed by: STUDENT IN AN ORGANIZED HEALTH CARE EDUCATION/TRAINING PROGRAM

## 2018-11-08 PROCEDURE — 80048 BASIC METABOLIC PNL TOTAL CA: CPT

## 2018-11-08 RX ORDER — FUROSEMIDE 20 MG/1
20 TABLET ORAL DAILY
Status: COMPLETED | OUTPATIENT
Start: 2018-11-08 | End: 2018-11-08

## 2018-11-08 RX ORDER — HYDROCODONE BITARTRATE AND ACETAMINOPHEN 7.5; 325 MG/15ML; MG/15ML
2.5 SOLUTION ORAL 2 TIMES DAILY
Status: DISCONTINUED | OUTPATIENT
Start: 2018-11-08 | End: 2018-11-14 | Stop reason: HOSPADM

## 2018-11-08 RX ORDER — ACETAMINOPHEN 500 MG
500 TABLET ORAL EVERY 8 HOURS PRN
Status: DISCONTINUED | OUTPATIENT
Start: 2018-11-08 | End: 2018-11-14 | Stop reason: HOSPADM

## 2018-11-08 RX ADMIN — LIDOCAINE 2 PATCH: 50 PATCH TOPICAL at 04:11

## 2018-11-08 RX ADMIN — HYDROCODONE BITARTRATE AND ACETAMINOPHEN 2.5 ML: 7.5; 325 SOLUTION ORAL at 09:11

## 2018-11-08 RX ADMIN — POLYETHYLENE GLYCOL 3350 17 G: 17 POWDER, FOR SOLUTION ORAL at 10:11

## 2018-11-08 RX ADMIN — HYDROXYCHLOROQUINE SULFATE 200 MG: 200 TABLET, FILM COATED ORAL at 10:11

## 2018-11-08 RX ADMIN — CARVEDILOL 12.5 MG: 12.5 TABLET, FILM COATED ORAL at 04:11

## 2018-11-08 RX ADMIN — AMLODIPINE BESYLATE 10 MG: 10 TABLET ORAL at 10:11

## 2018-11-08 RX ADMIN — FUROSEMIDE 20 MG: 20 TABLET ORAL at 01:11

## 2018-11-08 RX ADMIN — LISINOPRIL 10 MG: 2.5 TABLET ORAL at 10:11

## 2018-11-08 RX ADMIN — SENNOSIDES AND DOCUSATE SODIUM 1 TABLET: 8.6; 5 TABLET ORAL at 09:11

## 2018-11-08 RX ADMIN — ATORVASTATIN CALCIUM 20 MG: 20 TABLET, FILM COATED ORAL at 10:11

## 2018-11-08 RX ADMIN — CARVEDILOL 12.5 MG: 12.5 TABLET, FILM COATED ORAL at 10:11

## 2018-11-08 RX ADMIN — ESCITALOPRAM 10 MG: 10 TABLET, FILM COATED ORAL at 10:11

## 2018-11-08 RX ADMIN — ACETAMINOPHEN 1000 MG: 500 TABLET, FILM COATED ORAL at 10:11

## 2018-11-08 RX ADMIN — SENNOSIDES AND DOCUSATE SODIUM 1 TABLET: 8.6; 5 TABLET ORAL at 10:11

## 2018-11-08 RX ADMIN — FOLIC ACID 1 MG: 1 TABLET ORAL at 10:11

## 2018-11-08 RX ADMIN — HYDROCODONE BITARTRATE AND ACETAMINOPHEN 5 ML: 7.5; 325 SOLUTION ORAL at 02:11

## 2018-11-08 NOTE — PLAN OF CARE
Problem: Occupational Therapy Goal  Goal: Occupational Therapy Goal  Goals to be met by: 11 days     Patient will increase functional independence with ADLs by performing:    UE Dressing with Set-up Assistance.--not met  REVISED to mod A  LE Dressing with Supervision.--not met  REVISED to mod A  Grooming while standing at sink with Supervision.--not met  REVISED to mod I seated at sink  Toileting from toilet with Supervision for hygiene and clothing management. --not met  REVISED to mod A Met, but continue to address  Bathing from  shower chair/bench with Supervision.--mot met  REVISED to mod A  Supine to sit with Modified [place] /c HOB flat and no handrails.  Stand pivot transfers with Supervision.-not met  REVISED to SBA  Toilet transfer to toilet with Supervision.-not met  REVISED to SBA  Upper extremity exercise program 3 x 15 reps per handout, with independence.  Patient will complete a functional standing activity for 10 min with S in order to perform household tasks. --not met  REVISED to 6 min SBA  Met.   Outcome: Ongoing (interventions implemented as appropriate)  Patient's goals are appropriate.   DACIA Regalado  11/8/2018

## 2018-11-08 NOTE — PT/OT/SLP PROGRESS
Physical Therapy  Treatment    Karly Sandoval   MRN: [id number]   Admitting Diagnosis: Compression fracture of T12 vertebra    PT Received On: 11/08/18  Total Time (min): 45       Billable Minutes:  Gait Training 20, Therapeutic Activity 25 and Total Time 45    Treatment Type: Treatment  PT/PTA: PT     PTA Visit Number: 0       General Precautions: Standard, fall  Orthopedic Precautions: spinal precautions   Braces: TLSO    Do you have any cultural, spiritual, Catholic conflicts, given your current situation?: none    Subjective:  Communicated with patient, nursing, and pt's son prior to session.  Pt agreeable to session w/ encouragement. She continues to report pain and fatigue.   Pt son's present t/o majority of session encouraging patient.     PT and son discussed pt's current LOF and level of assistance required. PT and son in agreement that pt requires hands on assistance available 24/7 for safety. Pt's son reported that the [facility] can accommodate providing her w/ a call light she can use to ask for assistance. PT in agreement that this is appropriate.     Pain/Comfort  Pain Rating 1: 8/10  Location - Side 1: Bilateral  Location - Orientation 1: lower  Location 1: back  Pain Addressed 1: Pre-medicate for activity, Reposition  Pain Rating Post-Intervention 1: 8/10    Objective:  Patient found sitting EOB w/ son and nurse present. Patient found with: TLSO     [facility] 6 CLICK MOBILITY  Total Score:15    Bed Mobility:  Not performed    Transfers:  Sit<>Stand: from EOB, to/from w/c (2 trials), all w/ RW and Min/CGA to rise  Stand Pivot Transfer: to/from bedside commode w/ RW and Min/CGA to rise  inc'd difficulty w/ transitioning of BUE from chair to RW due to inc'd back pain w/ slight trunk extension during sit>stand transfer    Gait:  Amb 3 trials (15ft in room, 120ft, and 120ft), all w/ RW and CGA for stability/safety  Cueing for upright posture and to remain inside RW  Cueing also to  inc step length/height     Balance:  Static stand w/ RW and CGA/SBA for safety while PT donned diaper    Additional Treatment:  Pt completed toileting w/ overall ModA including: SPT to/from commode w/ RW and [place], pericare (anterior and posterior) in standing w/ CGA for stability and RW, ModA to john scrub pants.     Patient left up in chair with OT present.    Assessment:  Karly Sandoval is a 88 y.o. female with a medical diagnosis of Compression fracture of T12 vertebra.  Pt continues to be limited t/o sessions by pain and fatigue. She does however participate well in therapy sessions. She remains at a Min/CGA level for transfers and ambulation w/ RW. Due to instability she is recommended to have 24hour assistance upon D/C.    Rehab identified problem list/impairments: weakness, impaired endurance, impaired self care skills, impaired functional mobilty, gait instability, impaired balance, decreased lower extremity function, pain    Rehab potential is fair.    Activity tolerance: Fair    Discharge recommendations: [facility] w/ 24hour assistance available    Barriers to discharge: Decreased caregiver support    Equipment recommendations: wheelchair     GOALS:   Multidisciplinary Problems     Physical Therapy Goals        Problem: Physical Therapy Goal    Goal Priority Disciplines Outcome Goal Variances Interventions   Physical Therapy Goal     PT, PT/OT Ongoing (interventions implemented as appropriate)     Description:  Goals to be met by: 18    Patient will increase functional independence with mobility by performin. Supine to/from sit with SBA.  2. Sit to stand transfer with SBA.  3. Bed to chair transfer with SBA using Rolling Walker.  4. Gait  x 100 feet with SBA using Rolling Walker.   5. Wheelchair propulsion x50 feet with Supervision using bilateral uppper extremities.  6. Ascend/Descend 4 inch curb step with Stand-by Assistance using Rolling Walker. (discontinued)  7. Lower extremity exercise  program x20 reps per handout, with assistance as needed.                        PLAN:    Patient to be seen (5-6X/week)  to address the above listed problems via gait training, therapeutic activities, therapeutic exercises, wheelchair management/training  Plan of Care expires: 11/23/18  Plan of Care reviewed with: patient, camille AGUILERA Jamie, PT  11/08/2018   21-Aug-2018 12:18

## 2018-11-08 NOTE — PLAN OF CARE
Problem: Fall Risk (Adult)  Goal: Identify Related Risk Factors and Signs and Symptoms  Related risk factors and signs and symptoms are identified upon initiation of [employer] Clinical Practice Guideline (CPG)   11/08/18 0017   Fall Risk   Related Risk Factors (Fall Risk) age-related changes;fear of falling;gait/mobility problems;history of falls   Signs and Symptoms (Fall Risk) presence of risk factors

## 2018-11-08 NOTE — PT/OT/SLP PROGRESS
Occupational Therapy  Treatment    Karly Sandoval   MRN: [id number]   Admitting Diagnosis: Compression fracture of T12 vertebra    OT Date of Treatment: 11/08/18  Total Time (min): 38 min    Billable Minutes:  Self Care/Home Management 15 and Therapeutic Activity 23    General Precautions: Standard, fall  Orthopedic Precautions: spinal precautions  Braces: TLSO         Subjective:  Communicated with patient prior to session.    Pain/Comfort  Pain Rating 1: 7/10  Location - Side 1: Left  Location - Orientation 1: generalized  Location 1: back  Pain Addressed 1: Reposition, Distraction, Cessation of Activity  Pain Rating Post-Intervention 1: 7/10    Objective:       Occupational Performance:    Functional Mobility/Transfers:  · Patient completed Sit <> Stand Transfer with minimum assistance  with  hand-held assist and rolling walker   · Patient completed w/c>bedside chair Transfer using Stand Pivot technique with minimum assistance with hand-held assist  · Patient completed Toilet Transfer Stand Pivot technique with minimum assistance with  rolling walker    Activities of Daily Living:  · Lower Body Dressing: minimum assistance [place] pants only  · Toileting: moderate assistance      [facility] 6 Click:  [facility] Total Score: 16    OT Exercises: UE Ergometer 5 min for improving endurance to increase independence with ADLs.     Additional Treatment:  Patient performed a visual perception activity while standing for activity tolerance more than 10 min with S in order to perform household tasks.     Patient left up in chair with call button in reach and all needs met.     ASSESSMENT:  Karly Sandoval is a 88 y.o. female with a medical diagnosis of Compression fracture of T12 vertebra and presents with the deficits listed below. Patient tolerated treatment session despite having pain. Patient did need verbal and tactile cues to slow down during stand to sit. Patient continues to benefit from skilled OT services to achieve  maximal independence.    Rehab identified problem list/impairments: weakness, impaired endurance, impaired self care skills, impaired functional mobilty, gait instability, impaired balance, decreased lower extremity function, pain    Rehab potential is good    Activity tolerance: Good    Discharge recommendations: nursing facility, basic(pt need 24  hour care)     Barriers to discharge: Decreased caregiver support     Equipment recommendations: wheelchair     GOALS:   Multidisciplinary Problems     Occupational Therapy Goals        Problem: Occupational Therapy Goal    Goal Priority Disciplines Outcome Interventions   Occupational Therapy Goal     OT, PT/OT Ongoing (interventions implemented as appropriate)    Description:  Goals to be met by: 11 days     Patient will increase functional independence with ADLs by performing:    UE Dressing with Set-up Assistance.--not met  REVISED to mod A  LE Dressing with Supervision.--not met  REVISED to mod A  Grooming while standing at sink with Supervision.--not met  REVISED to mod I seated at sink  Toileting from toilet with Supervision for hygiene and clothing management. --not met  REVISED to mod A Met, but continue to address  Bathing from  shower chair/bench with Supervision.--mot met  REVISED to mod A  Supine to sit with Modified [place] /c HOB flat and no handrails.  Stand pivot transfers with Supervision.-not met  REVISED to SBA  Toilet transfer to toilet with Supervision.-not met  REVISED to SBA  Upper extremity exercise program 3 x 15 reps per handout, with independence.  Patient will complete a functional standing activity for 10 min with S in order to perform household tasks. --not met  REVISED to 6 min SBA  Met.                     Plan:  Patient to be seen 5 x/week to address the above listed problems via self-care/home management, therapeutic activities, therapeutic exercises  Plan of Care expires: 11/24/18  Plan of Care reviewed with: patient    Lorenza JOHNSON  DACIA Boggs  11/08/2018

## 2018-11-09 PROCEDURE — 25000003 PHARM REV CODE 250: Performed by: NURSE PRACTITIONER

## 2018-11-09 PROCEDURE — 25000003 PHARM REV CODE 250: Performed by: INTERNAL MEDICINE

## 2018-11-09 PROCEDURE — 97803 MED NUTRITION INDIV SUBSEQ: CPT

## 2018-11-09 PROCEDURE — 97116 GAIT TRAINING THERAPY: CPT

## 2018-11-09 PROCEDURE — 11000004 HC SNF PRIVATE

## 2018-11-09 PROCEDURE — 25000003 PHARM REV CODE 250: Performed by: STUDENT IN AN ORGANIZED HEALTH CARE EDUCATION/TRAINING PROGRAM

## 2018-11-09 PROCEDURE — 97110 THERAPEUTIC EXERCISES: CPT

## 2018-11-09 PROCEDURE — 97530 THERAPEUTIC ACTIVITIES: CPT

## 2018-11-09 PROCEDURE — 97535 SELF CARE MNGMENT TRAINING: CPT

## 2018-11-09 RX ADMIN — FOLIC ACID 1 MG: 1 TABLET ORAL at 08:11

## 2018-11-09 RX ADMIN — SENNOSIDES AND DOCUSATE SODIUM 1 TABLET: 8.6; 5 TABLET ORAL at 08:11

## 2018-11-09 RX ADMIN — HYDROCODONE BITARTRATE AND ACETAMINOPHEN 5 ML: 7.5; 325 SOLUTION ORAL at 03:11

## 2018-11-09 RX ADMIN — HYDROXYCHLOROQUINE SULFATE 200 MG: 200 TABLET, FILM COATED ORAL at 08:11

## 2018-11-09 RX ADMIN — CARVEDILOL 12.5 MG: 12.5 TABLET, FILM COATED ORAL at 05:11

## 2018-11-09 RX ADMIN — HYDROCODONE BITARTRATE AND ACETAMINOPHEN 2.5 ML: 7.5; 325 SOLUTION ORAL at 08:11

## 2018-11-09 RX ADMIN — LISINOPRIL 10 MG: 2.5 TABLET ORAL at 08:11

## 2018-11-09 RX ADMIN — CARVEDILOL 12.5 MG: 12.5 TABLET, FILM COATED ORAL at 08:11

## 2018-11-09 RX ADMIN — ESCITALOPRAM 10 MG: 10 TABLET, FILM COATED ORAL at 09:11

## 2018-11-09 RX ADMIN — ATORVASTATIN CALCIUM 20 MG: 20 TABLET, FILM COATED ORAL at 08:11

## 2018-11-09 RX ADMIN — AMLODIPINE BESYLATE 10 MG: 10 TABLET ORAL at 08:11

## 2018-11-09 RX ADMIN — LIDOCAINE 2 PATCH: 50 PATCH TOPICAL at 05:11

## 2018-11-09 NOTE — PLAN OF CARE
Problem: Patient Care Overview  Goal: Plan of Care Review  Outcome: Ongoing (interventions implemented as appropriate)  Moderate Malnutrition in the context of Acute Illness/Injury     Related to (etiology):  Decreased appetite and fatigue      Signs and Symptoms (as evidenced by):  Energy Intake: <75% of estimated energy requirement for > 1 week  Body Fat Depletion: moderate depletion of triceps   Muscle Mass Depletion: moderate depletion of clavicle region, scapular region and interosseous muscle   Weight Loss: 5% x 1 month   Fluid Accumulation: mild     Interventions/Recommendations (treatment strategy):  Continue cardiac diet+ boost plus; 1.5 L fluid restriction; encourage and assistance with meals     Nutrition Diagnosis Status:  Continues

## 2018-11-09 NOTE — PROGRESS NOTES
" [facility] - Skilled Nursing Care  Adult Nutrition  Progress Note    SUMMARY       Recommendations    Recommendation/Intervention: Continue cardiac diet + boost plus, 1.5 L fluid restriction. Assistance and encouragement with meals. RD to follow.  Goals: po >50% of meals and ONS acceptance in next 5 days  Nutrition Goal Status: progressing towards goal  Communication of RD Recs: other (comment)(POC)    Reason for Assessment    Reason for Assessment: RD follow-up  Diagnosis: (hyponatremia; debility)  Relevant Medical History: HTN; anemia; HLD; GERD  Interdisciplinary Rounds: attended  General Information Comments: Pt in pain today. Reported N/V. Appetite poor due to pain, po ~25% of breakfast. Encouraging Boost at each meal. Pt would benefit from assistance and encouragment with meals. No C/D reported at this time. RD to monitor.   Nutrition Discharge Planning: d/c cardiac diet     Nutrition Risk Screen    Nutrition Risk Screen: no indicators present    Nutrition/Diet History    Do you have any cultural, spiritual, Cheondoism conflicts, given your current situation?: none  Factors Affecting Nutritional Intake: decreased appetite(fatigue)    Anthropometrics    Temp: 97.4 °F (36.3 °C)  Height Method: Stated  Height: 5' 2" (157.5 cm)  Height (inches): 62 in  Weight Method: Standard Scale  Weight: 57.2 kg (126 lb 1.7 oz)  Weight (lb): 126.1 lb  Ideal Body Weight (IBW), Female: 110 lb  % Ideal Body Weight, Female (lb): 108.63 lb  BMI (Calculated): 21.9  BMI Grade: 18.5-24.9 - normal  Weight Loss: unintentional  Usual Body Weight (UBW), k.82 kg  % Usual Body Weight: 95.59   wt gain since admission; continue to encourage    Lab/Procedures/Meds    Pertinent Labs Reviewed: reviewed  Pertinent Labs Comments: Na 131; BUN 30; GFR 46; Hgb 8.7  Pertinent Medications Reviewed: reviewed  Pertinent Labs Comments: lisinopril     Physical Findings/Assessment  NFPE completed on 10/24/18  Overall Physical Appearance: advanced age, " loss of muscle mass, loss of subcutaneous fat  Tubes: (-)  Oral/Mouth Cavity: WDL    Estimated/Assessed Needs    Weight Used For Calorie Calculations: 54.2 kg (119 lb 7.8 oz)  Energy Calorie Requirements (kcal): 0726-7931 kcal  Energy Need Method: Kcal/kg((25-30 kcal/kg))  Protein Requirements: 54-65 gm((1.0-1.2 gm/kg))  Weight Used For Protein Calculations: 54.2 kg (119 lb 7.8 oz)  Fluid Requirements (mL): 2664-0013 mL  Fluid Need Method: RDA Method  RDA Method (mL): 1355  CHO Requirement: 1 mL/kcal or per MD      Nutrition Prescription Ordered    Current Diet Order: Cardiac , 1.5L fluid restriction  Nutrition Order Comments: intake good when pt not in pain  Oral Nutrition Supplement: Boost plus TID    Evaluation of Received Nutrient/Fluid Intake    Energy Calories Required: meeting needs  Protein Required: meeting needs  Fluid Required: meeting needs  Tolerance: tolerating  % Intake of Estimated Energy Needs: 50 - 75 %  % Meal Intake: 50 - 75 %    Nutrition Risk    Level of Risk/Frequency of Follow-up: high     Assessment and Plan    Moderate Malnutrition in the context of Acute Illness/Injury     Related to (etiology):  Decreased appetite and fatigue      Signs and Symptoms (as evidenced by):  Energy Intake: <75% of estimated energy requirement for > 1 week  Body Fat Depletion: moderate depletion of triceps   Muscle Mass Depletion: moderate depletion of clavicle region, scapular region and interosseous muscle   Weight Loss: 5% x 1 month   Fluid Accumulation: mild    Monitor and Evaluation    Food and Nutrient Intake: energy intake  Food and Nutrient Adminstration: diet order  Physical Activity and Function: nutrition-related ADLs and IADLs  Anthropometric Measurements: weight, weight change  Biochemical Data, Medical Tests and Procedures: electrolyte and renal panel, gastrointestinal profile, glucose/endocrine profile, inflammatory profile, lipid profile  Nutrition-Focused Physical Findings: overall appearance      Nutrition Follow-Up    RD Follow-up?: Yes

## 2018-11-09 NOTE — PLAN OF CARE
Problem: Occupational Therapy Goal  Goal: Occupational Therapy Goal  Goals to be met by: 11 days     Patient will increase functional independence with ADLs by performing:    UE Dressing with Set-up Assistance.--not met  REVISED to mod A  LE Dressing with Supervision.--not met  REVISED to mod A  Grooming while standing at sink with Supervision.--not met  REVISED to mod I seated at sink  Toileting from toilet with Supervision for hygiene and clothing management. --not met  REVISED to mod A Met, but continue to address  Bathing from  shower chair/bench with Supervision.--mot met  REVISED to mod A  Supine to sit with Modified [place] /c HOB flat and no handrails.  Stand pivot transfers with Supervision.-not met  REVISED to SBA  Toilet transfer to toilet with Supervision.-not met  REVISED to SBA  Upper extremity exercise program 3 x 15 reps per handout, with independence.  Patient will complete a functional standing activity for 10 min with S in order to perform household tasks. --not met  REVISED to 6 min SBA  Met.    Outcome: Ongoing (interventions implemented as appropriate)  Patient's goals are appropraite.   DACIA Regalado  11/9/2018

## 2018-11-09 NOTE — PLAN OF CARE
Problem: Fall Risk (Adult)  Goal: Absence of Falls  Patient will demonstrate the desired outcomes by discharge/transition of care.  Outcome: Ongoing (interventions implemented as appropriate)  Utilizes nurse call light when assistance is needed. Call light remains within reach. Remains free of falls, injury or trauma. Will continue to monitor.

## 2018-11-09 NOTE — PT/OT/SLP PROGRESS
Physical Therapy  Treatment    Karly Sandoval   MRN: [id number]   Admitting Diagnosis: Compression fracture of T12 vertebra    PT Received On: 11/09/18  Total Time (min): 41       Billable Minutes:  Gait Training 15, Therapeutic Activity 10, Therapeutic Exercise 16 and Total Time 41    Treatment Type: Treatment  PT/PTA: PT     PTA Visit Number: 0       General Precautions: Standard, fall  Orthopedic Precautions: spinal precautions   Braces: TLSO    Do you have any cultural, spiritual, Muslim conflicts, given your current situation?: none    Subjective:  Communicated with patient prior to session.  Pt agreeable to session but reporting inc'd pain and fatigue- nurse was notified.     Pain/Comfort  Pain Rating 1: 8/10  Location - Side 1: Bilateral  Location - Orientation 1: generalized  Location 1: back  Pain Addressed 1: Reposition, Nurse notified, Other (see comments)(TLSO adjusted t/o session for proper fit)  Pain Rating Post-Intervention 1: 8/10    Objective:  Patient found sitting in w/c. Patient found with: TLSO     AM-PAC 6 CLICK MOBILITY  Total Score:15    Bed Mobility:  Not performed    Transfers:  Sit<>Stand: to/from w/c (3 trials) w/ RW and Min/CGA for stability w/ rise, inc'd pain when transitioning hands from w/c to RW causing instability  Cueing for hand placement and forward lean    Gait:  Amb 2 trials (38ft, 82ft, and 10ft) w/ RW and CGA for safety/stability  Cueing to remain inside RW  Limited in distance by pain and fatigue     Therex:  Seated BLE therex 2x10 reps (GS, HF, LAQ, AP)    Additional Treatment:  Seated LBE x15min to inc BLE strength/endurance    Patient left up in chair with call button in reach.    Assessment:  Karly Sandoval is a 88 y.o. female with a medical diagnosis of Compression fracture of T12 vertebra.  Pt continues to be limited t/o sessions by pain and fatigue. She ambulated a decreased distance today due to back pain- nurse was notified. Pt will continue PT  POC.    Rehab identified problem list/impairments: weakness, impaired endurance, impaired self care skills, impaired functional mobilty, gait instability, impaired balance, decreased lower extremity function, pain    Rehab potential is fair.    Activity tolerance: Fair    Discharge recommendations: assisted living facility(w/ 24hour assistance available)     Barriers to discharge: Decreased caregiver support    Equipment recommendations: wheelchair     GOALS:   Multidisciplinary Problems     Physical Therapy Goals        Problem: Physical Therapy Goal    Goal Priority Disciplines Outcome Goal Variances Interventions   Physical Therapy Goal     PT, PT/OT Ongoing (interventions implemented as appropriate)     Description:  Goals to be met by: 18    Patient will increase functional independence with mobility by performin. Supine to/from sit with SBA.  2. Sit to stand transfer with SBA.  3. Bed to chair transfer with SBA using Rolling Walker.  4. Gait  x 100 feet with SBA using Rolling Walker.   5. Wheelchair propulsion x50 feet with Supervision using bilateral uppper extremities.  6. Ascend/Descend 4 inch curb step with Stand-by Assistance using Rolling Walker. (discontinued)  7. Lower extremity exercise program x20 reps per handout, with assistance as needed.                        PLAN:    Patient to be seen (5-6X/week)  to address the above listed problems via gait training, therapeutic activities, therapeutic exercises, wheelchair management/training  Plan of Care expires: 18  Plan of Care reviewed with: patient, camille Ayala, PT  2018

## 2018-11-09 NOTE — PT/OT/SLP PROGRESS
Occupational Therapy  Treatment    Karly Sandoval   MRN: [id number]   Admitting Diagnosis: Compression fracture of T12 vertebra    OT Date of Treatment: 11/08/18  Total Time (min): 45 min    Billable Minutes:  Self Care/Home Management 45    General Precautions: Standard, fall  Orthopedic Precautions: spinal precautions  Braces: TLSO         Subjective:  Communicated with patient prior to session.    Pain/Comfort  Pain Rating 1: 9/10  Location - Side 1: Bilateral  Location - Orientation 1: generalized  Location 1: back  Pain Addressed 1: Reposition, Distraction  Pain Rating Post-Intervention 1: 9/10    Objective:       Occupational Performance:    Bed Mobility:    · Patient completed Supine to Sit with moderate assistance     Functional Mobility/Transfers:  · Patient completed Sit <> Stand Transfer with minimum assistance  with  hand-held assist   · Patient completed Bed > w/c; w/c>bedside chair Transfer using Stand Pivot technique with moderate assistance with hand-held assist  · Patient completed Toilet Transfer Stand Pivot technique with minimum assistance with  grab bars    Activities of Daily Living:  · Grooming: supervision oral care and washing face  · Bathing: moderate assistance spongebath  · Upper Body Dressing: stand by assistance [place] and donning pullover shirt. Donning button up shirt. Total A for Donning LSO.   · Lower Body Dressing: moderate assistance [place] and donning pants.   · Toileting: minimum assistance      AMPA 6 Click:  AMPAC Total Score: 16    Patient left up in chair with call button in reach and all needs met.     ASSESSMENT:  Karly Sandoval is a 88 y.o. female with a medical diagnosis of Compression fracture of T12 vertebra and presents with the deficits listed below. Patient tolerated treatment session despite having pain. Patient does still need more assistance with ADLs due to pain. Patient continues to benefit from skilled OT services to achieve maximal  independence.    Rehab identified problem list/impairments: weakness, impaired endurance, impaired self care skills, impaired functional mobilty, gait instability, impaired balance, decreased lower extremity function, pain    Rehab potential is good    Activity tolerance: Good    Discharge recommendations: nursing facility, basic(pt need 24  hour care)     Barriers to discharge: Decreased caregiver support     Equipment recommendations: wheelchair     GOALS:   Multidisciplinary Problems     Occupational Therapy Goals        Problem: Occupational Therapy Goal    Goal Priority Disciplines Outcome Interventions   Occupational Therapy Goal     OT, PT/OT Ongoing (interventions implemented as appropriate)    Description:  Goals to be met by: 11 days     Patient will increase functional independence with ADLs by performing:    UE Dressing with Set-up Assistance.--not met  REVISED to mod A  LE Dressing with Supervision.--not met  REVISED to mod A  Grooming while standing at sink with Supervision.--not met  REVISED to mod I seated at sink  Toileting from toilet with Supervision for hygiene and clothing management. --not met  REVISED to mod A Met, but continue to address  Bathing from  shower chair/bench with Supervision.--mot met  REVISED to mod A  Supine to sit with Modified [place] /c HOB flat and no handrails.  Stand pivot transfers with Supervision.-not met  REVISED to SBA  Toilet transfer to toilet with Supervision.-not met  REVISED to SBA  Upper extremity exercise program 3 x 15 reps per handout, with independence.  Patient will complete a functional standing activity for 10 min with S in order to perform household tasks. --not met  REVISED to 6 min SBA  Met.                     Plan:  Patient to be seen 5 x/week to address the above listed problems via self-care/home management, therapeutic activities, therapeutic exercises  Plan of Care expires: 11/24/18  Plan of Care reviewed with: patient    Lorenza Boggs,  LOTR  11/09/2018

## 2018-11-10 PROCEDURE — 25000003 PHARM REV CODE 250: Performed by: INTERNAL MEDICINE

## 2018-11-10 PROCEDURE — 11000004 HC SNF PRIVATE

## 2018-11-10 PROCEDURE — 25000003 PHARM REV CODE 250: Performed by: STUDENT IN AN ORGANIZED HEALTH CARE EDUCATION/TRAINING PROGRAM

## 2018-11-10 PROCEDURE — 25000003 PHARM REV CODE 250: Performed by: NURSE PRACTITIONER

## 2018-11-10 RX ADMIN — FOLIC ACID 1 MG: 1 TABLET ORAL at 09:11

## 2018-11-10 RX ADMIN — CARVEDILOL 12.5 MG: 12.5 TABLET, FILM COATED ORAL at 05:11

## 2018-11-10 RX ADMIN — LIDOCAINE 2 PATCH: 50 PATCH TOPICAL at 05:11

## 2018-11-10 RX ADMIN — CARVEDILOL 12.5 MG: 12.5 TABLET, FILM COATED ORAL at 09:11

## 2018-11-10 RX ADMIN — POLYETHYLENE GLYCOL 3350 17 G: 17 POWDER, FOR SOLUTION ORAL at 09:11

## 2018-11-10 RX ADMIN — ATORVASTATIN CALCIUM 20 MG: 20 TABLET, FILM COATED ORAL at 09:11

## 2018-11-10 RX ADMIN — HYDROXYCHLOROQUINE SULFATE 200 MG: 200 TABLET, FILM COATED ORAL at 09:11

## 2018-11-10 RX ADMIN — HYDROCODONE BITARTRATE AND ACETAMINOPHEN 2.5 ML: 7.5; 325 SOLUTION ORAL at 09:11

## 2018-11-10 RX ADMIN — SENNOSIDES AND DOCUSATE SODIUM 1 TABLET: 8.6; 5 TABLET ORAL at 09:11

## 2018-11-10 RX ADMIN — ALPRAZOLAM 0.12 MG: 0.25 TABLET ORAL at 10:11

## 2018-11-10 RX ADMIN — ESCITALOPRAM 10 MG: 10 TABLET, FILM COATED ORAL at 09:11

## 2018-11-10 RX ADMIN — LISINOPRIL 10 MG: 2.5 TABLET ORAL at 09:11

## 2018-11-10 RX ADMIN — AMLODIPINE BESYLATE 10 MG: 10 TABLET ORAL at 09:11

## 2018-11-10 NOTE — PLAN OF CARE
Problem: Fall Risk (Adult)  Goal: Absence of Falls  Patient will demonstrate the desired outcomes by discharge/transition of care.  Outcome: Ongoing (interventions implemented as appropriate)   11/09/18 7217   Fall Risk (Adult)   Absence of Falls making progress toward outcome

## 2018-11-10 NOTE — PLAN OF CARE
Problem: Fall Risk (Adult)  Goal: Absence of Falls  Patient will demonstrate the desired outcomes by discharge/transition of care.  Outcome: Ongoing (interventions implemented as appropriate)  Call light in reach. Bed In lowest positioned. Patient without falls.

## 2018-11-10 NOTE — NURSING
Patient sitting in bed at this time. Patient informed about meds. Drink Boost. Did not eat much of breakfast. Awake alert and oriented x 4. Tolerated meds. Assisted Patient out of bed and in bedside chair. Patient tolerated fair. Resp pattern even and unlabored. No distress. Call light within reach.

## 2018-11-11 PROCEDURE — 25000003 PHARM REV CODE 250: Performed by: INTERNAL MEDICINE

## 2018-11-11 PROCEDURE — 25000003 PHARM REV CODE 250: Performed by: STUDENT IN AN ORGANIZED HEALTH CARE EDUCATION/TRAINING PROGRAM

## 2018-11-11 PROCEDURE — 11000004 HC SNF PRIVATE

## 2018-11-11 PROCEDURE — 97535 SELF CARE MNGMENT TRAINING: CPT

## 2018-11-11 PROCEDURE — 25000003 PHARM REV CODE 250: Performed by: NURSE PRACTITIONER

## 2018-11-11 RX ADMIN — ATORVASTATIN CALCIUM 20 MG: 20 TABLET, FILM COATED ORAL at 08:11

## 2018-11-11 RX ADMIN — FOLIC ACID 1 MG: 1 TABLET ORAL at 08:11

## 2018-11-11 RX ADMIN — ACETAMINOPHEN 500 MG: 500 TABLET, FILM COATED ORAL at 06:11

## 2018-11-11 RX ADMIN — ESCITALOPRAM 10 MG: 10 TABLET, FILM COATED ORAL at 08:11

## 2018-11-11 RX ADMIN — CARVEDILOL 12.5 MG: 12.5 TABLET, FILM COATED ORAL at 08:11

## 2018-11-11 RX ADMIN — HYDROCODONE BITARTRATE AND ACETAMINOPHEN 2.5 ML: 7.5; 325 SOLUTION ORAL at 08:11

## 2018-11-11 RX ADMIN — HYDROXYCHLOROQUINE SULFATE 200 MG: 200 TABLET, FILM COATED ORAL at 08:11

## 2018-11-11 RX ADMIN — CARVEDILOL 12.5 MG: 12.5 TABLET, FILM COATED ORAL at 05:11

## 2018-11-11 RX ADMIN — LISINOPRIL 10 MG: 2.5 TABLET ORAL at 08:11

## 2018-11-11 RX ADMIN — LIDOCAINE 2 PATCH: 50 PATCH TOPICAL at 05:11

## 2018-11-11 RX ADMIN — AMLODIPINE BESYLATE 10 MG: 10 TABLET ORAL at 08:11

## 2018-11-11 NOTE — PLAN OF CARE
Problem: Fall Risk (Adult)  Goal: Identify Related Risk Factors and Signs and Symptoms  Related risk factors and signs and symptoms are identified upon initiation of Human Response Clinical Practice Guideline (CPG)   11/11/18 0010   Fall Risk   Related Risk Factors (Fall Risk) age-related changes;bladder function altered;depression/anxiety;fear of falling;gait/mobility problems   Signs and Symptoms (Fall Risk) presence of risk factors

## 2018-11-11 NOTE — PLAN OF CARE
Problem: Occupational Therapy Goal  Goal: Occupational Therapy Goal  Goals to be met by: 11 days     Patient will increase functional independence with ADLs by performing:    UE Dressing with Set-up Assistance.--not met  REVISED to mod A  LE Dressing with Supervision.--not met  REVISED to mod A  Grooming while standing at sink with Supervision.--not met  REVISED to mod I seated at sink  Toileting from toilet with Supervision for hygiene and clothing management. --not met  REVISED to mod A Met, but continue to address  Bathing from  shower chair/bench with Supervision.--mot met  REVISED to mod A  Supine to sit with Modified [place] /c HOB flat and no handrails.  Stand pivot transfers with Supervision.-not met  REVISED to SBA  Toilet transfer to toilet with Supervision.-not met  REVISED to SBA  Upper extremity exercise program 3 x 15 reps per handout, with independence.  Patient will complete a functional standing activity for 10 min with S in order to perform household tasks. --not met  REVISED to 6 min SBA  Met.    Outcome: Ongoing (interventions implemented as appropriate)  .

## 2018-11-11 NOTE — PT/OT/SLP PROGRESS
Occupational Therapy  Treatment    Karly Sandoval   MRN: [id number]   Admitting Diagnosis: Compression fracture of T12 vertebra    OT Date of Treatment: 11/11/18  Total Time (min): 45 min    Billable Minutes:  Self Care/Home Management 45    General Precautions: Standard, fall  Orthopedic Precautions: spinal precautions  Braces: TLSO         Subjective:  Communicated with nsg prior to session.  I didn't go te sleep until late last night     Pain/Comfort  Pain Rating 1: 9/10  Location - Side 1: Bilateral  Location 1: back  Pain Addressed 1: Reposition, Distraction, Nurse notified  Pain Rating Post-Intervention 1: 9/10    Objective:  Patient found with: TLSO    Occupational Performance:    Bed Mobility:    · Patient completed Scooting/Bridging with minimum assistance  Patient completed Supine to Sit with moderate assistance for upper body and Lower body management  Functional Mobility/Transfers:  · Patient completed Sit <> Stand Transfer with contact guard assistance  with  rolling walker   · Patient completed Bed <> Chair Transfer using Step Transfer technique with contact guard assistance with rolling walker  · Functional Mobility: Pt. With sit to stand from EOB x 2 trials and then to bed side chair with cues for safety     Activities of Daily Living:  · Feeding:  supervision with breakfast   · Grooming: stand by assistance at sink level with oral care and hair care  · Bathing: moderate assistance for BLE feet and post [place] area  · Upper Body Dressing: moderate assistance to john pull over shirt and button up shirt and Total A for LOS management   · Lower Body Dressing: maximal assistance to john BLE into pants and over hips Pt. with increase pain with  managing task on this day with RW for balance  · Toileting: total assistance Pt. with urine incot in diaper (A) to clean and manage     AMPAC 6 Click:  AMPAC Total Score: 16    Patient left up in chair with all lines intact and call button in  reach    ASSESSMENT:  Karly Sandoval is a 88 y.o. female with a medical diagnosis of Compression fracture of T12 vertebra Pt. participated well with session on this day.Pt. Still continues to require increase (A) with selfcare task due to increase pain with task. Pt demos physical deficits with balance  functional mobility, UB strength, endurance  level of functional indep with daily tasks and activities and selfcare skills .Pt. Will continue to benefit from continued OT to progress towards goals      Rehab identified problem list/impairments: weakness, impaired endurance, impaired self care skills, impaired functional mobilty, gait instability, impaired balance, decreased lower extremity function, pain    Rehab potential is fair    Activity tolerance: Fair    Discharge recommendations: nursing facility, basic(pt need 24  hour care)     Barriers to discharge: Decreased caregiver support     Equipment recommendations: wheelchair     GOALS:   Multidisciplinary Problems     Occupational Therapy Goals        Problem: Occupational Therapy Goal    Goal Priority Disciplines Outcome Interventions   Occupational Therapy Goal     OT, PT/OT Ongoing (interventions implemented as appropriate)    Description:  Goals to be met by: 11 days     Patient will increase functional independence with ADLs by performing:    UE Dressing with Set-up Assistance.--not met  REVISED to mod A  LE Dressing with Supervision.--not met  REVISED to mod A  Grooming while standing at sink with Supervision.--not met  REVISED to mod I seated at sink  Toileting from toilet with Supervision for hygiene and clothing management. --not met  REVISED to mod A Met, but continue to address  Bathing from  shower chair/bench with Supervision.--mot met  REVISED to mod A  Supine to sit with Modified [place] /c HOB flat and no handrails.  Stand pivot transfers with Supervision.-not met  REVISED to SBA  Toilet transfer to toilet with Supervision.-not  met  REVISED to SBA  Upper extremity exercise program 3 x 15 reps per handout, with independence.  Patient will complete a functional standing activity for 10 min with S in order to perform household tasks. --not met  REVISED to 6 min SBA  Met.                 Plan:  Patient to be seen 5 x/week to address the above listed problems via self-care/home management, therapeutic activities, therapeutic exercises  Plan of Care expires: 11/24/18  Plan of Care reviewed with: patient  The [facility] and [facility] have collaborated and discussed the patient's status, treatment plan and progress toward established goals.   MU Archibald/ALEX 11/11/2018

## 2018-11-12 ENCOUNTER — TELEPHONE (OUTPATIENT)
Dept: ADMINISTRATIVE | Facility: CLINIC | Age: 83
End: 2018-11-12

## 2018-11-12 LAB
ANION GAP SERPL CALC-SCNC: 9 MMOL/L
BACTERIA #/AREA URNS AUTO: ABNORMAL /HPF
BASOPHILS # BLD AUTO: 0.01 K/UL
BASOPHILS NFR BLD: 0.1 %
BILIRUB UR QL STRIP: NEGATIVE
BUN SERPL-MCNC: 34 MG/DL
CALCIUM SERPL-MCNC: 8.4 MG/DL
CHLORIDE SERPL-SCNC: 96 MMOL/L
CLARITY UR REFRACT.AUTO: ABNORMAL
CO2 SERPL-SCNC: 23 MMOL/L
COLOR UR AUTO: YELLOW
CREAT SERPL-MCNC: 1.4 MG/DL
DIFFERENTIAL METHOD: ABNORMAL
EOSINOPHIL # BLD AUTO: 0 K/UL
EOSINOPHIL NFR BLD: 0.1 %
ERYTHROCYTE [DISTWIDTH] IN BLOOD BY AUTOMATED COUNT: 14.4 %
EST. GFR  (AFRICAN AMERICAN): 38.7 ML/MIN/1.73 M^2
EST. GFR  (NON AFRICAN AMERICAN): 33.6 ML/MIN/1.73 M^2
GLUCOSE SERPL-MCNC: 134 MG/DL
GLUCOSE UR QL STRIP: NEGATIVE
HCT VFR BLD AUTO: 26.5 %
HGB BLD-MCNC: 9.3 G/DL
HGB UR QL STRIP: NEGATIVE
HYALINE CASTS UR QL AUTO: 0 /LPF
IMM GRANULOCYTES # BLD AUTO: 0.05 K/UL
IMM GRANULOCYTES NFR BLD AUTO: 0.4 %
KETONES UR QL STRIP: NEGATIVE
LEUKOCYTE ESTERASE UR QL STRIP: ABNORMAL
LYMPHOCYTES # BLD AUTO: 0.5 K/UL
LYMPHOCYTES NFR BLD: 4.2 %
MAGNESIUM SERPL-MCNC: 2 MG/DL
MCH RBC QN AUTO: 32.1 PG
MCHC RBC AUTO-ENTMCNC: 35.1 G/DL
MCV RBC AUTO: 91 FL
MICROSCOPIC COMMENT: ABNORMAL
MONOCYTES # BLD AUTO: 0.8 K/UL
MONOCYTES NFR BLD: 6.5 %
NEUTROPHILS # BLD AUTO: 11 K/UL
NEUTROPHILS NFR BLD: 88.7 %
NITRITE UR QL STRIP: NEGATIVE
NON-SQ EPI CELLS #/AREA URNS AUTO: 1 /HPF
NRBC BLD-RTO: 0 /100 WBC
PH UR STRIP: 6 [PH] (ref 5–8)
PHOSPHATE SERPL-MCNC: 3.7 MG/DL
PLATELET # BLD AUTO: 159 K/UL
PMV BLD AUTO: 9.7 FL
POTASSIUM SERPL-SCNC: 4.3 MMOL/L
PROT UR QL STRIP: ABNORMAL
RBC # BLD AUTO: 2.9 M/UL
RBC #/AREA URNS AUTO: 0 /HPF (ref 0–4)
SODIUM SERPL-SCNC: 128 MMOL/L
SP GR UR STRIP: 1.01 (ref 1–1.03)
SQUAMOUS #/AREA URNS AUTO: 1 /HPF
URN SPEC COLLECT METH UR: ABNORMAL
WBC # BLD AUTO: 12.44 K/UL
WBC #/AREA URNS AUTO: >100 /HPF (ref 0–5)
WBC CLUMPS UR QL AUTO: ABNORMAL

## 2018-11-12 PROCEDURE — 84100 ASSAY OF PHOSPHORUS: CPT

## 2018-11-12 PROCEDURE — 63600175 PHARM REV CODE 636 W HCPCS: Performed by: NURSE PRACTITIONER

## 2018-11-12 PROCEDURE — 25000003 PHARM REV CODE 250: Performed by: INTERNAL MEDICINE

## 2018-11-12 PROCEDURE — 27000221 HC OXYGEN, UP TO 24 HOURS

## 2018-11-12 PROCEDURE — 11000004 HC SNF PRIVATE

## 2018-11-12 PROCEDURE — 85025 COMPLETE CBC W/AUTO DIFF WBC: CPT

## 2018-11-12 PROCEDURE — 80048 BASIC METABOLIC PNL TOTAL CA: CPT

## 2018-11-12 PROCEDURE — 99309 SBSQ NF CARE MODERATE MDM 30: CPT | Mod: ,,, | Performed by: NURSE PRACTITIONER

## 2018-11-12 PROCEDURE — 83735 ASSAY OF MAGNESIUM: CPT

## 2018-11-12 PROCEDURE — 97530 THERAPEUTIC ACTIVITIES: CPT

## 2018-11-12 PROCEDURE — 81001 URINALYSIS AUTO W/SCOPE: CPT

## 2018-11-12 PROCEDURE — 94761 N-INVAS EAR/PLS OXIMETRY MLT: CPT

## 2018-11-12 PROCEDURE — 87086 URINE CULTURE/COLONY COUNT: CPT

## 2018-11-12 PROCEDURE — 87088 URINE BACTERIA CULTURE: CPT

## 2018-11-12 PROCEDURE — 87077 CULTURE AEROBIC IDENTIFY: CPT

## 2018-11-12 PROCEDURE — 25000003 PHARM REV CODE 250: Performed by: NURSE PRACTITIONER

## 2018-11-12 PROCEDURE — 25000242 PHARM REV CODE 250 ALT 637 W/ HCPCS: Performed by: NURSE PRACTITIONER

## 2018-11-12 PROCEDURE — 94640 AIRWAY INHALATION TREATMENT: CPT

## 2018-11-12 PROCEDURE — 94799 UNLISTED PULMONARY SVC/PX: CPT

## 2018-11-12 PROCEDURE — 25000003 PHARM REV CODE 250: Performed by: STUDENT IN AN ORGANIZED HEALTH CARE EDUCATION/TRAINING PROGRAM

## 2018-11-12 PROCEDURE — 87186 SC STD MICRODIL/AGAR DIL: CPT

## 2018-11-12 PROCEDURE — 36415 COLL VENOUS BLD VENIPUNCTURE: CPT

## 2018-11-12 PROCEDURE — 97110 THERAPEUTIC EXERCISES: CPT

## 2018-11-12 RX ORDER — FUROSEMIDE 20 MG/1
20 TABLET ORAL DAILY
Status: DISCONTINUED | OUTPATIENT
Start: 2018-11-12 | End: 2018-11-12

## 2018-11-12 RX ORDER — MOXIFLOXACIN HYDROCHLORIDE 400 MG/1
400 TABLET ORAL DAILY
Status: DISCONTINUED | OUTPATIENT
Start: 2018-11-12 | End: 2018-11-13

## 2018-11-12 RX ORDER — FUROSEMIDE 10 MG/ML
20 INJECTION INTRAMUSCULAR; INTRAVENOUS DAILY
Status: DISCONTINUED | OUTPATIENT
Start: 2018-11-13 | End: 2018-11-14 | Stop reason: HOSPADM

## 2018-11-12 RX ORDER — FUROSEMIDE 10 MG/ML
20 INJECTION INTRAMUSCULAR; INTRAVENOUS ONCE
Status: COMPLETED | OUTPATIENT
Start: 2018-11-12 | End: 2018-11-12

## 2018-11-12 RX ORDER — IPRATROPIUM BROMIDE AND ALBUTEROL SULFATE 2.5; .5 MG/3ML; MG/3ML
3 SOLUTION RESPIRATORY (INHALATION)
Status: DISCONTINUED | OUTPATIENT
Start: 2018-11-12 | End: 2018-11-14 | Stop reason: HOSPADM

## 2018-11-12 RX ADMIN — ESCITALOPRAM 10 MG: 10 TABLET, FILM COATED ORAL at 09:11

## 2018-11-12 RX ADMIN — HYDROCODONE BITARTRATE AND ACETAMINOPHEN 2.5 ML: 7.5; 325 SOLUTION ORAL at 08:11

## 2018-11-12 RX ADMIN — ATORVASTATIN CALCIUM 20 MG: 20 TABLET, FILM COATED ORAL at 09:11

## 2018-11-12 RX ADMIN — CARVEDILOL 12.5 MG: 12.5 TABLET, FILM COATED ORAL at 09:11

## 2018-11-12 RX ADMIN — CALCIUM CARBONATE (ANTACID) CHEW TAB 500 MG 500 MG: 500 CHEW TAB at 09:11

## 2018-11-12 RX ADMIN — IPRATROPIUM BROMIDE AND ALBUTEROL SULFATE 3 ML: .5; 3 SOLUTION RESPIRATORY (INHALATION) at 07:11

## 2018-11-12 RX ADMIN — FOLIC ACID 1 MG: 1 TABLET ORAL at 09:11

## 2018-11-12 RX ADMIN — FUROSEMIDE 20 MG: 10 INJECTION, SOLUTION INTRAMUSCULAR; INTRAVENOUS at 01:11

## 2018-11-12 RX ADMIN — HYDROXYCHLOROQUINE SULFATE 200 MG: 200 TABLET, FILM COATED ORAL at 09:11

## 2018-11-12 RX ADMIN — ALPRAZOLAM 0.12 MG: 0.25 TABLET ORAL at 02:11

## 2018-11-12 RX ADMIN — IPRATROPIUM BROMIDE AND ALBUTEROL SULFATE 3 ML: .5; 3 SOLUTION RESPIRATORY (INHALATION) at 04:11

## 2018-11-12 RX ADMIN — LIDOCAINE 2 PATCH: 50 PATCH TOPICAL at 04:11

## 2018-11-12 RX ADMIN — MOXIFLOXACIN HYDROCHLORIDE 400 MG: 400 TABLET, FILM COATED ORAL at 04:11

## 2018-11-12 RX ADMIN — AMLODIPINE BESYLATE 10 MG: 10 TABLET ORAL at 09:11

## 2018-11-12 RX ADMIN — LISINOPRIL 10 MG: 2.5 TABLET ORAL at 09:11

## 2018-11-12 RX ADMIN — CARVEDILOL 12.5 MG: 12.5 TABLET, FILM COATED ORAL at 04:11

## 2018-11-12 NOTE — NURSING
Pt sleeping but resp are 24 pulse ox is 86   Pt awakened and instructed to take some deep breaths    Pulse ox remains at 86 to 88  Charge nurse notified

## 2018-11-12 NOTE — NURSING
"Rounding, pt verbalized SOB and "Just don't feel good". Pt pale 1L NC 94% sats, accessory muscles with inspiration. Temp 98.8. Bladder scan <300. D.DAY Cash at bedside to assess. Previous day wt gain. WBC 12.44. Orders to be written for IV lasix, CXR, abd x-ray, urine collection, resp treatments with IS  "

## 2018-11-12 NOTE — NURSING
Pt very anxious resp 24  Pulse 92   Pulse ox is 93   Med given and pt started to callm down within 15 min

## 2018-11-12 NOTE — PT/OT/SLP PROGRESS
Occupational Therapy      Patient Name:  Karly Sandoval   MRN:  [id number]    Patient not seen today secondary to patient adamantly refusing therapy with multiple attempts and maximal encouragement. Will follow-up next treatment session.    DACIA Regalado  11/12/2018

## 2018-11-12 NOTE — PROGRESS NOTES
Attended IDT meeting at [facility] to obtain patient update -    Clinical Status:Patient has recent weight gain & swelling with abdominal distention & white count of 12. She is scheduled to have a urine culture & chest/abdominal xray. She has an appt. Tomorrow with the spine clinic.    Progress made/or loss:    Therapies Involved with Care:      PT:    Ambulation:120 ft. Min assist    Sit to Stand:    Pivot:    Bed Function:    Transfers:mod assist    Bed Mobility:    OT:    Toileting:max assist    Hygiene:mod assist    Upper Body dressing:mod assist    Lower Body dressing:mod assist    Clothing management:    ADL's:    ST    Diet Order    Cognition:      Barriers to Progress:back pain limits her ability to participate in therapy at times.    New Treatments:    Projected LOS:    Projected Discharge date:11/16/2018    Discharge Disposition:

## 2018-11-12 NOTE — PT/OT/SLP PROGRESS
Physical Therapy  Treatment    Karly Sandoval   MRN: [id number]   Admitting Diagnosis: Compression fracture of T12 vertebra    PT Received On: 11/12/18  Total Time (min): 23       Billable Minutes:  Therapeutic Activity 8, Therapeutic Exercise 15 and Total Time 23    Treatment Type: Treatment  PT/PTA: PT     PTA Visit Number: 0       General Precautions: Standard, fall  Orthopedic Precautions: spinal precautions   Braces: TLSO    Do you have any cultural, spiritual, Christian conflicts, given your current situation?: none    Subjective:  Communicated with nursing prior to session.  Nurse notified PT that pt had low pulse ox readings today and was placed on O2. She also had inc'd anxiety today. Nurse recommending pt participate in supine therex today. Pt was agreeable to supine therex.     Pain/Comfort  Pain Rating 1: 9/10  Location - Side 1: Bilateral  Location - Orientation 1: lower  Location 1: back  Pain Addressed 1: Pre-medicate for activity, Reposition  Pain Rating Post-Intervention 1: 9/10    Objective:  Patient found supine in bed. Pt's son present at start of session.       AM-PAC 6 CLICK MOBILITY  Total Score:16    Bed Mobility:  Roll left/right in bed w/ SBA  Scoot to HOB in supine w/ totalA via drawsheet    Transfers:  deferred    Gait:  deferred    Therex:  Supine therex 2x10 reps (GS, SAQ, AP)  Supine therex 1x10 reps (HS and Abd/Add)    Additional Treatment:  Pt educated on pursed lip breathing t/o session. She needs reinforcement.    Patient left supine with all lines intact and call button in reach. MD present in room at end of PT session.    Assessment:  Karly Sandoval is a 88 y.o. female with a medical diagnosis of Compression fracture of T12 vertebra.  Pt's session was limited by SOB/anxiety. She participated in BLE therex. Pt will continue PT POC.    Rehab identified problem list/impairments: weakness, impaired endurance, impaired self care skills, impaired functional mobilty, gait  instability, impaired balance, decreased lower extremity function, pain    Rehab potential is fair.    Activity tolerance: Fair    Discharge recommendations: assisted living facility(w/ 24hour assistance available)     Barriers to discharge: Decreased caregiver support    Equipment recommendations: wheelchair     GOALS:   Multidisciplinary Problems     Physical Therapy Goals        Problem: Physical Therapy Goal    Goal Priority Disciplines Outcome Goal Variances Interventions   Physical Therapy Goal     PT, PT/OT Ongoing (interventions implemented as appropriate)     Description:  Goals to be met by: 18    Patient will increase functional independence with mobility by performin. Supine to/from sit with SBA.  2. Sit to stand transfer with SBA.  3. Bed to chair transfer with SBA using Rolling Walker.  4. Gait  x 100 feet with SBA using Rolling Walker.   5. Wheelchair propulsion x50 feet with Supervision using bilateral uppper extremities.  6. Ascend/Descend 4 inch curb step with Stand-by Assistance using Rolling Walker. (discontinued)  7. Lower extremity exercise program x20 reps per handout, with assistance as needed.                        PLAN:    Patient to be seen (5-6X/week)  to address the above listed problems via gait training, therapeutic activities, therapeutic exercises, wheelchair management/training  Plan of Care expires: 18  Plan of Care reviewed with: patient, camille    Elida Ayala, PT  2018

## 2018-11-12 NOTE — PLAN OF CARE
Problem: Fall Risk (Adult)  Goal: Identify Related Risk Factors and Signs and Symptoms  Related risk factors and signs and symptoms are identified upon initiation of Human Response Clinical Practice Guideline (CPG)   11/12/18 0002   Fall Risk   Related Risk Factors (Fall Risk) age-related changes;fear of falling;gait/mobility problems   Signs and Symptoms (Fall Risk) presence of risk factors

## 2018-11-12 NOTE — NURSING
instructed pt on urine cath collection. Performed sterile procedure and sent clear yellow urine to lab WBC 12.44

## 2018-11-12 NOTE — PLAN OF CARE
Problem: Patient Care Overview  Goal: Plan of Care Review  Outcome: Revised  Repositions with assistance, no new skin breakdowns noted. Afebrile. WBC 12.44, abnormal CXR, PO ABX ordered, UA collected. Monitored for pain and safety. Safety maintained. Lower stef back lidocaine patches effective.

## 2018-11-13 ENCOUNTER — TELEPHONE (OUTPATIENT)
Dept: NEUROSURGERY | Facility: CLINIC | Age: 83
End: 2018-11-13

## 2018-11-13 ENCOUNTER — HOSPITAL ENCOUNTER (INPATIENT)
Facility: HOSPITAL | Age: 83
LOS: 7 days | Discharge: NURSING, REHAB OR LONG-TERM CARE | DRG: 542 | End: 2018-11-20
Attending: EMERGENCY MEDICINE | Admitting: NEUROLOGICAL SURGERY
Payer: MEDICARE

## 2018-11-13 VITALS
HEIGHT: 62 IN | DIASTOLIC BLOOD PRESSURE: 58 MMHG | BODY MASS INDEX: 23.98 KG/M2 | RESPIRATION RATE: 18 BRPM | TEMPERATURE: 98 F | OXYGEN SATURATION: 95 % | WEIGHT: 130.31 LBS | SYSTOLIC BLOOD PRESSURE: 127 MMHG | HEART RATE: 67 BPM

## 2018-11-13 DIAGNOSIS — S22.080A T12 COMPRESSION FRACTURE: Primary | ICD-10-CM

## 2018-11-13 DIAGNOSIS — N30.00 ACUTE CYSTITIS WITHOUT HEMATURIA: ICD-10-CM

## 2018-11-13 DIAGNOSIS — S22.080A COMPRESSION FRACTURE OF T12 VERTEBRA: ICD-10-CM

## 2018-11-13 DIAGNOSIS — R06.02 SOB (SHORTNESS OF BREATH): ICD-10-CM

## 2018-11-13 DIAGNOSIS — R41.82 ALTERED MENTAL STATUS: ICD-10-CM

## 2018-11-13 DIAGNOSIS — J18.9 PNEUMONIA OF LEFT LUNG DUE TO INFECTIOUS ORGANISM, UNSPECIFIED PART OF LUNG: ICD-10-CM

## 2018-11-13 DIAGNOSIS — M48.04 STENOSIS, SPINAL, THORACIC: ICD-10-CM

## 2018-11-13 DIAGNOSIS — R52 INTRACTABLE PAIN: ICD-10-CM

## 2018-11-13 LAB
ABO + RH BLD: NORMAL
ALBUMIN SERPL BCP-MCNC: 2.4 G/DL
ALP SERPL-CCNC: 72 U/L
ALT SERPL W/O P-5'-P-CCNC: 27 U/L
AMORPH CRY UR QL COMP ASSIST: ABNORMAL
AMPHET+METHAMPHET UR QL: NEGATIVE
ANION GAP SERPL CALC-SCNC: 6 MMOL/L
AST SERPL-CCNC: 19 U/L
BACTERIA #/AREA URNS AUTO: ABNORMAL /HPF
BARBITURATES UR QL SCN>200 NG/ML: NEGATIVE
BASOPHILS # BLD AUTO: 0.01 K/UL
BASOPHILS NFR BLD: 0.1 %
BENZODIAZ UR QL SCN>200 NG/ML: NEGATIVE
BILIRUB SERPL-MCNC: 0.9 MG/DL
BILIRUB UR QL STRIP: NEGATIVE
BLD GP AB SCN CELLS X3 SERPL QL: NORMAL
BNP SERPL-MCNC: 490 PG/ML
BUN SERPL-MCNC: 41 MG/DL
BZE UR QL SCN: NEGATIVE
CALCIUM SERPL-MCNC: 8.4 MG/DL
CANNABINOIDS UR QL SCN: NEGATIVE
CHLORIDE SERPL-SCNC: 97 MMOL/L
CLARITY UR REFRACT.AUTO: ABNORMAL
CO2 SERPL-SCNC: 26 MMOL/L
COLOR UR AUTO: YELLOW
CREAT SERPL-MCNC: 1.5 MG/DL
CREAT UR-MCNC: 62 MG/DL
DIFFERENTIAL METHOD: ABNORMAL
EOSINOPHIL # BLD AUTO: 0 K/UL
EOSINOPHIL NFR BLD: 0.3 %
ERYTHROCYTE [DISTWIDTH] IN BLOOD BY AUTOMATED COUNT: 14.6 %
EST. GFR  (AFRICAN AMERICAN): 35.6 ML/MIN/1.73 M^2
EST. GFR  (NON AFRICAN AMERICAN): 30.9 ML/MIN/1.73 M^2
GLUCOSE SERPL-MCNC: 110 MG/DL
GLUCOSE UR QL STRIP: NEGATIVE
HCT VFR BLD AUTO: 23.7 %
HGB BLD-MCNC: 8.4 G/DL
HGB UR QL STRIP: NEGATIVE
HYALINE CASTS UR QL AUTO: 0 /LPF
IMM GRANULOCYTES # BLD AUTO: 0.08 K/UL
IMM GRANULOCYTES NFR BLD AUTO: 0.7 %
INR PPP: 1.2
KETONES UR QL STRIP: NEGATIVE
LEUKOCYTE ESTERASE UR QL STRIP: ABNORMAL
LYMPHOCYTES # BLD AUTO: 0.7 K/UL
LYMPHOCYTES NFR BLD: 6 %
MAGNESIUM SERPL-MCNC: 2.1 MG/DL
MCH RBC QN AUTO: 31.8 PG
MCHC RBC AUTO-ENTMCNC: 35.4 G/DL
MCV RBC AUTO: 90 FL
METHADONE UR QL SCN>300 NG/ML: NEGATIVE
MICROSCOPIC COMMENT: ABNORMAL
MONOCYTES # BLD AUTO: 1 K/UL
MONOCYTES NFR BLD: 8.9 %
NEUTROPHILS # BLD AUTO: 9.8 K/UL
NEUTROPHILS NFR BLD: 84 %
NITRITE UR QL STRIP: NEGATIVE
NON-SQ EPI CELLS #/AREA URNS AUTO: 1 /HPF
NRBC BLD-RTO: 0 /100 WBC
OPIATES UR QL SCN: NORMAL
PCP UR QL SCN>25 NG/ML: NEGATIVE
PH UR STRIP: 5 [PH] (ref 5–8)
PLATELET # BLD AUTO: 147 K/UL
PMV BLD AUTO: 9.9 FL
POTASSIUM SERPL-SCNC: 4 MMOL/L
PROT SERPL-MCNC: 5.2 G/DL
PROT UR QL STRIP: ABNORMAL
PROTHROMBIN TIME: 12.4 SEC
RBC # BLD AUTO: 2.64 M/UL
RBC #/AREA URNS AUTO: 0 /HPF (ref 0–4)
SODIUM SERPL-SCNC: 129 MMOL/L
SP GR UR STRIP: 1.01 (ref 1–1.03)
SQUAMOUS #/AREA URNS AUTO: 28 /HPF
TOXICOLOGY INFORMATION: NORMAL
TSH SERPL DL<=0.005 MIU/L-ACNC: 1.95 UIU/ML
URN SPEC COLLECT METH UR: ABNORMAL
WBC # BLD AUTO: 11.61 K/UL
WBC #/AREA URNS AUTO: >100 /HPF (ref 0–5)

## 2018-11-13 PROCEDURE — 85610 PROTHROMBIN TIME: CPT

## 2018-11-13 PROCEDURE — 83880 ASSAY OF NATRIURETIC PEPTIDE: CPT

## 2018-11-13 PROCEDURE — 20600001 HC STEP DOWN PRIVATE ROOM

## 2018-11-13 PROCEDURE — 97110 THERAPEUTIC EXERCISES: CPT

## 2018-11-13 PROCEDURE — 80053 COMPREHEN METABOLIC PANEL: CPT

## 2018-11-13 PROCEDURE — 94799 UNLISTED PULMONARY SVC/PX: CPT

## 2018-11-13 PROCEDURE — 80307 DRUG TEST PRSMV CHEM ANLYZR: CPT

## 2018-11-13 PROCEDURE — 36415 COLL VENOUS BLD VENIPUNCTURE: CPT

## 2018-11-13 PROCEDURE — 86901 BLOOD TYPING SEROLOGIC RH(D): CPT

## 2018-11-13 PROCEDURE — 93010 ELECTROCARDIOGRAM REPORT: CPT | Mod: ,,, | Performed by: INTERNAL MEDICINE

## 2018-11-13 PROCEDURE — 99284 EMERGENCY DEPT VISIT MOD MDM: CPT | Mod: ,,, | Performed by: EMERGENCY MEDICINE

## 2018-11-13 PROCEDURE — 25000003 PHARM REV CODE 250: Performed by: STUDENT IN AN ORGANIZED HEALTH CARE EDUCATION/TRAINING PROGRAM

## 2018-11-13 PROCEDURE — 27000221 HC OXYGEN, UP TO 24 HOURS

## 2018-11-13 PROCEDURE — 87086 URINE CULTURE/COLONY COUNT: CPT

## 2018-11-13 PROCEDURE — 85025 COMPLETE CBC W/AUTO DIFF WBC: CPT

## 2018-11-13 PROCEDURE — 83735 ASSAY OF MAGNESIUM: CPT

## 2018-11-13 PROCEDURE — 81001 URINALYSIS AUTO W/SCOPE: CPT

## 2018-11-13 PROCEDURE — 99285 EMERGENCY DEPT VISIT HI MDM: CPT

## 2018-11-13 PROCEDURE — 25000242 PHARM REV CODE 250 ALT 637 W/ HCPCS: Performed by: NURSE PRACTITIONER

## 2018-11-13 PROCEDURE — 93005 ELECTROCARDIOGRAM TRACING: CPT

## 2018-11-13 PROCEDURE — 63600175 PHARM REV CODE 636 W HCPCS: Performed by: NURSE PRACTITIONER

## 2018-11-13 PROCEDURE — 25000003 PHARM REV CODE 250: Performed by: INTERNAL MEDICINE

## 2018-11-13 PROCEDURE — 25000003 PHARM REV CODE 250: Performed by: PHYSICIAN ASSISTANT

## 2018-11-13 PROCEDURE — 94640 AIRWAY INHALATION TREATMENT: CPT

## 2018-11-13 PROCEDURE — 94761 N-INVAS EAR/PLS OXIMETRY MLT: CPT

## 2018-11-13 PROCEDURE — 25000003 PHARM REV CODE 250: Performed by: NURSE PRACTITIONER

## 2018-11-13 PROCEDURE — 18500000 HC LEAVE OF ABSENCE HOSPITAL SERVICES

## 2018-11-13 PROCEDURE — 84443 ASSAY THYROID STIM HORMONE: CPT

## 2018-11-13 PROCEDURE — 99316 NF DSCHRG MGMT 30 MIN+: CPT | Mod: ,,, | Performed by: NURSE PRACTITIONER

## 2018-11-13 RX ORDER — ALBUTEROL SULFATE 0.83 MG/ML
2.5 SOLUTION RESPIRATORY (INHALATION) EVERY 4 HOURS PRN
Status: DISCONTINUED | OUTPATIENT
Start: 2018-11-13 | End: 2018-11-20 | Stop reason: HOSPADM

## 2018-11-13 RX ORDER — ESCITALOPRAM OXALATE 10 MG/1
10 TABLET ORAL DAILY
Status: DISCONTINUED | OUTPATIENT
Start: 2018-11-14 | End: 2018-11-20 | Stop reason: HOSPADM

## 2018-11-13 RX ORDER — ALBUTEROL SULFATE 2.5 MG/.5ML
2.5 SOLUTION RESPIRATORY (INHALATION)
COMMUNITY

## 2018-11-13 RX ORDER — ACETAMINOPHEN 325 MG/1
650 TABLET ORAL EVERY 6 HOURS PRN
Status: DISCONTINUED | OUTPATIENT
Start: 2018-11-13 | End: 2018-11-14

## 2018-11-13 RX ORDER — GLUCAGON 1 MG
1 KIT INJECTION
Status: DISCONTINUED | OUTPATIENT
Start: 2018-11-13 | End: 2018-11-14

## 2018-11-13 RX ORDER — IBUPROFEN 200 MG
24 TABLET ORAL
Status: DISCONTINUED | OUTPATIENT
Start: 2018-11-13 | End: 2018-11-14

## 2018-11-13 RX ORDER — LEVOFLOXACIN 250 MG/1
250 TABLET ORAL DAILY
Status: DISCONTINUED | OUTPATIENT
Start: 2018-11-14 | End: 2018-11-14 | Stop reason: HOSPADM

## 2018-11-13 RX ORDER — IBUPROFEN 200 MG
16 TABLET ORAL
Status: DISCONTINUED | OUTPATIENT
Start: 2018-11-13 | End: 2018-11-14

## 2018-11-13 RX ORDER — CARVEDILOL 6.25 MG/1
6.25 TABLET ORAL 2 TIMES DAILY WITH MEALS
Status: DISCONTINUED | OUTPATIENT
Start: 2018-11-13 | End: 2018-11-20 | Stop reason: HOSPADM

## 2018-11-13 RX ORDER — ATORVASTATIN CALCIUM 20 MG/1
20 TABLET, FILM COATED ORAL DAILY
Status: DISCONTINUED | OUTPATIENT
Start: 2018-11-14 | End: 2018-11-20 | Stop reason: HOSPADM

## 2018-11-13 RX ORDER — FUROSEMIDE 20 MG/1
20 TABLET ORAL
Status: DISCONTINUED | OUTPATIENT
Start: 2018-11-14 | End: 2018-11-14

## 2018-11-13 RX ORDER — CALCIUM CARBONATE 1250 MG/5ML
500 SUSPENSION ORAL DAILY
Status: DISCONTINUED | OUTPATIENT
Start: 2018-11-14 | End: 2018-11-20 | Stop reason: HOSPADM

## 2018-11-13 RX ORDER — ALPRAZOLAM 0.25 MG/1
0.25 TABLET ORAL DAILY PRN
Status: DISCONTINUED | OUTPATIENT
Start: 2018-11-13 | End: 2018-11-20 | Stop reason: HOSPADM

## 2018-11-13 RX ORDER — ESCITALOPRAM OXALATE 10 MG/1
10 TABLET ORAL DAILY
Status: ON HOLD | COMMUNITY
End: 2018-12-03 | Stop reason: SDUPTHER

## 2018-11-13 RX ORDER — EPINEPHRINE 0.22MG
100 AEROSOL WITH ADAPTER (ML) INHALATION DAILY
Status: DISCONTINUED | OUTPATIENT
Start: 2018-11-14 | End: 2018-11-20

## 2018-11-13 RX ORDER — SERTRALINE HYDROCHLORIDE 25 MG/1
25 TABLET, FILM COATED ORAL DAILY
Status: DISCONTINUED | OUTPATIENT
Start: 2018-11-14 | End: 2018-11-20

## 2018-11-13 RX ORDER — FOLIC ACID 1 MG/1
1 TABLET ORAL DAILY
Status: DISCONTINUED | OUTPATIENT
Start: 2018-11-14 | End: 2018-11-20

## 2018-11-13 RX ORDER — BISACODYL 10 MG
10 SUPPOSITORY, RECTAL RECTAL DAILY PRN
Status: DISCONTINUED | OUTPATIENT
Start: 2018-11-13 | End: 2018-11-20 | Stop reason: HOSPADM

## 2018-11-13 RX ORDER — OXYCODONE AND ACETAMINOPHEN 7.5; 325 MG/1; MG/1
1 TABLET ORAL EVERY 4 HOURS PRN
Status: DISCONTINUED | OUTPATIENT
Start: 2018-11-13 | End: 2018-11-14

## 2018-11-13 RX ORDER — AMLODIPINE BESYLATE 10 MG/1
10 TABLET ORAL DAILY
Status: DISCONTINUED | OUTPATIENT
Start: 2018-11-14 | End: 2018-11-20 | Stop reason: HOSPADM

## 2018-11-13 RX ORDER — LIDOCAINE 50 MG/G
1 PATCH TOPICAL
Status: DISCONTINUED | OUTPATIENT
Start: 2018-11-13 | End: 2018-11-20 | Stop reason: HOSPADM

## 2018-11-13 RX ORDER — LISINOPRIL 10 MG/1
10 TABLET ORAL DAILY
Status: DISCONTINUED | OUTPATIENT
Start: 2018-11-14 | End: 2018-11-14

## 2018-11-13 RX ORDER — ACETAMINOPHEN 325 MG/1
650 TABLET ORAL
Status: COMPLETED | OUTPATIENT
Start: 2018-11-13 | End: 2018-11-13

## 2018-11-13 RX ORDER — HYDROXYCHLOROQUINE SULFATE 200 MG/1
200 TABLET, FILM COATED ORAL DAILY
Status: DISCONTINUED | OUTPATIENT
Start: 2018-11-14 | End: 2018-11-20 | Stop reason: HOSPADM

## 2018-11-13 RX ADMIN — IPRATROPIUM BROMIDE AND ALBUTEROL SULFATE 3 ML: .5; 3 SOLUTION RESPIRATORY (INHALATION) at 11:11

## 2018-11-13 RX ADMIN — LISINOPRIL 10 MG: 2.5 TABLET ORAL at 08:11

## 2018-11-13 RX ADMIN — POLYETHYLENE GLYCOL 3350 17 G: 17 POWDER, FOR SOLUTION ORAL at 08:11

## 2018-11-13 RX ADMIN — ALPRAZOLAM 0.25 MG: 0.25 TABLET ORAL at 11:11

## 2018-11-13 RX ADMIN — ESCITALOPRAM 10 MG: 10 TABLET, FILM COATED ORAL at 08:11

## 2018-11-13 RX ADMIN — FOLIC ACID 1 MG: 1 TABLET ORAL at 08:11

## 2018-11-13 RX ADMIN — ATORVASTATIN CALCIUM 20 MG: 20 TABLET, FILM COATED ORAL at 08:11

## 2018-11-13 RX ADMIN — CARVEDILOL 12.5 MG: 12.5 TABLET, FILM COATED ORAL at 08:11

## 2018-11-13 RX ADMIN — ACETAMINOPHEN 650 MG: 325 TABLET ORAL at 03:11

## 2018-11-13 RX ADMIN — IPRATROPIUM BROMIDE AND ALBUTEROL SULFATE 3 ML: .5; 3 SOLUTION RESPIRATORY (INHALATION) at 07:11

## 2018-11-13 RX ADMIN — MOXIFLOXACIN HYDROCHLORIDE 400 MG: 400 TABLET, FILM COATED ORAL at 08:11

## 2018-11-13 RX ADMIN — CARVEDILOL 6.25 MG: 6.25 TABLET, FILM COATED ORAL at 09:11

## 2018-11-13 RX ADMIN — SENNOSIDES AND DOCUSATE SODIUM 1 TABLET: 8.6; 5 TABLET ORAL at 08:11

## 2018-11-13 RX ADMIN — AMLODIPINE BESYLATE 10 MG: 10 TABLET ORAL at 08:11

## 2018-11-13 RX ADMIN — FUROSEMIDE 20 MG: 10 INJECTION, SOLUTION INTRAMUSCULAR; INTRAVENOUS at 08:11

## 2018-11-13 RX ADMIN — LIDOCAINE 1 PATCH: 50 PATCH TOPICAL at 04:11

## 2018-11-13 RX ADMIN — HYDROCODONE BITARTRATE AND ACETAMINOPHEN 2.5 ML: 7.5; 325 SOLUTION ORAL at 08:11

## 2018-11-13 RX ADMIN — HYDROXYCHLOROQUINE SULFATE 200 MG: 200 TABLET, FILM COATED ORAL at 08:11

## 2018-11-13 NOTE — NURSING
Pt's son at bedside aware of order to transfer to ED. Report called to Jamila, charge nurse [facility] ED. SHARON Erickson called for ambulance transportation

## 2018-11-13 NOTE — TELEPHONE ENCOUNTER
Spoke to the patient son, he states they are in the ED. States patient is non functional, she will not do anything with PT, will not eat due to pain.

## 2018-11-13 NOTE — PLAN OF CARE
Problem: Fall Risk (Adult)  Goal: Identify Related Risk Factors and Signs and Symptoms  Related risk factors and signs and symptoms are identified upon initiation of Human Response Clinical Practice Guideline (CPG)   11/13/18 0155   Fall Risk   Related Risk Factors (Fall Risk) age-related changes;fear of falling;gait/mobility problems   Signs and Symptoms (Fall Risk) presence of risk factors

## 2018-11-13 NOTE — ASSESSMENT & PLAN NOTE
New on imaging but likely present since admit  Continue lidoderm patches and scheduled tylenol  TLSO brace ordered.  Patient will need f/u with neurosurgery next available.    11/2/18  Pain persist despite change in treatment plan.  Added Tramadol yesterday but only took one dose.  Set to see Neurosurgery next week.  Patient is not amendable to surgery at this time but has said she will see what her son, Rene, thinks.  Continue Lidoderm patch, scheduled tylenol and continue to wear TSLO brace when out of bed.    11/7/18  Treatment as above.  Future Appointments   Date Time Provider Department Center   11/19/2018  8:00 AM HOME MONITOR DEVICE CHECK, [facility] JAMEEL Bertrand   1/14/2019 11:30 AM Isai Smith MD [facility]   2/19/2019  8:00 AM HOME MONITOR DEVICE CHECK, [facility] JAMEEL Bertrand     11/12/18  Plan as above.

## 2018-11-13 NOTE — SIGNIFICANT EVENT
Patient respiration is not as labored as yesterday.  She is awake and alert and ate her breakfast this morning.  She is still complaining of back pain despite our efforts with medication adjustments.  CXR suggestive of PNA versus pleural effusion and UA indicating infection.  She is afebrile.  Initiated Avelox PO daily x 5 yesterday and IV Lasix.  Repeat labs done today, see below.  Will change Avelox to Levoquin to cross cover UTI and PNA.      Son informed he had reached out to NSGY today.  They are not able to see her until Monday.  He feels her poor progress is due to her uncontrolled back pain and feel it is related to her T12 compression fracture.  He is requesting patient to be sent to the ED for further evaluation.    Recent Labs   Lab 11/08/18 0523 11/12/18 0547 11/13/18  0542   WBC 6.15 12.44 11.61   HGB 8.7* 9.3* 8.4*   HCT 25.8* 26.5* 23.7*    159 147*      Recent Labs   Lab 11/08/18 0523 11/12/18 0547 11/13/18  0542   * 128* 129*   K 4.0 4.3 4.0   CL 98 96 97   CO2 26 23 26   BUN 30* 34* 41*   CREATININE 1.2 1.4 1.5*   GLU 96 134* 110   CALCIUM 8.7 8.4* 8.4*   MG 2.0 2.0  --    PHOS 3.6 3.7  --         No results for input(s): PT, INR, APTT in the last 168 hours.   Microbiology Results (last 7 days)     Procedure Component Value Units Date/Time    Urine culture [[id number]] Collected:  11/12/18 2116    Order Status:  No result Specimen:  Urine Updated:  11/12/18 8125           [place] [facility] [place] per family request.

## 2018-11-13 NOTE — ASSESSMENT & PLAN NOTE
Chronic and stable  Will continue to monitor with twice weekly labs.    10/25/18  Chronic, sodium down to 127.  Will give IVF as she appears dry and admits to not eating much.  Repeat Lab on Monday.  PharmD cautions use of Plaquenil if sodium continues to drop.    10/30/2018  Appears related to CHF based on labs.  Continue fluid restriction (which she does on her own due to poor po intake)  Continue lasix diuresis.     11/2/18  Continue fluid restriction, sodium level chronically low.  Continue to monitor biweekly labs.  Lasix currently on hold, will consider resuming on Monday if renal function stable.    11/7/18  Continue fluid restriction and repeat labs in AM.  Last sodium level up to 129 from 124.    11/12/18  Sodium down to 128.  Will give IV lasix and monitor.

## 2018-11-13 NOTE — ASSESSMENT & PLAN NOTE
Chronic and controlled  Continue therapy with carvedilol, amlodipine and lisinopril  Will continue to monitor and adjust regimen as necessary.    10/25/18  BP Readings from Last 3 Encounters:   11/12/18 (!) 156/72   10/23/18 (!) 111/59   10/17/18 (!) 175/75     continue Norvasc, coreg and Hydralazine PRN as ordered.  Will stop ACE for now.  BP is stable.    11/2/18  BP mildly elevated, will restart ACE and monitor renal function, last creatinine down to 0.9.  Continue Norvasc and Coreg at current dosing.  Hydralazine PRN as ordered.    11/7/18  BP is stable, continue Norvasc, Coreg and lisinopril.  Monitor renal panel in AM.  Hydralazine PRN, continue as ordered.    11/12/18  BP mildly elevated but still in pain.  Continue Norvasc, Coreg and Lisinopril and monitor.  Hydralazine PRN for systolic BP > 170

## 2018-11-13 NOTE — ED PROVIDER NOTES
"Encounter Date: 11/13/2018    SCRIBE #1 NOTE: I, Janell Trotter, am scribing for, and in the presence of,  Dr. Rivera. I have scribed the following portions of the note - the Resident attestation.       History     Chief Complaint   Patient presents with    Back Pain     Pt is a resident at [facility]. She is having worsening back pain and her son requested for her to be evaluated in the ED.      HPI   89yo F with HTN, HLD, rheumatoid arthritis, HFpEF, GERD, and recent L3 compression fracture presents with back pain and altered mental status.  The patient states her back pain has been present since the L3 fracture and she has had poor pain control while undergoing rehab.  She is also concerned because she becomes "loopy" whenever she takes her medications.  She states she is in her normal state of health upon awakening every morning.  She is able to eat breakfast and get around but once she takes her medications she becomes confused, loses her appetite, and has difficulty ambulating and participating with rehabilitation therapy. Patient is incontinent of urine since her L3 fracture last month.    Patient had a imaging and labs yesterday at inpatient rehab.  She was found to have consolidation concerning for pneumonia on her CXR and UA indicative of UTI.  She was started on levofloxacin to cover for both.  Also of note, the patient's lumbar spine x-ray from 10/29 shows evidence of T12 fracture.      Review of patient's allergies indicates:   Allergen Reactions    Amoxicillin Other (See Comments)     unknown    Bactrim [sulfamethoxazole-trimethoprim] Other (See Comments)     unknown    Omeprazole Other (See Comments)     Muscle and joint pain    Rocephin [ceftriaxone] Other (See Comments)     Severe acute generalized pain    Penicillins Rash     Past Medical History:   Diagnosis Date    Abdominal aortic aneurysm (AAA) without rupture 10/8/2018    Abnormal CT of the abdomen 10/8/2018    Acid reflux     Anemia  "    Anxiety     Atrial arrhythmia 2/11/2014    Carotid artery occlusion     Chronic prescription benzodiazepine use 10/23/2018    Compression fracture of thoracic vertebra 6/25/2014    CVA (cerebral infarction) 2014    Cyst of pancreas 10/9/2018    Diastolic heart failure     echo 2016 ef 55% +Diastolic dysf    Diverticulosis     Encounter for blood transfusion     GERD (gastroesophageal reflux disease) 9/11/2012    History of cholelithiasis     Hyperlipidemia     Hypertension     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    Osteoarthritis     Osteoarthritis of both knees 6/12/2015    Osteopenia     PFO (patent foramen ovale) 2/11/2014    PVC (premature ventricular contraction) 4/28/2014    RVOT origin -- benign     RBBB 11/14/2014    Rheumatoid arthritis(714.0)     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)     has pacemaker    Right pelvic adnexal fluid collection 10/8/2018     Past Surgical History:   Procedure Laterality Date    APPENDECTOMY      BREAST SURGERY      CARDIAC PACEMAKER PLACEMENT  11/2014    for syncope and RBBB/LAHB    CHOLECYSTECTOMY      EYE SURGERY      HEMORRHOID SURGERY      HYSTERECTOMY      1966    SKIN BIOPSY      VASCULAR SURGERY      VERTEBROPLASTY       Family History   Problem Relation Age of Onset    Leukemia Father     Heart disease Father     Hypertension Father     Diabetes Mellitus Mother     Hypertension Mother     Diabetes Mellitus Brother     Parkinsonism Brother 68    Heart attack Neg Hx     Heart failure Neg Hx     Hyperlipidemia Neg Hx     Stroke Neg Hx      Social History     Tobacco Use    Smoking status: Never Smoker    Smokeless tobacco: Never Used   Substance Use Topics    Alcohol use: No     Comment: [facility]    Drug use: No     Review of Systems   Constitutional: Positive for appetite change. Negative for chills, diaphoresis, fatigue and fever.   HENT: Negative for congestion,  ear pain, hearing loss, rhinorrhea, sore throat, tinnitus and trouble swallowing.    Eyes: Negative for visual disturbance.   Respiratory: Negative for apnea, cough, chest tightness, shortness of breath and wheezing.    Cardiovascular: Negative for chest pain, palpitations and leg swelling.   Gastrointestinal: Negative for abdominal distention, abdominal pain, constipation, diarrhea, nausea and vomiting.   Genitourinary: Negative for dysuria, frequency and urgency.        Urinary incontinence   Musculoskeletal: Positive for back pain and gait problem. Negative for arthralgias, myalgias and neck pain.   Skin: Negative for pallor, rash and wound.   Neurological: Positive for weakness (generalized). Negative for dizziness, tremors, seizures, syncope, facial asymmetry, light-headedness, numbness and headaches.   Psychiatric/Behavioral: Positive for confusion. The patient is not nervous/anxious.        Physical Exam     Initial Vitals   BP Pulse Resp Temp SpO2   11/13/18 1319 11/13/18 1319 11/13/18 1319 11/13/18 1321 11/13/18 1319   (!) 115/48 64 16 97.7 °F (36.5 °C) 98 %      MAP       --                Physical Exam    Nursing note and vitals reviewed.  Constitutional: She appears well-developed and well-nourished. She is not diaphoretic. She appears distressed.   Elderly  female laying in bed, appears in significant pain.   HENT:   Head: Normocephalic and atraumatic.   Nose: Nose normal.   Mouth/Throat: Oropharynx is clear and moist. No oropharyngeal exudate.   Eyes: Conjunctivae and EOM are normal. Pupils are equal, round, and reactive to light. No scleral icterus.   Neck: Normal range of motion. Neck supple. No tracheal deviation present.   Cardiovascular: Normal rate, regular rhythm, normal heart sounds and intact distal pulses. Exam reveals no gallop and no friction rub.    No murmur heard.  Pulmonary/Chest: Breath sounds normal. No stridor. No respiratory distress. She has no wheezes. She has no rhonchi.  She has no rales.   Abdominal: Soft. Bowel sounds are normal. She exhibits no distension and no mass. There is no tenderness. There is no guarding.   Musculoskeletal:        Thoracic back: She exhibits tenderness and pain.        Lumbar back: She exhibits tenderness and pain.   Neurological: She is alert and oriented to person, place, and time. She has normal strength. No cranial nerve deficit. GCS score is 15. GCS eye subscore is 4. GCS verbal subscore is 5. GCS motor subscore is 6.   AAOx3, GCS 15  CN II-XII intact, EOMI, PERRLA, No visual field deficits  Sensation - Grossly intact  Strength   - LUE -  5/5, Arm Flexion 5/5, Arm Extension 5/5   - RUE -  5/5, Arm Flexion 5/5, Arm Extension 5/5   - LLE - Plantarflex 5/5, Dorsiflex 5/5, Leg Raise 5/5, Hip Flex 5/5   - RLE - Plantarflex 5/5, Dorsiflex 5/5, Leg Raise 5/5, Hip Flex 5/5  UNable to assess gait 2/2 to patient's pain.   Skin: Skin is warm and dry. Capillary refill takes less than 2 seconds. No erythema. No pallor.   Psychiatric: She has a normal mood and affect. Her behavior is normal. Judgment and thought content normal.         ED Course   Procedures  Labs Reviewed - No data to display       Imaging Results    None          Medical Decision Making:   History:   Old Medical Records: I decided to obtain old medical records.  Clinical Tests:   Lab Tests: Ordered  Radiological Study: Ordered  Medical Tests: Ordered        APC / Resident Notes:   HO-4 MDM  89yo F with HTN, HLD, rheumatoid arthritis, HFpEF, GERD, and recent L3 compression fracture presents with back pain and altered mental status.   DDx: spinal cord compression, T12 fracture, medication side effect (likely benzos), UTI, penumonia, epidural abscess  A/P: Based on history and physical exam, I have suspicion that hte patient's intractable back pain is secondary to her L3 fracture or potentially this T12 fracture seen on plain films.  We will obtain a CT scan of the Thoracic and Lumbar spine  "to further evaluate for acute fracture. I believe her altered mental status after her medication is likely secondary to the aprazolam she was started on since her L3 fracture.  While she has evidence of infections currently being treated with levofloxacin, we must rule out the possibility her altered mental status is related to infection.  The patient currently has no SIRS criteria and her only qSOFA criteria is AMS.  Also, her intermittent symptoms with a perceived provoking factor is less concerning for infection which would likely cause persistent symptoms.  Will continue to monitor in the ED while awaiting imaging results.    Jair Faye MD, PGY-4  [facility] Emergency Medicine/Pediatrics Resident  3:03 PM 11/13/2018    -4 update  Radiology resident called with critical result: the patient has a T12 compression fracture with osseus retropulsion resulting in severe spinal canal stenosis.  NSGY was consulted and will evaluate the patient in the ED.  The patient reports her pain is currently improved after tylenol and lidocaine patch.    Jair Faye MD, PGY-4  5:11 PM 11/13/2018    -4 update  Patient evaluated by NSGY, will admit for pain control and further studies of her T12 fracture.  Jair Faye MD, PGY-4  5:44 PM 11/13/2018           Scribe Attestation:   Scribe #1: I performed the above scribed service and the documentation accurately describes the services I performed. I attest to the accuracy of the note.    Attending Attestation:   Physician Attestation Statement for Resident:  As the supervising MD   Physician Attestation Statement: I have personally seen and examined this patient.   I agree with the above history. -: 88 y.o. female with PMHx of hypertension, rheumatoid arthritis, multiple spinal compression fractures followed by neurosurgery presents with acute on-chronic back pain in addition to "feeling loopy" after taking the new pain medications they've put her on.  Denies any recent falls or " trauma.  No report of fevers or chills.   As the supervising MD I agree with the above PE.   -: Alert and oriented.  Mild distress secondary to back pain.  She has parathoracic and paralumbar tenderness to palpation.    As the supervising MD I agree with the above treatment, course, plan, and disposition.   -: Labs, immaging, reassess.                [unfilled]        Clinical Impression:   The primary encounter diagnosis was T12 compression fracture. Diagnoses of Altered mental status, Stenosis, spinal, thoracic, Acute cystitis without hematuria, Pneumonia of left lung due to infectious organism, unspecified part of lung, and Intractable pain were also pertinent to this visit.                             aJir Faye MD  Resident  11/13/18 8170

## 2018-11-13 NOTE — TELEPHONE ENCOUNTER
----- Message from Poncho Leon MD sent at 11/13/2018  8:52 AM CST -----  Yeni please add on the schedule of Fei for next week  ----- Message -----  From: KELVIN Ashley  Sent: 11/13/2018   6:27 AM  To: Warren Barry NP, MD Dr. Edward Henderson,     This is the patient that wants to discuss surgical options for her compression fracture. I have included Warren Barry on this message because he is the provider who is taking care of her at [facility]. Thank you so much for fitting her into your schedule.     Thanks.   Sabrina      ----- Message -----  From: Warren Barry NP  Sent: 11/12/2018   5:50 PM  To: KELVIN Ashley,    Thanks for speaking with me.  Please let me know when we can get her in to see one of the surgeons.  You can call me on my cell at [phone]    Warren Martinez NP

## 2018-11-13 NOTE — TELEPHONE ENCOUNTER
Spoke to the patient son, he is very upset the appointment for today was canceled after waiting 2 weeks. Patient is not doing well and not eating because of the pain. Offered an appointment for 1/19/18 and he states he does not think his mom will be alive next week she needs to be seen asap. Instructed will discuss this with  Dr. Leon and will call him back. Verbalizes understanding.

## 2018-11-13 NOTE — ASSESSMENT & PLAN NOTE
-currently compensated on clinical exam  -continue current medical therapy with lisinopril, coreg, furosemide (dose reduced due to recent CHARLENE and poor po intake but dexamethasone therapy currently)  -continue low Na diet to treat  -continue daily weight monitoring with adjustment of diuretic therapy as necessary to treat weight gains > 2-3 pounds in 24-48 hours  -continue daily pulse oximetry monitoring; proceed with CXR imaging and adjustment of diuretic therapy as necessary to treat new or worsening hypoxia  -intermittent clinical exam monitoring with adjustment of diuretic regimen as necessary to treat signs of volume overload  -f/u with cardiology as scheduled    10/25/18  Labs indicated patient is dehydrated.  Will give  cc total per Dr. Henry request and hold lasix for now.  Patient with lower leg edema that is chronic.  No reports of SOB.  Monitor weights.  Wt Readings from Last 1 Encounters:   11/12/18 0500 61.8 kg (136 lb 3.9 oz)   11/11/18 0657 60.4 kg (133 lb 2.5 oz)   11/10/18 0700 57.4 kg (126 lb 8.7 oz)   11/08/18 0632 57.2 kg (126 lb 1.7 oz)   11/07/18 0546 57.8 kg (127 lb 6.8 oz)   11/06/18 0548 56.6 kg (124 lb 12.5 oz)   11/05/18 0600 55.6 kg (122 lb 9.2 oz)   11/04/18 0500 55.5 kg (122 lb 5.7 oz)   11/03/18 0600 55.4 kg (122 lb 2.2 oz)   11/01/18 0600 55.2 kg (121 lb 11.1 oz)   10/30/18 0600 55.2 kg (121 lb 11.1 oz)   10/29/18 0556 54.1 kg (119 lb 4.3 oz)   10/23/18 1632 54.2 kg (119 lb 7.8 oz)     Continue b-blocker as ordered.    10/30/2018:  Patient with LE edema and hyponatremia with lasix therapy resumed on 10/29.  Will continue to monitor labs and weights.     11/2/18  Appears compensated, weight is stable, no reports of SOB.  Continue ACE, b-blocker and continue to hold lasix.  No reports of chest pain or SOB.    11/7/18  Weight up 3 Kg from admit.  Still with LE edema.  Will give dose of Lasix today and check labs in AM.    11/12/18  Wt Readings from Last 1 Encounters:   11/12/18  0500 61.8 kg (136 lb 3.9 oz)   11/11/18 0657 60.4 kg (133 lb 2.5 oz)   11/10/18 0700 57.4 kg (126 lb 8.7 oz)   11/08/18 0632 57.2 kg (126 lb 1.7 oz)   11/07/18 0546 57.8 kg (127 lb 6.8 oz)   11/06/18 0548 56.6 kg (124 lb 12.5 oz)   11/05/18 0600 55.6 kg (122 lb 9.2 oz)   11/04/18 0500 55.5 kg (122 lb 5.7 oz)   11/03/18 0600 55.4 kg (122 lb 2.2 oz)   11/01/18 0600 55.2 kg (121 lb 11.1 oz)   10/30/18 0600 55.2 kg (121 lb 11.1 oz)   10/29/18 0556 54.1 kg (119 lb 4.3 oz)   10/23/18 1632 54.2 kg (119 lb 7.8 oz)     Weight up, now SOB.  CXR consistent with pulmonary edema versus PNA.  Give IV lasix x 3 days and monitor closely.  If symptoms do not improve will readmit.  Start Avelox for PNA, allergy to PCN and Rocephin  Continue ACE and b-blocker as ordered.

## 2018-11-13 NOTE — ED NOTES
Hourly rounding complete. Patient resting in stretcher and is in NAD at this time. Pt is awake alert and oriented x4, VSS, respirations even and unlabored. Family at bedside. Pt and family updated on POC. Bed low and locked with side rails up x2, call bell in pt reach. Pt voices no needs at this time.

## 2018-11-13 NOTE — ASSESSMENT & PLAN NOTE
Likely multifactorial involving RA, chronic compression fractures and old left L3 transverse process  Continue tylenol prn pain and lidocaine patch daily.  Continue dexamethasone therapy with end date of 10/28  -continue PT/OT to increase ambulation, ADL performance and endurance  -continue TOM's and SCD's for DVT prophylaxis  -continue fall precautions  -continue senokot-s and miralax to prevent constipation; hold for frequent or loose stooling    10/25/18  Patient reports pain to back is worse after therapy today.  Discussed with Dr. Henry, she suggested changing to 2 lidoderm patches and continue Tylenol dosing as ordered.  No need to re-image at this time.  Continue TOM/SCD for dvt ppx.  Will try using an abdominal binder to see if this helps with pain with therapy sessions.    11/2/18  Now with TSLO and recent x-ray showed compression fracture at T12 which is new.    Set appointment with Neurosurgery to discuss treatment options.  Continue TOM/SCD for dvt ppx.  Added Tramadol PRN to assist with pain control and continue Tylenol as ordered.    11/7/18  Patient now with new T12 compression fracture.  Continue TSLO and follow up with Neurosurgery.  Stopped Tramadol and use Lortab elixir PRN for pain control.  Continue TOM/SCD for dvt ppx.    11/12/18  Pain has been ongoing issue.  New T12 compression fracture.  Pain limiting overall progress.  Was set to see NSGY tomorrow but appointment was cancelled by someone in clinic.  Spoke with KELVIN Moncada with NSGY, said she cannot provide any detail regarding surgical fixation or vetebralplasty or not and will ask Dr. Worthington if he or Dr. Leon can see her.    Family updated.  Continue Lortab elixir as ordered.

## 2018-11-13 NOTE — ED TRIAGE NOTES
"Karly Sandoval, a 88 y.o. female presents to the ED via EMS from [facility] with CC worsening back pain and fatigue, son states "This happens every day after they give her her medications." Pt c/o increased lower back pain, rates pain 10/10. Denies any other complaints.    Patient identifiers verified verbally with patient and correct for Karly Sandoval.    LOC/ APPEARANCE: The patient is AAOx4. Pt is speaking appropriately, no slurred speech. Pt changed into hospital gown. Continuous cardiac monitor, cont pulse ox, and auto BP cuff applied to patient. Pt is clean and well groomed. No JVD visible. Pt updated on POC. Bed low and locked with side rails up x2, call bell in pt reach.  SKIN: Skin is warm dry and intact, and color is consistent with ethnicity. Capillary refill <3 seconds. No breakdown or brusing visible. Mucus membranes moist, acyanotic.  RESPIRATORY: Airway is open and patent. Respirations-spontaneous, unlabored, regular rate, equal bilaterally on inspiration and expiration. No accessory muscle use noted. Lungs clear to auscultation in all fields bilaterally anterior and posterior.   CARDIAC: Patient has regular heart rate.  No peripheral edema noted, and patient has no c/o chest pain. Peripheral pulses present equal and strong throughout.  ABDOMEN: Soft and non-tender to palpation with no distention noted. Normoactive bowel sounds x4 quadrants. Pt has no complaints of abnormal bowel movements, denies blood in stool. Pt reports normal appetite.   NEUROLOGIC: Eyes open spontaneously and facial expression symmetrical. Pt behavior appropriate to situation, and pt follows commands. Pt reports sensation present in all extremities when touched with a finger, denies any numbness or tingling. PERRLA  MUSCULOSKELETAL: Spontaneous movement noted to all extremities. Hand  equal and leg strength strong +5 bilaterally. Pt c/o back with with movement of BLE.  : Pt reports currently being " treated for UTI. No complaints of frequency, burning, urgency or blood in the urine. No complaints of incontinence.

## 2018-11-13 NOTE — CHAPLAIN
spoke with nurse and visited patient as requested by nurse and charge nurse.   provided a compassionate presence, reflective listening and prayer support for patient and family.

## 2018-11-13 NOTE — PT/OT/SLP PROGRESS
Physical Therapy      Patient Name:  Karly Sandoval   MRN:  [id number]    Patient not seen today secondary to being sent to ED due to back pain  . Will follow-up if pt returns to SNF. If pt does not return formal D/C note will follow.     Elida Ayala, PT   11/13/2018

## 2018-11-13 NOTE — PT/OT/SLP PROGRESS
Occupational Therapy  Treatment    Karly Sandoval   MRN: [id number]   Admitting Diagnosis: Compression fracture of T12 vertebra    OT Date of Treatment: 11/13/18  Total Time (min): 35 min    Billable Minutes:  Therapeutic Exercise 35    General Precautions: Standard, fall  Orthopedic Precautions: spinal precautions  Braces: TLSO         Subjective:  Communicated with patient and son prior to session.      Pain/Comfort  Pain Rating 1: 10/10  Location - Side 1: Bilateral  Location - Orientation 1: lower  Location 1: back  Pain Addressed 1: Reposition, Distraction, Cessation of Activity  Pain Rating Post-Intervention 1: 10/10    Objective:       Occupational Performance:    AMPA 6 Click:  [facility] Total Score: 16    OT Exercises: UE Ergometer Attempted task to improve endurance in order to increase independence with ADLs and daily tasks. Patient tolerated a total of approximately 5 min with frequent rest breaks.     Additional Treatment:  B UE ROM Exercises using 2# dowel roshan 1 x 15 in a variety of planes and joints focusing to improve strength and endurance to increase independence with ADLs with handout provided with son present. Patient had difficulty performing all exercises using dowel roshan due to pain.     Attempted sit<>stand from w/c level in order to improve functional mobility, but patient was unable to perform task due to extreme back pain.     Patient left up in chair with call button in reach and all needs met.     ASSESSMENT:  Karly Sandoval is a 88 y.o. female with a medical diagnosis of Compression fracture of T12 vertebra and presents with the deficits listed below. Patient had a lot of back pain during OT session and was very limited with participation. Son was present throughout OT session. Patient does continue to benefit from skilled OT services to achieve maximal independence.    Rehab identified problem list/impairments: weakness, impaired endurance, impaired self care skills, impaired  functional mobilty, gait instability, impaired balance, decreased lower extremity function, pain    Rehab potential is good    Activity tolerance: Fair    Discharge recommendations: nursing facility, basic(pt need 24  hour care)     Barriers to discharge: Decreased caregiver support     Equipment recommendations: wheelchair     GOALS:   Multidisciplinary Problems     Occupational Therapy Goals        Problem: Occupational Therapy Goal    Goal Priority Disciplines Outcome Interventions   Occupational Therapy Goal     OT, PT/OT Ongoing (interventions implemented as appropriate)    Description:  Goals to be met by: 11 days     Patient will increase functional independence with ADLs by performing:    UE Dressing with Set-up Assistance.--not met  REVISED to mod A  LE Dressing with Supervision.--not met  REVISED to mod A  Grooming while standing at sink with Supervision.--not met  REVISED to mod I seated at sink  Toileting from toilet with Supervision for hygiene and clothing management. --not met  REVISED to mod A Met, but continue to address  Bathing from  shower chair/bench with Supervision.--mot met  REVISED to mod A  Supine to sit with Modified [place] /c HOB flat and no handrails.  Stand pivot transfers with Supervision.-not met  REVISED to SBA  Toilet transfer to toilet with Supervision.-not met  REVISED to SBA  Upper extremity exercise program 3 x 15 reps per handout, with independence.  Patient will complete a functional standing activity for 10 min with S in order to perform household tasks. --not met  REVISED to 6 min SBA  Met.                     Plan:  Patient to be seen 5 x/week to address the above listed problems via self-care/home management, therapeutic activities, therapeutic exercises  Plan of Care expires: 11/24/18  Plan of Care reviewed with: patient, son    DACIA Regalado  11/13/2018

## 2018-11-13 NOTE — ASSESSMENT & PLAN NOTE
Continue chronic therapy with sertraline.  Patient did not tolerate therapy with amitriptyline due to lethargy  Continue chronic xanax nightly as she takes at home; GDR not to be attempted due to acute illness and failure of alternative therapy with risk of decompensating condition and prohibiting rehabilitation potential    10/25/18  Chronic, continue Zoloft as ordered.  Per MD, unable to tolerate Elavil.  Xanax qhs as she chronically takes.  Continue to monitor.    11/2/18  Family request Zoloft be discontinue and Lexapro be restarted.  They felt she did better with Lexapro.  Will change Xanax to PRN dosing today.  Recommend she follow up with PCP.    11/7/18  Anxiety has improved since resuming Lexapro.  Xanax now PRN.  Continue treatment regimen and follow up with PCP.    11/12/18  Anxiety due to medical condition.  Continue Lexapro as ordered and continue Xanax PRN.

## 2018-11-13 NOTE — TELEPHONE ENCOUNTER
----- Message from Isai Huynh sent at 11/13/2018 11:56 AM CST -----  Contact: son/[phone] Rene  Patient son states he's returning your call  Please call and advise

## 2018-11-13 NOTE — PLAN OF CARE
Problem: Patient Care Overview  Goal: Plan of Care Review  Outcome: Revised  Repositions 1 person assist, no new skin breakdowns noted. Heels suspended. WBC 11.61 , afebrile. Monitored for pain and safety. Safety maintained. Pain meds ineffective. Pt being transferred to [facility] ED for eval of increased back pain

## 2018-11-13 NOTE — SUBJECTIVE & OBJECTIVE
Interval History:   10/25/18  Patient seen at bedside, son is present.  She reports having worsening back pain to mid back.  States symptoms started after therapy.  She has no report paraesthesia or pain radiation down legs.  Patient said current pain regimen is not helping.  Son inquired about use of back brace to help with pain control.  Advised him, would discuss with MD.  He also reports patient with history of RA and is followed by Dr. Smith.  Son reports patient is going to [facility] to [facility] when discharge if deemed appropriate.  Patient reports she does not have a good appetite but tries to eat.  She reports she is drinking fluids and urinating and having BM without issues.      Discussed case with Dr. Henry, she suggest no need to repeat imaging at this time but suggest increasing lidoderm patch to 2 and try use of abdominal binder to support back.  Will order and monitor.       11/2/18  Patient seen at bedside, she reports she is still having back pain.  Therapy reports she did a little better this morning with her session.  She is starting to retain urine and required I&O cath today, bladder scan showed ~254 cc of urine.  Patient is concerned about her lack of progress.  Therapy feels her progress has declined since admission as her eval was much better.  However, patient with acute T12 compression fracture.  She has follow up with spine clinic and a TSLO brace has been obtained for her.  Pain medication has been adjusted to optimize pain control, ordered Tramadol 25 mg tid PRN which she has only taken 1 dose so far.  Will change scheduled Xanax to PRN dosing as this is how it is ordered for her at home.    11/7/18 at 12 pm  Patient still with back pain with movement.  Earliest appointment with Neurosurgery is next week.  Continue to wear back brace.  Patient had not taken Lortab elixir prior to rounding and encouraged her to use to assist with pain control.  She has agreed to take today and  informed nursing to bring a dose to her.      1355 went back to see patient, she reports lortab gave her minimal relief but was nervous she would get sleepy and fall out of wheelchair.  Son is currently at bedside.  Explained to patient again, the importance of using her pain medication to help minimize her pain.  She has agreed to comply and will reassess in 24 hours.    11/12/18  Patient seen at bedside, she is reporting SOB and abdominal distension.  She reports having BM, denies n/v.  CXR and abd x-ray ordered.  Labs reviewed, WBC is up from previous lab draw and pulse ox is 94% on 1 liter.  She still complains of back pain which has limited her progression with therapy.  She was suppose to see NSGY tomorrow but appointment was cancelled.  I spoke with NSGY PA this evening, advised her of patient's issues.  She is recommending patient see the surgeon as she cannot provide any information relating to surgical fixation or vertebralplasty.  She states she will discuss with Dr. Worthington in AM to determine if patient can be seen by him or Dr. Leon.  Family updated on plan of care.          Review of Systems   Constitutional: Negative for fever.   Respiratory: Positive for shortness of breath. Negative for cough.    Cardiovascular: Negative for chest pain, palpitations and leg swelling.   Gastrointestinal: Positive for abdominal pain. Negative for constipation, diarrhea, nausea and vomiting.   Genitourinary:        Urinary retention   Musculoskeletal: Positive for arthralgias, back pain and myalgias.     Objective:     Vital Signs (Most Recent):  Temp: 97.9 °F (36.6 °C) (11/12/18 0813)  Pulse: 69 (11/12/18 1635)  Resp: 20 (11/12/18 1625)  BP: (!) 156/72 (11/12/18 1635)  SpO2: (!) 94 % (11/12/18 1625) Vital Signs (24h Range):  Temp:  [97.7 °F (36.5 °C)-97.9 °F (36.6 °C)] 97.9 °F (36.6 °C)  Pulse:  [69-75] 69  Resp:  [18-20] 20  SpO2:  [86 %-96 %] 94 %  BP: (133-160)/(68-72) 156/72     Weight: 61.8 kg (136 lb 3.9 oz)  Body  mass index is 24.92 kg/m².  No intake or output data in the 24 hours ending 11/12/18 1807   Physical Exam   Constitutional: She is oriented to person, place, and time. She appears well-developed and well-nourished.   Patient is kyphotic   Cardiovascular: Normal rate, regular rhythm, normal heart sounds and intact distal pulses.   No murmur heard.  Pulmonary/Chest: No stridor. She has no wheezes. She has no rales.   Shallow respirations, breath sounds diminished on left lower lobe posteriorly.   Abdominal: Soft. Normal appearance and bowel sounds are normal. She exhibits distension. She exhibits no mass. There is no tenderness. There is no guarding.   Musculoskeletal: Normal range of motion. She exhibits edema.   TSLO in place.   Neurological: She is alert and oriented to person, place, and time. She has normal strength.   Skin: Skin is warm, dry and intact. Capillary refill takes less than 2 seconds.       Significant Labs:   BMP:   Recent Labs   Lab 11/12/18  0547   *   *   K 4.3   CL 96   CO2 23   BUN 34*   CREATININE 1.4   CALCIUM 8.4*   MG 2.0     CBC:   Recent Labs   Lab 11/12/18  0547   WBC 12.44   HGB 9.3*   HCT 26.5*        Urine Culture: No results for input(s): LABURIN in the last 48 hours.  Urine Studies: No results for input(s): COLORU, APPEARANCEUA, PHUR, SPECGRAV, PROTEINUA, GLUCUA, KETONESU, BILIRUBINUA, OCCULTUA, NITRITE, UROBILINOGEN, LEUKOCYTESUR, RBCUA, WBCUA, BACTERIA, SQUAMEPITHEL, HYALINECASTS in the last 48 hours.    Invalid input(s): WRIGHTSUR    Significant Imaging:   X-Ray Chest 1 View  Narrative: EXAMINATION:  XR CHEST 1 VIEW    CLINICAL HISTORY:  ? CHF;    FINDINGS:  There is a pacer.  There is cardiomegaly.  There is mild perihilar edema and lower lobe edema with pleural fluid.  There is aortic plaque and DJD.  Impression: See above    Pulmonary edema versus pneumonia.    Electronically signed by: Yg Davis MD  Date:    11/12/2018  Time:    14:37  X-Ray Abdomen  AP 1 View  Narrative: EXAMINATION:  XR ABDOMEN AP 1 VIEW    CLINICAL HISTORY:  abd distention and discomfort;    FINDINGS:  There is a pacer and cardiomegaly.  No obstruction, ileus, or perforation seen.  Impression: See above    Electronically signed by: Yg Davis MD  Date:    11/12/2018  Time:    14:36

## 2018-11-13 NOTE — TELEPHONE ENCOUNTER
----- Message from Isai Huynh sent at 11/13/2018 11:56 AM CST -----  Contact: son/[phone] Rene  Patient son states he's returning your call  Please call and advise

## 2018-11-13 NOTE — NURSING
[facility] ambulance called to transport patient to [facility]. Patient's son at bedside and is aware of transfer.

## 2018-11-13 NOTE — PROGRESS NOTES
[facility] [place] [facility] - [facility] Nursing Care  Department of [facility] Medicine  Progress Note    Patient Name: Karly Sandoval  MRN: [id number]  Code Status: Full Code  Admission Date: 10/23/2018  Length of Stay: 20 days  Attending Physician: Edna Henry MD  Primary Care Provider: Uday Allen MD    Subjective:     Principal Problem:Compression fracture of T12 vertebra    HPI:  Chief Complaint/Reason for Admission: Acute bilateral low back pain without sciatica    History of Present Illness:  Patient is a 88 y.o. 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  She was evaluated by neurosurgery with normal AIN and sensation with low suspicion for cauda equina syndrome.  CT myelogram was considered but given low suspicion for condition, risk of kidney injury and patient's disinterest in surgery, procedure was not done.  She complains of pain to her back today rating it 6/10 but controlled with tylenol.  She did well in therapy per her bu is worn out subsequently.  She has a poor appetite.      The patient has been admitted to SNF for ongoing PT/OT due to insufficient progress to go home safely from the hospital.    Records from last hospital stay reviewed and summarized above.     Interval History:   10/25/18  Patient seen at bedside, son is present.  She reports having worsening back pain to mid back.  States symptoms started after therapy.  She has no report paraesthesia or pain radiation down legs.  Patient said current pain regimen is not helping.  Son inquired about use of back brace to help with pain control.  Advised him, would discuss with MD.  He also reports patient with history of RA and is followed by Dr. Smith.  Son reports patient is [facility] if deemed appropriate.   Patient reports she does not have a good appetite but tries to eat.  She reports she is drinking fluids and urinating and having BM without issues.      Discussed case with Dr. Henry, she suggest no need to repeat imaging at this time but suggest increasing lidoderm patch to 2 and try use of abdominal binder to support back.  Will order and monitor.       11/2/18  Patient seen at bedside, she reports she is still having back pain.  Therapy reports she did a little better this morning with her session.  She is starting to retain urine and required I&O cath today, bladder scan showed ~254 cc of urine.  Patient is concerned about her lack of progress.  Therapy feels her progress has declined since admission as her eval was much better.  However, patient with acute T12 compression fracture.  She has follow up with spine clinic and a TSLO brace has been obtained for her.  Pain medication has been adjusted to optimize pain control, ordered Tramadol 25 mg tid PRN which she has only taken 1 dose so far.  Will change scheduled Xanax to PRN dosing as this is how it is ordered for her at home.    11/7/18 at 12 pm  Patient still with back pain with movement.  Earliest appointment with Neurosurgery is next week.  Continue to wear back brace.  Patient had not taken Lortab elixir prior to rounding and encouraged her to use to assist with pain control.  She has agreed to take today and informed nursing to bring a dose to her.      1355 went back to see patient, she reports lortab gave her minimal relief but was nervous she would get sleepy and fall out of wheelchair.  Son is currently at bedside.  Explained to patient again, the importance of using her pain medication to help minimize her pain.  She has agreed to comply and will reassess in 24 hours.    11/12/18  Patient seen at bedside, she is reporting SOB and abdominal distension.  She reports having BM, denies n/v.  CXR and abd x-ray ordered.  Labs reviewed, WBC is up from  previous lab draw and pulse ox is 94% on 1 liter.  She still complains of back pain which has limited her progression with therapy.  She was suppose to see NSGY tomorrow but appointment was cancelled.  I spoke with RUSSEL PA this evening, advised her of patient's issues.  She is recommending patient see the surgeon as she cannot provide any information relating to surgical fixation or vertebralplasty.  She states she will discuss with Dr. Worthington in AM to determine if patient can be seen by him or Dr. Leon.  Family updated on plan of care.          Review of Systems   Constitutional: Negative for fever.   Respiratory: Positive for shortness of breath. Negative for cough.    Cardiovascular: Negative for chest pain, palpitations and leg swelling.   Gastrointestinal: Positive for abdominal pain. Negative for constipation, diarrhea, nausea and vomiting.   Genitourinary:        Urinary retention   Musculoskeletal: Positive for arthralgias, back pain and myalgias.     Objective:     Vital Signs (Most Recent):  Temp: 97.9 °F (36.6 °C) (11/12/18 0813)  Pulse: 69 (11/12/18 1635)  Resp: 20 (11/12/18 1625)  BP: (!) 156/72 (11/12/18 1635)  SpO2: (!) 94 % (11/12/18 1625) Vital Signs (24h Range):  Temp:  [97.7 °F (36.5 °C)-97.9 °F (36.6 °C)] 97.9 °F (36.6 °C)  Pulse:  [69-75] 69  Resp:  [18-20] 20  SpO2:  [86 %-96 %] 94 %  BP: (133-160)/(68-72) 156/72     Weight: 61.8 kg (136 lb 3.9 oz)  Body mass index is 24.92 kg/m².  No intake or output data in the 24 hours ending 11/12/18 1807   Physical Exam   Constitutional: She is oriented to person, place, and time. She appears well-developed and well-nourished.   Patient is kyphotic   Cardiovascular: Normal rate, regular rhythm, normal heart sounds and intact distal pulses.   No murmur heard.  Pulmonary/Chest: No stridor. She has no wheezes. She has no rales.   Shallow respirations, breath sounds diminished on left lower lobe posteriorly.   Abdominal: Soft. Normal appearance and bowel sounds  are normal. She exhibits distension. She exhibits no mass. There is no tenderness. There is no guarding.   Musculoskeletal: Normal range of motion. She exhibits edema.   TSLO in place.   Neurological: She is alert and oriented to person, place, and time. She has normal strength.   Skin: Skin is warm, dry and intact. Capillary refill takes less than 2 seconds.       Significant Labs:   BMP:   Recent Labs   Lab 11/12/18  0547   *   *   K 4.3   CL 96   CO2 23   BUN 34*   CREATININE 1.4   CALCIUM 8.4*   MG 2.0     CBC:   Recent Labs   Lab 11/12/18  0547   WBC 12.44   HGB 9.3*   HCT 26.5*        Urine Culture: No results for input(s): LABURIN in the last 48 hours.  Urine Studies: No results for input(s): COLORU, APPEARANCEUA, PHUR, SPECGRAV, PROTEINUA, GLUCUA, KETONESU, BILIRUBINUA, OCCULTUA, NITRITE, UROBILINOGEN, LEUKOCYTESUR, RBCUA, WBCUA, BACTERIA, SQUAMEPITHEL, HYALINECASTS in the last 48 hours.    Invalid input(s): WRIGHTSUR    Significant Imaging:   X-Ray Chest 1 View  Narrative: EXAMINATION:  XR CHEST 1 VIEW    CLINICAL HISTORY:  ? CHF;    FINDINGS:  There is a pacer.  There is cardiomegaly.  There is mild perihilar edema and lower lobe edema with pleural fluid.  There is aortic plaque and DJD.  Impression: See above    Pulmonary edema versus pneumonia.    Electronically signed by: Yg Davis MD  Date:    11/12/2018  Time:    14:37  X-Ray Abdomen AP 1 View  Narrative: EXAMINATION:  XR ABDOMEN AP 1 VIEW    CLINICAL HISTORY:  abd distention and discomfort;    FINDINGS:  There is a pacer and cardiomegaly.  No obstruction, ileus, or perforation seen.  Impression: See above    Electronically signed by: Yg Davis MD  Date:    11/12/2018  Time:    14:36        Assessment/Plan:      * Compression fracture of T12 vertebra    New on imaging but likely present since admit  Continue lidoderm patches and scheduled tylenol  TLSO brace ordered.  Patient will need f/u with neurosurgery next  available.    11/2/18  Pain persist despite change in treatment plan.  Added Tramadol yesterday but only took one dose.  Set to see Neurosurgery next week.  Patient is not amendable to surgery at this time but has said she will see what her son, Rene, thinks.  Continue Lidoderm patch, scheduled tylenol and continue to wear TSLO brace when out of bed.    11/7/18  Treatment as above.  Future Appointments   Date Time Provider Department Center   11/19/2018  8:00 [facility]   1/14/2019 11:30 AM Isai Smith MD [facility]   2/19/2019  8:00 AM [facility]     11/12/18  Plan as above.     Hyponatremia    Chronic and stable  Will continue to monitor with twice weekly labs.    10/25/18  Chronic, sodium down to 127.  Will give IVF as she appears dry and admits to not eating much.  Repeat Lab on Monday.  PharmD cautions use of Plaquenil if sodium continues to drop.    10/30/2018  Appears related to CHF based on labs.  Continue fluid restriction (which she does on her own due to poor po intake)  Continue lasix diuresis.     11/2/18  Continue fluid restriction, sodium level chronically low.  Continue to monitor biweekly labs.  Lasix currently on hold, will consider resuming on Monday if renal function stable.    11/7/18  Continue fluid restriction and repeat labs in AM.  Last sodium level up to 129 from 124.    11/12/18  Sodium down to 128.  Will give IV lasix and monitor.     Acute bilateral low back pain without sciatica    Likely multifactorial involving RA, chronic compression fractures and old left L3 transverse process  Continue tylenol prn pain and lidocaine patch daily.  Continue dexamethasone therapy with end date of 10/28  -continue PT/OT to increase ambulation, ADL performance and endurance  -continue TOM's and SCD's for DVT prophylaxis  -continue fall precautions  -continue senokot-s and miralax to prevent  constipation; hold for frequent or loose stooling    10/25/18  Patient reports pain to back is worse after therapy today.  Discussed with Dr. Henry, she suggested changing to 2 lidoderm patches and continue Tylenol dosing as ordered.  No need to re-image at this time.  Continue TOM/SCD for dvt ppx.  Will try using an abdominal binder to see if this helps with pain with therapy sessions.    11/2/18  Now with TSLO and recent x-ray showed compression fracture at T12 which is new.    Set appointment with Neurosurgery to discuss treatment options.  Continue TOM/SCD for dvt ppx.  Added Tramadol PRN to assist with pain control and continue Tylenol as ordered.    11/7/18  Patient now with new T12 compression fracture.  Continue TSLO and follow up with Neurosurgery.  Stopped Tramadol and use Lortab elixir PRN for pain control.  Continue TOM/SCD for dvt ppx.    11/12/18  Pain has been ongoing issue.  New T12 compression fracture.  Pain limiting overall progress.  Was set to see NSGY tomorrow but appointment was cancelled by someone in clinic.  Spoke with KELVIN Moncada with NSGY, said she cannot provide any detail regarding surgical fixation or vetebralplasty or not and will ask Dr. Worthington if he or Dr. Leon can see her.    Family updated.  Continue Lortab elixir as ordered.     Anxiety    Continue chronic therapy with sertraline.  Patient did not tolerate therapy with amitriptyline due to lethargy  Continue chronic xanax nightly as she takes at home; GDR not to be attempted due to acute illness and failure of alternative therapy with risk of decompensating condition and prohibiting rehabilitation potential    10/25/18  Chronic, continue Zoloft as ordered.  Per MD, unable to tolerate Elavil.  Xanax qhs as she chronically takes.  Continue to monitor.    11/2/18  Family request Zoloft be discontinue and Lexapro be restarted.  They felt she did better with Lexapro.  Will change Xanax to PRN dosing today.  Recommend she follow up  with PCP.    11/7/18  Anxiety has improved since resuming Lexapro.  Xanax now PRN.  Continue treatment regimen and follow up with PCP.    11/12/18  Anxiety due to medical condition.  Continue Lexapro as ordered and continue Xanax PRN.     Chronic diastolic CHF (congestive heart failure)    -currently compensated on clinical exam  -continue current medical therapy with lisinopril, coreg, furosemide (dose reduced due to recent CHARLENE and poor po intake but dexamethasone therapy currently)  -continue low Na diet to treat  -continue daily weight monitoring with adjustment of diuretic therapy as necessary to treat weight gains > 2-3 pounds in 24-48 hours  -continue daily pulse oximetry monitoring; proceed with CXR imaging and adjustment of diuretic therapy as necessary to treat new or worsening hypoxia  -intermittent clinical exam monitoring with adjustment of diuretic regimen as necessary to treat signs of volume overload  -f/u with cardiology as scheduled    10/25/18  Labs indicated patient is dehydrated.  Will give  cc total per Dr. Henry request and hold lasix for now.  Patient with lower leg edema that is chronic.  No reports of SOB.  Monitor weights.  Wt Readings from Last 1 Encounters:   11/12/18 0500 61.8 kg (136 lb 3.9 oz)   11/11/18 0657 60.4 kg (133 lb 2.5 oz)   11/10/18 0700 57.4 kg (126 lb 8.7 oz)   11/08/18 0632 57.2 kg (126 lb 1.7 oz)   11/07/18 0546 57.8 kg (127 lb 6.8 oz)   11/06/18 0548 56.6 kg (124 lb 12.5 oz)   11/05/18 0600 55.6 kg (122 lb 9.2 oz)   11/04/18 0500 55.5 kg (122 lb 5.7 oz)   11/03/18 0600 55.4 kg (122 lb 2.2 oz)   11/01/18 0600 55.2 kg (121 lb 11.1 oz)   10/30/18 0600 55.2 kg (121 lb 11.1 oz)   10/29/18 0556 54.1 kg (119 lb 4.3 oz)   10/23/18 1632 54.2 kg (119 lb 7.8 oz)     Continue b-blocker as ordered.    10/30/2018:  Patient with LE edema and hyponatremia with lasix therapy resumed on 10/29.  Will continue to monitor labs and weights.     11/2/18  Appears compensated, weight is  stable, no reports of SOB.  Continue ACE, b-blocker and continue to hold lasix.  No reports of chest pain or SOB.    11/7/18  Weight up 3 Kg from admit.  Still with LE edema.  Will give dose of Lasix today and check labs in AM.    11/12/18  Wt Readings from Last 1 Encounters:   11/12/18 0500 61.8 kg (136 lb 3.9 oz)   11/11/18 0657 60.4 kg (133 lb 2.5 oz)   11/10/18 0700 57.4 kg (126 lb 8.7 oz)   11/08/18 0632 57.2 kg (126 lb 1.7 oz)   11/07/18 0546 57.8 kg (127 lb 6.8 oz)   11/06/18 0548 56.6 kg (124 lb 12.5 oz)   11/05/18 0600 55.6 kg (122 lb 9.2 oz)   11/04/18 0500 55.5 kg (122 lb 5.7 oz)   11/03/18 0600 55.4 kg (122 lb 2.2 oz)   11/01/18 0600 55.2 kg (121 lb 11.1 oz)   10/30/18 0600 55.2 kg (121 lb 11.1 oz)   10/29/18 0556 54.1 kg (119 lb 4.3 oz)   10/23/18 1632 54.2 kg (119 lb 7.8 oz)     Weight up, now SOB.  CXR consistent with pulmonary edema versus PNA.  Give IV lasix x 3 days and monitor closely.  If symptoms do not improve will readmit.  Start Avelox for PNA, allergy to PCN and Rocephin  Continue ACE and b-blocker as ordered.     Essential hypertension    Chronic and controlled  Continue therapy with carvedilol, amlodipine and lisinopril  Will continue to monitor and adjust regimen as necessary.    10/25/18  BP Readings from Last 3 Encounters:   11/12/18 (!) 156/72   10/23/18 (!) 111/59   10/17/18 (!) 175/75     continue Norvasc, coreg and Hydralazine PRN as ordered.  Will stop ACE for now.  BP is stable.    11/2/18  BP mildly elevated, will restart ACE and monitor renal function, last creatinine down to 0.9.  Continue Norvasc and Coreg at current dosing.  Hydralazine PRN as ordered.    11/7/18  BP is stable, continue Norvasc, Coreg and lisinopril.  Monitor renal panel in AM.  Hydralazine PRN, continue as ordered.    11/12/18  BP mildly elevated but still in pain.  Continue Norvasc, Coreg and Lisinopril and monitor.  Hydralazine PRN for systolic BP > 170         Warren Barry NP  Department of  Hospital Medicine  [facility] - Skilled [facility] Care

## 2018-11-13 NOTE — PLAN OF CARE
Problem: Occupational Therapy Goal  Goal: Occupational Therapy Goal  Goals to be met by: 11 days     Patient will increase functional independence with ADLs by performing:    UE Dressing with Set-up Assistance.--not met  REVISED to mod A  LE Dressing with Supervision.--not met  REVISED to mod A  Grooming while standing at sink with Supervision.--not met  REVISED to mod I seated at sink  Toileting from toilet with Supervision for hygiene and clothing management. --not met  REVISED to mod A Met, but continue to address  Bathing from  shower chair/bench with Supervision.--mot met  REVISED to mod A  Supine to sit with Modified [place] /c HOB flat and no handrails.  Stand pivot transfers with Supervision.-not met  REVISED to SBA  Toilet transfer to toilet with Supervision.-not met  REVISED to SBA  Upper extremity exercise program 3 x 15 reps per handout, with independence.  Patient will complete a functional standing activity for 10 min with S in order to perform household tasks. --not met  REVISED to 6 min SBA  Met.    Outcome: Ongoing (interventions implemented as appropriate)  Patient's goals are appropriate.   DACIA Regalado  11/13/2018

## 2018-11-14 PROBLEM — J96.01 ACUTE HYPOXEMIC RESPIRATORY FAILURE: Status: ACTIVE | Noted: 2018-11-14

## 2018-11-14 PROBLEM — Z71.89 GOALS OF CARE, COUNSELING/DISCUSSION: Status: ACTIVE | Noted: 2018-11-14

## 2018-11-14 PROBLEM — N18.9 ACUTE KIDNEY INJURY SUPERIMPOSED ON CKD: Status: ACTIVE | Noted: 2018-10-08

## 2018-11-14 LAB
ANION GAP SERPL CALC-SCNC: 7 MMOL/L
BACTERIA UR CULT: NORMAL
BASOPHILS # BLD AUTO: 0.02 K/UL
BASOPHILS NFR BLD: 0.3 %
BUN SERPL-MCNC: 47 MG/DL
CALCIUM SERPL-MCNC: 8.2 MG/DL
CHLORIDE SERPL-SCNC: 97 MMOL/L
CO2 SERPL-SCNC: 24 MMOL/L
CREAT SERPL-MCNC: 1.7 MG/DL
DIFFERENTIAL METHOD: ABNORMAL
EOSINOPHIL # BLD AUTO: 0.1 K/UL
EOSINOPHIL NFR BLD: 1.5 %
ERYTHROCYTE [DISTWIDTH] IN BLOOD BY AUTOMATED COUNT: 14.7 %
EST. GFR  (AFRICAN AMERICAN): 30.6 ML/MIN/1.73 M^2
EST. GFR  (NON AFRICAN AMERICAN): 26.5 ML/MIN/1.73 M^2
GLUCOSE SERPL-MCNC: 92 MG/DL
HCT VFR BLD AUTO: 23.9 %
HGB BLD-MCNC: 8 G/DL
IMM GRANULOCYTES # BLD AUTO: 0.05 K/UL
IMM GRANULOCYTES NFR BLD AUTO: 0.7 %
LYMPHOCYTES # BLD AUTO: 0.7 K/UL
LYMPHOCYTES NFR BLD: 8.6 %
MCH RBC QN AUTO: 30.9 PG
MCHC RBC AUTO-ENTMCNC: 33.5 G/DL
MCV RBC AUTO: 92 FL
MONOCYTES # BLD AUTO: 0.9 K/UL
MONOCYTES NFR BLD: 11.4 %
NEUTROPHILS # BLD AUTO: 5.8 K/UL
NEUTROPHILS NFR BLD: 77.5 %
NRBC BLD-RTO: 0 /100 WBC
PLATELET # BLD AUTO: 156 K/UL
PMV BLD AUTO: 9.9 FL
POTASSIUM SERPL-SCNC: 3.8 MMOL/L
RBC # BLD AUTO: 2.59 M/UL
SODIUM SERPL-SCNC: 128 MMOL/L
WBC # BLD AUTO: 7.52 K/UL

## 2018-11-14 PROCEDURE — 94799 UNLISTED PULMONARY SVC/PX: CPT

## 2018-11-14 PROCEDURE — 94640 AIRWAY INHALATION TREATMENT: CPT

## 2018-11-14 PROCEDURE — 20600001 HC STEP DOWN PRIVATE ROOM

## 2018-11-14 PROCEDURE — 36415 COLL VENOUS BLD VENIPUNCTURE: CPT

## 2018-11-14 PROCEDURE — 25000003 PHARM REV CODE 250: Performed by: STUDENT IN AN ORGANIZED HEALTH CARE EDUCATION/TRAINING PROGRAM

## 2018-11-14 PROCEDURE — 25000003 PHARM REV CODE 250: Performed by: NEUROLOGICAL SURGERY

## 2018-11-14 PROCEDURE — 27000221 HC OXYGEN, UP TO 24 HOURS

## 2018-11-14 PROCEDURE — 25000003 PHARM REV CODE 250: Performed by: PHYSICIAN ASSISTANT

## 2018-11-14 PROCEDURE — 85025 COMPLETE CBC W/AUTO DIFF WBC: CPT

## 2018-11-14 PROCEDURE — 80048 BASIC METABOLIC PNL TOTAL CA: CPT

## 2018-11-14 PROCEDURE — 99232 SBSQ HOSP IP/OBS MODERATE 35: CPT | Mod: ,,, | Performed by: NEUROLOGICAL SURGERY

## 2018-11-14 PROCEDURE — 25000242 PHARM REV CODE 250 ALT 637 W/ HCPCS: Performed by: PHYSICIAN ASSISTANT

## 2018-11-14 PROCEDURE — 63600175 PHARM REV CODE 636 W HCPCS

## 2018-11-14 PROCEDURE — 99223 1ST HOSP IP/OBS HIGH 75: CPT | Mod: ,,, | Performed by: HOSPITALIST

## 2018-11-14 PROCEDURE — 25000003 PHARM REV CODE 250

## 2018-11-14 RX ORDER — FUROSEMIDE 10 MG/ML
40 INJECTION INTRAMUSCULAR; INTRAVENOUS DAILY
Status: DISCONTINUED | OUTPATIENT
Start: 2018-11-14 | End: 2018-11-15

## 2018-11-14 RX ORDER — CIPROFLOXACIN 500 MG/1
500 TABLET ORAL EVERY 12 HOURS
Status: DISCONTINUED | OUTPATIENT
Start: 2018-11-14 | End: 2018-11-14

## 2018-11-14 RX ORDER — ACETAMINOPHEN 325 MG/1
650 TABLET ORAL EVERY 6 HOURS PRN
Status: DISCONTINUED | OUTPATIENT
Start: 2018-11-14 | End: 2018-11-20 | Stop reason: HOSPADM

## 2018-11-14 RX ORDER — IPRATROPIUM BROMIDE AND ALBUTEROL SULFATE 2.5; .5 MG/3ML; MG/3ML
3 SOLUTION RESPIRATORY (INHALATION) EVERY 12 HOURS
Status: DISCONTINUED | OUTPATIENT
Start: 2018-11-14 | End: 2018-11-20 | Stop reason: HOSPADM

## 2018-11-14 RX ORDER — CIPROFLOXACIN 500 MG/1
500 TABLET ORAL EVERY 24 HOURS
Status: COMPLETED | OUTPATIENT
Start: 2018-11-14 | End: 2018-11-20

## 2018-11-14 RX ORDER — HEPARIN SODIUM 5000 [USP'U]/ML
5000 INJECTION, SOLUTION INTRAVENOUS; SUBCUTANEOUS EVERY 8 HOURS
Status: DISCONTINUED | OUTPATIENT
Start: 2018-11-15 | End: 2018-11-20 | Stop reason: HOSPADM

## 2018-11-14 RX ORDER — ACETAMINOPHEN 500 MG
500 TABLET ORAL EVERY 6 HOURS PRN
Status: DISCONTINUED | OUTPATIENT
Start: 2018-11-14 | End: 2018-11-14

## 2018-11-14 RX ORDER — AMOXICILLIN 250 MG
1 CAPSULE ORAL 2 TIMES DAILY
Status: DISCONTINUED | OUTPATIENT
Start: 2018-11-14 | End: 2018-11-20 | Stop reason: HOSPADM

## 2018-11-14 RX ORDER — TRAMADOL HYDROCHLORIDE 50 MG/1
50 TABLET ORAL EVERY 6 HOURS PRN
Status: DISCONTINUED | OUTPATIENT
Start: 2018-11-14 | End: 2018-11-20 | Stop reason: HOSPADM

## 2018-11-14 RX ADMIN — ALPRAZOLAM 0.25 MG: 0.25 TABLET ORAL at 08:11

## 2018-11-14 RX ADMIN — FUROSEMIDE 40 MG: 10 INJECTION, SOLUTION INTRAMUSCULAR; INTRAVENOUS at 01:11

## 2018-11-14 RX ADMIN — ALBUTEROL SULFATE 2.5 MG: 2.5 SOLUTION RESPIRATORY (INHALATION) at 05:11

## 2018-11-14 RX ADMIN — SENNOSIDES AND DOCUSATE SODIUM 1 TABLET: 8.6; 5 TABLET ORAL at 08:11

## 2018-11-14 RX ADMIN — SERTRALINE HYDROCHLORIDE 25 MG: 25 TABLET ORAL at 10:11

## 2018-11-14 RX ADMIN — ATORVASTATIN CALCIUM 20 MG: 20 TABLET, FILM COATED ORAL at 10:11

## 2018-11-14 RX ADMIN — IPRATROPIUM BROMIDE AND ALBUTEROL SULFATE 3 ML: .5; 3 SOLUTION RESPIRATORY (INHALATION) at 09:11

## 2018-11-14 RX ADMIN — LIDOCAINE 1 PATCH: 50 PATCH TOPICAL at 05:11

## 2018-11-14 RX ADMIN — TRAMADOL HYDROCHLORIDE 50 MG: 50 TABLET, COATED ORAL at 09:11

## 2018-11-14 RX ADMIN — HYDROXYCHLOROQUINE SULFATE 200 MG: 200 TABLET, FILM COATED ORAL at 10:11

## 2018-11-14 RX ADMIN — Medication 100 MG: at 10:11

## 2018-11-14 RX ADMIN — CARVEDILOL 6.25 MG: 6.25 TABLET, FILM COATED ORAL at 05:11

## 2018-11-14 RX ADMIN — CIPROFLOXACIN HYDROCHLORIDE 500 MG: 500 TABLET, FILM COATED ORAL at 12:11

## 2018-11-14 RX ADMIN — FOLIC ACID 1 MG: 1 TABLET ORAL at 10:11

## 2018-11-14 RX ADMIN — SODIUM CHLORIDE TAB 1 GM 1 G: 1 TAB at 03:11

## 2018-11-14 RX ADMIN — AMLODIPINE BESYLATE 10 MG: 10 TABLET ORAL at 10:11

## 2018-11-14 RX ADMIN — LISINOPRIL 10 MG: 10 TABLET ORAL at 10:11

## 2018-11-14 RX ADMIN — CARVEDILOL 6.25 MG: 6.25 TABLET, FILM COATED ORAL at 10:11

## 2018-11-14 RX ADMIN — SODIUM CHLORIDE TAB 1 GM 1 G: 1 TAB at 07:11

## 2018-11-14 RX ADMIN — TRAMADOL HYDROCHLORIDE 50 MG: 50 TABLET, COATED ORAL at 08:11

## 2018-11-14 RX ADMIN — CALCIUM CARBONATE 500 MG: 1250 SUSPENSION ORAL at 10:11

## 2018-11-14 RX ADMIN — SODIUM CHLORIDE TAB 1 GM 1 G: 1 TAB at 08:11

## 2018-11-14 RX ADMIN — ESCITALOPRAM OXALATE 10 MG: 10 TABLET ORAL at 10:11

## 2018-11-14 RX ADMIN — CIPROFLOXACIN 500 MG: 500 TABLET, FILM COATED ORAL at 02:11

## 2018-11-14 NOTE — ASSESSMENT & PLAN NOTE
Likely multifactorial involving RA, chronic compression fractures and old left L3 transverse process  Continue tylenol prn pain and lidocaine patch daily.  Continue dexamethasone therapy with end date of 10/28  -continue PT/OT to increase ambulation, ADL performance and endurance  -continue TOM's and SCD's for DVT prophylaxis  -continue fall precautions  -continue senokot-s and miralax to prevent constipation; hold for frequent or loose stooling    10/25/18  Patient reports pain to back is worse after therapy today.  Discussed with Dr. Henry, she suggested changing to 2 lidoderm patches and continue Tylenol dosing as ordered.  No need to re-image at this time.  Continue TOM/SCD for dvt ppx.  Will try using an abdominal binder to see if this helps with pain with therapy sessions.    11/2/18  Now with TSLO and recent x-ray showed compression fracture at T12 which is new.    Set appointment with Neurosurgery to discuss treatment options.  Continue TOM/SCD for dvt ppx.  Added Tramadol PRN to assist with pain control and continue Tylenol as ordered.    11/7/18  Patient now with new T12 compression fracture.  Continue TSLO and follow up with Neurosurgery.  Stopped Tramadol and use Lortab elixir PRN for pain control.  Continue TOM/SCD for dvt ppx.    11/12/18  Pain has been ongoing issue.  New T12 compression fracture.  Pain limiting overall progress.  Was set to see NSGY tomorrow but appointment was cancelled by someone in clinic.  Spoke with KELVIN Moncada with [facility], said she cannot provide any detail regarding surgical fixation or vetebralplasty or not and will ask Dr. Worthington if he or Dr. Leon can see her.    Family updated.  Continue Lortab elixir as ordered.    11/13/18  Patient has not progressed since worsening back pain dating back to 10-24.  Several changes in her pain regimen was made here at [facility] in addition to getting a TSLO brace for her.  Family is wanting NSGY to eval today and requesting transfer to the  ED.  Currently on Lortab elixir.

## 2018-11-14 NOTE — ASSESSMENT & PLAN NOTE
Noted CHARLENE on CKD at this time.  Overall, possible pre-renal due to volume overload and cardiorenal syndrome, but with positive UA possibly intrinsic.  Patient with previous UTI with organisms sensitive to ciprofloxacin, despite lack of significant urinary symptoms that are acute.  Also, no evidence of fever or leukocytosis.  However, patient with alteration in mental status and mild CHARLENE, so will start 5 day oral course at this time.  Overall, difficult to ascertain if etiology of frequent UTIs, but suspect incontinence as a factor.  Unknown if incontinence is a product of pain, but otherwise will continue with improved pain management.      Recommendations  - lasix 40 mg IV ordered  - discontinued lisinopril at this time given low-normal BP and CHARLENE  - strict Is and Os  - cipro ordered, GNR growing in urine culture, awaiting full sensitivities   - daily labs, monitor Cr at this time

## 2018-11-14 NOTE — ASSESSMENT & PLAN NOTE
- no significant joint complaints at this time  - can continue home plaquenil  - patient has been off MTX since last outpatient rheumatology visit

## 2018-11-14 NOTE — ASSESSMENT & PLAN NOTE
Initiated goals of care discussion at this time.  Son does note previous Living Will, but checked [employer] documents since 2015 without Living Will or [place] found.  Patient at this time would not like to have any significant surgeries but are amenable for procedures for further clarification of issues.  Can continue to assess DNR/DNI status daily until decision made.

## 2018-11-14 NOTE — ASSESSMENT & PLAN NOTE
Chronic and controlled  Continue therapy with carvedilol, amlodipine and lisinopril  Will continue to monitor and adjust regimen as necessary.    10/25/18  BP Readings from Last 3 Encounters:   11/14/18 (!) 114/55   11/13/18 (!) 127/58   10/23/18 (!) 111/59     continue Norvasc, coreg and Hydralazine PRN as ordered.  Will stop ACE for now.  BP is stable.    11/2/18  BP mildly elevated, will restart ACE and monitor renal function, last creatinine down to 0.9.  Continue Norvasc and Coreg at current dosing.  Hydralazine PRN as ordered.    11/7/18  BP is stable, continue Norvasc, Coreg and lisinopril.  Monitor renal panel in AM.  Hydralazine PRN, continue as ordered.    11/12/18  BP mildly elevated but still in pain.  Continue Norvasc, Coreg and Lisinopril and monitor.  Hydralazine PRN for systolic BP > 170    11/13/18  BP Readings from Last 3 Encounters:   11/14/18 (!) 114/55 11/13/18 (!) 127/58   10/23/18 (!) 111/59     BP is stable.  Currently taking Norvasc, Coreg and Lisinopril at time of transfer.  Will defer any further treatment recommendation to admitting team.

## 2018-11-14 NOTE — PLAN OF CARE
CM met with patient to obtain discharge planning assessment.  Patient admitted with compression fracture.  Planned discharge is Skilled Nursing - Plan (A) or home with home health - Plan (B).    PCP:  Uday Allen MD     Payor:  Payor: MEDICARE / Plan: MEDICARE PART A & B / Product Type: Government /      Pharmacy:    [id number] - [place] - [address] AT [facility] [address]  [address]  [place] [address]  Phone: [phone] Fax: [phone]    [id number] - [place] - [address]  [address]  [place] [address]  Phone: [phone] Fax: [phone]    [id number] - [place]  [address] AT [facility]  [address]  [place] [address]  Phone: [phone] Fax: [phone]       11/14/18 1256   Discharge Assessment   Assessment Type Discharge Planning Assessment   Confirmed/corrected address and phone number on facesheet? Yes   Assessment information obtained from? Patient   Expected Length of Stay (days) 2   Communicated expected length of stay with patient/caregiver yes   Prior to hospitilization cognitive status: Alert/Oriented   Prior to hospitalization functional status: Assistive Equipment   Current cognitive status: Alert/Oriented   Current Functional Status: Assistive Equipment   Lives With alone   Able to Return to Prior Arrangements unable to determine at this time (comments)   Is patient able to care for self after discharge? Unable to determine at this time (comments)   Who are your caregiver(s) and their phone number(s)? Rene Reis camille [phone]   Patient's perception of discharge disposition skilled nursing facility   Readmission Within The Last 30 Days no previous admission in last 30 days   If yes, most recent facility name: [facility]   Patient currently being followed by outpatient case management? No   Patient currently receives any other outside agency services? No    Equipment Currently Used at Home walker, rolling;cane, straight;bath bench   Do you have any problems affording any of your prescribed medications? No   Is the patient taking medications as prescribed? yes   Does the patient have transportation home? Yes   Transportation Available family or friend will provide   Does the patient receive services at the [facility]? No   Discharge Plan A Skilled Nursing Facility   Discharge Plan B Home with family;Home Health   Patient/Family In Agreement With Plan yes   Does the patient have family/friends to help with healtcare needs after discharge? yes   Does the patient have transportation to healthcare appointments? Yes   Readmission Questionnaire   Living Arrangements house   At the time of discharge, did someone talk to you about what to watch out for regarding worsening of your health problem? Yes   Have you felt down, depressed, or hopeless? 0   Have you felt little interest or pleasure in doing things? 0   At the time of your discharge, did someone talk to you about what your health problems were? Yes   At the time of discharge, did someone talk to you about what to do if you experienced worsening of your health problem? Yes   At the time of discharge, did someone talk to you about which medication to take when you left the hospital and which ones to stop taking? Yes   At the time of discharge, did someone talk to you about when and where to follow up with a doctor after you left the hospital? Yes   What do you believe caused you to be sick enough to be re-admitted? compression fracture   How often do you need to have someone help you when you read instructions, pamphlets, or other written material from your doctor or pharmacy? Sometimes   Do you have problems taking your medications as prescribed? No   Do you have problems obtaining/receiving your medications? No   Does your caregiver provide all the help you need? Yes   Are you currently feeling confused? No   Are  you currently having problems thinking? No   Are you currently having memory problems? No   In the last 7 days, my sleep quality was: fair   Do you have any problems affording any of  your prescribed medications? No

## 2018-11-14 NOTE — HPI
"Pt is an 89 y/o female with hx of HTN, HLD, RA, GERD and L2 compression fracture who [place] [facility] [place]. Pt is accompanied by her son. He reports she was very independent and living and home until she had her L2 compression fracture last month and is now being transitioned to assisted living home. He says over the past few weeks the pt's pain has become more significant, especially the past few days. Pain is worse with positional changes or movement. Pain is better when laying down or sitting. Pt has had previous kyphoplasty at T8-T9 about 5 years ago. Pt is currently taking liquid oxycodone without relief. She denies any leg pain, numbness/tingling, b/b incontinence, or saddle anesthesia. She does report urinary urgency which has been present for months. Pt and son also report she has been feeling "loopy" and "off" after taking her daily medications in the morning. No AC use, she says she sometimes takes 1/4 of a baby aspirin. Per pt's son, she is also being treated for a UTI as of this week.       "

## 2018-11-14 NOTE — HPI
Karly Sandoval is a 88 y.o. lady with RA (on plaquenil), HFpEF (noted diastolic dysfunction in echo on 2016), essential hypertension, hyperlipidemia, and new cystic pancreatic head mass who presented to [facility] rehab for evaluation of worsening back pain.  She was recently seen and discharged on 10/23 to [facility] SNF for further rehabilitation for a T12 compression fracture.  There, she was noted to have continued back pain that was not responsive to increasing pain regimen.  She was also noted to have a positive UA with E. Coli and Proteus, where she completed a 7 day course of ciprofloxacin.  Throughout her stay, she would have continued back pain without significant improvement.  She also developed acute hypoxic respiratory failure, requiring minimal supplemental O2.  It was noted in rehab notes that patient was gaining weight despite poor PO intake.  Additionally towards the past week, she was noted to have worsening altered mental status after taking her AM medications, which would improve over time.  Patient was supposed to follow up with neurosurgery as an outpatient for follow up, but due to significant worsening in pain, patient was brought to [facility] for further evaluation.  Prior to presentation, she had a CXR and UA performed, where UA was again positive with 3+ leukocytes and > 100 WBCs.  CXR also revealed pulmonary vascular congestion as well as a new L lower lobe effusion, where she was started on levaquin, and was given 1 dose of IV lasix.  She denies fevers, chills, N/V, coughs, chest or abdominal pains.  She received repeat imaging revealing new severe spinal canal stenosis related to new T12 vertebral body compression fracture and 7 mm osseous retropulsion.  She was subsequently admitted to Neurosurgery for further evaluation, where she was deemed not a surgical candidate, and recommended further bracing with improvement in pain control.  Medicine was consulted regarding effusion, UTI, and  "CHARLENE.    Of note, patient was also previously seen in [facility] prior to presentation earlier on 10/8/2018.  There, she was evaluated for hyponatremia, where she was found to have a cystic pancreatic head lesion concerning for either cystic malignancy versus pseudocyst captured on both CT and abdominal ultrasound.  Patient denies any significant EtOH use or previous abdominal pains, but did have previous cholecystectomy.      Patient assessed at bedside with son present.  She noted she did have some improvement in breathing after receiving her dose of IV lasix.  Does note that her mind does feel "unclear" at the moment, but alert and appropriate.  Notes lying flat helps improve with her back pain.        "

## 2018-11-14 NOTE — HOSPITAL COURSE
11/13: Admit to step down. Pending evaluation by Dr. Leon.   11/14: Exam stable. Pain medication adjusted. No surgical intervention planned due to age, bone quality, and co morbidities. Continue treatment with TLSO brace. PT/OT eval. Return to SNF when pain better controlled.   11/15: Exam stable.  Back pain controlled. Cont TLSo when up/oob.  11/16: controlled back pain.  Exam stable.  OOB - chair daily.

## 2018-11-14 NOTE — H&P
"[facility]  Neurosurgery  History & Physical    Patient Name: Karly Sandoval  MRN: [id number]  Admission Date: 11/13/2018  Attending Physician:Poncho Leon MD  Primary Care Provider: Uday Allen MD    Patient information was obtained from patient, relative(s) and ER records.     Subjective:     Chief Complaint/Reason for Admission: back pain    History of Present Illness: Pt is an 87 y/o female with hx of HTN, HLD, RA, GERD and L2 compression fracture who presents to [facility] ED with worsening back pain. Pt is accompanied by her son. He reports she was very independent and living and home until she had her L2 compression fracture last month and is now being transitioned to assisted living home. He says over the past few weeks the pt's pain has become more significant, especially the past few days. Pain is worse with positional changes or movement. Pain is better when laying down or sitting. Pt has had previous kyphoplasty at T8-T9 about 5 years ago. Pt is currently taking liquid oxycodone without relief. She denies any leg pain, numbness/tingling, b/b incontinence, or saddle anesthesia. She does report urinary urgency which has been present for months. Pt and son also report she has been feeling "loopy" and "off" after taking her daily medications in the morning. No AC use, she says she sometimes takes 1/4 of a baby aspirin. Per pt's son, she is also being treated for a UTI as of this week.     Review of patient's allergies indicates:   Allergen Reactions    Amoxicillin Other (See Comments)     unknown    Bactrim [sulfamethoxazole-trimethoprim] Other (See Comments)     unknown    Omeprazole Other (See Comments)     Muscle and joint pain    Rocephin [ceftriaxone] Other (See Comments)     Severe acute generalized pain    Penicillins Rash       Past Medical History:   Diagnosis Date    Abdominal aortic aneurysm (AAA) without rupture 10/8/2018    Abnormal CT of the abdomen 10/8/2018    Acid " reflux     Anemia     Anxiety     Atrial arrhythmia 2/11/2014    Carotid artery occlusion     Chronic prescription benzodiazepine use 10/23/2018    Compression fracture of thoracic vertebra 6/25/2014    CVA (cerebral infarction) 2014    Cyst of pancreas 10/9/2018    Diastolic heart failure     echo 2016 ef 55% +Diastolic dysf    Diverticulosis     Encounter for blood transfusion     GERD (gastroesophageal reflux disease) 9/11/2012    History of cholelithiasis     Hyperlipidemia     Hypertension     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    Osteoarthritis     Osteoarthritis of both knees 6/12/2015    Osteopenia     PFO (patent foramen ovale) 2/11/2014    PVC (premature ventricular contraction) 4/28/2014    RVOT origin -- benign     RBBB 11/14/2014    Rheumatoid arthritis(714.0)     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)     has pacemaker    Right pelvic adnexal fluid collection 10/8/2018     Past Surgical History:   Procedure Laterality Date    APPENDECTOMY      BREAST SURGERY      CARDIAC PACEMAKER PLACEMENT  11/2014    for syncope and RBBB/LAHB    CHOLECYSTECTOMY      EYE SURGERY      HEMORRHOID SURGERY      HYSTERECTOMY      1966    SKIN BIOPSY      VASCULAR SURGERY      VERTEBROPLASTY       Family History     Problem Relation (Age of Onset)    Diabetes Mellitus Mother, Brother    Heart disease Father    Hypertension Father, Mother    Leukemia Father    Parkinsonism Brother (68)        Tobacco Use    Smoking status: Never Smoker    Smokeless tobacco: Never Used   Substance and Sexual Activity    Alcohol use: No     Comment: vermooth    Drug use: No    Sexual activity: No     Partners: Male     Birth control/protection: None     Review of Systems   Constitutional: no fever, chills or night sweats. No changes in weight   Eyes: no visual changes   ENT: no nasal congestion or sore throat   Respiratory: no cough or shortness of  breath   Cardiovascular: no chest pain or palpitations   Gastrointestinal: no nausea or vomiting   Genitourinary: no hematuria or dysuria   Integument/Breast: no rash or pruritis   Hematologic/Lymphatic: no easy bruising or lymphadenopathy   Musculoskeletal: no arthralgias. Positive for back pain.   Neurological: no seizures or tremors   Behavioral/Psych: no auditory or visual hallucinations   Endocrine: no heat or cold intolerance     Objective:     Weight: 60.8 kg (134 lb)  Body mass index is 24.51 kg/m².  Vital Signs (Most Recent):  Temp: 97.7 °F (36.5 °C) (11/13/18 1321)  Pulse: 68 (11/13/18 1730)  Resp: 19 (11/13/18 1730)  BP: (!) 149/66 (11/13/18 1730)  SpO2: 100 % (11/13/18 1730) Vital Signs (24h Range):  Temp:  [97 °F (36.1 °C)-97.8 °F (36.6 °C)] 97.7 °F (36.5 °C)  Pulse:  [62-69] 68  Resp:  [16-20] 19  SpO2:  [95 %-100 %] 100 %  BP: (115-149)/(48-66) 149/66     Date 11/13/18 0700 - 11/14/18 0659   Shift 8216-7624 5799-0752 6067-7534 24 Hour Total   INTAKE   Shift Total(mL/kg)       OUTPUT   Urine  20  20   Shift Total(mL/kg)  20(0.3)  20(0.3)   Weight (kg)  60.8 60.8 60.8                        Neurosurgery Physical Exam  General: well developed, well nourished, mild distress 2/2 pain.  Head: normocephalic, atraumatic  Neurologic: Alert and oriented. Thought content appropriate.  GCS: Motor: 6/Verbal: 5/Eyes: 4 GCS Total: 15  Mental Status: Awake, Alert, Oriented x 4  Language: No aphasia  Speech: No dysarthria  Cranial nerves: face symmetric, tongue midline, CN II-XII grossly intact.   Eyes: pupils equal, round, reactive to light with accomodation, EOMI.    Pulmonary: normal respirations, no signs of respiratory distress  Abdomen: soft, non-distended, not tender to palpation  Sensory: intact to light touch throughout    Motor Strength:Moves all extremities spontaneously with good tone.  . No abnormal movements seen.     Strength  Deltoids Triceps Biceps Wrist Extension Wrist Flexion Hand    Upper: R  5/5 5/5 5/5 5/5 5/5 5/5    L 5/5 5/5 5/5 5/5 5/5 5/5     Iliopsoas Quadriceps Knee  Flexion Tibialis  anterior Gastro- cnemius EHL   Lower: R 4/5 5/5 5/5 5/5 5/5 5/5    L 4/5 5/5 5/5 5/5 5/5 5/5     Doran: absent  Clonus: absent  Babinski: absent  Pulses: 2+ and symmetric radial and dorsalis pedis.  Skin: Skin is warm, dry and intact.  Mild ttp of left paraspinal region of thoracic and lumbar spine  Significant kyphosis of thoracic spine    Significant Labs:  Recent Labs   Lab 11/12/18  0547 11/13/18  0542 11/13/18  1505   * 110  --    * 129*  --    K 4.3 4.0  --    CL 96 97  --    CO2 23 26  --    BUN 34* 41*  --    CREATININE 1.4 1.5*  --    CALCIUM 8.4* 8.4*  --    MG 2.0  --  2.1     Recent Labs   Lab 11/12/18  0547 11/13/18  0542   WBC 12.44 11.61   HGB 9.3* 8.4*   HCT 26.5* 23.7*    147*     No results for input(s): LABPT, INR, APTT in the last 48 hours.  Microbiology Results (last 7 days)     ** No results found for the last 168 hours. **        Recent Lab Results       11/13/18  1505   11/13/18  0542        Immature Granulocytes   0.7     Immature Grans (Abs)   0.08  Comment:  Mild elevation in immature granulocytes is non specific and   can be seen in a variety of conditions including stress response,   acute inflammation, trauma and pregnancy. Correlation with other   laboratory and clinical findings is essential.       Albumin   2.4     Alkaline Phosphatase   72     ALT   27     Anion Gap   6     AST   19     Baso #   0.01     Basophil%   0.1     Total Bilirubin   0.9  Comment:  For infants and newborns, interpretation of results should be based  on gestational age, weight and in agreement with clinical  observations.  Premature Infant recommended reference ranges:  Up to 24 hours.............<8.0 mg/dL  Up to 48 hours............<12.0 mg/dL  3-5 days..................<15.0 mg/dL  6-29 days.................<15.0 mg/dL       BNP   490  Comment:  Values of less than 100 pg/ml are  consistent with non-CHF populations.     BUN, Bld   41     Calcium   8.4     Chloride   97     CO2   26     Creatinine   1.5     Differential Method   Automated     eGFR if African American   35.6     eGFR if non    30.9  Comment:  Calculation used to obtain the estimated glomerular filtration  rate (eGFR) is the CKD-EPI equation.        Eos #   0.0     Eosinophil%   0.3     Glucose   110     Gran # (ANC)   9.8     Gran%   84.0     Hematocrit   23.7     Hemoglobin   8.4     Lymph #   0.7     Lymph%   6.0     Magnesium 2.1       MCH   31.8     MCHC   35.4     MCV   90     Mono #   1.0     Mono%   8.9     MPV   9.9     nRBC   0     Platelets   147     Potassium   4.0     Total Protein   5.2     RBC   2.64     RDW   14.6     Sodium   129     TSH 1.950       WBC   11.61           Significant Diagnostics:  CT thoracic spine 11/13/18:  Impression       New severe spinal canal stenosis related to new T12 vertebral body compression fracture and 7 mm osseous retropulsion.    Acute appearing left-sided T12 proximal rib fracture.    Stable vertebral body height loss of T8 and T9, and resultant moderate spinal canal stenosis at this level.    Small bilateral pleural effusions and compressive atelectasis.       I have reviewed all pertinent imaging results/findings within the past 24 hours.    Assessment/Plan:     Active Diagnoses:    Diagnosis Date Noted POA    T12 compression fracture [S22.080A] 11/13/2018 Yes      Problems Resolved During this Admission:      Pt is an 89 y/o female with hx of kyphoplasty T8-T9 and L2 compression fracture who presents with worsening back pain and new T12 fracture on CT scan.    -Admit to NSGY service for pain control  -Neurologically stable. Neuro checks q4h  -CT myelogram thoracic spine pending  -Will f/u UA for UTI  -Will call DME for better fitting TLSO brace  -Home meds re-ordered  -Regular diet  -Surgical planning pending pain control and further imaging.     Discussed  with Dr. Edward Brewer PA-C  Neurosurgery  [facility]

## 2018-11-14 NOTE — ASSESSMENT & PLAN NOTE
-currently compensated on clinical exam  -continue current medical therapy with lisinopril, coreg, furosemide (dose reduced due to recent CHARLENE and poor po intake but dexamethasone therapy currently)  -continue low Na diet to treat  -continue daily weight monitoring with adjustment of diuretic therapy as necessary to treat weight gains > 2-3 pounds in 24-48 hours  -continue daily pulse oximetry monitoring; proceed with CXR imaging and adjustment of diuretic therapy as necessary to treat new or worsening hypoxia  -intermittent clinical exam monitoring with adjustment of diuretic regimen as necessary to treat signs of volume overload  -f/u with cardiology as scheduled    10/25/18  Labs indicated patient is dehydrated.  Will give  cc total per Dr. Henry request and hold lasix for now.  Patient with lower leg edema that is chronic.  No reports of SOB.  Monitor weights.  Wt Readings from Last 1 Encounters:   11/13/18 1753 60.8 kg (134 lb)     Continue b-blocker as ordered.    10/30/2018:  Patient with LE edema and hyponatremia with lasix therapy resumed on 10/29.  Will continue to monitor labs and weights.     11/2/18  Appears compensated, weight is stable, no reports of SOB.  Continue ACE, b-blocker and continue to hold lasix.  No reports of chest pain or SOB.    11/7/18  Weight up 3 Kg from admit.  Still with LE edema.  Will give dose of Lasix today and check labs in AM.    11/12/18  Wt Readings from Last 1 Encounters:   11/13/18 1753 60.8 kg (134 lb)     Weight up, now SOB.  CXR consistent with pulmonary edema versus PNA.  Give IV lasix x 3 days and monitor closely.  If symptoms do not improve will readmit.  Start Avelox for PNA, allergy to PCN and Rocephin  Continue ACE and b-blocker as ordered.    11/13/18  Weight up, CXR indicative of pleural effusion versus PNA.  Giving IV lasix daily at discharge but will need to monitor renal function.  No CP reported.  Had initiated Avelox for suspected PNA but changed to  Levaquin as UA also indicating infection.  Has allergy to PCN and Rocephin.  Need to monitor weights and renal function closely.

## 2018-11-14 NOTE — ASSESSMENT & PLAN NOTE
- previously noted hyponatremia with Na at 128  - given clinical scenario, likely hypervolemic hyponatremia given weight gain, imaging with effusions and pulmonary edema, as well as acute hypoxia  - previously however, patient with episodes of hypovolemic hyponatremia that may have been responsive to IVF with predisposing factors for volume depletion (diarrheal illness, etc.)  - ovall, will diurese today as noted above, repeat labs tomorrow

## 2018-11-14 NOTE — ASSESSMENT & PLAN NOTE
Karly Sandoval is a 88 y.o. lady with HFpEF, HTN, HLD, and T12 compression fracture who [place] [facility] [place] of worsening lower back pain.  Medicine was consulted regarding new onset hypoxia with CXR with noted pleural effusion and pulmonary vascular congestion consistent with volume overload.  Overall, BNP also elevated 490, where he baseline is WNL.  CT imaging of spine reviewed with also noted bilateral pleural effusions, L > R.  Overall, patient with likely acute decompensated heart failure with underlying chronic diastolic dysfunction/HFpEF.  Differential also includes effusion from other etiology, possibly malignancy given pancreatic head mass.  Low suspicion for LRTI infection given lack of infectious respiratory symptoms.  Overall, patient with noted previous improvement with initiation of IV diuretics, will continue at this time.    Recommendations  - will place order for lasix 40 mg IV once, as previous notes in chart with noted reactivity with lasix  - further assessment depending on improvement on volume status to assess if patient's respiratory function improves  - strict intake and output ordered, female external urinary catheter in place  - wean FiO2 as tolerated, currently on 2L NC   - daily labs

## 2018-11-14 NOTE — ASSESSMENT & PLAN NOTE
87 y/o female with hx of kyphoplasty at T8 and T9, with a known acute L2 compression fracture, who presents with worsening back pain and a new T12 rib fracture and T12 compression fracture with retropulsion.     -Patient neurologically stable on exam  -Dr. Leon discussed the imaging findings with the patient and her son. Kyphoplasty is not an option for the T12 fracture due to the component of retropulsion. Son voiced understanding. Due to patient's age, co morbidities, and bone quality, she is not a candidate for posterior instrumentation. Patient also stated that she is not interested in anything more invasive than a kyphoplasty. Son voiced understanding. Treatment plan will be to continue with bracing, pain control, and therapy. All of the son's questions were answered by Dr. Leon.   -Consulted [facility] for assistance with treatment of UTI, PNA, pleural fluid, and decreased appetite.   -Pain: DC Oxycodone. Alternate Tylenol and Tramadol PRN. Continue Lidocaine patches.   -HTN: Continue home dose of Amlodipine, Lisinopril, and Carvedilol  -Chronic diastolic CHF: Continue home dose of Lasix q M/W/F  -Anxiety: Continue home dose of Sertraline and Lexapro  -Constipation: Begin Senna BID. Continue suppository and cellulose PRN.   -Atelectasis prevention: Will start scheduled duo nebs BID and IS hourly while awake.   -DVTP: TOM's, SCD's, and begin SQH      DISPO: Pending PT/OT eval. Will plan for return to [facility] once pain better controlled.

## 2018-11-14 NOTE — ASSESSMENT & PLAN NOTE
Continue chronic therapy with sertraline.  Patient did not tolerate therapy with amitriptyline due to lethargy  Continue chronic xanax nightly as she takes at home; GDR not to be attempted due to acute illness and failure of alternative therapy with risk of decompensating condition and prohibiting rehabilitation potential    10/25/18  Chronic, continue Zoloft as ordered.  Per MD, unable to tolerate Elavil.  Xanax qhs as she chronically takes.  Continue to monitor.    11/2/18  Family request Zoloft be discontinue and Lexapro be restarted.  They felt she did better with Lexapro.  Will change Xanax to PRN dosing today.  Recommend she follow up with PCP.    11/7/18  Anxiety has improved since resuming Lexapro.  Xanax now PRN.  Continue treatment regimen and follow up with PCP.    11/12/18  Anxiety due to medical condition.  Continue Lexapro as ordered and continue Xanax PRN.    11/14/18  Changed from Zoloft back to Lexapro per family request.  Improvement noted in her anxiety.  Recommend to follow up with PCP and/or Psych for further treatment.

## 2018-11-14 NOTE — ASSESSMENT & PLAN NOTE
Patient with noted worsening lower back pain requiring further evaluation.  Repeat imaging did reveal new severe spinal canal stenosis related to new T12 vertebral body compression fracture and 7 mm osseous retropulsion.  Differential also includes pain originating from cystic lesion in pancreatic head that would require further evaluation.  Patient initially admitted to neurosurgery, noted not a surgical candidate at this time and will opt for conservative management and continued bracing.  Noted previously when pancreatic lesion found that family would wait to defer further evaluation.  Discussion with son and patient at bedside, who note that they are willing for further evaluation as long as they can have a discussion what tests would be available.  Discussed with [facility] service, where they noted their next step would be an EUS.      Recommendations  - continue management of compression fracture as per neurosurgical recommendations   - discussed with [facility] regarding EUS, who recommend outpatient EUS at this time.  Can further discuss options available once patient improves medically.  If outpatient management preferred, can follow up with [facility] fellow to assure patient can be scheduled for outpatient EUS.  If however, patient requesting further inpatient imaging, can consult EUS and they can assess if EUS can be performed as an inpatient  - continue current medical management of pain control

## 2018-11-14 NOTE — ASSESSMENT & PLAN NOTE
- noted acute decompensation as above  - continue lasix as per above  - continue management of BP and volume status

## 2018-11-14 NOTE — PROGRESS NOTES
Pharmacist Renal Dose Adjustment Note    Karly Sandoval is a 88 y.o. female being treated with the medication ciprofloxacin     Patient Data:    Vital Signs (Most Recent):  Temp: 98.9 °F (37.2 °C) (11/14/18 1317)  Pulse: 66 (11/14/18 1317)  Resp: 17 (11/14/18 1317)  BP: (!) 114/55 (11/14/18 1317)  SpO2: 97 % (11/14/18 1317)   Vital Signs (72h Range):  Temp:  [97 °F (36.1 °C)-98.9 °F (37.2 °C)]   Pulse:  [62-75]   Resp:  [14-20]   BP: (109-164)/(48-85)   SpO2:  [86 %-100 %]      Recent Labs   Lab 11/12/18  0547 11/13/18  0542 11/14/18  0541   CREATININE 1.4 1.5* 1.7*     Serum creatinine: 1.7 mg/dL (H) 11/14/18 0541  Estimated creatinine clearance: 19.6 mL/min (A)    Medication:Ciprofloxacin dose: 500 mg q 12 hrs will be changed to medication: Ciprofloxacin dose: 500 mg q 24 hrs    Pharmacist's Name: Guerrero Arriaza  Pharmacist's Extension: [phone]

## 2018-11-14 NOTE — ASSESSMENT & PLAN NOTE
Unable to have MRI spine due to presence.    10/25/18  Chronic.  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    11/2/18  Chronic, follow up with device clinic after discharge.    11/14/18  Follow up in device clinic.

## 2018-11-14 NOTE — PROGRESS NOTES
"[facility]  Neurosurgery  Progress Note    Subjective:     History of Present Illness: Pt is an 87 y/o female with hx of HTN, HLD, RA, GERD and L2 compression fracture who presents to [facility] ED with worsening back pain. Pt is accompanied by her son. He reports she was very independent and living and home until she had her L2 compression fracture last month and is now being transitioned to assisted living home. He says over the past few weeks the pt's pain has become more significant, especially the past few days. Pain is worse with positional changes or movement. Pain is better when laying down or sitting. Pt has had previous kyphoplasty at T8-T9 about 5 years ago. Pt is currently taking liquid oxycodone without relief. She denies any leg pain, numbness/tingling, b/b incontinence, or saddle anesthesia. She does report urinary urgency which has been present for months. Pt and son also report she has been feeling "loopy" and "off" after taking her daily medications in the morning. No AC use, she says she sometimes takes 1/4 of a baby aspirin. Per pt's son, she is also being treated for a UTI as of this week.         Post-Op Info:  * No surgery found *         Interval History:   NAEON. Patient continues to report severe back pain with movement. Son at bedside. States the pain medication that the patient is currently receiving makes her groggy, decreases her appetite, and decreases her desire to participate in therapy. Son asking for medication adjustment. Pure wick in place.     Medications:  Continuous Infusions:  Scheduled Meds:   albuterol-ipratropium  3 mL Nebulization Q12H    amLODIPine  10 mg Oral Daily    atorvastatin  20 mg Oral Daily    calcium carbonate  500 mg Oral Daily    carvedilol  6.25 mg Oral BID WM    coenzyme Q10  100 mg Oral Daily    escitalopram oxalate  10 mg Oral Daily    folic acid  1 mg Oral Daily    furosemide  20 mg Oral Every Mon, Wed, Fri    hydroxychloroquine  " 200 mg Oral Daily    lidocaine  1 patch Transdermal Q24H    lisinopril  10 mg Oral Daily    senna-docusate 8.6-50 mg  1 tablet Oral BID    sertraline  25 mg Oral Daily    sodium chloride  1 g Oral TID     PRN Meds:acetaminophen, albuterol, ALPRAZolam, bisacodyl, cellulose, traMADol     Review of Systems  Objective:     Weight: 60.8 kg (134 lb)  Body mass index is 23.83 kg/m².  Vital Signs (Most Recent):  Temp: 98.9 °F (37.2 °C) (11/14/18 0856)  Pulse: 65 (11/14/18 0856)  Resp: 14 (11/14/18 0856)  BP: 126/85 (11/14/18 0856)  SpO2: 97 % (11/14/18 0856) Vital Signs (24h Range):  Temp:  [97.1 °F (36.2 °C)-98.9 °F (37.2 °C)] 98.9 °F (37.2 °C)  Pulse:  [62-70] 65  Resp:  [14-20] 14  SpO2:  [94 %-100 %] 97 %  BP: (113-149)/(48-85) 126/85        Neurosurgery Physical Exam  General: well developed, well nourished, no distress  Head: normocephalic, atraumatic  Neurologic: Alert and oriented. Thought content appropriate.  GCS: Motor: 6/Verbal: 5/Eyes: 4 GCS Total: 15  Mental Status: Awake, Alert, Oriented x 4  Language: No aphasia  Speech: No dysarthria  Cranial nerves: face symmetric, tongue midline, CN II-XII grossly intact.   Eyes: pupils equal, round, reactive to light with accomodation, EOMI.    Pulmonary: normal respirations, no signs of respiratory distress  Abdomen: soft, non-distended, not tender to palpation  Sensory: intact to light touch throughout     Motor Strength:Moves all extremities spontaneously with good tone. No abnormal movements seen.      Strength   Deltoids Triceps Biceps Wrist Extension Wrist Flexion Hand    Upper: R 5/5 5/5 5/5 5/5 5/5 5/5     L 5/5 5/5 5/5 5/5 5/5 5/5       Iliopsoas Quadriceps Knee  Flexion Tibialis  anterior Gastro- cnemius EHL   Lower: R 4+/5 5/5 5/5 5/5 5/5 5/5     L 4+/5 5/5 5/5 5/5 5/5 5/5      Doran: absent  Clonus: absent  Skin: Skin is warm, dry and intact.        Significant Labs:  Recent Labs   Lab 11/13/18  0542 11/13/18  1505 11/14/18  0541     --  92    *  --  128*   K 4.0  --  3.8   CL 97  --  97   CO2 26  --  24   BUN 41*  --  47*   CREATININE 1.5*  --  1.7*   CALCIUM 8.4*  --  8.2*   MG  --  2.1  --      Recent Labs   Lab 11/13/18  0542 11/14/18  0541   WBC 11.61 7.52   HGB 8.4* 8.0*   HCT 23.7* 23.9*   * 156     Recent Labs   Lab 11/13/18  1505   INR 1.2     Microbiology Results (last 7 days)     Procedure Component Value Units Date/Time    Urine culture [[id number]] Collected:  11/13/18 1745    Order Status:  No result Specimen:  Urine Updated:  11/13/18 1817            Assessment/Plan:     T12 compression fracture    89 y/o female with hx of kyphoplasty  at T8 and T9, with a known acute L2 compression fracture, who presents with worsening back pain and a new T12 rib fracture and T12 compression fracture with retropulsion.     -Patient neurologically stable on exam  -Dr. Leon discussed the imaging findings with the patient and her son. Kyphoplasty is not an option for the T12 fracture due to the component of retropulsion. Son voiced understanding. Due to patient's age, co morbidities, and bone quality, she is not a candidate for posterior instrumentation. Patient also stated that she is not interested in anything more invasive than a kyphoplasty. Son voiced understanding. Treatment plan will be to continue with bracing, pain control, and therapy. All of the son's questions were answered by Dr. Leon.   -Consulted [facility] for assistance with treatment of UTI, PNA, pleural fluid, and decreased appetite.   -Pain: DC Oxycodone. Alternate Tylenol and Tramadol PRN. Continue Lidocaine patches.   -HTN: Continue home dose of Amlodipine, Lisinopril, and Carvedilol  -Chronic diastolic CHF: Continue home dose of Lasix q M/W/F  -Anxiety: Continue home dose of Sertraline and Lexapro  -Constipation: Begin Senna BID. Continue suppository and cellulose PRN.   -Atelectasis prevention: Will start scheduled duo nebs BID and IS hourly while awake.   -DVTP:  TOM's, SCD's, and begin H      DISPO: Pending PT/OT eval. Will plan for return to [facility] once pain better controlled.          Discussed with Dr. Leon  Please call with any questions      Sabrina Woodruff PA-C   Neurosurgery   Pager: [phone]

## 2018-11-14 NOTE — ASSESSMENT & PLAN NOTE
Chronic and stable  Will continue to monitor with twice weekly labs.    10/25/18  Chronic, sodium down to 127.  Will give IVF as she appears dry and admits to not eating much.  Repeat Lab on Monday.  PharmD cautions use of Plaquenil if sodium continues to drop.    10/30/2018  Appears related to CHF based on labs.  Continue fluid restriction (which she does on her own due to poor po intake)  Continue lasix diuresis.     11/2/18  Continue fluid restriction, sodium level chronically low.  Continue to monitor biweekly labs.  Lasix currently on hold, will consider resuming on Monday if renal function stable.    11/7/18  Continue fluid restriction and repeat labs in AM.  Last sodium level up to 129 from 124.    11/12/18  Sodium down to 128.  Will give IV lasix and monitor.    11/13/18  On going issue with hyponatremia during admit.  Attempt to treat with IVF and fluid restriction with no significant improvement.  Will defer further workup to admit team.  Patient asymptomatic while at SNF.

## 2018-11-14 NOTE — SUBJECTIVE & OBJECTIVE
Past Medical History:   Diagnosis Date    Abdominal aortic aneurysm (AAA) without rupture 10/8/2018    Abnormal CT of the abdomen 10/8/2018    Acid reflux     Anemia     Anxiety     Atrial arrhythmia 2/11/2014    Carotid artery occlusion     Chronic prescription benzodiazepine use 10/23/2018    Compression fracture of thoracic vertebra 6/25/2014    CVA (cerebral infarction) 2014    Cyst of pancreas 10/9/2018    Diastolic heart failure     echo 2016 ef 55% +Diastolic dysf    Diverticulosis     Encounter for blood transfusion     GERD (gastroesophageal reflux disease) 9/11/2012    History of cholelithiasis     Hyperlipidemia     Hypertension     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    Osteoarthritis     Osteoarthritis of both knees 6/12/2015    Osteopenia     PFO (patent foramen ovale) 2/11/2014    PVC (premature ventricular contraction) 4/28/2014    RVOT origin -- benign     RBBB 11/14/2014    Rheumatoid arthritis(714.0)     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)     has pacemaker    Right pelvic adnexal fluid collection 10/8/2018       Past Surgical History:   Procedure Laterality Date    APPENDECTOMY      BREAST SURGERY      CARDIAC PACEMAKER PLACEMENT  11/2014    for syncope and RBBB/LAHB    CHOLECYSTECTOMY      EYE SURGERY      HEMORRHOID SURGERY      HYSTERECTOMY      1966    SKIN BIOPSY      VASCULAR SURGERY      VERTEBROPLASTY         Review of patient's allergies indicates:   Allergen Reactions    Amoxicillin Other (See Comments)     unknown    Bactrim [sulfamethoxazole-trimethoprim] Other (See Comments)     unknown    Omeprazole Other (See Comments)     Muscle and joint pain    Rocephin [ceftriaxone] Other (See Comments)     Severe acute generalized pain    Penicillins Rash       Current Facility-Administered Medications on File Prior to Encounter   Medication    [DISCONTINUED] acetaminophen tablet 500 mg     [DISCONTINUED] acetaminophen tablet 650 mg    [DISCONTINUED] albuterol-ipratropium 2.5 mg-0.5 mg/3 mL nebulizer solution 3 mL    [DISCONTINUED] alprazolam split tablet 0.125 mg    [DISCONTINUED] amLODIPine tablet 10 mg    [DISCONTINUED] atorvastatin tablet 20 mg    [DISCONTINUED] bisacodyl suppository 10 mg    [DISCONTINUED] calcium carbonate 200 mg calcium (500 mg) chewable tablet 500 mg    [DISCONTINUED] carvedilol tablet 12.5 mg    [DISCONTINUED] escitalopram oxalate tablet 10 mg    [DISCONTINUED] folic acid tablet 1 mg    [DISCONTINUED] furosemide injection 20 mg    [DISCONTINUED] hydrALAZINE tablet 25 mg    [DISCONTINUED] hydrocodone-apap 7.5-325 MG/15 ML oral solution 2.5 mL    [DISCONTINUED] hydrocodone-apap 7.5-325 MG/15 ML oral solution 5 mL    [DISCONTINUED] hydroxychloroquine tablet 200 mg    [DISCONTINUED] levoFLOXacin tablet 250 mg    [DISCONTINUED] lidocaine 5 % patch 2 patch    [DISCONTINUED] lisinopril tablet 10 mg    [DISCONTINUED] polyethylene glycol packet 17 g    [DISCONTINUED] ramelteon tablet 8 mg    [DISCONTINUED] senna-docusate 8.6-50 mg per tablet 1 tablet    [DISCONTINUED] sodium chloride 0.9% flush 5 mL     Current Outpatient Medications on File Prior to Encounter   Medication Sig    albuterol sulfate 2.5 mg/0.5 mL Nebu Take 2.5 mg by nebulization. Rescue    amLODIPine (NORVASC) 10 MG tablet Take 10 mg by mouth once daily.    atorvastatin (LIPITOR) 20 MG tablet TAKE 1 TABLET BY MOUTH EVERY DAY    bisacodyl (DULCOLAX) 10 mg Supp Place 10 mg rectally daily as needed (CONSTIPATION).    calcium carbonate (CALCIUM 600 ORAL) Take 1 tablet by mouth 2 (two) times daily.    carvedilol (COREG) 6.25 MG tablet Take 1 tablet (6.25 mg total) by mouth 2 (two) times daily with meals.    escitalopram oxalate (LEXAPRO) 10 MG tablet Take 10 mg by mouth once daily.    fish oil-omega-3 fatty acids 300-1,000 mg capsule Take 1,000 mg by mouth once daily.     folic acid (FOLVITE) 1  MG tablet TAKE 1 TABLET (1 MG TOTAL) BY MOUTH ONCE DAILY.    furosemide (LASIX) 20 MG tablet Take 1 tablet (20 mg total) by mouth once daily. (Patient taking differently: Take 20 mg by mouth every Mon, Wed, Fri. )    hydroxychloroquine (PLAQUENIL) 200 mg tablet Take 1 tablet (200 mg total) by mouth once daily.    lisinopril 10 MG tablet Take 1 tablet (10 mg total) by mouth once daily.    psyllium (METAMUCIL) powder Take 1 packet by mouth daily as needed (CONSTIPATION).    acetaminophen (TYLENOL) 650 MG TbSR Take 650 mg by mouth daily as needed (PAIN).    ALPRAZolam (XANAX) 0.25 MG tablet TAKE 1 TABLET BY MOUTH EVERY DAY AS NEEDED (Patient taking differently: TAKE ½ TABLET BY MOUTH TWICE DAILY)    aspirin (ECOTRIN) 81 MG EC tablet Take 1 tablet (81 mg total) by mouth once daily. HOLD until cleared by your ENT doctor and your Neurologist. (Patient taking differently: Take 81 mg by mouth once daily. )    coenzyme Q10 (CO Q-10) 100 mg capsule Take 100 mg by mouth once daily.    denosumab (PROLIA) 60 mg/mL Syrg Inject 60 mg into the skin. Every 6 months    sertraline (ZOLOFT) 25 MG tablet Take 1 tablet (25 mg total) by mouth once daily.     Family History     Problem Relation (Age of Onset)    Diabetes Mellitus Mother, Brother    Heart disease Father    Hypertension Father, Mother    Leukemia Father    Parkinsonism Brother (68)        Tobacco Use    Smoking status: Never Smoker    Smokeless tobacco: Never Used   Substance and Sexual Activity    Alcohol use: No     Comment: [facility]    Drug use: No    Sexual activity: No     Partners: Male     Birth control/protection: None     Review of Systems   Constitutional: Positive for appetite change and fatigue. Negative for chills and fever.   Respiratory: Negative for cough and wheezing.    Cardiovascular: Positive for leg swelling. Negative for chest pain and palpitations.   Gastrointestinal: Negative for abdominal distention, abdominal pain, constipation,  diarrhea, nausea and vomiting.   Genitourinary: Negative for dysuria, flank pain and frequency.   Musculoskeletal: Positive for back pain. Negative for arthralgias, myalgias and neck stiffness.   Skin: Negative for pallor and rash.   Neurological: Negative for light-headedness and headaches.   Hematological: Negative for adenopathy.   Psychiatric/Behavioral: Positive for confusion. Negative for agitation and behavioral problems.     Objective:     Vital Signs (Most Recent):  Temp: 98.6 °F (37 °C) (11/14/18 1144)  Pulse: 63 (11/14/18 1144)  Resp: 14 (11/14/18 0856)  BP: (!) 109/51 (11/14/18 1144)  SpO2: (!) 93 % (11/14/18 1144) Vital Signs (24h Range):  Temp:  [97.1 °F (36.2 °C)-98.9 °F (37.2 °C)] 98.6 °F (37 °C)  Pulse:  [63-70] 63  Resp:  [14-20] 14  SpO2:  [93 %-100 %] 93 %  BP: (109-149)/(48-85) 109/51     Weight: 60.8 kg (134 lb)  Body mass index is 23.83 kg/m².    Physical Exam   Constitutional: She is oriented to person, place, and time. She appears well-developed and well-nourished. No distress.   HENT:   Head: Normocephalic and atraumatic.   NC in place, on 2 L   Eyes: Pupils are equal, round, and reactive to light. No scleral icterus.   Neck: Normal range of motion. Neck supple.   Cardiovascular: Normal rate, regular rhythm, normal heart sounds and intact distal pulses.   No murmur heard.  Pulmonary/Chest: Effort normal. No respiratory distress.   Noted decreased breath sounds bibasilarly   Abdominal: Soft. Bowel sounds are normal. She exhibits no distension. There is no tenderness.   Musculoskeletal: Normal range of motion. She exhibits edema (1+ pitting edema on LE bilaterally ).   Neurological: She is alert and oriented to person, place, and time. No cranial nerve deficit.   Skin: Skin is warm and dry. Capillary refill takes 2 to 3 seconds. She is not diaphoretic. No erythema.   Psychiatric: She has a normal mood and affect. Her behavior is normal.   Vitals reviewed.      Significant Labs:    Recent  Results (from the past 24 hour(s))   Magnesium    Collection Time: 11/13/18  3:05 PM   Result Value Ref Range    Magnesium 2.1 1.6 - 2.6 mg/dL   TSH    Collection Time: 11/13/18  3:05 PM   Result Value Ref Range    TSH 1.950 0.400 - 4.000 uIU/mL   Protime-INR    Collection Time: 11/13/18  3:05 PM   Result Value Ref Range    Prothrombin Time 12.4 9.0 - 12.5 sec    INR 1.2 0.8 - 1.2   Urinalysis, Reflex to Urine Culture Urine, Clean Catch    Collection Time: 11/13/18  5:45 PM   Result Value Ref Range    Specimen UA Urine, Clean Catch     Color, UA Yellow Yellow, Straw, Lelia    Appearance, UA Cloudy (A) Clear    pH, UA 5.0 5.0 - 8.0    Specific Gravity, UA 1.010 1.005 - 1.030    Protein, UA 1+ (A) Negative    Glucose, UA Negative Negative    Ketones, UA Negative Negative    Bilirubin (UA) Negative Negative    Occult Blood UA Negative Negative    Nitrite, UA Negative Negative    Leukocytes, UA 3+ (A) Negative   Drug screen panel, emergency    Collection Time: 11/13/18  5:45 PM   Result Value Ref Range    Benzodiazepines Negative     Methadone metabolites Negative     Cocaine (Metab.) Negative     Opiate Scrn, Ur Presumptive Positive     Barbiturate Screen, Ur Negative     Amphetamine Screen, Ur Negative     THC Negative     Phencyclidine Negative     Creatinine, Random Ur 62.0 15.0 - 325.0 mg/dL    Toxicology Information SEE COMMENT    Urinalysis Microscopic    Collection Time: 11/13/18  5:45 PM   Result Value Ref Range    RBC, UA 0 0 - 4 /hpf    WBC, UA >100 (H) 0 - 5 /hpf    Bacteria, UA Many (A) None-Occ /hpf    Squam Epithel, UA 28 /hpf    Non-Squam Epith 1 (A) <1/hpf /hpf    Hyaline Casts, UA 0 0-1/lpf /lpf    Amorphous, UA Moderate None-Moderate    Microscopic Comment SEE COMMENT    Type & Screen    Collection Time: 11/13/18  6:08 PM   Result Value Ref Range    Group & Rh A NEG     Indirect Anabel NEG    CBC auto differential    Collection Time: 11/14/18  5:41 AM   Result Value Ref Range    WBC 7.52 3.90 - 12.70  K/uL    RBC 2.59 (L) 4.00 - 5.40 M/uL    Hemoglobin 8.0 (L) 12.0 - 16.0 g/dL    Hematocrit 23.9 (L) 37.0 - 48.5 %    MCV 92 82 - 98 fL    MCH 30.9 27.0 - 31.0 pg    MCHC 33.5 32.0 - 36.0 g/dL    RDW 14.7 (H) 11.5 - 14.5 %    Platelets 156 150 - 350 K/uL    MPV 9.9 9.2 - 12.9 fL    Immature Granulocytes 0.7 (H) 0.0 - 0.5 %    Gran # (ANC) 5.8 1.8 - 7.7 K/uL    Immature Grans (Abs) 0.05 (H) 0.00 - 0.04 K/uL    Lymph # 0.7 (L) 1.0 - 4.8 K/uL    Mono # 0.9 0.3 - 1.0 K/uL    Eos # 0.1 0.0 - 0.5 K/uL    Baso # 0.02 0.00 - 0.20 K/uL    nRBC 0 0 /100 WBC    Gran% 77.5 (H) 38.0 - 73.0 %    Lymph% 8.6 (L) 18.0 - 48.0 %    Mono% 11.4 4.0 - 15.0 %    Eosinophil% 1.5 0.0 - 8.0 %    Basophil% 0.3 0.0 - 1.9 %    Differential Method Automated    Basic metabolic panel    Collection Time: 11/14/18  5:41 AM   Result Value Ref Range    Sodium 128 (L) 136 - 145 mmol/L    Potassium 3.8 3.5 - 5.1 mmol/L    Chloride 97 95 - 110 mmol/L    CO2 24 23 - 29 mmol/L    Glucose 92 70 - 110 mg/dL    BUN, Bld 47 (H) 8 - 23 mg/dL    Creatinine 1.7 (H) 0.5 - 1.4 mg/dL    Calcium 8.2 (L) 8.7 - 10.5 mg/dL    Anion Gap 7 (L) 8 - 16 mmol/L    eGFR if African American 30.6 (A) >60 mL/min/1.73 m^2    eGFR if non  26.5 (A) >60 mL/min/1.73 m^2         Significant Imaging: I have reviewed all pertinent imaging results/findings within the past 24 hours.

## 2018-11-14 NOTE — ASSESSMENT & PLAN NOTE
New on imaging but likely present since admit  Continue lidoderm patches and scheduled tylenol  TLSO brace ordered.  Patient will need f/u with neurosurgery next available.    11/2/18  Pain persist despite change in treatment plan.  Added Tramadol yesterday but only took one dose.  Set to see Neurosurgery next week.  Patient is not amendable to surgery at this time but has said she will see what her son, Rene, thinks.  Continue Lidoderm patch, scheduled tylenol and continue to wear TSLO brace when out of bed.    11/7/18  Treatment as above.  Future Appointments   Date Time Provider Department Center   11/19/2018  8:00 AM HOME MONITOR DEVICE CHECK, [facility] GERALDANDREA Javier [facility]   11/19/2018  1:00 PM Poncho Leon MD [facility]UROSU [facility]   1/14/2019 11:30 AM Isai Smith MD [facility]   2/19/2019  8:00 [facility]     11/12/18  Plan as above.    11/13/18  Patient with worsening back pain since working with therapy at initial evaluation on 10-23 - 10-24.  Repeat x-ray indicative of New T12 compression fracture.  Attempted to get follow up with NSGY to discuss treatment options but unfortunately appointment canceled by someone in the clinic.  Initially tried treating her pain with Tylenol PRN then scheduled tid but no relief.  Then tried Tramadol but did not take out of fear of confusion.  Then switch to lortab elixir but forgot to request.  Then changed lortab to scheduled bid dosing but still no relief.  When laying still, she did not have any pain.  Son wants her seen by NSGY today and is requesting sending to ED.  Patient sent to ED for emergent evaluation and was admitted for pain control.

## 2018-11-14 NOTE — ED NOTES
Telemetry Verification   Patient placed on Telemetry Box  Verified with War Room  Box # [id number]   Monitor Tech Tiffiany   Rate 65   Rhythm Sinus Rhythm

## 2018-11-14 NOTE — DISCHARGE SUMMARY
[facility] - Skilled Nursing Care  Department of [facility] Medicine  Discharge Summary      Patient Name: Karly Sandoval  MRN: [id number]  Admission Date: 10/23/2018  Hospital Length of Stay: 21 days  Discharge Date and Time: 11/13/18  Attending Physician: Chong Saab MD   Discharging Provider: Warren Barry NP  Primary Care Provider: Uday Allen MD    HPI:   Chief Complaint/Reason for Admission: Acute bilateral low back pain without sciatica    History of Present Illness:  Patient is a 88 y.o. 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  She was evaluated by neurosurgery with normal IAN and sensation with low suspicion for cauda equina syndrome.  CT myelogram was considered but given low suspicion for condition, risk of kidney injury and patient's disinterest in surgery, procedure was not done.  She complains of pain to her back today rating it 6/10 but controlled with tylenol.  She did well in therapy per her bu is worn out subsequently.  She has a poor appetite.      The patient has been admitted to SNF for ongoing PT/OT due to insufficient progress to go home safely from the hospital.    Records from last hospital stay reviewed and summarized above.     * No surgery found *      Hospital Course:   10/25/18  Patient seen at bedside, son is present.  She reports having worsening back pain to mid back.  States symptoms started after therapy.  She has no report paraesthesia or pain radiation down legs.  Patient said current pain regimen is not helping.  Son inquired about use of back brace to help with pain control.  Advised him, would discuss with MD.  He also reports patient with history of RA and is followed by Dr. Smith.  Son reports patient is going [facility]  if deemed appropriate.  Patient reports she does not have a good appetite but tries to eat.  She reports she is drinking fluids and urinating and having BM without issues.      Discussed case with Dr. Henry, she suggest no need to repeat imaging at this time but suggest increasing lidoderm patch to 2 and try use of abdominal binder to support back.  Will order and monitor.       11/2/18  Patient seen at bedside, she reports she is still having back pain.  Therapy reports she did a little better this morning with her session.  She is starting to retain urine and required I&O cath today, bladder scan showed ~254 cc of urine.  Patient is concerned about her lack of progress.  Therapy feels her progress has declined since admission as her eval was much better.  However, patient with acute T12 compression fracture.  She has follow up with spine clinic and a TSLO brace has been obtained for her.  Pain medication has been adjusted to optimize pain control, ordered Tramadol 25 mg tid PRN which she has only taken 1 dose so far.  Will change scheduled Xanax to PRN dosing as this is how it is ordered for her at home.    11/7/18 at 12 pm  Patient still with back pain with movement.  Earliest appointment with Neurosurgery is next week.  Continue to wear back brace.  Patient had not taken Lortab elixir prior to rounding and encouraged her to use to assist with pain control.  She has agreed to take today and informed nursing to bring a dose to her.      1355 went back to see patient, she reports lortab gave her minimal relief but was nervous she would get sleepy and fall out of wheelchair.  Son is currently at bedside.  Explained to patient again, the importance of using her pain medication to help minimize her pain.  She has agreed to comply and will reassess in 24 hours.    11/12/18  Patient seen at bedside, she is reporting SOB and abdominal distension.  She reports having BM, denies n/v.  CXR and abd x-ray ordered.  Labs  reviewed, WBC is up from previous lab draw and pulse ox is 94% on 1 liter.  She still complains of back pain which has limited her progression with therapy.  She was suppose to see NSGY tomorrow but appointment was cancelled.  I spoke with NSGY PA this evening, advised her of patient's issues.  She is recommending patient see the surgeon as she cannot provide any information relating to surgical fixation or vertebralplasty.  She states she will discuss with Dr. Worthington in AM to determine if patient can be seen by him or Dr. Leon.  Family updated on plan of care.      11/13/14  Patient seen at bedside, she is not having as much difficulty breathing today as she did on 11/12.  She still in significant back pain.  Son is upset over cancellation of her NSGY appointment.  He has requested patient be sent to [facility] ED where NSGY could see her today.  Labs reviewed, her sodium is 129, BUN/Cr up to 41/1.5 from 34/1.4 the day prior.  eGFR is 30.9.  She is currently on IV lasix given recent CXR findings.  Will change Avelox to Levoquin as UA is also indicating an infection.  She is currently afebrile.  Called [facility] ED and gave report to Dr. Kapoor.  Patient transported per family request and was admitted.     Consults (From admission, onward)        Status Ordering Provider     Inpatient consult to Registered Dietitian/Nutritionist  Once     Provider:  (Not yet assigned)    Completed ANNA PINTO          Significant Diagnostic Studies: Labs:   BMP:   Recent Labs   Lab 11/13/18  0542 11/13/18  1505 11/14/18  0541     --  92   *  --  128*   K 4.0  --  3.8   CL 97  --  97   CO2 26  --  24   BUN 41*  --  47*   CREATININE 1.5*  --  1.7*   CALCIUM 8.4*  --  8.2*   MG  --  2.1  --     and CBC   Recent Labs   Lab 11/13/18  0542 11/14/18  0541   WBC 11.61 7.52   HGB 8.4* 8.0*   HCT 23.7* 23.9*   * 156       Pending Diagnostic Studies:     None        Final Active Diagnoses:    Diagnosis Date Noted POA     PRINCIPAL PROBLEM:  Compression fracture of T12 vertebra [S22.080A] 10/30/2018 Yes    Urinary retention [R33.9] 10/24/2018 Yes    Hyponatremia [E87.1] 10/18/2018 Yes    Acute bilateral low back pain without sciatica [M54.5] 10/17/2018 Yes    Anxiety [F41.9] 02/01/2018 Yes    Pacemaker [Z95.0] 03/25/2015 Yes    Chronic diastolic CHF (congestive heart failure) [I50.32] 02/11/2014 Yes    RA (rheumatoid arthritis) [M06.9] 09/11/2012 Yes    Essential hypertension [I10] 09/11/2012 Yes      Problems Resolved During this Admission:    Diagnosis Date Noted Date Resolved POA    Acute cystitis without hematuria [N30.00] 10/30/2018 11/07/2018 Yes      * Compression fracture of T12 vertebra    New on imaging but likely present since admit  Continue lidoderm patches and scheduled tylenol  TLSO brace ordered.  Patient will need f/u with neurosurgery next available.    11/2/18  Pain persist despite change in treatment plan.  Added Tramadol yesterday but only took one dose.  Set to see Neurosurgery next week.  Patient is not amendable to surgery at this time but has said she will see what her son, Rene, thinks.  Continue Lidoderm patch, scheduled tylenol and continue to wear TSLO brace when out of bed.    11/7/18  Treatment as above.  Future Appointments   Date Time Provider Department Center   11/19/2018  8:00 AM HOME MONITOR DEVICE CHECK, [facility] JAMEEL Javier [facility]   11/19/2018  1:00 PM Poncho Leon MD [facility] NEUROSU [facility]   1/14/2019 11:30 AM Isai Smith MD [facility]   2/19/2019  8:00 AM HOME MONITOR DEVICE CHECK, [facility] JAMEEL J[facility]     11/12/18  Plan as above.    11/13/18  Patient with worsening back pain since working with therapy at initial evaluation on 10-23 - 10-24.  Repeat x-ray indicative of New T12 compression fracture.  Attempted to get follow up with NSGY to discuss treatment options but unfortunately appointment canceled by someone in the clinic.  Initially tried treating  her pain with Tylenol PRN then scheduled tid but no relief.  Then tried Tramadol but did not take out of fear of confusion.  Then switch to lortab elixir but forgot to request.  Then changed lortab to scheduled bid dosing but still no relief.  When laying still, she did not have any pain.  Son wants her seen by NSGY today and is requesting sending to ED.  Patient sent to ED for emergent evaluation and was admitted for pain control.     Urinary retention    Urge incontinence history.  She was started in tamsulosin therapy; urinating without difficulty currently.     10/25/18  No complaints today, continue Flomax as ordered.    11/2/18  Required I&O cath today.  Continue Flomax as ordered.  No reports of urinary burning.  Treating for K Pneumoniae and Proteus UTI with Cipro.  No fevers and WBC is normal.    11/7/18  Improved no complaints.  No longer on Flomax.    11/13/18  Patient had developed a leukocytosis.  UAR obtained and suspicious for UTI.  Treating with Levoquin at time of transfer.     Hyponatremia    Chronic and stable  Will continue to monitor with twice weekly labs.    10/25/18  Chronic, sodium down to 127.  Will give IVF as she appears dry and admits to not eating much.  Repeat Lab on Monday.  PharmD cautions use of Plaquenil if sodium continues to drop.    10/30/2018  Appears related to CHF based on labs.  Continue fluid restriction (which she does on her own due to poor po intake)  Continue lasix diuresis.     11/2/18  Continue fluid restriction, sodium level chronically low.  Continue to monitor biweekly labs.  Lasix currently on hold, will consider resuming on Monday if renal function stable.    11/7/18  Continue fluid restriction and repeat labs in AM.  Last sodium level up to 129 from 124.    11/12/18  Sodium down to 128.  Will give IV lasix and monitor.    11/13/18  On going issue with hyponatremia during admit.  Attempt to treat with IVF and fluid restriction with no significant  improvement.  Will defer further workup to admit team.  Patient asymptomatic while at SNF.     Acute bilateral low back pain without sciatica    Likely multifactorial involving RA, chronic compression fractures and old left L3 transverse process  Continue tylenol prn pain and lidocaine patch daily.  Continue dexamethasone therapy with end date of 10/28  -continue PT/OT to increase ambulation, ADL performance and endurance  -continue TOM's and SCD's for DVT prophylaxis  -continue fall precautions  -continue senokot-s and miralax to prevent constipation; hold for frequent or loose stooling    10/25/18  Patient reports pain to back is worse after therapy today.  Discussed with Dr. Henry, she suggested changing to 2 lidoderm patches and continue Tylenol dosing as ordered.  No need to re-image at this time.  Continue TOM/SCD for dvt ppx.  Will try using an abdominal binder to see if this helps with pain with therapy sessions.    11/2/18  Now with TSLO and recent x-ray showed compression fracture at T12 which is new.    Set appointment with Neurosurgery to discuss treatment options.  Continue TOM/SCD for dvt ppx.  Added Tramadol PRN to assist with pain control and continue Tylenol as ordered.    11/7/18  Patient now with new T12 compression fracture.  Continue TSLO and follow up with Neurosurgery.  Stopped Tramadol and use Lortab elixir PRN for pain control.  Continue TOM/SCD for dvt ppx.    11/12/18  Pain has been ongoing issue.  New T12 compression fracture.  Pain limiting overall progress.  Was set to see NSGY tomorrow but appointment was cancelled by someone in clinic.  Spoke with KELVIN Moncada with NSGY, said she cannot provide any detail regarding surgical fixation or vetebralplasty or not and will ask Dr. Worthington if he or Dr. Leon can see her.    Family updated.  Continue Lortab elixir as ordered.    11/13/18  Patient has not progressed since worsening back pain dating back to 10-24.  Several changes in her pain  regimen was made here at [facility] in addition to getting a TSLO brace for her.  Family is wanting NSGY to eval today and requesting transfer to the ED.  Currently on Lortab elixir.     Anxiety    Continue chronic therapy with sertraline.  Patient did not tolerate therapy with amitriptyline due to lethargy  Continue chronic xanax nightly as she takes at home; GDR not to be attempted due to acute illness and failure of alternative therapy with risk of decompensating condition and prohibiting rehabilitation potential    10/25/18  Chronic, continue Zoloft as ordered.  Per MD, unable to tolerate Elavil.  Xanax qhs as she chronically takes.  Continue to monitor.    11/2/18  Family request Zoloft be discontinue and Lexapro be restarted.  They felt she did better with Lexapro.  Will change Xanax to PRN dosing today.  Recommend she follow up with PCP.    11/7/18  Anxiety has improved since resuming Lexapro.  Xanax now PRN.  Continue treatment regimen and follow up with PCP.    11/12/18  Anxiety due to medical condition.  Continue Lexapro as ordered and continue Xanax PRN.    11/14/18  Changed from Zoloft back to Lexapro per family request.  Improvement noted in her anxiety.  Recommend to follow up with PCP and/or Psych for further treatment.     Pacemaker    Unable to have MRI spine due to presence.    10/25/18  Chronic.  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    11/2/18  Chronic, follow up with device clinic after discharge.    11/14/18  Follow up in device clinic.     Chronic diastolic CHF (congestive heart failure)    -currently compensated on clinical exam  -continue current medical therapy with lisinopril, coreg, furosemide (dose reduced due to recent CHARLENE and poor po intake but dexamethasone therapy currently)  -continue low Na diet to treat  -continue daily weight monitoring with adjustment of diuretic therapy as necessary to treat weight gains > 2-3 pounds in 24-48 hours  -continue daily pulse oximetry  monitoring; proceed with CXR imaging and adjustment of diuretic therapy as necessary to treat new or worsening hypoxia  -intermittent clinical exam monitoring with adjustment of diuretic regimen as necessary to treat signs of volume overload  -f/u with cardiology as scheduled    10/25/18  Labs indicated patient is dehydrated.  Will give  cc total per Dr. Henry request and hold lasix for now.  Patient with lower leg edema that is chronic.  No reports of SOB.  Monitor weights.  Wt Readings from Last 1 Encounters:   11/13/18 1753 60.8 kg (134 lb)     Continue b-blocker as ordered.    10/30/2018:  Patient with LE edema and hyponatremia with lasix therapy resumed on 10/29.  Will continue to monitor labs and weights.     11/2/18  Appears compensated, weight is stable, no reports of SOB.  Continue ACE, b-blocker and continue to hold lasix.  No reports of chest pain or SOB.    11/7/18  Weight up 3 Kg from admit.  Still with LE edema.  Will give dose of Lasix today and check labs in AM.    11/12/18  Wt Readings from Last 1 Encounters:   11/13/18 1753 60.8 kg (134 lb)     Weight up, now SOB.  CXR consistent with pulmonary edema versus PNA.  Give IV lasix x 3 days and monitor closely.  If symptoms do not improve will readmit.  Start Avelox for PNA, allergy to PCN and Rocephin  Continue ACE and b-blocker as ordered.    11/13/18  Weight up, CXR indicative of pleural effusion versus PNA.  Giving IV lasix daily at discharge but will need to monitor renal function.  No CP reported.  Had initiated Avelox for suspected PNA but changed to Levaquin as UA also indicating infection.  Has allergy to PCN and Rocephin.  Need to monitor weights and renal function closely.     Essential hypertension    Chronic and controlled  Continue therapy with carvedilol, amlodipine and lisinopril  Will continue to monitor and adjust regimen as necessary.    10/25/18  BP Readings from Last 3 Encounters:   11/14/18 (!) 114/55   11/13/18 (!) 127/58  "  10/23/18 (!) 111/59     continue Norvasc, coreg and Hydralazine PRN as ordered.  Will stop ACE for now.  BP is stable.    11/2/18  BP mildly elevated, will restart ACE and monitor renal function, last creatinine down to 0.9.  Continue Norvasc and Coreg at current dosing.  Hydralazine PRN as ordered.    11/7/18  BP is stable, continue Norvasc, Coreg and lisinopril.  Monitor renal panel in AM.  Hydralazine PRN, continue as ordered.    11/12/18  BP mildly elevated but still in pain.  Continue Norvasc, Coreg and Lisinopril and monitor.  Hydralazine PRN for systolic BP > 170    11/13/18  BP Readings from Last 3 Encounters:   11/14/18 (!) 114/55   11/13/18 (!) 127/58   10/23/18 (!) 111/59     BP is stable.  Currently taking Norvasc, Coreg and Lisinopril at time of transfer.  Will defer any further treatment recommendation to admitting team.     RA (rheumatoid arthritis)    Continue hydroxychloroquine and folic acid.  MTX discontinued by Rheumatology.  She will chito f/u with rheum on discharge from [facility].     10/25/18  Follow by Dr. Smith in Rheum, will need f/u after discharge.  Continue Plaquenil and MTX per MD orders.  Sodium level is low but chronic, will need to monitor given concurrent use of Plaquenil.      11/2/18  Follow up with Rheumatology after discharge.  Continue Plaquenil and MTX as ordered.  Hyponatremia is chronically low and stable, continue to monitor.    11/13/18  Patient on Plaquenil and MTX.  Recommend she follow up with Rheumatology after discharge.         Discharged Condition: poor    Disposition: Admitted as an Inpatient    Follow Up:    Patient Instructions:      HME - OTHER   Order Comments: New T12 vertebral compression fracture     Order Specific Question Answer Comments   Type of Equipment: TLSO brace    Height: 5' 2" (1.575 m)    Weight: 55.2 kg (121 lb 11.1 oz)    Does patient have medical equipment at home? bedside commode    Does patient have medical equipment at home? cane, straight  "   Does patient have medical equipment at home? bath bench    Vendor: Other (use comments) Sent to [facility] office   Expected Date of Delivery: 10/30/2018      Medications:  Transfer Medications (for Discharge Readmit only):   No current facility-administered medications for this encounter.      No current outpatient medications on file.     Facility-Administered Medications Ordered in Other Encounters   Medication Dose Route Frequency Provider Last Rate Last Dose    acetaminophen tablet 650 mg  650 mg Oral Q6H PRN KELVIN Ashley        albuterol nebulizer solution 2.5 mg  2.5 mg Nebulization Q4H PRN Bernice Brewer PA-C        albuterol-ipratropium 2.5 mg-0.5 mg/3 mL nebulizer solution 3 mL  3 mL Nebulization Q12H KELVIN Ashley        ALPRAZolam tablet 0.25 mg  0.25 mg Oral Daily PRN YRN AbadC   0.25 mg at 11/13/18 2310    amLODIPine tablet 10 mg  10 mg Oral Daily Bernice Brewer PA-C   10 mg at 11/14/18 1006    atorvastatin tablet 20 mg  20 mg Oral Daily KELVIN Abad-C   20 mg at 11/14/18 1006    bisacodyl suppository 10 mg  10 mg Rectal Daily PRN Bernice Brewer PA-C        calcium carbonate 500 mg/5 mL (1,250 mg/5 mL) suspension 500 mg  500 mg Oral Daily YRN AbadC   500 mg at 11/14/18 1005    carvedilol tablet 6.25 mg  6.25 mg Oral BID WM YRN AbadC   6.25 mg at 11/14/18 1006    cellulose 80 % powder 15 mL  15 mL Oral Daily PRN Bernice Brewer PA-C        ciprofloxacin HCl tablet 500 mg  500 mg Oral Daily Poncho Leon MD        coenzyme Q10 100 mg  100 mg Oral Daily Bernice Brewer PA-C   100 mg at 11/14/18 1006    escitalopram oxalate tablet 10 mg  10 mg Oral Daily KELVIN Abad-C   10 mg at 11/14/18 1007    folic acid tablet 1 mg  1 mg Oral Daily YRN AbadC   1 mg at 11/14/18 1005    furosemide injection 40 mg  40 mg Intravenous Daily Pavel Dietrich MD        [START ON 11/15/2018] heparin (porcine) injection 5,000 Units   5,000 Units Subcutaneous Q8H KELVIN Ashley        hydroxychloroquine tablet 200 mg  200 mg Oral Daily Bernice Brewer PA-C   200 mg at 11/14/18 1005    lidocaine 5 % patch 1 patch  1 patch Transdermal Q24H Jair Faye MD   1 patch at 11/13/18 1648    senna-docusate 8.6-50 mg per tablet 1 tablet  1 tablet Oral BID KELVIN Ashley        sertraline tablet 25 mg  25 mg Oral Daily Bernice Brewer PA-C   25 mg at 11/14/18 1007    sodium chloride tablet 1 g  1 g Oral TID Bernice Brewer PA-C   1 g at 11/14/18 0745    traMADol tablet 50 mg  50 mg Oral Q6H PRN KELVIN Ashley   50 mg at 11/14/18 0941     Time spent on the discharge of patient: 45 minutes    Warren Barry NP  Department of Hospital Medicine  [facility] - Skilled Nursing Care

## 2018-11-14 NOTE — ASSESSMENT & PLAN NOTE
- BP low normal   - can continue home norvasc 10 mg PO daily, coreg 6.25 mg PO BID  - hold lisinopril for CHARLENE  - q4 vitals

## 2018-11-14 NOTE — CONSULTS
[facility] Hospital Medicine  Consult Note    Patient Name: Karly Sandoval  MRN: [id number]  Admission Date: 11/13/2018  Hospital Length of Stay: 1 days  Attending Physician: Poncho Leon MD   Primary Care Provider: Uday Allen MD     [facility] Medicine Team: Networked reference to record PCT  Pavel Dietrich MD      Patient information was obtained from patient, past medical records and ER records.     Inpatient consult to [facility] Medicine-General  Consult performed by: Pavel Dietrich MD  Consult ordered by: KELVIN Ashley        Subjective:     Principal Problem: <principal problem not specified>    Chief Complaint:   Chief Complaint   Patient presents with    Back Pain     Pt is a resident at [facility]. She is having worsening back pain and her son requested for her to be evaluated in the ED.         HPI: Karly Sandoval is a 88 y.o. lady with RA (on plaquenil), HFpEF (noted diastolic dysfunction in echo on 2016), essential hypertension, hyperlipidemia, and new cystic pancreatic head mass who presented to [facility] for evaluation of worsening back pain.  She was recently seen and discharged on 10/23 to [facility] for further rehabilitation for a T12 compression fracture.  There, she was noted to have continued back pain that was not responsive to increasing pain regimen.  She was also noted to have a positive UA with E. Coli and Proteus, where she completed a 7 day course of ciprofloxacin.  Throughout her stay, she would have continued back pain without significant improvement.  She also developed acute hypoxic respiratory failure, requiring minimal supplemental O2.  It was noted in rehab notes that patient was gaining weight despite poor PO intake.  Additionally towards the past week, she was noted to have worsening altered mental status after taking her AM medications, which would improve over time.  Patient was supposed to follow up with neurosurgery as an  "outpatient for follow up, but due to significant worsening in pain, patient was brought to [facility] for further evaluation.  Prior to presentation, she had a CXR and UA performed, where UA was again positive with 3+ leukocytes and > 100 WBCs.  CXR also revealed pulmonary vascular congestion as well as a new L lower lobe effusion, where she was started on levaquin, and was given 1 dose of IV lasix.  She denies fevers, chills, N/V, coughs, chest or abdominal pains.  She received repeat imaging revealing new severe spinal canal stenosis related to new T12 vertebral body compression fracture and 7 mm osseous retropulsion.  She was subsequently admitted to Neurosurgery for further evaluation, where she was deemed not a surgical candidate, and recommended further bracing with improvement in pain control.  Medicine was consulted regarding effusion, UTI, and CHARLENE.    Of note, patient was also previously seen in [facility] prior to presentation earlier on 10/8/2018.  There, she was evaluated for hyponatremia, where she was found to have a cystic pancreatic head lesion concerning for either cystic malignancy versus pseudocyst captured on both CT and abdominal ultrasound.  Patient denies any significant EtOH use or previous abdominal pains, but did have previous cholecystectomy.      Patient assessed at bedside with son present.  She noted she did have some improvement in breathing after receiving her dose of IV lasix.  Does note that her mind does feel "unclear" at the moment, but alert and appropriate.  Notes lying flat helps improve with her back pain.          Past Medical History:   Diagnosis Date    Abdominal aortic aneurysm (AAA) without rupture 10/8/2018    Abnormal CT of the abdomen 10/8/2018    Acid reflux     Anemia     Anxiety     Atrial arrhythmia 2/11/2014    Carotid artery occlusion     Chronic prescription benzodiazepine use 10/23/2018    Compression fracture of thoracic vertebra 6/25/2014    CVA (cerebral " infarction) 2014    Cyst of pancreas 10/9/2018    Diastolic heart failure     echo 2016 ef 55% +Diastolic dysf    Diverticulosis     Encounter for blood transfusion     GERD (gastroesophageal reflux disease) 9/11/2012    History of cholelithiasis     Hyperlipidemia     Hypertension     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    Osteoarthritis     Osteoarthritis of both knees 6/12/2015    Osteopenia     PFO (patent foramen ovale) 2/11/2014    PVC (premature ventricular contraction) 4/28/2014    RVOT origin -- benign     RBBB 11/14/2014    Rheumatoid arthritis(714.0)     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)     has pacemaker    Right pelvic adnexal fluid collection 10/8/2018       Past Surgical History:   Procedure Laterality Date    APPENDECTOMY      BREAST SURGERY      CARDIAC PACEMAKER PLACEMENT  11/2014    for syncope and RBBB/LAHB    CHOLECYSTECTOMY      EYE SURGERY      HEMORRHOID SURGERY      HYSTERECTOMY      1966    SKIN BIOPSY      VASCULAR SURGERY      VERTEBROPLASTY         Review of patient's allergies indicates:   Allergen Reactions    Amoxicillin Other (See Comments)     unknown    Bactrim [sulfamethoxazole-trimethoprim] Other (See Comments)     unknown    Omeprazole Other (See Comments)     Muscle and joint pain    Rocephin [ceftriaxone] Other (See Comments)     Severe acute generalized pain    Penicillins Rash       Current Facility-Administered Medications on File Prior to Encounter   Medication    [DISCONTINUED] acetaminophen tablet 500 mg    [DISCONTINUED] acetaminophen tablet 650 mg    [DISCONTINUED] albuterol-ipratropium 2.5 mg-0.5 mg/3 mL nebulizer solution 3 mL    [DISCONTINUED] alprazolam split tablet 0.125 mg    [DISCONTINUED] amLODIPine tablet 10 mg    [DISCONTINUED] atorvastatin tablet 20 mg    [DISCONTINUED] bisacodyl suppository 10 mg    [DISCONTINUED] calcium carbonate 200 mg calcium (500 mg)  chewable tablet 500 mg    [DISCONTINUED] carvedilol tablet 12.5 mg    [DISCONTINUED] escitalopram oxalate tablet 10 mg    [DISCONTINUED] folic acid tablet 1 mg    [DISCONTINUED] furosemide injection 20 mg    [DISCONTINUED] hydrALAZINE tablet 25 mg    [DISCONTINUED] hydrocodone-apap 7.5-325 MG/15 ML oral solution 2.5 mL    [DISCONTINUED] hydrocodone-apap 7.5-325 MG/15 ML oral solution 5 mL    [DISCONTINUED] hydroxychloroquine tablet 200 mg    [DISCONTINUED] levoFLOXacin tablet 250 mg    [DISCONTINUED] lidocaine 5 % patch 2 patch    [DISCONTINUED] lisinopril tablet 10 mg    [DISCONTINUED] polyethylene glycol packet 17 g    [DISCONTINUED] ramelteon tablet 8 mg    [DISCONTINUED] senna-docusate 8.6-50 mg per tablet 1 tablet    [DISCONTINUED] sodium chloride 0.9% flush 5 mL     Current Outpatient Medications on File Prior to Encounter   Medication Sig    albuterol sulfate 2.5 mg/0.5 mL Nebu Take 2.5 mg by nebulization. Rescue    amLODIPine (NORVASC) 10 MG tablet Take 10 mg by mouth once daily.    atorvastatin (LIPITOR) 20 MG tablet TAKE 1 TABLET BY MOUTH EVERY DAY    bisacodyl (DULCOLAX) 10 mg Supp Place 10 mg rectally daily as needed (CONSTIPATION).    calcium carbonate (CALCIUM 600 ORAL) Take 1 tablet by mouth 2 (two) times daily.    carvedilol (COREG) 6.25 MG tablet Take 1 tablet (6.25 mg total) by mouth 2 (two) times daily with meals.    escitalopram oxalate (LEXAPRO) 10 MG tablet Take 10 mg by mouth once daily.    fish oil-omega-3 fatty acids 300-1,000 mg capsule Take 1,000 mg by mouth once daily.     folic acid (FOLVITE) 1 MG tablet TAKE 1 TABLET (1 MG TOTAL) BY MOUTH ONCE DAILY.    furosemide (LASIX) 20 MG tablet Take 1 tablet (20 mg total) by mouth once daily. (Patient taking differently: Take 20 mg by mouth every Mon, Wed, Fri. )    hydroxychloroquine (PLAQUENIL) 200 mg tablet Take 1 tablet (200 mg total) by mouth once daily.    lisinopril 10 MG tablet Take 1 tablet (10 mg total) by  mouth once daily.    psyllium (METAMUCIL) powder Take 1 packet by mouth daily as needed (CONSTIPATION).    acetaminophen (TYLENOL) 650 MG TbSR Take 650 mg by mouth daily as needed (PAIN).    ALPRAZolam (XANAX) 0.25 MG tablet TAKE 1 TABLET BY MOUTH EVERY DAY AS NEEDED (Patient taking differently: TAKE ½ TABLET BY MOUTH TWICE DAILY)    aspirin (ECOTRIN) 81 MG EC tablet Take 1 tablet (81 mg total) by mouth once daily. HOLD until cleared by your ENT doctor and your Neurologist. (Patient taking differently: Take 81 mg by mouth once daily. )    coenzyme Q10 (CO Q-10) 100 mg capsule Take 100 mg by mouth once daily.    denosumab (PROLIA) 60 mg/mL Syrg Inject 60 mg into the skin. Every 6 months    sertraline (ZOLOFT) 25 MG tablet Take 1 tablet (25 mg total) by mouth once daily.     Family History     Problem Relation (Age of Onset)    Diabetes Mellitus Mother, Brother    Heart disease Father    Hypertension Father, Mother    Leukemia Father    Parkinsonism Brother (68)        Tobacco Use    Smoking status: Never Smoker    Smokeless tobacco: Never Used   Substance and Sexual Activity    Alcohol use: No     Comment: [facility]    Drug use: No    Sexual activity: No     Partners: Male     Birth control/protection: None     Review of Systems   Constitutional: Positive for appetite change and fatigue. Negative for chills and fever.   Respiratory: Negative for cough and wheezing.    Cardiovascular: Positive for leg swelling. Negative for chest pain and palpitations.   Gastrointestinal: Negative for abdominal distention, abdominal pain, constipation, diarrhea, nausea and vomiting.   Genitourinary: Negative for dysuria, flank pain and frequency.   Musculoskeletal: Positive for back pain. Negative for arthralgias, myalgias and neck stiffness.   Skin: Negative for pallor and rash.   Neurological: Negative for light-headedness and headaches.   Hematological: Negative for adenopathy.   Psychiatric/Behavioral: Positive for  confusion. Negative for agitation and behavioral problems.     Objective:     Vital Signs (Most Recent):  Temp: 98.6 °F (37 °C) (11/14/18 1144)  Pulse: 63 (11/14/18 1144)  Resp: 14 (11/14/18 0856)  BP: (!) 109/51 (11/14/18 1144)  SpO2: (!) 93 % (11/14/18 1144) Vital Signs (24h Range):  Temp:  [97.1 °F (36.2 °C)-98.9 °F (37.2 °C)] 98.6 °F (37 °C)  Pulse:  [63-70] 63  Resp:  [14-20] 14  SpO2:  [93 %-100 %] 93 %  BP: (109-149)/(48-85) 109/51     Weight: 60.8 kg (134 lb)  Body mass index is 23.83 kg/m².    Physical Exam   Constitutional: She is oriented to person, place, and time. She appears well-developed and well-nourished. No distress.   HENT:   Head: Normocephalic and atraumatic.   NC in place, on 2 L   Eyes: Pupils are equal, round, and reactive to light. No scleral icterus.   Neck: Normal range of motion. Neck supple.   Cardiovascular: Normal rate, regular rhythm, normal heart sounds and intact distal pulses.   No murmur heard.  Pulmonary/Chest: Effort normal. No respiratory distress.   Noted decreased breath sounds bibasilarly   Abdominal: Soft. Bowel sounds are normal. She exhibits no distension. There is no tenderness.   Musculoskeletal: Normal range of motion. She exhibits edema (1+ pitting edema on LE bilaterally ).   Neurological: She is alert and oriented to person, place, and time. No cranial nerve deficit.   Skin: Skin is warm and dry. Capillary refill takes 2 to 3 seconds. She is not diaphoretic. No erythema.   Psychiatric: She has a normal mood and affect. Her behavior is normal.   Vitals reviewed.      Significant Labs:    Recent Results (from the past 24 hour(s))   Magnesium    Collection Time: 11/13/18  3:05 PM   Result Value Ref Range    Magnesium 2.1 1.6 - 2.6 mg/dL   TSH    Collection Time: 11/13/18  3:05 PM   Result Value Ref Range    TSH 1.950 0.400 - 4.000 uIU/mL   Protime-INR    Collection Time: 11/13/18  3:05 PM   Result Value Ref Range    Prothrombin Time 12.4 9.0 - 12.5 sec    INR 1.2 0.8  - 1.2   Urinalysis, Reflex to Urine Culture Urine, Clean Catch    Collection Time: 11/13/18  5:45 PM   Result Value Ref Range    Specimen UA Urine, Clean Catch     Color, UA Yellow Yellow, Straw, Lelia    Appearance, UA Cloudy (A) Clear    pH, UA 5.0 5.0 - 8.0    Specific Gravity, UA 1.010 1.005 - 1.030    Protein, UA 1+ (A) Negative    Glucose, UA Negative Negative    Ketones, UA Negative Negative    Bilirubin (UA) Negative Negative    Occult Blood UA Negative Negative    Nitrite, UA Negative Negative    Leukocytes, UA 3+ (A) Negative   Drug screen panel, emergency    Collection Time: 11/13/18  5:45 PM   Result Value Ref Range    Benzodiazepines Negative     Methadone metabolites Negative     Cocaine (Metab.) Negative     Opiate Scrn, Ur Presumptive Positive     Barbiturate Screen, Ur Negative     Amphetamine Screen, Ur Negative     THC Negative     Phencyclidine Negative     Creatinine, Random Ur 62.0 15.0 - 325.0 mg/dL    Toxicology Information SEE COMMENT    Urinalysis Microscopic    Collection Time: 11/13/18  5:45 PM   Result Value Ref Range    RBC, UA 0 0 - 4 /hpf    WBC, UA >100 (H) 0 - 5 /hpf    Bacteria, UA Many (A) None-Occ /hpf    Squam Epithel, UA 28 /hpf    Non-Squam Epith 1 (A) <1/hpf /hpf    Hyaline Casts, UA 0 0-1/lpf /lpf    Amorphous, UA Moderate None-Moderate    Microscopic Comment SEE COMMENT    Type & Screen    Collection Time: 11/13/18  6:08 PM   Result Value Ref Range    Group & Rh A NEG     Indirect Anabel NEG    CBC auto differential    Collection Time: 11/14/18  5:41 AM   Result Value Ref Range    WBC 7.52 3.90 - 12.70 K/uL    RBC 2.59 (L) 4.00 - 5.40 M/uL    Hemoglobin 8.0 (L) 12.0 - 16.0 g/dL    Hematocrit 23.9 (L) 37.0 - 48.5 %    MCV 92 82 - 98 fL    MCH 30.9 27.0 - 31.0 pg    MCHC 33.5 32.0 - 36.0 g/dL    RDW 14.7 (H) 11.5 - 14.5 %    Platelets 156 150 - 350 K/uL    MPV 9.9 9.2 - 12.9 fL    Immature Granulocytes 0.7 (H) 0.0 - 0.5 %    Gran # (ANC) 5.8 1.8 - 7.7 K/uL    Immature Grans  (Abs) 0.05 (H) 0.00 - 0.04 K/uL    Lymph # 0.7 (L) 1.0 - 4.8 K/uL    Mono # 0.9 0.3 - 1.0 K/uL    Eos # 0.1 0.0 - 0.5 K/uL    Baso # 0.02 0.00 - 0.20 K/uL    nRBC 0 0 /100 WBC    Gran% 77.5 (H) 38.0 - 73.0 %    Lymph% 8.6 (L) 18.0 - 48.0 %    Mono% 11.4 4.0 - 15.0 %    Eosinophil% 1.5 0.0 - 8.0 %    Basophil% 0.3 0.0 - 1.9 %    Differential Method Automated    Basic metabolic panel    Collection Time: 11/14/18  5:41 AM   Result Value Ref Range    Sodium 128 (L) 136 - 145 mmol/L    Potassium 3.8 3.5 - 5.1 mmol/L    Chloride 97 95 - 110 mmol/L    CO2 24 23 - 29 mmol/L    Glucose 92 70 - 110 mg/dL    BUN, Bld 47 (H) 8 - 23 mg/dL    Creatinine 1.7 (H) 0.5 - 1.4 mg/dL    Calcium 8.2 (L) 8.7 - 10.5 mg/dL    Anion Gap 7 (L) 8 - 16 mmol/L    eGFR if African American 30.6 (A) >60 mL/min/1.73 m^2    eGFR if non  26.5 (A) >60 mL/min/1.73 m^2         Significant Imaging: I have reviewed all pertinent imaging results/findings within the past 24 hours.    Assessment/Plan:     Acute hypoxemic respiratory failure    Karly Sandoval is a 88 y.o. lady with HFpEF, HTN, HLD, and T12 compression fracture who presents to [facility] for further evaluation of worsening lower back pain.  Medicine was consulted regarding new onset hypoxia with CXR with noted pleural effusion and pulmonary vascular congestion consistent with volume overload.  Overall, BNP also elevated 490, where he baseline is WNL.  CT imaging of spine reviewed with also noted bilateral pleural effusions, L > R.  Overall, patient with likely acute decompensated heart failure with underlying chronic diastolic dysfunction/HFpEF.  Differential also includes effusion from other etiology, possibly malignancy given pancreatic head mass.  Low suspicion for LRTI infection given lack of infectious respiratory symptoms.  Overall, patient with noted previous improvement with initiation of IV diuretics, will continue at this time.    Recommendations  - will place order for  lasix 40 mg IV once, as previous notes in chart with noted reactivity with lasix  - further assessment depending on improvement on volume status to assess if patient's respiratory function improves  - strict intake and output ordered, female external urinary catheter in place  - wean FiO2 as tolerated, currently on 2L NC   - daily labs        Compression fracture of T12 vertebra    Patient with noted worsening lower back pain requiring further evaluation.  Repeat imaging did reveal new severe spinal canal stenosis related to new T12 vertebral body compression fracture and 7 mm osseous retropulsion.  Differential also includes pain originating from cystic lesion in pancreatic head that would require further evaluation.  Patient initially admitted to neurosurgery, noted not a surgical candidate at this time and will opt for conservative management and continued bracing.  Noted previously when pancreatic lesion found that family would wait to defer further evaluation.  Discussion with son and patient at bedside, who note that they are willing for further evaluation as long as they can have a discussion what tests would be available.  Discussed with [facility] service, where they noted their next step would be an EUS.      Recommendations  - continue management of compression fracture as per neurosurgical recommendations   - discussed with [facility] regarding EUS, who recommend outpatient EUS at this time.  Can further discuss options available once patient improves medically.  If outpatient management preferred, can follow up with [facility] fellow to assure patient can be scheduled for outpatient EUS.  If however, patient requesting further inpatient imaging, can consult EUS and they can assess if EUS can be performed as an inpatient  - continue current medical management of pain control        Acute kidney injury superimposed on CKD    Noted CHARLENE on CKD at this time.  Overall, possible pre-renal due to volume overload and cardiorenal  syndrome, but with positive UA possibly intrinsic.  Patient with previous UTI with organisms sensitive to ciprofloxacin, despite lack of significant urinary symptoms that are acute.  Also, no evidence of fever or leukocytosis.  However, patient with alteration in mental status and mild CHARLENE, so will start 5 day oral course at this time.  Overall, difficult to ascertain if etiology of frequent UTIs, but suspect incontinence as a factor.  Unknown if incontinence is a product of pain, but otherwise will continue with improved pain management.      Recommendations  - lasix 40 mg IV ordered  - discontinued lisinopril at this time given low-normal BP and CHARLENE  - strict Is and Os  - cipro ordered, GNR growing in urine culture, awaiting full sensitivities   - daily labs, monitor Cr at this time        Hyponatremia    - previously noted hyponatremia with Na at 128  - given clinical scenario, likely hypervolemic hyponatremia given weight gain, imaging with effusions and pulmonary edema, as well as acute hypoxia  - previously however, patient with episodes of hypovolemic hyponatremia that may have been responsive to IVF with predisposing factors for volume depletion (diarrheal illness, etc.)  - ovall, will diurese today as noted above, repeat labs tomorrow       RA (rheumatoid arthritis)    - no significant joint complaints at this time  - can continue home plaquenil  - patient has been off MTX since last outpatient rheumatology visit        Chronic diastolic CHF (congestive heart failure)    - noted acute decompensation as above  - continue lasix as per above  - continue management of BP and volume status        Essential hypertension    - BP low normal   - can continue home norvasc 10 mg PO daily, coreg 6.25 mg PO BID  - hold lisinopril for CHARLENE  - q4 vitals        Mixed hyperlipidemia    - continue home statin       Pacemaker    - for RBBB/LB issues  - no significant intervention at this time needed       Goals of care,  counseling/discussion    Initiated goals of care discussion at this time.  Son does note previous Living Will, but checked [employer] documents since 2015 without Living Will or [place] found.  Patient at this time would not like to have any significant surgeries but are amenable for procedures for further clarification of issues.  Can continue to assess DNR/DNI status daily until decision made.           VTE Risk Mitigation (From admission, onward)        Ordered     heparin (porcine) injection 5,000 Units  Every 8 hours      11/14/18 1015     IP VTE HIGH RISK PATIENT  Once      11/13/18 1756     Place TOM hose  Until discontinued      11/13/18 1756     Place sequential compression device  Until discontinued      11/13/18 1756          Thank you for your consult.  Given significant medical management needs and no imminent surgical intervention at this time, patient eligible for transfer to Hospital Medicine.    Discussed with Neurosurgery provider, will transfer patient to Hospital Medicine at this time.  Hospital Medicine consults will sign off.  Case discussed with Staff, Dr. Nelson.      Pavel Dietrich MD  Department of Hospital Medicine   [facility]                      11/14/2018                             STAFF PHYSICIAN NOTE                                   Attending Attestation for Rounds with Resident  I have reviewed and concur with the resident's history, physical, assessment, and plan.  I have personally interviewed and examined the patient at bedside and agree with the resident's findings.                                  ________________________________________

## 2018-11-14 NOTE — PLAN OF CARE
Problem: Fall Risk (Adult)  Goal: Identify Related Risk Factors and Signs and Symptoms  Related risk factors and signs and symptoms are identified upon initiation of Human [employer] Clinical Practice Guideline (CPG)  Outcome: Ongoing (interventions implemented as appropriate)  Fall risk and precautions discussed with pt. Acknowledged understanding. Fall precautions in place. No questions or concerns at present. Call light in reach. Will cont to monitor.     Problem: Patient Care Overview  Goal: Plan of Care Review  Outcome: Ongoing (interventions implemented as appropriate)  Plan of care reviewed with pt. Pt voiced understanding. Pt AAOX3. PRN anxiety medicine given earlier this shift due to pt being restless and agitated. Resting quietly at this present time. Bed in lowest position and locked, call light within reach, side rails X2, and slip resistant socks on at this time. Will continue to monitor

## 2018-11-14 NOTE — SUBJECTIVE & OBJECTIVE
Interval History:   NAEON. Patient continues to report severe back pain with movement. Son at bedside. States the pain medication that the patient is currently receiving makes her groggy, decreases her appetite, and decreases her desire to participate in therapy. Son asking for medication adjustment. Pure wick in place.     Medications:  Continuous Infusions:  Scheduled Meds:   albuterol-ipratropium  3 mL Nebulization Q12H    amLODIPine  10 mg Oral Daily    atorvastatin  20 mg Oral Daily    calcium carbonate  500 mg Oral Daily    carvedilol  6.25 mg Oral BID WM    coenzyme Q10  100 mg Oral Daily    escitalopram oxalate  10 mg Oral Daily    folic acid  1 mg Oral Daily    furosemide  20 mg Oral Every Mon, Wed, Fri    hydroxychloroquine  200 mg Oral Daily    lidocaine  1 patch Transdermal Q24H    lisinopril  10 mg Oral Daily    senna-docusate 8.6-50 mg  1 tablet Oral BID    sertraline  25 mg Oral Daily    sodium chloride  1 g Oral TID     PRN Meds:acetaminophen, albuterol, ALPRAZolam, bisacodyl, cellulose, traMADol     Review of Systems  Objective:     Weight: 60.8 kg (134 lb)  Body mass index is 23.83 kg/m².  Vital Signs (Most Recent):  Temp: 98.9 °F (37.2 °C) (11/14/18 0856)  Pulse: 65 (11/14/18 0856)  Resp: 14 (11/14/18 0856)  BP: 126/85 (11/14/18 0856)  SpO2: 97 % (11/14/18 0856) Vital Signs (24h Range):  Temp:  [97.1 °F (36.2 °C)-98.9 °F (37.2 °C)] 98.9 °F (37.2 °C)  Pulse:  [62-70] 65  Resp:  [14-20] 14  SpO2:  [94 %-100 %] 97 %  BP: (113-149)/(48-85) 126/85        Neurosurgery Physical Exam  General: well developed, well nourished, no distress  Head: normocephalic, atraumatic  Neurologic: Alert and oriented. Thought content appropriate.  GCS: Motor: 6/Verbal: 5/Eyes: 4 GCS Total: 15  Mental Status: Awake, Alert, Oriented x 4  Language: No aphasia  Speech: No dysarthria  Cranial nerves: face symmetric, tongue midline, CN II-XII grossly intact.   Eyes: pupils equal, round, reactive to light with  accomodation, EOMI.    Pulmonary: normal respirations, no signs of respiratory distress  Abdomen: soft, non-distended, not tender to palpation  Sensory: intact to light touch throughout     Motor Strength:Moves all extremities spontaneously with good tone. No abnormal movements seen.      Strength   Deltoids Triceps Biceps Wrist Extension Wrist Flexion Hand    Upper: R 5/5 5/5 5/5 5/5 5/5 5/5     L 5/5 5/5 5/5 5/5 5/5 5/5       Iliopsoas Quadriceps Knee  Flexion Tibialis  anterior Gastro- cnemius EHL   Lower: R 4+/5 5/5 5/5 5/5 5/5 5/5     L 4+/5 5/5 5/5 5/5 5/5 5/5      Doran: absent  Clonus: absent  Skin: Skin is warm, dry and intact.        Significant Labs:  Recent Labs   Lab 11/13/18  0542 11/13/18  1505 11/14/18  0541     --  92   *  --  128*   K 4.0  --  3.8   CL 97  --  97   CO2 26  --  24   BUN 41*  --  47*   CREATININE 1.5*  --  1.7*   CALCIUM 8.4*  --  8.2*   MG  --  2.1  --      Recent Labs   Lab 11/13/18  0542 11/14/18  0541   WBC 11.61 7.52   HGB 8.4* 8.0*   HCT 23.7* 23.9*   * 156     Recent Labs   Lab 11/13/18  1505   INR 1.2     Microbiology Results (last 7 days)     Procedure Component Value Units Date/Time    Urine culture [[id number]] Collected:  11/13/18 1904    Order Status:  No result Specimen:  Urine Updated:  11/13/18 1717

## 2018-11-14 NOTE — ASSESSMENT & PLAN NOTE
Continue hydroxychloroquine and folic acid.  MTX discontinued by Rheumatology.  She will chito f/u with rheum on discharge from [facility].     10/25/18  Follow by Dr. Smith in Rheum, will need f/u after discharge.  Continue Plaquenil and MTX per MD orders.  Sodium level is low but chronic, will need to monitor given concurrent use of Plaquenil.      11/2/18  Follow up with Rheumatology after discharge.  Continue Plaquenil and MTX as ordered.  Hyponatremia is chronically low and stable, continue to monitor.    11/13/18  Patient on Plaquenil and MTX.  Recommend she follow up with Rheumatology after discharge.

## 2018-11-15 LAB
ANION GAP SERPL CALC-SCNC: 9 MMOL/L
BACTERIA UR CULT: NORMAL
BASOPHILS # BLD AUTO: 0.02 K/UL
BASOPHILS NFR BLD: 0.4 %
BUN SERPL-MCNC: 48 MG/DL
CALCIUM SERPL-MCNC: 8.7 MG/DL
CHLORIDE SERPL-SCNC: 98 MMOL/L
CO2 SERPL-SCNC: 26 MMOL/L
CREAT SERPL-MCNC: 1.9 MG/DL
DIFFERENTIAL METHOD: ABNORMAL
EOSINOPHIL # BLD AUTO: 0.2 K/UL
EOSINOPHIL NFR BLD: 3.3 %
ERYTHROCYTE [DISTWIDTH] IN BLOOD BY AUTOMATED COUNT: 14.4 %
EST. GFR  (AFRICAN AMERICAN): 26.8 ML/MIN/1.73 M^2
EST. GFR  (NON AFRICAN AMERICAN): 23.2 ML/MIN/1.73 M^2
GLUCOSE SERPL-MCNC: 109 MG/DL
HCT VFR BLD AUTO: 26 %
HGB BLD-MCNC: 8.6 G/DL
IMM GRANULOCYTES # BLD AUTO: 0.07 K/UL
IMM GRANULOCYTES NFR BLD AUTO: 1.3 %
LYMPHOCYTES # BLD AUTO: 0.7 K/UL
LYMPHOCYTES NFR BLD: 12.4 %
MCH RBC QN AUTO: 31.7 PG
MCHC RBC AUTO-ENTMCNC: 33.1 G/DL
MCV RBC AUTO: 96 FL
MONOCYTES # BLD AUTO: 0.7 K/UL
MONOCYTES NFR BLD: 12.8 %
NEUTROPHILS # BLD AUTO: 3.8 K/UL
NEUTROPHILS NFR BLD: 69.8 %
NRBC BLD-RTO: 0 /100 WBC
PLATELET # BLD AUTO: 163 K/UL
PMV BLD AUTO: 9.9 FL
POTASSIUM SERPL-SCNC: 4.5 MMOL/L
RBC # BLD AUTO: 2.71 M/UL
SODIUM SERPL-SCNC: 133 MMOL/L
WBC # BLD AUTO: 5.41 K/UL

## 2018-11-15 PROCEDURE — 25000003 PHARM REV CODE 250: Performed by: PHYSICIAN ASSISTANT

## 2018-11-15 PROCEDURE — 99232 SBSQ HOSP IP/OBS MODERATE 35: CPT | Mod: ,,, | Performed by: PHYSICIAN ASSISTANT

## 2018-11-15 PROCEDURE — G8987 SELF CARE CURRENT STATUS: HCPCS | Mod: CL

## 2018-11-15 PROCEDURE — 36415 COLL VENOUS BLD VENIPUNCTURE: CPT

## 2018-11-15 PROCEDURE — G8988 SELF CARE GOAL STATUS: HCPCS | Mod: CK

## 2018-11-15 PROCEDURE — 20600001 HC STEP DOWN PRIVATE ROOM

## 2018-11-15 PROCEDURE — 97530 THERAPEUTIC ACTIVITIES: CPT

## 2018-11-15 PROCEDURE — 25000242 PHARM REV CODE 250 ALT 637 W/ HCPCS: Performed by: PHYSICIAN ASSISTANT

## 2018-11-15 PROCEDURE — 63600175 PHARM REV CODE 636 W HCPCS: Performed by: PHYSICIAN ASSISTANT

## 2018-11-15 PROCEDURE — 97165 OT EVAL LOW COMPLEX 30 MIN: CPT

## 2018-11-15 PROCEDURE — 94640 AIRWAY INHALATION TREATMENT: CPT

## 2018-11-15 PROCEDURE — 94761 N-INVAS EAR/PLS OXIMETRY MLT: CPT

## 2018-11-15 PROCEDURE — 25000003 PHARM REV CODE 250: Performed by: NEUROLOGICAL SURGERY

## 2018-11-15 PROCEDURE — 63600175 PHARM REV CODE 636 W HCPCS

## 2018-11-15 PROCEDURE — 80048 BASIC METABOLIC PNL TOTAL CA: CPT

## 2018-11-15 PROCEDURE — 99232 SBSQ HOSP IP/OBS MODERATE 35: CPT | Mod: ,,, | Performed by: HOSPITALIST

## 2018-11-15 PROCEDURE — 27000221 HC OXYGEN, UP TO 24 HOURS

## 2018-11-15 PROCEDURE — 85025 COMPLETE CBC W/AUTO DIFF WBC: CPT

## 2018-11-15 RX ADMIN — SENNOSIDES AND DOCUSATE SODIUM 1 TABLET: 8.6; 5 TABLET ORAL at 09:11

## 2018-11-15 RX ADMIN — AMLODIPINE BESYLATE 10 MG: 10 TABLET ORAL at 08:11

## 2018-11-15 RX ADMIN — HEPARIN SODIUM 5000 UNITS: 5000 INJECTION, SOLUTION INTRAVENOUS; SUBCUTANEOUS at 09:11

## 2018-11-15 RX ADMIN — ALPRAZOLAM 0.25 MG: 0.25 TABLET ORAL at 09:11

## 2018-11-15 RX ADMIN — IPRATROPIUM BROMIDE AND ALBUTEROL SULFATE 3 ML: .5; 3 SOLUTION RESPIRATORY (INHALATION) at 07:11

## 2018-11-15 RX ADMIN — IPRATROPIUM BROMIDE AND ALBUTEROL SULFATE 3 ML: .5; 3 SOLUTION RESPIRATORY (INHALATION) at 09:11

## 2018-11-15 RX ADMIN — ATORVASTATIN CALCIUM 20 MG: 20 TABLET, FILM COATED ORAL at 08:11

## 2018-11-15 RX ADMIN — FOLIC ACID 1 MG: 1 TABLET ORAL at 08:11

## 2018-11-15 RX ADMIN — HYDROXYCHLOROQUINE SULFATE 200 MG: 200 TABLET, FILM COATED ORAL at 08:11

## 2018-11-15 RX ADMIN — HEPARIN SODIUM 5000 UNITS: 5000 INJECTION, SOLUTION INTRAVENOUS; SUBCUTANEOUS at 05:11

## 2018-11-15 RX ADMIN — SERTRALINE HYDROCHLORIDE 25 MG: 25 TABLET ORAL at 08:11

## 2018-11-15 RX ADMIN — SODIUM CHLORIDE TAB 1 GM 1 G: 1 TAB at 08:11

## 2018-11-15 RX ADMIN — HEPARIN SODIUM 5000 UNITS: 5000 INJECTION, SOLUTION INTRAVENOUS; SUBCUTANEOUS at 02:11

## 2018-11-15 RX ADMIN — Medication 100 MG: at 08:11

## 2018-11-15 RX ADMIN — SENNOSIDES AND DOCUSATE SODIUM 1 TABLET: 8.6; 5 TABLET ORAL at 08:11

## 2018-11-15 RX ADMIN — ESCITALOPRAM OXALATE 10 MG: 10 TABLET ORAL at 08:11

## 2018-11-15 RX ADMIN — CARVEDILOL 6.25 MG: 6.25 TABLET, FILM COATED ORAL at 08:11

## 2018-11-15 RX ADMIN — CALCIUM CARBONATE 500 MG: 1250 SUSPENSION ORAL at 08:11

## 2018-11-15 RX ADMIN — FUROSEMIDE 40 MG: 10 INJECTION, SOLUTION INTRAMUSCULAR; INTRAVENOUS at 08:11

## 2018-11-15 RX ADMIN — CIPROFLOXACIN 500 MG: 500 TABLET, FILM COATED ORAL at 08:11

## 2018-11-15 NOTE — ASSESSMENT & PLAN NOTE
- Cr worsened to 1.9, still likely cardiorenal given volume overload  - Daily CBCs, wean FiO2 as tolerated

## 2018-11-15 NOTE — SUBJECTIVE & OBJECTIVE
Interval History: Ms. Sandoval is doing well today.  She is having back pain, controlled with pain medication.  No new weakness, paresthesias, or bowel/bladder dysfunction.     Medications:  Continuous Infusions:  Scheduled Meds:   albuterol-ipratropium  3 mL Nebulization Q12H    amLODIPine  10 mg Oral Daily    atorvastatin  20 mg Oral Daily    calcium carbonate  500 mg Oral Daily    carvedilol  6.25 mg Oral BID WM    ciprofloxacin HCl  500 mg Oral Daily    coenzyme Q10  100 mg Oral Daily    escitalopram oxalate  10 mg Oral Daily    folic acid  1 mg Oral Daily    heparin (porcine)  5,000 Units Subcutaneous Q8H    hydroxychloroquine  200 mg Oral Daily    lidocaine  1 patch Transdermal Q24H    senna-docusate 8.6-50 mg  1 tablet Oral BID    sertraline  25 mg Oral Daily     PRN Meds:acetaminophen, albuterol, ALPRAZolam, bisacodyl, cellulose, traMADol     Review of Systems  Objective:     Weight: 60.8 kg (134 lb)  Body mass index is 23.83 kg/m².  Vital Signs (Most Recent):  Temp: 98.8 °F (37.1 °C) (11/15/18 1300)  Pulse: 66 (11/15/18 1300)  Resp: 12 (11/15/18 0909)  BP: 116/60 (11/15/18 1300)  SpO2: 99 % (11/15/18 1300) Vital Signs (24h Range):  Temp:  [97.6 °F (36.4 °C)-98.8 °F (37.1 °C)] 98.8 °F (37.1 °C)  Pulse:  [59-66] 66  Resp:  [12-20] 12  SpO2:  [93 %-99 %] 99 %  BP: (102-119)/(49-60) 116/60     Date 11/15/18 0700 - 11/16/18 0659   Shift 0294-2570 8739-2273 6940-8336 24 Hour Total   INTAKE   Shift Total(mL/kg)       OUTPUT   Urine(mL/kg/hr) 300(0.6)   300   Shift Total(mL/kg) 300(4.9)   300(4.9)   Weight (kg) 60.8 60.8 60.8 60.8                        Neurosurgery Physical Exam   General: well developed, well nourished, no distress  Head: normocephalic, atraumatic  Neurologic: Alert and oriented. Thought content appropriate.  GCS: Motor: 6/Verbal: 5/Eyes: 4 GCS Total: 15  Mental Status: Awake, Alert, Oriented x 4  Language: No aphasia  Speech: No dysarthria  Cranial nerves: face symmetric, tongue  midline, CN II-XII grossly intact.   Eyes: pupils equal, round, reactive to light with accomodation, EOMI.    Pulmonary: normal respirations, no signs of respiratory distress  Abdomen: soft, non-distended, not tender to palpation  Sensory: intact to light touch throughout  Motor Strength:Moves all extremities spontaneously with good tone. No abnormal movements seen.      Strength   Deltoids Triceps Biceps Wrist Extension Wrist Flexion Hand    Upper: R 5/5 5/5 5/5 5/5 5/5 5/5     L 5/5 5/5 5/5 5/5 5/5 5/5       Iliopsoas Quadriceps Knee  Flexion Tibialis  anterior Gastro- cnemius EHL   Lower: R 5/5 5/5 5/5 5/5 5/5 5/5     L 5/5 5/5 5/5 5/5 5/5 5/5      Doran: absent  Clonus: absent  Skin: Skin is warm, dry and intact.         Significant Labs:  Recent Labs   Lab 11/14/18  0541 11/15/18  0853   GLU 92 109   * 133*   K 3.8 4.5   CL 97 98   CO2 24 26   BUN 47* 48*   CREATININE 1.7* 1.9*   CALCIUM 8.2* 8.7     Recent Labs   Lab 11/14/18  0541 11/15/18  0853   WBC 7.52 5.41   HGB 8.0* 8.6*   HCT 23.9* 26.0*    163     No results for input(s): LABPT, INR, APTT in the last 48 hours.  Microbiology Results (last 7 days)     Procedure Component Value Units Date/Time    Urine culture [[id number]] Collected:  11/13/18 8420    Order Status:  Completed Specimen:  Urine Updated:  11/15/18 0350     Urine Culture, Routine No significant growth    Narrative:       Preferred Collection Type->Urine, Clean Catch  CUP          Significant Diagnostics:  No new imaging

## 2018-11-15 NOTE — PLAN OF CARE
Problem: Patient Care Overview  Goal: Plan of Care Review  Outcome: Ongoing (interventions implemented as appropriate)   11/15/18 9193   Coping/Psychosocial   Plan Of Care Reviewed With patient     Patient remains awake, alert and oriented x4. No apparent distress. VSS. Patient is able to verbalize pain relief and pain goals. Denies any pain at this time. POC discussed with son and patient. Verbalize understanding. Patient verbalize anxiety regarding d/c planning. Emotional support provided. Patient up in chair with brace. Fall precautions in place. No falls occurred during this shift. No-skid socks on, call light in reach. No other concerns at this time. Will continue to monitor.

## 2018-11-15 NOTE — PROGRESS NOTES
[facility] Hospital Medicine  Progress Note    Patient Name: Karly Sandoval  MRN: [id number]  Patient Class: IP- Inpatient   Admission Date: 11/13/2018  Length of Stay: 2 days  Attending Physician: Poncho Leon MD  Primary Care Provider: Uday Allen MD    [facility] Medicine Team: Networked reference to record PCT  Pavel Dietrich MD    Subjective:     Principal Problem:<principal problem not specified>    HPI:  Karly Sandoval is a 88 y.o. lady with RA (on plaquenil), HFpEF (noted diastolic dysfunction in echo on 2016), essential hypertension, hyperlipidemia, and new cystic pancreatic head mass who presented to [facility] from [facility] rehab for evaluation of worsening back pain.  She was recently seen and discharged on 10/23 to [facility] for further rehabilitation for a T12 compression fracture.  There, she was noted to have continued back pain that was not responsive to increasing pain regimen.  She was also noted to have a positive UA with E. Coli and Proteus, where she completed a 7 day course of ciprofloxacin.  Throughout her stay, she would have continued back pain without significant improvement.  She also developed acute hypoxic respiratory failure, requiring minimal supplemental O2.  It was noted in rehab notes that patient was gaining weight despite poor PO intake.  Additionally towards the past week, she was noted to have worsening altered mental status after taking her AM medications, which would improve over time.  Patient was supposed to follow up with neurosurgery as an outpatient for follow up, but due to significant worsening in pain, patient was brought to [facility] for further evaluation.  Prior to presentation, she had a CXR and UA performed, where UA was again positive with 3+ leukocytes and > 100 WBCs.  CXR also revealed pulmonary vascular congestion as well as a new L lower lobe effusion, where she was started on levaquin, and was given 1 dose of IV lasix.  She denies fevers, chills,  "N/V, coughs, chest or abdominal pains.  She received repeat imaging revealing new severe spinal canal stenosis related to new T12 vertebral body compression fracture and 7 mm osseous retropulsion.  She was subsequently admitted to Neurosurgery for further evaluation, where she was deemed not a surgical candidate, and recommended further bracing with improvement in pain control.  Medicine was consulted regarding effusion, UTI, and CHARLENE.    Of note, patient was also previously seen in [facility] prior to presentation earlier on 10/8/2018.  There, she was evaluated for hyponatremia, where she was found to have a cystic pancreatic head lesion concerning for either cystic malignancy versus pseudocyst captured on both CT and abdominal ultrasound.  Patient denies any significant EtOH use or previous abdominal pains, but did have previous cholecystectomy.      Patient assessed at bedside with son present.  She noted she did have some improvement in breathing after receiving her dose of IV lasix.  Does note that her mind does feel "unclear" at the moment, but alert and appropriate.  Notes lying flat helps improve with her back pain.        Interval History: Patient with mild complaints of insomnia overnight.  Otherwise, patient seen sitting in chair doing well.  No significant pain in back currently.  On 3L saturating on 98%.      Review of Systems   Constitutional: Positive for appetite change and fatigue. Negative for chills and fever.   Respiratory: Negative for cough and wheezing.    Cardiovascular: Positive for leg swelling. Negative for chest pain and palpitations.   Gastrointestinal: Negative for abdominal distention, abdominal pain, constipation, diarrhea, nausea and vomiting.   Genitourinary: Negative for dysuria, flank pain and frequency.   Musculoskeletal: Positive for back pain. Negative for arthralgias, myalgias and neck stiffness.   Skin: Negative for pallor and rash.   Neurological: Negative for " light-headedness and headaches.   Hematological: Negative for adenopathy.   Psychiatric/Behavioral: Positive for confusion. Negative for agitation and behavioral problems.     Objective:     Vital Signs (Most Recent):  Temp: 98.8 °F (37.1 °C) (11/15/18 1300)  Pulse: 66 (11/15/18 1300)  Resp: 12 (11/15/18 0909)  BP: 116/60 (11/15/18 1300)  SpO2: 99 % (11/15/18 1300) Vital Signs (24h Range):  Temp:  [97.6 °F (36.4 °C)-98.8 °F (37.1 °C)] 98.8 °F (37.1 °C)  Pulse:  [59-66] 66  Resp:  [12-20] 12  SpO2:  [93 %-99 %] 99 %  BP: (102-119)/(49-60) 116/60     Weight: 60.8 kg (134 lb)  Body mass index is 23.83 kg/m².    Intake/Output Summary (Last 24 hours) at 11/15/2018 1501  Last data filed at 11/15/2018 0800  Gross per 24 hour   Intake --   Output 1200 ml   Net -1200 ml      Physical Exam   Constitutional: She is oriented to person, place, and time. She appears well-developed and well-nourished. No distress.   HENT:   Head: Normocephalic and atraumatic.   NC in place, on 2 L   Eyes: Pupils are equal, round, and reactive to light. No scleral icterus.   Neck: Normal range of motion. Neck supple.   Cardiovascular: Normal rate, regular rhythm, normal heart sounds and intact distal pulses.   No murmur heard.  Pulmonary/Chest: Effort normal. No respiratory distress.   Noted decreased breath sounds bibasilarly   Abdominal: Soft. Bowel sounds are normal. She exhibits no distension. There is no tenderness.   Musculoskeletal: Normal range of motion. She exhibits edema (1+ pitting edema on LE bilaterally ).   Neurological: She is alert and oriented to person, place, and time. No cranial nerve deficit.   Skin: Skin is warm and dry. Capillary refill takes 2 to 3 seconds. She is not diaphoretic. No erythema.   Psychiatric: She has a normal mood and affect. Her behavior is normal.   Vitals reviewed.      Significant Labs:     Recent Results (from the past 24 hour(s))   CBC auto differential    Collection Time: 11/15/18  8:53 AM   Result  Value Ref Range    WBC 5.41 3.90 - 12.70 K/uL    RBC 2.71 (L) 4.00 - 5.40 M/uL    Hemoglobin 8.6 (L) 12.0 - 16.0 g/dL    Hematocrit 26.0 (L) 37.0 - 48.5 %    MCV 96 82 - 98 fL    MCH 31.7 (H) 27.0 - 31.0 pg    MCHC 33.1 32.0 - 36.0 g/dL    RDW 14.4 11.5 - 14.5 %    Platelets 163 150 - 350 K/uL    MPV 9.9 9.2 - 12.9 fL    Immature Granulocytes 1.3 (H) 0.0 - 0.5 %    Gran # (ANC) 3.8 1.8 - 7.7 K/uL    Immature Grans (Abs) 0.07 (H) 0.00 - 0.04 K/uL    Lymph # 0.7 (L) 1.0 - 4.8 K/uL    Mono # 0.7 0.3 - 1.0 K/uL    Eos # 0.2 0.0 - 0.5 K/uL    Baso # 0.02 0.00 - 0.20 K/uL    nRBC 0 0 /100 WBC    Gran% 69.8 38.0 - 73.0 %    Lymph% 12.4 (L) 18.0 - 48.0 %    Mono% 12.8 4.0 - 15.0 %    Eosinophil% 3.3 0.0 - 8.0 %    Basophil% 0.4 0.0 - 1.9 %    Differential Method Automated    Basic metabolic panel    Collection Time: 11/15/18  8:53 AM   Result Value Ref Range    Sodium 133 (L) 136 - 145 mmol/L    Potassium 4.5 3.5 - 5.1 mmol/L    Chloride 98 95 - 110 mmol/L    CO2 26 23 - 29 mmol/L    Glucose 109 70 - 110 mg/dL    BUN, Bld 48 (H) 8 - 23 mg/dL    Creatinine 1.9 (H) 0.5 - 1.4 mg/dL    Calcium 8.7 8.7 - 10.5 mg/dL    Anion Gap 9 8 - 16 mmol/L    eGFR if African American 26.8 (A) >60 mL/min/1.73 m^2    eGFR if non  23.2 (A) >60 mL/min/1.73 m^2       .    Significant Imaging: I have reviewed all pertinent imaging results/findings within the past 24 hours.     No significant new imaging.      Assessment/Plan:      Acute hypoxemic respiratory failure    Karly Sandoval is a 88 y.o. lady with HFpEF, HTN, HLD, and T12 compression fracture who presents to [facility] for further evaluation of worsening lower back pain.  Medicine was consulted regarding new onset hypoxia with CXR with noted pleural effusion and pulmonary vascular congestion consistent with volume overload.  Diuresed yesterday, today with improved breathing and improvement in peripheral edema.  While supplemental oxygen is increased, she has improved sats and  is sitting in chair, can wean as tolerated.      Recommendations  - Stop lasix for now, continue to monitor given Cr bump  - Wean FiO2 as tolerated  - q4 vitals        Compression fracture of T12 vertebra    Patient with noted worsening lower back pain requiring further evaluation.  Repeat imaging did reveal new severe spinal canal stenosis related to new T12 vertebral body compression fracture and 7 mm osseous retropulsion.      Recommendations  - continue management of compression fracture as per neurosurgical recommendations   - discussed with AES, patient to have outpatient work up.  No need for inpatient EUS. Appreciate AES assistance at this time      Acute kidney injury superimposed on CKD    - Cr worsened to 1.9, still likely cardiorenal given volume overload  - Daily CBCs, wean FiO2 as tolerated        Hyponatremia    - previously noted hyponatremia with Na at 128  - improved with diuresis and salt tabs per primary, now at 133  - hold off additional diuresis, continue to assess       RA (rheumatoid arthritis)    - no significant joint complaints at this time  - can continue home plaquenil  - patient has been off MTX since last outpatient rheumatology visit        Chronic diastolic CHF (congestive heart failure)    - noted acute decompensation as above  - continue management of BP and volume status        Essential hypertension    - BP low normal   - can continue home norvasc 10 mg PO daily, coreg 6.25 mg PO BID  - hold lisinopril for CHARLENE  - q4 vitals        Mixed hyperlipidemia    - continue home statin       Pacemaker    - for RBBB/LB issues  - no significant intervention at this time needed       Goals of care, counseling/discussion    Initiated goals of care discussion at this time.  Son does note previous Living Will, but checked [employer] documents since 2015 without Living Will or [place] found.  Patient at this time would not like to have any significant surgeries but are amenable for procedures for  further clarification of issues.  Can continue to assess DNR/DNI status daily until decision made.           VTE Risk Mitigation (From admission, onward)        Ordered     heparin (porcine) injection 5,000 Units  Every 8 hours      11/14/18 1015     IP VTE HIGH RISK PATIENT  Once      11/13/18 1756     Place TOM hose  Until discontinued      11/13/18 1756     Place sequential compression device  Until discontinued      11/13/18 1756          Patient to be transferred to Hospital Medicine service for further management of hypoxic respiratory failure and CHARLENE tomorrow.  Consult services will sign off.  Thank you for allowing us to participate in the care of Ms. Sandoval.  Please call if there are any further questions.      Pavel Dietrich MD  Department of Hospital Medicine   [facility]                    11/16/2018                             STAFF PHYSICIAN NOTE                                   Attending Attestation for Rounds with Resident  I have reviewed and concur with the resident's history, physical, assessment, and plan.  I have personally interviewed and examined the patient at bedside and agree with the resident's findings.                                  ________________________________________

## 2018-11-15 NOTE — PT/OT/SLP EVAL
Occupational Therapy   Evaluation    Name: Karly Sandoval  MRN: [id number]  Admitting Diagnosis: Compression fractures  Transferred from [facility] due to worsening back pain and fatigue    Recommendations:     Discharge Recommendations: nursing facility, skilled  Discharge Equipment Recommendations:  (TBD)  Barriers to discharge:  Decreased caregiver support    History:     Occupational Profile:  Lives With: alone  Living Arrangements: house  Home Accessibility: stairs to enter home  Home Layout: Able to live on 1st floor  Number of Stairs to Enter Home: 1  Stair Railings at Home: none  Transportation Available: family or friend will provide  Living Environment Comment: Pt transferred back to [facility] from [facility]; previously lived alone in a 1SH w/ 1 SMITHA and a tub/shower combo w/ a grab bar; she was generally independent with ADLs and mobility until ~1 month prior and son assisted as needed. Upon D/C pt has plans to move in to [facility] where she will have an apartment w/ a walk-in shower.   Equipment Currently Used at Home: bedside commode, cane, straight, grab bar, raised toilet, shower chair  DME owned (not currently used): rolling walker    Past Medical History:   Diagnosis Date    Abdominal aortic aneurysm (AAA) without rupture 10/8/2018    Abnormal CT of the abdomen 10/8/2018    Acid reflux     Anemia     Anxiety     Atrial arrhythmia 2/11/2014    Carotid artery occlusion     Chronic prescription benzodiazepine use 10/23/2018    Compression fracture of thoracic vertebra 6/25/2014    CVA (cerebral infarction) 2014    Cyst of pancreas 10/9/2018    Diastolic heart failure     echo 2016 ef 55% +Diastolic dysf    Diverticulosis     Encounter for blood transfusion     GERD (gastroesophageal reflux disease) 9/11/2012    History of cholelithiasis     Hyperlipidemia     Hypertension     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    Osteoarthritis     Osteoarthritis of both knees 6/12/2015     "Osteopenia     PFO (patent foramen ovale) 2/11/2014    PVC (premature ventricular contraction) 4/28/2014    RVOT origin -- benign     RBBB 11/14/2014    Rheumatoid arthritis(714.0)     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)     has pacemaker    Right pelvic adnexal fluid collection 10/8/2018       Past Surgical History:   Procedure Laterality Date    APPENDECTOMY      BREAST SURGERY      CARDIAC PACEMAKER PLACEMENT  11/2014    for syncope and RBBB/LAHB    CHOLECYSTECTOMY      EYE SURGERY      HEMORRHOID SURGERY      HYSTERECTOMY      1966    SKIN BIOPSY      VASCULAR SURGERY      VERTEBROPLASTY       Subjective     Chief Complaint: Difficulty keeping eyes open; pt stated "they doped me up"  Patient/Family Comments/goals: Increase independence    Pain/Comfort:  · Pain Rating 1: (Did not rate)  · Location - Orientation 1: lower  · Location 1: back  · Pain Addressed 1: Pre-medicate for activity, Reposition, Distraction  · Pain Rating Post-Intervention 1: (remained throughout)    Patients cultural, spiritual, Yazidism conflicts given the current situation: None    Objective:     Communicated with: RN prior to session. Patient found supine oxygen, PureWick, telemetry upon OT entry to room, son present.    General Precautions: Standard, fall   Orthopedic Precautions:spinal precautions   Braces: TLSO     Occupational Performance:    Bed Mobility:    · Patient completed Rolling/Turning to Left with moderate assistance and with side rail  · Patient completed Supine to Sit with maximal assistance via log rolling technique    Functional Mobility/Transfers:  · Patient completed Sit <> Stand Transfer with Mod A x 2 person assist from EOB 3 trials with hand-held assist; pt with posterior lean and fearful of falling   · Functional Mobility: few steps from EOB to bedside chair with Mod A x 2 person assist for balance and safety, posterior lean    Activities of Daily " "Living:  · Upper Body Dressing: total assistance to don and adjust TLSO while seated  · Lower Body Dressing: total assistance to don/doff socks    Cognitive/Visual Perceptual:  Cognitive/Psychosocial Skills:    -       Oriented to: Person, Place, Time and Situation   -       Follows Commands/attention:Follows one-step commands  -       Communication: clear/fluent  -       Safety awareness/insight to disability: intact   Visual/Perceptual:  -Intact      Physical Exam:  -Pt demo fair sitting and fair-poor standing balance with posterior lean  -B UE ROM WFL with generalized weakness  -Intact coordination and sensation    AMPAC 6 Click ADL:  AMPAC Total Score: 14    Treatment & Education:  OT eval; educated on OT role and POC, spinal precautions, and TLSO management; discussed D/C recs with pt and son  Education:    Patient left up in chair with all lines intact, call button in reach, RN notified and son present    Assessment:     Karly Sandoval is a 88 y.o. female with a medical diagnosis of compression fractures.  She presents with the following performance deficits affecting function: weakness, impaired endurance, impaired self care skills, impaired functional mobilty, gait instability, impaired balance, decreased upper extremity function, decreased lower extremity function, decreased safety awareness, pain, impaired cardiopulmonary response to activity. Pt would continue to benefit from OT to increase safety and deficits above. Recommend return to SNF to improve functional independence in order to safely transition to assisted living facility.      Rehab Prognosis: Good; patient would benefit from acute skilled OT services to address these deficits and reach maximum level of function.         Clinical Decision Makin.  OT Low:  "Pt evaluation falls under low complexity for evaluation coding due to performance deficits noted in 1-3 areas as stated above and 0 co-morbities affecting current functional " "status. Data obtained from problem focused assessments. No modifications or assistance was required for completion of evaluation. Only brief occupational profile and history review completed."     Plan:     Patient to be seen 4 x/week to address the above listed problems via self-care/home management, therapeutic activities, therapeutic exercises, neuromuscular re-education  · Plan of Care Expires: 12/15/18  · Plan of Care Reviewed with: patient, son    This Plan of care has been discussed with the patient who was involved in its development and understands and is in agreement with the identified goals and treatment plan    GOALS:   Multidisciplinary Problems     Occupational Therapy Goals        Problem: Occupational Therapy Goal    Goal Priority Disciplines Outcome Interventions   Occupational Therapy Goal     OT, PT/OT Ongoing (interventions implemented as appropriate)    Description:  Goals to be met by: 7 days (11/22/18)     Patient will increase functional independence with ADLs by performing:    UE Dressing with Contact Guard Assistance including TLSO.  LE Dressing with Minimal Assistance using AD as needed.  Grooming while EOB with Stand-by Assistance.  Toileting from bedside commode with Minimal Assistance for hygiene and clothing management.   Supine to sit with Minimal Assistance.  Toilet transfer to bedside commode with Minimal Assistance.  Increased functional strength to WFL for ADLs.                      Time Tracking:     OT Date of Treatment: 11/15/18  OT Start Time: 1041  OT Stop Time: 1125  OT Total Time (min): 44 min    Billable Minutes:Evaluation 20  Therapeutic Activity 15    DACIA Mansfield  11/15/2018    "

## 2018-11-15 NOTE — ASSESSMENT & PLAN NOTE
87 y/o female with hx of kyphoplasty at T8 and T9, with a known acute L2 compression fracture, who presents with worsening back pain and a new T12 rib fracture and T12 compression fracture with retropulsion.     -Patient neurologically stable on exam  -Patient is not a surgical candidate.  Kyphoplasty is not an option for the T12 fracture due to the component of retropulsion.  Due to patient's age, co morbidities, and bone quality, she is not a candidate for posterior instrumentation. Patient also stated that she is not interested in anything more invasive than a kyphoplasty.   - Plan to continue with bracing, pain control, and therapy  -TLSO brace when up/OOB  -Hospital medicine following & managing all comorbidities, will take the patient onto their service tomorrow morning - appreciate all recommendations & assistance  -Pain: Alternate Tylenol and Tramadol PRN. Continue Lidocaine patches.   -HTN: Continue home dose of Amlodipine, Lisinopril, and Carvedilol  -Chronic diastolic CHF: Holding lasix due to creatinine bump  -Anxiety: Continue home dose of Sertraline and Lexapro  -Constipation: Senna BID. Continue suppository and cellulose PRN.   -Atelectasis prevention: Will start scheduled duo nebs BID and IS hourly while awake.   -DVTP: TOM's, SCD's, and begin SQH  -PT/OT recs for SNF placement  -Plan for return to [facility]NF when medically appropriate  -Discussed with Dr. Leon

## 2018-11-15 NOTE — SUBJECTIVE & OBJECTIVE
Interval History: Patient with mild complaints of insomnia overnight.  Otherwise, patient seen sitting in chair doing well.  No significant pain in back currently.  On 3L saturating on 98%.      Review of Systems   Constitutional: Positive for appetite change and fatigue. Negative for chills and fever.   Respiratory: Negative for cough and wheezing.    Cardiovascular: Positive for leg swelling. Negative for chest pain and palpitations.   Gastrointestinal: Negative for abdominal distention, abdominal pain, constipation, diarrhea, nausea and vomiting.   Genitourinary: Negative for dysuria, flank pain and frequency.   Musculoskeletal: Positive for back pain. Negative for arthralgias, myalgias and neck stiffness.   Skin: Negative for pallor and rash.   Neurological: Negative for light-headedness and headaches.   Hematological: Negative for adenopathy.   Psychiatric/Behavioral: Positive for confusion. Negative for agitation and behavioral problems.     Objective:     Vital Signs (Most Recent):  Temp: 98.8 °F (37.1 °C) (11/15/18 1300)  Pulse: 66 (11/15/18 1300)  Resp: 12 (11/15/18 0909)  BP: 116/60 (11/15/18 1300)  SpO2: 99 % (11/15/18 1300) Vital Signs (24h Range):  Temp:  [97.6 °F (36.4 °C)-98.8 °F (37.1 °C)] 98.8 °F (37.1 °C)  Pulse:  [59-66] 66  Resp:  [12-20] 12  SpO2:  [93 %-99 %] 99 %  BP: (102-119)/(49-60) 116/60     Weight: 60.8 kg (134 lb)  Body mass index is 23.83 kg/m².    Intake/Output Summary (Last 24 hours) at 11/15/2018 1501  Last data filed at 11/15/2018 0800  Gross per 24 hour   Intake --   Output 1200 ml   Net -1200 ml      Physical Exam   Constitutional: She is oriented to person, place, and time. She appears well-developed and well-nourished. No distress.   HENT:   Head: Normocephalic and atraumatic.   NC in place, on 2 L   Eyes: Pupils are equal, round, and reactive to light. No scleral icterus.   Neck: Normal range of motion. Neck supple.   Cardiovascular: Normal rate, regular rhythm, normal heart  sounds and intact distal pulses.   No murmur heard.  Pulmonary/Chest: Effort normal. No respiratory distress.   Noted decreased breath sounds bibasilarly   Abdominal: Soft. Bowel sounds are normal. She exhibits no distension. There is no tenderness.   Musculoskeletal: Normal range of motion. She exhibits edema (1+ pitting edema on LE bilaterally ).   Neurological: She is alert and oriented to person, place, and time. No cranial nerve deficit.   Skin: Skin is warm and dry. Capillary refill takes 2 to 3 seconds. She is not diaphoretic. No erythema.   Psychiatric: She has a normal mood and affect. Her behavior is normal.   Vitals reviewed.      Significant Labs:     Recent Results (from the past 24 hour(s))   CBC auto differential    Collection Time: 11/15/18  8:53 AM   Result Value Ref Range    WBC 5.41 3.90 - 12.70 K/uL    RBC 2.71 (L) 4.00 - 5.40 M/uL    Hemoglobin 8.6 (L) 12.0 - 16.0 g/dL    Hematocrit 26.0 (L) 37.0 - 48.5 %    MCV 96 82 - 98 fL    MCH 31.7 (H) 27.0 - 31.0 pg    MCHC 33.1 32.0 - 36.0 g/dL    RDW 14.4 11.5 - 14.5 %    Platelets 163 150 - 350 K/uL    MPV 9.9 9.2 - 12.9 fL    Immature Granulocytes 1.3 (H) 0.0 - 0.5 %    Gran # (ANC) 3.8 1.8 - 7.7 K/uL    Immature Grans (Abs) 0.07 (H) 0.00 - 0.04 K/uL    Lymph # 0.7 (L) 1.0 - 4.8 K/uL    Mono # 0.7 0.3 - 1.0 K/uL    Eos # 0.2 0.0 - 0.5 K/uL    Baso # 0.02 0.00 - 0.20 K/uL    nRBC 0 0 /100 WBC    Gran% 69.8 38.0 - 73.0 %    Lymph% 12.4 (L) 18.0 - 48.0 %    Mono% 12.8 4.0 - 15.0 %    Eosinophil% 3.3 0.0 - 8.0 %    Basophil% 0.4 0.0 - 1.9 %    Differential Method Automated    Basic metabolic panel    Collection Time: 11/15/18  8:53 AM   Result Value Ref Range    Sodium 133 (L) 136 - 145 mmol/L    Potassium 4.5 3.5 - 5.1 mmol/L    Chloride 98 95 - 110 mmol/L    CO2 26 23 - 29 mmol/L    Glucose 109 70 - 110 mg/dL    BUN, Bld 48 (H) 8 - 23 mg/dL    Creatinine 1.9 (H) 0.5 - 1.4 mg/dL    Calcium 8.7 8.7 - 10.5 mg/dL    Anion Gap 9 8 - 16 mmol/L    eGFR if   26.8 (A) >60 mL/min/1.73 m^2    eGFR if non  23.2 (A) >60 mL/min/1.73 m^2       .    Significant Imaging: I have reviewed all pertinent imaging results/findings within the past 24 hours.     No significant new imaging.

## 2018-11-15 NOTE — TREATMENT PLAN
AES Note     Patient with incidental pancreatic cyst found on 10/8/2018.     Non-con CT showed:  2.1 cm fluid attenuation lesion within the pancreatic head.  No significant inflammatory changes surrounding the pancreas.  Differential considerations would include a pancreatic pseudocyst versus cystic neoplasm of the pancreas. MRCP/MRI of the abdomen without and with contrast may be obtained for further evaluation.    Recommend follow up in pancreatic cyst clinic after discharge.   We will arrange the follow up once patient is being discharged.      Discussed with AES staff.     AES will sign off. Please call us if any questions/concerns.     Maribeth Noel MD  Gastroenterology & Hepatology Fellow   Pager: [phone]

## 2018-11-15 NOTE — PLAN OF CARE
Problem: Patient Care Overview  Goal: Plan of Care Review  POC reviewed with pt. Verbalized understanding. VSS. Pain controlled with prn medications. Neuro exam remains unchanged. Fall precautions maintained. repositioned q2h and prn.  Pt advised to call staff for assistance. Will continue to monitor with all safety measures met.

## 2018-11-15 NOTE — ASSESSMENT & PLAN NOTE
- previously noted hyponatremia with Na at 128  - improved with diuresis and salt tabs per primary, now at 133  - hold off additional diuresis, continue to assess

## 2018-11-15 NOTE — ASSESSMENT & PLAN NOTE
Patient with noted worsening lower back pain requiring further evaluation.  Repeat imaging did reveal new severe spinal canal stenosis related to new T12 vertebral body compression fracture and 7 mm osseous retropulsion.      Recommendations  - continue management of compression fracture as per neurosurgical recommendations   - discussed with AES, patient to have outpatient work up.  No need for inpatient EUS. Appreciate AES assistance at this time

## 2018-11-15 NOTE — PROGRESS NOTES
"[facility]  Neurosurgery  Progress Note    Subjective:     History of Present Illness: Pt is an 87 y/o female with hx of HTN, HLD, RA, GERD and L2 compression fracture who [place] [facility] [place] with worsening [place] pain. Pt is accompanied by her son. He reports she was very independent and living and home until she had her L2 compression fracture last month and is now being transitioned to assisted living home. He says over the past few weeks the pt's pain has become more significant, especially the past few days. Pain is worse with positional changes or movement. Pain is better when laying down or sitting. Pt has had previous kyphoplasty at T8-T9 about 5 years ago. Pt is currently taking liquid oxycodone without relief. She denies any leg pain, numbness/tingling, b/b incontinence, or saddle anesthesia. She does report urinary urgency which has been present for months. Pt and son also report she has been feeling "loopy" and "off" after taking her daily medications in the morning. No AC use, she says she sometimes takes 1/4 of a baby aspirin. Per pt's son, she is also being treated for a UTI as of this week.         Post-Op Info:  * No surgery found *         Interval History: Ms. Sandoval is doing well today.  She is having back pain, controlled with pain medication.  No new weakness, paresthesias, or bowel/bladder dysfunction.     Medications:  Continuous Infusions:  Scheduled Meds:   albuterol-ipratropium  3 mL Nebulization Q12H    amLODIPine  10 mg Oral Daily    atorvastatin  20 mg Oral Daily    calcium carbonate  500 mg Oral Daily    carvedilol  6.25 mg Oral BID WM    ciprofloxacin HCl  500 mg Oral Daily    coenzyme Q10  100 mg Oral Daily    escitalopram oxalate  10 mg Oral Daily    folic acid  1 mg Oral Daily    heparin (porcine)  5,000 Units Subcutaneous Q8H    hydroxychloroquine  200 mg Oral Daily    lidocaine  1 patch Transdermal Q24H    senna-docusate 8.6-50 mg  1 tablet Oral " BID    sertraline  25 mg Oral Daily     PRN Meds:acetaminophen, albuterol, ALPRAZolam, bisacodyl, cellulose, traMADol     Review of Systems  Objective:     Weight: 60.8 kg (134 lb)  Body mass index is 23.83 kg/m².  Vital Signs (Most Recent):  Temp: 98.8 °F (37.1 °C) (11/15/18 1300)  Pulse: 66 (11/15/18 1300)  Resp: 12 (11/15/18 0909)  BP: 116/60 (11/15/18 1300)  SpO2: 99 % (11/15/18 1300) Vital Signs (24h Range):  Temp:  [97.6 °F (36.4 °C)-98.8 °F (37.1 °C)] 98.8 °F (37.1 °C)  Pulse:  [59-66] 66  Resp:  [12-20] 12  SpO2:  [93 %-99 %] 99 %  BP: (102-119)/(49-60) 116/60     Date 11/15/18 0700 - 11/16/18 0659   Shift 1130-2316 3559-8105 2461-4279 24 Hour Total   INTAKE   Shift Total(mL/kg)       OUTPUT   Urine(mL/kg/hr) 300(0.6)   300   Shift Total(mL/kg) 300(4.9)   300(4.9)   Weight (kg) 60.8 60.8 60.8 60.8                        Neurosurgery Physical Exam   General: well developed, well nourished, no distress  Head: normocephalic, atraumatic  Neurologic: Alert and oriented. Thought content appropriate.  GCS: Motor: 6/Verbal: 5/Eyes: 4 GCS Total: 15  Mental Status: Awake, Alert, Oriented x 4  Language: No aphasia  Speech: No dysarthria  Cranial nerves: face symmetric, tongue midline, CN II-XII grossly intact.   Eyes: pupils equal, round, reactive to light with accomodation, EOMI.    Pulmonary: normal respirations, no signs of respiratory distress  Abdomen: soft, non-distended, not tender to palpation  Sensory: intact to light touch throughout  Motor Strength:Moves all extremities spontaneously with good tone. No abnormal movements seen.      Strength   Deltoids Triceps Biceps Wrist Extension Wrist Flexion Hand    Upper: R 5/5 5/5 5/5 5/5 5/5 5/5     L 5/5 5/5 5/5 5/5 5/5 5/5       Iliopsoas Quadriceps Knee  Flexion Tibialis  anterior Gastro- cnemius EHL   Lower: R 5/5 5/5 5/5 5/5 5/5 5/5     L 5/5 5/5 5/5 5/5 5/5 5/5      Doran: absent  Clonus: absent  Skin: Skin is warm, dry and intact.         Significant  Labs:  Recent Labs   Lab 11/14/18  0541 11/15/18  0853   GLU 92 109   * 133*   K 3.8 4.5   CL 97 98   CO2 24 26   BUN 47* 48*   CREATININE 1.7* 1.9*   CALCIUM 8.2* 8.7     Recent Labs   Lab 11/14/18  0541 11/15/18  0853   WBC 7.52 5.41   HGB 8.0* 8.6*   HCT 23.9* 26.0*    163     No results for input(s): LABPT, INR, APTT in the last 48 hours.  Microbiology Results (last 7 days)     Procedure Component Value Units Date/Time    Urine culture [[id number]] Collected:  11/13/18 1745    Order Status:  Completed Specimen:  Urine Updated:  11/15/18 0350     Urine Culture, Routine No significant growth    Narrative:       Preferred Collection Type->Urine, Clean Catch  CUP          Significant Diagnostics:  No new imaging    Assessment/Plan:     T12 compression fracture    89 y/o female with hx of kyphoplasty at T8 and T9, with a known acute L2 compression fracture, who presents with worsening back pain and a new T12 rib fracture and T12 compression fracture with retropulsion.     -Patient neurologically stable on exam  -Patient is not a surgical candidate.  Kyphoplasty is not an option for the T12 fracture due to the component of retropulsion.  Due to patient's age, co morbidities, and bone quality, she is not a candidate for posterior instrumentation. Patient also stated that she is not interested in anything more invasive than a kyphoplasty.   - Plan to continue with bracing, pain control, and therapy  -TLSO brace when up/OOB  -Hospital medicine following & managing all comorbidities, will take the patient onto their service tomorrow morning - appreciate all recommendations & assistance  -Pain: Alternate Tylenol and Tramadol PRN. Continue Lidocaine patches.   -HTN: Continue home dose of Amlodipine, Lisinopril, and Carvedilol  -Chronic diastolic CHF: Holding lasix due to creatinine bump  -Anxiety: Continue home dose of Sertraline and Lexapro  -Constipation: Senna BID. Continue suppository and cellulose PRN.    -Atelectasis prevention: Will start scheduled duo nebs BID and IS hourly while awake.   -DVTP: TOM's, SCD's, and begin SQH  -PT/OT recs for SNF placement  -Plan for return to OSNF when medically appropriate  -Discussed with Dr. Edward Sequeira PASmileyC  Neurosurgery  [facility]

## 2018-11-15 NOTE — PLAN OF CARE
Problem: Occupational Therapy Goal  Goal: Occupational Therapy Goal  Goals to be met by: 7 days (11/22/18)     Patient will increase functional independence with ADLs by performing:    UE Dressing with Contact Guard Assistance including TLSO.  LE Dressing with Minimal Assistance using AD as needed.  Grooming while EOB with Stand-by Assistance.  Toileting from bedside commode with Minimal Assistance for hygiene and clothing management.   Supine to sit with Minimal Assistance.  Toilet transfer to bedside commode with Minimal Assistance.  Increased functional strength to WFL for ADLs.    Outcome: Ongoing (interventions implemented as appropriate)  Eval and POC set 11/15/18

## 2018-11-15 NOTE — ASSESSMENT & PLAN NOTE
Karly Sandoval is a 88 y.o. lady with HFpEF, HTN, HLD, and T12 compression fracture who presents to [facility] [place] [place] for further evaluation of worsening lower back pain.  Medicine was consulted regarding new onset hypoxia with CXR with noted pleural effusion and pulmonary vascular congestion consistent with volume overload.  Diuresed yesterday, today with improved breathing and improvement in peripheral edema.  While supplemental oxygen is increased, she has improved sats and is sitting in chair, can wean as tolerated.      Recommendations  - Stop lasix for now, continue to monitor given Cr bump  - Wean FiO2 as tolerated  - q4 vitals

## 2018-11-16 LAB
ANION GAP SERPL CALC-SCNC: 8 MMOL/L
BASOPHILS # BLD AUTO: 0.01 K/UL
BASOPHILS NFR BLD: 0.2 %
BUN SERPL-MCNC: 48 MG/DL
CALCIUM SERPL-MCNC: 8.3 MG/DL
CHLORIDE SERPL-SCNC: 98 MMOL/L
CO2 SERPL-SCNC: 25 MMOL/L
CREAT SERPL-MCNC: 1.7 MG/DL
DIFFERENTIAL METHOD: ABNORMAL
EOSINOPHIL # BLD AUTO: 0.2 K/UL
EOSINOPHIL NFR BLD: 5 %
ERYTHROCYTE [DISTWIDTH] IN BLOOD BY AUTOMATED COUNT: 14.1 %
EST. GFR  (AFRICAN AMERICAN): 30.6 ML/MIN/1.73 M^2
EST. GFR  (NON AFRICAN AMERICAN): 26.5 ML/MIN/1.73 M^2
GLUCOSE SERPL-MCNC: 117 MG/DL
HCT VFR BLD AUTO: 23.2 %
HGB BLD-MCNC: 7.7 G/DL
IMM GRANULOCYTES # BLD AUTO: 0.06 K/UL
IMM GRANULOCYTES NFR BLD AUTO: 1.2 %
LYMPHOCYTES # BLD AUTO: 0.7 K/UL
LYMPHOCYTES NFR BLD: 15.1 %
MCH RBC QN AUTO: 30.6 PG
MCHC RBC AUTO-ENTMCNC: 33.2 G/DL
MCV RBC AUTO: 92 FL
MONOCYTES # BLD AUTO: 0.7 K/UL
MONOCYTES NFR BLD: 14.7 %
NEUTROPHILS # BLD AUTO: 3.1 K/UL
NEUTROPHILS NFR BLD: 63.8 %
NRBC BLD-RTO: 0 /100 WBC
PLATELET # BLD AUTO: 167 K/UL
PMV BLD AUTO: 9.9 FL
POTASSIUM SERPL-SCNC: 3.7 MMOL/L
RBC # BLD AUTO: 2.52 M/UL
SODIUM SERPL-SCNC: 131 MMOL/L
WBC # BLD AUTO: 4.82 K/UL

## 2018-11-16 PROCEDURE — 94640 AIRWAY INHALATION TREATMENT: CPT

## 2018-11-16 PROCEDURE — 36415 COLL VENOUS BLD VENIPUNCTURE: CPT

## 2018-11-16 PROCEDURE — 99232 SBSQ HOSP IP/OBS MODERATE 35: CPT | Mod: ,,, | Performed by: PHYSICIAN ASSISTANT

## 2018-11-16 PROCEDURE — 63600175 PHARM REV CODE 636 W HCPCS: Performed by: PHYSICIAN ASSISTANT

## 2018-11-16 PROCEDURE — 27000221 HC OXYGEN, UP TO 24 HOURS

## 2018-11-16 PROCEDURE — 85025 COMPLETE CBC W/AUTO DIFF WBC: CPT

## 2018-11-16 PROCEDURE — 97162 PT EVAL MOD COMPLEX 30 MIN: CPT

## 2018-11-16 PROCEDURE — 25000003 PHARM REV CODE 250: Performed by: NEUROLOGICAL SURGERY

## 2018-11-16 PROCEDURE — 94799 UNLISTED PULMONARY SVC/PX: CPT

## 2018-11-16 PROCEDURE — 25000003 PHARM REV CODE 250: Performed by: PHYSICIAN ASSISTANT

## 2018-11-16 PROCEDURE — 80048 BASIC METABOLIC PNL TOTAL CA: CPT

## 2018-11-16 PROCEDURE — 94761 N-INVAS EAR/PLS OXIMETRY MLT: CPT

## 2018-11-16 PROCEDURE — 25000003 PHARM REV CODE 250: Performed by: STUDENT IN AN ORGANIZED HEALTH CARE EDUCATION/TRAINING PROGRAM

## 2018-11-16 PROCEDURE — 25000242 PHARM REV CODE 250 ALT 637 W/ HCPCS: Performed by: PHYSICIAN ASSISTANT

## 2018-11-16 PROCEDURE — 20600001 HC STEP DOWN PRIVATE ROOM

## 2018-11-16 PROCEDURE — 99233 SBSQ HOSP IP/OBS HIGH 50: CPT | Mod: ,,, | Performed by: HOSPITALIST

## 2018-11-16 RX ADMIN — HYDROXYCHLOROQUINE SULFATE 200 MG: 200 TABLET, FILM COATED ORAL at 09:11

## 2018-11-16 RX ADMIN — CARVEDILOL 6.25 MG: 6.25 TABLET, FILM COATED ORAL at 09:11

## 2018-11-16 RX ADMIN — ESCITALOPRAM OXALATE 10 MG: 10 TABLET ORAL at 09:11

## 2018-11-16 RX ADMIN — FOLIC ACID 1 MG: 1 TABLET ORAL at 09:11

## 2018-11-16 RX ADMIN — ATORVASTATIN CALCIUM 20 MG: 20 TABLET, FILM COATED ORAL at 09:11

## 2018-11-16 RX ADMIN — CIPROFLOXACIN 500 MG: 500 TABLET, FILM COATED ORAL at 09:11

## 2018-11-16 RX ADMIN — AMLODIPINE BESYLATE 10 MG: 10 TABLET ORAL at 09:11

## 2018-11-16 RX ADMIN — SERTRALINE HYDROCHLORIDE 25 MG: 25 TABLET ORAL at 09:11

## 2018-11-16 RX ADMIN — HEPARIN SODIUM 5000 UNITS: 5000 INJECTION, SOLUTION INTRAVENOUS; SUBCUTANEOUS at 08:11

## 2018-11-16 RX ADMIN — HEPARIN SODIUM 5000 UNITS: 5000 INJECTION, SOLUTION INTRAVENOUS; SUBCUTANEOUS at 02:11

## 2018-11-16 RX ADMIN — LIDOCAINE 1 PATCH: 50 PATCH TOPICAL at 04:11

## 2018-11-16 RX ADMIN — HEPARIN SODIUM 5000 UNITS: 5000 INJECTION, SOLUTION INTRAVENOUS; SUBCUTANEOUS at 05:11

## 2018-11-16 RX ADMIN — IPRATROPIUM BROMIDE AND ALBUTEROL SULFATE 3 ML: .5; 3 SOLUTION RESPIRATORY (INHALATION) at 08:11

## 2018-11-16 RX ADMIN — SENNOSIDES AND DOCUSATE SODIUM 1 TABLET: 8.6; 5 TABLET ORAL at 09:11

## 2018-11-16 RX ADMIN — IPRATROPIUM BROMIDE AND ALBUTEROL SULFATE 3 ML: .5; 3 SOLUTION RESPIRATORY (INHALATION) at 09:11

## 2018-11-16 RX ADMIN — Medication 100 MG: at 09:11

## 2018-11-16 RX ADMIN — CARVEDILOL 6.25 MG: 6.25 TABLET, FILM COATED ORAL at 04:11

## 2018-11-16 RX ADMIN — ALPRAZOLAM 0.25 MG: 0.25 TABLET ORAL at 08:11

## 2018-11-16 RX ADMIN — CALCIUM CARBONATE 500 MG: 1250 SUSPENSION ORAL at 09:11

## 2018-11-16 NOTE — ASSESSMENT & PLAN NOTE
89 y/o female with hx of kyphoplasty at T8 and T9, with a known acute L2 compression fracture, who presents with worsening back pain and a new T12 rib fracture and T12 compression fracture with retropulsion.   -Patient neurologically stable on exam  -Patient is not a surgical candidate.  Kyphoplasty is not an option for the T12 fracture due to the component of retropulsion.  Due to patient's age, co morbidities, and bone quality, she is not a candidate for posterior instrumentation. Patient also stated that she is not interested in anything more invasive than a kyphoplasty.   - Plan to continue with bracing, pain control, and therapy  - No further imaging needed at this time  -TLSO brace when up/OOB  - Hospital medicine is now primary for the patient   - Medical management per primary team  -DVTP: TOM's, SCD's, and begin [facility]  -PT/OT recs for SNF placement  -Plan for return to OSNF when medically appropriate  -Discussed with Dr. Leon

## 2018-11-16 NOTE — PLAN OF CARE
Problem: Physical Therapy Goal  Goal: Physical Therapy Goal  Goals to be met by: 2018     Patient will increase functional independence with mobility by performin. Supine to sit with stand-by Assistance  2. Sit to supine with Stand-by Assistance  3. Sit to stand transfer with Minimal Assistance using appropriate AD.   4. Bed to chair transfer with Minimal Assistance using appropriate AD.   5. Gait  x 50 feet with Minimal Assistance using Rolling Walker.   6. Stand for 2 minutes with Contact Guard Assistance using Rolling Walker  7. Lower extremity exercise program x15 reps per handout, with supervision    Outcome: Ongoing (interventions implemented as appropriate)  PT evaluation completed- see note for details. Goals established and POC initiated.     Debo Arriaza, PT, DPT   2018  Pager: [phone]

## 2018-11-16 NOTE — PROGRESS NOTES
"[facility]  Neurosurgery  Progress Note    Subjective:     History of Present Illness: Pt is an 87 y/o female with hx of HTN, HLD, RA, GERD and L2 compression fracture who [place] [facility] [place] worsening [place] pain. Pt is accompanied by her son. He reports she was very independent and living and home until she had her L2 compression fracture last month and is now being transitioned to assisted living home. He says over the past few weeks the pt's pain has become more significant, especially the past few days. Pain is worse with positional changes or movement. Pain is better when laying down or sitting. Pt has had previous kyphoplasty at T8-T9 about 5 years ago. Pt is currently taking liquid oxycodone without relief. She denies any leg pain, numbness/tingling, b/b incontinence, or saddle anesthesia. She does report urinary urgency which has been present for months. Pt and son also report she has been feeling "loopy" and "off" after taking her daily medications in the morning. No AC use, she says she sometimes takes 1/4 of a baby aspirin. Per pt's son, she is also being treated for a UTI as of this week.         Post-Op Info:  * No surgery found *         Interval History: Ms. Sandoval complains of back pain, controlled with pain medication.  No new weakness, paresthesias, or B/B dysfunction.     Medications:  Continuous Infusions:  Scheduled Meds:   albuterol-ipratropium  3 mL Nebulization Q12H    amLODIPine  10 mg Oral Daily    atorvastatin  20 mg Oral Daily    calcium carbonate  500 mg Oral Daily    carvedilol  6.25 mg Oral BID WM    ciprofloxacin HCl  500 mg Oral Daily    coenzyme Q10  100 mg Oral Daily    escitalopram oxalate  10 mg Oral Daily    folic acid  1 mg Oral Daily    heparin (porcine)  5,000 Units Subcutaneous Q8H    hydroxychloroquine  200 mg Oral Daily    lidocaine  1 patch Transdermal Q24H    senna-docusate 8.6-50 mg  1 tablet Oral BID    sertraline  25 mg Oral " Daily     PRN Meds:acetaminophen, albuterol, ALPRAZolam, bisacodyl, cellulose, traMADol     Review of Systems  Objective:     Weight: 60.8 kg (134 lb)  Body mass index is 23.83 kg/m².  Vital Signs (Most Recent):  Temp: 97.8 °F (36.6 °C) (11/16/18 1630)  Pulse: 72 (11/16/18 1630)  Resp: 18 (11/16/18 1630)  BP: 125/61 (11/16/18 1630)  SpO2: 98 % (11/16/18 1316) Vital Signs (24h Range):  Temp:  [97.1 °F (36.2 °C)-98.1 °F (36.7 °C)] 97.8 °F (36.6 °C)  Pulse:  [60-73] 72  Resp:  [16-20] 18  SpO2:  [93 %-99 %] 98 %  BP: (112-140)/(54-63) 125/61     Date 11/16/18 0700 - 11/17/18 0659   Shift 8578-8247 0583-3409 1046-4351 24 Hour Total   INTAKE   Shift Total(mL/kg)       OUTPUT   Urine(mL/kg/hr) 200(0.4) 400  600   Shift Total(mL/kg) 200(3.3) 400(6.6)  600(9.9)   Weight (kg) 60.8 60.8 60.8 60.8                   Female External Urinary Catheter 11/16/18 1442 (Active)   Skin no redness;no breakdown;reddened 11/16/2018  2:42 PM   Tolerance no signs/symptoms of discomfort 11/16/2018  2:42 PM   Suction Continuous suction at 70 mmHg 11/16/2018  2:42 PM       Neurosurgery Physical Exam   General: well developed, well nourished, no distress  Head: normocephalic, atraumatic  Neurologic: Alert and oriented. Thought content appropriate.  GCS: Motor: 6/Verbal: 5/Eyes: 4 GCS Total: 15  Mental Status: Awake, Alert, Oriented x 4  Language: No aphasia  Speech: No dysarthria  Cranial nerves: face symmetric, tongue midline, CN II-XII grossly intact.   Eyes: pupils equal, round, reactive to light with accomodation, EOMI.    Pulmonary: normal respirations, no signs of respiratory distress  Abdomen: soft, non-distended, not tender to palpation  Sensory: intact to light touch throughout  Motor Strength:Moves all extremities spontaneously with good tone. No abnormal movements seen.      Strength   Deltoids Triceps Biceps Wrist Extension Wrist Flexion Hand    Upper: R 5/5 5/5 5/5 5/5 5/5 5/5     L 5/5 5/5 5/5 5/5 5/5 5/5       Iliopsoas  Quadriceps Knee  Flexion Tibialis  anterior Gastro- cnemius EHL   Lower: R 5/5 5/5 5/5 5/5 5/5 5/5     L 5/5 5/5 5/5 5/5 5/5 5/5      Doran: absent  Clonus: absent  Skin: Skin is warm, dry and intact.      Significant Labs:  Recent Labs   Lab 11/15/18  0853 11/16/18  0416    117*   * 131*   K 4.5 3.7   CL 98 98   CO2 26 25   BUN 48* 48*   CREATININE 1.9* 1.7*   CALCIUM 8.7 8.3*     Recent Labs   Lab 11/15/18  0853 11/16/18  0416   WBC 5.41 4.82   HGB 8.6* 7.7*   HCT 26.0* 23.2*    167     No results for input(s): LABPT, INR, APTT in the last 48 hours.  Microbiology Results (last 7 days)     Procedure Component Value Units Date/Time    Urine culture [[id number]] Collected:  11/13/18 1745    Order Status:  Completed Specimen:  Urine Updated:  11/15/18 0350     Urine Culture, Routine No significant growth    Narrative:       Preferred Collection Type->Urine, Clean Catch  CUP            Significant Diagnostics:  No new imaging    Assessment/Plan:     T12 compression fracture    89 y/o female with hx of kyphoplasty at T8 and T9, with a known acute L2 compression fracture, who presents with worsening back pain and a new T12 rib fracture and T12 compression fracture with retropulsion.   -Patient neurologically stable on exam  -Patient is not a surgical candidate.  Kyphoplasty is not an option for the T12 fracture due to the component of retropulsion.  Due to patient's age, co morbidities, and bone quality, she is not a candidate for posterior instrumentation. Patient also stated that she is not interested in anything more invasive than a kyphoplasty.   - Plan to continue with bracing, pain control, and therapy  - No further imaging needed at this time  -TLSO brace when up/OOB  - Hospital medicine is now primary for the patient   - Medical management per primary team  -DVTP: TOM's, SCD's, and begin SQH  -PT/OT recs for SNF placement  -Plan for return to [facility] when medically appropriate  -Discussed with  Dr. Edward Sequeira PA-C  Neurosurgery  [facility]

## 2018-11-16 NOTE — SUBJECTIVE & OBJECTIVE
Interval History: Ms. Sandoval complains of back pain, controlled with pain medication.  No new weakness, paresthesias, or B/B dysfunction.     Medications:  Continuous Infusions:  Scheduled Meds:   albuterol-ipratropium  3 mL Nebulization Q12H    amLODIPine  10 mg Oral Daily    atorvastatin  20 mg Oral Daily    calcium carbonate  500 mg Oral Daily    carvedilol  6.25 mg Oral BID WM    ciprofloxacin HCl  500 mg Oral Daily    coenzyme Q10  100 mg Oral Daily    escitalopram oxalate  10 mg Oral Daily    folic acid  1 mg Oral Daily    heparin (porcine)  5,000 Units Subcutaneous Q8H    hydroxychloroquine  200 mg Oral Daily    lidocaine  1 patch Transdermal Q24H    senna-docusate 8.6-50 mg  1 tablet Oral BID    sertraline  25 mg Oral Daily     PRN Meds:acetaminophen, albuterol, ALPRAZolam, bisacodyl, cellulose, traMADol     Review of Systems  Objective:     Weight: 60.8 kg (134 lb)  Body mass index is 23.83 kg/m².  Vital Signs (Most Recent):  Temp: 97.8 °F (36.6 °C) (11/16/18 1630)  Pulse: 72 (11/16/18 1630)  Resp: 18 (11/16/18 1630)  BP: 125/61 (11/16/18 1630)  SpO2: 98 % (11/16/18 1316) Vital Signs (24h Range):  Temp:  [97.1 °F (36.2 °C)-98.1 °F (36.7 °C)] 97.8 °F (36.6 °C)  Pulse:  [60-73] 72  Resp:  [16-20] 18  SpO2:  [93 %-99 %] 98 %  BP: (112-140)/(54-63) 125/61     Date 11/16/18 0700 - 11/17/18 0659   Shift 0729-4606 9006-9764 8957-2686 24 Hour Total   INTAKE   Shift Total(mL/kg)       OUTPUT   Urine(mL/kg/hr) 200(0.4) 400  600   Shift Total(mL/kg) 200(3.3) 400(6.6)  600(9.9)   Weight (kg) 60.8 60.8 60.8 60.8                   Female External Urinary Catheter 11/16/18 1442 (Active)   Skin no redness;no breakdown;reddened 11/16/2018  2:42 PM   Tolerance no signs/symptoms of discomfort 11/16/2018  2:42 PM   Suction Continuous suction at 70 mmHg 11/16/2018  2:42 PM       Neurosurgery Physical Exam   General: well developed, well nourished, no distress  Head: normocephalic, atraumatic  Neurologic: Alert  and oriented. Thought content appropriate.  GCS: Motor: 6/Verbal: 5/Eyes: 4 GCS Total: 15  Mental Status: Awake, Alert, Oriented x 4  Language: No aphasia  Speech: No dysarthria  Cranial nerves: face symmetric, tongue midline, CN II-XII grossly intact.   Eyes: pupils equal, round, reactive to light with accomodation, EOMI.    Pulmonary: normal respirations, no signs of respiratory distress  Abdomen: soft, non-distended, not tender to palpation  Sensory: intact to light touch throughout  Motor Strength:Moves all extremities spontaneously with good tone. No abnormal movements seen.      Strength   Deltoids Triceps Biceps Wrist Extension Wrist Flexion Hand    Upper: R 5/5 5/5 5/5 5/5 5/5 5/5     L 5/5 5/5 5/5 5/5 5/5 5/5       Iliopsoas Quadriceps Knee  Flexion Tibialis  anterior Gastro- cnemius EHL   Lower: R 5/5 5/5 5/5 5/5 5/5 5/5     L 5/5 5/5 5/5 5/5 5/5 5/5      Doran: absent  Clonus: absent  Skin: Skin is warm, dry and intact.      Significant Labs:  Recent Labs   Lab 11/15/18  0853 11/16/18  0416    117*   * 131*   K 4.5 3.7   CL 98 98   CO2 26 25   BUN 48* 48*   CREATININE 1.9* 1.7*   CALCIUM 8.7 8.3*     Recent Labs   Lab 11/15/18  0853 11/16/18  0416   WBC 5.41 4.82   HGB 8.6* 7.7*   HCT 26.0* 23.2*    167     No results for input(s): LABPT, INR, APTT in the last 48 hours.  Microbiology Results (last 7 days)     Procedure Component Value Units Date/Time    Urine culture [[id number]] Collected:  11/13/18 8813    Order Status:  Completed Specimen:  Urine Updated:  11/15/18 0350     Urine Culture, Routine No significant growth    Narrative:       Preferred Collection Type->Urine, Clean Catch  CUP            Significant Diagnostics:  No new imaging

## 2018-11-16 NOTE — PLAN OF CARE
Problem: Patient Care Overview  Goal: Plan of Care Review  Outcome: Ongoing (interventions implemented as appropriate)   11/16/18 1510   Coping/Psychosocial   Plan Of Care Reviewed With patient;family     Patient remains awake, alert and oriented x4. No apparent distress. VSS. Patient is able to verbalize pain relief and pain goals. Denies any pain medication at this time. POC discussed with patient and son at bedside. Verbalize understanding. Patient encouraged to drink fluids and eat meals. Patient continues to verbalize some anxiety related to d/c. Emotional support provided. Patient up in chair with brace. Fall precautions in place. No falls occurred during this shift. No-skid socks on, call light in reach. No other concerns at this time. Will continue to monitor.

## 2018-11-16 NOTE — PT/OT/SLP EVAL
"Physical Therapy Evaluation    Patient Name:  Karly Sandoval   MRN:  [id number]    Recommendations:     Discharge Recommendations:  nursing facility, skilled   Discharge Equipment Recommendations: wheelchair   Barriers to discharge: Decreased caregiver support    Assessment:     Karly Sandoval is a 88 y.o. female admitted with a medical diagnosis of T12 compression fracture; pt transferred to [facility] 11/13/18 from [facility].  She presents with the following impairments/functional limitations: generalized weakness, impaired endurance, impaired self care skills, gait instability, impaired functional mobilty, impaired balance, impaired cognition, pain, decreased safety awareness, impaired cardiopulmonary response to activity. Pt's activity tolerance limited by lethargy this visit. Pt was able to participate in bed mobility with minimum-moderate assistance and sitting EOB x 8 minutes. Pt presents at risk for falls and requires increased assistance for all mobility. Pt would benefit from skilled PT intervention to address listed functional deficits, reduce fall risk, and maximize (I) and safety with functional mobility.     Rehab Prognosis:  good; patient would benefit from acute skilled PT services to address these deficits and reach maximum level of function.      Recent Surgery: * No surgery found *      Plan:     During this hospitalization, patient to be seen 4 x/week to address the above listed problems via gait training, therapeutic activities, therapeutic exercises, neuromuscular re-education  · Plan of Care Expires:  12/14/18   Plan of Care Reviewed with: patient    Subjective     Communicated with RN prior to session.  Patient found supine upon PT entry to room, agreeable to evaluation.      Chief Complaint: weakness  Patient comments/goals: "I feel so groggy, I feel drunk"  Pain/Comfort:  · Pain Rating 1: (Pt did not rate pain on numeric scale)  · Location - Orientation 1: generalized  · Location 1: " back  · Pain Addressed 1: Reposition, Distraction, Pre-medicate for activity, Nurse notified  · Pain Rating Post-Intervention 1: (remained throughout)    Patients cultural, spiritual, Jainism conflicts given the current situation: no conflicts    Patient History:   Living Environment: Pt transferred from [facility]; previously lived alone in [facility] with 1 SMITHA. Bathroom: tub/shower combo with grab bar.    Prior Level of Function: modified independent with ADLs and mobility using SPC for community mobility until ~1 month prior and son assisted as needed  DME owned: bedside commode, SPC, grab bar, raised toilet, shower chair   Caregiver Assistance: Upon D/C pt has plans to move in to assisted living facility where she will have an apartment w/ a walk-in shower.     Objective:     Patient found with: oxygen, PureWick, telemetry, peripheral IV     General Precautions: Standard, fall, aspiration   Orthopedic Precautions:spinal precautions   Braces: TLSO     Exams:  · Cognitive Exam:  Patient is oriented to Person, Place and Time  · Postural Exam:  Patient presented with the following abnormalities:    · -       Rounded shoulders  · -       Forward head  · -       Kyphosis  · Sensation:    · -       Intact  · RLE ROM: WFL  · RLE Strength: 3+/5 (Pt appeared limited by fatigue)   · LLE ROM: WFL  · LLE Strength: 3+/5 (Pt appeared limited by fatigue)     Functional Mobility:    Bed Mobility:   · Rolling: to left with minimum assistance  · Supine > Sit: moderate assistance   · Sit > Supine: minimum assistance   · Scooting to EOB: moderate assistance  · Lateral scooting along EOB to L: moderate assistance x 2 trials      Sitting Balance at Edge of Bed:  · Assistance level: contact guard assistance  · Duration: ~8 minutes    Transfers:   · N/T this visit 2/2 pt lethargic, unable to remain alert while sitting EOB and requesting to return to supine.                  Gait:  · Unable to assess this visit       AM-PAC 6 CLICK  MOBILITY  Total Score:11       Therapeutic Activities and Exercises:  Pt educated on:  - Role of PT and POC/goals for therapy   - Safety with mobility  - Instructed to call nursing staff for assistance with mobility as needed 2/2 fall risk   - donning TLSO     *No family/caregiver present for education     Pt verbalized understanding and expressed no further concerns/questions. Will require reinforcement of education 2/2 lethargy.       Patient left HOB elevated with all lines intact, call button in reach, bed alarm on and RN notified.    GOALS:   Multidisciplinary Problems     Physical Therapy Goals        Problem: Physical Therapy Goal    Goal Priority Disciplines Outcome Goal Variances Interventions   Physical Therapy Goal     PT, PT/OT Ongoing (interventions implemented as appropriate)     Description:  Goals to be met by: 2018     Patient will increase functional independence with mobility by performin. Supine to sit with stand-by Assistance  2. Sit to supine with Stand-by Assistance  3. Sit to stand transfer with Minimal Assistance using appropriate AD.   4. Bed to chair transfer with Minimal Assistance using appropriate AD.   5. Gait  x 50 feet with Minimal Assistance using Rolling Walker.   6. Stand for 2 minutes with Contact Guard Assistance using Rolling Walker  7. Lower extremity exercise program x15 reps per handout, with supervision                      History:     Past Medical History:   Diagnosis Date    Abdominal aortic aneurysm (AAA) without rupture 10/8/2018    Abnormal CT of the abdomen 10/8/2018    Acid reflux     Anemia     Anxiety     Atrial arrhythmia 2014    Carotid artery occlusion     Chronic prescription benzodiazepine use 10/23/2018    Compression fracture of thoracic vertebra 2014    CVA (cerebral infarction)     Cyst of pancreas 10/9/2018    Diastolic heart failure     echo  ef 55% +Diastolic dysf    Diverticulosis     Encounter for blood  transfusion     GERD (gastroesophageal reflux disease) 9/11/2012    History of cholelithiasis     Hyperlipidemia     Hypertension     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    Osteoarthritis     Osteoarthritis of both knees 6/12/2015    Osteopenia     PFO (patent foramen ovale) 2/11/2014    PVC (premature ventricular contraction) 4/28/2014    RVOT origin -- benign     RBBB 11/14/2014    Rheumatoid arthritis(714.0)     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)     has pacemaker    Right pelvic adnexal fluid collection 10/8/2018       Past Surgical History:   Procedure Laterality Date    APPENDECTOMY      BREAST SURGERY      CARDIAC PACEMAKER PLACEMENT  11/2014    for syncope and RBBB/LAHB    CHOLECYSTECTOMY      EYE SURGERY      HEMORRHOID SURGERY      HYSTERECTOMY      1966    SKIN BIOPSY      VASCULAR SURGERY      VERTEBROPLASTY         Clinical Decision Making:     Moderate complexity evaluation:   · Evolving clinical presentation   · 1-2 personal factors/comorbidities affecting treatment   · Examining and addressing 3+ functional deficits, activity limitations, and participation restrictions    Time Tracking:     PT Received On: 11/16/18  PT Start Time: 1105     PT Stop Time: 1122  PT Total Time (min): 17 min     Billable Minutes: Evaluation 17 min    Debo Arriaza PT, DPT   11/16/2018  Pager: [phone]

## 2018-11-16 NOTE — PLAN OF CARE
Problem: Patient Care Overview  Goal: Plan of Care Review  POC reviewed with pt. Verbalized understanding. VSS. No c/o pain or signs of distress noted Neuro exam remains unchanged. Fall precautions maintained. Repositioned q2h and prn.Pt advised to call staff for assistance call light is placed within reach. Will continue to monitor with all safety measures met.

## 2018-11-17 DIAGNOSIS — S22.080A T12 COMPRESSION FRACTURE: Primary | ICD-10-CM

## 2018-11-17 PROBLEM — K86.9 PANCREATIC LESION: Status: ACTIVE | Noted: 2018-10-09

## 2018-11-17 PROBLEM — Z71.89 GOALS OF CARE, COUNSELING/DISCUSSION: Status: RESOLVED | Noted: 2018-11-14 | Resolved: 2018-11-17

## 2018-11-17 LAB
ANION GAP SERPL CALC-SCNC: 6 MMOL/L
BASOPHILS # BLD AUTO: 0.01 K/UL
BASOPHILS NFR BLD: 0.2 %
BUN SERPL-MCNC: 41 MG/DL
CALCIUM SERPL-MCNC: 8.3 MG/DL
CHLORIDE SERPL-SCNC: 98 MMOL/L
CO2 SERPL-SCNC: 25 MMOL/L
CREAT SERPL-MCNC: 1.6 MG/DL
DIFFERENTIAL METHOD: ABNORMAL
EOSINOPHIL # BLD AUTO: 0.2 K/UL
EOSINOPHIL NFR BLD: 3 %
ERYTHROCYTE [DISTWIDTH] IN BLOOD BY AUTOMATED COUNT: 13.9 %
EST. GFR  (AFRICAN AMERICAN): 32.9 ML/MIN/1.73 M^2
EST. GFR  (NON AFRICAN AMERICAN): 28.6 ML/MIN/1.73 M^2
GLUCOSE SERPL-MCNC: 115 MG/DL
HCT VFR BLD AUTO: 23.7 %
HGB BLD-MCNC: 8 G/DL
IMM GRANULOCYTES # BLD AUTO: 0.11 K/UL
IMM GRANULOCYTES NFR BLD AUTO: 1.9 %
LYMPHOCYTES # BLD AUTO: 1 K/UL
LYMPHOCYTES NFR BLD: 16.8 %
MCH RBC QN AUTO: 31.1 PG
MCHC RBC AUTO-ENTMCNC: 33.8 G/DL
MCV RBC AUTO: 92 FL
MONOCYTES # BLD AUTO: 0.8 K/UL
MONOCYTES NFR BLD: 14.2 %
NEUTROPHILS # BLD AUTO: 3.7 K/UL
NEUTROPHILS NFR BLD: 63.9 %
NRBC BLD-RTO: 0 /100 WBC
PLATELET # BLD AUTO: 171 K/UL
PMV BLD AUTO: 9.5 FL
POTASSIUM SERPL-SCNC: 4.2 MMOL/L
RBC # BLD AUTO: 2.57 M/UL
SODIUM SERPL-SCNC: 129 MMOL/L
WBC # BLD AUTO: 5.72 K/UL

## 2018-11-17 PROCEDURE — 20600001 HC STEP DOWN PRIVATE ROOM

## 2018-11-17 PROCEDURE — 80048 BASIC METABOLIC PNL TOTAL CA: CPT

## 2018-11-17 PROCEDURE — 94640 AIRWAY INHALATION TREATMENT: CPT

## 2018-11-17 PROCEDURE — 25000003 PHARM REV CODE 250: Performed by: PHYSICIAN ASSISTANT

## 2018-11-17 PROCEDURE — 97530 THERAPEUTIC ACTIVITIES: CPT

## 2018-11-17 PROCEDURE — 25000003 PHARM REV CODE 250: Performed by: NEUROLOGICAL SURGERY

## 2018-11-17 PROCEDURE — 94799 UNLISTED PULMONARY SVC/PX: CPT

## 2018-11-17 PROCEDURE — G8987 SELF CARE CURRENT STATUS: HCPCS | Mod: CL

## 2018-11-17 PROCEDURE — 25000242 PHARM REV CODE 250 ALT 637 W/ HCPCS: Performed by: PHYSICIAN ASSISTANT

## 2018-11-17 PROCEDURE — G8988 SELF CARE GOAL STATUS: HCPCS | Mod: CK

## 2018-11-17 PROCEDURE — 27000221 HC OXYGEN, UP TO 24 HOURS

## 2018-11-17 PROCEDURE — 63600175 PHARM REV CODE 636 W HCPCS: Performed by: PHYSICIAN ASSISTANT

## 2018-11-17 PROCEDURE — 99232 SBSQ HOSP IP/OBS MODERATE 35: CPT | Mod: ,,, | Performed by: HOSPITALIST

## 2018-11-17 PROCEDURE — 63600175 PHARM REV CODE 636 W HCPCS: Performed by: HOSPITALIST

## 2018-11-17 PROCEDURE — 85025 COMPLETE CBC W/AUTO DIFF WBC: CPT

## 2018-11-17 PROCEDURE — 25000003 PHARM REV CODE 250: Performed by: STUDENT IN AN ORGANIZED HEALTH CARE EDUCATION/TRAINING PROGRAM

## 2018-11-17 PROCEDURE — 36415 COLL VENOUS BLD VENIPUNCTURE: CPT

## 2018-11-17 PROCEDURE — 94761 N-INVAS EAR/PLS OXIMETRY MLT: CPT

## 2018-11-17 PROCEDURE — 99900035 HC TECH TIME PER 15 MIN (STAT)

## 2018-11-17 PROCEDURE — 99232 SBSQ HOSP IP/OBS MODERATE 35: CPT | Mod: GC,,, | Performed by: NEUROLOGICAL SURGERY

## 2018-11-17 RX ORDER — FUROSEMIDE 10 MG/ML
20 INJECTION INTRAMUSCULAR; INTRAVENOUS ONCE
Status: COMPLETED | OUTPATIENT
Start: 2018-11-17 | End: 2018-11-17

## 2018-11-17 RX ADMIN — SENNOSIDES AND DOCUSATE SODIUM 1 TABLET: 8.6; 5 TABLET ORAL at 08:11

## 2018-11-17 RX ADMIN — ATORVASTATIN CALCIUM 20 MG: 20 TABLET, FILM COATED ORAL at 08:11

## 2018-11-17 RX ADMIN — LIDOCAINE 1 PATCH: 50 PATCH TOPICAL at 04:11

## 2018-11-17 RX ADMIN — ACETAMINOPHEN 650 MG: 500 TABLET, FILM COATED ORAL at 10:11

## 2018-11-17 RX ADMIN — AMLODIPINE BESYLATE 10 MG: 10 TABLET ORAL at 08:11

## 2018-11-17 RX ADMIN — FOLIC ACID 1 MG: 1 TABLET ORAL at 08:11

## 2018-11-17 RX ADMIN — CARVEDILOL 6.25 MG: 6.25 TABLET, FILM COATED ORAL at 04:11

## 2018-11-17 RX ADMIN — Medication 100 MG: at 08:11

## 2018-11-17 RX ADMIN — CARVEDILOL 6.25 MG: 6.25 TABLET, FILM COATED ORAL at 08:11

## 2018-11-17 RX ADMIN — HEPARIN SODIUM 5000 UNITS: 5000 INJECTION, SOLUTION INTRAVENOUS; SUBCUTANEOUS at 02:11

## 2018-11-17 RX ADMIN — ALPRAZOLAM 0.25 MG: 0.25 TABLET ORAL at 09:11

## 2018-11-17 RX ADMIN — SENNOSIDES AND DOCUSATE SODIUM 1 TABLET: 8.6; 5 TABLET ORAL at 09:11

## 2018-11-17 RX ADMIN — SERTRALINE HYDROCHLORIDE 25 MG: 25 TABLET ORAL at 08:11

## 2018-11-17 RX ADMIN — HYDROXYCHLOROQUINE SULFATE 200 MG: 200 TABLET, FILM COATED ORAL at 08:11

## 2018-11-17 RX ADMIN — CALCIUM CARBONATE 500 MG: 1250 SUSPENSION ORAL at 08:11

## 2018-11-17 RX ADMIN — IPRATROPIUM BROMIDE AND ALBUTEROL SULFATE 3 ML: .5; 3 SOLUTION RESPIRATORY (INHALATION) at 09:11

## 2018-11-17 RX ADMIN — HEPARIN SODIUM 5000 UNITS: 5000 INJECTION, SOLUTION INTRAVENOUS; SUBCUTANEOUS at 05:11

## 2018-11-17 RX ADMIN — FUROSEMIDE 20 MG: 10 INJECTION, SOLUTION INTRAMUSCULAR; INTRAVENOUS at 09:11

## 2018-11-17 RX ADMIN — ACETAMINOPHEN 650 MG: 500 TABLET, FILM COATED ORAL at 01:11

## 2018-11-17 RX ADMIN — ESCITALOPRAM OXALATE 10 MG: 10 TABLET ORAL at 08:11

## 2018-11-17 RX ADMIN — HEPARIN SODIUM 5000 UNITS: 5000 INJECTION, SOLUTION INTRAVENOUS; SUBCUTANEOUS at 09:11

## 2018-11-17 RX ADMIN — IPRATROPIUM BROMIDE AND ALBUTEROL SULFATE 3 ML: .5; 3 SOLUTION RESPIRATORY (INHALATION) at 07:11

## 2018-11-17 RX ADMIN — CIPROFLOXACIN 500 MG: 500 TABLET, FILM COATED ORAL at 08:11

## 2018-11-17 NOTE — PROGRESS NOTES
"[facility]  Neurosurgery  Progress Note    Subjective:     History of Present Illness: Pt is an 89 y/o female with hx of HTN, HLD, RA, GERD and L2 compression fracture who presents to [facility] ED with worsening back pain. Pt is accompanied by her son. He reports she was very independent and living and home until she had her L2 compression fracture last month and is now being transitioned to assisted living home. He says over the past few weeks the pt's pain has become more significant, especially the past few days. Pain is worse with positional changes or movement. Pain is better when laying down or sitting. Pt has had previous kyphoplasty at T8-T9 about 5 years ago. Pt is currently taking liquid oxycodone without relief. She denies any leg pain, numbness/tingling, b/b incontinence, or saddle anesthesia. She does report urinary urgency which has been present for months. Pt and son also report she has been feeling "loopy" and "off" after taking her daily medications in the morning. No AC use, she says she sometimes takes 1/4 of a baby aspirin. Per pt's son, she is also being treated for a UTI as of this week.         Post-Op Info:  * No surgery found *         Interval History: NAEON.    Medications:  Continuous Infusions:  Scheduled Meds:   albuterol-ipratropium  3 mL Nebulization Q12H    amLODIPine  10 mg Oral Daily    atorvastatin  20 mg Oral Daily    calcium carbonate  500 mg Oral Daily    carvedilol  6.25 mg Oral BID WM    ciprofloxacin HCl  500 mg Oral Daily    coenzyme Q10  100 mg Oral Daily    escitalopram oxalate  10 mg Oral Daily    folic acid  1 mg Oral Daily    heparin (porcine)  5,000 Units Subcutaneous Q8H    hydroxychloroquine  200 mg Oral Daily    lidocaine  1 patch Transdermal Q24H    senna-docusate 8.6-50 mg  1 tablet Oral BID    sertraline  25 mg Oral Daily     PRN Meds:acetaminophen, albuterol, ALPRAZolam, bisacodyl, cellulose, traMADol     Review of " Systems    Objective:     Weight: 60.8 kg (134 lb)  Body mass index is 23.83 kg/m².  Vital Signs (Most Recent):  Temp: 98.1 °F (36.7 °C) (11/17/18 0730)  Pulse: 63 (11/17/18 0731)  Resp: 16 (11/17/18 0731)  BP: 138/66 (11/17/18 0730)  SpO2: 97 % (11/17/18 0731) Vital Signs (24h Range):  Temp:  [97.1 °F (36.2 °C)-98.1 °F (36.7 °C)] 98.1 °F (36.7 °C)  Pulse:  [59-76] 63  Resp:  [16-20] 16  SpO2:  [94 %-99 %] 97 %  BP: (112-146)/(54-66) 138/66            Oxygen Concentration (%):  [32] 32    Female External Urinary Catheter 11/16/18 1442 (Active)   Skin no redness;no breakdown;reddened 11/16/2018  2:42 PM   Tolerance no signs/symptoms of discomfort 11/16/2018  2:42 PM   Suction Continuous suction at 70 mmHg 11/16/2018  2:42 PM       Neurosurgery Physical Exam     General: well developed, well nourished, no distress  Head: normocephalic, atraumatic  Neurologic: Alert and oriented. Thought content appropriate.  GCS: Motor: 6/Verbal: 5/Eyes: 4 GCS Total: 15  Mental Status: Awake, Alert, Oriented x 4  Cranial nerves: face symmetric, tongue midline, CN II-XII grossly intact.   Eyes: pupils equal, round, reactive to light with accomodation, EOMI.    Pulmonary: normal respirations, no signs of respiratory distress  Abdomen: soft, non-distended, not tender to palpation  Sensory: intact to light touch throughout  Motor Strength:Moves all extremities spontaneously with good tone. No abnormal movements seen.      Strength   Deltoids Triceps Biceps Wrist Extension Wrist Flexion Hand    Upper: R 5/5 5/5 5/5 5/5 5/5 5/5     L 5/5 5/5 5/5 5/5 5/5 5/5       Iliopsoas Quadriceps Knee  Flexion Tibialis  anterior Gastro- cnemius EHL   Lower: R 5/5 5/5 5/5 5/5 5/5 5/5     L 5/5 5/5 5/5 5/5 5/5 5/5         Significant Labs:  Recent Labs   Lab 11/16/18  0416 11/17/18  0550   * 115*   * 129*   K 3.7 4.2   CL 98 98   CO2 25 25   BUN 48* 41*   CREATININE 1.7* 1.6*   CALCIUM 8.3* 8.3*     Recent Labs   Lab 11/16/18  0416  11/17/18  0550   WBC 4.82 5.72   HGB 7.7* 8.0*   HCT 23.2* 23.7*    171     No results for input(s): LABPT, INR, APTT in the last 48 hours.  Microbiology Results (last 7 days)     Procedure Component Value Units Date/Time    Urine culture [[id number]] Collected:  11/13/18 1741    Order Status:  Completed Specimen:  Urine Updated:  11/15/18 0350     Urine Culture, Routine No significant growth    Narrative:       Preferred Collection Type->Urine, Clean Catch  CUP            Significant Diagnostics:  No new imaging    Assessment/Plan:     * T12 compression fracture    87 y/o female with hx of kyphoplasty at T8 and T9, with a known acute L2 compression fracture, who presents with worsening back pain and a new T12 rib fracture and T12 compression fracture with retropulsion.     -Patient neurologically stable on exam  -Patient is not a surgical candidate.  Kyphoplasty is not an option for the T12 fracture due to the component of retropulsion.  Due to patient's age, co morbidities, and bone quality, she is not a candidate for posterior instrumentation. Patient also stated that she is not interested in anything more invasive than a kyphoplasty.   - Plan to continue with bracing, pain control, and therapy  - No further imaging needed at this time  - TLSO brace when up/OOB  - Hospital medicine is now primary for the patient   - Medical management per primary team  - No further neurosurgical needs at this time, will sign off, please call with questions.     FU in neurosurgery clinic in 6 weeks with Thoracic XR.           Boris Macdonald MD  Neurosurgery  [facility]

## 2018-11-17 NOTE — PT/OT/SLP PROGRESS
Occupational Therapy   Treatment    Name: Karly Sandoval  MRN: [id number]  Admitting Diagnosis:  T12 compression fracture       Recommendations:     Discharge Recommendations: nursing facility, skilled  Discharge Equipment Recommendations:  wheelchair  Barriers to discharge:  Decreased caregiver support    Subjective     Pain/Comfort:  · Pain Rating 1: 0/10(at rest)  · Location - Side 1: Bilateral  · Location - Orientation 1: generalized  · Location 1: back  · Pain Addressed 1: Reposition, Distraction, Cessation of Activity  · Pain Rating Post-Intervention 1: (Pt c/o pain with movement, but did not rate)    Objective:     Communicated with: RN prior to session.  Patient found with all lines intact and call button in reach and oxygen, telemetry, PureWick upon OT entry to room.    General Precautions: Standard, fall, aspiration   Orthopedic Precautions:spinal precautions   Braces: TLSO     Occupational Performance:    Bed Mobility:    · Patient completed Rolling/Turning to Left with  minimum assistance and with side rail  · Patient completed Scooting/Bridging to HOB with stand by assistance  · Patient completed Supine to Sit with minimum assistance  · Patient completed Sit to Supine with minimum assistance   · Pt seated EOB with SBA    Functional Mobility/Transfers:  · Patient completed Sit <> Stand Transfer with maximal assistance  with  no assistive device   · Functional Mobility: Pt decline 2/2 pain in standing    Activities of Daily Living:  · Upper Body Dressing: maximal assistance to don/doff TLSO at EOB  · Lower Body Dressing: maximal assistance to don B socks while supine      AMPAC 6 Click ADL: 14    Treatment & Education:  Pt demo low participation 2/2 increased fatigue and c/o cold. Pt decline ROM exercises and participation in ADL tasks  Pt educated on role of OT/POC  Pt educated on importance of EOB/OOB activity  Pt educated on spinal precautions, log roll technique, and use of TLSO when OOB  White  board/communication board updated    Patient left supine with all lines intact, call button in reach and RN notified  Education:    Assessment:     Karly Sandoval is a 88 y.o. female with a medical diagnosis of T12 compression fracture.  She presents with the following performance deficits affecting function are weakness, impaired endurance, impaired self care skills, impaired functional mobilty, gait instability, impaired balance, pain, orthopedic precautions.     Rehab Prognosis:  Fair; patient would benefit from acute skilled OT services to address these deficits and reach maximum level of function.       Plan:     Patient to be seen 4 x/week to address the above listed problems via self-care/home management, therapeutic activities, therapeutic exercises  · Plan of Care Expires: 12/15/18  · Plan of Care Reviewed with: patient    This Plan of care has been discussed with the patient who was involved in its development and understands and is in agreement with the identified goals and treatment plan    GOALS:   Multidisciplinary Problems     Occupational Therapy Goals        Problem: Occupational Therapy Goal    Goal Priority Disciplines Outcome Interventions   Occupational Therapy Goal     OT, PT/OT Ongoing (interventions implemented as appropriate)    Description:  Goals to be met by: 7 days (11/22/18)     Patient will increase functional independence with ADLs by performing:    UE Dressing with Contact Guard Assistance including TLSO.  LE Dressing with Minimal Assistance using AD as needed.  Grooming while EOB with Stand-by Assistance.  Toileting from bedside commode with Minimal Assistance for hygiene and clothing management.   Supine to sit with Minimal Assistance. MET  Toilet transfer to bedside commode with Minimal Assistance.  Increased functional strength to WFL for ADLs.                       Time Tracking:     OT Date of Treatment: 11/17/18  OT Start Time: 1338  OT Stop Time: 1351  OT Total Time  (min): 13 min    Billable Minutes:Therapeutic Activity 13    Gail Swanson OT  11/17/2018

## 2018-11-17 NOTE — SUBJECTIVE & OBJECTIVE
Interval History: transferred from NS service for management of several comorbid conditions; initially admitted from rehab for back pain with known T12 fx; reports not feeling well today; having issues with getting breakfast and just exhausted; pain tolerable; feels like her mouth is dry     Review of Systems   Constitutional: Positive for fatigue. Negative for fever.   HENT:        Dry mouth   Respiratory: Negative for shortness of breath.    Cardiovascular: Negative for chest pain.   Genitourinary: Negative for difficulty urinating.   Musculoskeletal: Positive for back pain.     Objective:     Vitals:  Reviewed and stable     Physical Exam   Constitutional:   Thin elderly female. Appears fatigued but in NAD.   Cardiovascular: Normal rate and regular rhythm.   Pulmonary/Chest: Effort normal.   Diminished basilar breath sounds    Abdominal: Soft. Bowel sounds are normal.   Musculoskeletal: She exhibits no edema.   Mild ttp over left mid back    Neurological: She is alert.   Skin:   Multiple plaque-like skin lesions on back.        Significant Labs: All pertinent labs within the past 24 hours have been reviewed.    Significant Imaging: I have reviewed all pertinent imaging results/findings within the past 24 hours.

## 2018-11-17 NOTE — SUBJECTIVE & OBJECTIVE
Interval History: NAEON.    Medications:  Continuous Infusions:  Scheduled Meds:   albuterol-ipratropium  3 mL Nebulization Q12H    amLODIPine  10 mg Oral Daily    atorvastatin  20 mg Oral Daily    calcium carbonate  500 mg Oral Daily    carvedilol  6.25 mg Oral BID WM    ciprofloxacin HCl  500 mg Oral Daily    coenzyme Q10  100 mg Oral Daily    escitalopram oxalate  10 mg Oral Daily    folic acid  1 mg Oral Daily    heparin (porcine)  5,000 Units Subcutaneous Q8H    hydroxychloroquine  200 mg Oral Daily    lidocaine  1 patch Transdermal Q24H    senna-docusate 8.6-50 mg  1 tablet Oral BID    sertraline  25 mg Oral Daily     PRN Meds:acetaminophen, albuterol, ALPRAZolam, bisacodyl, cellulose, traMADol     Review of Systems    Objective:     Weight: 60.8 kg (134 lb)  Body mass index is 23.83 kg/m².  Vital Signs (Most Recent):  Temp: 98.1 °F (36.7 °C) (11/17/18 0730)  Pulse: 63 (11/17/18 0731)  Resp: 16 (11/17/18 0731)  BP: 138/66 (11/17/18 0730)  SpO2: 97 % (11/17/18 0731) Vital Signs (24h Range):  Temp:  [97.1 °F (36.2 °C)-98.1 °F (36.7 °C)] 98.1 °F (36.7 °C)  Pulse:  [59-76] 63  Resp:  [16-20] 16  SpO2:  [94 %-99 %] 97 %  BP: (112-146)/(54-66) 138/66            Oxygen Concentration (%):  [32] 32    Female External Urinary Catheter 11/16/18 1442 (Active)   Skin no redness;no breakdown;reddened 11/16/2018  2:42 PM   Tolerance no signs/symptoms of discomfort 11/16/2018  2:42 PM   Suction Continuous suction at 70 mmHg 11/16/2018  2:42 PM       Neurosurgery Physical Exam     General: well developed, well nourished, no distress  Head: normocephalic, atraumatic  Neurologic: Alert and oriented. Thought content appropriate.  GCS: Motor: 6/Verbal: 5/Eyes: 4 GCS Total: 15  Mental Status: Awake, Alert, Oriented x 4  Cranial nerves: face symmetric, tongue midline, CN II-XII grossly intact.   Eyes: pupils equal, round, reactive to light with accomodation, EOMI.    Pulmonary: normal respirations, no signs of  respiratory distress  Abdomen: soft, non-distended, not tender to palpation  Sensory: intact to light touch throughout  Motor Strength:Moves all extremities spontaneously with good tone. No abnormal movements seen.      Strength   Deltoids Triceps Biceps Wrist Extension Wrist Flexion Hand    Upper: R 5/5 5/5 5/5 5/5 5/5 5/5     L 5/5 5/5 5/5 5/5 5/5 5/5       Iliopsoas Quadriceps Knee  Flexion Tibialis  anterior Gastro- cnemius EHL   Lower: R 5/5 5/5 5/5 5/5 5/5 5/5     L 5/5 5/5 5/5 5/5 5/5 5/5         Significant Labs:  Recent Labs   Lab 11/16/18 0416 11/17/18  0550   * 115*   * 129*   K 3.7 4.2   CL 98 98   CO2 25 25   BUN 48* 41*   CREATININE 1.7* 1.6*   CALCIUM 8.3* 8.3*     Recent Labs   Lab 11/16/18 0416 11/17/18  0550   WBC 4.82 5.72   HGB 7.7* 8.0*   HCT 23.2* 23.7*    171     No results for input(s): LABPT, INR, APTT in the last 48 hours.  Microbiology Results (last 7 days)     Procedure Component Value Units Date/Time    Urine culture [[id number]] Collected:  11/13/18 1763    Order Status:  Completed Specimen:  Urine Updated:  11/15/18 0350     Urine Culture, Routine No significant growth    Narrative:       Preferred Collection Type->Urine, Clean Catch  CUP            Significant Diagnostics:  No new imaging

## 2018-11-17 NOTE — PLAN OF CARE
Problem: Occupational Therapy Goal  Goal: Occupational Therapy Goal  Goals to be met by: 7 days (11/22/18)     Patient will increase functional independence with ADLs by performing:    UE Dressing with Contact Guard Assistance including TLSO.  LE Dressing with Minimal Assistance using AD as needed.  Grooming while EOB with Stand-by Assistance.  Toileting from bedside commode with Minimal Assistance for hygiene and clothing management.   Supine to sit with Minimal Assistance. MET  Toilet transfer to bedside commode with Minimal Assistance.  Increased functional strength to WFL for ADLs.     Outcome: Ongoing (interventions implemented as appropriate)  Goal met for supine>sit; pt progressing well toward remaining goals    Comments: Continue OT POC     Gail Swanson OT  11/17/2018

## 2018-11-17 NOTE — PLAN OF CARE
Problem: Patient Care Overview  Goal: Plan of Care Review  Outcome: Ongoing (interventions implemented as appropriate)  Plan  Of care reviewed with family and patient. Patient demonstrated understanding of plan of care. VSS throughout shift. Patient complained of  lower back pain while changing positions and siting up more than 45 degrees in the bed. PRN pain meds given. Mild to moderate relief obtained.  Fall and aspiration precautions maintained. Incentive spirometer done with encouragement . Pt ate well for breakfast but drank a boost for both lunch and dinner. Pt c/o being tired and lacking energy needed frequent encouragement to stay awake during shift.  Patient turned q2 and frequent turns were encouraged. Bed locked and low, patient belongings and call light within reach. Wctm.

## 2018-11-17 NOTE — ASSESSMENT & PLAN NOTE
89 y/o female with hx of kyphoplasty at T8 and T9, with a known acute L2 compression fracture, who presents with worsening back pain and a new T12 rib fracture and T12 compression fracture with retropulsion.     -Patient neurologically stable on exam  -Patient is not a surgical candidate.  Kyphoplasty is not an option for the T12 fracture due to the component of retropulsion.  Due to patient's age, co morbidities, and bone quality, she is not a candidate for posterior instrumentation. Patient also stated that she is not interested in anything more invasive than a kyphoplasty.   - Plan to continue with bracing, pain control, and therapy  - No further imaging needed at this time  - TLSO brace when up/OOB  - Hospital medicine is now primary for the patient   - Medical management per primary team  - No further neurosurgical needs at this time, will sign off, please call with questions.

## 2018-11-18 PROBLEM — B96.89 UTI DUE TO KLEBSIELLA SPECIES: Status: ACTIVE | Noted: 2018-11-18

## 2018-11-18 PROBLEM — N39.0 UTI DUE TO KLEBSIELLA SPECIES: Status: ACTIVE | Noted: 2018-11-18

## 2018-11-18 LAB
ANION GAP SERPL CALC-SCNC: 9 MMOL/L
BASOPHILS # BLD AUTO: 0.03 K/UL
BASOPHILS NFR BLD: 0.5 %
BUN SERPL-MCNC: 30 MG/DL
CALCIUM SERPL-MCNC: 8.8 MG/DL
CHLORIDE SERPL-SCNC: 97 MMOL/L
CO2 SERPL-SCNC: 26 MMOL/L
CREAT SERPL-MCNC: 1.4 MG/DL
DIFFERENTIAL METHOD: ABNORMAL
EOSINOPHIL # BLD AUTO: 0.2 K/UL
EOSINOPHIL NFR BLD: 3.5 %
ERYTHROCYTE [DISTWIDTH] IN BLOOD BY AUTOMATED COUNT: 13.8 %
EST. GFR  (AFRICAN AMERICAN): 38.7 ML/MIN/1.73 M^2
EST. GFR  (NON AFRICAN AMERICAN): 33.6 ML/MIN/1.73 M^2
GLUCOSE SERPL-MCNC: 101 MG/DL
HCT VFR BLD AUTO: 25.3 %
HGB BLD-MCNC: 8.3 G/DL
IMM GRANULOCYTES # BLD AUTO: 0.18 K/UL
IMM GRANULOCYTES NFR BLD AUTO: 3.2 %
LYMPHOCYTES # BLD AUTO: 1.1 K/UL
LYMPHOCYTES NFR BLD: 19.1 %
MCH RBC QN AUTO: 30 PG
MCHC RBC AUTO-ENTMCNC: 32.8 G/DL
MCV RBC AUTO: 91 FL
MONOCYTES # BLD AUTO: 0.7 K/UL
MONOCYTES NFR BLD: 12.2 %
NEUTROPHILS # BLD AUTO: 3.5 K/UL
NEUTROPHILS NFR BLD: 61.5 %
NRBC BLD-RTO: 0 /100 WBC
PLATELET # BLD AUTO: 209 K/UL
PMV BLD AUTO: 9.4 FL
POTASSIUM SERPL-SCNC: 4 MMOL/L
RBC # BLD AUTO: 2.77 M/UL
SODIUM SERPL-SCNC: 132 MMOL/L
WBC # BLD AUTO: 5.65 K/UL

## 2018-11-18 PROCEDURE — 99900035 HC TECH TIME PER 15 MIN (STAT)

## 2018-11-18 PROCEDURE — 27000221 HC OXYGEN, UP TO 24 HOURS

## 2018-11-18 PROCEDURE — 25000003 PHARM REV CODE 250: Performed by: HOSPITALIST

## 2018-11-18 PROCEDURE — 36415 COLL VENOUS BLD VENIPUNCTURE: CPT

## 2018-11-18 PROCEDURE — 99232 SBSQ HOSP IP/OBS MODERATE 35: CPT | Mod: ,,, | Performed by: HOSPITALIST

## 2018-11-18 PROCEDURE — 25000003 PHARM REV CODE 250: Performed by: STUDENT IN AN ORGANIZED HEALTH CARE EDUCATION/TRAINING PROGRAM

## 2018-11-18 PROCEDURE — 97530 THERAPEUTIC ACTIVITIES: CPT

## 2018-11-18 PROCEDURE — 63600175 PHARM REV CODE 636 W HCPCS: Performed by: PHYSICIAN ASSISTANT

## 2018-11-18 PROCEDURE — 85025 COMPLETE CBC W/AUTO DIFF WBC: CPT

## 2018-11-18 PROCEDURE — 94799 UNLISTED PULMONARY SVC/PX: CPT

## 2018-11-18 PROCEDURE — 20600001 HC STEP DOWN PRIVATE ROOM

## 2018-11-18 PROCEDURE — 94761 N-INVAS EAR/PLS OXIMETRY MLT: CPT

## 2018-11-18 PROCEDURE — 94640 AIRWAY INHALATION TREATMENT: CPT

## 2018-11-18 PROCEDURE — 25000003 PHARM REV CODE 250: Performed by: PHYSICIAN ASSISTANT

## 2018-11-18 PROCEDURE — 25000242 PHARM REV CODE 250 ALT 637 W/ HCPCS: Performed by: PHYSICIAN ASSISTANT

## 2018-11-18 PROCEDURE — 80048 BASIC METABOLIC PNL TOTAL CA: CPT

## 2018-11-18 RX ORDER — FUROSEMIDE 40 MG/1
40 TABLET ORAL DAILY
Status: DISCONTINUED | OUTPATIENT
Start: 2018-11-18 | End: 2018-11-19

## 2018-11-18 RX ADMIN — CALCIUM CARBONATE 500 MG: 1250 SUSPENSION ORAL at 10:11

## 2018-11-18 RX ADMIN — CARVEDILOL 6.25 MG: 6.25 TABLET, FILM COATED ORAL at 05:11

## 2018-11-18 RX ADMIN — ESCITALOPRAM OXALATE 10 MG: 10 TABLET ORAL at 10:11

## 2018-11-18 RX ADMIN — HEPARIN SODIUM 5000 UNITS: 5000 INJECTION, SOLUTION INTRAVENOUS; SUBCUTANEOUS at 03:11

## 2018-11-18 RX ADMIN — FUROSEMIDE 40 MG: 40 TABLET ORAL at 11:11

## 2018-11-18 RX ADMIN — IPRATROPIUM BROMIDE AND ALBUTEROL SULFATE 3 ML: .5; 3 SOLUTION RESPIRATORY (INHALATION) at 08:11

## 2018-11-18 RX ADMIN — HYDROXYCHLOROQUINE SULFATE 200 MG: 200 TABLET, FILM COATED ORAL at 10:11

## 2018-11-18 RX ADMIN — LIDOCAINE 1 PATCH: 50 PATCH TOPICAL at 05:11

## 2018-11-18 RX ADMIN — AMLODIPINE BESYLATE 10 MG: 10 TABLET ORAL at 10:11

## 2018-11-18 RX ADMIN — CIPROFLOXACIN 500 MG: 500 TABLET, FILM COATED ORAL at 10:11

## 2018-11-18 RX ADMIN — HEPARIN SODIUM 5000 UNITS: 5000 INJECTION, SOLUTION INTRAVENOUS; SUBCUTANEOUS at 05:11

## 2018-11-18 RX ADMIN — ALPRAZOLAM 0.25 MG: 0.25 TABLET ORAL at 09:11

## 2018-11-18 RX ADMIN — CARVEDILOL 6.25 MG: 6.25 TABLET, FILM COATED ORAL at 07:11

## 2018-11-18 RX ADMIN — SERTRALINE HYDROCHLORIDE 25 MG: 25 TABLET ORAL at 10:11

## 2018-11-18 RX ADMIN — HEPARIN SODIUM 5000 UNITS: 5000 INJECTION, SOLUTION INTRAVENOUS; SUBCUTANEOUS at 09:11

## 2018-11-18 RX ADMIN — ATORVASTATIN CALCIUM 20 MG: 20 TABLET, FILM COATED ORAL at 10:11

## 2018-11-18 NOTE — PROGRESS NOTES
[facility] Hospital Medicine  Progress Note    Patient Name: Karly Sandoval  MRN: [id number]  Patient Class: IP- Inpatient   Admission Date: 11/13/2018  Length of Stay: 4 days  Attending Physician: Larry Peters MD  Primary Care Provider: Uday Allen MD    [facility] Medicine Team: [facility] G Larry Peters MD    Subjective:     Principal Problem:T12 compression fracture    HPI:  Karly Sandoval is a 88 y.o. lady with RA (on plaquenil), HFpEF (noted diastolic dysfunction in echo on 2016), essential hypertension, hyperlipidemia, and new cystic pancreatic head mass who presented to [facility] for evaluation of worsening back pain.  She was recently seen and discharged on 10/23 to [facility] for further rehabilitation for a T12 compression fracture.  There, she was noted to have continued back pain that was not responsive to increasing pain regimen.  She was also noted to have a positive UA with E. Coli and Proteus, where she completed a 7 day course of ciprofloxacin.  Throughout her stay, she would have continued back pain without significant improvement.  She also developed acute hypoxic respiratory failure, requiring minimal supplemental O2.  It was noted in rehab notes that patient was gaining weight despite poor PO intake.  Additionally towards the past week, she was noted to have worsening altered mental status after taking her AM medications, which would improve over time.  Patient was supposed to follow up with neurosurgery as an outpatient for follow up, but due to significant worsening in pain, patient was brought to [facility] for further evaluation.  Prior to presentation, she had a CXR and UA performed, where UA was again positive with 3+ leukocytes and > 100 WBCs.  CXR also revealed pulmonary vascular congestion as well as a new L lower lobe effusion, where she was started on levaquin, and was given 1 dose of IV lasix.  She denies fevers, chills, N/V, coughs, chest or  "abdominal pains.  She received repeat imaging revealing new severe spinal canal stenosis related to new T12 vertebral body compression fracture and 7 mm osseous retropulsion.  She was subsequently admitted to Neurosurgery for further evaluation, where she was deemed not a surgical candidate, and recommended further bracing with improvement in pain control.  Medicine was consulted regarding effusion, UTI, and CHARLENE.    Of note, patient was also previously seen in [facility] prior to presentation earlier on 10/8/2018.  There, she was evaluated for hyponatremia, where she was found to have a cystic pancreatic head lesion concerning for either cystic malignancy versus pseudocyst captured on both CT and abdominal ultrasound.  Patient denies any significant EtOH use or previous abdominal pains, but did have previous cholecystectomy.      Patient assessed at bedside with son present.  She noted she did have some improvement in breathing after receiving her dose of IV lasix.  Does note that her mind does feel "unclear" at the moment, but alert and appropriate.  Notes lying flat helps improve with her back pain.          Hospital Course:  No notes on file    Interval History: reports feeling better today; son and daughter in law at [facility]     Review of Systems   Constitutional: Negative for fatigue and fever.   Respiratory: Positive for shortness of breath.    Cardiovascular: Negative for chest pain and palpitations.   Gastrointestinal: Negative for abdominal pain.   Genitourinary: Negative for difficulty urinating.   Musculoskeletal:        Back pain controlled      Objective:     Vital Signs (Most Recent):  Temp: 98.1 °F (36.7 °C) (11/17/18 1634)  Pulse: 72 (11/17/18 1634)  Resp: 18 (11/17/18 1634)  BP: (!) 152/67 (11/17/18 1634)  SpO2: 97 % (11/17/18 1634) Vital Signs (24h Range):  Temp:  [97.8 °F (36.6 °C)-98.1 °F (36.7 °C)] 98.1 °F (36.7 °C)  Pulse:  [59-76] 72  Resp:  [16-20] 18  SpO2:  [94 %-97 %] 97 %  BP: " (138-152)/(65-67) 152/67     Weight: 60.8 kg (134 lb)  Body mass index is 23.83 kg/m².  No intake or output data in the 24 hours ending 11/17/18 1813   Physical Exam   Constitutional: She is oriented to person, place, and time. No distress.   Cardiovascular: Normal rate.   Pulmonary/Chest: No respiratory distress.   Minimal rales in bases   Abdominal: Soft.   Neurological: She is alert and oriented to person, place, and time.   Vitals reviewed.      Significant Labs: All pertinent labs within the past 24 hours have been reviewed.    Significant Imaging: I have reviewed all pertinent imaging results/findings within the past 24 hours.    Assessment/Plan:      * T12 compression fracture    - no surgical intervention at this time and patient declining surgery  - continue PTOT and pain control   - TLSO brace when OOB        Acute hypoxemic respiratory failure    - per HF management        Compression fracture of T12 vertebra    Patient with noted worsening lower back pain requiring further evaluation.  Repeat imaging did reveal new severe spinal canal stenosis related to new T12 vertebral body compression fracture and 7 mm osseous retropulsion.      Recommendations  - continue management of compression fracture as per neurosurgical recommendations   - discussed with AES, patient to have outpatient work up.  No need for inpatient EUS. Appreciate AES assistance at this time      Debility    - PTOT        Hyponatremia    - previously noted hyponatremia with Na at 128  - improved with diuresis and salt tabs per NS   - hold off additional diuresis, continue to assess       Pancreatic lesion    - AES consult to discuss inpatient vs outpatient EUS  - discussed with patient and son and daughter in law at bedside        Acute kidney injury superimposed on CKD    - baseline seems to fluctuate between 1.3 - 1.7  - will continue to monitor   - hold ACEi     Pacemaker    - for RBBB/LB issues  - no significant intervention at this  time needed       Chronic diastolic CHF (congestive heart failure)    - initially diuresed but then diuresis held due to CHARLENE  - continue with O2  - continue with medical management  - give lasix x 1 today based on cxr findings       Mixed hyperlipidemia    - continue home statin       Essential hypertension    - acceptable control   - can continue home norvasc 10 mg PO daily, coreg 6.25 mg PO BID  - hold lisinopril for CHARLENE  - q4 vitals        RA (rheumatoid arthritis)    - no significant joint complaints at this time  - can continue home plaquenil  - patient has been off MTX since last outpatient rheumatology visit          VTE Risk Mitigation (From admission, onward)        Ordered     heparin (porcine) injection 5,000 Units  Every 8 hours      11/14/18 1015     IP VTE HIGH RISK PATIENT  Once      11/13/18 1756     Place TOM hose  Until discontinued      11/13/18 1756     Place sequential compression device  Until discontinued      11/13/18 1756              Larry Peters MD  Department of Hospital Medicine   [facility]

## 2018-11-18 NOTE — ASSESSMENT & PLAN NOTE
- previously noted hyponatremia with Na at 128  - improved with diuresis and salt tabs per NS   - hold off additional diuresis, continue to assess

## 2018-11-18 NOTE — ASSESSMENT & PLAN NOTE
- initially diuresed but then diuresis held due to CHARLENE  - cxr on 11/17 showed worsened edema and given one dose of furosemide with improvement in sats and creatinine  - will continue with furosemide but with PO route and increase to 40 mg daily from her home dose of 20 mg on MWF  - monitor Is and Os  - last 2D echo was in 2016; echo ordered   - continue with O2  - continue with medical management

## 2018-11-18 NOTE — SUBJECTIVE & OBJECTIVE
Interval History: Patient transferred from NS service to medicine. Admitted to NS service from SNF intially due to worsened back pain. Found to have retropulsion from T12 compression fx. NO surgical intervention and patient declining surgery. Had issues with resp failure and CHARLENE and transferred to medicine for further management.     Today patient still reports feeling fatigued. Son and daughter-in law at bedside.     Review of Systems   Constitutional: Positive for fatigue. Negative for fever.   Respiratory: Positive for shortness of breath.    Cardiovascular: Negative for chest pain and palpitations.   Gastrointestinal: Negative for abdominal pain.   Genitourinary: Negative for difficulty urinating.   Musculoskeletal:        Back pain controlled      Objective:     Vital Signs (Most Recent):  Temp: 98.1 °F (36.7 °C) (11/18/18 0804)  Pulse: 74 (11/18/18 0806)  Resp: 18 (11/18/18 0806)  BP: (!) 172/71 (11/18/18 0804)  SpO2: 97 % (11/18/18 0806) Vital Signs (24h Range):  Temp:  [96.9 °F (36.1 °C)-98.1 °F (36.7 °C)] 98.1 °F (36.7 °C)  Pulse:  [61-74] 74  Resp:  [16-20] 18  SpO2:  [94 %-97 %] 97 %  BP: (138-173)/(65-77) 172/71     Weight: 60.8 kg (134 lb)  Body mass index is 23.83 kg/m².    Intake/Output Summary (Last 24 hours) at 11/18/2018 1044  Last data filed at 11/18/2018 0500  Gross per 24 hour   Intake --   Output 1450 ml   Net -1450 ml      Physical Exam   Constitutional: She is oriented to person, place, and time. No distress.   Thin, elderly female. Sitting on the side of the bed with therapy.    Cardiovascular: Normal rate.   Pulmonary/Chest: No respiratory distress.   Minimal rales in bases   Abdominal: Soft.   Neurological: She is alert and oriented to person, place, and time.   Vitals reviewed.      Significant Labs: All pertinent labs within the past 24 hours have been reviewed.    Significant Imaging: I have reviewed all pertinent imaging results/findings within the past 24 hours.

## 2018-11-18 NOTE — ASSESSMENT & PLAN NOTE
- acceptable control   - can continue home norvasc 10 mg PO daily, coreg 6.25 mg PO BID  - hold lisinopril for CHARLENE  - q4 vitals

## 2018-11-18 NOTE — ASSESSMENT & PLAN NOTE
- PTOT recommending SNF placement  - patient and family had plans for her to go to assisted living after SNF dc however given continued pain and patient reporting feeling tired, family is considering NH placement therefore may need NH SNF placement  - CM and SW to coordinate with son and daughter in law

## 2018-11-18 NOTE — ASSESSMENT & PLAN NOTE
- on 11/15, AES note as follows:    Non-con CT showed:  2.1 cm fluid attenuation lesion within the pancreatic head.  No significant inflammatory changes surrounding the pancreas.  Differential considerations would include a pancreatic pseudocyst versus cystic neoplasm of the pancreas. MRCP/MRI of the abdomen without and with contrast may be obtained for further evaluation.     Recommend follow up in pancreatic cyst clinic after discharge.   We will arrange the follow up once patient is being discharged.        - will discuss with son and daughter in law regarding outpatient follow up

## 2018-11-18 NOTE — ASSESSMENT & PLAN NOTE
- continue home norvasc 10 mg PO daily, coreg 6.25 mg PO BID  - hold lisinopril for CHARLENE  - started on 40 mg furosemide daily (takes 20 mg MWF); BP can tolerate at this time

## 2018-11-18 NOTE — ASSESSMENT & PLAN NOTE
- no surgical intervention at this time and patient declining surgery  - continue PTOT and pain control   - TLSO brace when OOB

## 2018-11-18 NOTE — ASSESSMENT & PLAN NOTE
- per NS note:    89 y/o female with hx of kyphoplasty at T8 and T9, with a known acute L2 compression fracture, who presents with worsening back pain and a new T12 rib fracture and T12 compression fracture with retropulsion.        - no surgical intervention at this time and patient declining surgery and transferred to medicine for management of active medical issues   - continue PTOT and pain control   - TLSO brace when OOB

## 2018-11-18 NOTE — PROGRESS NOTES
[facility] Hospital Medicine  Progress Note    Patient Name: Karly Sandoval  MRN: [id number]  Patient Class: IP- Inpatient   Admission Date: 11/13/2018  Length of Stay: 4 days  Attending Physician: Larry Peters MD  Primary Care Provider: Uday Allen MD    [facility] Medicine Team: [facility]OSP MED G Larry Peters MD    Subjective:     Principal Problem:T12 compression fracture    HPI:  Karly Sandoval is a 88 y.o. lady with RA (on plaquenil), HFpEF (noted diastolic dysfunction in echo on 2016), essential hypertension, hyperlipidemia, and new cystic pancreatic head mass who presented to [facility] from [facility] rehab for evaluation of worsening back pain.  She was recently seen and discharged on 10/23 to [facility] SNF for further rehabilitation for a T12 compression fracture.  There, she was noted to have continued back pain that was not responsive to increasing pain regimen.  She was also noted to have a positive UA with E. Coli and Proteus, where she completed a 7 day course of ciprofloxacin.  Throughout her stay, she would have continued back pain without significant improvement.  She also developed acute hypoxic respiratory failure, requiring minimal supplemental O2.  It was noted in rehab notes that patient was gaining weight despite poor PO intake.  Additionally towards the past week, she was noted to have worsening altered mental status after taking her AM medications, which would improve over time.  Patient was supposed to follow up with neurosurgery as an outpatient for follow up, but due to significant worsening in pain, patient was brought to [facility] for further evaluation.  Prior to presentation, she had a CXR and UA performed, where UA was again positive with 3+ leukocytes and > 100 WBCs.  CXR also revealed pulmonary vascular congestion as well as a new L lower lobe effusion, where she was started on levaquin, and was given 1 dose of IV lasix.  She denies fevers, chills, N/V, coughs, chest or  "abdominal pains.  She received repeat imaging revealing new severe spinal canal stenosis related to new T12 vertebral body compression fracture and 7 mm osseous retropulsion.  She was subsequently admitted to Neurosurgery for further evaluation, where she was deemed not a surgical candidate, and recommended further bracing with improvement in pain control.  Medicine was consulted regarding effusion, UTI, and CHARLENE.    Of note, patient was also previously seen in [facility] prior to presentation earlier on 10/8/2018.  There, she was evaluated for hyponatremia, where she was found to have a cystic pancreatic head lesion concerning for either cystic malignancy versus pseudocyst captured on both CT and abdominal ultrasound.  Patient denies any significant EtOH use or previous abdominal pains, but did have previous cholecystectomy.      Patient assessed at bedside with son present.  She noted she did have some improvement in breathing after receiving her dose of IV lasix.  Does note that her mind does feel "unclear" at the moment, but alert and appropriate.  Notes lying flat helps improve with her back pain.          Hospital Course:  No notes on file    Interval History: transferred from NS service for management of several comorbid conditions; initially admitted from rehab for back pain with known T12 fx; reports not feeling well today; having issues with getting breakfast and just exhausted; pain tolerable; feels like her mouth is dry     Review of Systems   Constitutional: Positive for fatigue. Negative for fever.   HENT:        Dry mouth   Respiratory: Negative for shortness of breath.    Cardiovascular: Negative for chest pain.   Genitourinary: Negative for difficulty urinating.   Musculoskeletal: Positive for back pain.     Objective:     Vitals:  Reviewed and stable     Physical Exam   Constitutional:   Thin elderly female. Appears fatigued but in NAD.   Cardiovascular: Normal rate and regular rhythm. "   Pulmonary/Chest: Effort normal.   Diminished basilar breath sounds    Abdominal: Soft. Bowel sounds are normal.   Musculoskeletal: She exhibits no edema.   Mild ttp over left mid back    Neurological: She is alert.   Skin:   Multiple plaque-like skin lesions on back.        Significant Labs: All pertinent labs within the past 24 hours have been reviewed.    Significant Imaging: I have reviewed all pertinent imaging results/findings within the past 24 hours.    Assessment/Plan:      * T12 compression fracture    - no surgical intervention at this time and patient declining surgery  - continue PTOT and pain control   - TLSO brace when OOB        Acute hypoxemic respiratory failure    - per HF management        Compression fracture of T12 vertebra    Patient with noted worsening lower back pain requiring further evaluation.  Repeat imaging did reveal new severe spinal canal stenosis related to new T12 vertebral body compression fracture and 7 mm osseous retropulsion.      Recommendations  - continue management of compression fracture as per neurosurgical recommendations   - discussed with AES, patient to have outpatient work up.  No need for inpatient EUS. Appreciate AES assistance at this time      Debility    - PTOT        Hyponatremia    - previously noted hyponatremia with Na at 128  - improved with diuresis and salt tabs per NS   - hold off additional diuresis, continue to assess       Acute kidney injury superimposed on CKD    - Cr worsened and diuresis stopped  - continue to monitor for now  - hold ACEi       Pacemaker    - for RBBB/LB issues  - no significant intervention at this time needed       Chronic diastolic CHF (congestive heart failure)    - initially diuresed but then diuresis held due to CHARLENE  - continue with O2  - continue with medical management  - will need to reassess diuretic treatment when CHARLENE improves        Mixed hyperlipidemia    - continue home statin       Essential hypertension    -  acceptable control   - can continue home norvasc 10 mg PO daily, coreg 6.25 mg PO BID  - hold lisinopril for CHARLENE  - q4 vitals        RA (rheumatoid arthritis)    - no significant joint complaints at this time  - can continue home plaquenil  - patient has been off MTX since last outpatient rheumatology visit          VTE Risk Mitigation (From admission, onward)        Ordered     heparin (porcine) injection 5,000 Units  Every 8 hours      11/14/18 1015     IP VTE HIGH RISK PATIENT  Once      11/13/18 1756     Place TOM hose  Until discontinued      11/13/18 1756     Place sequential compression device  Until discontinued      11/13/18 1756              Larry Peters MD  Department of Hospital Medicine   [facility]

## 2018-11-18 NOTE — PT/OT/SLP PROGRESS
Physical Therapy Treatment    Patient Name:  Karly Sandoval   MRN:  [id number]    Recommendations:     Discharge Recommendations:  nursing facility, skilled   Discharge Equipment Recommendations: wheelchair   Barriers to discharge: Decreased caregiver support    Assessment:     Karly Sandoval is a 88 y.o. female admitted with a medical diagnosis of T12 compression fracture.  She presents with the following impairments/functional limitations:  weakness, impaired endurance, impaired functional mobilty, impaired self care skills, gait instability, impaired balance, decreased lower extremity function, pain, impaired cardiopulmonary response to activity, orthopedic precautions. Pt activity limited today due to lethargy, weakness, pain, and poor motivation. Pt required maximal encouragement from PT and family members to participate in therapy. Pt frequently required verbal cues to open her eyes to promote alertness. Pt able to tolerate standing today with mod Ax2. Pt will benefit from skilled physical therapy services to receive education as needed, improve the above deficits, and return to prior level of function.    Rehab Prognosis:  good; patient would benefit from acute skilled PT services to address these deficits and reach maximum level of function.      Recent Surgery: * No surgery found *      Plan:     During this hospitalization, patient to be seen 4 x/week to address the above listed problems via gait training, therapeutic activities, therapeutic exercises, neuromuscular re-education  · Plan of Care Expires:  12/14/18   Plan of Care Reviewed with: patient, daughter, son    Subjective     Communicated with RN prior to session.  Patient found laying supine upon PT entry to room, agreeable to treatment.      Chief Complaint: fatigue and pain  Patient comments/goals: Pt states she doesn't feel up to participating in therapy today because she is so tired. Pt agreed after receiving max encouragement from PT and  family members.   Pain/Comfort:  · Pain Rating 1: 5/10  · Location - Side 1: Bilateral  · Location - Orientation 1: generalized  · Location 1: back  · Pain Addressed 1: Cessation of Activity, Distraction, Reposition  · Pain Rating Post-Intervention 1: 5/10    Patients cultural, spiritual, Church conflicts given the current situation: no conflicts    Objective:     Patient found with: oxygen, telemetry, PureWick     General Precautions: Standard, fall   Orthopedic Precautions:spinal precautions   Braces: TLSO     Functional Mobility:  Bed mobility:  Sit>supine: x1 trial(s); mod A using log roll technique  Supine>sit: x1 trial(s); min A using log roll technique  Rolling L: x1 trial(s); min A   Scooting forward: x5 small scoots; min A    Transfers:  Sit>stand: x2 trial(s) from bed; mod Ax2 with RW  Stand>sit: x2 trial(s) to bed; mod Ax2 with RW    Gait:  · Pt able to take 4 side steps to the L with RW and mod Ax2. Pt attempted side steps to the R but was unable to perform at this time 2/2 fatigue.     Balance:  · Static sitting: SBA/CGA  · Dyamic sitting: SBA/CGA  · Static standing: mod Ax2  · Dynamic standing: mod Ax2    **TLSO donned during all OOB activities    AM-PAC 6 CLICK MOBILITY  Turning over in bed (including adjusting bedclothes, sheets and blankets)?: 3  Sitting down on and standing up from a chair with arms (e.g., wheelchair, bedside commode, etc.): 2  Moving from lying on back to sitting on the side of the bed?: 2  Moving to and from a bed to a chair (including a wheelchair)?: 2  Need to walk in hospital room?: 2  Climbing 3-5 steps with a railing?: 1  Basic Mobility Total Score: 12       Therapeutic Activities and Exercises:  Pt initially required maximal encouragement from PT and family members to participate in therapy; pt ultimately agreed to participate. Pt assisted with donning TLSO brace once sitting EOB. Pt educated on importance of getting OOB and participating in physical therapy to improve  functional mobility. Pt hesitant to participate throughout session and required frequent motivation from PT and family members to promote pt participation. PT educated on POC. All needs and questions addressed.     Patient left supine with all lines intact, call button in reach and son, daughter-in-law, and MD present..    GOALS:   Multidisciplinary Problems     Physical Therapy Goals        Problem: Physical Therapy Goal    Goal Priority Disciplines Outcome Goal Variances Interventions   Physical Therapy Goal     PT, PT/OT Ongoing (interventions implemented as appropriate)     Description:  Goals to be met by: 2018     Patient will increase functional independence with mobility by performin. Supine to sit with stand-by Assistance  2. Sit to supine with Stand-by Assistance  3. Sit to stand transfer with Minimal Assistance using appropriate AD.   4. Bed to chair transfer with Minimal Assistance using appropriate AD.   5. Gait  x 50 feet with Minimal Assistance using Rolling Walker.   6. Stand for 2 minutes with Contact Guard Assistance using Rolling Walker  7. Lower extremity exercise program x15 reps per handout, with supervision                      Time Tracking:     PT Received On: 18  PT Start Time: 1256     PT Stop Time: 1329  PT Total Time (min): 33 min     Billable Minutes: Therapeutic Activity 33    Treatment Type: Treatment  PT/PTA: PT     PTA Visit Number: 0     CLAUS Olvera  2018

## 2018-11-18 NOTE — ASSESSMENT & PLAN NOTE
- no significant joint complaints at this time  - continue home plaquenil  - patient has been off MTX since last outpatient rheumatology visit   - follow up with rheum as scheduled

## 2018-11-18 NOTE — PROGRESS NOTES
[facility] Hospital Medicine  Progress Note    Patient Name: Karly Sandoval  MRN: [id number]  Patient Class: IP- Inpatient   Admission Date: 11/13/2018  Length of Stay: 5 days  Attending Physician: Larry Peters MD  Primary Care Provider: Uday Allen MD    [facility] Medicine Team: [facility] HOSP MED G Larry Peters MD    Subjective:     Principal Problem:T12 compression fracture    HPI:  Karly Sandoval is a 88 y.o. lady with RA (on plaquenil), HFpEF (noted diastolic dysfunction in echo on 2016), essential hypertension, hyperlipidemia, and new cystic pancreatic head mass who presented to [facility] from [facility] rehab for evaluation of worsening back pain.  She was recently seen and discharged on 10/23 to [facility] SNF for further rehabilitation for a T12 compression fracture.  There, she was noted to have continued back pain that was not responsive to increasing pain regimen.  She was also noted to have a positive UA with E. Coli and Proteus, where she completed a 7 day course of ciprofloxacin.  Throughout her stay, she would have continued back pain without significant improvement.  She also developed acute hypoxic respiratory failure, requiring minimal supplemental O2.  It was noted in rehab notes that patient was gaining weight despite poor PO intake.  Additionally towards the past week, she was noted to have worsening altered mental status after taking her AM medications, which would improve over time.  Patient was supposed to follow up with neurosurgery as an outpatient for follow up, but due to significant worsening in pain, patient was brought to [facility] for further evaluation.  Prior to presentation, she had a CXR and UA performed, where UA was again positive with 3+ leukocytes and > 100 WBCs.  CXR also revealed pulmonary vascular congestion as well as a new L lower lobe effusion, where she was started on levaquin, and was given 1 dose of IV lasix.  She denies fevers, chills, N/V, coughs, chest or  "abdominal pains.  She received repeat imaging revealing new severe spinal canal stenosis related to new T12 vertebral body compression fracture and 7 mm osseous retropulsion.  She was subsequently admitted to Neurosurgery for further evaluation, where she was deemed not a surgical candidate, and recommended further bracing with improvement in pain control.  Medicine was consulted regarding effusion, UTI, and CHARLENE.    Of note, patient was also previously seen in [facility] prior to presentation earlier on 10/8/2018.  There, she was evaluated for hyponatremia, where she was found to have a cystic pancreatic head lesion concerning for either cystic malignancy versus pseudocyst captured on both CT and abdominal ultrasound.  Patient denies any significant EtOH use or previous abdominal pains, but did have previous cholecystectomy.      Patient assessed at bedside with son present.  She noted she did have some improvement in breathing after receiving her dose of IV lasix.  Does note that her mind does feel "unclear" at the moment, but alert and appropriate.  Notes lying flat helps improve with her back pain.          Hospital Course:  No notes on file    Interval History: Patient transferred from NS service to medicine. Admitted to NS service from SNF intially due to worsened back pain. Found to have retropulsion from T12 compression fx. NO surgical intervention and patient declining surgery. Had issues with resp failure and CHARLENE and transferred to medicine for further management.     Today patient still reports feeling fatigued. Son and daughter-in law at bedside.     Review of Systems   Constitutional: Positive for fatigue. Negative for fever.   Respiratory: Positive for shortness of breath.    Cardiovascular: Negative for chest pain and palpitations.   Gastrointestinal: Negative for abdominal pain.   Genitourinary: Negative for difficulty urinating.   Musculoskeletal:        Back pain controlled      Objective:     Vital " Signs (Most Recent):  Temp: 98.1 °F (36.7 °C) (11/18/18 0804)  Pulse: 74 (11/18/18 0806)  Resp: 18 (11/18/18 0806)  BP: (!) 172/71 (11/18/18 0804)  SpO2: 97 % (11/18/18 0806) Vital Signs (24h Range):  Temp:  [96.9 °F (36.1 °C)-98.1 °F (36.7 °C)] 98.1 °F (36.7 °C)  Pulse:  [61-74] 74  Resp:  [16-20] 18  SpO2:  [94 %-97 %] 97 %  BP: (138-173)/(65-77) 172/71     Weight: 60.8 kg (134 lb)  Body mass index is 23.83 kg/m².    Intake/Output Summary (Last 24 hours) at 11/18/2018 1044  Last data filed at 11/18/2018 0500  Gross per 24 hour   Intake --   Output 1450 ml   Net -1450 ml      Physical Exam   Constitutional: She is oriented to person, place, and time. No distress.   Thin, elderly female. Sitting on the side of the bed with therapy.    Cardiovascular: Normal rate.   Pulmonary/Chest: No respiratory distress.   Minimal rales in bases   Abdominal: Soft.   Neurological: She is alert and oriented to person, place, and time.   Vitals reviewed.      Significant Labs: All pertinent labs within the past 24 hours have been reviewed.    Significant Imaging: I have reviewed all pertinent imaging results/findings within the past 24 hours.    Assessment/Plan:      * T12 compression fracture    - per NS note:    89 y/o female with hx of kyphoplasty at T8 and T9, with a known acute L2 compression fracture, who presents with worsening back pain and a new T12 rib fracture and T12 compression fracture with retropulsion.        - no surgical intervention at this time and patient declining surgery and transferred to medicine for management of active medical issues   - continue PTOT and pain control   - TLSO brace when OOB        UTI due to Klebsiella species    - complete 7 day course of cipro  - afebrile and no leukocytosis        Acute hypoxemic respiratory failure    - per HF management        Compression fracture of T12 vertebra    Patient with noted worsening lower back pain requiring further evaluation.  Repeat imaging did reveal  new severe spinal canal stenosis related to new T12 vertebral body compression fracture and 7 mm osseous retropulsion.      Recommendations  - continue management of compression fracture as per neurosurgical recommendations   - discussed with AES, patient to have outpatient work up.  No need for inpatient EUS. Appreciate AES assistance at this time      Debility    - PTOT recommending SNF placement  - patient and family had plans for her to go to assisted living after SNF dc however given continued pain and patient reporting feeling tired, family is considering NH placement therefore may need NH SNF placement  - CM and SW to coordinate with son and daughter in law        Hyponatremia    - improves with diuresis  - possibly hypervolemic hypernatremia attributable to HF exacerbation      Pancreatic lesion    - on 11/15, AES note as follows:    Non-con CT showed:  2.1 cm fluid attenuation lesion within the pancreatic head.  No significant inflammatory changes surrounding the pancreas.  Differential considerations would include a pancreatic pseudocyst versus cystic neoplasm of the pancreas. MRCP/MRI of the abdomen without and with contrast may be obtained for further evaluation.     Recommend follow up in pancreatic cyst clinic after discharge.   We will arrange the follow up once patient is being discharged.        - will discuss with son and daughter in law regarding outpatient follow up        Acute kidney injury superimposed on CKD    - patient had a normal creatinine up to mid September 2018 then started with what looked like CHARLENE since then with a baseline ranging from 1.1 - 1.7  - she was diuresed for HF and had worsening of her CHARLENE; diuresis stopped at that time  - given cxr findings on 11/17, a one time dose of furosemide was given with some improvement of her creatinine  - CHARLENE may be due to HF but will need close monitoring of her diuresis as she is at risk of dehydration as has been evident already; therefore  furosemide regimen changed to PO   - continue to monitor       Anxiety    - continue home dose sertraline  - lorazepam daily prn per home regimen        Pacemaker    - for RBBB/LB issues  - stable       Chronic diastolic CHF (congestive heart failure)    - initially diuresed but then diuresis held due to CHARLENE  - cxr on 11/17 showed worsened edema and given one dose of furosemide with improvement in sats and creatinine  - will continue with furosemide but with PO route and increase to 40 mg daily from her home dose of 20 mg on MWF  - monitor Is and Os  - last 2D echo was in 2016; echo ordered   - continue with O2  - continue with medical management         Mixed hyperlipidemia    - atorvastatin     Osteopenia    - continue calcium supplementation        Essential hypertension    - continue home norvasc 10 mg PO daily, coreg 6.25 mg PO BID  - hold lisinopril for CHARLENE  - started on 40 mg furosemide daily (takes 20 mg MWF); BP can tolerate at this time          RA (rheumatoid arthritis)    - no significant joint complaints at this time  - continue home plaquenil  - patient has been off MTX since last outpatient rheumatology visit   - follow up with rheum as scheduled          VTE Risk Mitigation (From admission, onward)        Ordered     heparin (porcine) injection 5,000 Units  Every 8 hours      11/14/18 1015     IP VTE HIGH RISK PATIENT  Once      11/13/18 1756     Place TOM hose  Until discontinued      11/13/18 1756     Place sequential compression device  Until discontinued      11/13/18 1756              Larry Peters MD  Department of Hospital Medicine   [facility]

## 2018-11-18 NOTE — ASSESSMENT & PLAN NOTE
- initially diuresed but then diuresis held due to CHARLENE  - continue with O2  - continue with medical management  - give lasix x 1 today based on cxr findings

## 2018-11-18 NOTE — ASSESSMENT & PLAN NOTE
- initially diuresed but then diuresis held due to CHARLENE  - continue with O2  - continue with medical management  - will need to reassess diuretic treatment when CHARLENE improves

## 2018-11-18 NOTE — PLAN OF CARE
Problem: Physical Therapy Goal  Goal: Physical Therapy Goal  Goals to be met by: 2018     Patient will increase functional independence with mobility by performin. Supine to sit with stand-by Assistance  2. Sit to supine with Stand-by Assistance  3. Sit to stand transfer with Minimal Assistance using appropriate AD.   4. Bed to chair transfer with Minimal Assistance using appropriate AD.   5. Gait  x 50 feet with Minimal Assistance using Rolling Walker.   6. Stand for 2 minutes with Contact Guard Assistance using Rolling Walker  7. Lower extremity exercise program x15 reps per handout, with supervision     Outcome: Ongoing (interventions implemented as appropriate)  Goals remain appropriate. Continue with POC.    Smith Campos, SPT  2018

## 2018-11-18 NOTE — SUBJECTIVE & OBJECTIVE
Interval History: reports feeling better today; son and daughter in law at [facility]     Review of Systems   Constitutional: Negative for fatigue and fever.   Respiratory: Positive for shortness of breath.    Cardiovascular: Negative for chest pain and palpitations.   Gastrointestinal: Negative for abdominal pain.   Genitourinary: Negative for difficulty urinating.   Musculoskeletal:        Back pain controlled      Objective:     Vital Signs (Most Recent):  Temp: 98.1 °F (36.7 °C) (11/17/18 1634)  Pulse: 72 (11/17/18 1634)  Resp: 18 (11/17/18 1634)  BP: (!) 152/67 (11/17/18 1634)  SpO2: 97 % (11/17/18 1634) Vital Signs (24h Range):  Temp:  [97.8 °F (36.6 °C)-98.1 °F (36.7 °C)] 98.1 °F (36.7 °C)  Pulse:  [59-76] 72  Resp:  [16-20] 18  SpO2:  [94 %-97 %] 97 %  BP: (138-152)/(65-67) 152/67     Weight: 60.8 kg (134 lb)  Body mass index is 23.83 kg/m².  No intake or output data in the 24 hours ending 11/17/18 1813   Physical Exam   Constitutional: She is oriented to person, place, and time. No distress.   Cardiovascular: Normal rate.   Pulmonary/Chest: No respiratory distress.   Minimal rales in bases   Abdominal: Soft.   Neurological: She is alert and oriented to person, place, and time.   Vitals reviewed.      Significant Labs: All pertinent labs within the past 24 hours have been reviewed.    Significant Imaging: I have reviewed all pertinent imaging results/findings within the past 24 hours.

## 2018-11-18 NOTE — ASSESSMENT & PLAN NOTE
- patient had a normal creatinine up to mid September 2018 then started with what looked like CHARLENE since then with a baseline ranging from 1.1 - 1.7  - she was diuresed for HF and had worsening of her CHARLENE; diuresis stopped at that time  - given cxr findings on 11/17, a one time dose of furosemide was given with some improvement of her creatinine  - CHARLENE may be due to HF but will need close monitoring of her diuresis as she is at risk of dehydration as has been evident already; therefore furosemide regimen changed to PO   - continue to monitor

## 2018-11-19 ENCOUNTER — TELEPHONE (OUTPATIENT)
Dept: NEUROSURGERY | Facility: CLINIC | Age: 83
End: 2018-11-19

## 2018-11-19 ENCOUNTER — TELEPHONE (OUTPATIENT)
Dept: CARDIOLOGY | Facility: HOSPITAL | Age: 83
End: 2018-11-19

## 2018-11-19 LAB
ANION GAP SERPL CALC-SCNC: 7 MMOL/L
ANISOCYTOSIS BLD QL SMEAR: SLIGHT
ASCENDING AORTA: 2.95 CM
AV MEAN GRADIENT: 4.64 MMHG
AV PEAK GRADIENT: 7.29 MMHG
AV VALVE AREA: 2.93 CM2
BASOPHILS # BLD AUTO: ABNORMAL K/UL
BASOPHILS NFR BLD: 0 %
BSA FOR ECHO PROCEDURE: 1.63 M2
BUN SERPL-MCNC: 29 MG/DL
CALCIUM SERPL-MCNC: 9 MG/DL
CHLORIDE SERPL-SCNC: 94 MMOL/L
CO2 SERPL-SCNC: 31 MMOL/L
CREAT SERPL-MCNC: 1.7 MG/DL
CV ECHO LV RWT: 0.45 CM
DIFFERENTIAL METHOD: ABNORMAL
DOP CALC AO PEAK VEL: 1.35 M/S
DOP CALC AO VTI: 29 CM
DOP CALC LVOT AREA: 3.23 CM2
DOP CALC LVOT DIAMETER: 2.03 CM
DOP CALC LVOT STROKE VOLUME: 84.88 CM3
DOP CALCLVOT PEAK VEL VTI: 26.24 CM
E WAVE DECELERATION TIME: 172.91 MSEC
E/A RATIO: 0.59
E/E' RATIO: 19.11
ECHO LV POSTERIOR WALL: 1 CM (ref 0.6–1.1)
EOSINOPHIL # BLD AUTO: ABNORMAL K/UL
EOSINOPHIL NFR BLD: 0 %
ERYTHROCYTE [DISTWIDTH] IN BLOOD BY AUTOMATED COUNT: 13.9 %
EST. GFR  (AFRICAN AMERICAN): 30.6 ML/MIN/1.73 M^2
EST. GFR  (NON AFRICAN AMERICAN): 26.5 ML/MIN/1.73 M^2
FRACTIONAL SHORTENING: 31 % (ref 28–44)
GLUCOSE SERPL-MCNC: 97 MG/DL
HCT VFR BLD AUTO: 26.3 %
HGB BLD-MCNC: 8.8 G/DL
HYPOCHROMIA BLD QL SMEAR: ABNORMAL
IMM GRANULOCYTES # BLD AUTO: ABNORMAL K/UL
IMM GRANULOCYTES NFR BLD AUTO: ABNORMAL %
INTERVENTRICULAR SEPTUM: 1 CM (ref 0.6–1.1)
LA MAJOR: 6.02 CM
LA MINOR: 5.98 CM
LA WIDTH: 4.06 CM
LEFT ATRIUM SIZE: 4.21 CM
LEFT ATRIUM VOLUME INDEX: 53.5 ML/M2
LEFT ATRIUM VOLUME: 87.17 CM3
LEFT INTERNAL DIMENSION IN SYSTOLE: 3.09 CM (ref 2.1–4)
LEFT VENTRICLE DIASTOLIC VOLUME INDEX: 56.33 ML/M2
LEFT VENTRICLE DIASTOLIC VOLUME: 91.81 ML
LEFT VENTRICLE MASS INDEX: 93.7 G/M2
LEFT VENTRICLE SYSTOLIC VOLUME INDEX: 23.1 ML/M2
LEFT VENTRICLE SYSTOLIC VOLUME: 37.61 ML
LEFT VENTRICULAR INTERNAL DIMENSION IN DIASTOLE: 4.49 CM (ref 3.5–6)
LEFT VENTRICULAR MASS: 152.72 G
LV LATERAL E/E' RATIO: 17.2
LV SEPTAL E/E' RATIO: 21.5
LYMPHOCYTES # BLD AUTO: ABNORMAL K/UL
LYMPHOCYTES NFR BLD: 12 %
MAGNESIUM SERPL-MCNC: 1.8 MG/DL
MAGNESIUM SERPL-MCNC: 2 MG/DL
MCH RBC QN AUTO: 30.9 PG
MCHC RBC AUTO-ENTMCNC: 33.5 G/DL
MCV RBC AUTO: 92 FL
MONOCYTES # BLD AUTO: ABNORMAL K/UL
MONOCYTES NFR BLD: 1 %
MV PEAK A VEL: 1.45 M/S
MV PEAK E VEL: 0.86 M/S
MYELOCYTES NFR BLD MANUAL: 1 %
NEUTROPHILS NFR BLD: 85 %
NEUTS BAND NFR BLD MANUAL: 1 %
NRBC BLD-RTO: 0 /100 WBC
OVALOCYTES BLD QL SMEAR: ABNORMAL
PHOSPHATE SERPL-MCNC: 4 MG/DL
PHOSPHATE SERPL-MCNC: 4.1 MG/DL
PISA TR MAX VEL: 3.11 M/S
PLATELET # BLD AUTO: 253 K/UL
PMV BLD AUTO: 9.8 FL
POIKILOCYTOSIS BLD QL SMEAR: SLIGHT
POLYCHROMASIA BLD QL SMEAR: ABNORMAL
POTASSIUM SERPL-SCNC: 4 MMOL/L
PV PEAK D VEL: 0.54 M/S
RA MAJOR: 5.52 CM
RA WIDTH: 3.15 CM
RBC # BLD AUTO: 2.85 M/UL
RIGHT VENTRICULAR END-DIASTOLIC DIMENSION: 3.6 CM
SINUS: 3.35 CM
SODIUM SERPL-SCNC: 132 MMOL/L
STJ: 2.65 CM
TDI LATERAL: 0.05
TDI SEPTAL: 0.04
TDI: 0.05
TR MAX PG: 38.69 MMHG
TRICUSPID ANNULAR PLANE SYSTOLIC EXCURSION: 2.59 CM
WBC # BLD AUTO: 5.48 K/UL

## 2018-11-19 PROCEDURE — 80048 BASIC METABOLIC PNL TOTAL CA: CPT

## 2018-11-19 PROCEDURE — G8989 SELF CARE D/C STATUS: HCPCS | Mod: CL

## 2018-11-19 PROCEDURE — 25000003 PHARM REV CODE 250: Performed by: STUDENT IN AN ORGANIZED HEALTH CARE EDUCATION/TRAINING PROGRAM

## 2018-11-19 PROCEDURE — G8988 SELF CARE GOAL STATUS: HCPCS | Mod: CK

## 2018-11-19 PROCEDURE — 25000242 PHARM REV CODE 250 ALT 637 W/ HCPCS: Performed by: PHYSICIAN ASSISTANT

## 2018-11-19 PROCEDURE — 25000003 PHARM REV CODE 250: Performed by: HOSPITALIST

## 2018-11-19 PROCEDURE — 94799 UNLISTED PULMONARY SVC/PX: CPT

## 2018-11-19 PROCEDURE — 85007 BL SMEAR W/DIFF WBC COUNT: CPT

## 2018-11-19 PROCEDURE — 99232 SBSQ HOSP IP/OBS MODERATE 35: CPT | Mod: ,,, | Performed by: HOSPITALIST

## 2018-11-19 PROCEDURE — 97535 SELF CARE MNGMENT TRAINING: CPT

## 2018-11-19 PROCEDURE — 85027 COMPLETE CBC AUTOMATED: CPT

## 2018-11-19 PROCEDURE — 27000221 HC OXYGEN, UP TO 24 HOURS

## 2018-11-19 PROCEDURE — 63600175 PHARM REV CODE 636 W HCPCS: Performed by: PHYSICIAN ASSISTANT

## 2018-11-19 PROCEDURE — 97530 THERAPEUTIC ACTIVITIES: CPT

## 2018-11-19 PROCEDURE — 94640 AIRWAY INHALATION TREATMENT: CPT

## 2018-11-19 PROCEDURE — 84100 ASSAY OF PHOSPHORUS: CPT

## 2018-11-19 PROCEDURE — 94761 N-INVAS EAR/PLS OXIMETRY MLT: CPT

## 2018-11-19 PROCEDURE — 25000003 PHARM REV CODE 250: Performed by: PHYSICIAN ASSISTANT

## 2018-11-19 PROCEDURE — 20600001 HC STEP DOWN PRIVATE ROOM

## 2018-11-19 PROCEDURE — 83735 ASSAY OF MAGNESIUM: CPT | Mod: 91

## 2018-11-19 RX ORDER — FUROSEMIDE 40 MG/1
40 TABLET ORAL DAILY
Status: DISCONTINUED | OUTPATIENT
Start: 2018-11-20 | End: 2018-11-20

## 2018-11-19 RX ADMIN — CALCIUM CARBONATE 500 MG: 1250 SUSPENSION ORAL at 09:11

## 2018-11-19 RX ADMIN — HEPARIN SODIUM 5000 UNITS: 5000 INJECTION, SOLUTION INTRAVENOUS; SUBCUTANEOUS at 09:11

## 2018-11-19 RX ADMIN — SENNOSIDES AND DOCUSATE SODIUM 1 TABLET: 8.6; 5 TABLET ORAL at 09:11

## 2018-11-19 RX ADMIN — HEPARIN SODIUM 5000 UNITS: 5000 INJECTION, SOLUTION INTRAVENOUS; SUBCUTANEOUS at 02:11

## 2018-11-19 RX ADMIN — SERTRALINE HYDROCHLORIDE 25 MG: 25 TABLET ORAL at 09:11

## 2018-11-19 RX ADMIN — HYDROXYCHLOROQUINE SULFATE 200 MG: 200 TABLET, FILM COATED ORAL at 09:11

## 2018-11-19 RX ADMIN — IPRATROPIUM BROMIDE AND ALBUTEROL SULFATE 3 ML: .5; 3 SOLUTION RESPIRATORY (INHALATION) at 08:11

## 2018-11-19 RX ADMIN — CIPROFLOXACIN 500 MG: 500 TABLET, FILM COATED ORAL at 09:11

## 2018-11-19 RX ADMIN — CARVEDILOL 6.25 MG: 6.25 TABLET, FILM COATED ORAL at 09:11

## 2018-11-19 RX ADMIN — ATORVASTATIN CALCIUM 20 MG: 20 TABLET, FILM COATED ORAL at 09:11

## 2018-11-19 RX ADMIN — ESCITALOPRAM OXALATE 10 MG: 10 TABLET ORAL at 09:11

## 2018-11-19 RX ADMIN — LIDOCAINE 1 PATCH: 50 PATCH TOPICAL at 06:11

## 2018-11-19 RX ADMIN — HEPARIN SODIUM 5000 UNITS: 5000 INJECTION, SOLUTION INTRAVENOUS; SUBCUTANEOUS at 05:11

## 2018-11-19 RX ADMIN — Medication 100 MG: at 09:11

## 2018-11-19 RX ADMIN — CARVEDILOL 6.25 MG: 6.25 TABLET, FILM COATED ORAL at 06:11

## 2018-11-19 RX ADMIN — AMLODIPINE BESYLATE 10 MG: 10 TABLET ORAL at 09:11

## 2018-11-19 RX ADMIN — FOLIC ACID 1 MG: 1 TABLET ORAL at 09:11

## 2018-11-19 NOTE — PLAN OF CARE
Problem: Occupational Therapy Goal  Goal: Occupational Therapy Goal  Goals to be met by: 7 days (11/22/18)     Patient will increase functional independence with ADLs by performing:    UE Dressing with Contact Guard Assistance including TLSO.  LE Dressing with Minimal Assistance using AD as needed.  Grooming while EOB with Stand-by Assistance.  Toileting from bedside commode with Minimal Assistance for hygiene and clothing management.   Supine to sit with Minimal Assistance. MET  Toilet transfer to bedside commode with Minimal Assistance.  Increased functional strength to WFL for ADLs.      Outcome: Ongoing (interventions implemented as appropriate)  Continue OT plan of care.

## 2018-11-19 NOTE — ASSESSMENT & PLAN NOTE
- initially diuresed but then diuresis held due to CHARLENE  - cxr on 11/17 showed worsened edema and given one dose of furosemide with improvement in sats and creatinine  - will continue with furosemide but with PO route and increase to 40 mg daily from her home dose of 20 mg on MWF  - monitor Is and Os  - last 2D echo with Grade II (moderate) left ventricular diastolic dysfunction  - continue with O2  - continue with medical management

## 2018-11-19 NOTE — PHYSICIAN QUERY
PT Name: Karly Sandoval  MR #: [id number]    Physician Query Form - Emergency Medicine Diagnosis Clarification     CDS/: Hailee Gibbs RN               Contact information:[email or web address]  This form is a permanent document in the medical record.     Query Date: November 19, 2018    By submitting this query, we are merely seeking further clarification of documentation.  Please utilize your independent clinical judgment when addressing the question(s) below.      The Medical record contains the following:     Diagnosis Supporting Clinical Information Location in Medical Record   Pneumonia Patient had a imaging and labs yesterday at inpatient rehab.  She was found to have consolidation concerning for pneumonia on her CXR and UA indicative of UTI.  She was started on levofloxacin to cover for both. Pneumonia of left lung due to infectious organism    Findings suggesting edema with left pleural fluid.  Developing left lower lung zone consolidation not excluded    Small bilateral pleural effusions and compressive atelectasis.       There is obscuration of the left costophrenic angle, suggesting effusion..  The trachea is midline.  The lungs are symmetrically expanded bilaterally with patchy increased interstitial and parenchymal attenuation, primarily in a perihilar distribution.  This appears worsened since the previous exam, suggesting worsening edema.  Developing left lower lobe consolidation is not excluded..  There is no pneumothorax.    Albuterol-ipratopium nebulization every 12 hours  Ciprofloxacin oral   Furosemide IV  Furosemide oral ED provider          CXR 11/13      Thoracic spine CT 11/13    CXR 11/17              MAR 11/14-present  MAR 11/14-present  MAR 11/15-15, 17  MAR 11/18-present     Do you agree with the Emergency Medicine diagnosis of __Pnemumonia___?    [  ] Yes  [  ] Yes, but it resolved prior to my assessment of the patient  [x  ] No  [  ] Clinically Insignificant  [  ]  Other/Clarification of Findings:_________________________________  [  ] Clinically Undetermined    Please document in your progress notes daily for the duration of treatment until resolved and include in your discharge summary.

## 2018-11-19 NOTE — ASSESSMENT & PLAN NOTE
- on 11/15, AES note as follows:    Non-con CT showed:  2.1 cm fluid attenuation lesion within the pancreatic head.  No significant inflammatory changes surrounding the pancreas.   Differential considerations would include a pancreatic pseudocyst versus cystic neoplasm of the pancreas. MRCP/MRI of the abdomen without and with contrast may be obtained for further evaluation.     Recommend follow up in pancreatic cyst clinic after discharge.   We will arrange the follow up once patient is being discharged.      Plan discussed with 2 sons regarding outpatient follow up

## 2018-11-19 NOTE — SUBJECTIVE & OBJECTIVE
Interval History: Patient transferred from NS service to medicine. Admitted to NS service from SNF intially due to worsened back pain. Found to have retropulsion from T12 compression fx. NO surgical intervention and patient declining surgery. Had issues with resp failure and CHARLENE and transferred to medicine for further management.     No new events overnight. Denies SOB or CP. Given lasix yesterday with good response and resolution of crackles. Two sons are at bedside and updated on plan of care. Plan to discharge back to SNF when accepted.     Review of Systems   Constitutional: Positive for activity change and fatigue. Negative for fever.   Respiratory: Negative for cough, shortness of breath and wheezing.    Cardiovascular: Negative for chest pain and palpitations.   Gastrointestinal: Negative for abdominal distention, abdominal pain and constipation.   Genitourinary: Negative for difficulty urinating.   Musculoskeletal: Positive for back pain.        Back pain controlled    Neurological: Negative for light-headedness, numbness and headaches.   Psychiatric/Behavioral: Negative for agitation and suicidal ideas. The patient is nervous/anxious.      Objective:     Vital Signs (Most Recent):  Temp: 98.6 °F (37 °C) (11/19/18 1229)  Pulse: 67 (11/19/18 1229)  Resp: 16 (11/19/18 1229)  BP: (!) 145/65 (11/19/18 1229)  SpO2: 96 % (11/19/18 1229) Vital Signs (24h Range):  Temp:  [98.5 °F (36.9 °C)-98.6 °F (37 °C)] 98.6 °F (37 °C)  Pulse:  [67-78] 67  Resp:  [16-22] 16  SpO2:  [94 %-100 %] 96 %  BP: (123-145)/(50-65) 145/65     Weight: 60.8 kg (134 lb)  Body mass index is 24.51 kg/m².    Intake/Output Summary (Last 24 hours) at 11/19/2018 1409  Last data filed at 11/19/2018 0850  Gross per 24 hour   Intake --   Output 3000 ml   Net -3000 ml      Physical Exam   Constitutional: She is oriented to person, place, and time. Vital signs are normal. She appears well-developed. No distress.   Thin, elderly female. Sitting on the  side of the bed with therapy.    Cardiovascular: Normal rate, S1 normal, S2 normal and normal heart sounds.   No murmur heard.  Pulmonary/Chest: No stridor. No respiratory distress. She has no decreased breath sounds. She has no wheezes. She has no rhonchi. She has no rales.   Abdominal: Soft. There is no tenderness.   Neurological: She is alert and oriented to person, place, and time.   Psychiatric: Her speech is normal and behavior is normal. Her mood appears anxious. Cognition and memory are impaired. She expresses impulsivity.   Vitals reviewed.      Significant Labs: All pertinent labs within the past 24 hours have been reviewed.    Significant Imaging: I have reviewed all pertinent imaging results/findings within the past 24 hours.

## 2018-11-19 NOTE — PHYSICIAN QUERY
PT Name: Karly Sandoval  MR #: [id number]  Physician Query Form - CKD Clarification     CDS/: Hailee Gibbs RN               Contact information:[email or web address]  This form is a permanent document in the medical record.     Query Date: November 19, 2018    By submitting this query, we are merely seeking further clarification of documentation. Please utilize your independent clinical judgment when addressing the question(s) below.    The Medical record contains the following:     Indicators   Supporting Clinical Findings   Location in Medical Record   x CKD or Chronic Kidney (Renal) Failure / Disease Acute kidney injury superimposed on CKD     - patient had a normal creatinine up to mid September 2018 then started with what looked like CHARLENE since then with a baseline ranging from 1.1 - 1.7  - she was diuresed for HF and had worsening of her CHARLENE; diuresis stopped at that time  - given cxr findings on 11/17, a one time dose of furosemide was given with some improvement of her creatinine  - CHARLENE may be due to HF but will need close monitoring of her diuresis as she is at risk of dehydration as has been evident already; therefore furosemide regimen changed to PO     Hospital Medicine PN 11/19   x BUN/Creatinine                          GFR BUN  41   Cr  1,5   GFR  30.9  BUN  47   Cr 1.7    GFR  26.5  BUN  48   Cr 1.9    GFR  23.2  BUN  48   Cr  1.7   GFR  26.5  BUN  41   Cr 1.6    GFR  28.6  BUN  30   Cr 1.4    GFR  33.6  BUN  29   Cr  1.7   GFR  26.5 Labs 11/13  Labs 11/14  Labs 11/15  Labs 11/16  Labs 11/17  Labs 11/18  Labs 11/19    Dehydration      Nausea / Vomiting      Dialysis / CRRT      Medication      Treatment     x Other Chronic Conditions RA (on plaquenil), HFpEF (noted diastolic dysfunction in echo on 2016), essential hypertension, hyperlipidemia, and new cystic pancreatic head mass   T12 compression fracture, UTI due to Klebsiella , Acute hypoxemic respiratory failure, hyponatremia, Acute  kidney injury, osteopenia. possibly hypervolemic hypernatremia attributable to HF exacerbation  Hospital Medicine PN 11/19    Other       Provider, please further specify the stage of CKD.      [  x] Chronic Kidney Disease (CKD) (please specify stage* below)       [employer] Definitions  Stage Description  eGFR (mL/min)   [  ]     III Moderately reduced kidney function 30-59   [ x ]     IV Severely reduced kidney function 15-29      CKD on Chronic Hemodialysis (please specify stage*): __________    End Stage Renal Disease (ESRD)    Other (please specify): ________________________    Clinically Undetermined         Please document in your progress notes daily for the duration of treatment until resolved and include in your discharge summary.

## 2018-11-19 NOTE — PROGRESS NOTES
[facility] Hospital Medicine  Progress Note    Patient Name: Karly Sandoval  MRN: [id number]  Patient Class: IP- Inpatient   Admission Date: 11/13/2018  Length of Stay: 6 days  Attending Physician: Amanda Peters MD  Primary Care Provider: Uday Allen MD    [facility] Medicine Team: [facility] HOSP MED G Amanda Aldana MD    Subjective:     Principal Problem:T12 compression fracture    HPI:  Karly Sandoval is a 88 y.o. lady with RA (on plaquenil), HFpEF (noted diastolic dysfunction in echo on 2016), essential hypertension, hyperlipidemia, and new cystic pancreatic head mass who presented to [facility] from [facility] rehab for evaluation of worsening back pain.  She was recently seen and discharged on 10/23 to [facility] SNF for further rehabilitation for a T12 compression fracture.  There, she was noted to have continued back pain that was not responsive to increasing pain regimen.  She was also noted to have a positive UA with E. Coli and Proteus, where she completed a 7 day course of ciprofloxacin.  Throughout her stay, she would have continued back pain without significant improvement.  She also developed acute hypoxic respiratory failure, requiring minimal supplemental O2.  It was noted in rehab notes that patient was gaining weight despite poor PO intake.  Additionally towards the past week, she was noted to have worsening altered mental status after taking her AM medications, which would improve over time.  Patient was supposed to follow up with neurosurgery as an outpatient for follow up, but due to significant worsening in pain, patient was brought to [facility] for further evaluation.  Prior to presentation, she had a CXR and UA performed, where UA was again positive with 3+ leukocytes and > 100 WBCs.  CXR also revealed pulmonary vascular congestion as well as a new L lower lobe effusion, where she was started on levaquin, and was given 1 dose of IV lasix.  She denies fevers, chills, N/V, coughs, chest or abdominal  "pains.  She received repeat imaging revealing new severe spinal canal stenosis related to new T12 vertebral body compression fracture and 7 mm osseous retropulsion.  She was subsequently admitted to Neurosurgery for further evaluation, where she was deemed not a surgical candidate, and recommended further bracing with improvement in pain control.  Medicine was consulted regarding effusion, UTI, and CHARLENE.    Of note, patient was also previously seen in [facility] prior to presentation earlier on 10/8/2018.  There, she was evaluated for hyponatremia, where she was found to have a cystic pancreatic head lesion concerning for either cystic malignancy versus pseudocyst captured on both CT and abdominal ultrasound.  Patient denies any significant EtOH use or previous abdominal pains, but did have previous cholecystectomy.      Patient assessed at bedside with son present.  She noted she did have some improvement in breathing after receiving her dose of IV lasix.  Does note that her mind does feel "unclear" at the moment, but alert and appropriate.  Notes lying flat helps improve with her back pain.          Hospital Course:  No notes on file    Interval History: Patient transferred from NS service to medicine. Admitted to NS service from SNF intially due to worsened back pain. Found to have retropulsion from T12 compression fx. NO surgical intervention and patient declining surgery. Had issues with resp failure and CHARLENE and transferred to medicine for further management.     No new events overnight. Denies SOB or CP. Given lasix yesterday with good response and resolution of crackles. Two sons are at bedside and updated on plan of care. Plan to discharge back to SNF when accepted.     Review of Systems   Constitutional: Positive for activity change and fatigue. Negative for fever.   Respiratory: Negative for cough, shortness of breath and wheezing.    Cardiovascular: Negative for chest pain and palpitations. "   Gastrointestinal: Negative for abdominal distention, abdominal pain and constipation.   Genitourinary: Negative for difficulty urinating.   Musculoskeletal: Positive for back pain.        Back pain controlled    Neurological: Negative for light-headedness, numbness and headaches.   Psychiatric/Behavioral: Negative for agitation and suicidal ideas. The patient is nervous/anxious.      Objective:     Vital Signs (Most Recent):  Temp: 98.6 °F (37 °C) (11/19/18 1229)  Pulse: 67 (11/19/18 1229)  Resp: 16 (11/19/18 1229)  BP: (!) 145/65 (11/19/18 1229)  SpO2: 96 % (11/19/18 1229) Vital Signs (24h Range):  Temp:  [98.5 °F (36.9 °C)-98.6 °F (37 °C)] 98.6 °F (37 °C)  Pulse:  [67-78] 67  Resp:  [16-22] 16  SpO2:  [94 %-100 %] 96 %  BP: (123-145)/(50-65) 145/65     Weight: 60.8 kg (134 lb)  Body mass index is 24.51 kg/m².    Intake/Output Summary (Last 24 hours) at 11/19/2018 1409  Last data filed at 11/19/2018 0850  Gross per 24 hour   Intake --   Output 3000 ml   Net -3000 ml      Physical Exam   Constitutional: She is oriented to person, place, and time. Vital signs are normal. She appears well-developed. No distress.   Thin, elderly female. Sitting on the side of the bed with therapy.    Cardiovascular: Normal rate, S1 normal, S2 normal and normal heart sounds.   No murmur heard.  Pulmonary/Chest: No stridor. No respiratory distress. She has no decreased breath sounds. She has no wheezes. She has no rhonchi. She has no rales.   Abdominal: Soft. There is no tenderness.   Neurological: She is alert and oriented to person, place, and time.   Psychiatric: Her speech is normal and behavior is normal. Her mood appears anxious. Cognition and memory are impaired. She expresses impulsivity.   Vitals reviewed.      Significant Labs: All pertinent labs within the past 24 hours have been reviewed.    Significant Imaging: I have reviewed all pertinent imaging results/findings within the past 24 hours.    Assessment/Plan:      * T12  compression fracture    - per NS note:   89 y/o female with hx of kyphoplasty at T8 and T9, with a known acute L2 compression fracture, who presents with worsening back pain and a new T12 rib fracture and T12 compression fracture with retropulsion.  1. Patient neurologically stable on exam  2. Patient is not a surgical candidate.  Kyphoplasty is not an option for the T12 fracture due to the component of retropulsion.  Due to patient's age, co morbidities, and bone quality, she is not a candidate for posterior instrumentation. Patient also stated that she is not interested in anything more invasive than a kyphoplasty.  3. continue PTOT and pain control   4. TLSO brace when OOB        UTI due to Klebsiella species    - complete 7 day course of cipro  - afebrile and no leukocytosis        Acute hypoxemic respiratory failure    - per HF management        Compression fracture of T12 vertebra    Patient with noted worsening lower back pain requiring further evaluation.  Repeat imaging did reveal new severe spinal canal stenosis related to new T12 vertebral body compression fracture and 7 mm osseous retropulsion.      Recommendations  - continue management of compression fracture as per neurosurgical recommendations   - discussed with AES, patient to have outpatient work up.  No need for inpatient EUS. Appreciate AES assistance at this time      Debility    - PTOT recommending SNF placement  - patient and family had plans for her to go to assisted living after SNF dc however given continued pain and patient reporting feeling tired, family is considering NH placement therefore may need NH SNF placement  - CM and SW to coordinate with son and daughter in law        Hyponatremia    - improves with diuresis  - possibly hypervolemic hypernatremia attributable to HF exacerbation      Pancreatic lesion    - on 11/15, AES note as follows:    Non-con CT showed:  2.1 cm fluid attenuation lesion within the pancreatic head.  No  significant inflammatory changes surrounding the pancreas.   Differential considerations would include a pancreatic pseudocyst versus cystic neoplasm of the pancreas. MRCP/MRI of the abdomen without and with contrast may be obtained for further evaluation.     Recommend follow up in pancreatic cyst clinic after discharge.   We will arrange the follow up once patient is being discharged.      Plan discussed with 2 sons regarding outpatient follow up        Acute kidney injury superimposed on CKD    - patient had a normal creatinine up to mid September 2018 then started with what looked like CHARLENE since then with a baseline ranging from 1.1 - 1.7  - she was diuresed for HF and had worsening of her CHARLENE; diuresis stopped at that time  - given cxr findings on 11/17, a one time dose of furosemide was given with some improvement of her creatinine  - CHARLENE may be due to HF but will need close monitoring of her diuresis as she is at risk of dehydration as has been evident already; therefore furosemide regimen changed to PO   - continue to monitor       Anxiety    - continue home dose sertraline  - lorazepam daily prn per home regimen        Pacemaker    - for RBBB/LB issues  - stable       Chronic diastolic CHF (congestive heart failure)    - initially diuresed but then diuresis held due to CHARLENE  - cxr on 11/17 showed worsened edema and given one dose of furosemide with improvement in sats and creatinine  - will continue with furosemide but with PO route and increase to 40 mg daily from her home dose of 20 mg on MWF  - monitor Is and Os  - last 2D echo with Grade II (moderate) left ventricular diastolic dysfunction  - continue with O2  - continue with medical management         Mixed hyperlipidemia    - atorvastatin     Osteopenia    - continue calcium supplementation        Essential hypertension    - continue home norvasc 10 mg PO daily, coreg 6.25 mg PO BID  - hold lisinopril for CHARLENE  - started on 40 mg furosemide daily (takes  20 mg MWF); BP can tolerate at this time          RA (rheumatoid arthritis)    - no significant joint complaints at this time  - continue home plaquenil  - patient has been off MTX since last outpatient rheumatology visit   - follow up with rheum as scheduled          VTE Risk Mitigation (From admission, onward)        Ordered     heparin (porcine) injection 5,000 Units  Every 8 hours      11/14/18 1015     IP VTE HIGH RISK PATIENT  Once      11/13/18 1756     Place TOM hose  Until discontinued      11/13/18 1756     Place sequential compression device  Until discontinued      11/13/18 1756              Amanda Aldana MD  Department of Hospital Medicine   [facility]

## 2018-11-19 NOTE — PT/OT/SLP PROGRESS
Occupational Therapy   Treatment    Name: Karly Sandoval  MRN: [id number]  Admitting Diagnosis:  T12 compression fracture       Recommendations:     Discharge Recommendations: nursing facility, skilled  Discharge Equipment Recommendations:  wheelchair  Barriers to discharge:  Decreased caregiver support    Subjective     Pain/Comfort:  · Pain Rating 1: (Did not rate)  · Location 1: back  · Pain Addressed 1: Reposition, Distraction  · Pain Rating Post-Intervention 1: (none at rest)    Objective:     Communicated with: RN prior to session.  Patient found supine with telemetry, PureWick upon OT entry to room, son present.    General Precautions: Standard, fall   Orthopedic Precautions:spinal precautions   Braces: TLSO     Occupational Performance:    Bed Mobility:    · Patient completed Rolling/Turning to Left with minimum assistance and with side rail  · Patient completed Supine to Sit with minimal assistance with cues for technique    Functional Mobility/Transfers:  · Patient completed Sit <> Stand Transfer with maximal assistance with hand-held assist from EOB 2 trials  · Functional Mobility: Few steps from EOB to bedside chair with Max A hand-held assist; posterior lean and fearful of falling    Activities of Daily Living:  · Feeding:  set-up assistance in bedside chair    · Grooming: stand by assistance sitting in chair to comb hair, wash face  · Upper Body Dressing: maximal assistance to don and adjust TLSO while seated    [facility] 6 Click ADL: 14    Treatment & Education:  Pt able to sit EOB with close SBA-CGA while assisting to don TLSO; practiced T/Fs from EOB and able to take few steps to bedside chair; participated in grooming tasks while seated; discussed D/C recs with pt and family    Patient left up in chair with all lines intact, call button in reach, RN notified and son present  Education:    Assessment:     Karly Sandoval is a 88 y.o. female with a medical diagnosis of T12 compression fracture.  She  presents with the following performance deficits affecting function are weakness, impaired endurance, impaired self care skills, impaired functional mobilty, gait instability, impaired balance, decreased safety awareness, pain, impaired cardiopulmonary response to activity. Pt progressing toward goals and would continue to benefit from OT to increase functional independence and safety. Recommend SNF upon D/C.    Rehab Prognosis:  Good; patient would benefit from acute skilled OT services to address these deficits and reach maximum level of function.       Plan:     Patient to be seen 4 x/week to address the above listed problems via self-care/home management, therapeutic activities, therapeutic exercises  · Plan of Care Expires: 12/15/18  · Plan of Care Reviewed with: patient, son    This Plan of care has been discussed with the patient who was involved in its development and understands and is in agreement with the identified goals and treatment plan    GOALS:   Multidisciplinary Problems     Occupational Therapy Goals        Problem: Occupational Therapy Goal    Goal Priority Disciplines Outcome Interventions   Occupational Therapy Goal     OT, PT/OT Ongoing (interventions implemented as appropriate)    Description:  Goals to be met by: 7 days (11/22/18)     Patient will increase functional independence with ADLs by performing:    UE Dressing with Contact Guard Assistance including TLSO.  LE Dressing with Minimal Assistance using AD as needed.  Grooming while EOB with Stand-by Assistance.  Toileting from bedside commode with Minimal Assistance for hygiene and clothing management.   Supine to sit with Minimal Assistance. MET  Toilet transfer to bedside commode with Minimal Assistance.  Increased functional strength to WFL for ADLs.                       Time Tracking:     OT Date of Treatment: 11/19/18  OT Start Time: 1405  OT Stop Time: 1430  OT Total Time (min): 25 min    Billable Minutes:Self Care/Home  Management 15  Therapeutic Activity 10    Barbra Vieyra, LOTR  11/19/2018

## 2018-11-19 NOTE — TELEPHONE ENCOUNTER
----- Message from Fei Sloan PA-C sent at 11/17/2018  9:36 AM CST -----  Please schedule 6 wk follow up with me with thoracic xrays prior. Thanks!

## 2018-11-19 NOTE — TELEPHONE ENCOUNTER
Contacted patient in regards to a scheduled transmission for remote pacemaker check but the voicemail did not allow me to leave a message it was asking for remote access codes only to check the voicemail.

## 2018-11-19 NOTE — ASSESSMENT & PLAN NOTE
- per NS note:   87 y/o female with hx of kyphoplasty at T8 and T9, with a known acute L2 compression fracture, who presents with worsening back pain and a new T12 rib fracture and T12 compression fracture with retropulsion.  1. Patient neurologically stable on exam  2. Patient is not a surgical candidate.  Kyphoplasty is not an option for the T12 fracture due to the component of retropulsion.  Due to patient's age, co morbidities, and bone quality, she is not a candidate for posterior instrumentation. Patient also stated that she is not interested in anything more invasive than a kyphoplasty.  3. continue PTOT and pain control   4. TLSO brace when OOB

## 2018-11-19 NOTE — PHYSICIAN QUERY
"PT Name: Karly Sandoval  MR #: [id number]     CDS/: Hailee Gibbs RN               Contact information:[email or web address]  This form is a permanent document in the medical record.     Query Date: November 19, 2018    By submitting this query, we are merely seeking further clarification of documentation to reflect the severity of illness of your patient. Please utilize your independent clinical judgment when addressing the question(s) below.    The Medical record reflects the following:     Indicators   Supporting Clinical Findings Location in Medical Record   x AMS, Confusion,  LOC, etc.  becomes "loopy" whenever she takes her medications.  She states she is in her normal state of health upon awakening every morning.  She is able to eat breakfast and get around but once she takes her medications she becomes confused, loses her appetite, and has difficulty ambulating and participating with rehabilitation therapy.    Positive for confusion. Negative for agitation and behavioral problems.    ED provider              Hospital Medicine PN 11/15   x Acute / Chronic Illness RA (on plaquenil), HFpEF (noted diastolic dysfunction in echo on 2016), essential hypertension, hyperlipidemia, and new cystic pancreatic head mass   T12 compression fracture, UTI due to Klebsiella , Acute hypoxemic respiratory failure, hyponatremia, Acute kidney injury, osteopenia. possibly hypervolemic hypernatremia attributable to HF exacerbation, CKD Hospital medicine PN 11/19    Radiology Findings     x Electrolyte Imbalance Hyponatremia, CHARLENE Hospital medicine PN 11/19    Medication      Treatment              Other       Provider, please specify the diagnosis or diagnoses associated with above clinical findings.      [  x ] Metabolic Encephalopathy   [   ] Other Encephalopathy  [   ] Unspecified Encephalopathy     [   ] Other Neurological Condition-  (please specify condition): _________  [   ] Clinically Undetermined  Please " document in your progress notes daily for the duration of treatment until resolved, and include in your discharge summary.

## 2018-11-19 NOTE — PHYSICIAN QUERY
"PT Name: Karly Sandoval  MR #: [id number]    Physician Query Form - Hematology Clarification      CDS/: Hailee Gibbs RN               Contact information:[email or web address]    This form is a permanent document in the medical record.      Query Date: November 19, 2018    By submitting this query, we are merely seeking further clarification of documentation. Please utilize your independent clinical judgment when addressing the question(s) below.    The Medical record contains the following:   Indicators  Supporting Clinical Findings Location in Medical Record   x "Anemia" documented Anemia under past medical history H&P   x H & H = H&H= 8.4/23.7  H&H= 8/23.9  H&H= 8.6/26  H&H= 7.7/23.2  H&H= 8/23.7  H&H= 8.3/25.3  H&H= 8.8/26.3 Labs 11/13  Labs 11/14  Labs 11/15  Labs 11/16  Labs 11/17  Labs 11/18  Labs 11/19    BP =                     HR=      "GI bleeding" documented      Acute bleeding (Non GI site)      Transfusion(s)      Treatment:     x Other:  RA (on plaquenil), HFpEF (noted diastolic dysfunction in echo on 2016), essential hypertension, hyperlipidemia, and new cystic pancreatic head mass   T12 compression fracture, UTI due to Klebsiella , Acute hypoxemic respiratory failure, hyponatremia, Acute kidney injury, osteopenia. possibly hypervolemic hypernatremia attributable to HF exacerbation Hospital Medicine PN 11/19     Provider, please specify diagnosis or diagnoses associated with above clinical findings.     Iron deficiency anemia    Chronic blood loss anemia     x Anemia of chronic disease ( Specify chronic disease)       x CKD (specify stage): 4    Other (Specify):    Clinically Undetermined        Other Hematological Diagnosis (please specify):      Clinically Undetermined       Please document in your progress notes daily for the duration of treatment, until resolved, and include in your discharge summary.                                                                                 "

## 2018-11-20 ENCOUNTER — HOSPITAL ENCOUNTER (INPATIENT)
Facility: HOSPITAL | Age: 83
LOS: 14 days | Discharge: HOME HEALTH | DRG: 560 | End: 2018-12-04
Attending: INTERNAL MEDICINE | Admitting: NEUROLOGICAL SURGERY
Payer: MEDICARE

## 2018-11-20 ENCOUNTER — TELEPHONE (OUTPATIENT)
Dept: ADMINISTRATIVE | Facility: CLINIC | Age: 83
End: 2018-11-20

## 2018-11-20 VITALS
HEIGHT: 62 IN | HEART RATE: 74 BPM | TEMPERATURE: 97 F | WEIGHT: 134 LBS | OXYGEN SATURATION: 94 % | BODY MASS INDEX: 24.66 KG/M2 | SYSTOLIC BLOOD PRESSURE: 141 MMHG | DIASTOLIC BLOOD PRESSURE: 65 MMHG | RESPIRATION RATE: 16 BRPM

## 2018-11-20 DIAGNOSIS — Z79.631 LONG TERM METHOTREXATE USER: Chronic | ICD-10-CM

## 2018-11-20 DIAGNOSIS — N18.30 CKD (CHRONIC KIDNEY DISEASE), STAGE III: ICD-10-CM

## 2018-11-20 DIAGNOSIS — I50.32 CHRONIC DIASTOLIC CHF (CONGESTIVE HEART FAILURE): ICD-10-CM

## 2018-11-20 DIAGNOSIS — I10 ESSENTIAL HYPERTENSION: ICD-10-CM

## 2018-11-20 DIAGNOSIS — D63.8 ANEMIA OF CHRONIC DISEASE: ICD-10-CM

## 2018-11-20 DIAGNOSIS — M06.9 RHEUMATOID ARTHRITIS, INVOLVING UNSPECIFIED SITE, UNSPECIFIED RHEUMATOID FACTOR PRESENCE: ICD-10-CM

## 2018-11-20 DIAGNOSIS — E87.1 HYPONATREMIA: ICD-10-CM

## 2018-11-20 DIAGNOSIS — Z78.9 TAKES DIETARY SUPPLEMENTS: ICD-10-CM

## 2018-11-20 DIAGNOSIS — F41.9 ANXIETY: ICD-10-CM

## 2018-11-20 DIAGNOSIS — B96.1 KLEBSIELLA CYSTITIS: ICD-10-CM

## 2018-11-20 DIAGNOSIS — E78.2 MIXED HYPERLIPIDEMIA: ICD-10-CM

## 2018-11-20 DIAGNOSIS — S22.080A T12 COMPRESSION FRACTURE: ICD-10-CM

## 2018-11-20 DIAGNOSIS — K86.9 PANCREATIC LESION: ICD-10-CM

## 2018-11-20 DIAGNOSIS — N30.90 KLEBSIELLA CYSTITIS: ICD-10-CM

## 2018-11-20 DIAGNOSIS — S22.080A COMPRESSION FRACTURE OF T12 VERTEBRA: Primary | ICD-10-CM

## 2018-11-20 DIAGNOSIS — Z71.89 DNR (DO NOT RESUSCITATE) DISCUSSION: ICD-10-CM

## 2018-11-20 DIAGNOSIS — Z95.0 PACEMAKER: ICD-10-CM

## 2018-11-20 PROCEDURE — 25000003 PHARM REV CODE 250: Performed by: HOSPITALIST

## 2018-11-20 PROCEDURE — 63600175 PHARM REV CODE 636 W HCPCS: Performed by: PHYSICIAN ASSISTANT

## 2018-11-20 PROCEDURE — 11000004 HC SNF PRIVATE

## 2018-11-20 PROCEDURE — 25000003 PHARM REV CODE 250: Performed by: PHYSICIAN ASSISTANT

## 2018-11-20 PROCEDURE — 25000003 PHARM REV CODE 250: Performed by: INTERNAL MEDICINE

## 2018-11-20 PROCEDURE — 94761 N-INVAS EAR/PLS OXIMETRY MLT: CPT

## 2018-11-20 PROCEDURE — 25000242 PHARM REV CODE 250 ALT 637 W/ HCPCS: Performed by: PHYSICIAN ASSISTANT

## 2018-11-20 PROCEDURE — 25000003 PHARM REV CODE 250: Performed by: STUDENT IN AN ORGANIZED HEALTH CARE EDUCATION/TRAINING PROGRAM

## 2018-11-20 PROCEDURE — 94640 AIRWAY INHALATION TREATMENT: CPT

## 2018-11-20 PROCEDURE — 99239 HOSP IP/OBS DSCHRG MGMT >30: CPT | Mod: ,,, | Performed by: HOSPITALIST

## 2018-11-20 PROCEDURE — 63600175 PHARM REV CODE 636 W HCPCS: Performed by: HOSPITALIST

## 2018-11-20 PROCEDURE — 97530 THERAPEUTIC ACTIVITIES: CPT

## 2018-11-20 RX ORDER — ACETAMINOPHEN 325 MG/1
650 TABLET ORAL EVERY 6 HOURS PRN
Status: DISCONTINUED | OUTPATIENT
Start: 2018-11-20 | End: 2018-12-04 | Stop reason: HOSPADM

## 2018-11-20 RX ORDER — FUROSEMIDE 20 MG/1
20 TABLET ORAL DAILY
Status: DISCONTINUED | OUTPATIENT
Start: 2018-11-21 | End: 2018-11-20 | Stop reason: HOSPADM

## 2018-11-20 RX ORDER — ALPRAZOLAM 0.25 MG/1
0.25 TABLET ORAL DAILY PRN
Status: DISCONTINUED | OUTPATIENT
Start: 2018-11-20 | End: 2018-12-04 | Stop reason: HOSPADM

## 2018-11-20 RX ORDER — CALCIUM CARBONATE 1250 MG/5ML
500 SUSPENSION ORAL DAILY
Status: DISCONTINUED | OUTPATIENT
Start: 2018-11-21 | End: 2018-12-04 | Stop reason: HOSPADM

## 2018-11-20 RX ORDER — ATORVASTATIN CALCIUM 20 MG/1
20 TABLET, FILM COATED ORAL DAILY
Status: CANCELLED | OUTPATIENT
Start: 2018-11-21

## 2018-11-20 RX ORDER — BISACODYL 10 MG
10 SUPPOSITORY, RECTAL RECTAL DAILY PRN
Status: DISCONTINUED | OUTPATIENT
Start: 2018-11-20 | End: 2018-12-04 | Stop reason: HOSPADM

## 2018-11-20 RX ORDER — ALBUTEROL SULFATE 0.83 MG/ML
2.5 SOLUTION RESPIRATORY (INHALATION) EVERY 4 HOURS PRN
Status: DISCONTINUED | OUTPATIENT
Start: 2018-11-20 | End: 2018-11-30

## 2018-11-20 RX ORDER — ESCITALOPRAM OXALATE 10 MG/1
10 TABLET ORAL DAILY
Status: CANCELLED | OUTPATIENT
Start: 2018-11-21

## 2018-11-20 RX ORDER — HYDROXYCHLOROQUINE SULFATE 200 MG/1
200 TABLET, FILM COATED ORAL DAILY
Status: CANCELLED | OUTPATIENT
Start: 2018-11-21

## 2018-11-20 RX ORDER — HEPARIN SODIUM 5000 [USP'U]/ML
5000 INJECTION, SOLUTION INTRAVENOUS; SUBCUTANEOUS EVERY 8 HOURS
Status: CANCELLED | OUTPATIENT
Start: 2018-11-20

## 2018-11-20 RX ORDER — ALPRAZOLAM 0.25 MG/1
0.25 TABLET ORAL DAILY PRN
Status: CANCELLED | OUTPATIENT
Start: 2018-11-20

## 2018-11-20 RX ORDER — ESCITALOPRAM OXALATE 10 MG/1
10 TABLET ORAL DAILY
Status: DISCONTINUED | OUTPATIENT
Start: 2018-11-21 | End: 2018-12-04 | Stop reason: HOSPADM

## 2018-11-20 RX ORDER — FUROSEMIDE 20 MG/1
20 TABLET ORAL DAILY
Status: CANCELLED | OUTPATIENT
Start: 2018-11-21

## 2018-11-20 RX ORDER — FUROSEMIDE 20 MG/1
20 TABLET ORAL DAILY
Status: DISCONTINUED | OUTPATIENT
Start: 2018-11-21 | End: 2018-11-29

## 2018-11-20 RX ORDER — ACETAMINOPHEN 325 MG/1
650 TABLET ORAL EVERY 6 HOURS PRN
Status: CANCELLED | OUTPATIENT
Start: 2018-11-20

## 2018-11-20 RX ORDER — CALCIUM CARBONATE 1250 MG/5ML
500 SUSPENSION ORAL DAILY
Status: CANCELLED | OUTPATIENT
Start: 2018-11-21

## 2018-11-20 RX ORDER — LIDOCAINE 50 MG/G
1 PATCH TOPICAL
Status: CANCELLED | OUTPATIENT
Start: 2018-11-20

## 2018-11-20 RX ORDER — ONDANSETRON 4 MG/1
4 TABLET, ORALLY DISINTEGRATING ORAL EVERY 8 HOURS PRN
Status: DISCONTINUED | OUTPATIENT
Start: 2018-11-20 | End: 2018-11-20 | Stop reason: HOSPADM

## 2018-11-20 RX ORDER — AMLODIPINE BESYLATE 10 MG/1
10 TABLET ORAL DAILY
Status: CANCELLED | OUTPATIENT
Start: 2018-11-21

## 2018-11-20 RX ORDER — AMLODIPINE BESYLATE 10 MG/1
10 TABLET ORAL DAILY
Status: DISCONTINUED | OUTPATIENT
Start: 2018-11-21 | End: 2018-12-04 | Stop reason: HOSPADM

## 2018-11-20 RX ORDER — HEPARIN SODIUM 5000 [USP'U]/ML
5000 INJECTION, SOLUTION INTRAVENOUS; SUBCUTANEOUS EVERY 8 HOURS
Status: DISCONTINUED | OUTPATIENT
Start: 2018-11-20 | End: 2018-12-04 | Stop reason: HOSPADM

## 2018-11-20 RX ORDER — LIDOCAINE 50 MG/G
1 PATCH TOPICAL
Status: DISCONTINUED | OUTPATIENT
Start: 2018-11-21 | End: 2018-11-24

## 2018-11-20 RX ORDER — CARVEDILOL 6.25 MG/1
6.25 TABLET ORAL 2 TIMES DAILY WITH MEALS
Status: DISCONTINUED | OUTPATIENT
Start: 2018-11-21 | End: 2018-12-04 | Stop reason: HOSPADM

## 2018-11-20 RX ORDER — ONDANSETRON 8 MG/1
8 TABLET, ORALLY DISINTEGRATING ORAL EVERY 6 HOURS PRN
Status: DISCONTINUED | OUTPATIENT
Start: 2018-11-20 | End: 2018-12-04 | Stop reason: HOSPADM

## 2018-11-20 RX ORDER — ALBUTEROL SULFATE 0.83 MG/ML
2.5 SOLUTION RESPIRATORY (INHALATION) EVERY 4 HOURS PRN
Status: CANCELLED | OUTPATIENT
Start: 2018-11-20

## 2018-11-20 RX ORDER — CARVEDILOL 6.25 MG/1
6.25 TABLET ORAL 2 TIMES DAILY WITH MEALS
Status: CANCELLED | OUTPATIENT
Start: 2018-11-20

## 2018-11-20 RX ORDER — PROMETHAZINE HYDROCHLORIDE 12.5 MG/1
12.5 TABLET ORAL EVERY 6 HOURS PRN
Status: DISCONTINUED | OUTPATIENT
Start: 2018-11-20 | End: 2018-11-20 | Stop reason: HOSPADM

## 2018-11-20 RX ORDER — HYDROXYCHLOROQUINE SULFATE 200 MG/1
200 TABLET, FILM COATED ORAL DAILY
Status: DISCONTINUED | OUTPATIENT
Start: 2018-11-21 | End: 2018-12-04 | Stop reason: HOSPADM

## 2018-11-20 RX ORDER — AMOXICILLIN 250 MG
1 CAPSULE ORAL 2 TIMES DAILY
Status: DISCONTINUED | OUTPATIENT
Start: 2018-11-20 | End: 2018-11-28

## 2018-11-20 RX ORDER — BISACODYL 10 MG
10 SUPPOSITORY, RECTAL RECTAL DAILY PRN
Status: CANCELLED | OUTPATIENT
Start: 2018-11-20

## 2018-11-20 RX ORDER — ATORVASTATIN CALCIUM 20 MG/1
20 TABLET, FILM COATED ORAL DAILY
Status: DISCONTINUED | OUTPATIENT
Start: 2018-11-21 | End: 2018-12-04 | Stop reason: HOSPADM

## 2018-11-20 RX ADMIN — HEPARIN SODIUM 5000 UNITS: 5000 INJECTION, SOLUTION INTRAVENOUS; SUBCUTANEOUS at 05:11

## 2018-11-20 RX ADMIN — ATORVASTATIN CALCIUM 20 MG: 20 TABLET, FILM COATED ORAL at 09:11

## 2018-11-20 RX ADMIN — ALPRAZOLAM 0.25 MG: 0.25 TABLET ORAL at 08:11

## 2018-11-20 RX ADMIN — HEPARIN SODIUM 5000 UNITS: 5000 INJECTION, SOLUTION INTRAVENOUS; SUBCUTANEOUS at 10:11

## 2018-11-20 RX ADMIN — SENNOSIDES AND DOCUSATE SODIUM 1 TABLET: 8.6; 5 TABLET ORAL at 09:11

## 2018-11-20 RX ADMIN — FUROSEMIDE 40 MG: 40 TABLET ORAL at 09:11

## 2018-11-20 RX ADMIN — HYDROXYCHLOROQUINE SULFATE 200 MG: 200 TABLET, FILM COATED ORAL at 12:11

## 2018-11-20 RX ADMIN — ONDANSETRON 4 MG: 4 TABLET, ORALLY DISINTEGRATING ORAL at 05:11

## 2018-11-20 RX ADMIN — LIDOCAINE 1 PATCH: 50 PATCH TOPICAL at 04:11

## 2018-11-20 RX ADMIN — CARVEDILOL 6.25 MG: 6.25 TABLET, FILM COATED ORAL at 09:11

## 2018-11-20 RX ADMIN — IPRATROPIUM BROMIDE AND ALBUTEROL SULFATE 3 ML: .5; 3 SOLUTION RESPIRATORY (INHALATION) at 08:11

## 2018-11-20 RX ADMIN — CARVEDILOL 6.25 MG: 6.25 TABLET, FILM COATED ORAL at 04:11

## 2018-11-20 RX ADMIN — AMLODIPINE BESYLATE 10 MG: 10 TABLET ORAL at 09:11

## 2018-11-20 RX ADMIN — CIPROFLOXACIN 500 MG: 500 TABLET, FILM COATED ORAL at 09:11

## 2018-11-20 RX ADMIN — SENNOSIDES AND DOCUSATE SODIUM 1 TABLET: 8.6; 5 TABLET ORAL at 08:11

## 2018-11-20 NOTE — DISCHARGE SUMMARY
[facility] Hospital Medicine  Discharge Summary      Patient Name: Karly Sandoval  MRN: [id number]  Admission Date: 11/13/2018  Hospital Length of Stay: 7 days  Discharge Date and Time:  11/20/2018 12:19 PM  Attending Physician: Amanda Peters MD   Discharging Provider: Amanda Aldana MD  Primary Care Provider: Uday Allen MD  [facility] Medicine Team: [facility] HOSP MED  Amanda Aldana MD    HPI:   Karly Sandoval is a 88 y.o. lady with RA (on plaquenil), HFpEF (noted diastolic dysfunction in echo on 2016), essential hypertension, hyperlipidemia, and new cystic pancreatic head mass who presented to [facility] from [facility] rehab for evaluation of worsening back pain.  She was recently seen and discharged on 10/23 to [facility] SNF for further rehabilitation for a T12 compression fracture.  There, she was noted to have continued back pain that was not responsive to increasing pain regimen.  She was also noted to have a positive UA with E. Coli and Proteus, where she completed a 7 day course of ciprofloxacin.  Throughout her stay, she would have continued back pain without significant improvement.  She also developed acute hypoxic respiratory failure, requiring minimal supplemental O2.  It was noted in rehab notes that patient was gaining weight despite poor PO intake.  Additionally towards the past week, she was noted to have worsening altered mental status after taking her AM medications, which would improve over time.  Patient was supposed to follow up with neurosurgery as an outpatient for follow up, but due to significant worsening in pain, patient was brought to [facility] for further evaluation.  Prior to presentation, she had a CXR and UA performed, where UA was again positive with 3+ leukocytes and > 100 WBCs.  CXR also revealed pulmonary vascular congestion as well as a new L lower lobe effusion, where she was started on levaquin, and was given 1 dose of IV lasix.  She denies fevers, chills, N/V, coughs, chest or  "abdominal pains.  She received repeat imaging revealing new severe spinal canal stenosis related to new T12 vertebral body compression fracture and 7 mm osseous retropulsion.  She was subsequently admitted to Neurosurgery for further evaluation, where she was deemed not a surgical candidate, and recommended further bracing with improvement in pain control.  Medicine was consulted regarding effusion, UTI, and CHARLENE.    Of note, patient was also previously seen in [facility] prior to presentation earlier on 10/8/2018.  There, she was evaluated for hyponatremia, where she was found to have a cystic pancreatic head lesion concerning for either cystic malignancy versus pseudocyst captured on both CT and abdominal ultrasound.  Patient denies any significant EtOH use or previous abdominal pains, but did have previous cholecystectomy.      Patient assessed at bedside with son present.  She noted she did have some improvement in breathing after receiving her dose of IV lasix.  Does note that her mind does feel "unclear" at the moment, but alert and appropriate.  Notes lying flat helps improve with her back pain.          * No surgery found *      Hospital Course:   No notes on file     Consults:   Consults (From admission, onward)        Status Ordering Provider     Inpatient consult to Hospital Medicine-General  Once     Provider:  (Not yet assigned)    Completed SHILOH BLANCAS     Inpatient consult to SNF  Once     Provider:  (Not yet assigned)    Acknowledged RASHIDA MORRISON     Inpatient consult to SNF [facility]  Once     Provider:  (Not yet assigned)    Acknowledged RASHIDA MORRISON          * T12 compression fracture    - per NS note:   87 y/o female with hx of kyphoplasty at T8 and T9, with a known acute L2 compression fracture, who presents with worsening back pain and a new T12 rib fracture and T12 compression fracture with retropulsion.  1. Patient neurologically stable on exam  2. Patient is not a surgical candidate.  " Kyphoplasty is not an option for the T12 fracture due to the component of retropulsion.  Due to patient's age, co morbidities, and bone quality, she is not a candidate for posterior instrumentation. Patient also stated that she is not interested in anything more invasive than a kyphoplasty.  3. continue PTOT and pain control   4. TLSO brace when OOB        UTI due to Klebsiella species    - complete 7 day course of cipro  - afebrile and no leukocytosis        Acute hypoxemic respiratory failure    - per HF management        Debility    - PTOT recommending SNF placement  - patient and family had plans for her to go to assisted living after SNF dc however given continued pain and patient reporting feeling tired, family is considering NH placement therefore may need NH SNF placement  - CM and SW to coordinate with son and daughter in law        Hyponatremia    - improves with diuresis  - possibly hypervolemic hypernatremia attributable to HF exacerbation      Pancreatic lesion    - on 11/15, AES note as follows:    Non-con CT showed:  2.1 cm fluid attenuation lesion within the pancreatic head.  No significant inflammatory changes surrounding the pancreas.   Differential considerations would include a pancreatic pseudocyst versus cystic neoplasm of the pancreas. MRCP/MRI of the abdomen without and with contrast may be obtained for further evaluation.     Recommend follow up in pancreatic cyst clinic after discharge.   We will arrange the follow up once patient is being discharged. Unable to arrange while inpatient.      Plan discussed with 2 sons and daughter in law regarding outpatient follow up        Acute kidney injury superimposed on CKD    - patient had a normal creatinine up to mid September 2018 then started with what looked like CHARLENE since then with a baseline ranging from 1.1 - 1.7  - she was diuresed for HF and had worsening of her CHARLENE; diuresis stopped at that time  - given cxr findings on 11/17, a one time  dose of furosemide was given with some improvement of her creatinine  - CHARLENE may be due to HF but will need close monitoring of her diuresis as she is at risk of dehydration as has been evident already; therefore furosemide regimen changed to PO   - continue to monitor       Anxiety    - lorazepam daily prn per home regimen        Pacemaker    - for RBBB/LB issues  - stable       Chronic diastolic CHF (congestive heart failure)    - initially diuresed but then diuresis held due to CHARLENE  - cxr on 11/17 showed worsened edema and given one dose of furosemide with improvement in sats and creatinine  - will continue with furosemide but with PO route and increase to 40 mg daily from her home dose of 20 mg on MWF  - monitor Is and Os  - last 2D echo with Grade II (moderate) left ventricular diastolic dysfunction  - continue with O2  - continue with medical management         Mixed hyperlipidemia    - atorvastatin     Osteopenia    - continue calcium supplementation        Essential hypertension    - continue home norvasc 10 mg PO daily, coreg 6.25 mg PO BID  - hold lisinopril for CHARLENE         RA (rheumatoid arthritis)    - no significant joint complaints at this time  - continue home plaquenil  - patient has been off MTX since last outpatient rheumatology visit   - follow up with rheum as scheduled          Final Active Diagnoses:    Diagnosis Date Noted POA    PRINCIPAL PROBLEM:  T12 compression fracture [S22.080A] 11/13/2018 Yes    UTI due to Klebsiella species [N39.0, B96.1] 11/18/2018 Yes    Acute hypoxemic respiratory failure [J96.01] 11/14/2018 Yes    Debility [R53.81] 10/23/2018 Yes    Hyponatremia [E87.1] 10/18/2018 Yes    Pancreatic lesion [K86.9] 10/09/2018 Yes    Acute kidney injury superimposed on CKD [N17.9, N18.9] 10/08/2018 Yes    Anxiety [F41.9] 02/01/2018 Yes    Pacemaker [Z95.0] 03/25/2015 Yes    Chronic diastolic CHF (congestive heart failure) [I50.32] 02/11/2014 Yes    Mixed hyperlipidemia  [E78.2]  Yes    RA (rheumatoid arthritis) [M06.9] 09/11/2012 Yes    Essential hypertension [I10] 09/11/2012 Yes    Osteopenia [M85.80] 09/11/2012 Yes      Problems Resolved During this Admission:    Diagnosis Date Noted Date Resolved POA    Goals of care, counseling/discussion [Z71.89] 11/14/2018 11/17/2018 Not Applicable       Discharged Condition: good    Disposition: Skilled Nursing Facility    Follow Up:  Follow-up Information     Fei Sloan PA-C In 6 weeks.    Specialty:  Neurosurgery  Why:  spine imaging prior to appointment, hospital follow up  Contact information:  [address]  [address]  [facility] [address] [phone]                 Patient Instructions:   No discharge procedures on file.    Significant Diagnostic Studies: Labs:   BMP:   Recent Labs   Lab 11/19/18  0330   GLU 97   *   K 4.0   CL 94*   CO2 31*   BUN 29*   CREATININE 1.7*   CALCIUM 9.0   MG 2.0  1.8    and CMP   Recent Labs   Lab 11/19/18  0330   *   K 4.0   CL 94*   CO2 31*   GLU 97   BUN 29*   CREATININE 1.7*   CALCIUM 9.0   ANIONGAP 7*   ESTGFRAFRICA 30.6*   EGFRNONAA 26.5*       Pending Diagnostic Studies:     None         Medications:  Transfer Medications (for Discharge Readmit only):   Current Facility-Administered Medications   Medication Dose Route Frequency Provider Last Rate Last Dose    acetaminophen tablet 650 mg  650 mg Oral Q6H PRN KELVIN Ashley   650 mg at 11/17/18 1024    albuterol nebulizer solution 2.5 mg  2.5 mg Nebulization Q4H PRN Bernice Brewer PA-C   2.5 mg at 11/14/18 1710    albuterol-ipratropium 2.5 mg-0.5 mg/3 mL nebulizer solution 3 mL  3 mL Nebulization Q12H KELVIN Ashley   3 mL at 11/20/18 0810    ALPRAZolam tablet 0.25 mg  0.25 mg Oral Daily PRN Bernice Brewer PA-C   0.25 mg at 11/18/18 2102    amLODIPine tablet 10 mg  10 mg Oral Daily Bernice Brewer PA-C   10 mg at 11/20/18 0941    atorvastatin tablet 20 mg  20 mg Oral Daily Bernice Brewer PA-C   20 mg at  11/20/18 0940    bisacodyl suppository 10 mg  10 mg Rectal Daily PRN Bernice Brewer PA-C        calcium carbonate 500 mg/5 mL (1,250 mg/5 mL) suspension 500 mg  500 mg Oral Daily Bernice Brewer PA-C   500 mg at 11/19/18 0942    carvedilol tablet 6.25 mg  6.25 mg Oral BID WM Bernice Brewer PA-C   6.25 mg at 11/20/18 0953    cellulose 80 % powder 15 mL  15 mL Oral Daily PRN Bernice Brewer PA-C        escitalopram oxalate tablet 10 mg  10 mg Oral Daily Bernice Brewer PA-C   10 mg at 11/19/18 0944    [START ON 11/21/2018] furosemide tablet 20 mg  20 mg Oral Daily Amanda Peters MD        heparin (porcine) injection 5,000 Units  5,000 Units Subcutaneous Q8H KELVIN Ashley   5,000 Units at 11/20/18 0531    hydroxychloroquine tablet 200 mg  200 mg Oral Daily Bernice Brewer PA-C   200 mg at 11/19/18 0945    lidocaine 5 % patch 1 patch  1 patch Transdermal Q24H Jair Faye MD   1 patch at 11/19/18 1822    ondansetron disintegrating tablet 4 mg  4 mg Oral Q8H PRN Carin King PA-C   4 mg at 11/20/18 0530    promethazine tablet 12.5 mg  12.5 mg Oral Q6H PRN Carin King PA-C        senna-docusate 8.6-50 mg per tablet 1 tablet  1 tablet Oral BID KELVIN Ashley   1 tablet at 11/20/18 0946    traMADol tablet 50 mg  50 mg Oral Q6H PRN KELVIN Ashley   50 mg at 11/14/18 2036       Indwelling Lines/Drains at time of discharge:   Lines/Drains/Airways     Drain            Female External Urinary Catheter 11/16/18 1442 3 days                Time spent on the discharge of patient: 30 minutes  Patient was seen and examined on the date of discharge and determined to be suitable for discharge.         Amanda Aldana MD  Department of Hospital Medicine  [facility]

## 2018-11-20 NOTE — ASSESSMENT & PLAN NOTE
- on 11/15, AES note as follows:    Non-con CT showed:  2.1 cm fluid attenuation lesion within the pancreatic head.  No significant inflammatory changes surrounding the pancreas.   Differential considerations would include a pancreatic pseudocyst versus cystic neoplasm of the pancreas. MRCP/MRI of the abdomen without and with contrast may be obtained for further evaluation.     Recommend follow up in pancreatic cyst clinic after discharge.   We will arrange the follow up once patient is being discharged. Unable to arrange while inpatient.      Plan discussed with 2 sons and daughter in law regarding outpatient follow up

## 2018-11-20 NOTE — PLAN OF CARE
Problem: Patient Care Overview  Goal: Plan of Care Review  Outcome: Ongoing (interventions implemented as appropriate)  Patient remains free from injury or fall. LSO when out of back. No neuro changes noted or reported from initial assessment, fall and aspiration precautions applied. Will continue to monitor

## 2018-11-20 NOTE — NURSING
Pt c/o nausea and abdominal discomfort. No medication noted on MAR. Called Hospital med G. New orders given.

## 2018-11-20 NOTE — NURSING
PT STABLE FOR D/C, VSS. IV REMOVED. PT TRANSFERRED TO SNF. REPORT CALLED. ALL BELONGING SENT HOME WITH FAMILY. ALL QUESTIONS ANSWERED.

## 2018-11-20 NOTE — PLAN OF CARE
Pt accepted to [facility] as per Katerina in admissions.  [facility] RN should call report to [phone].  SPD  van transport arranged through [facility] for 415.  Transport packet printed and sent to [address] [place] [address] nurses station via tube system.        Dahlia Carbajal LMSW

## 2018-11-20 NOTE — PROGRESS NOTES
Attended IDT meeting at [facility] to obtain patient update - Patient was D/C'd to [facility] ED per family request on 11/13/2018.

## 2018-11-20 NOTE — PLAN OF CARE
Problem: Fall Risk (Adult)  Goal: Identify Related Risk Factors and Signs and Symptoms  Related risk factors and signs and symptoms are identified upon initiation of Human Response Clinical Practice Guideline (CPG)  Outcome: Ongoing (interventions implemented as appropriate)  Fall risk and precautions discussed with pt. Acknowledged understanding. Fall precautions in place. No questions or concerns at present. Call light in reach. Will cont to monitor.     Problem: Patient Care Overview  Goal: Plan of Care Review  Outcome: Ongoing (interventions implemented as appropriate)  Plan of care discussed with pt. Questions and concerns addressed. No request for pain medication. Pt adjusted as needed. C/o nausea this shift with medication provided. Call light in place. Will cont to monitor.

## 2018-11-21 ENCOUNTER — PATIENT OUTREACH (OUTPATIENT)
Dept: ADMINISTRATIVE | Facility: CLINIC | Age: 83
End: 2018-11-21

## 2018-11-21 PROCEDURE — 63600175 PHARM REV CODE 636 W HCPCS: Performed by: HOSPITALIST

## 2018-11-21 PROCEDURE — 11000004 HC SNF PRIVATE

## 2018-11-21 PROCEDURE — 25000003 PHARM REV CODE 250: Performed by: INTERNAL MEDICINE

## 2018-11-21 PROCEDURE — 97162 PT EVAL MOD COMPLEX 30 MIN: CPT

## 2018-11-21 PROCEDURE — 97116 GAIT TRAINING THERAPY: CPT

## 2018-11-21 PROCEDURE — 97110 THERAPEUTIC EXERCISES: CPT

## 2018-11-21 PROCEDURE — 97535 SELF CARE MNGMENT TRAINING: CPT

## 2018-11-21 PROCEDURE — 97165 OT EVAL LOW COMPLEX 30 MIN: CPT

## 2018-11-21 PROCEDURE — 94799 UNLISTED PULMONARY SVC/PX: CPT

## 2018-11-21 PROCEDURE — 97802 MEDICAL NUTRITION INDIV IN: CPT

## 2018-11-21 PROCEDURE — 97530 THERAPEUTIC ACTIVITIES: CPT

## 2018-11-21 PROCEDURE — 25000003 PHARM REV CODE 250: Performed by: HOSPITALIST

## 2018-11-21 PROCEDURE — 99900035 HC TECH TIME PER 15 MIN (STAT)

## 2018-11-21 RX ORDER — FOLIC ACID 1 MG/1
1 TABLET ORAL DAILY
Status: DISCONTINUED | OUTPATIENT
Start: 2018-11-22 | End: 2018-12-04 | Stop reason: HOSPADM

## 2018-11-21 RX ADMIN — HEPARIN SODIUM 5000 UNITS: 5000 INJECTION, SOLUTION INTRAVENOUS; SUBCUTANEOUS at 05:11

## 2018-11-21 RX ADMIN — SENNOSIDES AND DOCUSATE SODIUM 1 TABLET: 8.6; 5 TABLET ORAL at 09:11

## 2018-11-21 RX ADMIN — HEPARIN SODIUM 5000 UNITS: 5000 INJECTION, SOLUTION INTRAVENOUS; SUBCUTANEOUS at 01:11

## 2018-11-21 RX ADMIN — CALCIUM CARBONATE 500 MG: 1250 SUSPENSION ORAL at 09:11

## 2018-11-21 RX ADMIN — ATORVASTATIN CALCIUM 20 MG: 20 TABLET, FILM COATED ORAL at 09:11

## 2018-11-21 RX ADMIN — HYDROXYCHLOROQUINE SULFATE 200 MG: 200 TABLET, FILM COATED ORAL at 09:11

## 2018-11-21 RX ADMIN — LIDOCAINE 1 PATCH: 50 PATCH TOPICAL at 04:11

## 2018-11-21 RX ADMIN — AMLODIPINE BESYLATE 10 MG: 10 TABLET ORAL at 09:11

## 2018-11-21 RX ADMIN — CARVEDILOL 6.25 MG: 6.25 TABLET, FILM COATED ORAL at 04:11

## 2018-11-21 RX ADMIN — FUROSEMIDE 20 MG: 20 TABLET ORAL at 09:11

## 2018-11-21 RX ADMIN — HEPARIN SODIUM 5000 UNITS: 5000 INJECTION, SOLUTION INTRAVENOUS; SUBCUTANEOUS at 09:11

## 2018-11-21 RX ADMIN — ESCITALOPRAM 10 MG: 10 TABLET, FILM COATED ORAL at 09:11

## 2018-11-21 RX ADMIN — CARVEDILOL 6.25 MG: 6.25 TABLET, FILM COATED ORAL at 09:11

## 2018-11-21 NOTE — PT/OT/SLP EVAL
PhysicalTherapy   Evaluation    Karly Sandoval   MRN: [id number]     PT Received On: 11/21/18  PT Start Time: 1006     PT Stop Time: 1110    PT Total Time (min): 64 min       Billable Minutes:  Evaluation 20, Gait Training 14, Therapeutic Activity 15, Therapeutic Exercise 15 and Total Time 44    Diagnosis: T12 compression fx and T12 rib fracture, both non surgical, severe spinal stenosis, and cystic pancreatic mass (awaiting biopsy)  Past Medical History:   Diagnosis Date    Abdominal aortic aneurysm (AAA) without rupture 10/8/2018    Abnormal CT of the abdomen 10/8/2018    Acid reflux     Anemia     Anxiety     Atrial arrhythmia 2/11/2014    Carotid artery occlusion     Chronic prescription benzodiazepine use 10/23/2018    Compression fracture of thoracic vertebra 6/25/2014    CVA (cerebral infarction) 2014    Cyst of pancreas 10/9/2018    Diastolic heart failure     echo 2016 ef 55% +Diastolic dysf    Diverticulosis     Encounter for blood transfusion     GERD (gastroesophageal reflux disease) 9/11/2012    History of cholelithiasis     Hyperlipidemia     Hypertension     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    Osteoarthritis     Osteoarthritis of both knees 6/12/2015    Osteopenia     PFO (patent foramen ovale) 2/11/2014    PVC (premature ventricular contraction) 4/28/2014    RVOT origin -- benign     RBBB 11/14/2014    Rheumatoid arthritis(714.0)     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)     has pacemaker    Right pelvic adnexal fluid collection 10/8/2018      Past Surgical History:   Procedure Laterality Date    APPENDECTOMY      BREAST SURGERY      CARDIAC PACEMAKER PLACEMENT  11/2014    for syncope and RBBB/LAHB    CHOLECYSTECTOMY      EYE SURGERY      HEMORRHOID SURGERY      HYSTERECTOMY      1966    SKIN BIOPSY      VASCULAR SURGERY      VERTEBROPLASTY           General Precautions: Standard, fall  Orthopedic  "Precautions: spinal precautions   Braces: TLSO(when OOB)    Do you have any cultural, spiritual, Rastafarian conflicts, given your current situation?: none    Patient History:  Lives With: alone  Living Arrangements: house  Home Accessibility: stairs to enter home  Home Layout: Able to live on 1st floor  Number of Stairs to Enter Home: 3  Stair Railings at Home: outside, present at both sides  Transportation Available: family or friend will provide  Living Environment Comment: PTA pt lived alone in a 1SH w/ 3 SMITHA and (B) rails. She had a tub/shower combo w/ a grab bar. Per pt and her son she will D/C to either an half-way w/ call light assistance available or a NH depending on LOF at that time. Pt's son is managing her care.    Equipment Currently Used at Home: cane, straight, bedside commode, grab bar, shower chair  DME owned (not currently used): rolling walker    Previous Level of Function: Prior to original admit pt was Sathish w/ mobility using SPC for the community only. She used a shower chair and grab bar for showering. Pt also used her bedside commode.   Ambulation Skills: needs device  Transfer Skills: independent  ADL Skills: needs device    Subjective:  Communicated with patient prior to session.  Pt agreeable to session.  Pt stating "The pain has gotten better."  Chief Complaint: back pain  Patient goals: D/C to [facility] or NH    Pain/Comfort  Pain Rating 1: 7/10  Location - Side 1: Left  Location - Orientation 1: lateral  Location 1: back  Pain Addressed 1: Reposition, Pre-medicate for activity, Other (see comments)(NP and MD messaged via EPIC messenger)  Pain Rating Post-Intervention 1: 7/10    Objective:  Patient found sitting in w/c. Patient found with: TLSO    Cognitive Exam:  Oriented to: Person, Place, Time and Situation  Follows Commands/attention: Follows multistep  commands  Communication: clear/fluent  Safety awareness/insight to disability: intact    Physical Exam:  Postural examination/scapula alignment:  "   -       Rounded shoulders  -       Forward head    Skin integrity: Visible skin intact  Edema: None noted     Sensation:      -       Intact    Upper Extremity Range of Motion:  Right Upper Extremity: WFL  Left Upper Extremity: WFL PROM, shoulder flexion AROM ~90degrees due to pain    Upper Extremity Strength:  Right Upper Extremity: WFL  Left Upper Extremity: WFL except shoulder flexion 2+/5 and EF 3+/5 (both limited by pain)    Lower Extremity Range of Motion:  Right Lower Extremity: WFL  Left Lower Extremity: WFL    Lower Extremity Strength:  Right Lower Extremity: WFL except HF 2+/5 and KE 3+/5  Left Lower Extremity: WFL except HF 2+/5     Fine motor coordination:     -       Intact    Gross motor coordination: WFL      AM-PAC 6 CLICK MOBILITY  Total Score:12    Bed Mobility:  Sit>Supine:on mat w/ MaxA for BLE and trunk repositioning  Supine>Sit: on mat w/ SBA, inc'd time and cueing required  Cueing for logroll technique and to follow spinal precautions    Transfers:  Sit<>Stand: to/from w/c (2 trials) in // bars w/ ModA to rise  Stand Pivot Transfer: w/c<>EOM and w/c>bedside chair, all w/ RW and Max/ModA to rise, Holly for stability w/ pivoting  Cueing for forward scoot/lean and hand/foot placement  Inc'd difficulty w/ forward weight shifting due to back pain    Gait:  Amb 2 trials (length of // bars each) w/ Holly for stability  Cueing for upright posture and to inc step length/height  Limited in distance by pain and fatigue     Therex:  Supine therex 2x10 reps (GS, SAQ, AP, HF, ABd/ADd) w/ AAROM as needed t/o    Balance:  Static sit EOM w/ SPV  Static stand w/ RW and Holly for stability    Additional Treatment:  Pt and son educated on PT role and POC. Both verb understanding.     Patient left up in chair with call button in reach and son present.    Assessment:  Karly Sandoval is a 88 y.o. female with a medical diagnosis of T12 compression fx and T12 rib fracture, both non surgical, severe spinal stenosis,  and cystic pancreatic mass (awaiting biopsy).  Pt presents w/ previous pertinent co-morbidities and personal factors including advanced age, anxiety, CHF, RA, and incontinence. Pt currently presents w/ the below mentioned deficits requiring Max/ModA for transfers and gait in // bars. Pt was limited t/o session by back pain. Pt's clinical presentation is evolving due to healing compression fractures. These factors classify pt as a MODERATE complexity evaluation. Pt is appropriate for skilled PT and will begin PT POC.    Rehab identified problem list/impairments: weakness, impaired endurance, impaired self care skills, impaired functional mobilty, gait instability, impaired balance, decreased upper extremity function, decreased lower extremity function, pain    Rehab potential is fair.    Activity tolerance: Fair    Discharge recommendations: (snf or NH depending on pt progress)     Barriers to discharge: None(will D/C to snf or NH)    Equipment recommendations: wheelchair     GOALS:   Multidisciplinary Problems     Physical Therapy Goals        Problem: Physical Therapy Goal    Goal Priority Disciplines Outcome Goal Variances Interventions   Physical Therapy Goal     PT, PT/OT Ongoing (interventions implemented as appropriate)     Description:  Goals to be met by: 14 days     Patient will increase functional independence with mobility by performin. Supine to sit with Set-up [place]  2. Sit to supine with Contact Guard Assistance  3. Sit to stand transfer with Contact Guard Assistance  4. Bed to chair transfer with Contact Guard Assistance using Rolling Walker  5. Gait  x 100 feet with Contact Guard Assistance using Rolling Walker.   6. Stand for 2 minutes with Contact Guard Assistance using Rolling Walker                      PLAN:    Patient to be seen (5-6X/week)  to address the above listed problems via gait training, therapeutic activities, therapeutic exercises  Plan of Care Expires:  12/21/18    Elida Ayala, PT 11/21/2018

## 2018-11-21 NOTE — PT/OT/SLP EVAL
Occupational Therapy  Evaluation/Treatment    Karly Sandoval   MRN: [id number]   Admitting Diagnosis:  Debility following hyponatremia, pt found to have chronic T8/9 compression fractures; T12 compression fracture       OT Date of Treatment: 11/21/18   OT Start Time: 0830  OT Stop Time: 0930  OT Total Time (min): 60 min    Billable Minutes:  Evaluation 15  Self Care/Home Management 45    Diagnosis: Debility following hyponatremia, pt found to have chronic T8/9 compression fractures; T12 compression fracture         Past Medical History:   Diagnosis Date    Abdominal aortic aneurysm (AAA) without rupture 10/8/2018    Abnormal CT of the abdomen 10/8/2018    Acid reflux     Anemia     Anxiety     Atrial arrhythmia 2/11/2014    Carotid artery occlusion     Chronic prescription benzodiazepine use 10/23/2018    Compression fracture of thoracic vertebra 6/25/2014    CVA (cerebral infarction) 2014    Cyst of pancreas 10/9/2018    Diastolic heart failure     echo 2016 ef 55% +Diastolic dysf    Diverticulosis     Encounter for blood transfusion     GERD (gastroesophageal reflux disease) 9/11/2012    History of cholelithiasis     Hyperlipidemia     Hypertension     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    Osteoarthritis     Osteoarthritis of both knees 6/12/2015    Osteopenia     PFO (patent foramen ovale) 2/11/2014    PVC (premature ventricular contraction) 4/28/2014    RVOT origin -- benign     RBBB 11/14/2014    Rheumatoid arthritis(714.0)     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)     has pacemaker    Right pelvic adnexal fluid collection 10/8/2018      Past Surgical History:   Procedure Laterality Date    APPENDECTOMY      BREAST SURGERY      CARDIAC PACEMAKER PLACEMENT  11/2014    for syncope and RBBB/LAHB    CHOLECYSTECTOMY      EYE SURGERY      HEMORRHOID SURGERY      HYSTERECTOMY      1966    SKIN BIOPSY      VASCULAR SURGERY       VERTEBROPLASTY           General Precautions: Standard, fall  Orthopedic Precautions: spinal precautions  Braces: TLSO          Patient History:  Lives With: alone  Living Arrangements: house  Home Accessibility: stairs to enter home  Home Layout: Able to live on 1st floor  Number of Stairs to Enter Home: 1  Stair Railings at Home: none  Transportation Available: family or friend will provide  Living Environment Comment: Patient lived alone in a 1 SH with 1 SMITHA and a tub/shower combo with grab bar. Patient's family plans for patient to discharge to a facility after discharge for more assistance. Patient was (I) PTA and was mod (I) with functional mobility. Patient has a son who lives here and one that lives out of town.   Equipment Currently Used at Home: walker, rolling, cane, straight, bath bench    Prior level of function:   Bed Mobility/Transfers: needs device  Grooming: independent  Bathing: needs device  Upper Body Dressing: independent  Lower Body Dressing: independent  Toileting: independent  Home Management Skills: independent        Dominant hand: right    Subjective:  Communicated with patient prior to session.    Chief Complaint: pain  Patient/Family stated goals: return to PLOF    Pain/Comfort  Pain Rating 1: (patient did not rate)  Location - Side 1: Bilateral  Location - Orientation 1: generalized  Location 1: back((especially left side))  Pain Addressed 1: Reposition, Distraction  Pain Rating Post-Intervention 1: (Patient did not rate)    Objective:        Cognitive Exam:  Oriented to: Person, Place, Time and Situation  Follows Commands/attention: Follows multistep  commands  Communication: clear/fluent  Memory:  Poor immediate recall  Safety awareness/insight to disability: intact  Coping skills/emotional control: Appropriate to situation    Visual/perceptual:  Intact    Physical Exam:  Postural examination/scapula alignment:    -       Rounded shoulders  -       Forward head  -        Kyphosis  Skin integrity: Visible skin intact  Edema: None noted     Sensation:      -       Intact    Upper Extremity Range of Motion:  Right Upper Extremity: WFL  Left Upper Extremity: WFL    Upper Extremity Strength:  Right Upper Extremity: 3+/5  Left Upper Extremity: 3+/5   Strength: WFL    Fine motor coordination:      -       Intact    Gross motor coordination: WFL    Occupational Performance:    Bed Mobility:    · Patient completed Rolling/Turning to Left with  contact guard assistance  · Patient completed Rolling/Turning to Right with contact guard assistance  · Patient completed Scooting/Bridging with minimum assistance  · Patient completed Supine to Sit with minimum assistance  · Patient completed Sit to Supine with moderate assistance     Functional Mobility/Transfers:  · Patient completed Sit <> Stand Transfer with maximal assistance  with  hand-held assist   · Patient completed Bed > w/c Transfer using Stand Pivot technique with maximal assistance with hand-held assist    Activities of Daily Living:  · Feeding:  setup    · Grooming: stand by assistance oral care, washing face, and combing hair seated in w/c at sink  · Bathing: moderate assistance spongebath from edge of bed    · Upper Body Dressing: minimum assistance for doffing front gown. Donning pullover shirt and button up shirt (Total assistance needed for donning TLSO)  · Lower Body Dressing: maximal assistance Donning pants and socks.   · Toileting: total assistance     [facility] 6 Click:  AMPAC Total Score: 17    Additional Treatment:  Safety, ADL retraining, functional mobility training, discharge planning    Patient left up in chair with call button in reach and all needs met.     Assessment:  Karly Sandoval is a 88 y.o. female with a medical diagnosis of  Debility following hyponatremia, pt found to have chronic T8/9 compression fractures; T12 compression fracture and presents with the deficits listed below. Patient will benefit from  skilled OT services to achieve maximal independence, reduce burden of care, and ensure safety prior to discharge. Patient evaluation falls under low complexity for evaluation coding due to performance deficits noted in 1-3 areas as stated above and 0 co-morbities affecting current functional status. Data obtained from problem focused assessments. No modifications or assistance was required for completion of evaluation. Only brief occupational profile and history review completed.      Rehab identified problem list/impairments: weakness, impaired endurance, impaired self care skills, impaired functional mobilty, gait instability, impaired balance, decreased safety awareness, pain    Rehab potential is good    Activity tolerance: Good    Discharge recommendations: home with home health     Barriers to discharge: Decreased caregiver support     Equipment recommendations: wheelchair     GOALS:   Multidisciplinary Problems     Occupational Therapy Goals        Problem: Occupational Therapy Goal    Goal Priority Disciplines Outcome Interventions   Occupational Therapy Goal     OT, PT/OT Ongoing (interventions implemented as appropriate)    Description:  Goals to be met by: 2 weeks     Patient will increase functional independence with ADLs by performing:    UE Dressing with Set-up Assistance.  LE Dressing with Stand-by Assistance.  Grooming while standing at sink with Supervision.  Toileting from bedside commode with Stand-by Assistance for hygiene and clothing management.   Bathing from  shower chair/bench with Stand-by Assistance.  Supine to sit with Supervision.  Stand pivot transfers with Stand-by Assistance.  Toilet transfer to bedside commode with Stand-by Assistance.  Upper extremity exercise program 3 x 15 reps per handout, with independence.  Patient will complete a functional standing activity with S for activity tolerance in order to perform household tasks.                     PLAN: Patient to be seen 5 x/week  to address the above listed problems via self-care/home management, therapeutic activities, therapeutic exercises  Plan of Care expires: 12/21/18  Plan of Care reviewed with: patient    DACIA Regalado  11/21/2018

## 2018-11-21 NOTE — CONSULTS
"  [facility] - Skilled Nursing Care  Adult Nutrition  Consult Note    SUMMARY     Recommendations  1. Continue current Regular diet.   2. Add Boost Plus Vanilla to all meals.   3. Encourage asking for meal preferences daily and good po intake.   Goals: Increased po intake >/= 50% of meals and ONS by next RD visit.  Nutrition Goal Status: new  Communication of RD Recs: (Plan of Care)    Reason for Assessment    Reason for Assessment: consult  Diagnosis: (back pain)  Relevant Medical History: HTN, HLD, GERD, anemia, diverticulosis  General Information Comments: Pt admitted due to consistant back pain. Reports decrease appetite and intake in the past year. Son reports she is a slow eater and was consuming one Boost daily before admit. NFPE completed on 18; Muscle wasting and fat loss observed.  Nutrition Discharge Planning: Adequate po intake of meals /oral supplement.    Nutrition Risk Screen    Nutrition Risk Screen: no indicators present    Nutrition/Diet History    Patient Reported Diet/Restrictions/Preferences: general  Do you have any cultural, spiritual, Nondenominational conflicts, given your current situation?: none(none)  Food Allergies: NKFA  Factors Affecting Nutritional Intake: decreased appetite    Anthropometrics    Temp: 98.1 °F (36.7 °C)  Height Method: Stated  Height: 5' 2" (157.5 cm)  Height (inches): 62 in  Weight Method: Standard Scale  Weight: 53.1 kg (117 lb 1 oz)  Weight (lb): 117.07 lb  Ideal Body Weight (IBW), Female: 110 lb  % Ideal Body Weight, Female (lb): 106.43 lb  BMI (Calculated): 21.5  BMI Grade: 18.5-24.9 - normal  Usual Body Weight (UBW), k kg  % Usual Body Weight: 90.19       Lab/Procedures/Meds    Pertinent Labs Reviewed: reviewed  Pertinent Labs Comments: Na 132H, BUN 29H, Crea 1.7H, GFR 26.5L, A1C 5.9H  Pertinent Medications Reviewed: reviewed  Pertinent Medications Comments: statin, lasix, heparin, senna    Physical Findings/Assessment    Overall Physical Appearance: advanced " age, loss of subcutaneous fat, loss of muscle mass    Estimated/Assessed Needs    Weight Used For Calorie Calculations: 53.1 kg (117 lb 1 oz)  Energy Calorie Requirements (kcal): 6348-3788 kcal/d  Energy Need Method: Kcal/kg(25-30)  Protein Requirements: 53-64 gm/d(1.0-1.2 gm/kg)  Weight Used For Protein Calculations: 53.1 kg (117 lb 1 oz)  Fluid Requirements (mL): 1 mL/kcal or per MD  Fluid Need Method: RDA Method  RDA Method (mL): 1327       Nutrition Prescription Ordered    Current Diet Order: Regular Level 7    Evaluation of Received Nutrient/Fluid Intake       % Intake of Estimated Energy Needs: 25 - 50 %  % Meal Intake: 25 - 50 %    Assessment and Plan    Moderate Malnutrition in the context of Acute Illness/Injury     Related to (etiology):  Decreased appetite and fatigue      Signs and Symptoms (as evidenced by):  Energy Intake: <75% of estimated energy requirement for > 1 week  Body Fat Depletion: moderate depletion of triceps   Muscle Mass Depletion: moderate depletion of clavicle region, scapular region and interosseous muscle   Weight Loss: 5% x 1 month   Fluid Accumulation: mild    Interventions (treatment strategy):     Collaboration with other providers    Nutrition Diagnosis Status:   Continues    Monitor and Evaluation    Food and Nutrient Intake: energy intake  Physical Activity and Function: other (specify)  Anthropometric Measurements: weight, weight change  Biochemical Data, Medical Tests and Procedures: lipid profile, electrolyte and renal panel, gastrointestinal profile, glucose/endocrine profile, inflammatory profile  Nutrition-Focused Physical Findings: overall appearance     Nutrition Follow-Up    RD Follow-up?: Yes

## 2018-11-21 NOTE — PLAN OF CARE
Problem: Fall Risk (Adult)  Goal: Identify Related Risk Factors and Signs and Symptoms  Related risk factors and signs and symptoms are identified upon initiation of Human Response Clinical Practice Guideline (CPG)  Outcome: Ongoing (interventions implemented as appropriate)   11/21/18 0402   Fall Risk   Related Risk Factors (Fall Risk) fatigue/slow reaction;gait/mobility problems

## 2018-11-21 NOTE — PLAN OF CARE
Problem: Patient Care Overview  Goal: Plan of Care Review  Outcome: Revised  Repositions independently with encouragement, no new skin breakdowns noted. Afebrile. Monitored for pain and safety.  Safety maintained. Camera in place to prevent forgetful pt OOB without assist, denies pain

## 2018-11-21 NOTE — PLAN OF CARE
Problem: Occupational Therapy Goal  Goal: Occupational Therapy Goal  Goals to be met by: 2 weeks     Patient will increase functional independence with ADLs by performing:    UE Dressing with Set-up Assistance.  LE Dressing with Stand-by Assistance.  Grooming while standing at sink with Supervision.  Toileting from bedside commode with Stand-by Assistance for hygiene and clothing management.   Bathing from  shower chair/bench with Stand-by Assistance.  Supine to sit with Supervision.  Stand pivot transfers with Stand-by Assistance.  Toilet transfer to bedside commode with Stand-by Assistance.  Upper extremity exercise program 3 x 15 reps per handout, with independence.  Patient will complete a functional standing activity with S for activity tolerance in order to perform household tasks.   Outcome: Ongoing (interventions implemented as appropriate)  Patient's goals are set.   DACIA Regalado  11/21/2018

## 2018-11-21 NOTE — PLAN OF CARE
Problem: Patient Care Overview  Goal: Plan of Care Review  Recommendations  1. Continue current Regular diet.   2. Add Boost Plus Vanilla to all meals.   3. Encourage asking for meal preferences daily and good po intake.   Goals: Increased po intake >/= 50% of meals and ONS by next RD visit.      Moderate Malnutrition in the context of Acute Illness/Injury  Related to (etiology):  Decreased appetite and fatigue   Signs and Symptoms (as evidenced by):  Energy Intake: <75% of estimated energy requirement for > 1 week  Body Fat Depletion: moderate depletion of triceps   Muscle Mass Depletion: moderate depletion of clavicle region, scapular region and interosseous muscle   Weight Loss: 5% x 1 month   Fluid Accumulation: mild  Interventions (treatment strategy):   Collaboration with other providers  Nutrition Diagnosis Status:   Continues

## 2018-11-21 NOTE — PT/OT/SLP DISCHARGE
Physical Therapy Discharge Summary    Name: Karly Sandoval  MRN: [id number]   Principal Problem: T12 compression fracture     Patient Discharged from acute Physical Therapy on 18.  Please refer to prior PT noted date on 18 for functional status.     Assessment:     Patient has not met goals.    Objective:     GOALS:   Multidisciplinary Problems     Physical Therapy Goals        Problem: Physical Therapy Goal    Goal Priority Disciplines Outcome Goal Variances Interventions   Physical Therapy Goal     PT, PT/OT Ongoing (interventions implemented as appropriate)     Description:  Goals to be met by: 2018     Patient will increase functional independence with mobility by performin. Supine to sit with stand-by Assistance  2. Sit to supine with Stand-by Assistance  3. Sit to stand transfer with Minimal Assistance using appropriate AD.   4. Bed to chair transfer with Minimal Assistance using appropriate AD.   5. Gait  x 50 feet with Minimal Assistance using Rolling Walker.   6. Stand for 2 minutes with Contact Guard Assistance using Rolling Walker  7. Lower extremity exercise program x15 reps per handout, with supervision                      Reasons for Discontinuation of Therapy Services  Transfer to alternate level of care.      Plan:     Patient Discharged to: Skilled Nursing Facility.    Debo Arriaza, PT  2018

## 2018-11-21 NOTE — PLAN OF CARE
SW met with patient with son, Rene at the bedside.  SW discuss role of  and discharge plan for patient.  Per patient's son, there are two plans in place at this time.  Patient due to have biopsy to determine if she has Pancreatic Ca.  If biopsy is positive, patient will go to a NH likely [facility] in [place].  If biopsy is negative, patient will go to assisted living.  Prior to 6 weeks ago, patient was independent.  Patient has a walker and cane and may need a wheelchair at discharge.  SW updated patient's communication board and advised patient that Anticipated Discharge Date would be provided when obtained by .   Claudia Duarte LMSW, SABINA[facility]CHR[facility], [facility]  11/21/2018

## 2018-11-21 NOTE — PLAN OF CARE
Problem: Physical Therapy Goal  Goal: Physical Therapy Goal  Goals to be met by: 14 days     Patient will increase functional independence with mobility by performin. Supine to sit with Set-up [place]  2. Sit to supine with Contact Guard Assistance  3. Sit to stand transfer with Contact Guard Assistance  4. Bed to chair transfer with Contact Guard Assistance using Rolling Walker  5. Gait  x 100 feet with Contact Guard Assistance using Rolling Walker.   6. Stand for 2 minutes with Contact Guard Assistance using Rolling Walker    Outcome: Ongoing (interventions implemented as appropriate)  PT eval completed. Pt will begin PT POC.    Elida Ayala, PT  2018

## 2018-11-21 NOTE — PLAN OF CARE
Discharge Planning Assessment         Payor: MEDICARE / Plan: MEDICARE PART A & B / Product Type: Government /       Expected length of stay:  [] 7 days   [] 10 days  [] 14 days   [] 21 days   [] > 30 days    Communicated expected length of stay with patient/caregiver:  [] Yes   [x] No (TBD)  Anticipated discharge date:  TBD    Assessment information obtained from:  [x] Patient   [x] Caregiver     Patient has:  [] POA   [] Conservator    Arrived from:   [x] Home   [] Assisted Living    [] Nursing Home   [] SNF   [] Rehab  [] LTACH   [] Group Home   [] Foster Care   [] Psych   [] Shelter   [] Homeless   [] Transfer  [] Correctional Facility  [] Name of Facility:      Patient currently lives with:    [] Alone   [] Spouse   [] Daughter   [x] Son   [] Grandparents   []  Parents   [] Siblings   [] Friends   [] Domestic Partner   [] Facility Resident      [] Foster Home    [] Other:       Extended Emergency Contact Information  Primary Emergency Contact: Rene Sandoval  Address: [address] [place] [address] [place]  Home Phone: [phone]  Work Phone: [phone]  Mobile Phone: [phone]  Relation: Son  Secondary Emergency Contact: Abigail Sandoval  Address: [address] [place] [address] [place]  Home Phone: [phone]  Mobile Phone: [phone]  Relation: Relative     Prior to hospitalization cognitive status:   [x] Alert/Oriented  [] No Deficits [] Risk of Harm to Self/Others   [] Not Oriented to Person   [] Not Oriented to Place   [] Not Oriented to Time   [] Coma/Sedated/Intubated  [] Judgement Impaired    []  Unable to Assess   [] Inappropriate Behavior  [] Infant/Toddler    Prior to hospitalization functional status:   [x] Independent   [x] Assistive Equipment   [] Assistive Person    [] Completely Dependent  [] Infant/Toddler/Child Appropriate   [] Infant/Toddler/Child Delayed    []  Adolescent     Current cognitive  status:   [x] Alert/Oriented  [] No Deficits [] Risk of Harm to Self/Others   [] Not Oriented to Person   [] Not Oriented to Place   [] Not Oriented to Time   [] Coma/Sedated/Intubated  [] Judgement Impaired    []  Unable to Assess   [] Inappropriate Behavior  [] Infant/Toddler    Current functional status:     [] Independent   [x] Assistive Equipment   [] Assistive Person     [] Completely Dependent   [] Infant/Toddler/Child Appropriate   [] Infant/Toddler/Child Delayed     [] Adolescent     Capacity to Care for Self:   Is patient able to return to prior living arrangements after discharge: [] Yes  [] No(TBD)   Is patient able to care for self after discharge?   [] Yes   [] No     [] Pediatric     Does the patient have family/friends to assist after discharge?:  [x] Yes   [] No    [] N/A   Comments:        Patient/caregiver perception of discharge disposition:   [] Home   [] Home with Family  [] Home Health   [] SNF   [] Rehab   [] LTAC    [x]  New Nursing Home Placement  [] Return to Nursing Home    [] Shelter     [x] Assisted Living  [] Foster Home   [] Other:  Asst Living or NH depending on results of patient tests.  Readmit:   Has patient jersey in the hospital in the last 30 days? [x] Yes   [] No     If YES, was patient admitted for the same reason?  [] Yes   [x] No       Home Health:   Patient currently receives home health services?:   [] Yes   [x] No     Patient previously received home health services and would like to resume services if necessary   [] Yes   [] No      If YES, name of home health provider:    DME:   Patient currently uses DME:   [x] Yes   [] No        If YES, name of DME provider:   If YES, phone number of DME provider:    If you require DME at discharge do you have a preference:       List of equipment currently used:     [] Wheelchair   [] Standard Walker  [x] Rolling Walker  []  Rollator    [] Oxygen    [] Portable oxygen   [] Nebulizer    [] Apnea Monitor    [] Crutches  [] Hospital Bed    []  Lift Device   [] Scooter [x] Cane     [] Prosthesis   []  BSC   [] Tub Bench   [] Catheter Supplies    [] Ostomy Supplies   [] Trach Supplies     [] Suction Machine        [] Home Vent    [] Bipap   [] Other:         Medications:    Can the patient afford all prescribed medications?  [x] Yes   [] No     If NO, what medication:       Is the patient taking medications as prescribed?    [x] Yes   [] No    Financial Concerns:   Does the patient have any financial concerns?   [] Yes   [x] No      If YES, what are the concerns:      Transportation:   Does the patient have transportation to healthcare appointments?   [x] Yes   [] No     If YES, what means of transportation does the patient have?   [] Car   [x] Family/Friend  [] Bicycle   [] Motorcycle   [] Public Transportation [] Ambulance[] Wheelchair van   [] Name of Provider    Dialysis:   Does the patient currently receive dialysis?   [] Yes   [x] No      If YES, what is the name of the provider:        Uday Allen MD   [address] [facility] [place] [address]  [phone]         APS/CPS involved in the case:  [] Yes   [x] No   If YES, name of :     If YES, phone number of :        Discharge Plan A:  [] Home   [] Home with Family  [] Home Health   [] SNF   [] Rehab   [] LTAC   []  New Nursing Home Placement  [] Return to Nursing Home    [x] Assisted Living    [] Shelter     [] Private Duty Nursing   [] Foster Home    [] Psych    [] Early Steps  [] WIC       [] Home Hospice   [] Inpatient Hospice   [] Other    Discharge Plan B:  [] Home   [] Home with Family  [] Home Health   [] SNF   [] Rehab   [] LTAC  [x]  New Nursing Home Placement   [] Return to  Nursing Home    [] Assisted Living   [] Shelter  [] Private Duty Nursing   [] Foster Home     [] Psych     [] Early Steps  [] WIC    [] Home Hospice     [] Inpatient Hospice   [] Other     [x] Patient and family in agreement with discharge plan.

## 2018-11-22 PROCEDURE — 25000003 PHARM REV CODE 250: Performed by: NURSE PRACTITIONER

## 2018-11-22 PROCEDURE — 63600175 PHARM REV CODE 636 W HCPCS: Performed by: HOSPITALIST

## 2018-11-22 PROCEDURE — 11000004 HC SNF PRIVATE

## 2018-11-22 PROCEDURE — 25000003 PHARM REV CODE 250: Performed by: HOSPITALIST

## 2018-11-22 RX ADMIN — CARVEDILOL 6.25 MG: 6.25 TABLET, FILM COATED ORAL at 06:11

## 2018-11-22 RX ADMIN — ALPRAZOLAM 0.25 MG: 0.25 TABLET ORAL at 11:11

## 2018-11-22 RX ADMIN — HYDROXYCHLOROQUINE SULFATE 200 MG: 200 TABLET, FILM COATED ORAL at 09:11

## 2018-11-22 RX ADMIN — LIDOCAINE 1 PATCH: 50 PATCH TOPICAL at 03:11

## 2018-11-22 RX ADMIN — CARVEDILOL 6.25 MG: 6.25 TABLET, FILM COATED ORAL at 09:11

## 2018-11-22 RX ADMIN — ESCITALOPRAM 10 MG: 10 TABLET, FILM COATED ORAL at 09:11

## 2018-11-22 RX ADMIN — AMLODIPINE BESYLATE 10 MG: 10 TABLET ORAL at 09:11

## 2018-11-22 RX ADMIN — HEPARIN SODIUM 5000 UNITS: 5000 INJECTION, SOLUTION INTRAVENOUS; SUBCUTANEOUS at 01:11

## 2018-11-22 RX ADMIN — HEPARIN SODIUM 5000 UNITS: 5000 INJECTION, SOLUTION INTRAVENOUS; SUBCUTANEOUS at 05:11

## 2018-11-22 RX ADMIN — CALCIUM CARBONATE 500 MG: 1250 SUSPENSION ORAL at 09:11

## 2018-11-22 RX ADMIN — FUROSEMIDE 20 MG: 20 TABLET ORAL at 09:11

## 2018-11-22 RX ADMIN — ATORVASTATIN CALCIUM 20 MG: 20 TABLET, FILM COATED ORAL at 09:11

## 2018-11-22 RX ADMIN — HEPARIN SODIUM 5000 UNITS: 5000 INJECTION, SOLUTION INTRAVENOUS; SUBCUTANEOUS at 09:11

## 2018-11-22 RX ADMIN — FOLIC ACID 1 MG: 1 TABLET ORAL at 09:11

## 2018-11-22 NOTE — PLAN OF CARE
Problem: Patient Care Overview  Goal: Plan of Care Review  Outcome: Ongoing (interventions implemented as appropriate)  Plan of care reviewed with patient. Intervention documented on MAR and flow sheet. No fall or injury noted this shift. Telesitter/camera at bedside. Safety measures maintained.

## 2018-11-22 NOTE — PT/OT/SLP PROGRESS
"Physical Therapy Treatment    Patient Name:  Karly Sandoval   MRN:  [id number]  Admitting Diagnosis: T12 compression fracture  Recent Surgery: * No surgery found *      Recommendations:     Discharge Recommendations:  nursing facility, skilled   Discharge Equipment Recommendations: wheelchair   Barriers to discharge: Decreased caregiver support  Pt requiring increased assistance at current time.     Plan:     During this hospitalization, patient to be seen 4 x/week to address the above listed problems via gait training, therapeutic activities, therapeutic exercises, neuromuscular re-education  · Plan of Care Expires:  12/14/18   Plan of Care Reviewed with: patient    This Plan of care has been discussed with the patient who was involved in its development and understands and is in agreement with the identified goals and treatment plan    Subjective     Communicated with RN (Isaura) prior to session.     Patient comments: "I don't really want to get up"  Pain/Comfort:  ·      Objective:     Patient found with: TLSO    Patient found sup in bed upon PT entry to room, agreeable to treatment.  Family present in the room.    General Precautions: Standard, Cardiac fall   Orthopedic Precautions:spinal precautions   Braces:         BED MOBILITY (vc's for hand placement sequencing of task):        Rolling to the R with mod A with no use of bedrail       Rolling to the L with mod A with no use of bedrail       Sup > sit at the EOB with mod A from L side lying        Sit > sup with mod A       Scooting hips to the EOB upon sitting with min A x2 scoots       Scooting hips along the EOB to the L requiring mod A x2-3 scoots          SITTING AT THE EDGE OF THE BED    Assistance Level Required: SBA for safety with B UE support     Pt requires assistance for donning of TLSO at the EOB       TRANSFERS  (vc's for hand placement, sequencing of task and safety)   Patient completed Sit <> Stand Transfer from EOB with mod A for hip " elevation with no assistive device/RW x2 trials   Patient completed Stand <> Sit Transfer to EOB with mod A for controlled descent with no assistive device/RW      GAIT: alongside the EOB   Patient ambulated: 3-4 steps to the L   Patient required: mod A   Patient used:  Rolling Walker   Gait Pattern observed: step to   Gait Deviation(s): decreased cosme, increased time in double stance, decreased velocity of limb motion, decreased step length, decreased swing-to-stance ratio, decreased toe-to-floor clearance and decreased weight-shifting ability    Comments: vc's and tc's for postural control, placement of AD, directional guidance, weight shift and safety    Patient left supine, withall lines intact, call button in reach, bed alarm on, RN notified and family present    AM-PAC 6 CLICK MOBILITY        Assessment:     Karly Sandoval is a 88 y.o. female admitted with a medical diagnosis of T12 compression fracture.  She presents with the following impairments/functional limitations:  weakness, impaired endurance, impaired sensation, impaired self care skills, impaired functional mobilty, gait instability, impaired balance, impaired cognition, decreased coordination, decreased upper extremity function, decreased lower extremity function, decreased safety awareness, pain, edema. requiring significant assistance and verbal cues for bed mob, transfers, standing and gait to prevent falls due to pain and weakness.   In light of pt's current functional level and deficits, it is anticipated that pt will need to participate in an intense rehab program consisting of PT and OT in order to achieve full rehab potential to return to previous level of function and roles.  Pt will cont to benefit from skilled PT intervention to address deficits and improve functional mobility.    Rehab Prognosis:  Good; patient would benefit from acute skilled PT services to address these deficits and reach maximum level of function.      GOALS:    Multidisciplinary Problems     Physical Therapy Goals        Problem: Physical Therapy Goal    Goal Priority Disciplines Outcome Goal Variances Interventions   Physical Therapy Goal     PT, PT/OT Ongoing (interventions implemented as appropriate)     Description:  Goals to be met by: 2018     Patient will increase functional independence with mobility by performin. Supine to sit with stand-by Assistance  2. Sit to supine with Stand-by Assistance  3. Sit to stand transfer with Minimal Assistance using appropriate AD.   4. Bed to chair transfer with Minimal Assistance using appropriate AD.   5. Gait  x 50 feet with Minimal Assistance using Rolling Walker.   6. Stand for 2 minutes with Contact Guard Assistance using Rolling Walker  7. Lower extremity exercise program x15 reps per handout, with supervision                      Time Tracking:     PT Received On: 18  PT Start Time: 1148     PT Stop Time: 1215  PT Total Time (min): 27 min     Billable Minutes: Therapeutic Activity 27    Treatment Type: Treatment  PT/PTA: PTA     PTA Visit Number: 1       Ana Lilia Landeros PTA.  Pager [phone]    2018    .

## 2018-11-22 NOTE — PT/OT/SLP DISCHARGE
Occupational Therapy Discharge Summary    Karly Sandoval  MRN: [id number]   Principal Problem: T12 compression fracture      Patient Discharged from acute Occupational Therapy on 11/20/18.  Please refer to prior OT note dated 11/19/18 for functional status.    Assessment:      Patient appropriate for care in another setting.    Objective:     GOALS:   Multidisciplinary Problems     Occupational Therapy Goals        Problem: Occupational Therapy Goal    Goal Priority Disciplines Outcome Interventions   Occupational Therapy Goal     OT, PT/OT Ongoing (interventions implemented as appropriate)    Description:  Goals to be met by: 7 days (11/22/18)     Patient will increase functional independence with ADLs by performing:    UE Dressing with Contact Guard Assistance including TLSO.  LE Dressing with Minimal Assistance using AD as needed.  Grooming while EOB with Stand-by Assistance.  Toileting from bedside commode with Minimal Assistance for hygiene and clothing management.   Supine to sit with Minimal Assistance. MET  Toilet transfer to bedside commode with Minimal Assistance.  Increased functional strength to WFL for ADLs.                       Reasons for Discontinuation of Therapy Services  Transfer to alternate level of care.      Plan:     Patient Discharged to: Skilled Nursing Facility    DACIA Mansfield  11/22/2018

## 2018-11-22 NOTE — PLAN OF CARE
Problem: Physical Therapy Goal  Goal: Physical Therapy Goal  Goals to be met by: 2018     Patient will increase functional independence with mobility by performin. Supine to sit with stand-by Assistance  2. Sit to supine with Stand-by Assistance  3. Sit to stand transfer with Minimal Assistance using appropriate AD.   4. Bed to chair transfer with Minimal Assistance using appropriate AD.   5. Gait  x 50 feet with Minimal Assistance using Rolling Walker.   6. Stand for 2 minutes with Contact Guard Assistance using Rolling Walker  7. Lower extremity exercise program x15 reps per handout, with supervision       Discharge Recommendations: return to SNF    Goals remain appropriate.     Ana Lilia Landeros, PTA.   [phone]   2018

## 2018-11-22 NOTE — PLAN OF CARE
Problem: Patient Care Overview  Goal: Plan of Care Review  Outcome: Ongoing (interventions implemented as appropriate)   11/22/18 1610   Coping/Psychosocial   Plan Of Care Reviewed With patient       Problem: Fall Risk (Adult)  Goal: Absence of Falls  Patient will demonstrate the desired outcomes by discharge/transition of care.  Outcome: Ongoing (interventions implemented as appropriate)   11/22/18 1610   Fall Risk (Adult)   Absence of Falls making progress toward outcome       Problem: Pressure Ulcer Risk (Jake Scale) (Adult,Obstetrics,Pediatric)  Goal: Skin Integrity  Patient will demonstrate the desired outcomes by discharge/transition of care.  Outcome: Ongoing (interventions implemented as appropriate)   11/22/18 1610   Pressure Ulcer Risk (Jake Scale) (Adult,Obstetrics,Pediatric)   Skin Integrity making progress toward outcome       Comments: Patient monitored every 1 to 2 hours for pain and safety.  Safety maintained.  Patient instructed to call for assistance.Call  Light and persoanl items in reach.

## 2018-11-23 PROCEDURE — 63600175 PHARM REV CODE 636 W HCPCS: Performed by: HOSPITALIST

## 2018-11-23 PROCEDURE — 97530 THERAPEUTIC ACTIVITIES: CPT

## 2018-11-23 PROCEDURE — 25000003 PHARM REV CODE 250: Performed by: NURSE PRACTITIONER

## 2018-11-23 PROCEDURE — 97110 THERAPEUTIC EXERCISES: CPT

## 2018-11-23 PROCEDURE — 11000004 HC SNF PRIVATE

## 2018-11-23 PROCEDURE — 25000003 PHARM REV CODE 250: Performed by: HOSPITALIST

## 2018-11-23 PROCEDURE — 97535 SELF CARE MNGMENT TRAINING: CPT

## 2018-11-23 PROCEDURE — 97116 GAIT TRAINING THERAPY: CPT

## 2018-11-23 RX ADMIN — AMLODIPINE BESYLATE 10 MG: 10 TABLET ORAL at 09:11

## 2018-11-23 RX ADMIN — HEPARIN SODIUM 5000 UNITS: 5000 INJECTION, SOLUTION INTRAVENOUS; SUBCUTANEOUS at 10:11

## 2018-11-23 RX ADMIN — ALPRAZOLAM 0.25 MG: 0.25 TABLET ORAL at 10:11

## 2018-11-23 RX ADMIN — ESCITALOPRAM 10 MG: 10 TABLET, FILM COATED ORAL at 09:11

## 2018-11-23 RX ADMIN — CARVEDILOL 6.25 MG: 6.25 TABLET, FILM COATED ORAL at 09:11

## 2018-11-23 RX ADMIN — LIDOCAINE 1 PATCH: 50 PATCH TOPICAL at 05:11

## 2018-11-23 RX ADMIN — FOLIC ACID 1 MG: 1 TABLET ORAL at 09:11

## 2018-11-23 RX ADMIN — HEPARIN SODIUM 5000 UNITS: 5000 INJECTION, SOLUTION INTRAVENOUS; SUBCUTANEOUS at 05:11

## 2018-11-23 RX ADMIN — FUROSEMIDE 20 MG: 20 TABLET ORAL at 09:11

## 2018-11-23 RX ADMIN — ATORVASTATIN CALCIUM 20 MG: 20 TABLET, FILM COATED ORAL at 09:11

## 2018-11-23 RX ADMIN — HEPARIN SODIUM 5000 UNITS: 5000 INJECTION, SOLUTION INTRAVENOUS; SUBCUTANEOUS at 02:11

## 2018-11-23 RX ADMIN — CARVEDILOL 6.25 MG: 6.25 TABLET, FILM COATED ORAL at 04:11

## 2018-11-23 RX ADMIN — HYDROXYCHLOROQUINE SULFATE 200 MG: 200 TABLET, FILM COATED ORAL at 09:11

## 2018-11-23 RX ADMIN — CALCIUM CARBONATE 500 MG: 1250 SUSPENSION ORAL at 09:11

## 2018-11-23 NOTE — PT/OT/SLP PROGRESS
Occupational Therapy  Treatment    Karly Sandoval   MRN: [id number]   Admitting Diagnosis: <principal problem not specified>    OT Date of Treatment: 11/23/18  Total Time (min): 42 min    Billable Minutes:  Self Care/Home Management 42    General Precautions: Standard, fall  Orthopedic Precautions: spinal precautions  Braces: TLSO         Subjective:  Communicated with nsg prior to session.  Pt stated she was feeling nausea.    Pain/Comfort  Pain Rating 1: 7/10  Location - Side 1: Left  Location - Orientation 1: lateral  Location 1: back  Pain Addressed 1: Reposition, Nurse notified  Pain Rating Post-Intervention 1: 7/10    Objective:  Patient found with: TLSO sitting up in bed eating breakfast.  Occupational Performance:    Bed Mobility:    · Patient completed Rolling/Turning to Right with stand by assistance  · Patient completed Scooting/Bridging with minimum assistance  · Patient completed Supine to Sit with minimum assistance with VC     Functional Mobility/Transfers:  · Patient completed Sit <> Stand Transfer with maximal assistance  with  no assistive device   · Patient completed Bed <> Chair Transfer using Stand Pivot technique with maximal assistance with no assistive device  · Functional Mobility: Pt performed sit<stand requiring Max A with no AD  ·                                 Pt performed transfer from EOB with stand pivot requiring Max A with no AD     Activities of Daily Living:  · Feeding:  Pt required supervision to eat breakfast  · Grooming: Pt required set up to perform oral hygrine and hair care  · Bathing: Pt required SBA to bath upper body only seated in bed side chair  · Upper Body Dressing: Pt required set up to don/doff over head shirt and button down blouse. Pt required max A to don TLSO brace  · Lower Body Dressing: Pt required Max A to don pants lying supine in bed   · Toileting: Total A with diaper soiled on OT arrival    AMPA 6 Click:  AMPAC Total Score: 17    OT Exercises: Not  tested on this date    Additional Treatment:  Pt was educated on transfer technique to perform sit<>stand   Pt required VC to perform LBD lying supine in bed  Pt was educated on donning TLSO brace and the importance of having the brace on with any type of transfer/OOB.    Patient left up in chair with call button in reach    ASSESSMENT:  Karly Sandoval is a 88 y.o. female with a medical diagnosis of <principal problem not specified> .Pt tolerated tx well, and will benefit from continuing OT to increase safety with transfers, independence with ADLS, and conformability with performing self care task. Pt demonstrated fear to perform sit to stand. Pt was educated on safety and reassurance. OT will continue POC as indicated.     Rehab identified problem list/impairments: weakness, impaired endurance, impaired self care skills, impaired functional mobilty, gait instability, impaired balance, decreased safety awareness, pain    Rehab potential is fair    Activity tolerance: Fair    Discharge recommendations: home with home health     Barriers to discharge: Decreased caregiver support     Equipment recommendations: wheelchair     GOALS:   Multidisciplinary Problems     Occupational Therapy Goals        Problem: Occupational Therapy Goal    Goal Priority Disciplines Outcome Interventions   Occupational Therapy Goal     OT, PT/OT Ongoing (interventions implemented as appropriate)    Description:  Goals to be met by: 2 weeks     Patient will increase functional independence with ADLs by performing:    UE Dressing with Set-up Assistance.  LE Dressing with Stand-by Assistance.  Grooming while standing at sink with Supervision.  Toileting from bedside commode with Stand-by Assistance for hygiene and clothing management.   Bathing from  shower chair/bench with Stand-by Assistance.  Supine to sit with Supervision.  Stand pivot transfers with Stand-by Assistance.  Toilet transfer to bedside commode with Stand-by  Assistance.  Upper extremity exercise program 3 x 15 reps per handout, with independence.  Patient will complete a functional standing activity with S for activity tolerance in order to perform household tasks.                     Plan:  Patient to be seen 5 x/week to address the above listed problems via self-care/home management, therapeutic activities, therapeutic exercises  Plan of Care expires: 12/21/18  Plan of Care reviewed with: patient    Severiano Gonzalez, SORAY  11/23/2018     I certify that I was present in the room directing the student in service delivery and guiding them using my skilled judgment. As the co-signing therapist I have reviewed the students documentation and am responsible for the treatment, assessment, and plan.

## 2018-11-23 NOTE — PLAN OF CARE
Problem: Fall Risk (Adult)  Goal: Absence of Falls  Patient will demonstrate the desired outcomes by discharge/transition of care.  Outcome: Ongoing (interventions implemented as appropriate)  No fall or injury noted this shift. Safety measures maintained.

## 2018-11-23 NOTE — PLAN OF CARE
Problem: Patient Care Overview  Goal: Plan of Care Review  Outcome: Ongoing (interventions implemented as appropriate)   11/23/18 1019   Coping/Psychosocial   Plan Of Care Reviewed With patient       Problem: Fall Risk (Adult)  Goal: Absence of Falls  Patient will demonstrate the desired outcomes by discharge/transition of care.  Outcome: Ongoing (interventions implemented as appropriate)   11/23/18 1019   Fall Risk (Adult)   Absence of Falls making progress toward outcome       Problem: Pressure Ulcer Risk (Jake Scale) (Adult,Obstetrics,Pediatric)  Goal: Skin Integrity  Patient will demonstrate the desired outcomes by discharge/transition of care.  Outcome: Ongoing (interventions implemented as appropriate)   11/23/18 1019   Pressure Ulcer Risk (Jake Scale) (Adult,Obstetrics,Pediatric)   Skin Integrity making progress toward outcome       Comments: Patient monitored every 1 to 2 hours for pain and safety.  Safety maintained.  Patient instructed to call for assistance.Call  Light and persoanl items in reach.

## 2018-11-23 NOTE — PLAN OF CARE
Met with patient and discussed anticipated discharge date.  Communication board updated.   Claudia Duarte lMSW, ACWILLY-CHRIS, CCM  11/23/2018

## 2018-11-23 NOTE — CLINICAL REVIEW
Clinical Pharmacy Chart Review Note      Admit Date: 11/20/2018   LOS: 3 days       Karly Sandoval is a 88 y.o. female admitted to SNF for PT/OT after hospitalization for T12 compression fracture.    Active Hospital Problems    Diagnosis  POA    T12 compression fracture [S22.080A]  Yes      Resolved Hospital Problems   No resolved problems to display.     Review of patient's allergies indicates:   Allergen Reactions    Amoxicillin Other (See Comments)     unknown    Bactrim [sulfamethoxazole-trimethoprim] Other (See Comments)     unknown    Omeprazole Other (See Comments)     Muscle and joint pain    Rocephin [ceftriaxone] Other (See Comments)     Severe acute generalized pain    Penicillins Rash     Patient Active Problem List    Diagnosis Date Noted    UTI due to Klebsiella species 11/18/2018    Acute hypoxemic respiratory failure 11/14/2018    T12 compression fracture 11/13/2018    Compression fracture of T12 vertebra 10/30/2018    Urinary retention 10/24/2018    Debility 10/23/2018    Chronic prescription benzodiazepine use 10/23/2018    Constipation 10/22/2018    Hyponatremia 10/18/2018    Urge incontinence 10/18/2018    Acute bilateral low back pain without sciatica 10/17/2018    Pancreatic lesion 10/09/2018    Acute kidney injury superimposed on CKD 10/08/2018    Abdominal aortic aneurysm (AAA) without rupture 10/08/2018    Anxiety 02/01/2018    Decreased functional mobility 08/17/2016    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    Vitamin D deficiency 09/22/2015    Pacemaker 03/25/2015    History of CVA (cerebrovascular accident) 10/29/2014    Anemia of chronic disease 03/22/2014    Closed compression fracture of L3 lumbar vertebra 03/21/2014    Chronic diastolic CHF (congestive heart failure) 02/11/2014    CKD (chronic kidney disease), stage III 08/22/2013    Mixed hyperlipidemia     RA (rheumatoid arthritis) 09/11/2012    Essential hypertension 09/11/2012    Osteopenia  09/11/2012       Scheduled Meds:    amLODIPine  10 mg Oral Daily    atorvastatin  20 mg Oral Daily    calcium carbonate  500 mg Oral Daily    carvedilol  6.25 mg Oral BID WM    escitalopram oxalate  10 mg Oral Daily    folic acid  1 mg Oral Daily    furosemide  20 mg Oral Daily    heparin (porcine)  5,000 Units Subcutaneous Q8H    hydroxychloroquine  200 mg Oral Daily    lidocaine  1 patch Transdermal Q24H    senna-docusate 8.6-50 mg  1 tablet Oral BID     Continuous Infusions:   PRN Meds: acetaminophen, albuterol sulfate, ALPRAZolam, bisacodyl, ondansetron    OBJECTIVE:     Vital Signs (Last 24H)  Temp:  [97.7 °F (36.5 °C)-98.6 °F (37 °C)]   Pulse:  [68-70]   Resp:  [18]   BP: (136-138)/(61-62)   SpO2:  [97 %]     Laboratory:  CBC:   Recent Labs   Lab 11/17/18  0550 11/18/18  0522 11/19/18  0330   WBC 5.72 5.65 5.48   RBC 2.57* 2.77* 2.85*   HGB 8.0* 8.3* 8.8*   HCT 23.7* 25.3* 26.3*    209 253   MCV 92 91 92   MCH 31.1* 30.0 30.9   MCHC 33.8 32.8 33.5     BMP:   Recent Labs   Lab 11/17/18  0550 11/18/18  0522 11/19/18  0330   * 101 97   * 132* 132*   K 4.2 4.0 4.0   CL 98 97 94*   CO2 25 26 31*   BUN 41* 30* 29*   CREATININE 1.6* 1.4 1.7*   CALCIUM 8.3* 8.8 9.0   MG  --   --  2.0  1.8     CMP:   Recent Labs   Lab 11/17/18  0550 11/18/18  0522 11/19/18  0330   * 101 97   CALCIUM 8.3* 8.8 9.0   * 132* 132*   K 4.2 4.0 4.0   CO2 25 26 31*   CL 98 97 94*   BUN 41* 30* 29*   CREATININE 1.6* 1.4 1.7*           ASSESSMENT/PLAN:          Active Hospital Problems     Diagnosis     *T12 compression fracture  --PT/OT:  lidocaine 5% patch (2) daily;   --bowel regimen for constipation; hold for loose or frequent stools: senna-docusate twice daily; bisacodyl 10 mg supp daily prn  --DVT prophylaxis: heparin 5000u q8hrs        Hyponatremia   --Chronic and stable  --Continue to monitor with twice weekly labs        Anxiety   --Escitalopram 10 mg daily  --Alprazolam 0.25 mg  nightly  Monitor: mental status for depression, suicide ideation, anxiety, serotonin syndrome, hyponatremia, drowsiness, dizziness, oversedation        Pacemaker   --No acute issues        Chronic diastolic CHF (congestive heart failure)   **Monitor weight and adjust diuretic treatment as necessary to treat 2-3 pound weight gain in 24-48 hours  --Furosemide 20 mg daily (monitor BMP)    Wt Readings from Last 1 Encounters:   11/23/18 0524 51.9 kg (114 lb 6.7 oz)   11/22/18 0600 51.9 kg (114 lb 6.7 oz)   11/21/18 0500 53.1 kg (117 lb 1 oz)   11/20/18 1758 53.1 kg (117 lb 1 oz)     BMP  Lab Results   Component Value Date     (L) 11/19/2018    K 4.0 11/19/2018    CL 94 (L) 11/19/2018    CO2 31 (H) 11/19/2018    BUN 29 (H) 11/19/2018    CREATININE 1.7 (H) 11/19/2018    CALCIUM 9.0 11/19/2018    ANIONGAP 7 (L) 11/19/2018    ESTGFRAFRICA 30.6 (A) 11/19/2018    EGFRNONAA 26.5 (A) 11/19/2018             RA (rheumatoid arthritis)   --Hydroxychloroquine 200 mg daily  --Folic acid 1 mg daily        Essential hypertension   **Monitor BP and pulse  --Amlodipine 10 mg daily  --Carvedilol 6.25 mg twice daily       BP Readings from Last 3 Encounters:   11/23/18 138/61   11/20/18 (!) 141/65   11/13/18 (!) 127/58     Pulse Readings from Last 3 Encounters:   11/23/18 70   11/20/18 74   11/13/18 67             Hyperlipidemia  --Atorvastatin 20 mg daily     Lab Results   Component Value Date    CHOL 107 (L) 10/09/2018    CHOL 132 06/22/2018    CHOL 136 01/05/2018     Lab Results   Component Value Date    HDL 58 10/09/2018    HDL 66 06/22/2018    HDL 71 01/05/2018     Lab Results   Component Value Date    LDLCALC 42.2 (L) 10/09/2018    LDLCALC 56.4 (L) 06/22/2018    LDLCALC 56.6 (L) 01/05/2018     Lab Results   Component Value Date    TRIG 34 10/09/2018    TRIG 48 06/22/2018    TRIG 42 01/05/2018     Lab Results   Component Value Date    CHOLHDL 54.2 (H) 10/09/2018    CHOLHDL 50.0 06/22/2018    CHOLHDL 52.2 (H) 01/05/2018      Lab Results   Component Value Date    ALT 27 11/13/2018    AST 19 11/13/2018    ALKPHOS 72 11/13/2018    BILITOT 0.9 11/13/2018                   I have reviewed the medications in compliance with CMS Regulation F329 of the MADDI Appendix PP.      Carol Hood, Pharm. D.  Clinical Pharmacist  [facility]

## 2018-11-23 NOTE — PT/OT/SLP PROGRESS
"Physical Therapy  Treatment    Karly Sandoval   MRN: [id number]   Admitting Diagnosis: <principal problem not specified>    PT Received On: 11/23/18  Total Time (min): 38       Billable Minutes:  Gait Training 8, Therapeutic Activity 10 and Therapeutic Exercise 20    Treatment Type: Treatment  PT/PTA: PTA     PTA Visit Number: 2       General Precautions: Standard, fall  Orthopedic Precautions: spinal precautions   Braces: TLSO(when OOB)    Do you have any cultural, spiritual, Mormon conflicts, given your current situation?: none    Subjective:  "I'm sorry dear, I can't do it, been sitting up all morning, my stomach is cramping I need to lay down" agreeable to a few exs      Pain/Comfort  Pain Rating 1: (did not rate as pain "cramp")  Location - Side 1: Bilateral  Location - Orientation 1: generalized  Location 1: abdomen  Pain Addressed 1: Reposition  Pain Rating Post-Intervention 1: ("better" supine)    Objective:  Patient found with: TLSO     AM-PAC 6 CLICK MOBILITY  Total Score:11    Bed Mobility:  Sit>Supine:max A with trunk and BLE mgmt vcs for tech    Transfers:  Sit<>Stand: with RW max/mod A vcs for tech  Stand Pivot Transfer: with RW max/mod ~ 5-8 steps from BSC to EOB fwd/lat/bwds    Gait:  Amb with RW from BSC>EOB 5-8 steps max/mod A fwd/lat/bwds    Therex:A/AA as needed  Seated 2x10 reps AP,GS,LAQ  Supine AP,QS,SAQ,HS,abd/add     Patient left supine with call button in reach, camera present and belongings in reach.    Assessment:  Karly Sandoval is a 88 y.o. female with a medical diagnosis of <principal problem not specified>.  Pt tolerated fair, declined coming to the gym 2* to inc fatigue sitting up too long, also having some stomach craps and nausea, spitting up a little bit, subsided once in bed, ed to keep nsg informed if she needs something for her stomach, pt verbalized understanding,  pt would continue to benefit from skilled PT services to improve overall functional mobility, strength " and endurance.  .    Rehab identified problem list/impairments: weakness, impaired endurance, impaired self care skills, impaired functional mobilty, gait instability, impaired balance, decreased upper extremity function, decreased lower extremity function, pain    Rehab potential is good.    Activity tolerance: Fair    Discharge recommendations: ([facility] or NH depending on pt progress)     Barriers to discharge: None(will D/C to [facility] or NH)    Equipment recommendations: wheelchair     GOALS:   Multidisciplinary Problems     Physical Therapy Goals        Problem: Physical Therapy Goal    Goal Priority Disciplines Outcome Goal Variances Interventions   Physical Therapy Goal     PT, PT/OT Ongoing (interventions implemented as appropriate)     Description:  Goals to be met by: 14 days     Patient will increase functional independence with mobility by performin. Supine to sit with Set-up [place]  2. Sit to supine with Contact Guard Assistance  3. Sit to stand transfer with Contact Guard Assistance  4. Bed to chair transfer with Contact Guard Assistance using Rolling Walker  5. Gait  x 100 feet with Contact Guard Assistance using Rolling Walker.   6. Stand for 2 minutes with Contact Guard Assistance using Rolling Walker                      PLAN:    Patient to be seen (5-6X/week)  to address the above listed problems via gait training, therapeutic activities, therapeutic exercises  Plan of Care expires: 18  Plan of Care reviewed with: patient    Lyndsay Conklin, PTA  2018

## 2018-11-23 NOTE — PLAN OF CARE
Problem: Occupational Therapy Goal  Goal: Occupational Therapy Goal  Goals to be met by: 2 weeks     Patient will increase functional independence with ADLs by performing:    UE Dressing with Set-up Assistance.  LE Dressing with Stand-by Assistance.  Grooming while standing at sink with Supervision.  Toileting from bedside commode with Stand-by Assistance for hygiene and clothing management.   Bathing from  shower chair/bench with Stand-by Assistance.  Supine to sit with Supervision.  Stand pivot transfers with Stand-by Assistance.  Toilet transfer to bedside commode with Stand-by Assistance.  Upper extremity exercise program 3 x 15 reps per handout, with independence.  Patient will complete a functional standing activity with S for activity tolerance in order to perform household tasks.    Pt. Reported being  Nauseated on this date

## 2018-11-23 NOTE — PLAN OF CARE
Problem: Physical Therapy Goal  Goal: Physical Therapy Goal  Goals to be met by: 14 days     Patient will increase functional independence with mobility by performin. Supine to sit with Set-up [place]  2. Sit to supine with Contact Guard Assistance  3. Sit to stand transfer with Contact Guard Assistance  4. Bed to chair transfer with Contact Guard Assistance using Rolling Walker  5. Gait  x 100 feet with Contact Guard Assistance using Rolling Walker.   6. Stand for 2 minutes with Contact Guard Assistance using Rolling Walker     Outcome: Ongoing (interventions implemented as appropriate)  Goals remain appropriate

## 2018-11-24 PROCEDURE — 25000003 PHARM REV CODE 250: Performed by: INTERNAL MEDICINE

## 2018-11-24 PROCEDURE — 25000003 PHARM REV CODE 250: Performed by: HOSPITALIST

## 2018-11-24 PROCEDURE — 25000003 PHARM REV CODE 250: Performed by: NURSE PRACTITIONER

## 2018-11-24 PROCEDURE — 63600175 PHARM REV CODE 636 W HCPCS: Performed by: HOSPITALIST

## 2018-11-24 PROCEDURE — 11000004 HC SNF PRIVATE

## 2018-11-24 RX ORDER — LIDOCAINE 50 MG/G
2 PATCH TOPICAL
Status: DISCONTINUED | OUTPATIENT
Start: 2018-11-25 | End: 2018-12-04 | Stop reason: HOSPADM

## 2018-11-24 RX ADMIN — LIDOCAINE 1 PATCH: 50 PATCH TOPICAL at 05:11

## 2018-11-24 RX ADMIN — CARVEDILOL 6.25 MG: 6.25 TABLET, FILM COATED ORAL at 08:11

## 2018-11-24 RX ADMIN — ALPRAZOLAM 0.25 MG: 0.25 TABLET ORAL at 08:11

## 2018-11-24 RX ADMIN — FUROSEMIDE 20 MG: 20 TABLET ORAL at 09:11

## 2018-11-24 RX ADMIN — AMLODIPINE BESYLATE 10 MG: 10 TABLET ORAL at 09:11

## 2018-11-24 RX ADMIN — CALCIUM CARBONATE 500 MG: 1250 SUSPENSION ORAL at 09:11

## 2018-11-24 RX ADMIN — HEPARIN SODIUM 5000 UNITS: 5000 INJECTION, SOLUTION INTRAVENOUS; SUBCUTANEOUS at 02:11

## 2018-11-24 RX ADMIN — HEPARIN SODIUM 5000 UNITS: 5000 INJECTION, SOLUTION INTRAVENOUS; SUBCUTANEOUS at 05:11

## 2018-11-24 RX ADMIN — HYDROXYCHLOROQUINE SULFATE 200 MG: 200 TABLET, FILM COATED ORAL at 08:11

## 2018-11-24 RX ADMIN — SENNOSIDES AND DOCUSATE SODIUM 1 TABLET: 8.6; 5 TABLET ORAL at 08:11

## 2018-11-24 RX ADMIN — SENNOSIDES AND DOCUSATE SODIUM 1 TABLET: 8.6; 5 TABLET ORAL at 09:11

## 2018-11-24 RX ADMIN — FOLIC ACID 1 MG: 1 TABLET ORAL at 08:11

## 2018-11-24 RX ADMIN — ATORVASTATIN CALCIUM 20 MG: 20 TABLET, FILM COATED ORAL at 08:11

## 2018-11-24 RX ADMIN — ESCITALOPRAM 10 MG: 10 TABLET, FILM COATED ORAL at 09:11

## 2018-11-24 RX ADMIN — HEPARIN SODIUM 5000 UNITS: 5000 INJECTION, SOLUTION INTRAVENOUS; SUBCUTANEOUS at 09:11

## 2018-11-24 RX ADMIN — ACETAMINOPHEN 650 MG: 325 TABLET ORAL at 09:11

## 2018-11-24 RX ADMIN — CARVEDILOL 6.25 MG: 6.25 TABLET, FILM COATED ORAL at 05:11

## 2018-11-24 NOTE — SUBJECTIVE & OBJECTIVE
Review of Systems   Constitutional: Positive for fatigue. Negative for appetite change, chills and fever.   HENT: Negative for trouble swallowing.    Respiratory: Negative for cough, chest tightness, shortness of breath and wheezing.    Cardiovascular: Negative for chest pain, palpitations and leg swelling.   Gastrointestinal: Negative for abdominal pain, constipation, diarrhea and nausea.   Genitourinary: Negative for difficulty urinating, frequency and urgency.   Musculoskeletal: Positive for back pain. Negative for arthralgias and myalgias.        + improved back with brace, but brace is uncomfortable   Skin: Negative for rash.   Neurological: Negative for dizziness, weakness, light-headedness and headaches.   Psychiatric/Behavioral: Negative for sleep disturbance.     Scheduled Meds:   amLODIPine  10 mg Oral Daily    atorvastatin  20 mg Oral Daily    calcium carbonate  500 mg Oral Daily    carvedilol  6.25 mg Oral BID WM    escitalopram oxalate  10 mg Oral Daily    folic acid  1 mg Oral Daily    furosemide  20 mg Oral Daily    heparin (porcine)  5,000 Units Subcutaneous Q8H    hydroxychloroquine  200 mg Oral Daily    lidocaine  2 patch Transdermal Q24H    senna-docusate 8.6-50 mg  1 tablet Oral BID     Continuous Infusions:  PRN Meds:.acetaminophen, albuterol sulfate, ALPRAZolam, bisacodyl, ondansetron    Objective:     Vital Signs (Most Recent):  Temp: 98.3 °F (36.8 °C) (11/24/18 0730)  Pulse: 68 (11/24/18 0730)  Resp: 18 (11/24/18 0730)  BP: (!) 153/67 (11/24/18 0730)  SpO2: 96 % (11/24/18 0730) Vital Signs (24h Range):  Temp:  [97.1 °F (36.2 °C)-98.3 °F (36.8 °C)] 98.3 °F (36.8 °C)  Pulse:  [67-68] 68  Resp:  [18] 18  SpO2:  [96 %-98 %] 96 %  BP: (128-153)/(60-67) 153/67     Weight: 52.9 kg (116 lb 10 oz)(with brace)  Body mass index is 21.33 kg/m².    Physical Exam   Constitutional: She is oriented to person, place, and time. She appears well-developed and well-nourished. No distress.    Cardiovascular: Normal rate, regular rhythm and normal heart sounds. Exam reveals no gallop and no friction rub.   No murmur heard.  Pulmonary/Chest: Effort normal. No respiratory distress. She has decreased breath sounds. She has no wheezes. She has no rales.   Abdominal: Soft. Bowel sounds are normal. She exhibits no distension. There is no tenderness.   ABD rounded but soft   Musculoskeletal: Normal range of motion. She exhibits no edema or tenderness.   TLSO brace in use, Patient is kyphotic   Neurological: She is alert and oriented to person, place, and time.   Skin: Skin is warm and dry. No rash noted. She is not diaphoretic. No cyanosis. Nails show no clubbing.   Psychiatric: She has a normal mood and affect. Her behavior is normal.       Significant Labs:   Recent Labs   Lab 11/26/18 0524   WBC 7.41   HGB 8.4*   HCT 25.5*        Recent Labs   Lab 11/26/18 0524   *   K 3.9   CL 98   CO2 24   BUN 22   CREATININE 1.6*   CALCIUM 8.6*     Lab Results   Component Value Date    LABPROT 12.4 11/13/2018    ALBUMIN 2.4 (L) 11/13/2018     Lab Results   Component Value Date    CALCIUM 8.6 (L) 11/26/2018    PHOS 4.0 11/26/2018     Significant Imaging:na

## 2018-11-24 NOTE — PLAN OF CARE
Problem: Fall Risk (Adult)  Goal: Identify Related Risk Factors and Signs and Symptoms  Related risk factors and signs and symptoms are identified upon initiation of Human Response Clinical Practice Guideline (CPG)  Outcome: Ongoing (interventions implemented as appropriate)   11/24/18 8370   Fall Risk   Related Risk Factors (Fall Risk) fatigue/slow reaction;gait/mobility problems

## 2018-11-24 NOTE — PLAN OF CARE
Problem: Patient Care Overview  Goal: Plan of Care Review  Outcome: Ongoing (interventions implemented as appropriate)   11/24/18 1138   Coping/Psychosocial   Plan Of Care Reviewed With patient       Problem: Fall Risk (Adult)  Goal: Absence of Falls  Patient will demonstrate the desired outcomes by discharge/transition of care.  Outcome: Ongoing (interventions implemented as appropriate)   11/24/18 1138   Fall Risk (Adult)   Absence of Falls making progress toward outcome       Problem: Pressure Ulcer Risk (Jake Scale) (Adult,Obstetrics,Pediatric)  Goal: Skin Integrity  Patient will demonstrate the desired outcomes by discharge/transition of care.  Outcome: Ongoing (interventions implemented as appropriate)   11/24/18 1138   Pressure Ulcer Risk (Jake Scale) (Adult,Obstetrics,Pediatric)   Skin Integrity making progress toward outcome       Comments: Patient monitored every 1 to 2 hours for pain and safety.  Safety maintained.  Patient instructed to call for assistance.Call  Light and persoanl items in reach.

## 2018-11-25 PROCEDURE — 63600175 PHARM REV CODE 636 W HCPCS: Performed by: HOSPITALIST

## 2018-11-25 PROCEDURE — 25000003 PHARM REV CODE 250: Performed by: NURSE PRACTITIONER

## 2018-11-25 PROCEDURE — 97116 GAIT TRAINING THERAPY: CPT

## 2018-11-25 PROCEDURE — 97535 SELF CARE MNGMENT TRAINING: CPT

## 2018-11-25 PROCEDURE — 97110 THERAPEUTIC EXERCISES: CPT

## 2018-11-25 PROCEDURE — 97530 THERAPEUTIC ACTIVITIES: CPT

## 2018-11-25 PROCEDURE — 25000003 PHARM REV CODE 250: Performed by: INTERNAL MEDICINE

## 2018-11-25 PROCEDURE — 11000004 HC SNF PRIVATE

## 2018-11-25 PROCEDURE — 25000003 PHARM REV CODE 250: Performed by: HOSPITALIST

## 2018-11-25 RX ADMIN — FUROSEMIDE 20 MG: 20 TABLET ORAL at 09:11

## 2018-11-25 RX ADMIN — ACETAMINOPHEN 650 MG: 325 TABLET ORAL at 09:11

## 2018-11-25 RX ADMIN — SENNOSIDES AND DOCUSATE SODIUM 1 TABLET: 8.6; 5 TABLET ORAL at 09:11

## 2018-11-25 RX ADMIN — ESCITALOPRAM 10 MG: 10 TABLET, FILM COATED ORAL at 09:11

## 2018-11-25 RX ADMIN — HEPARIN SODIUM 5000 UNITS: 5000 INJECTION, SOLUTION INTRAVENOUS; SUBCUTANEOUS at 03:11

## 2018-11-25 RX ADMIN — HEPARIN SODIUM 5000 UNITS: 5000 INJECTION, SOLUTION INTRAVENOUS; SUBCUTANEOUS at 05:11

## 2018-11-25 RX ADMIN — HYDROXYCHLOROQUINE SULFATE 200 MG: 200 TABLET, FILM COATED ORAL at 09:11

## 2018-11-25 RX ADMIN — HEPARIN SODIUM 5000 UNITS: 5000 INJECTION, SOLUTION INTRAVENOUS; SUBCUTANEOUS at 09:11

## 2018-11-25 RX ADMIN — AMLODIPINE BESYLATE 10 MG: 10 TABLET ORAL at 09:11

## 2018-11-25 RX ADMIN — ATORVASTATIN CALCIUM 20 MG: 20 TABLET, FILM COATED ORAL at 09:11

## 2018-11-25 RX ADMIN — CALCIUM CARBONATE 500 MG: 1250 SUSPENSION ORAL at 09:11

## 2018-11-25 RX ADMIN — FOLIC ACID 1 MG: 1 TABLET ORAL at 11:11

## 2018-11-25 RX ADMIN — CARVEDILOL 6.25 MG: 6.25 TABLET, FILM COATED ORAL at 05:11

## 2018-11-25 RX ADMIN — CARVEDILOL 6.25 MG: 6.25 TABLET, FILM COATED ORAL at 09:11

## 2018-11-25 RX ADMIN — ALPRAZOLAM 0.25 MG: 0.25 TABLET ORAL at 07:11

## 2018-11-25 RX ADMIN — LIDOCAINE 2 PATCH: 50 PATCH TOPICAL at 05:11

## 2018-11-25 NOTE — PLAN OF CARE
Problem: Physical Therapy Goal  Goal: Physical Therapy Goal  Goals to be met by: 14 days     Patient will increase functional independence with mobility by performin. Supine to sit with Set-up [place]  2. Sit to supine with Contact Guard Assistance  3. Sit to stand transfer with Contact Guard Assistance  4. Bed to chair transfer with Contact Guard Assistance using Rolling Walker  5. Gait  x 100 feet with Contact Guard Assistance using Rolling Walker.   6. Stand for 2 minutes with Contact Guard Assistance using Rolling Walker     Goals remain appropriate. Continue with Physical therapy Plan of Care. Estella Rhodes, PT 2018

## 2018-11-25 NOTE — PT/OT/SLP PROGRESS
Physical Therapy  Treatment    Karly Sandoval   MRN: [id number]   Admitting Diagnosis: <principal problem not specified>    PT Received On: 11/25/18          Billable Minutes:  Gait Training 25, Therapeutic Activity 15 and Therapeutic Exercise 30=70    Treatment Type: Treatment  PT/PTA: PT     PTA Visit Number: 0       General Precautions: Standard, fall  Orthopedic Precautions: spinal precautions   Braces: TLSO(when OOB)    Do you have any cultural, spiritual, Gnosticist conflicts, given your current situation?: none    Subjective:  Communicated with patient prior to session.  Agreeable w/ frequent encouragement.    Pain/Comfort  Pain Rating 1: 5/10  Location - Side 1: Left  Location - Orientation 1: lower  Location 1: back  Pain Addressed 1: Reposition, Distraction, Cessation of Activity, Pre-medicate for activity  Pain Rating Post-Intervention 1: 5/10    Objective:  Patient found seated in w/c w/ TLSO  with       AM-PAC 6 CLICK MOBILITY  Total Score:15    Bed Mobility:  Sit>Supine:on mat w/ mod assist for trunk and LE guidance, postioning for comfort, use of wedge and pillows  Supine>Sit: on mat w/ mod assist for LE guidance and trunk elevation, logroll to left. Patient assisted w/ all    Transfers:  Sit<>Stand: to/from w/c x3 trials in parallel bars w/ min/mod assist. Mod for sitting to w/c to control sitting. To/from w/c w/ RW and min assistance; to/from mat w/ RW and min assistance.. Patient placed BUEs on RW for trf to stand due to difficulty transitioning hands.   Stand Pivot Transfer: walking to mat w/ RW and min assistance  To turn and sit      Gait:  Amb in parallel bars length of bars x3 trials w/ min asisstance, cues, w/ PT sitting in front and assisting at trunk and providing cues. W/c follow.  (stand before each gait trial in parallel bars xone minute each trial w/ min first trial and CGA two trials, prior to walking)  amb w/ RW and min assistance and w/c follow 15 feet plus turning w/ assistance and  RW to sit on mat  Amb w/ RW and min assistance and w/c follow 51 feet. Standing from mat. Improved postioning in RW this trial  Slow pace, tendency to forward flex, improves w/ cues, encouragment to continue trials     Advanced Gait:  Stairs: np  Curb Step: np    Wheelchair Mobility:  np     Therex:  Quad sets,   Glute sets,   Ankle  Pumps,   hip abduction/adduction,  heelslides,   SAQ,   LAQ   3x10 reps w/ assist as needed      Additional Treatment:  Patient pedals mini elliptical x15 minutes w/ SBA for LE endurance    Patient left up in chair with call button in reach.    Assessment:  Karly Sandoval is a 88 y.o. female with a medical diagnosis of <principal problem not specified>.  Patient participated fairly well but needed encouragement and frequent rest breaks. She appeared to benefit from longer session and ended the session w/ improved gait trial of 50 feet w/ RW and min assistance. Patient will benefit from continued physical therapy to address deficits and improve safety and functional mobility. Continue with physical therapy plan of care. .    Rehab identified problem list/impairments: weakness, impaired endurance, impaired self care skills, impaired functional mobilty, gait instability, impaired balance, decreased upper extremity function, decreased lower extremity function, pain    Rehab potential is good.    Activity tolerance: Good    Discharge recommendations: ([facility] or [facility] depending on pt progress)     Barriers to discharge: None(will D/C to longterm or NH)    Equipment recommendations: wheelchair     GOALS:   Multidisciplinary Problems     Physical Therapy Goals        Problem: Physical Therapy Goal    Goal Priority Disciplines Outcome Goal Variances Interventions   Physical Therapy Goal     PT, PT/OT Ongoing (interventions implemented as appropriate)     Description:  Goals to be met by: 14 days     Patient will increase functional independence with mobility by performin. Supine to sit with  Set-up [place]  2. Sit to supine with Contact Guard Assistance  3. Sit to stand transfer with Contact Guard Assistance  4. Bed to chair transfer with Contact Guard Assistance using Rolling Walker  5. Gait  x 100 feet with Contact Guard Assistance using Rolling Walker.   6. Stand for 2 minutes with Contact Guard Assistance using Rolling Walker                      PLAN:    Patient to be seen (5-6X/week)  to address the above listed problems via gait training, therapeutic activities, therapeutic exercises  Plan of Care expires: 12/21/18  Plan of Care reviewed with: patient    Estella Rhodes, PT  11/25/2018

## 2018-11-25 NOTE — PT/OT/SLP PROGRESS
Occupational Therapy  Treatment    Karly Sandoval   MRN: [id number]   Admitting Diagnosis: Debility following hyponatremia, pt found to have chronic T8/9 compression fractures; T12 compression fracture       OT Date of Treatment: 11/25/18  Total Time (min): 60 min    Billable Minutes:  Self Care/Home Management 35 and Therapeutic Exercise 25    General Precautions: Standard, fall  Orthopedic Precautions: spinal precautions  Braces: TLSO         Subjective:  Communicated with patient1 prior to session.    Pain/Comfort  Pain Rating 1: 7/10  Location - Side 1: Left  Location - Orientation 1: lower  Location 1: back  Pain Addressed 1: Reposition, Distraction, Cessation of Activity  Pain Rating Post-Intervention 1: 7/10    Objective:       Occupational Performance:    Bed Mobility:    · Patient completed Rolling/Turning to Left with  stand by assistance  · Patient completed Rolling/Turning to Right with stand by assistance  · Patient completed Supine to Sit with minimum assistance     Functional Mobility/Transfers:  · Patient completed Sit <> Stand Transfer with maximal assistance  with  hand-held assist   · Patient completed Bed > w/c Transfer using Stand Pivot technique with maximal assistance with hand-held assist    Activities of Daily Living:  · Feeding:  setup  · Grooming: supervision oral care, washing face, and combing hair  · Upper Body Dressing: total assistance for donning TLSO   · Lower Body Dressing: maximal assistance Donning socks and pants  · Toileting: total assistance      [facility] 6 Click:  AMPA Total Score: 16    OT Exercises: UE Ergometer (with multiple rest breaks) Patient was able to tolerate 4 min only due to back pain during task for improving endurance to increase independence with ADLs.     Additional Treatment:  Patient performed B UE AAROM exercises using 1# dowel roshan 1 x 10 (chest press, front rows, and back rows only) focusing to improve strength and endurance to increase independence with  ADLs.     Patient left up in chair with PT.     ASSESSMENT:  Karly Sandoval is a 88 y.o. female with a medical diagnosis of Debility following hyponatremia, pt found to have chronic T8/9 compression fractures; T12 compression fracture and presents with the deficits listed below. Patient is limited with participation during all tasks due pain. Patient continues to benefit from skilled OT services to achieve maximal independence.    Rehab identified problem list/impairments: weakness, impaired endurance, impaired self care skills, impaired functional mobilty, gait instability, impaired balance, decreased safety awareness, pain    Rehab potential is good    Activity tolerance: Good    Discharge recommendations: home with home health     Barriers to discharge: Decreased caregiver support     Equipment recommendations: wheelchair     GOALS:   Multidisciplinary Problems     Occupational Therapy Goals        Problem: Occupational Therapy Goal    Goal Priority Disciplines Outcome Interventions   Occupational Therapy Goal     OT, PT/OT Ongoing (interventions implemented as appropriate)    Description:  Goals to be met by: 2 weeks     Patient will increase functional independence with ADLs by performing:    UE Dressing with Set-up Assistance.  LE Dressing with Stand-by Assistance.  Grooming while standing at sink with Supervision.  Toileting from bedside commode with Stand-by Assistance for hygiene and clothing management.   Bathing from  shower chair/bench with Stand-by Assistance.  Supine to sit with Supervision.  Stand pivot transfers with Stand-by Assistance.  Toilet transfer to bedside commode with Stand-by Assistance.  Upper extremity exercise program 3 x 15 reps per handout, with independence.  Patient will complete a functional standing activity with S for activity tolerance in order to perform household tasks.                     Plan:  Patient to be seen 5 x/week to address the above listed problems via  self-care/home management, therapeutic activities, therapeutic exercises  Plan of Care expires: 12/21/18  Plan of Care reviewed with: patient    DACIA Regalado  11/25/2018

## 2018-11-25 NOTE — PLAN OF CARE
Problem: Patient Care Overview  Goal: Plan of Care Review  Outcome: Ongoing (interventions implemented as appropriate)   11/25/18 1212   Coping/Psychosocial   Plan Of Care Reviewed With patient       Problem: Fall Risk (Adult)  Goal: Absence of Falls  Patient will demonstrate the desired outcomes by discharge/transition of care.  Outcome: Ongoing (interventions implemented as appropriate)   11/25/18 1212   Fall Risk (Adult)   Absence of Falls making progress toward outcome       Problem: Pressure Ulcer Risk (Jake Scale) (Adult,Obstetrics,Pediatric)  Goal: Skin Integrity  Patient will demonstrate the desired outcomes by discharge/transition of care.  Outcome: Ongoing (interventions implemented as appropriate)   11/25/18 1212   Pressure Ulcer Risk (Jake Scale) (Adult,Obstetrics,Pediatric)   Skin Integrity making progress toward outcome       Comments: Patient monitored every 1 to 2 hours for pain and safety.  Safety maintained.  Patient instructed to call for assistance.Call  Light and persoanl items in reach.

## 2018-11-25 NOTE — PLAN OF CARE
Problem: Occupational Therapy Goal  Goal: Occupational Therapy Goal  Goals to be met by: 2 weeks     Patient will increase functional independence with ADLs by performing:    UE Dressing with Set-up Assistance.  LE Dressing with Stand-by Assistance.  Grooming while standing at sink with Supervision.  Toileting from bedside commode with Stand-by Assistance for hygiene and clothing management.   Bathing from  shower chair/bench with Stand-by Assistance.  Supine to sit with Supervision.  Stand pivot transfers with Stand-by Assistance.  Toilet transfer to bedside commode with Stand-by Assistance.  Upper extremity exercise program 3 x 15 reps per handout, with independence.  Patient will complete a functional standing activity with S for activity tolerance in order to perform household tasks.    Outcome: Ongoing (interventions implemented as appropriate)  Patient's goals are appropriate.   DACIA Regalado  11/25/2018

## 2018-11-25 NOTE — PLAN OF CARE
Problem: Patient Care Overview  Goal: Plan of Care Review  Outcome: Ongoing (interventions implemented as appropriate)   18   Coping/Psychosocial   Plan Of Care Reviewed With patient       Problem: Fall Risk (Adult)  Intervention: Reduce Risk/Promote Restraint Free Environment   18   Safety Interventions   Environmental Safety Modification assistive device/personal items within reach;clutter free environment maintained;mobility aid in reach;lighting adjusted   Prevent  Drop/Fall   Safety/Security Measures bed alarm set     Intervention: Patient Rounds   18   Safety Interventions   Patient Rounds bed in low position;bed wheels locked;call light in reach;ID band on;clutter free environment maintained;placement of personal items at bedside;toileting offered;visualized patient     Intervention: Safety Promotion/Fall Prevention   18   Safety Interventions   Safety Promotion/Fall Prevention /camera at bedside       Goal: Identify Related Risk Factors and Signs and Symptoms  Related risk factors and signs and symptoms are identified upon initiation of Human Response Clinical Practice Guideline (CPG)  Outcome: Ongoing (interventions implemented as appropriate)   18   Fall Risk   Related Risk Factors (Fall Risk) age-related changes;bladder function altered;fatigue/slow reaction;gait/mobility problems   Signs and Symptoms (Fall Risk) presence of risk factors

## 2018-11-26 PROBLEM — Z71.89 DNR (DO NOT RESUSCITATE) DISCUSSION: Status: ACTIVE | Noted: 2018-11-26

## 2018-11-26 PROBLEM — Z78.9 TAKES DIETARY SUPPLEMENTS: Status: ACTIVE | Noted: 2018-11-26

## 2018-11-26 LAB
ANION GAP SERPL CALC-SCNC: 8 MMOL/L
BASOPHILS # BLD AUTO: 0.07 K/UL
BASOPHILS NFR BLD: 0.9 %
BUN SERPL-MCNC: 22 MG/DL
CALCIUM SERPL-MCNC: 8.6 MG/DL
CHLORIDE SERPL-SCNC: 98 MMOL/L
CO2 SERPL-SCNC: 24 MMOL/L
CREAT SERPL-MCNC: 1.6 MG/DL
DIFFERENTIAL METHOD: ABNORMAL
EOSINOPHIL # BLD AUTO: 0.1 K/UL
EOSINOPHIL NFR BLD: 1.2 %
ERYTHROCYTE [DISTWIDTH] IN BLOOD BY AUTOMATED COUNT: 13.7 %
EST. GFR  (AFRICAN AMERICAN): 32.9 ML/MIN/1.73 M^2
EST. GFR  (NON AFRICAN AMERICAN): 28.6 ML/MIN/1.73 M^2
GLUCOSE SERPL-MCNC: 96 MG/DL
HCT VFR BLD AUTO: 25.5 %
HGB BLD-MCNC: 8.4 G/DL
IMM GRANULOCYTES # BLD AUTO: 0.18 K/UL
IMM GRANULOCYTES NFR BLD AUTO: 2.4 %
LYMPHOCYTES # BLD AUTO: 1.4 K/UL
LYMPHOCYTES NFR BLD: 19.4 %
MAGNESIUM SERPL-MCNC: 2.1 MG/DL
MCH RBC QN AUTO: 30.1 PG
MCHC RBC AUTO-ENTMCNC: 32.9 G/DL
MCV RBC AUTO: 91 FL
MONOCYTES # BLD AUTO: 0.9 K/UL
MONOCYTES NFR BLD: 12.1 %
NEUTROPHILS # BLD AUTO: 4.7 K/UL
NEUTROPHILS NFR BLD: 64 %
NRBC BLD-RTO: 0 /100 WBC
PHOSPHATE SERPL-MCNC: 4 MG/DL
PLATELET # BLD AUTO: 304 K/UL
PMV BLD AUTO: 10.1 FL
POTASSIUM SERPL-SCNC: 3.9 MMOL/L
RBC # BLD AUTO: 2.79 M/UL
SODIUM SERPL-SCNC: 130 MMOL/L
WBC # BLD AUTO: 7.41 K/UL

## 2018-11-26 PROCEDURE — 97116 GAIT TRAINING THERAPY: CPT

## 2018-11-26 PROCEDURE — 99310 SBSQ NF CARE HIGH MDM 45: CPT | Mod: ,,, | Performed by: NURSE PRACTITIONER

## 2018-11-26 PROCEDURE — 25000003 PHARM REV CODE 250: Performed by: HOSPITALIST

## 2018-11-26 PROCEDURE — 25000003 PHARM REV CODE 250: Performed by: NURSE PRACTITIONER

## 2018-11-26 PROCEDURE — 80048 BASIC METABOLIC PNL TOTAL CA: CPT

## 2018-11-26 PROCEDURE — 85025 COMPLETE CBC W/AUTO DIFF WBC: CPT

## 2018-11-26 PROCEDURE — 36415 COLL VENOUS BLD VENIPUNCTURE: CPT

## 2018-11-26 PROCEDURE — 97535 SELF CARE MNGMENT TRAINING: CPT

## 2018-11-26 PROCEDURE — 25000003 PHARM REV CODE 250: Performed by: INTERNAL MEDICINE

## 2018-11-26 PROCEDURE — 83735 ASSAY OF MAGNESIUM: CPT

## 2018-11-26 PROCEDURE — 97110 THERAPEUTIC EXERCISES: CPT

## 2018-11-26 PROCEDURE — 84100 ASSAY OF PHOSPHORUS: CPT

## 2018-11-26 PROCEDURE — 11000004 HC SNF PRIVATE

## 2018-11-26 PROCEDURE — 63600175 PHARM REV CODE 636 W HCPCS: Performed by: HOSPITALIST

## 2018-11-26 PROCEDURE — 97530 THERAPEUTIC ACTIVITIES: CPT

## 2018-11-26 RX ORDER — GUAIFENESIN/DEXTROMETHORPHAN 100-10MG/5
10 SYRUP ORAL EVERY 4 HOURS PRN
Status: DISCONTINUED | OUTPATIENT
Start: 2018-11-26 | End: 2018-12-04 | Stop reason: HOSPADM

## 2018-11-26 RX ADMIN — FUROSEMIDE 20 MG: 20 TABLET ORAL at 09:11

## 2018-11-26 RX ADMIN — ONDANSETRON 8 MG: 8 TABLET, ORALLY DISINTEGRATING ORAL at 06:11

## 2018-11-26 RX ADMIN — HYDROXYCHLOROQUINE SULFATE 200 MG: 200 TABLET, FILM COATED ORAL at 09:11

## 2018-11-26 RX ADMIN — HEPARIN SODIUM 5000 UNITS: 5000 INJECTION, SOLUTION INTRAVENOUS; SUBCUTANEOUS at 05:11

## 2018-11-26 RX ADMIN — CARVEDILOL 6.25 MG: 6.25 TABLET, FILM COATED ORAL at 05:11

## 2018-11-26 RX ADMIN — GUAIFENESIN AND DEXTROMETHORPHAN 10 ML: 100; 10 SYRUP ORAL at 11:11

## 2018-11-26 RX ADMIN — ACETAMINOPHEN 650 MG: 325 TABLET ORAL at 09:11

## 2018-11-26 RX ADMIN — ACETAMINOPHEN 650 MG: 325 TABLET ORAL at 11:11

## 2018-11-26 RX ADMIN — ESCITALOPRAM 10 MG: 10 TABLET, FILM COATED ORAL at 09:11

## 2018-11-26 RX ADMIN — ATORVASTATIN CALCIUM 20 MG: 20 TABLET, FILM COATED ORAL at 09:11

## 2018-11-26 RX ADMIN — CARVEDILOL 6.25 MG: 6.25 TABLET, FILM COATED ORAL at 09:11

## 2018-11-26 RX ADMIN — ALPRAZOLAM 0.25 MG: 0.25 TABLET ORAL at 10:11

## 2018-11-26 RX ADMIN — HEPARIN SODIUM 5000 UNITS: 5000 INJECTION, SOLUTION INTRAVENOUS; SUBCUTANEOUS at 04:11

## 2018-11-26 RX ADMIN — FOLIC ACID 1 MG: 1 TABLET ORAL at 09:11

## 2018-11-26 RX ADMIN — AMLODIPINE BESYLATE 10 MG: 10 TABLET ORAL at 09:11

## 2018-11-26 RX ADMIN — HEPARIN SODIUM 5000 UNITS: 5000 INJECTION, SOLUTION INTRAVENOUS; SUBCUTANEOUS at 10:11

## 2018-11-26 RX ADMIN — CALCIUM CARBONATE 500 MG: 1250 SUSPENSION ORAL at 09:11

## 2018-11-26 RX ADMIN — LIDOCAINE 2 PATCH: 50 PATCH TOPICAL at 05:11

## 2018-11-26 NOTE — PT/OT/SLP PROGRESS
Occupational Therapy  Treatment    Karly Sandoval   MRN: [id number]   Admitting Diagnosis: <principal problem not specified>    OT Date of Treatment: 11/26/18  Total Time (min): 53 min    Billable Minutes:  Self Care/Home Management 38 and Therapeutic Exercise 15    General Precautions: Standard, fall  Orthopedic Precautions: spinal precautions  Braces: TLSO         Subjective:  Communicated with nsg prior to session.  Pt stated she was feeling a little nausea today    Pain/Comfort  Pain Rating 1: 5/10  Location - Side 1: Left  Location - Orientation 1: lower  Location 1: back  Pain Addressed 1: Reposition, Nurse notified  Pain Rating Post-Intervention 1: 7/10    Objective:  Patient found with: TLSO lying supine in bed    Occupational Performance:    Bed Mobility:    · Patient completed Rolling/Turning to Left with  contact guard assistance  · Patient completed Rolling/Turning to Right with contact guard assistance  · Patient completed Scooting/Bridging with minimum assistance  · Patient completed Supine to Sit with minimum assistance     Functional Mobility/Transfers:  · Patient completed Sit <> Stand Transfer with maximal assistance  with  rolling walker   · Patient completed Bed <> Chair Transfer using Stand Pivot technique with maximal assistance with no assistive device  ·     Activities of Daily Living:  · Feeding:  Pt required set up for feeding seated in bed side chair   · Grooming: Pt required set up to comb hair seated in bed side chair  · Bathing: Pt required Max A to bath whole body lying supine in bed  · Upper Body Pt required Min A to don/doff over head shirt and button down shirt. Pt required Max A to don TLSO brace   · Lower Body Dressing: Pt required Max A to don pants lying supine in bed and assist to  don/doff socks and shoes  · Toileting: required total A to perform perineal cleaning     AMPAC 6 Click:  AMPAC Total Score: 16    OT Exercises: Pt performed elbow flexion/extention with no weight  seated in bed side chair with 2 sets of 10                            Pt performed ankle flexion/ extension seated in bed chair with 2 sets of 10.    Additional Treatment:  Pt was educated on don TLSO brace.  Pt was educated of safety precautions with transfers.    Patient left up in chair with call button in reach and nsg present    ASSESSMENT:  Karly Sandoval is a 88 y.o. female with a medical diagnosis of <principal problem not specified>. Pt tolerated tx fair. Pt demonstrated inconsistency with complains of pain. Pt will benefit with continuing OT to increase UE strength, [place], and endurance. OT will continue POC as indicated.Pt. Continues to require extensive assist with ADL tasks  Rehab identified problem list/impairments: weakness, impaired endurance, impaired self care skills, impaired functional mobilty, gait instability, impaired balance, decreased safety awareness, pain    Rehab potential is fair    Activity tolerance: Fair    Discharge recommendations: home with home health  And 24 hour physical assist     Barriers to discharge: Decreased caregiver support     Equipment recommendations: wheelchair     GOALS:   Multidisciplinary Problems     Occupational Therapy Goals        Problem: Occupational Therapy Goal    Goal Priority Disciplines Outcome Interventions   Occupational Therapy Goal     OT, PT/OT Ongoing (interventions implemented as appropriate)    Description:  Goals to be met by: 2 weeks     Patient will increase functional independence with ADLs by performing:    UE Dressing with Set-up Assistance.  LE Dressing with Stand-by Assistance.  Grooming while standing at sink with Supervision.  Toileting from bedside commode with Stand-by Assistance for hygiene and clothing management.   Bathing from  shower chair/bench with Stand-by Assistance.  Supine to sit with Supervision.  Stand pivot transfers with Stand-by Assistance.  Toilet transfer to bedside commode with Stand-by  Assistance.  Upper extremity exercise program 3 x 15 reps per handout, with independence.  Patient will complete a functional standing activity with S for activity tolerance in order to perform household tasks.                     Plan:  Patient to be seen 5 x/week to address the above listed problems via self-care/home management, therapeutic activities, therapeutic exercises  Plan of Care expires: 12/21/18  Plan of Care reviewed with: patient    Severiano Gonzalez, SOT  11/26/2018     I certify that I was present in the room directing the student in service delivery and guiding them using my skilled judgment. As the co-signing therapist I have reviewed the students documentation and am responsible for the treatment, assessment, and plan.

## 2018-11-26 NOTE — ASSESSMENT & PLAN NOTE
· Discussed DNR status  · Both patient and son agreed if her heart were to stop or if she could no longer breath not to perform resuscitative measure  · order placed

## 2018-11-26 NOTE — PT/OT/SLP PROGRESS
"Physical Therapy  Treatment    Karly Sandoval   MRN: [id number]   Admitting Diagnosis: Compression fracture of T12 vertebra    PT Received On: 11/26/18  Total Time (min): 57       Billable Minutes:  Gait Training 15, Therapeutic Activity 27 and Therapeutic Exercise 15    Treatment Type: Treatment  PT/PTA: PT     PTA Visit Number: 0       General Precautions: Standard, fall  Orthopedic Precautions: spinal precautions   Braces: TLSO(when OOB)    Do you have any cultural, spiritual, Yarsanism conflicts, given your current situation?: none    Subjective:  Communicated with pt prior to session.  "You just don't know how much this hurts"- regarding her lower back with bed mobility.    Pain/Comfort-- reported during ambulation.  Pain Rating 1: 5/10  Location - Side 1: Bilateral  Location - Orientation 1: lower  Location 1: back  Pain Addressed 1: Reposition  Pain Rating Post-Intervention 1: 5/10    Objective:  Patient found seated in wheelchair with Patient found with: TLSO     AM-PAC 6 CLICK MOBILITY  Total Score:15    Bed Mobility:  Sit>Supine:Mod A with log roll technique - assistance provided at trunk and LEs.  Supine>Sit: Min A with log roll technique- assistance provided at trunk and LEs.    Transfers:  Sit<>Stand: Min-Mod A from wheelchair/mat; pt does not lean forward over her base of support enough to lift off of the surface safely. Has a posterior lean.  Stand Pivot Transfer: Min-Mod A with cues for proper hand placement and lining up prior to descending onto surface.    Gait:  Amb 93 feet with SBA and use of rolling walker with narrow base of support.     Therex:  · LAQ, hip flexion, AP, GS, SAQ, QS, hip abduction, heel slides x 20 reps, trunk flexion  (2x10 reps),  lateral pelvic tilts (10 reps)   · TA isometric+ hip adduction isometric (2x10 reps)  For core strengthening    Balance:  Needs UE support and Min-Mod A for all standing activity.      Patient left L side-lying with call button in " reach.    Assessment:  Karly Sandoval is a 88 y.o. female with a medical diagnosis of Compression fracture of T12 vertebra.  Ms. Sandoval will need to address her sit<>stand technique, as she currently requires Min-Mod A, presenting with a slight posterior lean. This can be a fall risk for her.    Rehab identified problem list/impairments: weakness, impaired endurance, impaired self care skills, impaired functional mobilty, gait instability, impaired balance, decreased upper extremity function, decreased lower extremity function, pain    Rehab potential is good.    Activity tolerance: Good    Discharge recommendations: (penitentiary or NH depending on pt progress)     Barriers to discharge: None(will D/C to penitentiary or NH)    Equipment recommendations: wheelchair     GOALS:   Multidisciplinary Problems     Physical Therapy Goals        Problem: Physical Therapy Goal    Goal Priority Disciplines Outcome Goal Variances Interventions   Physical Therapy Goal     PT, PT/OT Ongoing (interventions implemented as appropriate)     Description:  Goals to be met by: 14 days     Patient will increase functional independence with mobility by performin. Supine to sit with Set-up [place]  2. Sit to supine with Contact Guard Assistance  3. Sit to stand transfer with Contact Guard Assistance  4. Bed to chair transfer with Contact Guard Assistance using Rolling Walker  5. Gait  x 100 feet with Contact Guard Assistance using Rolling Walker.   6. Stand for 2 minutes with Contact Guard Assistance using Rolling Walker                      PLAN:    Patient to be seen (5-6X/week)  to address the above listed problems via gait training, therapeutic activities, therapeutic exercises  Plan of Care expires: 18  Plan of Care reviewed with: patient    Kerry Clifford, PT  2018

## 2018-11-26 NOTE — ASSESSMENT & PLAN NOTE
· Treated for CHARLENE [place] [facility] [place] 1.9  · Baseline at 1.5  · Cr. 1.6  · Continue lasix will repeat BMP in AM  · May need to decrease lasix MWF dosing  · Continue to monitor with BIW labs with repeat in AM  · Avoid nephrotoxins and renally adjust medications

## 2018-11-26 NOTE — ASSESSMENT & PLAN NOTE
· Lasix held prior to readmission for worsening renal disease but needed to be diuresed from pulmonary congestion  · lasix was increase to 40mg daily prior to discharge but Cr increase and was sent here with lasix 20mg daily, Cr 1.6 repeat BMP in AM, patient may need to return to MWF scheduled as previously  · Continue coreg and furosemide, lisinopril on hold for renal function  · compensated on exam  · monitor weight daily, currently stable  · Denies SOB or RODAS

## 2018-11-26 NOTE — PLAN OF CARE
Problem: Physical Therapy Goal  Goal: Physical Therapy Goal  Goals to be met by: 14 days     Patient will increase functional independence with mobility by performin. Supine to sit with Set-up [place]  2. Sit to supine with Contact Guard Assistance  3. Sit to stand transfer with Contact Guard Assistance  4. Bed to chair transfer with Contact Guard Assistance using Rolling Walker  5. Gait  x 100 feet with Contact Guard Assistance using Rolling Walker.   6. Stand for 2 minutes with Contact Guard Assistance using Rolling Walker     Outcome: Ongoing (interventions implemented as appropriate)  Goals remain appropriate.

## 2018-11-26 NOTE — HPI
Chief complaint/ Reason for Admission: T12 compression fracture    HPI:   Karly Sandoval is a 88 y.o. lady with RA (on plaquenil), HFpEF (noted diastolic dysfunction in echo on 2016), essential hypertension, hyperlipidemia, chronic compression fx of T8-T9, new T12 compression fracture  and new cystic pancreatic head mass who presented to [facility] for evaluation of worsening back pain. She was also was  treated for a positive UA with E. Coli and Proteus, where she completed a 7 day course of ciprofloxacin along with management for CHARLENE buy Medicine. She was deemed not to be a surgical candidate for her T12 compression fracture by Neurosurgery and medical management was opted instead due to the nature of the fracture. Patient worked with therapy while inpatient and SNF placement was suggested. Patient at beside and feels that her confusion is cleared and patient is back to her baseline neurologically. He is concerned that she is still using a diaper instead of ambulating to the bathroom. Patient will not be returning to her home but an Assisted Living vs. Nursing home. Son feels if patient doesn't have significant improvement or if patient is + for Ca of pancreas patient will be placed into a nursing home.    11/27: the patient denies pain at rest but states it is 7/10 when moving; her back brace is uncomfortable when eating; lidoderm patch has been effective. She has had worsened confusion with narcotics.

## 2018-11-26 NOTE — PLAN OF CARE
Problem: Occupational Therapy Goal  Goal: Occupational Therapy Goal  Goals to be met by: 2 weeks     Patient will increase functional independence with ADLs by performing:    UE Dressing with Set-up Assistance.  LE Dressing with Stand-by Assistance.  Grooming while standing at sink with Supervision.  Toileting from bedside commode with Stand-by Assistance for hygiene and clothing management.   Bathing from  shower chair/bench with Stand-by Assistance.  Supine to sit with Supervision.  Stand pivot transfers with Stand-by Assistance.  Toilet transfer to bedside commode with Stand-by Assistance.  Upper extremity exercise program 3 x 15 reps per handout, with independence.  Patient will complete a functional standing activity with S for activity tolerance in order to perform household tasks.    Limited by pain and nausea on this date

## 2018-11-26 NOTE — PLAN OF CARE
Problem: Patient Care Overview  Goal: Plan of Care Review  Outcome: Ongoing (interventions implemented as appropriate)   11/26/18 1128   Coping/Psychosocial   Plan Of Care Reviewed With patient       Problem: Fall Risk (Adult)  Goal: Absence of Falls  Patient will demonstrate the desired outcomes by discharge/transition of care.  Outcome: Ongoing (interventions implemented as appropriate)   11/26/18 1128   Fall Risk (Adult)   Absence of Falls making progress toward outcome       Problem: Pressure Ulcer Risk (Jake Scale) (Adult,Obstetrics,Pediatric)  Goal: Skin Integrity  Patient will demonstrate the desired outcomes by discharge/transition of care.  Outcome: Ongoing (interventions implemented as appropriate)   11/26/18 1128   Pressure Ulcer Risk (Jake Scale) (Adult,Obstetrics,Pediatric)   Skin Integrity making progress toward outcome       Comments: Patient monitored every 1 to 2 hours for pain and safety.  Safety maintained.  Patient instructed to call for assistance.Call  Light and persoanl items in reach.

## 2018-11-26 NOTE — HOSPITAL COURSE
11/20/18: Patient admitted to SNF for ongoing PT/OT following a hospitalization for T12 compression fracture.   11/26:Patient seen at bedside, c/o improved pain to back with TLSO brace and discomfort with brace. Discussed Plan of care and verbalized understanding. Answered all questions.  11/28: UTI noted on UA and culture, started on Cipro, pending completion of susceptibility  11/29: patient seen at bedside, appears to be doing than earlier this week. Family feels that she is improving. AL paperwork completed. Labs reviewed.

## 2018-11-26 NOTE — ASSESSMENT & PLAN NOTE
· Chronic, controlled  · Continue norvasc and coreg  · Added holding parameters  · Monitor and adjust regimen as needed

## 2018-11-26 NOTE — ASSESSMENT & PLAN NOTE
· Not a surgical candidate  · Continue medical management   · Continue TLSO brace when OOB  · Continue pain control with Lidoderm patches  · Bowel regimen with senokot-s, hold for loose or frequent stooling  · DVT ppx with heparin   · PT/OT  · Fall precautions

## 2018-11-26 NOTE — ASSESSMENT & PLAN NOTE
· Pancreatic lesion noted on non-contrast CT  On 11/15, located at the pancreatic head  · Patient will need f/u with Pancreatic cyst services for outpatient testing

## 2018-11-26 NOTE — PROGRESS NOTES
[facility] - Skilled Nursing Care  Department of Hospital Medicine  Progress Note    Patient Name: Karly Sandoval  MRN: [id number]  Code Status: DNR  Admission Date: 11/20/2018  Length of Stay: 6 days  Attending Physician: Edna Henry MD  Primary Care Provider: Uday Allen MD    Subjective:     Principal Problem:Compression fracture of T12 vertebra    Chief complaint/ Reason for Admission: T12 compression fracture    HPI:   Karly Sandoval is a 88 y.o. lady with RA (on plaquenil), HFpEF (noted diastolic dysfunction in echo on 2016), essential hypertension, hyperlipidemia, chronic compression fx of T8-T9, new T12 compression fracture  and new cystic pancreatic head mass who presented to [facility] from [facility] for evaluation of worsening back pain. She was also was  treated for a positive UA with E. Coli and Proteus, where she completed a 7 day course of ciprofloxacin along with management for CHARLENE buy Medicine. She was deemed not to be a surgical candidate for her T12 compression fracture by Neurosurgery and medical management was opted instead due to the nature of the fracture. Patient worked with therapy while inpatient and SNF placement was suggested. Patient at beside and feels that her confusion is cleared and patient is back to her baseline neurologically. He is concerned that she is still using a diaper instead of ambulating to the bathroom. Patient will not be returning to her home but an Assisted Living vs. Nursing home. Son feels if patient doesn't have significant improvement or if patient is + for Ca of pancreas patient will be placed into a nursing home.    Hospital Course:  11/20/18: Patient admitted to SNF for ongoing PT/OT following a hospitalization for T12 compression fracture.   11/26:Patient seen at bedside, c/o improved pain to back with TLSO brace and discomfort with brace. Discussed Plan of care and verbalized understanding. Answered all questions.    Interval History: Patient seen at  bedside, doing well, son present discussed outpatient f/u for pancreatic cyst, patient wishes to DNR (both patient and son agreed).    Review of Systems   Constitutional: Positive for fatigue. Negative for appetite change, chills and fever.   HENT: Negative for trouble swallowing.    Respiratory: Negative for cough, chest tightness, shortness of breath and wheezing.    Cardiovascular: Negative for chest pain, palpitations and leg swelling.   Gastrointestinal: Negative for abdominal pain, constipation, diarrhea and nausea.   Genitourinary: Negative for difficulty urinating, frequency and urgency.   Musculoskeletal: Positive for back pain. Negative for arthralgias and myalgias.        + improved back with brace, but brace is uncomfortable   Skin: Negative for rash.   Neurological: Negative for dizziness, weakness, light-headedness and headaches.   Psychiatric/Behavioral: Negative for sleep disturbance.     Scheduled Meds:   amLODIPine  10 mg Oral Daily    atorvastatin  20 mg Oral Daily    calcium carbonate  500 mg Oral Daily    carvedilol  6.25 mg Oral BID WM    escitalopram oxalate  10 mg Oral Daily    folic acid  1 mg Oral Daily    furosemide  20 mg Oral Daily    heparin (porcine)  5,000 Units Subcutaneous Q8H    hydroxychloroquine  200 mg Oral Daily    lidocaine  2 patch Transdermal Q24H    senna-docusate 8.6-50 mg  1 tablet Oral BID     Continuous Infusions:  PRN Meds:.acetaminophen, albuterol sulfate, ALPRAZolam, bisacodyl, ondansetron    Objective:     Vital Signs (Most Recent):  Temp: 98.3 °F (36.8 °C) (11/24/18 0730)  Pulse: 68 (11/24/18 0730)  Resp: 18 (11/24/18 0730)  BP: (!) 153/67 (11/24/18 0730)  SpO2: 96 % (11/24/18 0730) Vital Signs (24h Range):  Temp:  [97.1 °F (36.2 °C)-98.3 °F (36.8 °C)] 98.3 °F (36.8 °C)  Pulse:  [67-68] 68  Resp:  [18] 18  SpO2:  [96 %-98 %] 96 %  BP: (128-153)/(60-67) 153/67     Weight: 52.9 kg (116 lb 10 oz)(with brace)  Body mass index is 21.33 kg/m².    Physical Exam    Constitutional: She is oriented to person, place, and time. She appears well-developed and well-nourished. No distress.   Cardiovascular: Normal rate, regular rhythm and normal heart sounds. Exam reveals no gallop and no friction rub.   No murmur heard.  Pulmonary/Chest: Effort normal. No respiratory distress. She has decreased breath sounds. She has no wheezes. She has no rales.   Abdominal: Soft. Bowel sounds are normal. She exhibits no distension. There is no tenderness.   ABD rounded but soft   Musculoskeletal: Normal range of motion. She exhibits no edema or tenderness.   TLSO brace in use, Patient is kyphotic   Neurological: She is alert and oriented to person, place, and time.   Skin: Skin is warm and dry. No rash noted. She is not diaphoretic. No cyanosis. Nails show no clubbing.   Psychiatric: She has a normal mood and affect. Her behavior is normal.       Significant Labs:   Recent Labs   Lab 11/26/18 0524   WBC 7.41   HGB 8.4*   HCT 25.5*        Recent Labs   Lab 11/26/18 0524   *   K 3.9   CL 98   CO2 24   BUN 22   CREATININE 1.6*   CALCIUM 8.6*     Lab Results   Component Value Date    LABPROT 12.4 11/13/2018    ALBUMIN 2.4 (L) 11/13/2018     Lab Results   Component Value Date    CALCIUM 8.6 (L) 11/26/2018    PHOS 4.0 11/26/2018     Significant Imaging:na    Assessment/Plan:      * Compression fracture of T12 vertebra    · Not a surgical candidate  · Continue medical management   · Continue TLSO brace when OOB  · Continue pain control with Lidoderm patches  · Bowel regimen with senokot-s, hold for loose or frequent stooling  · DVT ppx with heparin   · PT/OT  · Fall precautions     DNR (do not resuscitate) discussion    · Discussed DNR status  · Both patient and son agreed if her heart were to stop or if she could no longer breath not to perform resuscitative measure  · order placed     Takes dietary supplements    · Continue calcium and folic supplements      Hyponatremia    ·   and stable  · Still on lasix  · Will continue to monitor with BIW labs     Pancreatic lesion    · Pancreatic lesion noted on non-contrast CT  On 11/15, located at the pancreatic head  · Patient will need f/u with Pancreatic cyst services for outpatient testing     Anxiety    · Sleeping well, mood stable  · Continue home treatment with escitalopram and alprazolam     Chronic diastolic CHF (congestive heart failure)    · Lasix held prior to readmission for worsening renal disease but needed to be diuresed from pulmonary congestion  · lasix was increase to 40mg daily prior to discharge but Cr increase and was sent here with lasix 20mg daily, Cr 1.6 repeat BMP in AM, patient may need to return to MWF scheduled as previously  · Continue coreg and furosemide, lisinopril on hold for renal function  · compensated on exam  · monitor weight daily, currently stable  · Denies SOB or RODAS     CKD (chronic kidney disease), stage III    · [place] [facility] [place] 1.9  · Baseline at 1.5  · Cr. 1.6  · Continue lasix will repeat BMP in AM  · May need to decrease lasix MWF dosing  · Continue to monitor with BIW labs with repeat in AM  · Avoid nephrotoxins and renally adjust medications     Mixed hyperlipidemia    · Continue atorvastatin   · F/u with PCP for ongoing outpatient monitoring      Essential hypertension    · Chronic, controlled  · Continue norvasc and coreg  · Added holding parameters  · Monitor and adjust regimen as needed     RA (rheumatoid arthritis)    · Patient without joint complaints  · Continue plaquenil  · F/u with [facility] outpatient       I certify that SNF services are required to be given on an inpatient basis because Karly Sandoval's needs for skilled nursing care and/or skilled rehabilitation are required on a daily basis and such services can only practically be provided in a skilled nursing facility setting and are for an ongoing condition for which she received inpatient care in the hospital.      Future Appointments   Date Time Provider Department Center   12/18/2018 12:45 PM [facility] ORTHO CLINIC [facility]   12/18/2018  1:00 PM Fei Sloan PA-C [facility] NEUROSU [facility]   1/14/2019 11:30 AM Isai Smith MD [facility] MARJAN [facility]   2/19/2019  8:00 AM HOME MONITOR DEVICE CHECK, [facility] JAMEEL [facility]       Patient's care plan and discharge planning will be discussed by the SNF team in IDT meeting weekly. Medications to be reviewed and discussed with the SNF unit clinical pharmacist.      Sydnee Cash NP  Department of Hospital Medicine  [facility] - [facility] Nursing Care

## 2018-11-27 ENCOUNTER — TELEPHONE (OUTPATIENT)
Dept: ENDOSCOPY | Facility: HOSPITAL | Age: 83
End: 2018-11-27

## 2018-11-27 LAB
ANION GAP SERPL CALC-SCNC: 5 MMOL/L
BACTERIA #/AREA URNS AUTO: ABNORMAL /HPF
BASOPHILS # BLD AUTO: 0.04 K/UL
BASOPHILS NFR BLD: 0.5 %
BILIRUB UR QL STRIP: NEGATIVE
BUN SERPL-MCNC: 18 MG/DL
CALCIUM SERPL-MCNC: 8.5 MG/DL
CHLORIDE SERPL-SCNC: 98 MMOL/L
CLARITY UR REFRACT.AUTO: ABNORMAL
CO2 SERPL-SCNC: 27 MMOL/L
COLOR UR AUTO: YELLOW
CREAT SERPL-MCNC: 1.6 MG/DL
DIFFERENTIAL METHOD: ABNORMAL
EOSINOPHIL # BLD AUTO: 0.1 K/UL
EOSINOPHIL NFR BLD: 0.9 %
ERYTHROCYTE [DISTWIDTH] IN BLOOD BY AUTOMATED COUNT: 14.1 %
EST. GFR  (AFRICAN AMERICAN): 32.9 ML/MIN/1.73 M^2
EST. GFR  (NON AFRICAN AMERICAN): 28.6 ML/MIN/1.73 M^2
GLUCOSE SERPL-MCNC: 100 MG/DL
GLUCOSE UR QL STRIP: NEGATIVE
HCT VFR BLD AUTO: 24.3 %
HGB BLD-MCNC: 8.2 G/DL
HGB UR QL STRIP: ABNORMAL
HYALINE CASTS UR QL AUTO: 0 /LPF
IMM GRANULOCYTES # BLD AUTO: 0.09 K/UL
IMM GRANULOCYTES NFR BLD AUTO: 1.1 %
KETONES UR QL STRIP: NEGATIVE
LEUKOCYTE ESTERASE UR QL STRIP: ABNORMAL
LYMPHOCYTES # BLD AUTO: 1.4 K/UL
LYMPHOCYTES NFR BLD: 17.6 %
MCH RBC QN AUTO: 31.2 PG
MCHC RBC AUTO-ENTMCNC: 33.7 G/DL
MCV RBC AUTO: 92 FL
MICROSCOPIC COMMENT: ABNORMAL
MONOCYTES # BLD AUTO: 0.8 K/UL
MONOCYTES NFR BLD: 10 %
NEUTROPHILS # BLD AUTO: 5.5 K/UL
NEUTROPHILS NFR BLD: 69.9 %
NITRITE UR QL STRIP: NEGATIVE
NON-SQ EPI CELLS #/AREA URNS AUTO: 7 /HPF
NRBC BLD-RTO: 0 /100 WBC
PH UR STRIP: 6 [PH] (ref 5–8)
PLATELET # BLD AUTO: 266 K/UL
PMV BLD AUTO: 9.7 FL
POTASSIUM SERPL-SCNC: 3.9 MMOL/L
PROT UR QL STRIP: ABNORMAL
RBC # BLD AUTO: 2.63 M/UL
RBC #/AREA URNS AUTO: 79 /HPF (ref 0–4)
SODIUM SERPL-SCNC: 130 MMOL/L
SP GR UR STRIP: 1.01 (ref 1–1.03)
SQUAMOUS #/AREA URNS AUTO: 25 /HPF
URN SPEC COLLECT METH UR: ABNORMAL
WBC # BLD AUTO: 7.83 K/UL
WBC #/AREA URNS AUTO: >100 /HPF (ref 0–5)
WBC CLUMPS UR QL AUTO: ABNORMAL

## 2018-11-27 PROCEDURE — 87186 SC STD MICRODIL/AGAR DIL: CPT

## 2018-11-27 PROCEDURE — 63600175 PHARM REV CODE 636 W HCPCS: Performed by: HOSPITALIST

## 2018-11-27 PROCEDURE — 87040 BLOOD CULTURE FOR BACTERIA: CPT

## 2018-11-27 PROCEDURE — 87077 CULTURE AEROBIC IDENTIFY: CPT

## 2018-11-27 PROCEDURE — 25000003 PHARM REV CODE 250: Performed by: NURSE PRACTITIONER

## 2018-11-27 PROCEDURE — 85025 COMPLETE CBC W/AUTO DIFF WBC: CPT

## 2018-11-27 PROCEDURE — 25000003 PHARM REV CODE 250: Performed by: HOSPITALIST

## 2018-11-27 PROCEDURE — 36415 COLL VENOUS BLD VENIPUNCTURE: CPT

## 2018-11-27 PROCEDURE — 97535 SELF CARE MNGMENT TRAINING: CPT

## 2018-11-27 PROCEDURE — 87086 URINE CULTURE/COLONY COUNT: CPT

## 2018-11-27 PROCEDURE — 97110 THERAPEUTIC EXERCISES: CPT

## 2018-11-27 PROCEDURE — 81001 URINALYSIS AUTO W/SCOPE: CPT

## 2018-11-27 PROCEDURE — 11000004 HC SNF PRIVATE

## 2018-11-27 PROCEDURE — 87088 URINE BACTERIA CULTURE: CPT

## 2018-11-27 PROCEDURE — 25000003 PHARM REV CODE 250: Performed by: INTERNAL MEDICINE

## 2018-11-27 PROCEDURE — 97530 THERAPEUTIC ACTIVITIES: CPT

## 2018-11-27 PROCEDURE — 80048 BASIC METABOLIC PNL TOTAL CA: CPT

## 2018-11-27 RX ORDER — CIPROFLOXACIN 500 MG/1
500 TABLET ORAL EVERY 24 HOURS
Status: DISCONTINUED | OUTPATIENT
Start: 2018-11-28 | End: 2018-12-04 | Stop reason: HOSPADM

## 2018-11-27 RX ADMIN — ESCITALOPRAM 10 MG: 10 TABLET, FILM COATED ORAL at 10:11

## 2018-11-27 RX ADMIN — FUROSEMIDE 20 MG: 20 TABLET ORAL at 10:11

## 2018-11-27 RX ADMIN — ALPRAZOLAM 0.25 MG: 0.25 TABLET ORAL at 11:11

## 2018-11-27 RX ADMIN — HEPARIN SODIUM 5000 UNITS: 5000 INJECTION, SOLUTION INTRAVENOUS; SUBCUTANEOUS at 11:11

## 2018-11-27 RX ADMIN — LIDOCAINE 2 PATCH: 50 PATCH TOPICAL at 05:11

## 2018-11-27 RX ADMIN — HEPARIN SODIUM 5000 UNITS: 5000 INJECTION, SOLUTION INTRAVENOUS; SUBCUTANEOUS at 02:11

## 2018-11-27 RX ADMIN — CALCIUM CARBONATE 500 MG: 1250 SUSPENSION ORAL at 10:11

## 2018-11-27 RX ADMIN — CARVEDILOL 6.25 MG: 6.25 TABLET, FILM COATED ORAL at 10:11

## 2018-11-27 RX ADMIN — ONDANSETRON 8 MG: 8 TABLET, ORALLY DISINTEGRATING ORAL at 09:11

## 2018-11-27 RX ADMIN — GUAIFENESIN AND DEXTROMETHORPHAN 10 ML: 100; 10 SYRUP ORAL at 02:11

## 2018-11-27 RX ADMIN — FOLIC ACID 1 MG: 1 TABLET ORAL at 10:11

## 2018-11-27 RX ADMIN — HEPARIN SODIUM 5000 UNITS: 5000 INJECTION, SOLUTION INTRAVENOUS; SUBCUTANEOUS at 06:11

## 2018-11-27 RX ADMIN — AMLODIPINE BESYLATE 10 MG: 10 TABLET ORAL at 10:11

## 2018-11-27 RX ADMIN — ACETAMINOPHEN 650 MG: 325 TABLET ORAL at 02:11

## 2018-11-27 RX ADMIN — ATORVASTATIN CALCIUM 20 MG: 20 TABLET, FILM COATED ORAL at 10:11

## 2018-11-27 RX ADMIN — CARVEDILOL 6.25 MG: 6.25 TABLET, FILM COATED ORAL at 06:11

## 2018-11-27 RX ADMIN — HYDROXYCHLOROQUINE SULFATE 200 MG: 200 TABLET, FILM COATED ORAL at 10:11

## 2018-11-27 NOTE — PLAN OF CARE
Problem: Physical Therapy Goal  Goal: Physical Therapy Goal  Goals to be met by: 14 days     Patient will increase functional independence with mobility by performin. Supine to sit with Set-up [place]  2. Sit to supine with Contact Guard Assistance  3. Sit to stand transfer with Contact Guard Assistance  4. Bed to chair transfer with Contact Guard Assistance using Rolling Walker  5. Gait  x 100 feet with Contact Guard Assistance using Rolling Walker.   6. Stand for 2 minutes with Contact Guard Assistance using Rolling Walker     Outcome: Ongoing (interventions implemented as appropriate)  Goals remain appropriate

## 2018-11-27 NOTE — PROGRESS NOTES
CHRIS met with patient with son, Rene, at the bedside and discussed discharge planning.  Plan is to explore assisted living at [facility] ([phone]) or at nursing home in [place] ([place]).  Patient's son stated that patient will be evaluated assisted living.  SW requested and obtained permission to submit information to [facility] for possible placement.. Patient's anticipated discharge date is 12/3/2018.  Patient's son asking if patient might be able to discharge on Tuesday rather than  Monday.  CHRIS will discuss with team member and followup with family.  Claudia Duarte LMSW, SABINA-Chris, [facility]  11/27/2018

## 2018-11-27 NOTE — TELEPHONE ENCOUNTER
----- Message from Christianne Davison sent at 11/27/2018  9:16 AM CST -----  Contact: Danielle- [facility]- [phone]  Chasidy Randhawa called to schedule the pt an appt with a pancreatic specialist- pt has a cyst- please contact Sydnee at [phone]

## 2018-11-27 NOTE — PLAN OF CARE
CHRIS met with patient with son, Rene, at the bedside and discussed discharge planning.  Plan is to explore assisted living at [facility] ([phone]) or at nursing home in [place] ([place]).  Patient's son stated that patient will be evaluated assisted living.  SW requested and obtained permission to submit information to [facility] for possible placement.. Patient's anticipated discharge date is 12/3/2018.  Patient's son asking if patient might be able to discharge on Tuesday rather than  Monday.  CHRIS will discuss with team member and followup with family.  Claudia Duarte LMSW, SABINA-Chris, [facility]  11/27/2018

## 2018-11-27 NOTE — PT/OT/SLP PROGRESS
Occupational Therapy  Treatment    Karly Sandoval   MRN: [id number]   Admitting Diagnosis: Compression fracture of T12 vertebra    OT Date of Treatment: 11/27/18  Total Time (min): 46 min    Billable Minutes:  Self Care/Home Management 25 and Therapeutic Exercise 21    General Precautions: Standard, fall  Orthopedic Precautions: spinal precautions  Braces: TLSO         Subjective:  Communicated with nsg prior to session.  Pt stated she just wasn't feeling well on today    Pain/Comfort  Pain Rating 1: 5/10  Location - Side 1: Bilateral  Location - Orientation 1: lower  Location 1: back  Pain Addressed 1: Reposition  Pain Rating Post-Intervention 1: 5/10    Objective:  Patient found with: TLSO, lying supine in bed     Occupational Performance:    Bed Mobility:    · Patient completed Rolling/Turning to Left with  stand by assistance  · Patient completed Scooting/Bridging with moderate assistance  · Patient completed Supine to Sit with moderate assistance     Functional Mobility/Transfers:  · Patient completed Sit <> Stand Transfer with moderate assistance  with  rolling walker   Functional Mobility: Pt performed function sit<>Stand requiring Mod A with VC with RW in place to maintain balance and stability   Activities of Daily Living:  · Grooming: Pt required set up to brush teeth, Pt required Max A to properly clean dentures, however pt requires SBA to don/doff   · Upper Body Dressing: Pt required Max A to don brace.    [facility] 6 Click:  [facility] Total Score: 16    OT Exercises: Pt performed all four functional directions with one #2 dowel utilizing BUE x 2 sets of 10 supine in bed     Additional Treatment:  Pt was educated on donning TLSO brace with safety precautions.  Pt was educated on safety/spinal precautions.  Pt. Educated on importance of OOB  Patient left supine with call button in reach    ASSESSMENT:  Karly Sandoval is a 88 y.o. female with a medical diagnosis of Compression fracture of T12 vertebra  Pt  tolerated tx fair and will benefit from OT to increase self independence, education with precautions, and endurance. Pt was not feeling well on this date and verbally stated she would rather stay in room for tx. Pt. Continues to have back pain limiting session. OT will continue POC as indicated.    Rehab identified problem list/impairments: weakness, impaired endurance, impaired self care skills, impaired functional mobilty, gait instability, impaired balance, decreased safety awareness, pain    Rehab potential is fair    Activity tolerance: Fair    Discharge recommendations: home with home health     Barriers to discharge: Decreased caregiver support     Equipment recommendations: wheelchair     GOALS:   Multidisciplinary Problems     Occupational Therapy Goals        Problem: Occupational Therapy Goal    Goal Priority Disciplines Outcome Interventions   Occupational Therapy Goal     OT, PT/OT Ongoing (interventions implemented as appropriate)    Description:  Goals to be met by: 2 weeks     Patient will increase functional independence with ADLs by performing:    UE Dressing with Set-up Assistance.  LE Dressing with Stand-by Assistance.  Revised:  LBD with Min A  Grooming while standing at sink with Supervision.  Revised:  Grooming seated at sink with SBA  Toileting from bedside commode with Stand-by Assistance for hygiene and clothing management.   RevisedToileting from Bedside commode with Min A  Bathing from  shower chair/bench with Stand-by Assistance.  Bathing from shower bench with Mod A  Supine to sit with Supervision.  Stand pivot transfers with Stand-by Assistance.  Toilet transfer to bedside commode with Stand-by Assistance.  Revised:  Toilet transfer with Min A  Upper extremity exercise program 3 x 15 reps per handout, with independence.  Patient will complete a functional standing activity with S for activity tolerance in order to perform household tasks.  Goal Discontinued on 11-26-18                     Plan:  Patient to be seen 5 x/week to address the above listed problems via self-care/home management, therapeutic activities, therapeutic exercises  Plan of Care expires: 12/21/18  Plan of Care reviewed with: patient    Severiano GonzalezCASSIDY  11/27/2018     I certify that I was present in the room directing the student in service delivery and guiding them using my skilled judgment. As the co-signing therapist I have reviewed the students documentation and am responsible for the treatment, assessment, and plan.

## 2018-11-27 NOTE — PT/OT/SLP PROGRESS
"Physical Therapy  Treatment    Karly Sandoval   MRN: [id number]   Admitting Diagnosis: Compression fracture of T12 vertebra    PT Received On: 11/27/18  Total Time (min): 48       Billable Minutes:  Therapeutic Activity 21 and Therapeutic Exercise 27    Treatment Type: Treatment  PT/PTA: PTA     PTA Visit Number: 1       General Precautions: Standard, fall  Orthopedic Precautions: spinal precautions   Braces: TLSO(when OOB)    Do you have any cultural, spiritual, Hindu conflicts, given your current situation?: none    Subjective:  Multiple attempts AM and PM , agreeable in pm for supine therex and sitting EOB "I'm exhausted , they (nsg) said I could take it easy today"      Pain/Comfort  Pain Rating 1: 4/10  Location - Side 1: Bilateral  Location - Orientation 1: lower  Location 1: back  Pain Addressed 1: Pre-medicate for activity, Reposition, Distraction, Cessation of Activity, Nurse notified  Pain Rating Post-Intervention 1: (inc with mobility did not rate)    Objective: Patient found with: (in bed)     AM-PAC 6 CLICK MOBILITY  Total Score:16    Bed Mobility:  Sit>Supine:min A with trunk control and BLE mgmt via log roll vcs for tech inc time  Supine>Sit: min A with trunk elev and CG with BLE via log roll vcs for tech inc time  Total A x 2 with draw sheet to [facility]    Transfers:  Sit<>Stand: declined  Stand Pivot Transfer: declined    Gait:  Amb declined    Therex:  3x10 reps A/AA as needed AP,GS,QS,SAQ,HS,abd/add    Balance:  Sitting EOB ~ 12 minutes total time dyn and static with CG/SBA with uni lat support using BUE performing task on bedside table    Patient left supine with call button in reach and belongings in reach.    Assessment:  Karly Sandoval is a 88 y.o. female with a medical diagnosis of Compression fracture of T12 vertebra.  Pt tolerated well, however declined OOB and gait 2* to fatigue, pt would continue to benefit from skilled PT services to improve overall functional mobility, strength and " endurance.  .    Rehab identified problem list/impairments: weakness, impaired endurance, impaired self care skills, impaired functional mobilty, gait instability, impaired balance, decreased upper extremity function, decreased lower extremity function, pain    Rehab potential is good.    Activity tolerance: Fair    Discharge recommendations: (halfway or NH depending on pt progress)     Barriers to discharge: None(will D/C to [facility] or NH)    Equipment recommendations: wheelchair     GOALS:   Multidisciplinary Problems     Physical Therapy Goals        Problem: Physical Therapy Goal    Goal Priority Disciplines Outcome Goal Variances Interventions   Physical Therapy Goal     PT, PT/OT Ongoing (interventions implemented as appropriate)     Description:  Goals to be met by: 14 days     Patient will increase functional independence with mobility by performin. Supine to sit with Set-up [place]  2. Sit to supine with Contact Guard Assistance  3. Sit to stand transfer with Contact Guard Assistance  4. Bed to chair transfer with Contact Guard Assistance using Rolling Walker  5. Gait  x 100 feet with Contact Guard Assistance using Rolling Walker.   6. Stand for 2 minutes with Contact Guard Assistance using Rolling Walker                      PLAN:    Patient to be seen (5-6X/week)  to address the above listed problems via gait training, therapeutic activities, therapeutic exercises  Plan of Care expires: 18  Plan of Care reviewed with: patient    Lyndsay Conklin, PTA  2018

## 2018-11-27 NOTE — PLAN OF CARE
Problem: Patient Care Overview  Goal: Plan of Care Review  Outcome: Ongoing (interventions implemented as appropriate)   11/27/18 0444   Coping/Psychosocial   Plan Of Care Reviewed With patient       Problem: Fall Risk (Adult)  Goal: Absence of Falls  Patient will demonstrate the desired outcomes by discharge/transition of care.  Outcome: Ongoing (interventions implemented as appropriate)   11/27/18 0444   Fall Risk (Adult)   Absence of Falls making progress toward outcome

## 2018-11-28 ENCOUNTER — TELEPHONE (OUTPATIENT)
Dept: FAMILY MEDICINE | Facility: CLINIC | Age: 83
End: 2018-11-28

## 2018-11-28 PROCEDURE — 25000242 PHARM REV CODE 250 ALT 637 W/ HCPCS: Performed by: HOSPITALIST

## 2018-11-28 PROCEDURE — 25000003 PHARM REV CODE 250: Performed by: NURSE PRACTITIONER

## 2018-11-28 PROCEDURE — 99305 1ST NF CARE MODERATE MDM 35: CPT | Mod: ,,, | Performed by: INTERNAL MEDICINE

## 2018-11-28 PROCEDURE — 97110 THERAPEUTIC EXERCISES: CPT

## 2018-11-28 PROCEDURE — 63600175 PHARM REV CODE 636 W HCPCS: Performed by: HOSPITALIST

## 2018-11-28 PROCEDURE — 25000003 PHARM REV CODE 250: Performed by: HOSPITALIST

## 2018-11-28 PROCEDURE — 97535 SELF CARE MNGMENT TRAINING: CPT

## 2018-11-28 PROCEDURE — 11000004 HC SNF PRIVATE

## 2018-11-28 PROCEDURE — 94640 AIRWAY INHALATION TREATMENT: CPT

## 2018-11-28 PROCEDURE — 97803 MED NUTRITION INDIV SUBSEQ: CPT

## 2018-11-28 PROCEDURE — 25000003 PHARM REV CODE 250: Performed by: INTERNAL MEDICINE

## 2018-11-28 RX ORDER — AMOXICILLIN 250 MG
1 CAPSULE ORAL NIGHTLY
Status: DISCONTINUED | OUTPATIENT
Start: 2018-11-29 | End: 2018-12-04 | Stop reason: HOSPADM

## 2018-11-28 RX ADMIN — HEPARIN SODIUM 5000 UNITS: 5000 INJECTION, SOLUTION INTRAVENOUS; SUBCUTANEOUS at 06:11

## 2018-11-28 RX ADMIN — ALPRAZOLAM 0.25 MG: 0.25 TABLET ORAL at 08:11

## 2018-11-28 RX ADMIN — ONDANSETRON 8 MG: 8 TABLET, ORALLY DISINTEGRATING ORAL at 09:11

## 2018-11-28 RX ADMIN — CARVEDILOL 6.25 MG: 6.25 TABLET, FILM COATED ORAL at 05:11

## 2018-11-28 RX ADMIN — HEPARIN SODIUM 5000 UNITS: 5000 INJECTION, SOLUTION INTRAVENOUS; SUBCUTANEOUS at 03:11

## 2018-11-28 RX ADMIN — FUROSEMIDE 20 MG: 20 TABLET ORAL at 10:11

## 2018-11-28 RX ADMIN — ONDANSETRON 8 MG: 8 TABLET, ORALLY DISINTEGRATING ORAL at 08:11

## 2018-11-28 RX ADMIN — HEPARIN SODIUM 5000 UNITS: 5000 INJECTION, SOLUTION INTRAVENOUS; SUBCUTANEOUS at 09:11

## 2018-11-28 RX ADMIN — ATORVASTATIN CALCIUM 20 MG: 20 TABLET, FILM COATED ORAL at 10:11

## 2018-11-28 RX ADMIN — FOLIC ACID 1 MG: 1 TABLET ORAL at 10:11

## 2018-11-28 RX ADMIN — AMLODIPINE BESYLATE 10 MG: 10 TABLET ORAL at 10:11

## 2018-11-28 RX ADMIN — GUAIFENESIN AND DEXTROMETHORPHAN 10 ML: 100; 10 SYRUP ORAL at 03:11

## 2018-11-28 RX ADMIN — HYDROXYCHLOROQUINE SULFATE 200 MG: 200 TABLET, FILM COATED ORAL at 10:11

## 2018-11-28 RX ADMIN — LIDOCAINE 2 PATCH: 50 PATCH TOPICAL at 03:11

## 2018-11-28 RX ADMIN — ESCITALOPRAM 10 MG: 10 TABLET, FILM COATED ORAL at 10:11

## 2018-11-28 RX ADMIN — CIPROFLOXACIN HYDROCHLORIDE 500 MG: 500 TABLET, FILM COATED ORAL at 10:11

## 2018-11-28 RX ADMIN — GUAIFENESIN AND DEXTROMETHORPHAN 10 ML: 100; 10 SYRUP ORAL at 08:11

## 2018-11-28 RX ADMIN — ALBUTEROL SULFATE 2.5 MG: 2.5 SOLUTION RESPIRATORY (INHALATION) at 03:11

## 2018-11-28 RX ADMIN — CARVEDILOL 6.25 MG: 6.25 TABLET, FILM COATED ORAL at 09:11

## 2018-11-28 RX ADMIN — GUAIFENESIN AND DEXTROMETHORPHAN 10 ML: 100; 10 SYRUP ORAL at 02:11

## 2018-11-28 RX ADMIN — CALCIUM CARBONATE 500 MG: 1250 SUSPENSION ORAL at 10:11

## 2018-11-28 NOTE — SUBJECTIVE & OBJECTIVE
Past Medical History:   Diagnosis Date    Abdominal aortic aneurysm (AAA) without rupture 10/8/2018    Abnormal CT of the abdomen 10/8/2018    Acid reflux     Anemia     Anxiety     Atrial arrhythmia 2/11/2014    Carotid artery occlusion     Chronic prescription benzodiazepine use 10/23/2018    Compression fracture of thoracic vertebra 6/25/2014    CVA (cerebral infarction) 2014    Cyst of pancreas 10/9/2018    Diastolic heart failure     echo 2016 ef 55% +Diastolic dysf    Diverticulosis     Encounter for blood transfusion     GERD (gastroesophageal reflux disease) 9/11/2012    History of cholelithiasis     Hyperlipidemia     Hypertension     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    Osteoarthritis     Osteoarthritis of both knees 6/12/2015    Osteopenia     PFO (patent foramen ovale) 2/11/2014    PVC (premature ventricular contraction) 4/28/2014    RVOT origin -- benign     RBBB 11/14/2014    Rheumatoid arthritis(714.0)     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)     has pacemaker    Right pelvic adnexal fluid collection 10/8/2018       Past Surgical History:   Procedure Laterality Date    APPENDECTOMY      BREAST SURGERY      CARDIAC PACEMAKER PLACEMENT  11/2014    for syncope and RBBB/LAHB    CHOLECYSTECTOMY      EYE SURGERY      HEMORRHOID SURGERY      HYSTERECTOMY      1966    SKIN BIOPSY      VASCULAR SURGERY      VERTEBROPLASTY         Review of patient's allergies indicates:   Allergen Reactions    Amoxicillin Other (See Comments)     unknown    Bactrim [sulfamethoxazole-trimethoprim] Other (See Comments)     unknown    Omeprazole Other (See Comments)     Muscle and joint pain    Rocephin [ceftriaxone] Other (See Comments)     Severe acute generalized pain    Penicillins Rash     Scheduled Meds:   amLODIPine  10 mg Oral Daily    atorvastatin  20 mg Oral Daily    calcium carbonate  500 mg Oral Daily     carvedilol  6.25 mg Oral BID WM    ciprofloxacin HCl  500 mg Oral Daily    escitalopram oxalate  10 mg Oral Daily    folic acid  1 mg Oral Daily    furosemide  20 mg Oral Daily    heparin (porcine)  5,000 Units Subcutaneous Q8H    hydroxychloroquine  200 mg Oral Daily    lidocaine  2 patch Transdermal Q24H    senna-docusate 8.6-50 mg  1 tablet Oral BID     Continuous Infusions:  PRN Meds:.acetaminophen, albuterol sulfate, ALPRAZolam, bisacodyl, dextromethorphan-guaifenesin  mg/5 ml, ondansetron    No current facility-administered medications on file prior to encounter.      Current Outpatient Medications on File Prior to Encounter   Medication Sig    acetaminophen (TYLENOL) 650 MG TbSR Take 650 mg by mouth daily as needed (PAIN).    albuterol sulfate 2.5 mg/0.5 mL Nebu Take 2.5 mg by nebulization. Rescue    ALPRAZolam (XANAX) 0.25 MG tablet TAKE 1 TABLET BY MOUTH EVERY DAY AS NEEDED (Patient taking differently: TAKE ½ TABLET BY MOUTH TWICE DAILY)    amLODIPine (NORVASC) 10 MG tablet Take 10 mg by mouth once daily.    aspirin (ECOTRIN) 81 MG EC tablet Take 1 tablet (81 mg total) by mouth once daily. HOLD until cleared by your ENT doctor and your Neurologist. (Patient taking differently: Take 81 mg by mouth once daily. )    atorvastatin (LIPITOR) 20 MG tablet TAKE 1 TABLET BY MOUTH EVERY DAY    bisacodyl (DULCOLAX) 10 mg Supp Place 10 mg rectally daily as needed (CONSTIPATION).    calcium carbonate (CALCIUM 600 ORAL) Take 1 tablet by mouth 2 (two) times daily.    carvedilol (COREG) 6.25 MG tablet Take 1 tablet (6.25 mg total) by mouth 2 (two) times daily with meals.    coenzyme Q10 (CO Q-10) 100 mg capsule Take 100 mg by mouth once daily.    denosumab (PROLIA) 60 mg/mL Syrg Inject 60 mg into the skin. Every 6 months    escitalopram oxalate (LEXAPRO) 10 MG tablet Take 10 mg by mouth once daily.    fish oil-omega-3 fatty acids 300-1,000 mg capsule Take 1,000 mg by mouth once daily.     folic  acid (FOLVITE) 1 MG tablet TAKE 1 TABLET (1 MG TOTAL) BY MOUTH ONCE DAILY.    furosemide (LASIX) 20 MG tablet Take 1 tablet (20 mg total) by mouth once daily. (Patient taking differently: Take 20 mg by mouth every Mon, Wed, Fri. )    hydroxychloroquine (PLAQUENIL) 200 mg tablet Take 1 tablet (200 mg total) by mouth once daily.    lisinopril 10 MG tablet Take 1 tablet (10 mg total) by mouth once daily.    psyllium (METAMUCIL) powder Take 1 packet by mouth daily as needed (CONSTIPATION).    sertraline (ZOLOFT) 25 MG tablet Take 1 tablet (25 mg total) by mouth once daily.     Family History     Problem Relation (Age of Onset)    Diabetes Mellitus Mother, Brother    Heart disease Father    Hypertension Father, Mother    Leukemia Father    Parkinsonism Brother (68)        Tobacco Use    Smoking status: Never Smoker    Smokeless tobacco: Never Used   Substance and Sexual Activity    Alcohol use: No     Comment: vermooth    Drug use: No    Sexual activity: No     Partners: Male     Birth control/protection: None     Review of Systems   Constitutional: no fever or chills  Eyes: no visual changes  ENT: no nasal congestion or sore throat  Respiratory: no cough or shortness of breath  Cardiovascular: no chest pain or palpitations  Gastrointestinal: no nausea or vomiting, no abdominal pain; last BM:11/26  Genitourinary: no hematuria or dysuria  Integument/Breast: no rash or pruritis  Hematologic/Lymphatic: no easy bruising or lymphadenopathy  Allergy/Immunology: no postnasal drip  Musculoskeletal: no arthralgias or myalgias  Neurological: no seizures or tremors  Behavioral/Psych: no auditory or visual hallucinations  Endocrine: no heat or cold intolerance      Objective:     Vital Signs (Most Recent):  Temp: 98.1 °F (36.7 °C) (11/27/18 2024)  Pulse: 64 (11/27/18 2024)  Resp: 18 (11/27/18 2024)  BP: (!) 144/64 (11/27/18 2024)  SpO2: (!) 92 % (11/27/18 2024) Vital Signs (24h Range):  Temp:  [98.1 °F (36.7 °C)-99.3 °F  (37.4 °C)] 98.1 °F (36.7 °C)  Pulse:  [64-66] 64  Resp:  [18] 18  SpO2:  [92 %-94 %] 92 %  BP: (127-144)/(60-64) 144/64     Weight: 48.4 kg (106 lb 11.2 oz)  Body mass index is 19.52 kg/m².    Physical Exam  General: well developed, thin, no distress, appears frail  HENT: Head:normocephalic, atraumatic. Ears:bilateral external ear canals normal. Nose: Nares normal. Septum midline. Mucosa normal. Throat: lips, mucosa, and tongue normal and no throat erythema.  Eyes: conjunctivae/corneas clear.  EOM normal.  Neck: supple, symmetrical, trachea midline, no JVD and thyroid not enlarged.   Lungs:  clear to auscultation bilaterally and normal respiratory effort.  Cardiovascular: Heart: regular rate and rhythm, S1, S2 normal, no murmur, click, rub or gallop. Chest Wall: no tenderness. Extremities: no cyanosis, no edema, no clubbing. Pulses: 2+ and symmetric.  Abdomen/Rectal: Abdomen: soft, non-tender non-distended; bowel sounds normal  Skin: Skin color, texture, turgor normal. No rashes or lesions.  Musculoskeletal: no clubbing, cyanosis.  Lymph Nodes: No cervical or supraclavicular adenopathy.  Neurologic: Generalized weakness, mostly limited by back pain.  Psych/Behavioral:  Alert and oriented to person, place, appropriate affect.    Significant Labs:   Recent Labs   Lab 11/26/18  0524 11/27/18  1318   WBC 7.41 7.83   HGB 8.4* 8.2*   HCT 25.5* 24.3*    266     Recent Labs   Lab 11/26/18  0524 11/27/18  0537   * 130*   K 3.9 3.9   CL 98 98   CO2 24 27   BUN 22 18   CREATININE 1.6* 1.6*   CALCIUM 8.6* 8.5*

## 2018-11-28 NOTE — H&P
[facility] - Skilled Nursing Care  Department of Hospital Medicine  History & Physical    Patient Name: Karly Sandoval  MRN: [id number]  Code Status: DNR  Admission Date: 11/20/2018  Attending Physician: Edna Henry MD   Primary Care Provider: Uday Allen MD    Subjective:     Principal Problem:Compression fracture of T12 vertebra     HPI: Chief complaint/ Reason for Admission: T12 compression fracture    HPI:   Karly Sandoval is a 88 y.o. lady with RA (on plaquenil), HFpEF (noted diastolic dysfunction in echo on 2016), essential hypertension, hyperlipidemia, chronic compression fx of T8-T9, new T12 compression fracture  and new cystic pancreatic head mass who presented to [facility] from [facility] for evaluation of worsening back pain. She was also was  treated for a positive UA with E. Coli and Proteus, where she completed a 7 day course of ciprofloxacin along with management for CHARLENE [facility]. She was deemed not to be a surgical candidate for her T12 compression fracture by Neurosurgery and medical management was opted instead due to the nature of the fracture. Patient worked with therapy while inpatient and SNF placement was suggested. Patient at beside and feels that her confusion is cleared and patient is back to her baseline neurologically. He is concerned that she is still using a diaper instead of ambulating to the bathroom. Patient will not be returning to her home but an Assisted Living vs. Nursing home. Son feels if patient doesn't have significant improvement or if patient is + for Ca of pancreas patient will be placed into a nursing home.    11/27: the patient denies pain at rest but states it is 7/10 when moving; her back brace is uncomfortable when eating; lidoderm patch has been effective. She has had worsened confusion with narcotics.     Past Medical History:   Diagnosis Date    Abdominal aortic aneurysm (AAA) without rupture 10/8/2018    Abnormal CT of the abdomen 10/8/2018    Acid reflux      Anemia     Anxiety     Atrial arrhythmia 2/11/2014    Carotid artery occlusion     Chronic prescription benzodiazepine use 10/23/2018    Compression fracture of thoracic vertebra 6/25/2014    CVA (cerebral infarction) 2014    Cyst of pancreas 10/9/2018    Diastolic heart failure     echo 2016 ef 55% +Diastolic dysf    Diverticulosis     Encounter for blood transfusion     GERD (gastroesophageal reflux disease) 9/11/2012    History of cholelithiasis     Hyperlipidemia     Hypertension     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    Osteoarthritis     Osteoarthritis of both knees 6/12/2015    Osteopenia     PFO (patent foramen ovale) 2/11/2014    PVC (premature ventricular contraction) 4/28/2014    RVOT origin -- benign     RBBB 11/14/2014    Rheumatoid arthritis(714.0)     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)     has pacemaker    Right pelvic adnexal fluid collection 10/8/2018       Past Surgical History:   Procedure Laterality Date    APPENDECTOMY      BREAST SURGERY      CARDIAC PACEMAKER PLACEMENT  11/2014    for syncope and RBBB/LAHB    CHOLECYSTECTOMY      EYE SURGERY      HEMORRHOID SURGERY      HYSTERECTOMY      1966    SKIN BIOPSY      VASCULAR SURGERY      VERTEBROPLASTY         Review of patient's allergies indicates:   Allergen Reactions    Amoxicillin Other (See Comments)     unknown    Bactrim [sulfamethoxazole-trimethoprim] Other (See Comments)     unknown    Omeprazole Other (See Comments)     Muscle and joint pain    Rocephin [ceftriaxone] Other (See Comments)     Severe acute generalized pain    Penicillins Rash     Scheduled Meds:   amLODIPine  10 mg Oral Daily    atorvastatin  20 mg Oral Daily    calcium carbonate  500 mg Oral Daily    carvedilol  6.25 mg Oral BID WM    ciprofloxacin HCl  500 mg Oral Daily    escitalopram oxalate  10 mg Oral Daily    folic acid  1 mg Oral Daily    furosemide  20  mg Oral Daily    heparin (porcine)  5,000 Units Subcutaneous Q8H    hydroxychloroquine  200 mg Oral Daily    lidocaine  2 patch Transdermal Q24H    senna-docusate 8.6-50 mg  1 tablet Oral BID     Continuous Infusions:  PRN Meds:.acetaminophen, albuterol sulfate, ALPRAZolam, bisacodyl, dextromethorphan-guaifenesin  mg/5 ml, ondansetron    No current facility-administered medications on file prior to encounter.      Current Outpatient Medications on File Prior to Encounter   Medication Sig    acetaminophen (TYLENOL) 650 MG TbSR Take 650 mg by mouth daily as needed (PAIN).    albuterol sulfate 2.5 mg/0.5 mL Nebu Take 2.5 mg by nebulization. Rescue    ALPRAZolam (XANAX) 0.25 MG tablet TAKE 1 TABLET BY MOUTH EVERY DAY AS NEEDED (Patient taking differently: TAKE ½ TABLET BY MOUTH TWICE DAILY)    amLODIPine (NORVASC) 10 MG tablet Take 10 mg by mouth once daily.    aspirin (ECOTRIN) 81 MG EC tablet Take 1 tablet (81 mg total) by mouth once daily. HOLD until cleared by your ENT doctor and your Neurologist. (Patient taking differently: Take 81 mg by mouth once daily. )    atorvastatin (LIPITOR) 20 MG tablet TAKE 1 TABLET BY MOUTH EVERY DAY    bisacodyl (DULCOLAX) 10 mg Supp Place 10 mg rectally daily as needed (CONSTIPATION).    calcium carbonate (CALCIUM 600 ORAL) Take 1 tablet by mouth 2 (two) times daily.    carvedilol (COREG) 6.25 MG tablet Take 1 tablet (6.25 mg total) by mouth 2 (two) times daily with meals.    coenzyme Q10 (CO Q-10) 100 mg capsule Take 100 mg by mouth once daily.    denosumab (PROLIA) 60 mg/mL Syrg Inject 60 mg into the skin. Every 6 months    escitalopram oxalate (LEXAPRO) 10 MG tablet Take 10 mg by mouth once daily.    fish oil-omega-3 fatty acids 300-1,000 mg capsule Take 1,000 mg by mouth once daily.     folic acid (FOLVITE) 1 MG tablet TAKE 1 TABLET (1 MG TOTAL) BY MOUTH ONCE DAILY.    furosemide (LASIX) 20 MG tablet Take 1 tablet (20 mg total) by mouth once daily.  (Patient taking differently: Take 20 mg by mouth every Mon, Wed, Fri. )    hydroxychloroquine (PLAQUENIL) 200 mg tablet Take 1 tablet (200 mg total) by mouth once daily.    lisinopril 10 MG tablet Take 1 tablet (10 mg total) by mouth once daily.    psyllium (METAMUCIL) powder Take 1 packet by mouth daily as needed (CONSTIPATION).    sertraline (ZOLOFT) 25 MG tablet Take 1 tablet (25 mg total) by mouth once daily.     Family History     Problem Relation (Age of Onset)    Diabetes Mellitus Mother, Brother    Heart disease Father    Hypertension Father, Mother    Leukemia Father    Parkinsonism Brother (68)        Tobacco Use    Smoking status: Never Smoker    Smokeless tobacco: Never Used   Substance and Sexual Activity    Alcohol use: No     [facility]    Drug use: No    Sexual activity: No     Partners: Male     Birth control/protection: None     Review of Systems   Constitutional: no fever or chills  Eyes: no visual changes  ENT: no nasal congestion or sore throat  Respiratory: no cough or shortness of breath  Cardiovascular: no chest pain or palpitations  Gastrointestinal: no nausea or vomiting, no abdominal pain; last BM:11/26  Genitourinary: no hematuria or dysuria  Integument/Breast: no rash or pruritis  Hematologic/Lymphatic: no easy bruising or lymphadenopathy  Allergy/Immunology: no postnasal drip  Musculoskeletal: no arthralgias or myalgias  Neurological: no seizures or tremors  Behavioral/Psych: no auditory or visual hallucinations  Endocrine: no heat or cold intolerance      Objective:     Vital Signs (Most Recent):  Temp: 98.1 °F (36.7 °C) (11/27/18 2024)  Pulse: 64 (11/27/18 2024)  Resp: 18 (11/27/18 2024)  BP: (!) 144/64 (11/27/18 2024)  SpO2: (!) 92 % (11/27/18 2024) Vital Signs (24h Range):  Temp:  [98.1 °F (36.7 °C)-99.3 °F (37.4 °C)] 98.1 °F (36.7 °C)  Pulse:  [64-66] 64  Resp:  [18] 18  SpO2:  [92 %-94 %] 92 %  BP: (127-144)/(60-64) 144/64     Weight: 48.4 kg (106 lb 11.2 oz)  Body  mass index is 19.52 kg/m².    Physical Exam  General: well developed, thin, no distress, appears frail  HENT: Head:normocephalic, atraumatic. Ears:bilateral external ear canals normal. Nose: Nares normal. Septum midline. Mucosa normal. Throat: lips, mucosa, and tongue normal and no throat erythema.  Eyes: conjunctivae/corneas clear.  EOM normal.  Neck: supple, symmetrical, trachea midline, no JVD and thyroid not enlarged.   Lungs:  clear to auscultation bilaterally and normal respiratory effort.  Cardiovascular: Heart: regular rate and rhythm, S1, S2 normal, no murmur, click, rub or gallop. Chest Wall: no tenderness. Extremities: no cyanosis, no edema, no clubbing. Pulses: 2+ and symmetric.  Abdomen/Rectal: Abdomen: soft, non-tender non-distended; bowel sounds normal  Skin: Skin color, texture, turgor normal. No rashes or lesions.  Musculoskeletal: no clubbing, cyanosis.  Lymph Nodes: No cervical or supraclavicular adenopathy.  Neurologic: Generalized weakness, mostly limited by back pain.  Psych/Behavioral:  Alert and oriented to person, place, appropriate affect.    Significant Labs:   Recent Labs   Lab 11/26/18  0524 11/27/18  1318   WBC 7.41 7.83   HGB 8.4* 8.2*   HCT 25.5* 24.3*    266     Recent Labs   Lab 11/26/18  0524 11/27/18  0537   * 130*   K 3.9 3.9   CL 98 98   CO2 24 27   BUN 22 18   CREATININE 1.6* 1.6*   CALCIUM 8.6* 8.5*            Assessment/Plan:     * Compression fracture of T12 vertebra    · Not a surgical candidate  · Continue medical management   · Continue TLSO brace when OOB  · Continue pain control with Lidoderm patches  · Bowel regimen with senokot-s, hold for loose or frequent stooling  · DVT ppx with heparin   · PT/OT  · Fall precautions    Very slow progress with PT/OT and will need assistance on discharge. Underlying pancreas pathology may be hindering progress.  Continue tylenol prn to treat pain. Continue calcium supplements due to underlying osteoporosis.     RA  (rheumatoid arthritis)    · Patient without joint complaints  · Continue plaquenil  · F/u with [facility] outpatient    Continue plan as above; continue folic acid.     DNR (do not resuscitate) discussion    · Discussed DNR status 11/26  · Both patient and son agreed if her heart were to stop or if she could no longer breath not to perform resuscitative measure  · order placed    11/27/2018: paper work signed.      Hyponatremia    ·  and stable  · Still on lasix  · Will continue to monitor with BIW labs     Pancreatic lesion    · Pancreatic lesion noted on non-contrast CT  On 11/15, located at the pancreatic head  · Patient will need f/u with Pancreatic cyst services for outpatient testing    Patient has been made DNR by family with appropriate paperwork completed.      Anxiety    · Sleeping well, mood stable  · Continue home treatment with escitalopram and alprazolam     Chronic diastolic CHF (congestive heart failure)    · Lasix held prior to readmission for worsening renal disease but needed to be diuresed from pulmonary congestion  · lasix was increase to 40mg daily prior to discharge but Cr increase and was sent here with lasix 20mg daily, Cr 1.6 repeat BMP in AM, patient may need to return to MWF scheduled as previously  · Continue coreg and furosemide, lisinopril on hold for renal function  · compensated on exam  · monitor weight daily, currently stable  · Denies SOB or RODAS    11/27: continue current plan     CKD (chronic kidney disease), stage III    · Treated for CHARLENE at [facility] for Cr 1.9  · Baseline at 1.5  · Cr. 1.6  · Continue lasix will repeat BMP in AM  · May need to decrease lasix MWF dosing  · Continue to monitor with BIW labs  · Avoid nephrotoxins and renally adjust medications  11/27 - Cr stable at 1.6  Avoid nephrotoxins and renally adjust meds.  Estimated Creatinine Clearance: 18.6 mL/min (A) (based on SCr of 1.6 mg/dL (H)).         Mixed hyperlipidemia    · Continue atorvastatin   · F/u with PCP for  ongoing outpatient monitoring     Continue plan as above.      Essential hypertension    · Chronic, controlled  · Continue norvasc and coreg  · Added holding parameters  · Monitor and adjust regimen as needed    Continue plan as above.          Edna Henry MD  Department of Hospital Medicine  [facility] - Skilled Nursing Care

## 2018-11-28 NOTE — PROGRESS NOTES
Patient assessed by staff from [facility].  Representative provided an application to be completed by NP.  When NP completes the application,  will fax it to [phone]. SW provided, Immunization record, CXR, demographics to [facility] representatives.    Claudia rodriguez LMSW, SABINA-Nic, [facility]  11/28/2018

## 2018-11-28 NOTE — ASSESSMENT & PLAN NOTE
· Pancreatic lesion noted on non-contrast CT  On 11/15, located at the pancreatic head  · Patient will need f/u with Pancreatic cyst services for outpatient testing    Patient has been made DNR by family with appropriate paperwork completed.

## 2018-11-28 NOTE — PT/OT/SLP PROGRESS
Occupational Therapy  Treatment    Karly Sandoval   MRN: [id number]   Admitting Diagnosis: Compression fracture of T12 vertebra    OT Date of Treatment: 11/28/18  Total Time (min): 45 min    Billable Minutes:  Self Care/Home Management 45    General Precautions: Standard, fall  Orthopedic Precautions: spinal precautions  Braces: TLSO         Subjective:  Communicated with nsg prior to session.  Pt stated she think she has been in bed to much    Pain/Comfort  Pain Rating 1: 5/10  Location - Side 1: Left  Location - Orientation 1: lower  Location 1: back  Pain Addressed 1: Nurse notified, Reposition  Pain Rating Post-Intervention 1: 5/10    Objective:  Lying supine in bed  Occupational Performance:    Bed Mobility:    · Patient completed Rolling/Turning to Right with stand by assistance  · Patient completed Scooting/Bridging with stand by assistance with VC  · Patient completed Supine to Sit with stand by assistance with VC     Functional Mobility/Transfers:  · Patient completed Sit <> Stand Transfer with moderate assistance  with  rolling walker   · Patient completed Bed <> Chair Transfer using Stand Pivot technique with moderate assistance with rolling walker  · Functional Mobility: Pt performed functional sit<>stands requiring Mod A with RW to maintain stability and balance  ·                                Pt ambulated from EOB to bed side chair requiring Mod A with RW to maintain stability and balance. Pt also required VC to ensure safety with transfer    Activities of Daily Living:  · Feeding:  Pt required set up to eat breakfast  · Grooming: Pt required set up to wash face and perform hair care seated EOB  · Bathing: Pt required Max A to bath with assist to B LE below knees as well as milagros/buttocks region  · Upper Body Dressing: Pt required Min A to don/doff over head shirt and button down shirt with VC. Pt required Max A to don/doff brace  · Lower Body Dressing: Pt required Max A to don pants.  · Toilet: Pt  required total A for perineal cleaning     [facility] 6 Click:  [facility] Total Score: 16    OT Exercises:  Not tested on this date    Additional Treatment:  Pt was educated on donning/doffing brace.  Pt was educated on safety with performing sit<>stand transfer  Pt was educated on the importance of sitting up out of bed    Patient left up in chair with call button in reach    ASSESSMENT:  Karly Sandoval is a 88 y.o. female with a medical diagnosis of Compression fracture of T12 vertebra . Pt tolerated tx well, and will benefit from  Ot to increase self independence, safety with transfers, and endurance. Pt showed progression with ambulating from EOB to bed side chair on this date. Pt nurse was notified of increase pain to left posterior and SOB. OT will continue POC as indicated.    Rehab identified problem list/impairments: weakness, impaired endurance, impaired self care skills, impaired functional mobilty, gait instability, impaired balance, decreased safety awareness, pain    Rehab potential is good    Activity tolerance: Good    Discharge recommendations: home with home health     Barriers to discharge: Decreased caregiver support     Equipment recommendations: wheelchair     GOALS:   Multidisciplinary Problems     Occupational Therapy Goals        Problem: Occupational Therapy Goal    Goal Priority Disciplines Outcome Interventions   Occupational Therapy Goal     OT, PT/OT Ongoing (interventions implemented as appropriate)    Description:  Goals to be met by: 2 weeks     Patient will increase functional independence with ADLs by performing:    UE Dressing with Set-up Assistance.  LE Dressing with Stand-by Assistance.  Revised:  LBD with Min A  Grooming while standing at sink with Supervision.  Revised:  Grooming seated at sink with SBA  Toileting from bedside commode with Stand-by Assistance for hygiene and clothing management.   RevisedToileting from Bedside commode with Min A  Bathing from  shower chair/bench  with Stand-by Assistance.  Bathing from shower bench with Mod A  Supine to sit with Supervision.  Stand pivot transfers with Stand-by Assistance.  Toilet transfer to bedside commode with Stand-by Assistance.  Revised:  Toilet transfer with Min A  Upper extremity exercise program 3 x 15 reps per handout, with independence.  Patient will complete a functional standing activity with S for activity tolerance in order to perform household tasks.  Goal Discontinued on 11-26-18                    Plan:  Patient to be seen 5 x/week to address the above listed problems via self-care/home management, therapeutic activities, therapeutic exercises  Plan of Care expires: 12/21/18  Plan of Care reviewed with: patient    Severiano Gonzalez, SOT  11/28/2018     I certify that I was present in the room directing the student in service delivery and guiding them using my skilled judgment. As the co-signing therapist I have reviewed the students documentation and am responsible for the treatment, assessment, and plan.

## 2018-11-28 NOTE — ASSESSMENT & PLAN NOTE
· Treated for CHARLENE at [facility] for Cr 1.9  · Baseline at 1.5  · Cr. 1.6  · Continue lasix will repeat BMP in AM  · May need to decrease lasix MWF dosing  · Continue to monitor with BIW labs  · Avoid nephrotoxins and renally adjust medications  11/27 - Cr stable at 1.6  Avoid nephrotoxins and renally adjust meds.  Estimated Creatinine Clearance: 18.6 mL/min (A) (based on SCr of 1.6 mg/dL (H)).

## 2018-11-28 NOTE — PLAN OF CARE
Problem: Physical Therapy Goal  Goal: Physical Therapy Goal  Goals to be met by: 14 days     Patient will increase functional independence with mobility by performin. Supine to sit with Set-up [place]  2. Sit to supine with Contact Guard Assistance  3. Sit to stand transfer with Contact Guard Assistance  4. Bed to chair transfer with Contact Guard Assistance using Rolling Walker  5. Gait  x 100 feet with Contact Guard Assistance using Rolling Walker.   6. Stand for 2 minutes with Contact Guard Assistance using Rolling Walker     Goals remain appropriate. Continue with PT POC as indicated.

## 2018-11-28 NOTE — PROGRESS NOTES
" [facility] - Skilled Nursing Care  Adult Nutrition  Progress Note    SUMMARY       Recommendations    Recommendation/Intervention: 1. Continue current Regular diet; boost plus Vanilla TID 2. Encourage asking for meal preferences daily and good po intake.   Goals: Increased po intake >/= 50% of meals and ONS by next RD visit.  Nutrition Goal Status: goal met    Reason for Assessment    Reason for Assessment: RD follow-up  Diagnosis: (back pain)  Relevant Medical History: HTN, HLD, GERD, anemia, diverticulosis  Interdisciplinary Rounds: did not attend  General Information Comments: Spoke with pt this am, not feeling well and had some nausea/vomiting. Reported good po at breakfast this am. Notified pt of always available menu due to pt reporting not liking foods served. Outside food and snacks in room. Pt to be receiving boost plus BID to optimize nutrition. No C/D reported at this time, LBM 18. RD to follow up.   Nutrition Discharge Planning: Adequate po intake of meals /oral supplement.    Nutrition Risk Screen    Nutrition Risk Screen: no indicators present    Nutrition/Diet History    Patient Reported Diet/Restrictions/Preferences: general  Do you have any cultural, spiritual, Confucianism conflicts, given your current situation?: none  Food Allergies: NKFA  Factors Affecting Nutritional Intake: decreased appetite    Anthropometrics    Temp: 99.2 °F (37.3 °C)  Height Method: Stated  Height: 5' 2" (157.5 cm)  Height (inches): 62 in  Weight Method: Standard Scale  Weight: 48.4 kg (106 lb 11.2 oz)  Weight (lb): 106.7 lb  Ideal Body Weight (IBW), Female: 110 lb  % Ideal Body Weight, Female (lb): 106.43 lb  BMI (Calculated): 21.5  BMI Grade: 18.5-24.9 - normal  Usual Body Weight (UBW), k kg  % Usual Body Weight: 90.19   Discussed wt loss with pt; encourage gradual wt gain    Lab/Procedures/Meds    Pertinent Labs Reviewed: reviewed  Pertinent Labs Comments: Na 130; Creatinine 1.6; Ca 8.5; GFR 28.6  Pertinent " Medications Reviewed: reviewed  Pertinent Medications Comments: ciprofloxacin; lidocaine     Physical Findings/Assessment    Overall Physical Appearance: advanced age, loss of subcutaneous fat, loss of muscle mass    Estimated/Assessed Needs    Weight Used For Calorie Calculations: 53.1 kg (117 lb 1 oz)  Energy Calorie Requirements (kcal): 9626-2604 kcal/d  Energy Need Method: Kcal/kg(25-30)  Protein Requirements: 53-64 gm/d(1.0-1.2 gm/kg)  Weight Used For Protein Calculations: 53.1 kg (117 lb 1 oz)  Fluid Requirements (mL): 1 mL/kcal or per MD  Fluid Need Method: RDA Method  RDA Method (mL): 1327       Nutrition Prescription Ordered    Current Diet Order: Regular Level 7    Evaluation of Received Nutrient/Fluid Intake    Energy Calories Required: meeting needs  Protein Required: not meeting needs  Fluid Required: meeting needs  Tolerance: tolerating  % Intake of Estimated Energy Needs: 50 - 75 %  % Meal Intake: 50 - 75 %    Nutrition Risk    Level of Risk/Frequency of Follow-up: high     Assessment and Plan  Moderate Malnutrition in the context of Acute Illness/Injury     Related to (etiology):  Decreased appetite and fatigue      Signs and Symptoms (as evidenced by):  Energy Intake: <75% of estimated energy requirement for > 1 week  Body Fat Depletion: moderate depletion of triceps   Muscle Mass Depletion: moderate depletion of clavicle region, scapular region and interosseous muscle   Weight Loss: 5% x 1 month   Fluid Accumulation: mild     Interventions (treatment strategy):      Collaboration with other providers     Nutrition Diagnosis Status:   Continues    Monitor and Evaluation    Food and Nutrient Intake: energy intake  Physical Activity and Function: other (specify)  Anthropometric Measurements: weight, weight change  Biochemical Data, Medical Tests and Procedures: lipid profile, electrolyte and renal panel, gastrointestinal profile, glucose/endocrine profile, inflammatory profile  Nutrition-Focused  Physical Findings: overall appearance     Nutrition Follow-Up    RD Follow-up?: Yes

## 2018-11-28 NOTE — PLAN OF CARE
Problem: Occupational Therapy Goal  Goal: Occupational Therapy Goal  Goals to be met by: 2 weeks     Patient will increase functional independence with ADLs by performing:    UE Dressing with Set-up Assistance.  LE Dressing with Stand-by Assistance.  Revised:  LBD with Min A  Grooming while standing at sink with Supervision.  Revised:  Grooming seated at sink with SBA  Toileting from bedside commode with Stand-by Assistance for hygiene and clothing management.   RevisedToileting from Bedside commode with Min A  Bathing from  shower chair/bench with Stand-by Assistance.  Bathing from shower bench with Mod A  Supine to sit with Supervision.  Stand pivot transfers with Stand-by Assistance.  Toilet transfer to bedside commode with Stand-by Assistance.  Revised:  Toilet transfer with Min A  Upper extremity exercise program 3 x 15 reps per handout, with independence.  Patient will complete a functional standing activity with S for activity tolerance in order to perform household tasks.  Goal Discontinued on 11-26-18   Pt. Tolerated session well

## 2018-11-28 NOTE — ASSESSMENT & PLAN NOTE
· Patient without joint complaints  · Continue plaquenil  · F/u with Rhem outpatient    Continue plan as above; continue folic acid.

## 2018-11-28 NOTE — ASSESSMENT & PLAN NOTE
· Discussed DNR status 11/26  · Both patient and son agreed if her heart were to stop or if she could no longer breath not to perform resuscitative measure  · order placed    11/27/2018: paper work signed.

## 2018-11-28 NOTE — ASSESSMENT & PLAN NOTE
· Continue atorvastatin   · F/u with PCP for ongoing outpatient monitoring     Continue plan as above.

## 2018-11-28 NOTE — TELEPHONE ENCOUNTER
----- Message from Gilda Khan sent at 11/28/2018  2:09 PM CST -----  Contact: [phone]/[facility]    Ms Taylor called stating pt its going to be discharge on 12/03/18 and she needs a hospital follow up appointment a week from that day . Please advise

## 2018-11-28 NOTE — PROGRESS NOTES
Call back received from patient's PCP's office.  Patient's hospital followup visit has been scheduled with Dr. Lara in same office as PCP on Wednesday, December 5, 2018 at  1:00 p.m.  Claudia Duarte LMSW, SABINA-CHRIS, CCM

## 2018-11-28 NOTE — PROGRESS NOTES
Nic attempted scheduling patient 1 week post discharge f/u appt with her PCP.  Earliest avaialable appt is 12/31/2018.   will send message to PCP's nurse and request call back to  with earlier appointment if available.  Claudia rodriguez LMSW, SABINA-NIC, [facility]  11/28/2018

## 2018-11-28 NOTE — PROGRESS NOTES
Call received from Yeni with [facility] trying to find out if patient had been accepted at assisted living.  CHRIS advised Yeni that no information available as to whether patient had been accepted at assisted living.  SW to call Yeni at [phone] with update.  NH will also need PASRR, 142, PPD and PT/OT notes.  CHRIS to submit prior to admit (if patient not accepted into assisted living).  Claudia Duarte LMSW, SABINA-CHRIS, CCM  11/28/2018

## 2018-11-28 NOTE — ASSESSMENT & PLAN NOTE
· Chronic, controlled  · Continue norvasc and coreg  · Added holding parameters  · Monitor and adjust regimen as needed    Continue plan as above.

## 2018-11-28 NOTE — ASSESSMENT & PLAN NOTE
· Not a surgical candidate  · Continue medical management   · Continue TLSO brace when OOB  · Continue pain control with Lidoderm patches  · Bowel regimen with senokot-s, hold for loose or frequent stooling  · DVT ppx with heparin   · PT/OT  · Fall precautions    Very slow progress with PT/OT and will need assistance on discharge. Underlying pancreas pathology may be hindering progress.  Continue tylenol prn to treat pain. Continue calcium supplements due to underlying osteoporosis.

## 2018-11-28 NOTE — PT/OT/SLP PROGRESS
"Physical Therapy  Treatment    Karly Sandoval   MRN: [id number]   Admitting Diagnosis: Compression fracture of T12 vertebra    PT Received On: 11/28/18  Total Time (min): 23       Billable Minutes:  Therapeutic Exercise 23    Treatment Type: Treatment  PT/PTA: PTA     PTA Visit Number: 2       General Precautions: Standard, fall  Orthopedic Precautions: spinal precautions   Braces: TLSO    Do you have any cultural, spiritual, Methodist conflicts, given your current situation?: none    Subjective:  Communicated with nursing prior to session.  "I am not feeling well, and I am not going to therapy." Pt agreed to B LE therex in room only on this date despite max encouragement.     Pain/Comfort  Pain Rating 1: (Pt did not rate. )  Location - Side 1: Left  Location - Orientation 1: lower  Location 1: back  Pain Addressed 1: Reposition, Nurse notified  Pain Rating Post-Intervention 1: (Pt did not rate. )    Objective:  Patient found seated bedside chair.  Patient found with: TLSO     AM-PAC 6 CLICK MOBILITY  Total Score:16    Bed Mobility:  Sit>Supine:Not performed 2/2 pt refusal.   Supine>Sit: Not performed 2/2 pt refusal     Transfers:  Sit<>Stand: Not performed 2/2 pt refusal.   Stand Pivot Transfer: Pt declined.     Gait:  Pt declined.      Therex:  -Seated B LE therex 3x10 reps:    -AP   -LAQ   -Hip Flexion   -GS   -Hip Adduction w/ pillow  -Seated (long sitting) B LE therex 3x10 reps:    -AP   -QS    -GS    -Hip Abd/Add w/ assistance    Balance:  Not performed 2/2 pt refusal.     Additional Treatment:  -Pt educated on the importance/benefits of working with therapy.     Patient left up in chair with call button in reach and nursing notified.    Assessment:  Karly Sandoval is a 88 y.o. female with a medical diagnosis of Compression fracture of T12 vertebra.  Pt agreed to B LE therex in room only on this date despite max encouragement. Pt was educated on the importance/benefits of working with therapy. Pt will " continue to benefit from PT services at this time to improve all deficits noted below. Continue with PT POC as indicated.    Rehab identified problem list/impairments: weakness, impaired endurance, impaired self care skills, impaired functional mobilty, gait instability, impaired balance, decreased upper extremity function, decreased lower extremity function, pain    Rehab potential is good.    Activity tolerance:  Fair     Discharge recommendations: (jail or NH depending on pt progress)     Barriers to discharge: None(will D/C to jail or NH)    Equipment recommendations: wheelchair     GOALS:   Multidisciplinary Problems     Physical Therapy Goals        Problem: Physical Therapy Goal    Goal Priority Disciplines Outcome Goal Variances Interventions   Physical Therapy Goal     PT, PT/OT Ongoing (interventions implemented as appropriate)     Description:  Goals to be met by: 14 days     Patient will increase functional independence with mobility by performin. Supine to sit with Set-up [place]  2. Sit to supine with Contact Guard Assistance  3. Sit to stand transfer with Contact Guard Assistance  4. Bed to chair transfer with Contact Guard Assistance using Rolling Walker  5. Gait  x 100 feet with Contact Guard Assistance using Rolling Walker.   6. Stand for 2 minutes with Contact Guard Assistance using Rolling Walker                      PLAN:    Patient to be seen (5-6X/week)  to address the above listed problems via gait training, therapeutic activities, therapeutic exercises  Plan of Care expires: 18  Plan of Care reviewed with: patient    Carol Juan, PTA  2018

## 2018-11-28 NOTE — ASSESSMENT & PLAN NOTE
· Lasix held prior to readmission for worsening renal disease but needed to be diuresed from pulmonary congestion  · lasix was increase to 40mg daily prior to discharge but Cr increase and was sent here with lasix 20mg daily, Cr 1.6 repeat BMP in AM, patient may need to return to [facility] scheduled as previously  · Continue coreg and furosemide, lisinopril on hold for renal function  · compensated on exam  · monitor weight daily, currently stable  · Denies SOB or RODAS    11/27: continue current plan

## 2018-11-28 NOTE — PROGRESS NOTES
Immunization record and PT/OT notes sent to [facility] and advised that  would complete LOCET and PASRR when decision had been made regarding assisted living.  Claudia Duarte LMSW, ACWILLY-CHRIS, CCM  11/28/2018

## 2018-11-29 PROBLEM — N30.90 KLEBSIELLA CYSTITIS: Status: ACTIVE | Noted: 2018-11-18

## 2018-11-29 PROBLEM — B96.1 KLEBSIELLA CYSTITIS: Status: ACTIVE | Noted: 2018-11-18

## 2018-11-29 LAB
ANION GAP SERPL CALC-SCNC: 9 MMOL/L
BACTERIA UR CULT: NORMAL
BASOPHILS # BLD AUTO: 0.04 K/UL
BASOPHILS NFR BLD: 0.6 %
BUN SERPL-MCNC: 19 MG/DL
CALCIUM SERPL-MCNC: 8.6 MG/DL
CHLORIDE SERPL-SCNC: 97 MMOL/L
CO2 SERPL-SCNC: 23 MMOL/L
CREAT SERPL-MCNC: 1.8 MG/DL
DIFFERENTIAL METHOD: ABNORMAL
EOSINOPHIL # BLD AUTO: 0 K/UL
EOSINOPHIL NFR BLD: 0.4 %
ERYTHROCYTE [DISTWIDTH] IN BLOOD BY AUTOMATED COUNT: 14.2 %
EST. GFR  (AFRICAN AMERICAN): 28.6 ML/MIN/1.73 M^2
EST. GFR  (NON AFRICAN AMERICAN): 24.8 ML/MIN/1.73 M^2
GLUCOSE SERPL-MCNC: 97 MG/DL
HCT VFR BLD AUTO: 23.7 %
HGB BLD-MCNC: 7.9 G/DL
IMM GRANULOCYTES # BLD AUTO: 0.08 K/UL
IMM GRANULOCYTES NFR BLD AUTO: 1.1 %
LYMPHOCYTES # BLD AUTO: 1.9 K/UL
LYMPHOCYTES NFR BLD: 26.1 %
MAGNESIUM SERPL-MCNC: 1.9 MG/DL
MCH RBC QN AUTO: 30.2 PG
MCHC RBC AUTO-ENTMCNC: 33.3 G/DL
MCV RBC AUTO: 91 FL
MONOCYTES # BLD AUTO: 1 K/UL
MONOCYTES NFR BLD: 14.4 %
NEUTROPHILS # BLD AUTO: 4.1 K/UL
NEUTROPHILS NFR BLD: 57.4 %
NRBC BLD-RTO: 0 /100 WBC
PHOSPHATE SERPL-MCNC: 3.4 MG/DL
PLATELET # BLD AUTO: 234 K/UL
PMV BLD AUTO: 9.9 FL
POTASSIUM SERPL-SCNC: 3.9 MMOL/L
RBC # BLD AUTO: 2.62 M/UL
SODIUM SERPL-SCNC: 129 MMOL/L
WBC # BLD AUTO: 7.09 K/UL

## 2018-11-29 PROCEDURE — 25000003 PHARM REV CODE 250: Performed by: NURSE PRACTITIONER

## 2018-11-29 PROCEDURE — 80048 BASIC METABOLIC PNL TOTAL CA: CPT

## 2018-11-29 PROCEDURE — 25000003 PHARM REV CODE 250: Performed by: INTERNAL MEDICINE

## 2018-11-29 PROCEDURE — 25000003 PHARM REV CODE 250: Performed by: HOSPITALIST

## 2018-11-29 PROCEDURE — 11000004 HC SNF PRIVATE

## 2018-11-29 PROCEDURE — 63600175 PHARM REV CODE 636 W HCPCS: Performed by: NURSE PRACTITIONER

## 2018-11-29 PROCEDURE — 86580 TB INTRADERMAL TEST: CPT | Performed by: NURSE PRACTITIONER

## 2018-11-29 PROCEDURE — 97530 THERAPEUTIC ACTIVITIES: CPT

## 2018-11-29 PROCEDURE — 84100 ASSAY OF PHOSPHORUS: CPT

## 2018-11-29 PROCEDURE — 85025 COMPLETE CBC W/AUTO DIFF WBC: CPT

## 2018-11-29 PROCEDURE — 97535 SELF CARE MNGMENT TRAINING: CPT

## 2018-11-29 PROCEDURE — 97110 THERAPEUTIC EXERCISES: CPT

## 2018-11-29 PROCEDURE — 97116 GAIT TRAINING THERAPY: CPT

## 2018-11-29 PROCEDURE — 36415 COLL VENOUS BLD VENIPUNCTURE: CPT

## 2018-11-29 PROCEDURE — 63600175 PHARM REV CODE 636 W HCPCS: Performed by: HOSPITALIST

## 2018-11-29 PROCEDURE — 83735 ASSAY OF MAGNESIUM: CPT

## 2018-11-29 PROCEDURE — 99309 SBSQ NF CARE MODERATE MDM 30: CPT | Mod: ,,, | Performed by: NURSE PRACTITIONER

## 2018-11-29 RX ORDER — FUROSEMIDE 20 MG/1
20 TABLET ORAL
Status: DISCONTINUED | OUTPATIENT
Start: 2018-11-30 | End: 2018-12-04 | Stop reason: HOSPADM

## 2018-11-29 RX ADMIN — ALPRAZOLAM 0.25 MG: 0.25 TABLET ORAL at 11:11

## 2018-11-29 RX ADMIN — ESCITALOPRAM 10 MG: 10 TABLET, FILM COATED ORAL at 08:11

## 2018-11-29 RX ADMIN — ACETAMINOPHEN 650 MG: 325 TABLET ORAL at 10:11

## 2018-11-29 RX ADMIN — LIDOCAINE 2 PATCH: 50 PATCH TOPICAL at 05:11

## 2018-11-29 RX ADMIN — ATORVASTATIN CALCIUM 20 MG: 20 TABLET, FILM COATED ORAL at 08:11

## 2018-11-29 RX ADMIN — GUAIFENESIN AND DEXTROMETHORPHAN 10 ML: 100; 10 SYRUP ORAL at 11:11

## 2018-11-29 RX ADMIN — ONDANSETRON 8 MG: 8 TABLET, ORALLY DISINTEGRATING ORAL at 06:11

## 2018-11-29 RX ADMIN — HEPARIN SODIUM 5000 UNITS: 5000 INJECTION, SOLUTION INTRAVENOUS; SUBCUTANEOUS at 09:11

## 2018-11-29 RX ADMIN — CARVEDILOL 6.25 MG: 6.25 TABLET, FILM COATED ORAL at 08:11

## 2018-11-29 RX ADMIN — CALCIUM CARBONATE 500 MG: 1250 SUSPENSION ORAL at 08:11

## 2018-11-29 RX ADMIN — HEPARIN SODIUM 5000 UNITS: 5000 INJECTION, SOLUTION INTRAVENOUS; SUBCUTANEOUS at 02:11

## 2018-11-29 RX ADMIN — ONDANSETRON 8 MG: 8 TABLET, ORALLY DISINTEGRATING ORAL at 09:11

## 2018-11-29 RX ADMIN — CARVEDILOL 6.25 MG: 6.25 TABLET, FILM COATED ORAL at 05:11

## 2018-11-29 RX ADMIN — AMLODIPINE BESYLATE 10 MG: 10 TABLET ORAL at 08:11

## 2018-11-29 RX ADMIN — Medication 5 UNITS: at 02:11

## 2018-11-29 RX ADMIN — HEPARIN SODIUM 5000 UNITS: 5000 INJECTION, SOLUTION INTRAVENOUS; SUBCUTANEOUS at 06:11

## 2018-11-29 RX ADMIN — CIPROFLOXACIN HYDROCHLORIDE 500 MG: 500 TABLET, FILM COATED ORAL at 08:11

## 2018-11-29 RX ADMIN — FOLIC ACID 1 MG: 1 TABLET ORAL at 08:11

## 2018-11-29 RX ADMIN — STANDARDIZED SENNA CONCENTRATE AND DOCUSATE SODIUM 1 TABLET: 8.6; 5 TABLET, FILM COATED ORAL at 09:11

## 2018-11-29 NOTE — ASSESSMENT & PLAN NOTE
NEW 11/29/18  · T.MAX of 100.1 overnight on 11/26  · Urine studies, blood cultures, and cxr ordered  · NGTD for blood cultures  · CXR without PNA  · U. cx with klebsiella, awaiting susceptibility   · Currently treating patient with cipro (renally dosed) (allergic to PCNs, bactrim, rocephin)  · Patient was treated previously with cipro for same organism

## 2018-11-29 NOTE — PT/OT/SLP PROGRESS
"Physical Therapy  Treatment    Karly Sandoval   MRN: [id number]   Admitting Diagnosis: Compression fracture of T12 vertebra    PT Received On: 11/29/18  Total Time (min): 48       Billable Minutes:  Gait Training 15, Therapeutic Activity 18, Therapeutic Exercise 15 and Total Time 48    Treatment Type: Treatment  PT/PTA: PT     PTA Visit Number: 0       General Precautions: Standard, fall  Orthopedic Precautions: spinal precautions   Braces: TLSO    Do you have any cultural, spiritual, Lutheran conflicts, given your current situation?: none    Subjective:  Communicated with patient and nursing prior to session.  Pt agreeable to session.   "I need to do this."    Pain/Comfort  Pain Rating 1: (did not rate)  Location - Side 1: Left  Location - Orientation 1: lower  Location 1: back  Pain Addressed 1: Pre-medicate for activity, Reposition, Nurse notified    Objective:  Patient found sitting in bedside chair. Patient found with: TLSO     AM-PAC 6 CLICK MOBILITY  Total Score:14    Bed Mobility:  Not performed    Transfers:  Sit<>Stand: to/from w/c (3 trials) w/ RW and varying Mod/Holly overall, Min/CGA to rise and Mod/Holly for anterior weight shift and transition of hands to RW  Stand Pivot Transfer: bedside chair<>w/c, both w/ RW and Holly  Cueing for hand/foot placement    Gait:  Amb 2 trials (183ft and 145ft) w/ RW and CGA for safety  Occasional cueing to remain inside RW     Balance:  Standing balloon tap [id number] w/ RW and Mod/Holly for stability due to posterior lean  Limited in time by fatigue and pain    Additional Treatment:  Seated LBE x10min to inc BLE strength/endurance    Patient left up in chair with call button in reach and son present.    Assessment:  Karly Sandoval is a 88 y.o. female with a medical diagnosis of Compression fracture of T12 vertebra.  Pt continues to be limited t/o sessions by pain but did have good participation today and was able to significantly increase gait distance. She does " however continue to vary in transfer ability b/w Mod and Holly. Pt will continue PT POC.    Rehab identified problem list/impairments: weakness, impaired endurance, impaired self care skills, impaired functional mobilty, gait instability, impaired balance, decreased upper extremity function, decreased lower extremity function, pain    Rehab potential is good.    Activity tolerance: Good    Discharge recommendations: (half-way or NH depending on pt progress)     Barriers to discharge: None(will D/C to [facility] or NH)    Equipment recommendations: wheelchair     GOALS:   Multidisciplinary Problems     Physical Therapy Goals        Problem: Physical Therapy Goal    Goal Priority Disciplines Outcome Goal Variances Interventions   Physical Therapy Goal     PT, PT/OT Ongoing (interventions implemented as appropriate)     Description:  Goals to be met by: 14 days     Patient will increase functional independence with mobility by performin. Supine to sit with Set-up [place]  2. Sit to supine with Contact Guard Assistance  3. Sit to stand transfer with Contact Guard Assistance  4. Bed to chair transfer with Contact Guard Assistance using Rolling Walker  5. Gait  x 100 feet with Contact Guard Assistance using Rolling Walker. - MET 2018  6. Stand for 2 minutes with Contact Guard Assistance using Rolling Walker                       PLAN:    Patient to be seen (5-6X/week)  to address the above listed problems via gait training, therapeutic activities, therapeutic exercises  Plan of Care expires: 18  Plan of Care reviewed with: patient    Elida M Jamie, PT  2018

## 2018-11-29 NOTE — PROGRESS NOTES
Admit application faxed to [facility] at  [phone].  irma Duarte LMSW, ACWILLY-CHRIS, CCM  11/29/2018

## 2018-11-29 NOTE — PROGRESS NOTES
SW met with patient and son to discuss discharge plans.  Discussed patient's anticipated discharge date of 12/4/2018.  Patient and son in agreement with discharge date.  NOMNC signed.  Patient will discharge to [facility].  Preferred home health provider is [facility].  They are requesting therapist Mukesh.    Copy of NOMNC provided to patient and original filed in chart.    Claudia Duarte LMSW, SABINA-CHRIS, [facility]  11/29/2018

## 2018-11-29 NOTE — PLAN OF CARE
Problem: Physical Therapy Goal  Goal: Physical Therapy Goal  Goals to be met by: 14 days     Patient will increase functional independence with mobility by performin. Supine to sit with Set-up [place]  2. Sit to supine with Contact Guard Assistance  3. Sit to stand transfer with Contact Guard Assistance  4. Bed to chair transfer with Contact Guard Assistance using Rolling Walker  5. Gait  x 100 feet with Contact Guard Assistance using Rolling Walker. - MET 2018  6. Stand for 2 minutes with Contact Guard Assistance using Rolling Walker     Outcome: Ongoing (interventions implemented as appropriate)  LTGs remain appropriate. Pt will continue PT POC.    Elida Ayala, PT  2018

## 2018-11-29 NOTE — ASSESSMENT & PLAN NOTE
· Patient without joint complaints  · Continue plaquenil  · F/u with Rhem outpatient    Continue plan as above; continue folic acid.    Evaluated on 11/29/18  · Continue plaquenil

## 2018-11-29 NOTE — PROGRESS NOTES
CHRIS followed up with Tiarra at [facility] ( [phone]) to obtain patient's room number.  Per Tiarra, patient will go to room 237.  Per Tiarra, they will still need PPD, and medication list.  CHRIS will fax documents when obtained.  Fax number for [facility] is [phone].  Referral sent to [facility] via [employer] with patient's address  @ assisted living added in the system ([employer]).  Claudia Duarte LMSW, SABINA-CHRIS, [facility]  11/29/2018

## 2018-11-29 NOTE — PT/OT/SLP PROGRESS
Occupational Therapy  Treatment    Karly Sandoval   MRN: [id number]   Admitting Diagnosis: Compression fracture of T12 vertebra    OT Date of Treatment: 11/29/18  Total Time (min): 45 min    Billable Minutes:  Self Care/Home Management 45    General Precautions: Standard, fall  Orthopedic Precautions: spinal precautions  Braces: TLSO     Subjective:  Communicated with patient prior to session.    Pain/Comfort  Pain Rating 1: (patient did not rate)  Location - Side 1: Left  Location - Orientation 1: lower  Location 1: back  Pain Addressed 1: Reposition, Distraction  Pain Rating Post-Intervention 1: (patient did not rate)    Objective:       Occupational Performance:    Bed Mobility:    · Patient completed Rolling/Turning to Left with  stand by assistance  · Patient completed Rolling/Turning to Right with stand by assistance  · Patient completed Scooting/Bridging with minimum assistance to EOB  · Patient completed Supine to Sit with contact guard assistance     Functional Mobility/Transfers:  · Patient completed Sit <> Stand Transfer with minimum assistance  with  rolling walker   · Patient completed Bed > bedside chair Transfer using /c functional ambulation technique with minimum assistance with rolling walker    Activities of Daily Living:  · Grooming: setup for combing hair and washing face seated in bedside chair  · Bathing: minimum assistance spongebath from bed level  · Upper Body Dressing: contact guard assistance [place] and donning pullover shirt. Donning button up shirt. (Patient still requires total A for donning TLSO)  · Lower Body Dressing: moderate assistance [place] and donning socks. Donning pants.   · Toileting: moderate assistance Patient was soiled, but assisted with toileting hygiene.     [facility] 6 Click:  AMPAC Total Score: 17    Patient left up in chair with call button in reach and all needs met.     ASSESSMENT:  Karly Sandoval is a 88 y.o. female with a medical diagnosis of Compression  fracture of T12 vertebra and presents with the deficits listed below. Patient tolerated treatment session and was motivated to complete ADLs despite having pain. Patient continues to benefit from skilled OT services to achieve maximal independence.    Rehab identified problem list/impairments: weakness, impaired endurance, impaired self care skills, impaired functional mobilty, gait instability, impaired balance, decreased safety awareness, pain    Rehab potential is good    Activity tolerance: Good    Discharge recommendations: home with home health     Barriers to discharge: Decreased caregiver support     Equipment recommendations: wheelchair     GOALS:   Multidisciplinary Problems     Occupational Therapy Goals        Problem: Occupational Therapy Goal    Goal Priority Disciplines Outcome Interventions   Occupational Therapy Goal     OT, PT/OT Ongoing (interventions implemented as appropriate)    Description:  Goals to be met by: 2 weeks     Patient will increase functional independence with ADLs by performing:    UE Dressing with Set-up Assistance.  LE Dressing with Stand-by Assistance.  Revised:  LBD with Min A  Grooming while standing at sink with Supervision.  Revised:  Grooming seated at sink with SBA  Toileting from bedside commode with Stand-by Assistance for hygiene and clothing management.   RevisedToileting from Bedside commode with Min A  Bathing from  shower chair/bench with Stand-by Assistance.  Bathing from shower bench with Mod A  Supine to sit with Supervision.  Stand pivot transfers with Stand-by Assistance.  Toilet transfer to bedside commode with Stand-by Assistance.  Revised:  Toilet transfer with Min A  Upper extremity exercise program 3 x 15 reps per handout, with independence.  Patient will complete a functional standing activity with S for activity tolerance in order to perform household tasks.  Goal Discontinued on 11-26-18                    Plan:  Patient to be seen 5 x/week to address  the above listed problems via self-care/home management, therapeutic activities, therapeutic exercises  Plan of Care expires: 12/21/18  Plan of Care reviewed with: patient    DACIA Regalado  11/29/2018

## 2018-11-29 NOTE — PLAN OF CARE
11/29/18 1139   Medicare Message   Important Message from Medicare regarding Discharge Appeal Rights (NOMNC served today.  )   Signed by patient's son, Rene Sandoval, with patient's permission.

## 2018-11-29 NOTE — ASSESSMENT & PLAN NOTE
· Continue atorvastatin   · F/u with PCP for ongoing outpatient monitoring     Continue plan as above.     Evaluated on 11/29/18  · Continue atorvastatin

## 2018-11-29 NOTE — ASSESSMENT & PLAN NOTE
· Treated for CHARLENE at [facility] for Cr 1.9  · Baseline at 1.5  · Cr. 1.6  · Continue lasix will repeat BMP in AM  · May need to decrease lasix MWF dosing  · Continue to monitor with BIW labs  · Avoid nephrotoxins and renally adjust medications  11/27 - Cr stable at 1.6  Avoid nephrotoxins and renally adjust meds.  Estimated Creatinine Clearance: 17.1 mL/min (A) (based on SCr of 1.8 mg/dL (H)).    Evaluated on 11/29/18  · Cr 1.8 with Na 129  · Change lasix dosing to MWF  · Continue to monitor with BIW labs  · Avoid nephrotoxins and renally adjust medications

## 2018-11-29 NOTE — ASSESSMENT & PLAN NOTE
· Sleeping well, mood stable  · Continue home treatment with escitalopram and alprazolam    Evaluated on 11/29/18  · Stable, continue escitalopram and alprazolam

## 2018-11-29 NOTE — SUBJECTIVE & OBJECTIVE
Review of Systems   Constitutional: Positive for fatigue. Negative for appetite change, chills and fever.   HENT: Negative for trouble swallowing.    Respiratory: Negative for cough, chest tightness, shortness of breath and wheezing.    Cardiovascular: Negative for chest pain, palpitations and leg swelling.   Gastrointestinal: Negative for abdominal pain, constipation, diarrhea and nausea.   Genitourinary: Negative for difficulty urinating, frequency and urgency.   Musculoskeletal: Positive for back pain. Negative for arthralgias and myalgias.        + improved back with brace, but brace is uncomfortable   Skin: Negative for rash.   Neurological: Negative for dizziness, weakness, light-headedness and headaches.   Psychiatric/Behavioral: Negative for sleep disturbance.     Scheduled Meds:   amLODIPine  10 mg Oral Daily    atorvastatin  20 mg Oral Daily    calcium carbonate  500 mg Oral Daily    carvedilol  6.25 mg Oral BID WM    ciprofloxacin HCl  500 mg Oral Daily    escitalopram oxalate  10 mg Oral Daily    folic acid  1 mg Oral Daily    [START ON 11/30/2018] furosemide  20 mg Oral Every Mon, Wed, Fri    heparin (porcine)  5,000 Units Subcutaneous Q8H    hydroxychloroquine  200 mg Oral Daily    lidocaine  2 patch Transdermal Q24H    senna-docusate 8.6-50 mg  1 tablet Oral QHS    tuberculin  5 Units Intradermal Once     Continuous Infusions:  PRN Meds:.acetaminophen, albuterol sulfate, ALPRAZolam, bisacodyl, dextromethorphan-guaifenesin  mg/5 ml, ondansetron    Objective:     Vital Signs (Most Recent):  Temp: 98.4 °F (36.9 °C) (11/29/18 0736)  Pulse: 73 (11/29/18 0736)  Resp: 18 (11/29/18 0736)  BP: (!) 142/63 (11/29/18 0736)  SpO2: (!) 94 % (11/29/18 0736) Vital Signs (24h Range):  Temp:  [98.4 °F (36.9 °C)-98.6 °F (37 °C)] 98.4 °F (36.9 °C)  Pulse:  [73-78] 73  Resp:  [18-20] 18  SpO2:  [94 %] 94 %  BP: (142-153)/(63-67) 142/63     Weight: 51 kg (112 lb 7 oz)  Body mass index is 20.56  kg/m².    Physical Exam   Constitutional: She is oriented to person, place, and time. She appears well-developed and well-nourished. No distress.   Cardiovascular: Normal rate, regular rhythm and normal heart sounds. Exam reveals no gallop and no friction rub.   No murmur heard.  Pulmonary/Chest: Effort normal. No respiratory distress. She has decreased breath sounds. She has no wheezes. She has no rales.   Abdominal: Soft. Bowel sounds are normal. She exhibits no distension. There is no tenderness.   ABD rounded but soft   Musculoskeletal: Normal range of motion. She exhibits no edema or tenderness.   TLSO brace in use, Patient is kyphotic   Neurological: She is alert and oriented to person, place, and time.   Skin: Skin is warm and dry. No rash noted. She is not diaphoretic. No cyanosis. Nails show no clubbing.   Psychiatric: She has a normal mood and affect. Her behavior is normal.       Significant Labs:   Recent Labs   Lab 11/26/18  0524 11/27/18  1318 11/29/18  0532   WBC 7.41 7.83 7.09   HGB 8.4* 8.2* 7.9*   HCT 25.5* 24.3* 23.7*    266 234     Recent Labs   Lab 11/26/18  0524 11/27/18  0537 11/29/18  0532   * 130* 129*   K 3.9 3.9 3.9   CL 98 98 97   CO2 24 27 23   BUN 22 18 19   CREATININE 1.6* 1.6* 1.8*   CALCIUM 8.6* 8.5* 8.6*     Lab Results   Component Value Date    LABPROT 12.4 11/13/2018    ALBUMIN 2.4 (L) 11/13/2018     Lab Results   Component Value Date    CALCIUM 8.6 (L) 11/29/2018    PHOS 3.4 11/29/2018     Microbiology Results (last 7 days)     Procedure Component Value Units Date/Time    Blood culture [[id number]] Collected:  11/27/18 1318    Order Status:  Completed Specimen:  Blood Updated:  11/28/18 1612     Blood Culture, Routine No Growth to date     Blood Culture, Routine No Growth to date    Urine culture [[id number]] Collected:  11/27/18 1216    Order Status:  Completed Specimen:  Urine Updated:  11/28/18 1528     Urine Culture, Routine --     KLEBSIELLA PNEUMONIAE  >100,000  cfu/ml  Susceptibility pending      Narrative:       Preferred Collection Type->Urine, Clean Catch  cup only  11/27/2018  14:33         CXR 11/27/18  Impression       Findings suggesting persistent left effusion with interval improvement in perihilar infiltrative, vascular congestive changes.       Significant Imaging:na

## 2018-11-29 NOTE — PLAN OF CARE
Problem: Patient Care Overview  Goal: Plan of Care Review  Outcome: Ongoing (interventions implemented as appropriate)   11/29/18 0500   Coping/Psychosocial   Plan Of Care Reviewed With patient       Problem: Fall Risk (Adult)  Goal: Identify Related Risk Factors and Signs and Symptoms  Related risk factors and signs and symptoms are identified upon initiation of Human Response Clinical Practice Guideline (CPG)  Outcome: Ongoing (interventions implemented as appropriate)   11/29/18 0500   Fall Risk   Related Risk Factors (Fall Risk) fatigue/slow reaction;fear of falling;gait/mobility problems;history of falls;bladder function altered;age-related changes;depression/anxiety

## 2018-11-29 NOTE — PLAN OF CARE
Problem: Occupational Therapy Goal  Goal: Occupational Therapy Goal  Goals to be met by: 2 weeks     Patient will increase functional independence with ADLs by performing:    UE Dressing with Set-up Assistance.  LE Dressing with Stand-by Assistance.  Revised:  LBD with Min A  Grooming while standing at sink with Supervision.  Revised:  Grooming seated at sink with SBA  Toileting from bedside commode with Stand-by Assistance for hygiene and clothing management.   RevisedToileting from Bedside commode with Min A  Bathing from  shower chair/bench with Stand-by Assistance.  Bathing from shower bench with Mod A  Supine to sit with Supervision.  Stand pivot transfers with Stand-by Assistance.  Toilet transfer to bedside commode with Stand-by Assistance.  Revised:  Toilet transfer with Min A  Upper extremity exercise program 3 x 15 reps per handout, with independence.  Patient will complete a functional standing activity with S for activity tolerance in order to perform household tasks.  Goal Discontinued on 11-26-18   Outcome: Ongoing (interventions implemented as appropriate)  Patient's goals are appropriate.   DACIA Regalado  11/29/2018

## 2018-11-29 NOTE — ASSESSMENT & PLAN NOTE
· Not a surgical candidate  · Continue medical management   · Continue TLSO brace when OOB  · Continue pain control with Lidoderm patches  · Bowel regimen with senokot-s, hold for loose or frequent stooling  · DVT ppx with heparin   · PT/OT  · Fall precautions    Very slow progress with PT/OT and will need assistance on discharge. Underlying pancreas pathology may be hindering progress.  Continue tylenol prn to treat pain. Continue calcium supplements due to underlying osteoporosis.    Evaluated on 11/29/18  · Continue pain management with tylenol  · TLSO brace with OOB  · PT/OT, fall precautions   · Continue heparin for DVT ppx

## 2018-11-29 NOTE — ASSESSMENT & PLAN NOTE
· Pancreatic lesion noted on non-contrast CT  On 11/15, located at the pancreatic head  · Patient will need f/u with Pancreatic cyst services for outpatient testing    Patient has been made DNR by family with appropriate paperwork completed.     Evaluated on 11/29/18  · Arranged appointment for 12/20, family reports she may not want treatment if it is cancerous but would like to speak with specialist for testing and treatment options

## 2018-11-29 NOTE — ASSESSMENT & PLAN NOTE
· Lasix held prior to readmission for worsening renal disease but needed to be diuresed from pulmonary congestion  · lasix was increase to 40mg daily prior to discharge but Cr increase and was sent here with lasix 20mg daily, Cr 1.6 repeat BMP in AM, patient may need to return to MWF scheduled as previously  · Continue coreg and furosemide, lisinopril on hold for renal function  · compensated on exam  · monitor weight daily, currently stable  · Denies SOB or RODAS    11/27: continue current plan    Evaluated on 11/29/18  · Repeat CXR without pulmonary edema  · Cr 1.8 and Na 129, change lasix dosing to MWF  · Continue coreg and furosemide, lisinopril on hold for renal function  · compensated on exam  · monitor weight daily, currently stable  · Denies SOB or RODAS

## 2018-11-29 NOTE — CHAPLAIN
spoke with nurse and provided a compassionate presence, prayer support and reflective listening for patient.

## 2018-11-29 NOTE — PROGRESS NOTES
[facility] [place] [facility]  Department of Hospital Medicine  Progress Note    Patient Name: Karly Sandoval  MRN: [id number]  Code Status: DNR  Admission Date: 11/20/2018  Length of Stay: 9 days  Attending Physician: Chong Saab MD  Primary Care Provider: Uday Allen MD    Subjective:     Principal Problem:Compression fracture of T12 vertebra    Chief complaint/ Reason for Admission: T12 compression fracture    HPI:   Karly Sandoval is a 88 y.o. lady with RA (on plaquenil), HFpEF (noted diastolic dysfunction in echo on 2016), essential hypertension, hyperlipidemia, chronic compression fx of T8-T9, new T12 compression fracture  and new cystic pancreatic head mass who [place] [facility] [place] of worsening back pain. She was also was  treated for a positive UA with E. Coli and Proteus, where she completed a 7 day course of ciprofloxacin along with management for [facility]. She was deemed not to be a surgical candidate for her T12 compression fracture by Neurosurgery and medical management was opted instead due to the nature of the fracture. Patient worked with therapy while inpatient and SNF placement was suggested. Patient at beside and feels that her confusion is cleared and patient is back to her baseline neurologically. He is concerned that she is still using a diaper instead of ambulating to the bathroom. Patient will not be returning to her home but an Assisted Living vs. Nursing home. Son feels if patient doesn't have significant improvement or if patient is + for Ca of pancreas patient will be placed into a nursing home.    11/27: the patient denies pain at rest but states it is 7/10 when moving; her back brace is uncomfortable when eating; lidoderm patch has been effective. She has had worsened confusion with narcotics.     Hospital Course:  11/20/18: Patient admitted to SNF for ongoing PT/OT following a hospitalization for T12 compression fracture.   11/26:Patient  seen at bedside, c/o improved pain to back with TLSO brace and discomfort with brace. Discussed Plan of care and verbalized understanding. Answered all questions.  11/28: UTI noted on UA and culture, started on Cipro, pending completion of susceptibility  11/29: patient seen at bedside, appears to be doing than earlier this week. Family feels that she is improving. AL paperwork completed. Labs reviewed.      Review of Systems   Constitutional: Positive for fatigue. Negative for appetite change, chills and fever.   HENT: Negative for trouble swallowing.    Respiratory: Negative for cough, chest tightness, shortness of breath and wheezing.    Cardiovascular: Negative for chest pain, palpitations and leg swelling.   Gastrointestinal: Negative for abdominal pain, constipation, diarrhea and nausea.   Genitourinary: Negative for difficulty urinating, frequency and urgency.   Musculoskeletal: Positive for back pain. Negative for arthralgias and myalgias.        + improved back with brace, but brace is uncomfortable   Skin: Negative for rash.   Neurological: Negative for dizziness, weakness, light-headedness and headaches.   Psychiatric/Behavioral: Negative for sleep disturbance.     Scheduled Meds:   amLODIPine  10 mg Oral Daily    atorvastatin  20 mg Oral Daily    calcium carbonate  500 mg Oral Daily    carvedilol  6.25 mg Oral BID WM    ciprofloxacin HCl  500 mg Oral Daily    escitalopram oxalate  10 mg Oral Daily    folic acid  1 mg Oral Daily    [START ON 11/30/2018] furosemide  20 mg Oral Every Mon, Wed, Fri    heparin (porcine)  5,000 Units Subcutaneous Q8H    hydroxychloroquine  200 mg Oral Daily    lidocaine  2 patch Transdermal Q24H    senna-docusate 8.6-50 mg  1 tablet Oral QHS    tuberculin  5 Units Intradermal Once     Continuous Infusions:  PRN Meds:.acetaminophen, albuterol sulfate, ALPRAZolam, bisacodyl, dextromethorphan-guaifenesin  mg/5 ml, ondansetron    Objective:     Vital Signs (Most  Recent):  Temp: 98.4 °F (36.9 °C) (11/29/18 0736)  Pulse: 73 (11/29/18 0736)  Resp: 18 (11/29/18 0736)  BP: (!) 142/63 (11/29/18 0736)  SpO2: (!) 94 % (11/29/18 0736) Vital Signs (24h Range):  Temp:  [98.4 °F (36.9 °C)-98.6 °F (37 °C)] 98.4 °F (36.9 °C)  Pulse:  [73-78] 73  Resp:  [18-20] 18  SpO2:  [94 %] 94 %  BP: (142-153)/(63-67) 142/63     Weight: 51 kg (112 lb 7 oz)  Body mass index is 20.56 kg/m².    Physical Exam   Constitutional: She is oriented to person, place, and time. She appears well-developed and well-nourished. No distress.   Cardiovascular: Normal rate, regular rhythm and normal heart sounds. Exam reveals no gallop and no friction rub.   No murmur heard.  Pulmonary/Chest: Effort normal. No respiratory distress. She has decreased breath sounds. She has no wheezes. She has no rales.   Abdominal: Soft. Bowel sounds are normal. She exhibits no distension. There is no tenderness.   ABD rounded but soft   Musculoskeletal: Normal range of motion. She exhibits no edema or tenderness.   TLSO brace in use, Patient is kyphotic   Neurological: She is alert and oriented to person, place, and time.   Skin: Skin is warm and dry. No rash noted. She is not diaphoretic. No cyanosis. Nails show no clubbing.   Psychiatric: She has a normal mood and affect. Her behavior is normal.       Significant Labs:   Recent Labs   Lab 11/26/18  0524 11/27/18  1318 11/29/18  0532   WBC 7.41 7.83 7.09   HGB 8.4* 8.2* 7.9*   HCT 25.5* 24.3* 23.7*    266 234     Recent Labs   Lab 11/26/18  0524 11/27/18  0537 11/29/18  0532   * 130* 129*   K 3.9 3.9 3.9   CL 98 98 97   CO2 24 27 23   BUN 22 18 19   CREATININE 1.6* 1.6* 1.8*   CALCIUM 8.6* 8.5* 8.6*     Lab Results   Component Value Date    LABPROT 12.4 11/13/2018    ALBUMIN 2.4 (L) 11/13/2018     Lab Results   Component Value Date    CALCIUM 8.6 (L) 11/29/2018    PHOS 3.4 11/29/2018     Microbiology Results (last 7 days)     Procedure Component Value Units Date/Time     Blood culture [[id number]] Collected:  11/27/18 1318    Order Status:  Completed Specimen:  Blood Updated:  11/28/18 1612     Blood Culture, Routine No Growth to date     Blood Culture, Routine No Growth to date    Urine culture [[id number]] Collected:  11/27/18 1216    Order Status:  Completed Specimen:  Urine Updated:  11/28/18 1528     Urine Culture, Routine --     KLEBSIELLA PNEUMONIAE  >100,000 cfu/ml  Susceptibility pending      Narrative:       Preferred Collection Type->Urine, Clean Catch  cup only  11/27/2018  14:33         CXR 11/27/18  Impression       Findings suggesting persistent left effusion with interval improvement in perihilar infiltrative, vascular congestive changes.       Significant Imaging:na    Assessment/Plan:      * Compression fracture of T12 vertebra    · Not a surgical candidate  · Continue medical management   · Continue TLSO brace when OOB  · Continue pain control with Lidoderm patches  · Bowel regimen with senokot-s, hold for loose or frequent stooling  · DVT ppx with heparin   · PT/OT  · Fall precautions    Very slow progress with PT/OT and will need assistance on discharge. Underlying pancreas pathology may be hindering progress.  Continue tylenol prn to treat pain. Continue calcium supplements due to underlying osteoporosis.    Evaluated on 11/29/18  · Continue pain management with tylenol  · TLSO brace with OOB  · PT/OT, fall precautions   · Continue heparin for DVT ppx     Klebsiella cystitis    NEW 11/29/18  · T.MAX of 100.1 overnight on 11/26  · Urine studies, blood cultures, and cxr ordered  · NGTD for blood cultures  · CXR without PNA  · U. cx with klebsiella, awaiting susceptibility   · Currently treating patient with cipro (renally dosed) (allergic to PCNs, bactrim, rocephin)  · Patient was treated previously with cipro for same organism      Hyponatremia    ·  and stable  · Still on lasix  · Will continue to monitor with BIW labs    Evaluated on 11/29/18  · NA  129  · Hold lasix today and plan to resume tomorrow with repeat lab work  · Continue to monitor     Pancreatic lesion    · Pancreatic lesion noted on non-contrast CT  On 11/15, located at the pancreatic head  · Patient will need f/u with Pancreatic cyst services for outpatient testing    Patient has been made DNR by family with appropriate paperwork completed.     Evaluated on 11/29/18  · Arranged appointment for 12/20, family reports she may not want treatment if it is cancerous but would like to speak with specialist for testing and treatment options      Anxiety    · Sleeping well, mood stable  · Continue home treatment with escitalopram and alprazolam    Evaluated on 11/29/18  · Stable, continue escitalopram and alprazolam      Chronic diastolic CHF (congestive heart failure)    · Lasix held prior to readmission for worsening renal disease but needed to be diuresed from pulmonary congestion  · lasix was increase to 40mg daily prior to discharge but Cr increase and was sent here with lasix 20mg daily, Cr 1.6 repeat BMP in AM, patient may need to return to MWF scheduled as previously  · Continue coreg and furosemide, lisinopril on hold for renal function  · compensated on exam  · monitor weight daily, currently stable  · Denies SOB or RODAS    11/27: continue current plan    Evaluated on 11/29/18  · Repeat CXR without pulmonary edema  · Cr 1.8 and Na 129, change lasix dosing to MWF  · Continue coreg and furosemide, lisinopril on hold for renal function  · compensated on exam  · monitor weight daily, currently stable  · Denies SOB or RODAS     CKD (chronic kidney disease), stage III    · [place] [facility]  · Baseline at 1.5  · Cr. 1.6  · Continue lasix will repeat BMP in AM  · May need to decrease lasix MWF dosing  · Continue to monitor with BIW labs  · Avoid nephrotoxins and renally adjust medications  11/27 - Cr stable at 1.6  Avoid nephrotoxins and renally adjust meds.  Estimated Creatinine  Clearance: 17.1 mL/min (A) (based on SCr of 1.8 mg/dL (H)).    Evaluated on 11/29/18  · Cr 1.8 with Na 129  · Change lasix dosing to MWF  · Continue to monitor with BIW labs  · Avoid nephrotoxins and renally adjust medications     Mixed hyperlipidemia    · Continue atorvastatin   · F/u with PCP for ongoing outpatient monitoring     Continue plan as above.     Evaluated on 11/29/18  · Continue atorvastatin     Essential hypertension    · Chronic, controlled  · Continue norvasc and coreg  · Added holding parameters  · Monitor and adjust regimen as needed    Continue plan as above.     Evaluated on 11/29/18  · continue Norvasc, coreg with holding parameters  · Monitor and adjust as needed     RA (rheumatoid arthritis)    · Patient without joint complaints  · Continue plaquenil  · F/u with [facility] outpatient    Continue plan as above; continue folic acid.    Evaluated on 11/29/18  · Continue plaquenil       Future Appointments   Date Time Provider Department Center   12/5/2018  1:00 PM Colton Lara MD [facility] FAM MED [facility]   12/18/2018 12:45 PM [facility] ORTHO CLINIC [facility] ORXRAY [facility]   12/18/2018  1:00 PM Fei Sloan PA-C [facility] NEUROSU [facility]   12/20/2018  8:00 AM Boris Brewer MD [facility] GASTRO [facility]   1/14/2019 11:30 AM Isai Smith MD [facility] RHEUM [facility]   2/19/2019  8:00 AM HOME MONITOR DEVICE CHECK, [facility] ARRHPRO Yaya Cash NP  Department of Hospital Medicine  [facility] [place] [facility]

## 2018-11-29 NOTE — ASSESSMENT & PLAN NOTE
·  and stable  · Still on lasix  · Will continue to monitor with [facility] labs    Evaluated on 11/29/18  ·   · Hold lasix today and plan to resume tomorrow with repeat lab work  · Continue to monitor

## 2018-11-29 NOTE — ASSESSMENT & PLAN NOTE
· Chronic, controlled  · Continue norvasc and coreg  · Added holding parameters  · Monitor and adjust regimen as needed    Continue plan as above.     Evaluated on 11/29/18  · continue Norvasc, coreg with holding parameters  · Monitor and adjust as needed

## 2018-11-30 ENCOUNTER — TELEPHONE (OUTPATIENT)
Dept: ADMINISTRATIVE | Facility: CLINIC | Age: 83
End: 2018-11-30

## 2018-11-30 ENCOUNTER — EXTERNAL CHRONIC CARE MANAGEMENT (OUTPATIENT)
Dept: PRIMARY CARE CLINIC | Facility: CLINIC | Age: 83
End: 2018-11-30
Payer: MEDICARE

## 2018-11-30 ENCOUNTER — TELEPHONE (OUTPATIENT)
Dept: FAMILY MEDICINE | Facility: CLINIC | Age: 83
End: 2018-11-30

## 2018-11-30 LAB
ANION GAP SERPL CALC-SCNC: 9 MMOL/L
BUN SERPL-MCNC: 21 MG/DL
CALCIUM SERPL-MCNC: 8.6 MG/DL
CHLORIDE SERPL-SCNC: 97 MMOL/L
CO2 SERPL-SCNC: 24 MMOL/L
CREAT SERPL-MCNC: 1.6 MG/DL
EST. GFR  (AFRICAN AMERICAN): 32.9 ML/MIN/1.73 M^2
EST. GFR  (NON AFRICAN AMERICAN): 28.6 ML/MIN/1.73 M^2
GLUCOSE SERPL-MCNC: 99 MG/DL
POTASSIUM SERPL-SCNC: 4.1 MMOL/L
SODIUM SERPL-SCNC: 130 MMOL/L

## 2018-11-30 PROCEDURE — 25000003 PHARM REV CODE 250: Performed by: HOSPITALIST

## 2018-11-30 PROCEDURE — 25000003 PHARM REV CODE 250: Performed by: NURSE PRACTITIONER

## 2018-11-30 PROCEDURE — 36415 COLL VENOUS BLD VENIPUNCTURE: CPT

## 2018-11-30 PROCEDURE — 25000003 PHARM REV CODE 250: Performed by: INTERNAL MEDICINE

## 2018-11-30 PROCEDURE — 80048 BASIC METABOLIC PNL TOTAL CA: CPT

## 2018-11-30 PROCEDURE — 97110 THERAPEUTIC EXERCISES: CPT

## 2018-11-30 PROCEDURE — 97535 SELF CARE MNGMENT TRAINING: CPT

## 2018-11-30 PROCEDURE — 11000004 HC SNF PRIVATE

## 2018-11-30 PROCEDURE — 99490 CHRNC CARE MGMT STAFF 1ST 20: CPT | Mod: S$PBB,,, | Performed by: FAMILY MEDICINE

## 2018-11-30 PROCEDURE — 63600175 PHARM REV CODE 636 W HCPCS: Performed by: HOSPITALIST

## 2018-11-30 PROCEDURE — 97530 THERAPEUTIC ACTIVITIES: CPT

## 2018-11-30 PROCEDURE — 97116 GAIT TRAINING THERAPY: CPT

## 2018-11-30 PROCEDURE — 99490 CHRNC CARE MGMT STAFF 1ST 20: CPT | Mod: PBBFAC,PO | Performed by: FAMILY MEDICINE

## 2018-11-30 RX ORDER — ALBUTEROL SULFATE 2.5 MG/.5ML
2.5 SOLUTION RESPIRATORY (INHALATION) EVERY 4 HOURS PRN
Status: DISCONTINUED | OUTPATIENT
Start: 2018-11-30 | End: 2018-12-04 | Stop reason: HOSPADM

## 2018-11-30 RX ADMIN — LIDOCAINE 2 PATCH: 50 PATCH TOPICAL at 05:11

## 2018-11-30 RX ADMIN — CARVEDILOL 6.25 MG: 6.25 TABLET, FILM COATED ORAL at 10:11

## 2018-11-30 RX ADMIN — ATORVASTATIN CALCIUM 20 MG: 20 TABLET, FILM COATED ORAL at 10:11

## 2018-11-30 RX ADMIN — HEPARIN SODIUM 5000 UNITS: 5000 INJECTION, SOLUTION INTRAVENOUS; SUBCUTANEOUS at 01:11

## 2018-11-30 RX ADMIN — CARVEDILOL 6.25 MG: 6.25 TABLET, FILM COATED ORAL at 05:11

## 2018-11-30 RX ADMIN — ONDANSETRON 8 MG: 8 TABLET, ORALLY DISINTEGRATING ORAL at 11:11

## 2018-11-30 RX ADMIN — HEPARIN SODIUM 5000 UNITS: 5000 INJECTION, SOLUTION INTRAVENOUS; SUBCUTANEOUS at 05:11

## 2018-11-30 RX ADMIN — HYDROXYCHLOROQUINE SULFATE 200 MG: 200 TABLET, FILM COATED ORAL at 10:11

## 2018-11-30 RX ADMIN — ESCITALOPRAM 10 MG: 10 TABLET, FILM COATED ORAL at 10:11

## 2018-11-30 RX ADMIN — CALCIUM CARBONATE 500 MG: 1250 SUSPENSION ORAL at 10:11

## 2018-11-30 RX ADMIN — HEPARIN SODIUM 5000 UNITS: 5000 INJECTION, SOLUTION INTRAVENOUS; SUBCUTANEOUS at 10:11

## 2018-11-30 RX ADMIN — FUROSEMIDE 20 MG: 40 TABLET ORAL at 10:11

## 2018-11-30 RX ADMIN — FOLIC ACID 1 MG: 1 TABLET ORAL at 10:11

## 2018-11-30 RX ADMIN — STANDARDIZED SENNA CONCENTRATE AND DOCUSATE SODIUM 1 TABLET: 8.6; 5 TABLET, FILM COATED ORAL at 09:11

## 2018-11-30 RX ADMIN — CIPROFLOXACIN HYDROCHLORIDE 500 MG: 500 TABLET, FILM COATED ORAL at 10:11

## 2018-11-30 RX ADMIN — AMLODIPINE BESYLATE 10 MG: 10 TABLET ORAL at 10:11

## 2018-11-30 NOTE — TELEPHONE ENCOUNTER
----- Message from Ambrose Das sent at 11/30/2018  9:35 AM CST -----  Contact: [phone] kim  Caller requested to speak with the nurse about the patient handicap tag. Please call.

## 2018-11-30 NOTE — PT/OT/SLP PROGRESS
"Physical Therapy  Treatment    Karly Sandoval   MRN: [id number]   Admitting Diagnosis: Compression fracture of T12 vertebra    PT Received On: 11/30/18  Total Time (min): 45       Billable Minutes:  Gait Training 10, Therapeutic Activity 10 and Therapeutic Exercise 25    Treatment Type: Treatment  PT/PTA: PTA     PTA Visit Number: 1       General Precautions: Standard, fall  Orthopedic Precautions: spinal precautions   Braces: TLSO    Do you have any cultural, spiritual, Zoroastrian conflicts, given your current situation?: none    Subjective:  "feel terrible, stomach/side/back, sleepy" agreeable to therapy with encouragement to include getting back in bed post session      Pain/Comfort  Pain Rating 1: 5/10  Location - Side 1: Left  Location - Orientation 1: lower  Location 1: back  Pain Addressed 1: Pre-medicate for activity, Reposition, Distraction, Cessation of Activity, Nurse notified  Pain Rating Post-Intervention 1: (inc with mobility, mostly sit>std)    Objective:   Patient found with: TLSO     AM-PAC 6 CLICK MOBILITY  Total Score:14    Bed Mobility:  Sit>Supine:min A with vcs for tech via log roll  Total A x 2 with draw sheet to HOB    Transfers:  Sit<>Stand: mod/min with RW vcs for tech/ant wt shifting, stepping back to get over WU  Stand Pivot Transfer: mod/min no AD using bed rail WSC>EOB vcs for tech    Gait:  Amb with RW min /CGA ~ 85 ft no LOB, wc follow, decline further distance/trials     Therex:  2x10 reps AP,GS,LAQ,hip flex,abd/add    Additional Treatment:  Mini elliptical x 10 min    Patient left supine with call button in reach and belongings in reach.    Assessment:  Karly Sandoval is a 88 y.o. female with a medical diagnosis of Compression fracture of T12 vertebra.  Pt tolerated fair, limited by pain/fatigue, not feeling well, pt would continue to benefit from skilled PT services to improve overall functional mobility, strength and endurance.  .    Rehab identified problem list/impairments: " weakness, impaired endurance, impaired self care skills, impaired functional mobilty, gait instability, impaired balance, decreased upper extremity function, decreased lower extremity function, pain    Rehab potential is good.    Activity tolerance: Fair    Discharge recommendations: ([facility] or NH depending on pt progress)     Barriers to discharge: None(will D/C to prison or NH)    Equipment recommendations: wheelchair     GOALS:   Multidisciplinary Problems     Physical Therapy Goals        Problem: Physical Therapy Goal    Goal Priority Disciplines Outcome Goal Variances Interventions   Physical Therapy Goal     PT, PT/OT Ongoing (interventions implemented as appropriate)     Description:  Goals to be met by: 14 days     Patient will increase functional independence with mobility by performin. Supine to sit with Set-up [place]  2. Sit to supine with Contact Guard Assistance  3. Sit to stand transfer with Contact Guard Assistance  4. Bed to chair transfer with Contact Guard Assistance using Rolling Walker  5. Gait  x 100 feet with Contact Guard Assistance using Rolling Walker. - MET 2018  6. Stand for 2 minutes with Contact Guard Assistance using Rolling Walker                       PLAN:    Patient to be seen (5-6X/week)  to address the above listed problems via gait training, therapeutic activities, therapeutic exercises  Plan of Care expires: 18  Plan of Care reviewed with: patient    Lyndsay Conklin, PTA  2018

## 2018-11-30 NOTE — TELEPHONE ENCOUNTER
Patient's daughter called stating patient is being discharged from SNF unit and cannot appear at [facility].  Requesting handicap form that discloses this.

## 2018-11-30 NOTE — TREATMENT PLAN
Rehab Services' DME recommendations    Karly Sandoval  MRN: [id number]    [x] Wheelchair  Number of hours up in a wheelchair per day 6-8       Style Light weight        Justification for light weight w/c: patient cannot propel in a standard wheelchair, patient can and does self-propel in a lightweight wheelchair, patient has impaired ability to participate in MRADLs, mobility limitations cannot be sifficiently resolved with a cane/walker, the home provides adequate access between rooms for a wheelchair, a wheelchair will significantly improve the ability to participate in MRADLs and will be used in the home on a regular basis, the patient is willing to use a wheelchair in the home and the patient has a caregiver who is available, willing, and able to provide assistance with the wheelchair    Seat Width 18 (Standard adult)    Seat Depth 18    Back Height Standard    Leg Support Standard and Swing Away    Arm Height Detachable, Full and Swing Away    Lap Belt Velcro    Accessories Front Brakes and Anti-tippers    Cushion Basic    Justification for Cushion Decrease pressure sores and maintains skin integrity    Justification for wheelchair order: (Please select all that apply) Caregiver is capable and willing to operate wheelchair safely, Patient's upper body strength is sufficient for propulsion, The patient requires the use of a wheelchair for ADLs within the home and Patient mobility limitations cannot be sufficiently resolved by the use of other ambulatory therapies    [x] Home health PT and OT    DACIA Regalado 11/30/2018

## 2018-11-30 NOTE — PLAN OF CARE
Problem: Physical Therapy Goal  Goal: Physical Therapy Goal  Goals to be met by: 14 days     Patient will increase functional independence with mobility by performin. Supine to sit with Set-up [place]  2. Sit to supine with Contact Guard Assistance  3. Sit to stand transfer with Contact Guard Assistance  4. Bed to chair transfer with Contact Guard Assistance using Rolling Walker  5. Gait  x 100 feet with Contact Guard Assistance using Rolling Walker. - MET 2018  6. Stand for 2 minutes with Contact Guard Assistance using Rolling Walker      Outcome: Ongoing (interventions implemented as appropriate)  Goals remain appropriate

## 2018-11-30 NOTE — PT/OT/SLP PROGRESS
Occupational Therapy  Treatment    Karly Sandoval   MRN: [id number]   Admitting Diagnosis: Compression fracture of T12 vertebra    OT Date of Treatment: 11/30/18  Total Time (min): 60 min    Billable Minutes:  Self Care/Home Management 60    General Precautions: Standard, fall  Orthopedic Precautions: spinal precautions  Braces: TLSO         Subjective:  Communicated with patient prior to session.    Pain/Comfort  Pain Rating 1: (patient did not rate)  Location - Side 1: Left  Location - Orientation 1: lower  Location 1: back  Pain Addressed 1: Reposition, Distraction  Pain Rating Post-Intervention 1: (patient did not rate)    Objective:       Occupational Performance:    Bed Mobility:    · Patient completed Rolling/Turning to Left with  supervision  · Patient completed Rolling/Turning to Right with supervision   · Patient completed Scooting/Bridging with supervision to EOB sitting EOB  · Patient completed Supine to Sit with supervision with HOB flat and no handrails  · Patient completed Sit to Supine with supervision with HOB flat and no handrails    Functional Mobility/Transfers:  · Patient completed Sit <> Stand Transfer with minimum assistance  with  rolling walker   · Patient completed w/c>bed stand pivot using RW with min A; bed>bedside chair Transfer /c functional ambulation and minimum assistance with rolling walker  · Patient completed Toilet Transfer bedside chair>sit with BSC follow up with functional ambulation and min A; BSC>BSC over toilet using RW with functional ambulation with min A; BSC over toilet>w/c using RW with functional ambulation and min A    Activities of Daily Living:  · Grooming: standing with S at sink for washing face. Patient then sat on BSC in front of sink to perform oral care and comb hair due to not being able to maintain stance because of pain.     · Lower Body Dressing: minimum assistance [place] and donning socks  · Toileting: moderate assistance     [facility] 6 Click:  [facility]  Total Score: 17    Patient left up in chair with call button in reach and all needs met.     ASSESSMENT:  Karly Sandoval is a 88 y.o. female with a medical diagnosis of Compression fracture of T12 vertebra and presents with the deficits listed below. Patient tolerated treatment session and was motivated to complete tasks despite having pain. Son and daughter in law were present for part of OT session.  Patient did become nauseated after toileting and ambulating back to w/c. Son and daughter in law were educated on patient's current level of assistance with ADLs and functional mobility (including bed mobility). Nursing notified and provided patient with appropriate medication. Patient continues to benefit from skilled OT services to achieve maximal independence.    Rehab identified problem list/impairments: weakness, impaired endurance, impaired self care skills, impaired functional mobilty, gait instability, impaired balance, decreased safety awareness, pain    Rehab potential is good    Activity tolerance: Good    Discharge recommendations: home with home health     Barriers to discharge: Decreased caregiver support     Equipment recommendations: wheelchair     GOALS:   Multidisciplinary Problems     Occupational Therapy Goals        Problem: Occupational Therapy Goal    Goal Priority Disciplines Outcome Interventions   Occupational Therapy Goal     OT, PT/OT Ongoing (interventions implemented as appropriate)    Description:  Goals to be met by: 2 weeks     Patient will increase functional independence with ADLs by performing:    UE Dressing with Set-up Assistance.  LE Dressing with Stand-by Assistance.  Revised:  LBD with Min A  Grooming while standing at sink with Supervision.  Revised:  Grooming seated at sink with SBA  Toileting from bedside commode with Stand-by Assistance for hygiene and clothing management.   RevisedToileting from Bedside commode with Min A  Bathing from  shower chair/bench with Stand-by  Assistance.  Bathing from shower bench with Mod A  Supine to sit with Supervision.  Stand pivot transfers with Stand-by Assistance.  Toilet transfer to bedside commode with Stand-by Assistance.  Revised:  Toilet transfer with Min A  Upper extremity exercise program 3 x 15 reps per handout, with independence.  Patient will complete a functional standing activity with S for activity tolerance in order to perform household tasks.  Goal Discontinued on 11-26-18                    Plan:  Patient to be seen 5 x/week to address the above listed problems via self-care/home management, therapeutic activities, therapeutic exercises  Plan of Care expires: 12/21/18  Plan of Care reviewed with: patient, son and daughter in law    DACIA Regalado  11/30/2018

## 2018-11-30 NOTE — PROGRESS NOTES
Attended IDT meeting at [facility] to obtain patient update - Patient had 24 hour assistance prior to D/C & will have that after D/C as well.Patient plans to go back to [facility] living  Clinical Status:Patient had increased temp & was found to have a UTI - Cipro was ordered. Some pain in back & TLSO brace ordered.     Progress made/or loss:    Therapies Involved with Care:      PT:    Ambulation:150 ft. CG RW    Sit to Stand:    Pivot:    Bed Function:    Transfers:min-mod assist    Bed Mobility:    OT:    Toileting:min assist    Hygiene:mod assist    Upper Body dressing:min assist    Lower Body dressing:mod assist    Clothing management:    ADL's:    ST    Diet Order    Cognition:      Barriers to Progress:pain    New Treatments:    Projected LOS:    Projected Discharge date:12/4/2018    Discharge Disposition:

## 2018-11-30 NOTE — PLAN OF CARE
Problem: Fall Risk (Adult)  Goal: Absence of Falls  Patient will demonstrate the desired outcomes by discharge/transition of care.  Outcome: Ongoing (interventions implemented as appropriate)  Avasys camera in progress. Utilizes nurse call light when assistance is needed. Call light remains within reach. Remains free of falls, injury or trauma. Will continue to monitor.

## 2018-11-30 NOTE — PLAN OF CARE
Problem: Occupational Therapy Goal  Goal: Occupational Therapy Goal  Goals to be met by: 2 weeks     Patient will increase functional independence with ADLs by performing:    UE Dressing with Set-up Assistance.  LE Dressing with Stand-by Assistance.  Revised:  LBD with Min A  Grooming while standing at sink with Supervision.  Revised:  Grooming seated at sink with SBA  Toileting from bedside commode with Stand-by Assistance for hygiene and clothing management.   RevisedToileting from Bedside commode with Min A  Bathing from  shower chair/bench with Stand-by Assistance.  Bathing from shower bench with Mod A  Supine to sit with Supervision.  Stand pivot transfers with Stand-by Assistance.  Toilet transfer to bedside commode with Stand-by Assistance.  Revised:  Toilet transfer with Min A  Upper extremity exercise program 3 x 15 reps per handout, with independence.  Patient will complete a functional standing activity with S for activity tolerance in order to perform household tasks.  Goal Discontinued on 11-26-18   Outcome: Ongoing (interventions implemented as appropriate)  Patient's goals are appropriate.   DACIA Regalado  11/30/2018

## 2018-12-01 PROCEDURE — 25000003 PHARM REV CODE 250: Performed by: INTERNAL MEDICINE

## 2018-12-01 PROCEDURE — 25000003 PHARM REV CODE 250: Performed by: NURSE PRACTITIONER

## 2018-12-01 PROCEDURE — 25000003 PHARM REV CODE 250: Performed by: HOSPITALIST

## 2018-12-01 PROCEDURE — 11000004 HC SNF PRIVATE

## 2018-12-01 PROCEDURE — 63600175 PHARM REV CODE 636 W HCPCS: Performed by: HOSPITALIST

## 2018-12-01 RX ADMIN — CARVEDILOL 6.25 MG: 6.25 TABLET, FILM COATED ORAL at 05:12

## 2018-12-01 RX ADMIN — ATORVASTATIN CALCIUM 20 MG: 20 TABLET, FILM COATED ORAL at 09:12

## 2018-12-01 RX ADMIN — ESCITALOPRAM 10 MG: 10 TABLET, FILM COATED ORAL at 09:12

## 2018-12-01 RX ADMIN — HEPARIN SODIUM 5000 UNITS: 5000 INJECTION, SOLUTION INTRAVENOUS; SUBCUTANEOUS at 05:12

## 2018-12-01 RX ADMIN — CALCIUM CARBONATE 500 MG: 1250 SUSPENSION ORAL at 09:12

## 2018-12-01 RX ADMIN — LIDOCAINE 2 PATCH: 50 PATCH TOPICAL at 05:12

## 2018-12-01 RX ADMIN — HEPARIN SODIUM 5000 UNITS: 5000 INJECTION, SOLUTION INTRAVENOUS; SUBCUTANEOUS at 09:12

## 2018-12-01 RX ADMIN — STANDARDIZED SENNA CONCENTRATE AND DOCUSATE SODIUM 1 TABLET: 8.6; 5 TABLET, FILM COATED ORAL at 09:12

## 2018-12-01 RX ADMIN — AMLODIPINE BESYLATE 10 MG: 10 TABLET ORAL at 09:12

## 2018-12-01 RX ADMIN — CIPROFLOXACIN HYDROCHLORIDE 500 MG: 500 TABLET, FILM COATED ORAL at 09:12

## 2018-12-01 RX ADMIN — GUAIFENESIN AND DEXTROMETHORPHAN 10 ML: 100; 10 SYRUP ORAL at 09:12

## 2018-12-01 RX ADMIN — FOLIC ACID 1 MG: 1 TABLET ORAL at 09:12

## 2018-12-01 RX ADMIN — ALPRAZOLAM 0.25 MG: 0.25 TABLET ORAL at 09:12

## 2018-12-01 RX ADMIN — HEPARIN SODIUM 5000 UNITS: 5000 INJECTION, SOLUTION INTRAVENOUS; SUBCUTANEOUS at 02:12

## 2018-12-01 RX ADMIN — CARVEDILOL 6.25 MG: 6.25 TABLET, FILM COATED ORAL at 09:12

## 2018-12-01 NOTE — PLAN OF CARE
Problem: Fall Risk (Adult)  Goal: Absence of Falls  Patient will demonstrate the desired outcomes by discharge/transition of care.  Outcome: Ongoing (interventions implemented as appropriate)  Avasys camera in progress. Monitored frequently. Call light remains within reach. Remains free of falls, injury or trauma. Will continue to monitor.

## 2018-12-01 NOTE — PLAN OF CARE
Problem: Patient Care Overview  Goal: Plan of Care Review  Outcome: Ongoing (interventions implemented as appropriate)  Plan of care reviewed with patient. Intervention documented on MAR and flowsheet. Patient rested well throughout night.Telesitter remains at bedside. Safety measures maintained.

## 2018-12-02 LAB
BACTERIA BLD CULT: NORMAL
TB INDURATION 48 - 72 HR READ: 0 MM

## 2018-12-02 PROCEDURE — 25000003 PHARM REV CODE 250: Performed by: INTERNAL MEDICINE

## 2018-12-02 PROCEDURE — 97535 SELF CARE MNGMENT TRAINING: CPT

## 2018-12-02 PROCEDURE — 25000003 PHARM REV CODE 250: Performed by: NURSE PRACTITIONER

## 2018-12-02 PROCEDURE — 63600175 PHARM REV CODE 636 W HCPCS: Performed by: HOSPITALIST

## 2018-12-02 PROCEDURE — 25000003 PHARM REV CODE 250: Performed by: HOSPITALIST

## 2018-12-02 PROCEDURE — 11000004 HC SNF PRIVATE

## 2018-12-02 RX ADMIN — STANDARDIZED SENNA CONCENTRATE AND DOCUSATE SODIUM 1 TABLET: 8.6; 5 TABLET, FILM COATED ORAL at 09:12

## 2018-12-02 RX ADMIN — CARVEDILOL 6.25 MG: 6.25 TABLET, FILM COATED ORAL at 10:12

## 2018-12-02 RX ADMIN — ESCITALOPRAM 10 MG: 10 TABLET, FILM COATED ORAL at 10:12

## 2018-12-02 RX ADMIN — CIPROFLOXACIN HYDROCHLORIDE 500 MG: 500 TABLET, FILM COATED ORAL at 10:12

## 2018-12-02 RX ADMIN — HEPARIN SODIUM 5000 UNITS: 5000 INJECTION, SOLUTION INTRAVENOUS; SUBCUTANEOUS at 05:12

## 2018-12-02 RX ADMIN — FOLIC ACID 1 MG: 1 TABLET ORAL at 10:12

## 2018-12-02 RX ADMIN — ATORVASTATIN CALCIUM 20 MG: 20 TABLET, FILM COATED ORAL at 10:12

## 2018-12-02 RX ADMIN — AMLODIPINE BESYLATE 10 MG: 10 TABLET ORAL at 10:12

## 2018-12-02 RX ADMIN — ALPRAZOLAM 0.25 MG: 0.25 TABLET ORAL at 09:12

## 2018-12-02 RX ADMIN — HEPARIN SODIUM 5000 UNITS: 5000 INJECTION, SOLUTION INTRAVENOUS; SUBCUTANEOUS at 09:12

## 2018-12-02 RX ADMIN — GUAIFENESIN AND DEXTROMETHORPHAN 10 ML: 100; 10 SYRUP ORAL at 09:12

## 2018-12-02 RX ADMIN — HYDROXYCHLOROQUINE SULFATE 200 MG: 200 TABLET, FILM COATED ORAL at 10:12

## 2018-12-02 RX ADMIN — LIDOCAINE 2 PATCH: 50 PATCH TOPICAL at 06:12

## 2018-12-02 RX ADMIN — CARVEDILOL 6.25 MG: 6.25 TABLET, FILM COATED ORAL at 06:12

## 2018-12-02 RX ADMIN — CALCIUM CARBONATE 500 MG: 1250 SUSPENSION ORAL at 10:12

## 2018-12-02 RX ADMIN — HEPARIN SODIUM 5000 UNITS: 5000 INJECTION, SOLUTION INTRAVENOUS; SUBCUTANEOUS at 02:12

## 2018-12-02 NOTE — PT/OT/SLP PROGRESS
Occupational Therapy  Treatment    Karly Sandoval   MRN: [id number]   Admitting Diagnosis: Compression fracture of T12 vertebra    OT Date of Treatment: 12/02/18  Total Time (min): 43 min    Billable Minutes:  Self Care/Home Management 43    General Precautions: Standard, fall  Orthopedic Precautions: spinal precautions  Braces: TLSO         Subjective:  Communicated with nsg prior to session.  I am doing well for an old lady    Pain/Comfort  Pain Rating 1: 5/10  Location - Side 1: Left  Location - Orientation 1: lower  Location 1: back  Pain Addressed 1: Pre-medicate for activity, Reposition, Distraction    Objective:   Pt. Supine on arrival    Occupational Performance:    Bed Mobility:    · Patient completed Supine to Sit with minimum assistance for UB management and support     Functional Mobility/Transfers:  · Patient completed Sit <> Stand Transfer with minimum assistance  with  rolling walker  from EOB to w/c and then to bed side chair with cues for safety     Activities of Daily Living:  · Grooming: stand by assistance  at sink level for hair care and oral care  · Bathing: moderate assistance for BLE feet and post milagros care and incot with urine and diaper management   · Upper Body Dressing: minimum assistance to john button up as pull over and Total A for TLSO bace  · Lower Body Dressing: moderate assistance to john BLE feet into pants and Pt. able to manage over hips instance with RW for balance    [facility] 6 Click:  AMPAC Total Score: 17    Patient left up in chair with all lines intact and call button in reach and AVS camera     ASSESSMENT:  Karly Sandoval is a 88 y.o. female with a medical diagnosis of Compression fracture of T12 vertebra Pt. participated well with session on this day.Pt demos physical deficits with balance  functional mobility, UB strength, endurance  level of functional indep with daily tasks and activities and selfcare skills .Pt. Will continue to benefit from continued OT to  progress towards goals  .    Rehab identified problem list/impairments: weakness, impaired endurance, impaired self care skills, impaired functional mobilty, gait instability, impaired balance, decreased safety awareness, pain    Rehab potential is fair    Activity tolerance: Fair    Discharge recommendations: home with home health     Barriers to discharge: Decreased caregiver support     Equipment recommendations: wheelchair     GOALS:   Multidisciplinary Problems     Occupational Therapy Goals        Problem: Occupational Therapy Goal    Goal Priority Disciplines Outcome Interventions   Occupational Therapy Goal     OT, PT/OT Ongoing (interventions implemented as appropriate)    Description:  Goals to be met by: 2 weeks     Patient will increase functional independence with ADLs by performing:    UE Dressing with Set-up Assistance.  LE Dressing with Stand-by Assistance.  Revised:  LBD with Min A  Grooming while standing at sink with Supervision.  Revised:  Grooming seated at sink with SBA  Toileting from bedside commode with Stand-by Assistance for hygiene and clothing management.   RevisedToileting from Bedside commode with Min A  Bathing from  shower chair/bench with Stand-by Assistance.  Bathing from shower bench with Mod A  Supine to sit with Supervision.  Stand pivot transfers with Stand-by Assistance.  Toilet transfer to bedside commode with Stand-by Assistance.  Revised:  Toilet transfer with Min A  Upper extremity exercise program 3 x 15 reps per handout, with independence.  Patient will complete a functional standing activity with S for activity tolerance in order to perform household tasks.  Goal Discontinued on 11-26-18                Plan:  Patient to be seen 5 x/week to address the above listed problems via self-care/home management, therapeutic activities, therapeutic exercises  Plan of Care expires: 12/21/18  Plan of Care reviewed with: patient  The DACIA and MU have collaborated and discussed the  patient's status, treatment plan and progress toward established goals.   MU Archibald/ALEX 12/2/2018

## 2018-12-02 NOTE — PLAN OF CARE
Problem: Occupational Therapy Goal  Goal: Occupational Therapy Goal  Goals to be met by: 2 weeks     Patient will increase functional independence with ADLs by performing:    UE Dressing with Set-up Assistance.  LE Dressing with Stand-by Assistance.  Revised:  LBD with Min A  Grooming while standing at sink with Supervision.  Revised:  Grooming seated at sink with SBA  Toileting from bedside commode with Stand-by Assistance for hygiene and clothing management.   RevisedToileting from Bedside commode with Min A  Bathing from  shower chair/bench with Stand-by Assistance.  Bathing from shower bench with Mod A  Supine to sit with Supervision.  Stand pivot transfers with Stand-by Assistance.  Toilet transfer to bedside commode with Stand-by Assistance.  Revised:  Toilet transfer with Min A  Upper extremity exercise program 3 x 15 reps per handout, with independence.  Patient will complete a functional standing activity with S for activity tolerance in order to perform household tasks.  Goal Discontinued on 11-26-18   Outcome: Ongoing (interventions implemented as appropriate)  .

## 2018-12-02 NOTE — PLAN OF CARE
Problem: Patient Care Overview  Goal: Plan of Care Review  Outcome: Ongoing (interventions implemented as appropriate)   12/02/18 0520   Coping/Psychosocial   Plan Of Care Reviewed With patient       Problem: Fall Risk (Adult)  Goal: Identify Related Risk Factors and Signs and Symptoms  Related risk factors and signs and symptoms are identified upon initiation of Human Response Clinical Practice Guideline (CPG)  Outcome: Ongoing (interventions implemented as appropriate)   12/02/18 0520   Fall Risk   Related Risk Factors (Fall Risk) age-related changes;bladder function altered;fear of falling;gait/mobility problems;polypharmacy;sensory deficits   Signs and Symptoms (Fall Risk) presence of risk factors

## 2018-12-03 LAB
ANION GAP SERPL CALC-SCNC: 7 MMOL/L
ANISOCYTOSIS BLD QL SMEAR: SLIGHT
BASOPHILS # BLD AUTO: 0.03 K/UL
BASOPHILS NFR BLD: 0.6 %
BUN SERPL-MCNC: 21 MG/DL
CALCIUM SERPL-MCNC: 8.7 MG/DL
CHLORIDE SERPL-SCNC: 97 MMOL/L
CO2 SERPL-SCNC: 25 MMOL/L
CREAT SERPL-MCNC: 1.5 MG/DL
DIFFERENTIAL METHOD: ABNORMAL
EOSINOPHIL # BLD AUTO: 0.1 K/UL
EOSINOPHIL NFR BLD: 2.4 %
ERYTHROCYTE [DISTWIDTH] IN BLOOD BY AUTOMATED COUNT: 13.7 %
EST. GFR  (AFRICAN AMERICAN): 35.6 ML/MIN/1.73 M^2
EST. GFR  (NON AFRICAN AMERICAN): 30.9 ML/MIN/1.73 M^2
GLUCOSE SERPL-MCNC: 95 MG/DL
HCT VFR BLD AUTO: 25.2 %
HGB BLD-MCNC: 8.7 G/DL
HYPOCHROMIA BLD QL SMEAR: ABNORMAL
IMM GRANULOCYTES # BLD AUTO: 0.08 K/UL
IMM GRANULOCYTES NFR BLD AUTO: 1.7 %
LYMPHOCYTES # BLD AUTO: 1.3 K/UL
LYMPHOCYTES NFR BLD: 28.5 %
MAGNESIUM SERPL-MCNC: 1.9 MG/DL
MCH RBC QN AUTO: 30.6 PG
MCHC RBC AUTO-ENTMCNC: 34.5 G/DL
MCV RBC AUTO: 89 FL
MONOCYTES # BLD AUTO: 0.7 K/UL
MONOCYTES NFR BLD: 14.2 %
NEUTROPHILS # BLD AUTO: 2.5 K/UL
NEUTROPHILS NFR BLD: 52.6 %
NRBC BLD-RTO: 0 /100 WBC
OVALOCYTES BLD QL SMEAR: ABNORMAL
PHOSPHATE SERPL-MCNC: 3.9 MG/DL
PLATELET # BLD AUTO: 245 K/UL
PMV BLD AUTO: 9.8 FL
POIKILOCYTOSIS BLD QL SMEAR: SLIGHT
POLYCHROMASIA BLD QL SMEAR: ABNORMAL
POTASSIUM SERPL-SCNC: 4 MMOL/L
RBC # BLD AUTO: 2.84 M/UL
SODIUM SERPL-SCNC: 129 MMOL/L
WBC # BLD AUTO: 4.66 K/UL

## 2018-12-03 PROCEDURE — 97110 THERAPEUTIC EXERCISES: CPT

## 2018-12-03 PROCEDURE — 36415 COLL VENOUS BLD VENIPUNCTURE: CPT

## 2018-12-03 PROCEDURE — 97116 GAIT TRAINING THERAPY: CPT

## 2018-12-03 PROCEDURE — 25000003 PHARM REV CODE 250: Performed by: NURSE PRACTITIONER

## 2018-12-03 PROCEDURE — 85025 COMPLETE CBC W/AUTO DIFF WBC: CPT

## 2018-12-03 PROCEDURE — 80048 BASIC METABOLIC PNL TOTAL CA: CPT

## 2018-12-03 PROCEDURE — 11000004 HC SNF PRIVATE

## 2018-12-03 PROCEDURE — 84100 ASSAY OF PHOSPHORUS: CPT

## 2018-12-03 PROCEDURE — 83735 ASSAY OF MAGNESIUM: CPT

## 2018-12-03 PROCEDURE — 97530 THERAPEUTIC ACTIVITIES: CPT

## 2018-12-03 PROCEDURE — 25000003 PHARM REV CODE 250: Performed by: INTERNAL MEDICINE

## 2018-12-03 PROCEDURE — 63600175 PHARM REV CODE 636 W HCPCS: Performed by: HOSPITALIST

## 2018-12-03 PROCEDURE — 25000003 PHARM REV CODE 250: Performed by: HOSPITALIST

## 2018-12-03 RX ORDER — ASPIRIN 81 MG/1
81 TABLET ORAL DAILY
Refills: 0
Start: 2018-12-03

## 2018-12-03 RX ORDER — FUROSEMIDE 20 MG/1
20 TABLET ORAL
Qty: 15 TABLET | Refills: 1 | Status: SHIPPED | OUTPATIENT
Start: 2018-12-03 | End: 2019-12-03

## 2018-12-03 RX ORDER — LIDOCAINE 50 MG/G
2 PATCH TOPICAL DAILY
Qty: 60 PATCH | Refills: 1 | Status: SHIPPED | OUTPATIENT
Start: 2018-12-03

## 2018-12-03 RX ORDER — ONDANSETRON 8 MG/1
8 TABLET, ORALLY DISINTEGRATING ORAL EVERY 6 HOURS PRN
Qty: 15 TABLET | Refills: 0 | Status: SHIPPED | OUTPATIENT
Start: 2018-12-03

## 2018-12-03 RX ORDER — ESCITALOPRAM OXALATE 10 MG/1
10 TABLET ORAL DAILY
Qty: 30 TABLET | Refills: 1 | Status: SHIPPED | OUTPATIENT
Start: 2018-12-03

## 2018-12-03 RX ORDER — CIPROFLOXACIN 500 MG/1
500 TABLET ORAL DAILY
Qty: 2 TABLET | Refills: 0 | Status: SHIPPED | OUTPATIENT
Start: 2018-12-03 | End: 2018-12-05

## 2018-12-03 RX ADMIN — FOLIC ACID 1 MG: 1 TABLET ORAL at 08:12

## 2018-12-03 RX ADMIN — LIDOCAINE 2 PATCH: 50 PATCH TOPICAL at 04:12

## 2018-12-03 RX ADMIN — CIPROFLOXACIN HYDROCHLORIDE 500 MG: 500 TABLET, FILM COATED ORAL at 08:12

## 2018-12-03 RX ADMIN — CALCIUM CARBONATE 500 MG: 1250 SUSPENSION ORAL at 08:12

## 2018-12-03 RX ADMIN — ALPRAZOLAM 0.25 MG: 0.25 TABLET ORAL at 08:12

## 2018-12-03 RX ADMIN — ONDANSETRON 8 MG: 8 TABLET, ORALLY DISINTEGRATING ORAL at 09:12

## 2018-12-03 RX ADMIN — CARVEDILOL 6.25 MG: 6.25 TABLET, FILM COATED ORAL at 08:12

## 2018-12-03 RX ADMIN — AMLODIPINE BESYLATE 10 MG: 10 TABLET ORAL at 08:12

## 2018-12-03 RX ADMIN — ATORVASTATIN CALCIUM 20 MG: 20 TABLET, FILM COATED ORAL at 08:12

## 2018-12-03 RX ADMIN — HYDROXYCHLOROQUINE SULFATE 200 MG: 200 TABLET, FILM COATED ORAL at 08:12

## 2018-12-03 RX ADMIN — HEPARIN SODIUM 5000 UNITS: 5000 INJECTION, SOLUTION INTRAVENOUS; SUBCUTANEOUS at 02:12

## 2018-12-03 RX ADMIN — FUROSEMIDE 20 MG: 40 TABLET ORAL at 09:12

## 2018-12-03 RX ADMIN — CARVEDILOL 6.25 MG: 6.25 TABLET, FILM COATED ORAL at 04:12

## 2018-12-03 RX ADMIN — HEPARIN SODIUM 5000 UNITS: 5000 INJECTION, SOLUTION INTRAVENOUS; SUBCUTANEOUS at 09:12

## 2018-12-03 RX ADMIN — HEPARIN SODIUM 5000 UNITS: 5000 INJECTION, SOLUTION INTRAVENOUS; SUBCUTANEOUS at 05:12

## 2018-12-03 RX ADMIN — ESCITALOPRAM 10 MG: 10 TABLET, FILM COATED ORAL at 08:12

## 2018-12-03 NOTE — PT/OT/SLP PROGRESS
"Physical Therapy  Treatment    Karly Sandoval   MRN: [id number]   Admitting Diagnosis: Compression fracture of T12 vertebra    PT Received On: 12/03/18  Total Time (min): 50       Billable Minutes:  Gait Training 10, Therapeutic Activity 20 and Therapeutic Exercise 20    Treatment Type: Treatment  PT/PTA: PTA     PTA Visit Number: 2       General Precautions: Standard, fall  Orthopedic Precautions: spinal precautions   Braces: TLSO    Do you have any cultural, spiritual, [employer]al conflicts, given your current situation?: none    Subjective:  "just my stomach, they gave me something for a BM, I might need to go"      Pain/Comfort  Pain Rating 1: 5/10(0 at rest, 5/10 with sit<>std then resolves with gait)  Location - Side 1: Left  Location - Orientation 1: lower  Location 1: back  Pain Addressed 1: Reposition, Distraction, Cessation of Activity  Pain Rating Post-Intervention 1: 0/10    Objective:  Patient found with: TLSO     AM-PAC 6 CLICK MOBILITY  Total Score:13    Transfers:  Sit<>Stand: with RW mod/min with inc pain, inc time to transition  Stand Pivot Transfer: mod/min with grab bar WC<>BSC    Gait:  Amb with RW CGA ~ 120 ft decrease repts of pain with gait, limited by needing to go to the bathroom,fatigue/pain, decline 2nd trial     Therex:  3x10 reps AP,GS,LAQ,hip flex,abd/add    Balance:  Static standing with grab bar for toileting min A ~ 1 minute  With RW min A ~45 sec limed by pain    Additional Treatment:  toileting total A with clothing mgmt, mod A with trf, set up for hand hygiene , false alarm however diaper wet, A with cleaninging    Patient left in wc with call button in reach and belongings in reach.    Assessment:  Karly Sandoval is a 88 y.o. female with a medical diagnosis of Compression fracture of T12 vertebra.  Pt tolerated fairly well, appears to have most pain with transitional mvmts, , pt would continue to benefit from skilled PT services to improve overall functional mobility, " strength and endurance.  .    Rehab identified problem list/impairments: weakness, impaired endurance, impaired self care skills, impaired functional mobilty, gait instability, impaired balance, decreased upper extremity function, decreased lower extremity function, pain    Rehab potential is good.    Activity tolerance: Fair    Discharge recommendations: ([facility] or [facility] depending on pt progress)     Barriers to discharge: None(will D/C to [facility] or NH)    Equipment recommendations: wheelchair     GOALS:   Multidisciplinary Problems     Physical Therapy Goals        Problem: Physical Therapy Goal    Goal Priority Disciplines Outcome Goal Variances Interventions   Physical Therapy Goal     PT, PT/OT Ongoing (interventions implemented as appropriate)     Description:  Goals to be met by: 14 days     Patient will increase functional independence with mobility by performin. Supine to sit with Set-up [place]  2. Sit to supine with Contact Guard Assistance  3. Sit to stand transfer with Contact Guard Assistance  4. Bed to chair transfer with Contact Guard Assistance using Rolling Walker  5. Gait  x 100 feet with Contact Guard Assistance using Rolling Walker. - MET 2018  6. Stand for 2 minutes with Contact Guard Assistance using Rolling Walker                       PLAN:    Patient to be seen (5-6X/week)  to address the above listed problems via gait training, therapeutic activities, therapeutic exercises  Plan of Care expires: 18  Plan of Care reviewed with: patient    Lyndsay Conklin, PTA  2018

## 2018-12-03 NOTE — PLAN OF CARE
Problem: Patient Care Overview  Goal: Plan of Care Review  Outcome: Ongoing (interventions implemented as appropriate)   12/03/18 0632   Coping/Psychosocial   Plan Of Care Reviewed With patient       Problem: Fall Risk (Adult)  Goal: Identify Related Risk Factors and Signs and Symptoms  Related risk factors and signs and symptoms are identified upon initiation of Human Response Clinical Practice Guideline (CPG)  Outcome: Ongoing (interventions implemented as appropriate)   12/03/18 0632   Fall Risk   Related Risk Factors (Fall Risk) age-related changes;bladder function altered;fear of falling;gait/mobility problems;polypharmacy;sensory deficits;fatigue/slow reaction   Signs and Symptoms (Fall Risk) presence of risk factors       Problem: Pressure Ulcer Risk (Jake Scale) (Adult,Obstetrics,Pediatric)  Goal: Identify Related Risk Factors and Signs and Symptoms  Related risk factors and signs and symptoms are identified upon initiation of Human Response Clinical Practice Guideline (CPG)  Outcome: Ongoing (interventions implemented as appropriate)   12/03/18 0632   Pressure Ulcer Risk (Jake Scale)   Related Risk Factors (Pressure Ulcer Risk (Jake Scale)) age extremes;body weight extremes;cognitive impairment;mobility impaired;nutritional deficiencies

## 2018-12-03 NOTE — PLAN OF CARE
[facility] - Skilled Nursing Care    HOME HEALTH ORDERS  FACE TO FACE ENCOUNTER    Patient Name: Karly Sandoval  YOB: 1930    PCP: Uday Allen MD   PCP Address: [address] / [place] [address]  PCP Phone Number: [phone]  PCP Fax: [phone]    Encounter Date: 12/03/2018    Admit to Home Health    Diagnoses:  Active Hospital Problems    Diagnosis  POA    *Compression fracture of T12 vertebra [S22.080A]  Yes    Takes dietary supplements [Z78.9]  Yes    DNR (do not resuscitate) discussion [Z71.89]  Not Applicable    Klebsiella cystitis [N30.90, B96.89]  No    Hyponatremia [E87.1]  Yes    Pancreatic lesion [K86.9]  Yes    Anxiety [F41.9]  Yes    Chronic diastolic CHF (congestive heart failure) [I50.32]  Yes    CKD (chronic kidney disease), stage III [N18.3]  Yes    Mixed hyperlipidemia [E78.2]  Yes    RA (rheumatoid arthritis) [M06.9]  Yes    Essential hypertension [I10]  Yes      Resolved Hospital Problems   No resolved problems to display.       Future Appointments   Date Time Provider Department Center   12/5/2018  1:00 PM Colton Lara MD [facility] FAM MED [facility]   12/18/2018 12:45 PM [facility] ORTHO CLINIC [facility] ORXRAY [facility]   12/18/2018  1:00 PM Fei Sloan PA-C [facility] NEUROSU [facility]   12/20/2018  8:00 AM Boris Brewer MD [facility] GASTRO [address]   1/14/2019 11:30 AM Isai Smith MD [facility] RHEUM [address]   2/19/2019  8:00 AM HOME MONITOR DEVICE CHECK, [facility] ARRHPRO [address]     Follow-up Information     Uday Allen MD. Schedule an appointment as soon as possible for a visit on 12/10/2018.    Specialty:  Family Medicine  Contact information:  [address]  [address]  [place] [address]  [phone]             Go on 12/5/2018 to follow up.    Why:  See Dr. Allen's associate, Dr. Lara, for your hospital followup visit on Wednesday, December 5, 2018 at 1:00 p.m.               I have seen and examined this patient face to face  today. My clinical findings that support the need for the home health skilled services and home bound status are the following:  Weakness/numbness causing balance and gait disturbance due to Fracture and T12 compression fracture making it taxing to leave home.    Allergies:  Review of patient's allergies indicates:   Allergen Reactions    Amoxicillin Other (See Comments)     unknown    Bactrim [sulfamethoxazole-trimethoprim] Other (See Comments)     unknown    Omeprazole Other (See Comments)     Muscle and joint pain    Rocephin [ceftriaxone] Other (See Comments)     Severe acute generalized pain    Penicillins Rash       Diet: regular diet    Activities: activity as tolerated and no lifting, Driving, or Strenuous exercise, TLSO brace when out of bed    Nursing:   SN to complete comprehensive assessment including routine vital signs. Instruct on disease process and s/s of complications to report to MD. Review/verify medication list sent home with the patient at time of discharge  and instruct patient/caregiver as needed. Frequency may be adjusted depending on start of care date.    Notify MD if SBP > 160 or < 90; DBP > 90 or < 50; HR > 120 or < 50; Temp > 101      CONSULTS:    Physical Therapy to evaluate and treat. Evaluate for home safety and equipment needs; Establish/upgrade home exercise program. Perform / instruct on therapeutic exercises, gait training, transfer training, and Range of Motion.  Occupational Therapy to evaluate and treat. Evaluate home environment for safety and equipment needs. Perform/Instruct on transfers, ADL training, ROM, and therapeutic exercises.  Aide to provide assistance with personal care, ADLs, and vital signs.    MISCELLANEOUS CARE:  Heart Failure:      SN to instruct on the following:    Instruct on the definition of CHF.   Instruct on the signs/sympoms of CHF to be reported.   Instruct on and monitor daily weights.   Instruct on factors that cause exacerbation.   Instruct  on action, dose, schedule, and side effects of medications.   Instruct on diet as prescribed.   Instruct on activity allowed.   Instruct on life-style modifications for life long management of CHF   SN to assess compliance with daily weights, diet, medications, fluid retention,    safety precautions, activities permitted and life-style modifications.   Additional 1-2 SN visits per week as needed for signs and symptoms     of CHF exacerbation.      For Weight Gain > 2-3 lbs in 1 day or 4-6 lbs over 1 week notify PCP.    WOUND CARE ORDERS  n/a    LABS:  CBC, BMP, MAG, and PHOS on admit and send results to PCP.    Medications: Review discharge medications with patient and family and provide education.      Current Discharge Medication List      START taking these medications    Details   ciprofloxacin HCl (CIPRO) 500 MG tablet Take 1 tablet (500 mg total) by mouth once daily. for 2 days  Qty: 2 tablet, Refills: 0      lidocaine (LIDODERM) 5 % Place 2 patches onto the skin once daily. Remove & Discard patch within 12 hours or as directed by MD  Qty: 60 patch, Refills: 1      ondansetron (ZOFRAN-ODT) 8 MG TbDL Take 1 tablet (8 mg total) by mouth every 6 (six) hours as needed.  Qty: 15 tablet, Refills: 0         CONTINUE these medications which have CHANGED    Details   aspirin (ECOTRIN) 81 MG EC tablet Take 1 tablet (81 mg total) by mouth once daily. HOLD until cleared by your PCP  Refills: 0      escitalopram oxalate (LEXAPRO) 10 MG tablet Take 1 tablet (10 mg total) by mouth once daily.  Qty: 30 tablet, Refills: 1      furosemide (LASIX) 20 MG tablet Take 1 tablet (20 mg total) by mouth every Mon, Wed, Fri.  Qty: 15 tablet, Refills: 1         CONTINUE these medications which have NOT CHANGED    Details   acetaminophen (TYLENOL) 650 MG TbSR Take 650 mg by mouth daily as needed (PAIN).      albuterol sulfate 2.5 mg/0.5 mL Nebu Take 2.5 mg by nebulization. Rescue      ALPRAZolam (XANAX) 0.25 MG tablet TAKE 1 TABLET BY  MOUTH EVERY DAY AS NEEDED  Qty: 30 tablet, Refills: 1    Comments: Not to exceed 4 additional fills before 10/03/2018.      amLODIPine (NORVASC) 10 MG tablet Take 10 mg by mouth once daily.      atorvastatin (LIPITOR) 20 MG tablet TAKE 1 TABLET BY MOUTH EVERY DAY  Qty: 90 tablet, Refills: 3      calcium carbonate (CALCIUM 600 ORAL) Take 1 tablet by mouth 2 (two) times daily.      carvedilol (COREG) 6.25 MG tablet Take 1 tablet (6.25 mg total) by mouth 2 (two) times daily with meals.  Qty: 60 tablet, Refills: 6    Associated Diagnoses: Hypertension, essential      denosumab (PROLIA) 60 mg/mL Syrg Inject 60 mg into the skin. Every 6 months      folic acid (FOLVITE) 1 MG tablet TAKE 1 TABLET (1 MG TOTAL) BY MOUTH ONCE DAILY.  Qty: 90 tablet, Refills: 3    Associated Diagnoses: Rheumatoid arthritis of hand, unspecified laterality, unspecified rheumatoid factor presence; Long term methotrexate user      hydroxychloroquine (PLAQUENIL) 200 mg tablet Take 1 tablet (200 mg total) by mouth once daily.  Qty: 30 tablet, Refills: 1    Associated Diagnoses: Rheumatoid arthritis, involving unspecified site, unspecified rheumatoid factor presence         STOP taking these medications       bisacodyl (DULCOLAX) 10 mg Supp Comments:   Reason for Stopping:         coenzyme Q10 (CO Q-10) 100 mg capsule Comments:   Reason for Stopping:         fish oil-omega-3 fatty acids 300-1,000 mg capsule Comments:   Reason for Stopping:         lisinopril 10 MG tablet Comments:   Reason for Stopping:         psyllium (METAMUCIL) powder Comments:   Reason for Stopping:         sertraline (ZOLOFT) 25 MG tablet Comments:   Reason for Stopping:               I certify that this patient is confined to her home and needs intermittent skilled nursing care, physical therapy and occupational therapy.

## 2018-12-03 NOTE — PLAN OF CARE
Problem: Occupational Therapy Goal  Goal: Occupational Therapy Goal  Goals to be met by: 2 weeks     Patient will increase functional independence with ADLs by performing:    UE Dressing with Set-up Assistance.  LE Dressing with Stand-by Assistance.  Revised:  LBD with Min A  Grooming while standing at sink with Supervision.  Revised:  Grooming seated at sink with SBA  Toileting from bedside commode with Stand-by Assistance for hygiene and clothing management.   RevisedToileting from Bedside commode with Min A Met  Bathing from  shower chair/bench with Stand-by Assistance.  Bathing from shower bench with Mod A  Supine to sit with Supervision.  Stand pivot transfers with Stand-by Assistance.  Toilet transfer to bedside commode with Stand-by Assistance.  Revised:  Toilet transfer with Min A Met  Upper extremity exercise program 3 x 15 reps per handout, with independence.  Patient will complete a functional standing activity with S for activity tolerance in order to perform household tasks.  Goal Discontinued on 11-26-18   Outcome: Ongoing (interventions implemented as appropriate)  Patient's goals are appropriate.   DACIA Regalado  12/3/2018

## 2018-12-03 NOTE — PT/OT/SLP PROGRESS
Occupational Therapy  Treatment    Karly Sandoval   MRN: [id number]   Admitting Diagnosis: Compression fracture of T12 vertebra    OT Date of Treatment: 12/03/18  Total Time (min): 55 min    Billable Minutes:  Self Care/Home Management 25 and Therapeutic Exercise 30    General Precautions: Standard, fall  Orthopedic Precautions: spinal precautions  Braces: TLSO         Subjective:  Communicated with patient prior to session.    Pain/Comfort  Pain Rating 1: 8/10  Location - Side 1: Left  Location - Orientation 1: lower  Location 1: back  Pain Addressed 1: Reposition, Distraction, Cessation of Activity  Pain Rating Post-Intervention 1: 8/10    Objective:       Occupational Performance:    Bed Mobility:    · Patient completed Sit to Supine with supervision     Functional Mobility/Transfers:  · Patient completed Sit <> Stand Transfer with minimum assistance using grab bar to toilet and RW back to bed  · Patient completed Toilet Transfer w/c>BSC over toilet stand pivot using grab bar with min A; BSC over toilet>bed using RW with functional ambulation with min A     Activities of Daily Living:  · Upper Body Dressing: contact guard assistance [place] button down shirt and donning front gown  · Lower Body Dressing: moderate assistance [place] pants  · Toileting: moderate assistance      AMPA 6 Click:  AMPA Total Score: 17    Additional Treatment:  Attempted to have patient perform UE ergometer, but patient was in too much pain and was only able to tolerate 1 min of exercises. Attempted B UE ROM exercises with 1# sitting up in w/c, but patient had difficulty performing those exercise too due to pain. Patient then had a much easier time performing exercise in bed lying supine after toilet and dressing tasks. Patient performed B UE ROM exercises using 1# dowel roshan 3 x 10 in all planes and joints focusing to improve strength and endurance to increase independence with ADLs.     Patient then engaged in red theraband exercises  in bed for shoulder abd/adduction, bicep and tricep extension, and shoulder retraction 3 x 10 focusing to improve strength for daily tasks.     Patient left semi-supine with call button in reach and all needs met.     ASSESSMENT:  Karly Sandoval is a 88 y.o. female with a medical diagnosis of Compression fracture of T12 vertebra and presents with the deficits listed below. Patient had difficult performing exercises in beginning of OT session up in chair due to pain. Patient continues to benefit from skilled OT services to achieve maximal independence.    Rehab identified problem list/impairments: weakness, impaired endurance, impaired self care skills, impaired functional mobilty, gait instability, impaired balance, decreased safety awareness, pain    Rehab potential is good     Activity tolerance: Good, but limited due to pain    Discharge recommendations: home with home health     Barriers to discharge: Decreased caregiver support     Equipment recommendations: wheelchair     GOALS:   Multidisciplinary Problems     Occupational Therapy Goals        Problem: Occupational Therapy Goal    Goal Priority Disciplines Outcome Interventions   Occupational Therapy Goal     OT, PT/OT Ongoing (interventions implemented as appropriate)    Description:  Goals to be met by: 2 weeks     Patient will increase functional independence with ADLs by performing:    UE Dressing with Set-up Assistance.  LE Dressing with Stand-by Assistance.  Revised:  LBD with Min A  Grooming while standing at sink with Supervision.  Revised:  Grooming seated at sink with SBA  Toileting from bedside commode with Stand-by Assistance for hygiene and clothing management.   RevisedToileting from Bedside commode with Min A Met  Bathing from  shower chair/bench with Stand-by Assistance.  Bathing from shower bench with Mod A  Supine to sit with Supervision.  Stand pivot transfers with Stand-by Assistance.  Toilet transfer to bedside commode with Stand-by  Assistance.  Revised:  Toilet transfer with Min A Met  Upper extremity exercise program 3 x 15 reps per handout, with independence.  Patient will complete a functional standing activity with S for activity tolerance in order to perform household tasks.  Goal Discontinued on 11-26-18                     Plan:  Patient to be seen 5 x/week to address the above listed problems via self-care/home management, therapeutic activities, therapeutic exercises  Plan of Care expires: 12/21/18  Plan of Care reviewed with: patient    DACIA Regalado  12/03/2018

## 2018-12-03 NOTE — NURSING
JACQUELINE CELESTIN NP NOTIFIED OF NAUSEA AND VOMITED SCANT AMT UNDIGESTED FOOD FROM BREAKFAST .ZOFRAN WAS GIVEN AND NAUSEA RELIEVED.

## 2018-12-03 NOTE — PLAN OF CARE
Problem: Physical Therapy Goal  Goal: Physical Therapy Goal  Goals to be met by: 14 days     Patient will increase functional independence with mobility by performin. Supine to sit with Set-up [place]  2. Sit to supine with Contact Guard Assistance  3. Sit to stand transfer with Contact Guard Assistance  4. Bed to chair transfer with Contact Guard Assistance using Rolling Walker  5. Gait  x 100 feet with Contact Guard Assistance using Rolling Walker. - MET 2018  6. Stand for 2 minutes with Contact Guard Assistance using Rolling Walker      Outcome: Ongoing (interventions implemented as appropriate)  Goals remain appropriate

## 2018-12-03 NOTE — PLAN OF CARE
Problem: Patient Care Overview  Goal: Plan of Care Review  Outcome: Ongoing (interventions implemented as appropriate)  FALL PRECAUTIONS MAINTAINED NO INJURIES NOTED.AVASYS TELESITTER MONITORING MAINTAINED.NO PRESSURE ULCERS NOTED.

## 2018-12-04 VITALS
SYSTOLIC BLOOD PRESSURE: 151 MMHG | HEART RATE: 76 BPM | RESPIRATION RATE: 16 BRPM | DIASTOLIC BLOOD PRESSURE: 68 MMHG | TEMPERATURE: 98 F | OXYGEN SATURATION: 95 % | WEIGHT: 112 LBS | BODY MASS INDEX: 20.61 KG/M2 | HEIGHT: 62 IN

## 2018-12-04 PROCEDURE — 25000003 PHARM REV CODE 250: Performed by: HOSPITALIST

## 2018-12-04 PROCEDURE — 97530 THERAPEUTIC ACTIVITIES: CPT

## 2018-12-04 PROCEDURE — 25000003 PHARM REV CODE 250: Performed by: NURSE PRACTITIONER

## 2018-12-04 PROCEDURE — 97535 SELF CARE MNGMENT TRAINING: CPT

## 2018-12-04 PROCEDURE — 63600175 PHARM REV CODE 636 W HCPCS: Performed by: HOSPITALIST

## 2018-12-04 PROCEDURE — 97803 MED NUTRITION INDIV SUBSEQ: CPT

## 2018-12-04 PROCEDURE — 97116 GAIT TRAINING THERAPY: CPT

## 2018-12-04 RX ADMIN — CARVEDILOL 6.25 MG: 6.25 TABLET, FILM COATED ORAL at 08:12

## 2018-12-04 RX ADMIN — CALCIUM CARBONATE 500 MG: 1250 SUSPENSION ORAL at 08:12

## 2018-12-04 RX ADMIN — CIPROFLOXACIN HYDROCHLORIDE 500 MG: 500 TABLET, FILM COATED ORAL at 08:12

## 2018-12-04 RX ADMIN — HEPARIN SODIUM 5000 UNITS: 5000 INJECTION, SOLUTION INTRAVENOUS; SUBCUTANEOUS at 05:12

## 2018-12-04 RX ADMIN — ATORVASTATIN CALCIUM 20 MG: 20 TABLET, FILM COATED ORAL at 08:12

## 2018-12-04 RX ADMIN — HYDROXYCHLOROQUINE SULFATE 200 MG: 200 TABLET, FILM COATED ORAL at 09:12

## 2018-12-04 RX ADMIN — AMLODIPINE BESYLATE 10 MG: 10 TABLET ORAL at 08:12

## 2018-12-04 RX ADMIN — FOLIC ACID 1 MG: 1 TABLET ORAL at 08:12

## 2018-12-04 RX ADMIN — ESCITALOPRAM 10 MG: 10 TABLET, FILM COATED ORAL at 08:12

## 2018-12-04 NOTE — PLAN OF CARE
Problem: Patient Care Overview  Goal: Plan of Care Review  Outcome: Outcome(s) achieved Date Met: 12/04/18  FALL PRECAUTIONS MAINTAINED NO INJURIES NOTED.CAMERA REMOVED.AFEBRILE.NO PRESSURE ULCERS NOTED.

## 2018-12-04 NOTE — PLAN OF CARE
Problem: Physical Therapy Goal  Goal: Physical Therapy Goal  Goals to be met by: 14 days     Patient will increase functional independence with mobility by performin. Supine to sit with Set-up [place] = met 2018  2. Sit to supine with Contact Guard Assistance = not met   3. Sit to stand transfer with Contact Guard Assistance = not met  4. Bed to chair transfer with Contact Guard Assistance using Rolling Walker - not met   5. Gait  x 100 feet with Contact Guard Assistance using Rolling Walker. - MET 2018  6. Stand for 2 minutes with Contact Guard Assistance using Rolling Walker= met 2018        Goals met as noted. Estella Rhodes, PT 2018

## 2018-12-04 NOTE — PLAN OF CARE
Problem: Patient Care Overview  Goal: Plan of Care Review  Outcome: Ongoing (interventions implemented as appropriate)   12/04/18 0153   Coping/Psychosocial   Plan Of Care Reviewed With patient       Problem: Fall Risk (Adult)  Intervention: Reduce Risk/Promote Restraint Free Environment   12/04/18 0153   Safety Interventions   Environmental Safety Modification assistive device/personal items within reach;clutter free environment maintained;lighting adjusted;mobility aid in reach     Intervention: Patient Rounds   12/04/18 0153   Safety Interventions   Patient Rounds bed in low position;bed wheels locked;call light in reach;placement of personal items at bedside;ID band on;clutter free environment maintained;visualized patient;toileting offered     Intervention: Safety Promotion/Fall Prevention   12/04/18 0153   Safety Interventions   Safety Promotion/Fall Prevention assistive device/personal item within reach;/camera at bedside;Fall Risk reviewed with patient/family;Fall Risk signage in place;lighting adjusted;nonskid shoes/socks when out of bed       Goal: Identify Related Risk Factors and Signs and Symptoms  Related risk factors and signs and symptoms are identified upon initiation of Human Response Clinical Practice Guideline (CPG)  Outcome: Ongoing (interventions implemented as appropriate)   12/04/18 0153   Fall Risk   Related Risk Factors (Fall Risk) age-related changes;bladder function altered;fatigue/slow reaction;gait/mobility problems   Signs and Symptoms (Fall Risk) presence of risk factors

## 2018-12-04 NOTE — PROGRESS NOTES
" [facility]  Adult Nutrition  Progress Note    SUMMARY       Recommendations    Recommendations: 1. Continue current regular diet + boost plus BID. 2. Encourage asking for meal preferences and good po intake    Goals: Increased po intake >/= 50% of meals and ONS by next RD visit.  Prevent wt loss >2% x1 week   Nutrition Goal Status: goal met    Reason for Assessment    Reason for Assessment: RD follow-up  Diagnosis: (back pain)  Relevant Medical History: HTN, HLD, GERD, anemia, diverticulosis  Interdisciplinary Rounds: did not attend  General Information Comments: Spoke with pt and family. Reported good intake at breakfast, saving lunch for later. Pt receiving Boost plus BID. Discussed discharge plan, pt has boost plus and help with cooking/meal options. No N/V/C/D reported at this time. RD to monitor.   Nutrition Discharge Planning: Adequate po intake of meals /oral supplement.    Nutrition Risk Screen    Nutrition Risk Screen: no indicators present    Nutrition/Diet History    Patient Reported Diet/Restrictions/Preferences: general  Do you have any cultural, spiritual, Rastafari conflicts, given your current situation?: none  Food Allergies: NKFA  Factors Affecting Nutritional Intake: decreased appetite    Anthropometrics    Temp: 98.4 °F (36.9 °C)  Height Method: Stated  Height: 5' 2" (157.5 cm)  Height (inches): 62 in  Weight Method: Standard Scale  Weight: 50.8 kg (111 lb 15.9 oz)  Weight (lb): 111.99 lb  Ideal Body Weight (IBW), Female: 110 lb  % Ideal Body Weight, Female (lb): 106.43 lb  BMI (Calculated): 21.5  BMI Grade: 18.5-24.9 - normal  Usual Body Weight (UBW), k kg  % Usual Body Weight: 90.19   5 lb wt gain in last week; RD to monitor. Gradual wt gain encouraged.     Lab/Procedures/Meds    Pertinent Labs Reviewed: reviewed  Pertinent Labs Comments: Na 129' Creatinine 1.5; GFR 30.9  Pertinent Medications Reviewed: reviewed  Pertinent Medications Comments: furosemide; " senna-docusate    Physical Findings/Assessment    Overall Physical Appearance: advanced age, loss of subcutaneous fat, loss of muscle mass    Estimated/Assessed Needs    Weight Used For Calorie Calculations: 53.1 kg (117 lb 1 oz)  Energy Calorie Requirements (kcal): 3921-7822 kcal/d  Energy Need Method: Kcal/kg(25-30)  Protein Requirements: 53-64 gm/d(1.0-1.2 gm/kg)  Weight Used For Protein Calculations: 53.1 kg (117 lb 1 oz)  Fluid Requirements (mL): 1 mL/kcal or per MD  Fluid Need Method: RDA Method  RDA Method (mL): 1327       Nutrition Prescription Ordered    Current Diet Order: Regular Level 7  Nutrition Order Comments: appetite improving since last week   Oral Nutrition Supplement: Boost plus BID    Evaluation of Received Nutrient/Fluid Intake    Energy Calories Required: not meeting needs  Protein Required: not meeting needs  Fluid Required: meeting needs  Tolerance: tolerating  % Intake of Estimated Energy Needs: 50 - 75 %  % Meal Intake: 50 - 75 %    Nutrition Risk    Level of Risk/Frequency of Follow-up: high     Assessment and Plan  Moderate Malnutrition in the context of Acute Illness/Injury     Related to (etiology):  Decreased appetite and fatigue      Signs and Symptoms (as evidenced by):  Energy Intake: <75% of estimated energy requirement for > 1 week  Body Fat Depletion: moderate depletion of triceps   Muscle Mass Depletion: moderate depletion of clavicle region, scapular region and interosseous muscle   Weight Loss: 5% x 1 month   Fluid Accumulation: mild     Interventions (treatment strategy):      Collaboration with other providers     Nutrition Diagnosis Status:   Continues- appetite improving    Monitor and Evaluation    Food and Nutrient Intake: energy intake  Physical Activity and Function: other (specify)  Anthropometric Measurements: weight, weight change  Biochemical Data, Medical Tests and Procedures: lipid profile, electrolyte and renal panel, gastrointestinal profile, glucose/endocrine  profile, inflammatory profile  Nutrition-Focused Physical Findings: overall appearance     Nutrition Follow-Up    RD Follow-up?: Yes

## 2018-12-04 NOTE — PT/OT/SLP PROGRESS
"Physical Therapy  Treatment /discharge    Karly Sandoval   MRN: [id number]   Admitting Diagnosis: Compression fracture of T12 vertebra    PT Received On: 12/04/18          Billable Minutes:  Gait Training 15, Therapeutic Activity 25     Treatment Type: Treatment  PT/PTA: PT     PTA Visit Number: 0       General Precautions: Standard, fall  Orthopedic Precautions: spinal precautions   Braces: TLSO    Do you have any cultural, spiritual, Mormon conflicts, given your current situation?: none    Subjective:  Communicated with patient prior to session.  Patient agreeable to session.  "I'm leaving today." "I'm trying like hell"    Pain/Comfort  Pain Rating 1: 0/10(supine in bed)  Location - Orientation 1: lower  Location 1: back  Pain Addressed 1: Reposition, Distraction, Cessation of Activity(pain w/ oob, activity)  Pain Rating Post-Intervention 1: 7/10    Objective:  Patient found supine in bed; donned TLSO sitting EOB w/ TA with Patient found with: TLSO     AM-PAC 6 CLICK MOBILITY  Total Score:15    Bed Mobility:  Sit>Supine:not performed  Supine>Sit: SBA to EOB    Transfers:  Sit<>Stand: from bed w/ RW and CGA; from w/c w/ RW and mod assist x2 trials  To/from high chair in gym w/ RW and CGA.  Stand Pivot Transfer: bed>w/c w/ RW and CGA; cues for technique  Increased difficulty from chair height; improved w/ elevated surface    Gait:  Amb w/ RW and  feet including multiple turns.     Advanced Gait:  Stairs: not performed  Curb Step:  Not performed    Wheelchair Mobility:  Unable due to back pain, TLSO       Balance:  Patient stands and performs an activity w/ unilateral UE support on RW and SBA/CGA (dropping checkers into Connect 4 board) x 2 minutes 10 seconds. Limited by pain    Additional Treatment:  TLSO donned sitting EOB w/ TA    Patient left up in chair with supervision in gym until OT session.    Assessment:  Karly Sandoval is a 88 y.o. female with a medical diagnosis of Compression fracture of " T12 vertebra.  Patient has TLSO to wear OOB. Patient was limited by pain. She increased amb distance to 173 feet w/ RW and CGA. She is able to stand w/ RW w/ CGA from elevated surfaces but mod assist from chair height. Patient is discharging to [facility] near son's home, [facility]. She will benefit from HHPT to improve safety and functional mobility in the home..    Rehab identified problem list/impairments: weakness, impaired endurance, impaired self care skills, impaired functional mobilty, gait instability, impaired balance, decreased upper extremity function, decreased lower extremity function, pain    Rehab potential is good.    Activity tolerance: Good    Discharge recommendations: Discharging to [facility]    Barriers to discharge: None(will D/C to [facility] or NH)    Equipment recommendations: wheelchair     GOALS:   Multidisciplinary Problems     Physical Therapy Goals        Problem: Physical Therapy Goal    Goal Priority Disciplines Outcome Goal Variances Interventions   Physical Therapy Goal     PT, PT/OT Ongoing (interventions implemented as appropriate)     Description:  Goals to be met by: 14 days     Patient will increase functional independence with mobility by performin. Supine to sit with Set-up [place] = met 2018  2. Sit to supine with Contact Guard Assistance = not met   3. Sit to stand transfer with Contact Guard Assistance = not met  4. Bed to chair transfer with Contact Guard Assistance using Rolling Walker - not met   5. Gait  x 100 feet with Contact Guard Assistance using Rolling Walker. - MET 2018  6. Stand for 2 minutes with Contact Guard Assistance using Rolling Walker= met 2018                          PLAN:    Patient to be seen (5-6X/week)  to address the above listed problems via gait training, therapeutic activities, therapeutic exercises  Plan of Care expires: 18  Plan of Care reviewed with: patient    Estella Rhodes, PT  2018

## 2018-12-04 NOTE — NURSING
DISCHARGE INSTRUCTIONS GIVEN TO PATIENT AND FAMILY MEMBERS BOTH UNDERSTANDS.VERBAL REPORT CALLED TO NURSE FROY PAGAN LPN AT [facility] IN [place]..DISCHARGED BY PERSONAL WHEELCHAIR ACCOMPANIED BY NURSE TO [facility] ASSISTED TO CAR .DISCHARGED ACCOMPANIED BY FAMILY MEMBERS TO [facility] ASSISTED LIVING PERSONAL BELONGINGS WITH PATIENT..

## 2018-12-04 NOTE — PT/OT/SLP PROGRESS
Occupational Therapy  Treatment    Karly Sandoval   MRN: [id number]   Admitting Diagnosis: Compression fracture of T12 vertebra    OT Date of Treatment: 12/04/18  Total Time (min): 60 min    Billable Minutes:  Self Care/Home Management 30 and Therapeutic Activity 30    General Precautions: Standard, fall  Orthopedic Precautions: spinal precautions  Braces: TLSO         Subjective:  Communicated with patient prior to session.    Pain/Comfort  Pain Rating 1: 8/10  Location - Side 1: Left  Location - Orientation 1: lower  Location 1: back  Pain Addressed 1: Reposition, Distraction  Pain Rating Post-Intervention 1: 8/10    Objective:       Occupational Performance:    Functional Mobility/Transfers:  · Patient completed Sit <> Stand Transfer with minimum assistance  with  hand-held assist   · Patient completed w/c>chair in rehab gym with functional ambulation HHA with min A; chair>w/c stand pivot Transfer with no AD  · Patient completed Toilet Transfer Stand Pivot technique with minimum assistance with  grab bars    Activities of Daily Living:  · Grooming: stand by assistance standing at sink for oral care and washing face  · Toileting: moderate assistance    · Patient declined other ADLs including bathing in shower    AMPA 6 Click:  [facility] Total Score: 17    Additional Treatment:  Patient performed a functional standing activity table with FM component incorporated with S in order to perform self care skills. (sit<>stand with min A)    Patient performed a functional activity sidestepping L<>R with SBA at table top removing and replacing pegs in order to perform household tasks (sit<>stand with min A)    Patient left up in chair with call button in reach and all needs met.     ASSESSMENT:  Karly Sandoval is a 88 y.o. female with a medical diagnosis of Compression fracture of T12 vertebra.Patient tolerated treatment session, but had a lot of difficulty due to pain. Patient son was present for standing activities and  daughter in law (with son) present during all ADL tasks in room. Family is aware of patient's current level of ADL and functional mobility performance. Patient was unable to meet all goals, but pain limited her ability to achieve them.     Rehab identified problem list/impairments: weakness, impaired endurance, impaired self care skills, impaired functional mobilty, gait instability, impaired balance, decreased safety awareness, pain    GOALS:   Multidisciplinary Problems     Occupational Therapy Goals        Problem: Occupational Therapy Goal    Goal Priority Disciplines Outcome Interventions   Occupational Therapy Goal     OT, PT/OT Unable to achieve outcome(s) by discharge    Description:  Goals to be met by: 2 weeks     Patient will increase functional independence with ADLs by performing:    UE Dressing with Set-up Assistance.  LE Dressing with Stand-by Assistance.  Revised:  LBD with Min A  Grooming while standing at sink with Supervision.  Revised:  Grooming seated at sink with SBA Met  Toileting from bedside commode with Stand-by Assistance for hygiene and clothing management.   RevisedToileting from Bedside commode with Min A Met  Bathing from  shower chair/bench with Stand-by Assistance.  Bathing from shower bench with Mod A   Supine to sit with Supervision.   Stand pivot transfers with Stand-by Assistance.  Toilet transfer to bedside commode with Stand-by Assistance.  Revised:  Toilet transfer with Min A Met  Upper extremity exercise program 3 x 15 reps per handout, with independence.  Patient will complete a functional standing activity with S for activity tolerance in order to perform household tasks.  Goal Discontinued on 11-26-18                      Plan:  Patient was discharged from [facility] on this date.     DACIA Regalado  12/04/2018

## 2018-12-04 NOTE — PLAN OF CARE
Problem: Patient Care Overview  Goal: Plan of Care Review  Outcome: Ongoing (interventions implemented as appropriate)  Moderate Malnutrition in the context of Acute Illness/Injury     Related to (etiology):  Decreased appetite and fatigue      Signs and Symptoms (as evidenced by):  Energy Intake: <75% of estimated energy requirement for > 1 week  Body Fat Depletion: moderate depletion of triceps   Muscle Mass Depletion: moderate depletion of clavicle region, scapular region and interosseous muscle   Weight Loss: 5% x 1 month   Fluid Accumulation: mild     Interventions (treatment strategy):      Collaboration with other providers     Nutrition Diagnosis Status:   Continues- appetite improving    Recommendations:   1. Continue current regular diet + boost plus BID. 2. Encourage asking for meal preferences and good po intake    Goals: Increased po intake >/= 50% of meals and ONS by next RD visit.  Prevent wt loss >2% x1 week

## 2018-12-04 NOTE — PLAN OF CARE
Problem: Occupational Therapy Goal  Goal: Occupational Therapy Goal  Goals to be met by: 2 weeks     Patient will increase functional independence with ADLs by performing:    UE Dressing with Set-up Assistance.  LE Dressing with Stand-by Assistance.  Revised:  LBD with Min A  Grooming while standing at sink with Supervision.  Revised:  Grooming seated at sink with SBA Met  Toileting from bedside commode with Stand-by Assistance for hygiene and clothing management.   RevisedToileting from Bedside commode with Min A Met  Bathing from  shower chair/bench with Stand-by Assistance.  Bathing from shower bench with Mod A   Supine to sit with Supervision.   Stand pivot transfers with Stand-by Assistance.  Toilet transfer to bedside commode with Stand-by Assistance.  Revised:  Toilet transfer with Min A Met  Upper extremity exercise program 3 x 15 reps per handout, with independence.  Patient will complete a functional standing activity with S for activity tolerance in order to perform household tasks.  Goal Discontinued on 11-26-18    Outcome: Unable to achieve outcome(s) by discharge  Patient's was unable to meet all goals. Patient is limited by pain.  DACIA Regalado  12/4/2018

## 2018-12-05 ENCOUNTER — TELEPHONE (OUTPATIENT)
Dept: FAMILY MEDICINE | Facility: CLINIC | Age: 83
End: 2018-12-05

## 2018-12-05 ENCOUNTER — PATIENT OUTREACH (OUTPATIENT)
Dept: ADMINISTRATIVE | Facility: CLINIC | Age: 83
End: 2018-12-05

## 2018-12-05 NOTE — TELEPHONE ENCOUNTER
Left message requesting a callback.  Patient was scheduled for a hospital follow up today with [facility] colleague, Dr Lara.  Appointment was cancelled via text message.

## 2018-12-06 ENCOUNTER — PATIENT OUTREACH (OUTPATIENT)
Dept: ADMINISTRATIVE | Facility: CLINIC | Age: 83
End: 2018-12-06

## 2018-12-06 ENCOUNTER — TELEPHONE (OUTPATIENT)
Dept: FAMILY MEDICINE | Facility: CLINIC | Age: 83
End: 2018-12-06

## 2018-12-06 NOTE — TELEPHONE ENCOUNTER
----- Message from Sade Rapp sent at 12/6/2018  9:14 AM CST -----  Contact: [phone] Marilee with [facility]   Marilee with [facility] stated that she will draw pt's labs today. Please advise.

## 2018-12-07 ENCOUNTER — TELEPHONE (OUTPATIENT)
Dept: FAMILY MEDICINE | Facility: CLINIC | Age: 83
End: 2018-12-07

## 2018-12-07 NOTE — TELEPHONE ENCOUNTER
Brittni with [facility] stated they received lab for patient, BMP, Mag and Phos.  Labs results were questionable so they cancelled them.  They wanted to let you know if you want these drawn again.  Please advise.

## 2018-12-07 NOTE — TELEPHONE ENCOUNTER
----- Message from Brittni Baca sent at 12/7/2018  7:23 AM CST -----  Regarding: [facility]  Contact: [phone]  Hi my name is Brittni I work in the [facility]. We had a problem with some lab work on this patient. If someone from your office could call us at [phone] or ext [phone] that would be great. Anyone in my department can help. Thank you

## 2018-12-10 ENCOUNTER — OFFICE VISIT (OUTPATIENT)
Dept: FAMILY MEDICINE | Facility: CLINIC | Age: 83
End: 2018-12-10
Attending: FAMILY MEDICINE
Payer: MEDICARE

## 2018-12-10 VITALS
HEIGHT: 62 IN | BODY MASS INDEX: 21.3 KG/M2 | OXYGEN SATURATION: 97 % | SYSTOLIC BLOOD PRESSURE: 131 MMHG | WEIGHT: 115.75 LBS | HEART RATE: 62 BPM | DIASTOLIC BLOOD PRESSURE: 63 MMHG

## 2018-12-10 DIAGNOSIS — D63.8 ANEMIA OF CHRONIC DISEASE: ICD-10-CM

## 2018-12-10 DIAGNOSIS — S22.080A T12 COMPRESSION FRACTURE: ICD-10-CM

## 2018-12-10 DIAGNOSIS — M06.9 RHEUMATOID ARTHRITIS, INVOLVING UNSPECIFIED SITE, UNSPECIFIED RHEUMATOID FACTOR PRESENCE: ICD-10-CM

## 2018-12-10 DIAGNOSIS — Z95.0 PACEMAKER: ICD-10-CM

## 2018-12-10 DIAGNOSIS — I50.32 CHRONIC DIASTOLIC CHF (CONGESTIVE HEART FAILURE): ICD-10-CM

## 2018-12-10 DIAGNOSIS — N18.30 CKD (CHRONIC KIDNEY DISEASE), STAGE III: ICD-10-CM

## 2018-12-10 DIAGNOSIS — S32.030G CLOSED COMPRESSION FRACTURE OF L3 LUMBAR VERTEBRA WITH DELAYED HEALING, SUBSEQUENT ENCOUNTER: Primary | ICD-10-CM

## 2018-12-10 DIAGNOSIS — I71.40 ABDOMINAL AORTIC ANEURYSM (AAA) WITHOUT RUPTURE: ICD-10-CM

## 2018-12-10 DIAGNOSIS — I10 ESSENTIAL HYPERTENSION: ICD-10-CM

## 2018-12-10 DIAGNOSIS — E78.2 MIXED HYPERLIPIDEMIA: ICD-10-CM

## 2018-12-10 DIAGNOSIS — F41.9 ANXIETY: ICD-10-CM

## 2018-12-10 PROCEDURE — 99999 PR PBB SHADOW E&M-EST. PATIENT-LVL III: CPT | Mod: PBBFAC,,, | Performed by: FAMILY MEDICINE

## 2018-12-10 PROCEDURE — 99496 TRANSJ CARE MGMT HIGH F2F 7D: CPT | Mod: S$PBB,,, | Performed by: FAMILY MEDICINE

## 2018-12-10 PROCEDURE — 99213 OFFICE O/P EST LOW 20 MIN: CPT | Mod: PBBFAC,PO | Performed by: FAMILY MEDICINE

## 2018-12-10 PROCEDURE — 99496 TRANSJ CARE MGMT HIGH F2F 7D: CPT | Mod: PBBFAC,PO | Performed by: FAMILY MEDICINE

## 2018-12-10 NOTE — PROGRESS NOTES
Transitional Care Note  Subjective:       Patient ID: Karly Sandoval is a 88 y.o. female.  Chief Complaint: Hospital Follow Up    Family and/or Caretaker present at visit?  Yes.  Diagnostic tests reviewed/disposition: I have reviewed all completed as well as pending diagnostic tests at the time of discharge.  Disease/illness education: yes  Home health/community services discussion/referrals: Patient does not have home health established from hospital visit.  They do not need home health.  If needed, we will set up home health for the patient.   Establishment or re-establishment of referral orders for community resources: No other necessary community resources.   Discussion with other health care providers: No discussion with other health care providers necessary.   88 year old white female  with RA (on plaquenil), HFpEF (noted diastolic dysfunction in echo on 2016), essential hypertension, hyperlipidemia, chronic compression fx of T8-T9, new T12 compression fracture  and new cystic pancreatic head mass who [facility] for evaluation of worsening back pain. She was also was  treated for a positive UA with E. Coli and Proteus, where she completed a 7 day course of ciprofloxacin along with management for CHARLENE [facility]. She was deemed not to be a surgical candidate for her T12 compression fracture by Neurosurgery and medical management was opted instead due to the nature of the fracture. Patient worked with therapy while inpatient and SNF placement was suggested.     Hospital Course:  11/20/18: Patient admitted to SNF for ongoing PT/OT following a hospitalization for T12 compression fracture. she improved pain to back with TLSO brace and discomfort with brace.  11/28: UTI noted on UA and culture, started on Cipro, pending completion of susceptibility  11/29: patient discharged from SNF and sent to assisted living facility      RA - CURRENTLY MANAGED WITH PLAQUENIL 200 MG DAILY      LOW BACK  PAIN FROM FRACTURE - CURRENTLY PAIN CONTROLLED WITH TYLENOL 500 MG AS NEEDED AND ICY HOT PATCH AS NEEDED      ANXIETY - CURRENTLY CONTROLLED WITH LEXAPRO DAILY AND XANAX AS NEEDED - NO SI/HI      HTN - CONTROLLED - ON AMLODIPINE AND COREG DAILY - NO HYPOTENSION OR ANY SIDE EFFECTS       HLD - CONTROLLED - ON STATIN DAILY - NO SIDE EFFECTS      ANEMIA OF CHRONIC DZ AND CKD III - STABLE - NO WORSENING SINCE DISCHARGE      SHE IS FOLLOWING NEUROSURGERY FOR BACK      HISTORY AS BELOW - REVIEWED           Review of Systems   Constitutional: Positive for fatigue. Negative for activity change, diaphoresis and unexpected weight change.   HENT: Negative.  Negative for congestion, ear discharge, hearing loss, rhinorrhea, sore throat and voice change.    Eyes: Negative.  Negative for pain, discharge and visual disturbance.   Respiratory: Negative.  Negative for chest tightness, shortness of breath and wheezing.    Cardiovascular: Negative.  Negative for chest pain.   Gastrointestinal: Negative.  Negative for abdominal distention, anal bleeding, constipation and nausea.   Endocrine: Negative.  Negative for cold intolerance, polydipsia and polyuria.   Genitourinary: Negative.  Negative for decreased urine volume, difficulty urinating, dysuria, frequency, menstrual problem and vaginal pain.   Musculoskeletal: Positive for arthralgias, back pain and myalgias. Negative for gait problem.   Skin: Negative.  Negative for color change, pallor and wound.   Allergic/Immunologic: Negative.  Negative for environmental allergies and immunocompromised state.   Neurological: Positive for weakness. Negative for dizziness, tremors, seizures, speech difficulty and headaches.   Hematological: Negative.  Negative for adenopathy. Does not bruise/bleed easily.   Psychiatric/Behavioral: Negative.  Negative for agitation, confusion, decreased concentration, hallucinations, self-injury and suicidal ideas. The patient is not nervous/anxious.         Active Ambulatory Problems     Diagnosis Date Noted    RA (rheumatoid arthritis) 09/11/2012    Essential hypertension 09/11/2012    Osteopenia 09/11/2012    Mixed hyperlipidemia     CKD (chronic kidney disease), stage III 08/22/2013    Chronic diastolic CHF (congestive heart failure) 02/11/2014    Closed compression fracture of L3 lumbar vertebra 03/21/2014    Anemia of chronic disease 03/22/2014    History of CVA (cerebrovascular accident) 10/29/2014    Pacemaker 03/25/2015    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    Vitamin D deficiency 09/22/2015    Decreased functional mobility 08/17/2016    Anxiety 02/01/2018    Acute kidney injury superimposed on CKD 10/08/2018    Abdominal aortic aneurysm (AAA) without rupture 10/08/2018    Pancreatic lesion 10/09/2018    Acute bilateral low back pain without sciatica 10/17/2018    Hyponatremia 10/18/2018    Urge incontinence 10/18/2018    Constipation 10/22/2018    Debility 10/23/2018    Chronic prescription benzodiazepine use 10/23/2018    Urinary retention 10/24/2018    Compression fracture of T12 vertebra 10/30/2018    T12 compression fracture 11/13/2018    Acute hypoxemic respiratory failure 11/14/2018    Klebsiella cystitis 11/18/2018    Takes dietary supplements 11/26/2018    DNR (do not resuscitate) discussion 11/26/2018     Resolved Ambulatory Problems     Diagnosis Date Noted    Anemia 09/11/2012    GERD (gastroesophageal reflux disease) 09/11/2012    Chronic diastolic heart failure 12/26/2012    Acute otitis media, left 02/18/2013    Leg weakness 08/22/2013    ESR raised 08/22/2013    Difficulty walking 08/29/2013    CVA (cerebral vascular accident) 01/26/2014    Change in mental status 01/26/2014    CVA (cerebral infarction) 01/26/2014    CVA (cerebral infarction) 01/27/2014    UTI (lower urinary tract infection) 01/27/2014    Aphasia due to stroke 02/03/2014    Muscle weakness 02/04/2014    CVA (cerebral infarction)  02/04/2014    Difficulty walking 02/04/2014    Atrial arrhythmia 02/11/2014    PVCs (premature ventricular contractions) 02/11/2014    PFO (patent foramen ovale) 02/11/2014    Cerebral embolism without mention of cerebral infarction 02/17/2014    Asymptomatic carotid artery stenosis without infarction 02/20/2014    Asymptomatic carotid artery stenosis without infarction 02/20/2014    Back pain 03/22/2014    Pleural effusion 03/22/2014    Fall from bed 03/22/2014    PVC (premature ventricular contraction) 04/28/2014    Pre-operative clearance 04/28/2014    OP (osteoporosis) 06/25/2014    Compression fracture of thoracic vertebra 06/25/2014    Severe epistaxis 09/30/2014    Acute post-hemorrhagic anemia 10/01/2014    Mild protein-calorie malnutrition 10/02/2014    Syncope 11/14/2014    RBBB 11/14/2014    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    Cerebral embolism 12/19/2014    Osteoarthritis of both knees 06/12/2015    Difficulty walking 07/14/2015    Pain in limb 07/14/2015    Poor posture 07/14/2015    Thoracic spine pain 07/14/2015    Right knee pain 09/22/2015    Hyponatremia 07/22/2016    Peripheral edema 01/08/2018    CHARLENE (acute kidney injury) 09/24/2018    Right pelvic adnexal fluid collection 10/08/2018    Acute cystitis without hematuria 10/30/2018    Goals of care, counseling/discussion 11/14/2018     Past Medical History:   Diagnosis Date    Abdominal aortic aneurysm (AAA) without rupture 10/8/2018    Abnormal CT of the abdomen 10/8/2018    Acid reflux     Anemia     Anxiety     Atrial arrhythmia 2/11/2014    Carotid artery occlusion     Chronic prescription benzodiazepine use 10/23/2018    Compression fracture of thoracic vertebra 6/25/2014    CVA (cerebral infarction) 2014    Cyst of pancreas 10/9/2018    Diastolic heart failure     Diverticulosis     Encounter for blood transfusion     GERD (gastroesophageal reflux disease) 9/11/2012    History  of cholelithiasis     Hyperlipidemia     Hypertension     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    Osteoarthritis     Osteoarthritis of both knees 6/12/2015    Osteopenia     PFO (patent foramen ovale) 2/11/2014    PVC (premature ventricular contraction) 4/28/2014    RBBB 11/14/2014    Rheumatoid arthritis(714.0)     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)     Right pelvic adnexal fluid collection 10/8/2018     Past Surgical History:   Procedure Laterality Date    APPENDECTOMY      BREAST SURGERY      CARDIAC PACEMAKER PLACEMENT  11/2014    for syncope and RBBB/LAHB    CHOLECYSTECTOMY      EYE SURGERY      HEMORRHOID SURGERY      HYSTERECTOMY      1966    SKIN BIOPSY      VASCULAR SURGERY      VERTEBROPLASTY       Family History   Problem Relation Age of Onset    Leukemia Father     Heart disease Father     Hypertension Father     Diabetes Mellitus Mother     Hypertension Mother     Diabetes Mellitus Brother     Parkinsonism Brother 68    Heart attack Neg Hx     Heart failure Neg Hx     Hyperlipidemia Neg Hx     Stroke Neg Hx      Social History     Socioeconomic History    Marital status:      Spouse name: Not on file    Number of children: Not on file    Years of education: Not on file    Highest education level: Not on file   Social Needs    Financial resource strain: Not on file    Food insecurity - worry: Not on file    Food insecurity - inability: Not on file    Transportation needs - medical: Not on file    Transportation needs - non-medical: Not on file   Occupational History    Not on file   Tobacco Use    Smoking status: Never Smoker    Smokeless tobacco: Never Used   Substance and Sexual Activity    Alcohol use: No     Comment: mitchell    Drug use: No    Sexual activity: No     Partners: Male     Birth control/protection: None   Other Topics Concern    Not on file   Social History Narrative    Not  on file     Review of patient's allergies indicates:   Allergen Reactions    Amoxicillin Other (See Comments)     unknown    Bactrim [sulfamethoxazole-trimethoprim] Other (See Comments)     unknown    Omeprazole Other (See Comments)     Muscle and joint pain    Rocephin [ceftriaxone] Other (See Comments)     Severe acute generalized pain    Penicillins Rash     Current Outpatient Medications on File Prior to Visit   Medication Sig Dispense Refill    acetaminophen (TYLENOL) 650 MG TbSR Take 650 mg by mouth daily as needed (PAIN).      albuterol sulfate 2.5 mg/0.5 mL Nebu Take 2.5 mg by nebulization. Rescue      ALPRAZolam (XANAX) 0.25 MG tablet TAKE 1 TABLET BY MOUTH EVERY DAY AS NEEDED (Patient taking differently: TAKE ½ TABLET BY MOUTH TWICE DAILY) 30 tablet 1    amLODIPine (NORVASC) 10 MG tablet Take 10 mg by mouth once daily.      aspirin (ECOTRIN) 81 MG EC tablet Take 1 tablet (81 mg total) by mouth once daily. HOLD until cleared by your PCP  0    atorvastatin (LIPITOR) 20 MG tablet TAKE 1 TABLET BY MOUTH EVERY DAY 90 tablet 3    calcium carbonate (CALCIUM 600 ORAL) Take 1 tablet by mouth 2 (two) times daily.      carvedilol (COREG) 6.25 MG tablet Take 1 tablet (6.25 mg total) by mouth 2 (two) times daily with meals. 60 tablet 6    denosumab (PROLIA) 60 mg/mL Syrg Inject 60 mg into the skin. Every 6 months      escitalopram oxalate (LEXAPRO) 10 MG tablet Take 1 tablet (10 mg total) by mouth once daily. 30 tablet 1    folic acid (FOLVITE) 1 MG tablet TAKE 1 TABLET (1 MG TOTAL) BY MOUTH ONCE DAILY. 90 tablet 3    furosemide (LASIX) 20 MG tablet Take 1 tablet (20 mg total) by mouth every Mon, Wed, Fri. 15 tablet 1    hydroxychloroquine (PLAQUENIL) 200 mg tablet Take 1 tablet (200 mg total) by mouth once daily. 30 tablet 1    lidocaine (LIDODERM) 5 % Place 2 patches onto the skin once daily. Remove & Discard patch within 12 hours or as directed by MD Otero patch 1    ondansetron (ZOFRAN-ODT) 8 MG  TbDL Take 1 tablet (8 mg total) by mouth every 6 (six) hours as needed. 15 tablet 0     No current facility-administered medications on file prior to visit.        Objective:       Vitals:    12/10/18 0958   BP: 131/63   Pulse: 62       Physical Exam   Constitutional: She is oriented to person, place, and time. She appears well-developed and well-nourished. No distress.   HENT:   Head: Normocephalic and atraumatic.   Right Ear: External ear normal.   Left Ear: External ear normal.   Nose: Nose normal.   Mouth/Throat: Oropharynx is clear and moist. No oropharyngeal exudate.   Eyes: Conjunctivae and EOM are normal. Pupils are equal, round, and reactive to light. Right eye exhibits no discharge. Left eye exhibits no discharge. No scleral icterus.   Neck: Normal range of motion. Neck supple. No JVD present. No tracheal deviation present. No thyromegaly present.   Cardiovascular: Normal rate, regular rhythm, normal heart sounds and intact distal pulses. Exam reveals no gallop and no friction rub.   No murmur heard.  Pulmonary/Chest: Effort normal and breath sounds normal. No stridor. She has no wheezes. She has no rales. She exhibits no tenderness.   Abdominal: Soft. Bowel sounds are normal. She exhibits no distension and no mass. There is no tenderness. There is no rebound and no guarding. No hernia.   Musculoskeletal: Normal range of motion. She exhibits no edema or tenderness (MILD ttp LOWER BACK AND PAINFUL ROM. TSLO BRACE IN PLACE.).   Lymphadenopathy:     She has no cervical adenopathy.   Neurological: She is alert and oriented to person, place, and time. She has normal reflexes. She displays normal reflexes. No cranial nerve deficit. She exhibits normal muscle tone. Coordination normal.   Skin: Skin is warm and dry. No rash noted. She is not diaphoretic. No erythema. No pallor.   Psychiatric: She has a normal mood and affect. Her behavior is normal. Judgment and thought content normal.       Assessment:       1.  Closed compression fracture of L3 lumbar vertebra with delayed healing, subsequent encounter    2. T12 compression fracture    3. Anxiety    4. Essential hypertension    5. Mixed hyperlipidemia    6. Chronic diastolic CHF (congestive heart failure)    7. Pacemaker    8. Abdominal aortic aneurysm (AAA) without rupture    9. CKD (chronic kidney disease), stage III    10. Anemia of chronic disease    11. Rheumatoid arthritis, involving unspecified site, unspecified rheumatoid factor presence        Plan:           Karly was seen today for hospital follow up.    Diagnoses and all orders for this visit:    Closed compression fracture of L3 lumbar vertebra with delayed healing, subsequent encounter    T12 compression fracture    Anxiety    Essential hypertension    Mixed hyperlipidemia    Chronic diastolic CHF (congestive heart failure)    Pacemaker    Abdominal aortic aneurysm (AAA) without rupture    CKD (chronic kidney disease), stage III    Anemia of chronic disease    Rheumatoid arthritis, involving unspecified site, unspecified rheumatoid factor presence      HOSPITAL DC FOLLOW UP  -CONTINUE IN ASSISTED LIVING FACILITY  -LABS REVIEWED     HTN  -CONTROLLED    LOW BACK PAIN WITH FRACTURE VERTEBRA  -IMPROVING WELL WITH BRACE    HLD  -CONTROLLED    ANXIETY  -CONTROLLED    RA  -CONTROLLED    CKD III/ANEMIA  -STABLE    Spent adequate time in obtaining history and explaining differentials    40 minutes spent during this visit of which greater than 50% devoted to face-face counseling and coordination of care regarding diagnosis and management plan    Follow-up in about 3 months (around 3/10/2019), or if symptoms worsen or fail to improve.

## 2018-12-11 DIAGNOSIS — M06.9 RHEUMATOID ARTHRITIS, INVOLVING UNSPECIFIED SITE, UNSPECIFIED RHEUMATOID FACTOR PRESENCE: ICD-10-CM

## 2018-12-11 RX ORDER — HYDROXYCHLOROQUINE SULFATE 200 MG/1
200 TABLET, FILM COATED ORAL DAILY
Qty: 30 TABLET | Refills: 1 | Status: SHIPPED | OUTPATIENT
Start: 2018-12-11 | End: 2018-12-11 | Stop reason: SDUPTHER

## 2018-12-11 RX ORDER — HYDROXYCHLOROQUINE SULFATE 200 MG/1
TABLET, FILM COATED ORAL
Qty: 90 TABLET | Refills: 1 | Status: SHIPPED | OUTPATIENT
Start: 2018-12-11

## 2018-12-12 ENCOUNTER — TELEPHONE (OUTPATIENT)
Dept: FAMILY MEDICINE | Facility: CLINIC | Age: 83
End: 2018-12-12

## 2018-12-12 NOTE — TELEPHONE ENCOUNTER
----- Message from Lia Rojas sent at 12/12/2018 11:02 AM CST -----  Contact: [phone]/ Nurse Root with [facility]  Requesting call back for clarification on how many times per day should pt take Rx of Tylenol as well as how many times should icy hot patch be applied. Please call to further discuss and advise.

## 2018-12-13 ENCOUNTER — TELEPHONE (OUTPATIENT)
Dept: ADMINISTRATIVE | Facility: CLINIC | Age: 83
End: 2018-12-13

## 2018-12-13 ENCOUNTER — TELEPHONE (OUTPATIENT)
Dept: FAMILY MEDICINE | Facility: CLINIC | Age: 83
End: 2018-12-13

## 2018-12-13 NOTE — PROGRESS NOTES
Attended IDT meeting at [facility] to obtain patient update. Patient D/C'd to assisted living on 12/4/2018.

## 2018-12-13 NOTE — TELEPHONE ENCOUNTER
She can take Tylenol 325 mg, to 1-2 pills up to 4 times a day only as needed for pain. This is not a scheduled medication and should not be given to the patient on a regular basis    Icy Hot can be applied up to 4 times a day, again as needed, does not have to be a regular treatment

## 2018-12-13 NOTE — TELEPHONE ENCOUNTER
Spoke with nurse at assisted living facility.  States she would need orders specifying changes to medications.  Is requesting if we can resubmit for hte tylenol and the icy hot patches.  Also stated that the family has liquid tylenol currently and would like to finish the liquid tylenol before starting the tablets.  Also needs a new prescription for the 81 mg aspirin due to the medication list stating to hold until cleared by PCP.

## 2018-12-17 ENCOUNTER — TELEPHONE (OUTPATIENT)
Dept: FAMILY MEDICINE | Facility: CLINIC | Age: 83
End: 2018-12-17

## 2018-12-17 NOTE — TELEPHONE ENCOUNTER
----- Message from Ambrose Das sent at 2018 10:30 AM CST -----  Contact: cindi//[facility] [phone]  Caller wanted to know if the doctor would sign the patient death certificate ( on 12/15/18). Please call.

## 2018-12-18 ENCOUNTER — TELEPHONE (OUTPATIENT)
Dept: FAMILY MEDICINE | Facility: CLINIC | Age: 83
End: 2018-12-18

## 2018-12-18 NOTE — TELEPHONE ENCOUNTER
----- Message from Uday Allen MD sent at 12/17/2018  5:14 PM CST -----  Contact: [phone]/ Renetta with 's Office   Send me what you can.  ----- Message -----  From: Caroline Bullock LPN  Sent: 12/17/2018   9:29 AM  To: Uday Allen MD    Do you do this?  ----- Message -----  From: Lia Rojas  Sent: 12/17/2018   9:07 AM  To: [facility] Staff    Called in to notify you that pt was found unresponsive while in bed at assisted living facility on 12/15/18 at 6:47 a.m. Paramedics tried to resuscitate, 's office did not perform autopsy. Requesting  sign certificate.

## 2018-12-19 ENCOUNTER — TELEPHONE (OUTPATIENT)
Dept: FAMILY MEDICINE | Facility: CLINIC | Age: 83
End: 2018-12-19

## 2018-12-19 NOTE — TELEPHONE ENCOUNTER
----- Message from Isai Huynh sent at 2018  1:28 PM CST -----  Contact: Tonya griffin/[facility] [phone]  Tonya is calling to speak with you concerning the Dr signing the patient death certificate  Please call and advise

## 2018-12-19 NOTE — TELEPHONE ENCOUNTER
Tonya with  Home states that due to the new process, they cannot print a death certificate, it has to be done in [place].  States there is a hotline that you can call to get an immediate pin # to get in, [phone]

## 2018-12-20 ENCOUNTER — TELEPHONE (OUTPATIENT)
Dept: FAMILY MEDICINE | Facility: CLINIC | Age: 83
End: 2018-12-20

## 2018-12-20 NOTE — TELEPHONE ENCOUNTER
I have already registered with the system and just filled out the death certificate around 12:00 or 12 30 p.m. on today 12/20/2018.  It looks like it went through because after I submitted it, it showed that I had no current pending form to fill out.

## 2018-12-20 NOTE — TELEPHONE ENCOUNTER
Spoke with Alta from the  home. I explained to her that the death certificate was completed online, She stated it was not in their system. She informed me that you have to register with the [facility]. The e-mail is [email or web address]

## 2018-12-24 ENCOUNTER — TELEPHONE (OUTPATIENT)
Dept: ADMINISTRATIVE | Facility: CLINIC | Age: 83
End: 2018-12-24

## 2018-12-24 NOTE — ED PROVIDER NOTES
Encounter Date: 10/8/2018       History     Chief Complaint   Patient presents with    Emesis     c/o vomiting and diarrhea since Saturday. Also c/o blood in stool. Went to urgent care today and was sent to the ER for these symptoms as well as some abnormal labs today     HPI  Review of patient's allergies indicates:   Allergen Reactions    Amoxicillin Other (See Comments)     unknown    Bactrim [sulfamethoxazole-trimethoprim] Other (See Comments)     unknown    Omeprazole Other (See Comments)     Muscle and joint pain    Rocephin [ceftriaxone] Other (See Comments)     Severe acute generalized pain    Penicillins Rash     Past Medical History:   Diagnosis Date    Abdominal aortic aneurysm (AAA) without rupture 10/8/2018    Abnormal CT of the abdomen 10/8/2018    Acid reflux     Anemia     Anxiety     Atrial arrhythmia 2/11/2014    Carotid artery occlusion     Chronic prescription benzodiazepine use 10/23/2018    Compression fracture of thoracic vertebra 6/25/2014    CVA (cerebral infarction) 2014    Cyst of pancreas 10/9/2018    Diastolic heart failure     echo 2016 ef 55% +Diastolic dysf    Diverticulosis     Encounter for blood transfusion     GERD (gastroesophageal reflux disease) 9/11/2012    History of cholelithiasis     Hyperlipidemia     Hypertension     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    Osteoarthritis     Osteoarthritis of both knees 6/12/2015    Osteopenia     PFO (patent foramen ovale) 2/11/2014    PVC (premature ventricular contraction) 4/28/2014    RVOT origin -- benign     RBBB 11/14/2014    Rheumatoid arthritis(714.0)     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)     has pacemaker    Right pelvic adnexal fluid collection 10/8/2018     Past Surgical History:   Procedure Laterality Date    APPENDECTOMY      BREAST SURGERY      CARDIAC PACEMAKER PLACEMENT  11/2014    for syncope and RBBB/LAHB    CHOLECYSTECTOMY       EYE SURGERY      HEMORRHOID SURGERY      HYSTERECTOMY      1966    SKIN BIOPSY      VASCULAR SURGERY      VERTEBROPLASTY       Family History   Problem Relation Age of Onset    Leukemia Father     Heart disease Father     Hypertension Father     Diabetes Mellitus Mother     Hypertension Mother     Diabetes Mellitus Brother     Parkinsonism Brother 68    Heart attack Neg Hx     Heart failure Neg Hx     Hyperlipidemia Neg Hx     Stroke Neg Hx      Social History     Tobacco Use    Smoking status: Never Smoker    Smokeless tobacco: Never Used   Substance Use Topics    Alcohol use: No     Comment: mitchell    Drug use: No     Review of Systems    Physical Exam     Initial Vitals [10/08/18 1458]   BP Pulse Resp Temp SpO2   (!) 147/66 62 20 98.5 °F (36.9 °C) 98 %      MAP       --         Physical Exam    ED Course   Procedures  Labs Reviewed   CBC W/ AUTO DIFFERENTIAL - Abnormal; Notable for the following components:       Result Value    RBC 3.35 (*)     Hemoglobin 10.5 (*)     Hematocrit 30.3 (*)     MCH 31.3 (*)     Lymph # 0.9 (*)     Gran% 73.7 (*)     Lymph% 16.1 (*)     All other components within normal limits   COMPREHENSIVE METABOLIC PANEL - Abnormal; Notable for the following components:    Sodium 126 (*)     Chloride 93 (*)     BUN, Bld 42 (*)     Creatinine 1.7 (*)     eGFR if  31 (*)     eGFR if non  27 (*)     All other components within normal limits   LIPASE - Abnormal; Notable for the following components:    Lipase 230 (*)     All other components within normal limits   AMYLASE - Abnormal; Notable for the following components:    Amylase 164 (*)     All other components within normal limits   HEMOGLOBIN A1C - Abnormal; Notable for the following components:    Hemoglobin A1C 5.9 (*)     All other components within normal limits   URINALYSIS, REFLEX TO URINE CULTURE    Narrative:     Preferred Collection Type->Urine, Clean Catch   AMYLASE           Imaging Results          US Abdomen Limited (Final result)  Result time 10/08/18 21:38:09    Final result by Figueroa Chapman MD (10/08/18 21:38:09)                 Impression:      2.8 x 1.6 x 1.5 cm cystic lesion in the pancreatic head.  Differential considerations remain between cystic neoplasm of the pancreas and pancreatic pseudocyst.  MRCP/ERCP follow-up is suggested.    Status post cholecystectomy.  No abnormality in the gallbladder fossa.    No evidence of intrahepatic or extrahepatic biliary dilatation.      Electronically signed by: Figueroa Chapman MD  Date:    10/08/2018  Time:    21:38             Narrative:    EXAMINATION:  US ABDOMEN LIMITED    CLINICAL HISTORY:  right upper quadrant ultrasound to evaluate biliary tree and pancreatic head; Acute kidney failure, unspecified    TECHNIQUE:  Limited ultrasound of the right upper quadrant of the abdomen (including pancreas, liver, gallbladder, common bile duct, and right kidney) was performed.    COMPARISON:  CT scan of the abdomen and pelvis dated 10/08/2018.    FINDINGS:  There is a 2.8 x 1.6 x 1.5 cm cystic lesion in the pancreatic head.  This corresponds to the abnormality identified on the preceding CT scan.  No additional peripancreatic fluid collection is identified.    The abdominal aorta is not visualized secondary to overlying bowel gas.  The intrahepatic IVC is also not visualized.    The patient is status post cholecystectomy.  No abnormality is identified within the gallbladder fossa.    The CBD measures 4 mm.  There is no evidence of intrahepatic or extrahepatic biliary dilatation.    The liver measures 14.2 cm.  The hepatic parenchyma is homogeneous.  No discrete hepatic mass is present.    The right kidney measures 11 cm.  There is no evidence of hydronephrosis or nephrolithiasis.    There is no evidence of free fluid.                               CT Renal Stone Study ABD Pelvis WO (Final result)  Result time 10/08/18 17:44:32    Final result  by Figueroa Chapman MD (10/08/18 17:44:32)                 Impression:      2.1 cm fluid attenuation lesion within the pancreatic head.  No significant inflammatory changes surrounding the pancreas.  Differential considerations would include a pancreatic pseudocyst versus cystic neoplasm of the pancreas.  MRCP/MRI of the abdomen without and with contrast may be obtained for further evaluation.    Minimal fluid prominence of the small bowel loops.  The findings may represent enteritis.    Scattered colonic diverticula without acute diverticulitis.    3.8 cm fluid attenuation lesion within the right adnexa.  Pelvic ultrasound may be obtained for further evaluation.    3.5 cm aneurysmal dilatation of the suprarenal abdominal aorta along with advanced atherosclerotic disease of the abdominal aorta and its branch vessels.    Additional findings as above.      Electronically signed by: Figueroa Chapman MD  Date:    10/08/2018  Time:    17:44             Narrative:    EXAMINATION:  CT RENAL STONE STUDY ABD PELVIS WO    CLINICAL HISTORY:  vomiting, diarrhea, elevated lipase;    TECHNIQUE:  Axial CT scan of the abdomen and pelvis was obtained from the level of the hemidiaphragms to the pubic symphysis without intravenous contrast. Coronal and sagittal reformats were obtained.  The overall examination is somewhat degraded by motion.    COMPARISON:  Abdomen x-ray dated 10/08/2018.    FINDINGS:  There are no pleural effusions.  There is no evidence of a pneumothorax.  There are nodular airspace opacities within the lingula.    There are partially visualized pacemaker leads.  There are extensive mitral annular calcifications.  No pericardial effusion is present.  There are extensive calcifications along the course of the abdominal aorta and its branch vessels.  There is suggestion of there is aneurysm or dilatation of the suprarenal abdominal aorta measuring 3.5 cm.  There is no evidence of lymphadenopathy in the abdomen or  pelvis.    The esophagus, stomach, and duodenum are within normal limits.  There is minimal distention of the small bowel loops.  The appendix is not visualized.  There are no secondary findings of acute appendicitis.  There is extensive colonic diverticula without evidence of acute diverticulitis.    The liver is unremarkable.  The patient is status post cholecystectomy.  There is dilatation of the extrahepatic biliary tree.  The spleen is unremarkable.  There is poor definition of the pancreas.  There is a 2.1 cm cystic lesion in the region of the pancreatic head.  This is poorly defined due to adjacent soft tissue.    The adrenal glands are within normal limits.  There is no evidence of nephrolithiasis.  The distal ureters are poorly delineated.  There is no evidence of obstructing stone in the ureters.  The urinary bladder is distended.  The patient is status post hysterectomy.  There is a 3.8 x 3.6 cm fluid attenuation lesion in the right adnexa.  The remaining adnexal structures are within normal limits.    There is no evidence of free fluid in the pelvis.  There is no evidence of free air.  There is no evidence of pneumatosis.    The psoas margins are unremarkable.  The abdominal wall is within normal limits.  There are chronic appearing left-sided rib fractures.  There are bilateral pars defects at the L5-S1 level associated grade 1 anterolisthesis of L5 on S1.                               X-Ray Abdomen Flat And Erect (Final result)  Result time 10/08/18 16:18:10    Final result by Xu Flores MD (10/08/18 16:18:10)                 Impression:      Stable abdominal exam.  No acute or obstructive process.      Electronically signed by: Xu Flores MD  Date:    10/08/2018  Time:    16:18             Narrative:    EXAMINATION:  XR ABDOMEN FLAT AND ERECT    CLINICAL HISTORY:  Vomiting, unspecified    TECHNIQUE:  Flat and erect AP views of the abdomen were performed.    COMPARISON:  Two  thousand fifteen    FINDINGS:  Postop changes gallbladder fossa, left subclavian pacemaker, postop vertebroplasty dorsal spine vertebral body stable.  No free air or abnormal GI tract distention.  Mild fecal debris large bowel distribution.  Vascular type calcifications within pelvis and aorta stable.                                                   ED Course as of Dec 23 2008   Mon Oct 08, 2018   1910 Attestation: The patient was seen independently from the midlevel or resident. The management and disposition was discussed with me.   The patient presents the emergency department with abdominal pain and nausea vomiting. The patient has an elevated lipase in the emergency department today, her sodium is 126, her chloride is 93.  Her creatinine is 1.7.  She had a CT of her abdomen which shows a cystic mass at the pancreatic head.  Differential includes pseudocyst and cystic mass. She will be admitted to the [facility] hospitalist service for further workup and evaluation of this mass and her vomiting and pain.  [ST]      ED Course User Index  [ST] Emily Carlos MD     Clinical Impression:   The primary encounter diagnosis was CHARLENE (acute kidney injury). Diagnoses of Vomiting and diarrhea, Abnormal laboratory test, Acute pancreatitis, unspecified complication status, unspecified pancreatitis type, Abdominal aortic aneurysm (AAA) without rupture, Abnormal CT of the abdomen, Anemia of chronic disease, Atrial arrhythmia, Chronic congestive heart failure with left ventricular diastolic dysfunction, Cyst of pancreas, Elevated lipase, History of CVA (cerebrovascular accident), Pure hypercholesterolemia, Essential hypertension, Wheezing, Decreased functional mobility, and Back pain, unspecified back location, unspecified back pain laterality, unspecified chronicity were also pertinent to this visit.                             Emily Carlos MD  12/23/18 2009

## 2018-12-24 NOTE — TELEPHONE ENCOUNTER
[facility] Recert 12/11/2018 - 02/08/2019 with [facility] ([place]) - Dr. Uday Allen. Patient received SN, PT and OT services.

## 2019-07-16 ENCOUNTER — TELEPHONE (OUTPATIENT)
Dept: CARDIOLOGY | Facility: HOSPITAL | Age: 84
End: 2019-07-16

## 2019-07-16 NOTE — TELEPHONE ENCOUNTER
----- Message from Alaina Goins sent at 7/16/2019  3:40 PM CDT -----  Contact: Patient's daughter in law [phone]  Abigail is calling to see what should she do with the device stuff she has at her house. Please call her back @ [phone]. Abigail states that everything is all boxed up and she just needs to know what to do with it. Thanks, Alaina

## 2020-05-12 RX ORDER — ESCITALOPRAM OXALATE 10 MG/1
TABLET ORAL
Qty: 90 TABLET | Refills: 1 | OUTPATIENT
Start: 2020-05-12

## 2020-05-12 RX ORDER — FUROSEMIDE 20 MG/1
TABLET ORAL
Qty: 38 TABLET | Refills: 1 | OUTPATIENT
Start: 2020-05-12

## 2021-12-23 NOTE — ASSESSMENT & PLAN NOTE
- for RBBB/LB issues  - stable     Called patient with [employer]  Mikaela ID# [id number]  Left message to call back     Patient has read [facility] message    Dizziness   Message [phone]  From   Samara Hollingsworth RN To   Ritesh Robles, [address] and Delivered   12/22/2021 11:59 AM   [place]

## 2022-01-01 NOTE — PLAN OF CARE
Problem: Patient Care Overview  Goal: Plan of Care Review  Outcome: Ongoing (interventions implemented as appropriate)  Recommendations  1. Continue current Regular diet; Boost Plus Vanilla TID  2. Encourage asking for meal preferences daily and good po intake.   Goals: Increased po intake >/= 50% of meals and ONS by next RD visit.  Goal met        Moderate Malnutrition in the context of Acute Illness/Injury  Related to (etiology):  Decreased appetite and fatigue   Signs and Symptoms (as evidenced by):  Energy Intake: <75% of estimated energy requirement for > 1 week  Body Fat Depletion: moderate depletion of triceps   Muscle Mass Depletion: moderate depletion of clavicle region, scapular region and interosseous muscle   Weight Loss: 5% x 1 month   Fluid Accumulation: mild  Interventions (treatment strategy):   Collaboration with other providers  Nutrition Diagnosis Status:   Continues       Statement Selected

## 2023-03-23 NOTE — ASSESSMENT & PLAN NOTE
New on imaging but likely present since admit  Continue lidoderm patches and scheduled tylenol  TLSO brace ordered.  Patient will need f/u with neurosurgery next available.   Mart-1 - Negative Histology Text: MART-1 staining demonstrates a normal density and pattern of melanocytes along the dermal-epidermal junction. The surgical margins are negative for tumor cells. Yes

## 2024-12-10 NOTE — PLAN OF CARE
Problem: Patient Care Overview  Goal: Plan of Care Review  Outcome: Revised  Repositions independently, no new skin breakdowns noted. Afebrile. Monitored for pain and safety. Safety maintained. TLSO brace OOB. Pain patches and pain meds effective       No